# Patient Record
Sex: MALE | Race: WHITE | NOT HISPANIC OR LATINO | Employment: OTHER | URBAN - NONMETROPOLITAN AREA
[De-identification: names, ages, dates, MRNs, and addresses within clinical notes are randomized per-mention and may not be internally consistent; named-entity substitution may affect disease eponyms.]

---

## 2017-01-19 DIAGNOSIS — I10 BENIGN ESSENTIAL HYPERTENSION: Primary | Chronic | ICD-10-CM

## 2017-01-19 DIAGNOSIS — I10 HTN, GOAL BELOW 140/90: ICD-10-CM

## 2017-01-19 NOTE — TELEPHONE ENCOUNTER
HYDROCHLOROTHIAZIDE      Last Written Prescription Date: 12/23/16  Last Fill Quantity: 30, # refills: 0  Last Office Visit with [facility], [facility] or [facility] prescribing provider: 3/15/16       POTASSIUM   Date Value Ref Range Status   03/16/2016 4.2 3.4 - 5.3 mmol/L Final     CREATININE   Date Value Ref Range Status   03/16/2016 1.02 0.66 - 1.25 mg/dL Final     BP Readings from Last 3 Encounters:   09/15/16 142/74   06/16/16 141/65   03/15/16 132/66     LISINOPRIL      Last Written Prescription Date: 10/7/16  Last Fill Quantity: 30, # refills: 0  Last Office Visit with [facility], [facility] or [facility] prescribing provider: 3/15/16       POTASSIUM   Date Value Ref Range Status   03/16/2016 4.2 3.4 - 5.3 mmol/L Final     CREATININE   Date Value Ref Range Status   03/16/2016 1.02 0.66 - 1.25 mg/dL Final     BP Readings from Last 3 Encounters:   09/15/16 142/74   06/16/16 141/65   03/15/16 132/66

## 2017-01-20 ENCOUNTER — ALLIED HEALTH/NURSE VISIT (OUTPATIENT)
Dept: FAMILY MEDICINE | Facility: CLINIC | Age: 77
End: 2017-01-20
Payer: COMMERCIAL

## 2017-01-20 VITALS — SYSTOLIC BLOOD PRESSURE: 142 MMHG | RESPIRATION RATE: 16 BRPM | DIASTOLIC BLOOD PRESSURE: 68 MMHG | HEART RATE: 88 BPM

## 2017-01-20 DIAGNOSIS — E78.5 HYPERLIPIDEMIA WITH TARGET LDL LESS THAN 130: Primary | Chronic | ICD-10-CM

## 2017-01-20 DIAGNOSIS — I10 BENIGN ESSENTIAL HYPERTENSION: Chronic | ICD-10-CM

## 2017-01-20 DIAGNOSIS — R73.9 ELEVATED BLOOD SUGAR: Primary | ICD-10-CM

## 2017-01-20 LAB
ANION GAP SERPL CALCULATED.3IONS-SCNC: 8 MMOL/L (ref 3–14)
BUN SERPL-MCNC: 19 MG/DL (ref 7–30)
CALCIUM SERPL-MCNC: 9.1 MG/DL (ref 8.5–10.1)
CHLORIDE SERPL-SCNC: 104 MMOL/L (ref 94–109)
CO2 SERPL-SCNC: 27 MMOL/L (ref 20–32)
CREAT SERPL-MCNC: 1.04 MG/DL (ref 0.66–1.25)
GFR SERPL CREATININE-BSD FRML MDRD: 70 ML/MIN/1.7M2
GLUCOSE SERPL-MCNC: 139 MG/DL (ref 70–99)
POTASSIUM SERPL-SCNC: 3.8 MMOL/L (ref 3.4–5.3)
SODIUM SERPL-SCNC: 139 MMOL/L (ref 133–144)

## 2017-01-20 PROCEDURE — 99207 ZZC NO CHARGE NURSE ONLY: CPT

## 2017-01-20 PROCEDURE — 36415 COLL VENOUS BLD VENIPUNCTURE: CPT | Performed by: NURSE PRACTITIONER

## 2017-01-20 PROCEDURE — 80048 BASIC METABOLIC PNL TOTAL CA: CPT | Performed by: NURSE PRACTITIONER

## 2017-01-20 NOTE — PROGRESS NOTES
Liu Ragsdale is a 76 year old male who comes in today for a Blood Pressure check because of ongoing blood pressure monitoring.    *Document pulse and BP  *Use new set of vitals button for multiple readings.  *Use extended vitals for orthostatic    Vitals as recorded, a regular cuff was used.    Patient is taking medication as prescribed  Patient is tolerating medications well.  Patient is monitoring Blood Pressure at home.  Average readings if yes are 130-140/70s    Current complaints: dry mouth sometimes    Disposition: patient to continue with the same medication.  Labs today.  Blood pressure goal is150/90

## 2017-01-20 NOTE — PROGRESS NOTES
Quick Note:    BMP is normal, except blood glucose is elevated. Repeat blood glucose in 2 weeks. Come in fasting for 8 hours.  Please send him a copy of lab/test results.   Thanks. CECE Cline      ______

## 2017-01-23 DIAGNOSIS — R73.9 ELEVATED BLOOD SUGAR: ICD-10-CM

## 2017-01-23 LAB
GLUCOSE SERPL-MCNC: 89 MG/DL (ref 70–99)
HBA1C MFR BLD: 6 % (ref 4.3–6)

## 2017-01-23 PROCEDURE — 36415 COLL VENOUS BLD VENIPUNCTURE: CPT | Performed by: NURSE PRACTITIONER

## 2017-01-23 PROCEDURE — 82947 ASSAY GLUCOSE BLOOD QUANT: CPT | Performed by: NURSE PRACTITIONER

## 2017-01-23 PROCEDURE — 83036 HEMOGLOBIN GLYCOSYLATED A1C: CPT | Performed by: NURSE PRACTITIONER

## 2017-01-23 RX ORDER — HYDROCHLOROTHIAZIDE 25 MG/1
25 TABLET ORAL DAILY
Qty: 30 TABLET | Refills: 1 | Status: SHIPPED | OUTPATIENT
Start: 2017-01-23 | End: 2017-02-17

## 2017-01-23 RX ORDER — LISINOPRIL 30 MG/1
30 TABLET ORAL DAILY
Qty: 30 TABLET | Refills: 1 | Status: SHIPPED | OUTPATIENT
Start: 2017-01-23 | End: 2017-02-17

## 2017-01-23 NOTE — TELEPHONE ENCOUNTER
Patient came into clinic for a blood pressure check.  Prescription approved per [facility] Refill Protocol.    Ryann TOMLIN RN

## 2017-01-29 NOTE — PROGRESS NOTES
Quick Note:    Dear Liu,  Glucose normal  Hemoglobin A1c normal  No further follow-up at this time.   Please contact our clinic via phone or My Chart if you have any questions or concerns.  Thanks,  CECE Cline      ______

## 2017-02-17 ENCOUNTER — OFFICE VISIT (OUTPATIENT)
Dept: FAMILY MEDICINE | Facility: CLINIC | Age: 77
End: 2017-02-17
Payer: COMMERCIAL

## 2017-02-17 VITALS
SYSTOLIC BLOOD PRESSURE: 136 MMHG | HEIGHT: 63 IN | DIASTOLIC BLOOD PRESSURE: 68 MMHG | HEART RATE: 86 BPM | WEIGHT: 188 LBS | BODY MASS INDEX: 33.31 KG/M2 | TEMPERATURE: 98.2 F

## 2017-02-17 DIAGNOSIS — E78.5 HYPERLIPIDEMIA WITH TARGET LDL LESS THAN 130: Primary | Chronic | ICD-10-CM

## 2017-02-17 DIAGNOSIS — I10 BENIGN ESSENTIAL HYPERTENSION: Chronic | ICD-10-CM

## 2017-02-17 PROCEDURE — 99213 OFFICE O/P EST LOW 20 MIN: CPT | Performed by: NURSE PRACTITIONER

## 2017-02-17 RX ORDER — HYDROCHLOROTHIAZIDE 25 MG/1
25 TABLET ORAL DAILY
Qty: 90 TABLET | Refills: 3 | Status: SHIPPED | OUTPATIENT
Start: 2017-02-17 | End: 2018-03-19

## 2017-02-17 RX ORDER — LISINOPRIL 30 MG/1
30 TABLET ORAL DAILY
Qty: 90 TABLET | Refills: 3 | Status: SHIPPED | OUTPATIENT
Start: 2017-02-17 | End: 2018-03-19

## 2017-02-17 NOTE — NURSING NOTE
"Chief Complaint   Patient presents with     Hypertension       Initial /68  Pulse 86  Temp 98.2  F (36.8  C) (Tympanic)  Wt 188 lb (85.3 kg)  BMI 33.3 kg/m2 Estimated body mass index is 33.3 kg/(m^2) as calculated from the following:    Height as of 6/16/16: 5' 3\" (1.6 m).    Weight as of this encounter: 188 lb (85.3 kg).  Medication Reconciliation: complete  "

## 2017-02-17 NOTE — PATIENT INSTRUCTIONS
BP looks great  Labs are up-to-date  Lisinopril and HCTZ refilled  Recheck in 1 year  Continue to eat healthy and stay physically active  Recheck a fasting cholesterol in September 2017    Prevention Guidelines, Men Ages 65 and Older  Screening tests and vaccines are an important part of managing your health. Health counseling is essential, too. Below are guidelines for these, for men ages 65 and older. Talk with your healthcare provider to make sure you re up-to-date on what you need.  Screening Who needs it How often   Abdominal aortic aneurysm Men ages 65 to 75 who have ever smoked 1 ultrasound   Alcohol misuse All men in this age group At routine exams   Blood pressure All men in this age group Every 2 years if your blood pressure is less than 120/80 mm Hg; yearly if your systolic blood pressure is 120 to 139 mm Hg, or your diastolic blood pressure reading is 80 to 89 mm Hg   Colorectal cancer All men in this age group Flexible sigmoidoscopy every 5 years, or colonoscopy every 10 years, or double-contrast barium enema every 5 years; yearly fecal occult blood test or fecal immunochemical test; or a stool DNA test as often as your healthcare provider advises; talk with your healthcare provider about which tests are best for you   Depression All men in this age group At routine exams   Type 2 diabetes or prediabetes All adults beginning at age 45 and adults without symptoms at any age who are overweight or obese and have 1 or more other risk factors for diabetes At least every 3 years   Hepatitis C Men at increased risk for infection - talk with your healthcare provider At routine exams   High cholesterol or triglycerides All men in this age group At least every 5 years   HIV Men at increased risk for infection - talk with your healthcare provider At routine exams   Lung cancer Adults ages 55 to 80 who have smoked Yearly screening in smokers with 30 pack-year history of smoking or who quit within 15 years   Obesity  All men in this age group At routine exams   Prostate cancer All men in this age group, talk to healthcare provider about risks and benefits of digital rectal exam (LESLEE) and prostate-specific antigen (PSA) screening1 At routine exams   Syphilis Men at increased risk for infection - talk with your healthcare provider At routine exams   Tuberculosis Men at increased risk for infection - talk with your healthcare provider Ask your healthcare provider   Vision All men in this age group Every 1 to 2 years; if you have a chronic health condition, ask your healthcare provider if you needs exams more often   Vaccine Who needs it How often   Chickenpox (varicella) All men in this age group who have no record of this infection or vaccine 2 doses; second dose should be given at least 4 weeks after the first dose   Hepatitis A Men at increased risk for infection - talk with your healthcare provider 2 doses given at least 6 months apart   Hepatitis B Men at increased risk for infection - talk with your healthcare provider 3 doses over 6 months; second dose should be given 1 month after the first dose; the third dose should be given at least 2 months after the second dose and at least 4 months after the first dose   Haemophilus influenzae Type B (HIB) Men at increased risk for infection - talk with your healthcare provider 1 to 3 doses   Influenza (flu) All men in this age group  Once a year   Meningococcal Men at increased risk for infection - talk with your healthcare provider 1 or more doses   Pneumococcal conjugate vaccine (PCV13) and pneumococcal polysaccharide vaccine (PPSV23) All men in this age group 1 dose of each vaccine   Tetanus/diphtheria/  pertussis (Td/Tdap) booster All men in this age group Td every 10 years, or Tdap if you will have contact with a child younger than 12 months old   Zoster All men in this age group 1 dose   Counseling Who needs it How often   Diet and exercise Men who are overweight or obese When  diagnosed, and then at routine exams   Fall prevention (exercise, vitamin D supplements) All men in this age group At routine exams   Sexually transmitted infection Men at increased risk for infection - talk with your healthcare provider At routine exams   Use of daily aspirin Men ages 45 to 79 at risk for cardiovascular health problems At routine exams   Use of tobacco and the health affects it can cause All men in this age group Every visit   [address] [place] [address] [facility]     9628-4595 [employer] [address] [place] [address], [place] [address]. All rights reserved. This information is not intended as a substitute for professional medical care. Always follow your healthcare professional's instructions.

## 2017-02-17 NOTE — PROGRESS NOTES
"  SUBJECTIVE:                                                    Liu Ragsdale is a 76 year old male who presents to clinic today for the following health issues:      Hypertension Follow-up      Outpatient blood pressures are being checked at home.  Results are 138-60-70.    Low Salt Diet: no added salt       Amount of exercise or physical activity: None    Problems taking medications regularly: No    Medication side effects: Post nasal drip? Dry mouth?     Diet: regular (no restrictions) and low salt    BP Readings from Last 6 Encounters:   02/17/17 136/68   01/20/17 142/68   09/15/16 142/74   06/16/16 141/65   03/15/16 132/66   04/07/15 141/69       HPI:     Patient Active Problem List   Diagnosis     Carotid bruit     Hyperlipidemia with target LDL less than 130     Benign essential hypertension, BP goal <150/90     Obesity     FANI (obstructive sleep apnea)       Current Outpatient Prescriptions:      lisinopril (PRINIVIL,ZESTRIL) 30 MG tablet, Take 1 tablet (30 mg) by mouth daily, Disp: 30 tablet, Rfl: 1     hydrochlorothiazide (HYDRODIURIL) 25 MG tablet, Take 1 tablet (25 mg) by mouth daily, Disp: 30 tablet, Rfl: 1     atorvastatin (LIPITOR) 20 MG tablet, Take 1 tablet (20 mg) by mouth daily, Disp: 90 tablet, Rfl: 3     aspirin 81 MG EC tablet, Take 81 mg by mouth daily., Disp: , Rfl:      MULTIVITAMINS/MINERALS TABS   OR, 1 OZ  DAILY, Disp: , Rfl:   Labs reviewed in EPIC  Problem list, Medication list, Allergies, and Medical/Social/Surgical histories reviewed in [facility] and updated as appropriate.      ROS:  Constitutional, HEENT, cardiovascular, pulmonary, gi and gu systems are negative, except as otherwise noted.  CV: NEGATIVE for chest pain, palpitations or peripheral edema and Hx HTN    PHYSICAL EXAM:   /68  Pulse 86  Temp 98.2  F (36.8  C) (Tympanic)  Ht 5' 3\" (1.6 m)  Wt 188 lb (85.3 kg)  BMI 33.3 kg/m2  Body mass index is 33.3 kg/(m^2).  GENERAL APPEARANCE: healthy, alert, no distress and over " weight  NECK: no adenopathy, no asymmetry, masses, or scars and thyroid normal to palpation  RESP: lungs clear to auscultation - no rales, rhonchi or wheezes  CV: regular rates and rhythm, normal S1 S2, no S3 or S4 and no murmur, click or rub  ABDOMEN: soft, nontender, without hepatosplenomegaly or masses, bowel sounds normal and protuberant  MS: extremities normal- no gross deformities noted  SKIN: no suspicious lesions or rashes  PSYCH: mentation appears normal and affect normal/bright      ASSESSMENT & PLAN:   Liu was seen today for hypertension.    Diagnoses and all orders for this visit:    Benign essential hypertension, BP goal <150/90  -     lisinopril (PRINIVIL,ZESTRIL) 30 MG tablet; Take 1 tablet (30 mg) by mouth daily    HTN, goal below 140/90  -     hydrochlorothiazide (HYDRODIURIL) 25 MG tablet; Take 1 tablet (25 mg) by mouth daily    Hyperlipidemia with target LDL less than 130      (E78.5) Hyperlipidemia with target LDL less than 130  (primary encounter diagnosis)  Comment: chronic, controlled  Plan: Lipid panel reflex to direct LDL        Continue Atorvastatin    (I10) Benign essential hypertension, BP goal <150/90  Comment: chronic, controlled  Plan: lisinopril (PRINIVIL,ZESTRIL) 30 MG tablet,         hydrochlorothiazide (HYDRODIURIL) 25 MG tablet        refill      Patient Instructions     BP looks great  Labs are up-to-date  Lisinopril and HCTZ refilled  Recheck in 1 year  Continue to eat healthy and stay physically active  Recheck a fasting cholesterol in September 2017    Prevention Guidelines, Men Ages 65 and Older  Screening tests and vaccines are an important part of managing your health. Health counseling is essential, too. Below are guidelines for these, for men ages 65 and older. Talk with your healthcare provider to make sure you re up-to-date on what you need.  Screening Who needs it How often   Abdominal aortic aneurysm Men ages 65 to 75 who have ever smoked 1 ultrasound   Alcohol  misuse All men in this age group At routine exams   Blood pressure All men in this age group Every 2 years if your blood pressure is less than 120/80 mm Hg; yearly if your systolic blood pressure is 120 to 139 mm Hg, or your diastolic blood pressure reading is 80 to 89 mm Hg   Colorectal cancer All men in this age group Flexible sigmoidoscopy every 5 years, or colonoscopy every 10 years, or double-contrast barium enema every 5 years; yearly fecal occult blood test or fecal immunochemical test; or a stool DNA test as often as your healthcare provider advises; talk with your healthcare provider about which tests are best for you   Depression All men in this age group At routine exams   Type 2 diabetes or prediabetes All adults beginning at age 45 and adults without symptoms at any age who are overweight or obese and have 1 or more other risk factors for diabetes At least every 3 years   Hepatitis C Men at increased risk for infection - talk with your healthcare provider At routine exams   High cholesterol or triglycerides All men in this age group At least every 5 years   HIV Men at increased risk for infection - talk with your healthcare provider At routine exams   Lung cancer Adults ages 55 to 80 who have smoked Yearly screening in smokers with 30 pack-year history of smoking or who quit within 15 years   Obesity All men in this age group At routine exams   Prostate cancer All men in this age group, talk to healthcare provider about risks and benefits of digital rectal exam (LESLEE) and prostate-specific antigen (PSA) screening1 At routine exams   Syphilis Men at increased risk for infection - talk with your healthcare provider At routine exams   Tuberculosis Men at increased risk for infection - talk with your healthcare provider Ask your healthcare provider   Vision All men in this age group Every 1 to 2 years; if you have a chronic health condition, ask your healthcare provider if you needs exams more often    Vaccine Who needs it How often   Chickenpox (varicella) All men in this age group who have no record of this infection or vaccine 2 doses; second dose should be given at least 4 weeks after the first dose   Hepatitis A Men at increased risk for infection - talk with your healthcare provider 2 doses given at least 6 months apart   Hepatitis B Men at increased risk for infection - talk with your healthcare provider 3 doses over 6 months; second dose should be given 1 month after the first dose; the third dose should be given at least 2 months after the second dose and at least 4 months after the first dose   Haemophilus influenzae Type B (HIB) Men at increased risk for infection - talk with your healthcare provider 1 to 3 doses   Influenza (flu) All men in this age group  Once a year   Meningococcal Men at increased risk for infection - talk with your healthcare provider 1 or more doses   Pneumococcal conjugate vaccine (PCV13) and pneumococcal polysaccharide vaccine (PPSV23) All men in this age group 1 dose of each vaccine   Tetanus/diphtheria/  pertussis (Td/Tdap) booster All men in this age group Td every 10 years, or Tdap if you will have contact with a child younger than 12 months old   Zoster All men in this age group 1 dose   Counseling Who needs it How often   Diet and exercise Men who are overweight or obese When diagnosed, and then at routine exams   Fall prevention (exercise, vitamin D supplements) All men in this age group At routine exams   Sexually transmitted infection Men at increased risk for infection - talk with your healthcare provider At routine exams   Use of daily aspirin Men ages 45 to 79 at risk for cardiovascular health problems At routine exams   Use of tobacco and the health affects it can cause All men in this age group Every visit   [address] [place] [address] [facility]     [id number] [employer] [address] [place] [address], [place] [address]. All rights reserved. This  information is not intended as a substitute for professional medical care. Always follow your healthcare professional's instructions.          Risks, benefits, side effects and rationale for treatment plan fully discussed with the patient and understanding expressed.    CECE [facility]

## 2017-02-17 NOTE — MR AVS SNAPSHOT
After Visit Summary   2/17/2017    Liu Ragsdale    MRN: [id number]           Patient Information     Date Of Birth          1940        Visit Information        Provider Department      2/17/2017 12:40 PM Shania Schneider CNP [facility]        Today's Diagnoses     Hyperlipidemia with target LDL less than 130    -  1    Benign essential hypertension, BP goal <150/90          Care Instructions    BP looks great  Labs are up-to-date  Lisinopril and HCTZ refilled  Recheck in 1 year  Continue to eat healthy and stay physically active  Recheck a fasting cholesterol in September 2017    Prevention Guidelines, Men Ages 65 and Older  Screening tests and vaccines are an important part of managing your health. Health counseling is essential, too. Below are guidelines for these, for men ages 65 and older. Talk with your healthcare provider to make sure you re up-to-date on what you need.  Screening Who needs it How often   Abdominal aortic aneurysm Men ages 65 to 75 who have ever smoked 1 ultrasound   Alcohol misuse All men in this age group At routine exams   Blood pressure All men in this age group Every 2 years if your blood pressure is less than 120/80 mm Hg; yearly if your systolic blood pressure is 120 to 139 mm Hg, or your diastolic blood pressure reading is 80 to 89 mm Hg   Colorectal cancer All men in this age group Flexible sigmoidoscopy every 5 years, or colonoscopy every 10 years, or double-contrast barium enema every 5 years; yearly fecal occult blood test or fecal immunochemical test; or a stool DNA test as often as your healthcare provider advises; talk with your healthcare provider about which tests are best for you   Depression All men in this age group At routine exams   Type 2 diabetes or prediabetes All adults beginning at age 45 and adults without symptoms at any age who are overweight or obese and have 1 or more other risk factors for diabetes At least every 3  years   Hepatitis C Men at increased risk for infection - talk with your healthcare provider At routine exams   High cholesterol or triglycerides All men in this age group At least every 5 years   HIV Men at increased risk for infection - talk with your healthcare provider At routine exams   Lung cancer Adults ages 55 to 80 who have smoked Yearly screening in smokers with 30 pack-year history of smoking or who quit within 15 years   Obesity All men in this age group At routine exams   Prostate cancer All men in this age group, talk to healthcare provider about risks and benefits of digital rectal exam (LESLEE) and prostate-specific antigen (PSA) screening1 At routine exams   Syphilis Men at increased risk for infection - talk with your healthcare provider At routine exams   Tuberculosis Men at increased risk for infection - talk with your healthcare provider Ask your healthcare provider   Vision All men in this age group Every 1 to 2 years; if you have a chronic health condition, ask your healthcare provider if you needs exams more often   Vaccine Who needs it How often   Chickenpox (varicella) All men in this age group who have no record of this infection or vaccine 2 doses; second dose should be given at least 4 weeks after the first dose   Hepatitis A Men at increased risk for infection - talk with your healthcare provider 2 doses given at least 6 months apart   Hepatitis B Men at increased risk for infection - talk with your healthcare provider 3 doses over 6 months; second dose should be given 1 month after the first dose; the third dose should be given at least 2 months after the second dose and at least 4 months after the first dose   Haemophilus influenzae Type B (HIB) Men at increased risk for infection - talk with your healthcare provider 1 to 3 doses   Influenza (flu) All men in this age group  Once a year   Meningococcal Men at increased risk for infection - talk with your healthcare provider 1 or more  doses   Pneumococcal conjugate vaccine (PCV13) and pneumococcal polysaccharide vaccine (PPSV23) All men in this age group 1 dose of each vaccine   Tetanus/diphtheria/  pertussis (Td/Tdap) booster All men in this age group Td every 10 years, or Tdap if you will have contact with a child younger than 12 months old   Zoster All men in this age group 1 dose   Counseling Who needs it How often   Diet and exercise Men who are overweight or obese When diagnosed, and then at routine exams   Fall prevention (exercise, vitamin D supplements) All men in this age group At routine exams   Sexually transmitted infection Men at increased risk for infection - talk with your healthcare provider At routine exams   Use of daily aspirin Men ages 45 to 79 at risk for cardiovascular health problems At routine exams   Use of tobacco and the health affects it can cause All men in this age group Every visit   [address] [place] [address] [facility]     4175-8901 [employer]. [address] [place] [address]. All rights reserved. This information is not intended as a substitute for professional medical care. Always follow your healthcare professional's instructions.              Follow-ups after your visit        Future tests that were ordered for you today     Open Future Orders        Priority Expected Expires Ordered    Lipid panel reflex to direct LDL Routine  2/17/2018 2/17/2017            Who to contact     If you have questions or need follow up information about today's clinic visit or your schedule please contact [facility] directly at [phone].  Normal or non-critical lab and imaging results will be communicated to you by MyChart, letter or phone within 4 business days after the clinic has received the results. If you do not hear from us within 7 days, please contact the clinic through MyChart or phone. If you have a critical or abnormal lab result, we will notify you by phone as soon as  "possible.  Submit refill requests through [employer] or call your pharmacy and they will forward the refill request to us. Please allow 3 business days for your refill to be completed.          Additional Information About Your Visit        [employer] Information     [employer] gives you secure access to your electronic health record. If you see a primary care provider, you can also send messages to your care team and make appointments. If you have questions, please call your primary care clinic.  If you do not have a primary care provider, please call [phone] and they will assist you.        Care EveryWhere ID     This is your Care EveryWhere ID. This could be used by other organizations to access your [facility] medical records  [id number]        Your Vitals Were     Pulse Temperature Height BMI (Body Mass Index)          86 98.2  F (36.8  C) (Tympanic) 5' 3\" (1.6 m) 33.3 kg/m2         Blood Pressure from Last 3 Encounters:   02/17/17 136/68   01/20/17 142/68   09/15/16 142/74    Weight from Last 3 Encounters:   02/17/17 188 lb (85.3 kg)   06/16/16 180 lb (81.6 kg)   03/15/16 186 lb (84.4 kg)                 Where to get your medicines      These medications were sent to [facility] [address] [place] [address] [address] [place] [address]  [address] [facility] [address]     Phone:  [phone]     hydrochlorothiazide 25 MG tablet    lisinopril 30 MG tablet          Primary Care Provider Office Phone # Fax #    Shania Schneider [facility] [phone]       [facility] [address] [facility] [address]        Thank you!     Thank you for choosing [facility]  for your care. Our goal is always to provide you with excellent care. Hearing back from our patients is one way we can continue to improve our services. Please take a few minutes to complete the written survey that you may receive in the mail after your visit with us. Thank you!             Your Updated Medication " List - Protect others around you: Learn how to safely use, store and throw away your medicines at www.disposemymeds.org.          This list is accurate as of: 2/17/17  1:04 PM.  Always use your most recent med list.                   Brand Name Dispense Instructions for use    aspirin 81 MG EC tablet      Take 81 mg by mouth daily.       atorvastatin 20 MG tablet    LIPITOR    90 tablet    Take 1 tablet (20 mg) by mouth daily       hydrochlorothiazide 25 MG tablet    HYDRODIURIL    90 tablet    Take 1 tablet (25 mg) by mouth daily       lisinopril 30 MG tablet    PRINIVIL,ZESTRIL    90 tablet    Take 1 tablet (30 mg) by mouth daily       MULTIVITAMINS/MINERALS TABS   OR      1 OZ  DAILY

## 2017-11-06 DIAGNOSIS — E78.5 HYPERLIPIDEMIA WITH TARGET LDL LESS THAN 130: Chronic | ICD-10-CM

## 2017-11-06 RX ORDER — ATORVASTATIN CALCIUM 20 MG/1
TABLET, FILM COATED ORAL
Qty: 90 TABLET | Refills: 0 | Status: SHIPPED | OUTPATIENT
Start: 2017-11-06 | End: 2017-11-09

## 2017-11-08 DIAGNOSIS — E78.5 HYPERLIPIDEMIA WITH TARGET LDL LESS THAN 130: Chronic | ICD-10-CM

## 2017-11-08 LAB
CHOLEST SERPL-MCNC: 136 MG/DL
HDLC SERPL-MCNC: 69 MG/DL
LDLC SERPL CALC-MCNC: 49 MG/DL
NONHDLC SERPL-MCNC: 67 MG/DL
TRIGL SERPL-MCNC: 88 MG/DL

## 2017-11-08 PROCEDURE — 80061 LIPID PANEL: CPT | Performed by: NURSE PRACTITIONER

## 2017-11-08 PROCEDURE — 36415 COLL VENOUS BLD VENIPUNCTURE: CPT | Performed by: NURSE PRACTITIONER

## 2017-11-08 NOTE — LETTER
November 9, 2017      Liu Gouldvoy  [address] [facility] [address]        Dear ,    We are writing to inform you of your test results.    Normal lipids. Refilled Lipitor 20 mg daily for 1 year.     Resulted Orders   Lipid panel reflex to direct LDL   Result Value Ref Range    Cholesterol 136 <200 mg/dL    Triglycerides 88 <150 mg/dL    HDL Cholesterol 69 >39 mg/dL    LDL Cholesterol Calculated 49 <100 mg/dL      Comment:      Desirable:       <100 mg/dl    Non HDL Cholesterol 67 <130 mg/dL       If you have any questions or concerns, please call the clinic at the number listed above.       Sincerely,        [facility]

## 2017-11-09 DIAGNOSIS — E78.5 HYPERLIPIDEMIA WITH TARGET LDL LESS THAN 130: Chronic | ICD-10-CM

## 2017-11-09 RX ORDER — ATORVASTATIN CALCIUM 20 MG/1
20 TABLET, FILM COATED ORAL DAILY
Qty: 90 TABLET | Refills: 3 | Status: SHIPPED | OUTPATIENT
Start: 2017-11-09 | End: 2018-04-06

## 2017-11-09 NOTE — PROGRESS NOTES
Dear Liu,  Normal lipids. Refilled Lipitor 20 mg daily for 1 year.   Please contact our clinic via phone or My Chart if you have any questions or concerns.  Thanks,  CECE Cline

## 2018-01-22 ENCOUNTER — HOSPITAL ENCOUNTER (EMERGENCY)
Facility: CLINIC | Age: 78
Discharge: HOME | End: 2018-01-22
Attending: NURSE PRACTITIONER | Admitting: NURSE PRACTITIONER
Payer: COMMERCIAL

## 2018-01-22 ENCOUNTER — APPOINTMENT (OUTPATIENT)
Dept: GENERAL RADIOLOGY | Facility: CLINIC | Age: 78
End: 2018-01-22
Attending: NURSE PRACTITIONER
Payer: COMMERCIAL

## 2018-01-22 VITALS
DIASTOLIC BLOOD PRESSURE: 66 MMHG | WEIGHT: 189 LBS | OXYGEN SATURATION: 95 % | TEMPERATURE: 99.2 F | SYSTOLIC BLOOD PRESSURE: 130 MMHG | BODY MASS INDEX: 33.49 KG/M2 | HEIGHT: 63 IN | HEART RATE: 93 BPM | RESPIRATION RATE: 18 BRPM

## 2018-01-22 DIAGNOSIS — R55 VASOVAGAL SYNCOPE: ICD-10-CM

## 2018-01-22 DIAGNOSIS — J18.9 PNEUMONIA OF LEFT LOWER LOBE DUE TO INFECTIOUS ORGANISM: ICD-10-CM

## 2018-01-22 LAB
ALBUMIN SERPL-MCNC: 3.3 G/DL (ref 3.4–5)
ALBUMIN UR-MCNC: NEGATIVE MG/DL
ALP SERPL-CCNC: 113 U/L (ref 40–150)
ALT SERPL W P-5'-P-CCNC: 29 U/L (ref 0–70)
ANION GAP SERPL CALCULATED.3IONS-SCNC: 10 MMOL/L (ref 3–14)
APPEARANCE UR: CLEAR
AST SERPL W P-5'-P-CCNC: 19 U/L (ref 0–45)
BASOPHILS # BLD AUTO: 0 10E9/L (ref 0–0.2)
BASOPHILS NFR BLD AUTO: 0.1 %
BILIRUB SERPL-MCNC: 1 MG/DL (ref 0.2–1.3)
BILIRUB UR QL STRIP: NEGATIVE
BUN SERPL-MCNC: 16 MG/DL (ref 7–30)
CALCIUM SERPL-MCNC: 8.6 MG/DL (ref 8.5–10.1)
CHLORIDE SERPL-SCNC: 100 MMOL/L (ref 94–109)
CO2 SERPL-SCNC: 22 MMOL/L (ref 20–32)
COLOR UR AUTO: YELLOW
CREAT SERPL-MCNC: 1.05 MG/DL (ref 0.66–1.25)
DIFFERENTIAL METHOD BLD: ABNORMAL
EOSINOPHIL # BLD AUTO: 0 10E9/L (ref 0–0.7)
EOSINOPHIL NFR BLD AUTO: 0.1 %
ERYTHROCYTE [DISTWIDTH] IN BLOOD BY AUTOMATED COUNT: 12.3 % (ref 10–15)
FLUAV+FLUBV AG SPEC QL: NEGATIVE
FLUAV+FLUBV AG SPEC QL: NEGATIVE
GFR SERPL CREATININE-BSD FRML MDRD: 69 ML/MIN/1.7M2
GLUCOSE SERPL-MCNC: 111 MG/DL (ref 70–99)
GLUCOSE UR STRIP-MCNC: NEGATIVE MG/DL
HCT VFR BLD AUTO: 39.7 % (ref 40–53)
HGB BLD-MCNC: 13.5 G/DL (ref 13.3–17.7)
HGB UR QL STRIP: NEGATIVE
IMM GRANULOCYTES # BLD: 0 10E9/L (ref 0–0.4)
IMM GRANULOCYTES NFR BLD: 0.3 %
KETONES UR STRIP-MCNC: 40 MG/DL
LEUKOCYTE ESTERASE UR QL STRIP: NEGATIVE
LYMPHOCYTES # BLD AUTO: 0.9 10E9/L (ref 0.8–5.3)
LYMPHOCYTES NFR BLD AUTO: 5.8 %
MCH RBC QN AUTO: 29 PG (ref 26.5–33)
MCHC RBC AUTO-ENTMCNC: 34 G/DL (ref 31.5–36.5)
MCV RBC AUTO: 85 FL (ref 78–100)
MONOCYTES # BLD AUTO: 1.2 10E9/L (ref 0–1.3)
MONOCYTES NFR BLD AUTO: 7.6 %
NEUTROPHILS # BLD AUTO: 13.2 10E9/L (ref 1.6–8.3)
NEUTROPHILS NFR BLD AUTO: 86.1 %
NITRATE UR QL: NEGATIVE
PH UR STRIP: 6 PH (ref 5–7)
PLATELET # BLD AUTO: 179 10E9/L (ref 150–450)
POTASSIUM SERPL-SCNC: 3.5 MMOL/L (ref 3.4–5.3)
PROT SERPL-MCNC: 7.9 G/DL (ref 6.8–8.8)
RBC # BLD AUTO: 4.65 10E12/L (ref 4.4–5.9)
SODIUM SERPL-SCNC: 132 MMOL/L (ref 133–144)
SOURCE: ABNORMAL
SP GR UR STRIP: 1.01 (ref 1–1.03)
SPECIMEN SOURCE: NORMAL
TROPONIN I SERPL-MCNC: <0.015 UG/L (ref 0–0.04)
UROBILINOGEN UR STRIP-MCNC: NORMAL MG/DL (ref 0–2)
WBC # BLD AUTO: 15.3 10E9/L (ref 4–11)

## 2018-01-22 PROCEDURE — 93005 ELECTROCARDIOGRAM TRACING: CPT | Performed by: NURSE PRACTITIONER

## 2018-01-22 PROCEDURE — 99284 EMERGENCY DEPT VISIT MOD MDM: CPT | Mod: Z6 | Performed by: NURSE PRACTITIONER

## 2018-01-22 PROCEDURE — 96360 HYDRATION IV INFUSION INIT: CPT | Performed by: NURSE PRACTITIONER

## 2018-01-22 PROCEDURE — 80053 COMPREHEN METABOLIC PANEL: CPT | Performed by: EMERGENCY MEDICINE

## 2018-01-22 PROCEDURE — 93010 ELECTROCARDIOGRAM REPORT: CPT | Mod: Z6 | Performed by: EMERGENCY MEDICINE

## 2018-01-22 PROCEDURE — 96361 HYDRATE IV INFUSION ADD-ON: CPT | Performed by: NURSE PRACTITIONER

## 2018-01-22 PROCEDURE — 85025 COMPLETE CBC W/AUTO DIFF WBC: CPT | Performed by: EMERGENCY MEDICINE

## 2018-01-22 PROCEDURE — 99285 EMERGENCY DEPT VISIT HI MDM: CPT | Mod: 25 | Performed by: NURSE PRACTITIONER

## 2018-01-22 PROCEDURE — 25000128 H RX IP 250 OP 636: Performed by: EMERGENCY MEDICINE

## 2018-01-22 PROCEDURE — 71046 X-RAY EXAM CHEST 2 VIEWS: CPT

## 2018-01-22 PROCEDURE — 81003 URINALYSIS AUTO W/O SCOPE: CPT | Performed by: NURSE PRACTITIONER

## 2018-01-22 PROCEDURE — 87804 INFLUENZA ASSAY W/OPTIC: CPT | Performed by: NURSE PRACTITIONER

## 2018-01-22 PROCEDURE — 84484 ASSAY OF TROPONIN QUANT: CPT | Performed by: NURSE PRACTITIONER

## 2018-01-22 RX ORDER — DOXYCYCLINE 100 MG/1
100 TABLET ORAL 2 TIMES DAILY
Qty: 20 TABLET | Refills: 0 | Status: SHIPPED | OUTPATIENT
Start: 2018-01-22 | End: 2018-02-01

## 2018-01-22 RX ORDER — SODIUM CHLORIDE 9 MG/ML
INJECTION, SOLUTION INTRAVENOUS CONTINUOUS
Status: DISCONTINUED | OUTPATIENT
Start: 2018-01-22 | End: 2018-01-22 | Stop reason: HOSPADM

## 2018-01-22 RX ADMIN — SODIUM CHLORIDE 1000 ML: 9 INJECTION, SOLUTION INTRAVENOUS at 12:04

## 2018-01-22 ASSESSMENT — ENCOUNTER SYMPTOMS
DYSURIA: 0
APPETITE CHANGE: 1
FEVER: 1
DIARRHEA: 1
NAUSEA: 0
ABDOMINAL PAIN: 0
SORE THROAT: 1
LIGHT-HEADEDNESS: 0
SHORTNESS OF BREATH: 0
CHEST TIGHTNESS: 0
FATIGUE: 1
HEADACHES: 0
CHILLS: 1
COUGH: 1
BACK PAIN: 0
VOMITING: 0

## 2018-01-22 NOTE — ED AVS SNAPSHOT
[facility]    [address] [facility] [address]    Phone:  [phone]    Fax:  [phone]                                       Liu Ragsdale   MRN: [id number]    Department:  [facility]   Date of Visit:  1/22/2018           Patient Information     Date Of Birth          1940        Your diagnoses for this visit were:     Pneumonia of left lower lobe due to infectious organism (H)     Vasovagal syncope        You were seen by Malathi Ruiz APRN CNP.      Follow-up Information     Follow up with Shania Schneider CNP. Schedule an appointment as soon as possible for a visit in 2 days.    Specialty:  Nurse Practitioner - Family    Contact information:    [address] [facility] [address]  [phone]          Discharge Instructions         Start Doxycyline 100mg twice daily for 10 days.  Drink plenty of fluids.  Follow-up for recheck in clinic this week.  Return to the emergency department for chest pain, shortness of breath, vomiting, any further fainting, or worsening symptoms.    Pneumonia (Adult)  Pneumonia is an infection deep within the lungs. It is in the small air sacs (alveoli). Pneumonia may be caused by a virus or bacteria. Pneumonia caused by bacteria is usually treated with an antibiotic. Severe cases may need to be treated in the hospital. Milder cases can be treated at home. Symptoms usually start to get better during the first 2 days of treatment.    Home care  Follow these guidelines when caring for yourself at home:    Rest at home for the first 2 to 3 days, or until you feel stronger. Don t let yourself get overly tired when you go back to your activities.    Stay away from cigarette smoke - yours or other people s.    You may use acetaminophen or ibuprofen to control fever or pain, unless another medicine was prescribed. If you have chronic liver or kidney disease, talk with your healthcare provider  before using these medicines. Also talk with your provider if you ve had a stomach ulcer or gastrointestinal bleeding. Don t give aspirin to anyone younger than 18 years of age who is ill with a fever. It may cause severe liver damage.    Your appetite may be poor, so a light diet is fine.    Drink 6 to 8 glasses of fluids every day to make sure you are getting enough fluids. Beverages can include water, sport drinks, sodas without caffeine, juices, tea, or soup. Fluids will help loosen secretions in the lung. This will make it easier for you to cough up the phlegm (sputum). If you also have heart or kidney disease, check with your healthcare provider before you drink extra fluids.    Take antibiotic medicine prescribed until it is all gone, even if you are feeling better after a few days.  Follow-up care  Follow up with your healthcare provider in the next 2 to 3 days, or as advised. This is to be sure the medicine is helping you get better.  If you are 65 or older, you should get a pneumococcal vaccine and a yearly flu (influenza) shot. You should also get these vaccines if you have chronic lung disease like asthma, emphysema, or COPD. Recently, a second type of pneumonia vaccine has become available for everyone over 65 years old. This is in addition to the previous vaccine. Ask your provider about this.  When to seek medical advice  Call your healthcare provider right away if any of these occur:    You don t get better within the first 48 hours of treatment    Shortness of breath gets worse    Rapid breathing (more than 25 breaths per minute)    Coughing up blood    Chest pain gets worse with breathing    Fever of 100.4 F (38 C) or higher that doesn t get better with fever medicine    Weakness, dizziness, or fainting that gets worse    Thirst or dry mouth that gets worse    Sinus pain, headache, or a stiff neck    Chest pain not caused by coughing  Date Last Reviewed: 1/1/2017 2000-2017 [employer]  [employer]. [address] [place] [address]. All rights reserved. This information is not intended as a substitute for professional medical care. Always follow your healthcare professional's instructions.          24 Hour Appointment Hotline       To make an appointment at any [facility], call [phone] ([phone]). If you don't have a family doctor or clinic, we will help you find one. [facility] clinics are conveniently located to serve the needs of you and your family.             Review of your medicines      START taking        Dose / Directions Last dose taken    doxycycline Monohydrate 100 MG Tabs   Dose:  100 mg   Quantity:  20 tablet        Take 100 mg by mouth 2 times daily for 10 days   Refills:  0          Our records show that you are taking the medicines listed below. If these are incorrect, please call your family doctor or clinic.        Dose / Directions Last dose taken    aspirin 81 MG EC tablet   Dose:  81 mg        Take 81 mg by mouth every other day To every third day   Refills:  0        atorvastatin 20 MG tablet   Commonly known as:  LIPITOR   Dose:  20 mg   Quantity:  90 tablet        Take 1 tablet (20 mg) by mouth daily   Refills:  3        hydrochlorothiazide 25 MG tablet   Commonly known as:  HYDRODIURIL   Dose:  25 mg   Quantity:  90 tablet        Take 1 tablet (25 mg) by mouth daily   Refills:  3        lisinopril 30 MG tablet   Commonly known as:  PRINIVIL,ZESTRIL   Dose:  30 mg   Quantity:  90 tablet        Take 1 tablet (30 mg) by mouth daily   Refills:  3        MULTI VITAMIN PO   Dose:  1 oz        Take 1 oz by mouth daily   Refills:  0                Prescriptions were sent or printed at these locations (1 Prescription)                   [facility] [address] [place] [address]    Telephone:  [phone]   Fax:  [phone]   Hours:                  E-Prescribed (1 of 1)         doxycycline Monohydrate 100 MG  TABS                Procedures and tests performed during your visit     CBC with platelets, differential    Comprehensive metabolic panel    EKG 12-lead, tracing only    Influenza A/B antigen    Peripheral IV catheter    Troponin I    UA reflex to Microscopic and Culture    XR Chest 2 Views      Orders Needing Specimen Collection     None      Pending Results     No orders found from 1/20/2018 to 1/23/2018.            Pending Culture Results     No orders found from 1/20/2018 to 1/23/2018.            Pending Results Instructions     If you had any lab results that were not finalized at the time of your Discharge, you can call the ED Lab Result RN at [phone]. You will be contacted by this team for any positive Lab results or changes in treatment. The nurses are available 7 days a week from 10A to 6:30P.  You can leave a message 24 hours per day and they will return your call.        Test Results From Your Hospital Stay        1/22/2018 12:08 PM      Component Results     Component Value Ref Range & Units Status    WBC 15.3 (H) 4.0 - 11.0 10e9/L Final    RBC Count 4.65 4.4 - 5.9 10e12/L Final    Hemoglobin 13.5 13.3 - 17.7 g/dL Final    Hematocrit 39.7 (L) 40.0 - 53.0 % Final    MCV 85 78 - 100 fl Final    MCH 29.0 26.5 - 33.0 pg Final    MCHC 34.0 31.5 - 36.5 g/dL Final    RDW 12.3 10.0 - 15.0 % Final    Platelet Count 179 150 - 450 10e9/L Final    Diff Method Automated Method  Final    % Neutrophils 86.1 % Final    % Lymphocytes 5.8 % Final    % Monocytes 7.6 % Final    % Eosinophils 0.1 % Final    % Basophils 0.1 % Final    % Immature Granulocytes 0.3 % Final    Absolute Neutrophil 13.2 (H) 1.6 - 8.3 10e9/L Final    Absolute Lymphocytes 0.9 0.8 - 5.3 10e9/L Final    Absolute Monocytes 1.2 0.0 - 1.3 10e9/L Final    Absolute Eosinophils 0.0 0.0 - 0.7 10e9/L Final    Absolute Basophils 0.0 0.0 - 0.2 10e9/L Final    Abs Immature Granulocytes 0.0 0 - 0.4 10e9/L Final         1/22/2018 12:29 PM      Component  Results     Component Value Ref Range & Units Status    Sodium 132 (L) 133 - 144 mmol/L Final    Potassium 3.5 3.4 - 5.3 mmol/L Final    Chloride 100 94 - 109 mmol/L Final    Carbon Dioxide 22 20 - 32 mmol/L Final    Anion Gap 10 3 - 14 mmol/L Final    Glucose 111 (H) 70 - 99 mg/dL Final    Urea Nitrogen 16 7 - 30 mg/dL Final    Creatinine 1.05 0.66 - 1.25 mg/dL Final    GFR Estimate 69 >60 mL/min/1.7m2 Final    Non  GFR Calc    GFR Estimate If Black 83 >60 mL/min/1.7m2 Final    African American GFR Calc    Calcium 8.6 8.5 - 10.1 mg/dL Final    Bilirubin Total 1.0 0.2 - 1.3 mg/dL Final    Albumin 3.3 (L) 3.4 - 5.0 g/dL Final    Protein Total 7.9 6.8 - 8.8 g/dL Final    Alkaline Phosphatase 113 40 - 150 U/L Final    ALT 29 0 - 70 U/L Final    AST 19 0 - 45 U/L Final         1/22/2018  1:28 PM      Component Results     Component Value Ref Range & Units Status    Color Urine Yellow  Final    Appearance Urine Clear  Final    Glucose Urine Negative NEG^Negative mg/dL Final    Bilirubin Urine Negative NEG^Negative Final    Ketones Urine 40 (A) NEG^Negative mg/dL Final    Specific Gravity Urine 1.009 1.003 - 1.035 Final    Blood Urine Negative NEG^Negative Final    pH Urine 6.0 5.0 - 7.0 pH Final    Protein Albumin Urine Negative NEG^Negative mg/dL Final    Urobilinogen mg/dL Normal 0.0 - 2.0 mg/dL Final    Nitrite Urine Negative NEG^Negative Final    Leukocyte Esterase Urine Negative NEG^Negative Final    Source Midstream Urine  Final         1/22/2018  2:46 PM      Narrative     XR CHEST 2 VW 1/22/2018 2:37 PM    COMPARISON: None.    HISTORY: Fever.        Impression     IMPRESSION: Left retrocardiac consolidation, concerning for aspiration  or infection. Right lung is clear. No pneumothorax or pleural effusion  seen on either side.    THIERRY PRATT MD         1/22/2018  2:30 PM      Component Results     Component Value Ref Range & Units Status    Influenza A/B Agn Specimen Nasal  Final    Influenza A  Negative NEG^Negative Final    Influenza B Negative NEG^Negative Final    Test results must be correlated with clinical data. If necessary, results   should be confirmed by a molecular assay or viral culture.           1/22/2018  3:18 PM      Component Results     Component Value Ref Range & Units Status    Troponin I ES <0.015 0.000 - 0.045 ug/L Final    The 99th percentile for upper reference range is 0.045 ug/L.  Troponin values   in the range of 0.045 - 0.120 ug/L may be associated with risks of adverse   clinical events.                  Thank you for choosing [facility]       Thank you for choosing [facility] for your care. Our goal is always to provide you with excellent care. Hearing back from our patients is one way we can continue to improve our services. Please take a few minutes to complete the written survey that you may receive in the mail after you visit with us. Thank you!        [employer] Information     [employer] gives you secure access to your electronic health record. If you see a primary care provider, you can also send messages to your care team and make appointments. If you have questions, please call your primary care clinic.  If you do not have a primary care provider, please call [phone] and they will assist you.        Care EveryWhere ID     This is your Care EveryWhere ID. This could be used by other organizations to access your [facility] medical records  [id number]        Equal Access to Services     MAGGIE SHANKAR : Fidelia booneo Stephanie, waaxda luqadaha, qaybta kaalmada octavio, matilda barrios. So [facility] [phone].    ATENCIÓN: Si habla español, tiene a sawyer disposición servicios gratuitos de asistencia lingüística. Llame al [phone].    We comply with applicable federal civil rights laws and [place] laws. We do not discriminate on the basis of race, color, national origin, age, disability, sex, sexual orientation, or gender identity.             After Visit Summary       This is your record. Keep this with you and show to your community pharmacist(s) and doctor(s) at your next visit.

## 2018-01-22 NOTE — ED NOTES
Patient states cough started  1 week ago   This am woke up with full blown  Flu symptoms  Patient had a syncopal episode  This Am  Passed out and slip down against wall was assisted by people to floor Patient was diaphoretic  Patient did loose control of stool at that time     Was at [facility]  To visit cousin. Patient has cough   Body aches now having Diarrhea  Feeling warn out and weak. Coughing up phlegm   Drainage down back of throat  Headache is mild  Chills

## 2018-01-22 NOTE — ED AVS SNAPSHOT
[facility] Emergency Department    [address] [facility] [address]    Phone:  [phone]    Fax:  [phone]                                       Liu Ragsdale   MRN: [id number]    Department:  [facility] Emergency Department   Date of Visit:  1/22/2018           After Visit Summary Signature Page     I have received my discharge instructions, and my questions have been answered. I have discussed any challenges I see with this plan with the nurse or doctor.    ..........................................................................................................................................  Patient/Patient Representative Signature      ..........................................................................................................................................  Patient Representative Print Name and Relationship to Patient    ..................................................               ................................................  Date                                            Time    ..........................................................................................................................................  Reviewed by Signature/Title    ...................................................              ..............................................  Date                                                            Time

## 2018-01-22 NOTE — DISCHARGE INSTRUCTIONS
Start Doxycyline 100mg twice daily for 10 days.  Drink plenty of fluids.  Follow-up for recheck in clinic this week.  Return to the emergency department for chest pain, shortness of breath, vomiting, any further fainting, or worsening symptoms.    Pneumonia (Adult)  Pneumonia is an infection deep within the lungs. It is in the small air sacs (alveoli). Pneumonia may be caused by a virus or bacteria. Pneumonia caused by bacteria is usually treated with an antibiotic. Severe cases may need to be treated in the hospital. Milder cases can be treated at home. Symptoms usually start to get better during the first 2 days of treatment.    Home care  Follow these guidelines when caring for yourself at home:    Rest at home for the first 2 to 3 days, or until you feel stronger. Don t let yourself get overly tired when you go back to your activities.    Stay away from cigarette smoke - yours or other people s.    You may use acetaminophen or ibuprofen to control fever or pain, unless another medicine was prescribed. If you have chronic liver or kidney disease, talk with your healthcare provider before using these medicines. Also talk with your provider if you ve had a stomach ulcer or gastrointestinal bleeding. Don t give aspirin to anyone younger than 18 years of age who is ill with a fever. It may cause severe liver damage.    Your appetite may be poor, so a light diet is fine.    Drink 6 to 8 glasses of fluids every day to make sure you are getting enough fluids. Beverages can include water, sport drinks, sodas without caffeine, juices, tea, or soup. Fluids will help loosen secretions in the lung. This will make it easier for you to cough up the phlegm (sputum). If you also have heart or kidney disease, check with your healthcare provider before you drink extra fluids.    Take antibiotic medicine prescribed until it is all gone, even if you are feeling better after a few days.  Follow-up care  Follow up with your  healthcare provider in the next 2 to 3 days, or as advised. This is to be sure the medicine is helping you get better.  If you are 65 or older, you should get a pneumococcal vaccine and a yearly flu (influenza) shot. You should also get these vaccines if you have chronic lung disease like asthma, emphysema, or COPD. Recently, a second type of pneumonia vaccine has become available for everyone over 65 years old. This is in addition to the previous vaccine. Ask your provider about this.  When to seek medical advice  Call your healthcare provider right away if any of these occur:    You don t get better within the first 48 hours of treatment    Shortness of breath gets worse    Rapid breathing (more than 25 breaths per minute)    Coughing up blood    Chest pain gets worse with breathing    Fever of 100.4 F (38 C) or higher that doesn t get better with fever medicine    Weakness, dizziness, or fainting that gets worse    Thirst or dry mouth that gets worse    Sinus pain, headache, or a stiff neck    Chest pain not caused by coughing  Date Last Reviewed: 1/1/2017 2000-2017 [employer]. [address] [place] [address], [place] [address]. All rights reserved. This information is not intended as a substitute for professional medical care. Always follow your healthcare professional's instructions.

## 2018-01-22 NOTE — ED PROVIDER NOTES
History     Chief Complaint   Patient presents with     Flu Symptoms     cough, flu symptoms for 4 days.  did have syncopal episode      Loss of Consciousness     had syncopal episode. Believes it to be due to dehydration.      Diarrhea     has had some stomach pains, with incont of stool and now diarrhea since LOC      HPI  Liu Ragsdale is a 77 year old male with history of htn, FANI, and right carotid stenosis who presents to the emergency department for evaluation of cough and influenza-like symptoms. Symptoms started 1 week ago with cough and congestion. Started to feel better until 3 days ago and coughing became worse. Loose stools for the last 2 days.  Decreased appetite. States cough is less productive. Patient was up early this morning at 5am to visit a relative in the hospital at [facility]. Patient was standing in the room with a face mask for approximately 10 minutes when he became syncopal. He did not completely go down to floor- was leaning on wall and nursing staff at the hospital came to his aide. Patient states he did have a loss of bowel control at the time. He returned to baseline within a few seconds. He declined assessment at [facility] and wanted to drive closer to home to be evaluated here. Denies chest pain or shortness of breath. Patient is currently denies any dizziness or lightheadedness.    Problem List:    Patient Active Problem List    Diagnosis Date Noted     FANI (obstructive sleep apnea) 03/15/2016     Priority: Medium     NOT using CPAP 3/15/16       Obesity 04/07/2015     Priority: Medium     BMI 33 (3/15/16)       Benign essential hypertension, BP goal <150/90 09/10/2014     Priority: Medium     Hyperlipidemia with target LDL less than 130 12/12/2013     Priority: Medium     Diagnosis updated by automated process. Provider to review and confirm.       Carotid bruit 03/30/2010     Priority: Medium     right carotid bruit on exam- u/s 7/09 with minimal stenosis on left and not well  "seen on right           Past Medical History:    Past Medical History:   Diagnosis Date     NO ACTIVE PROBLEMS        Past Surgical History:    Past Surgical History:   Procedure Laterality Date     COLONOSCOPY N/A 6/16/2016    Procedure: COLONOSCOPY;  Surgeon: Randy Avendano MD;  Location: [facility]     VASECTOMY  1981       Family History:    Family History   Problem Relation Age of Onset     DIABETES Sister      Obesity Son      Depression Sister      Thyroid Disease Sister      GASTROINTESTINAL DISEASE Son      Depression Son      Congenital Anomalies Daughter      Depression Daughter      HEART DISEASE Daughter      CEREBROVASCULAR DISEASE No family hx of        Social History:  Marital Status:   [2]  Social History   Substance Use Topics     Smoking status: Never Smoker     Smokeless tobacco: Never Used     Alcohol use Yes      Comment: Rare        Medications:      doxycycline Monohydrate 100 MG TABS   Multiple Vitamin (MULTI VITAMIN PO)   atorvastatin (LIPITOR) 20 MG tablet   lisinopril (PRINIVIL,ZESTRIL) 30 MG tablet   hydrochlorothiazide (HYDRODIURIL) 25 MG tablet   aspirin 81 MG EC tablet         Review of Systems   Constitutional: Positive for appetite change, chills, fatigue and fever.   HENT: Positive for congestion and sore throat. Negative for ear pain.    Respiratory: Positive for cough. Negative for chest tightness and shortness of breath.    Cardiovascular: Negative for chest pain.   Gastrointestinal: Positive for diarrhea. Negative for abdominal pain, nausea and vomiting.   Genitourinary: Negative for dysuria.   Musculoskeletal: Negative for back pain.   Skin: Negative for rash.   Neurological: Positive for syncope. Negative for light-headedness and headaches.       Physical Exam   BP: 132/67  Pulse: 93  Temp: 98.3  F (36.8  C)  Resp: 18  Height: 160 cm (5' 3\")  Weight: 85.7 kg (189 lb)  SpO2: 96 %      Physical Exam   Constitutional: He appears well-developed and well-nourished. No distress. "   HENT:   Head: Normocephalic and atraumatic.   Right Ear: External ear normal.   Left Ear: External ear normal.   Nose: Nose normal.   Mouth/Throat: Oropharynx is clear and moist.   Cardiovascular: Normal rate, regular rhythm and normal heart sounds.    No murmur heard.  Pulmonary/Chest: Effort normal. He has decreased breath sounds in the right lower field and the left lower field. He has no wheezes. He has no rhonchi. He has no rales.       ED Course     ED Course     Procedures       EKG Interpretation:      Interpreted by Dr. Issac Razo  Time reviewed:10:35   Symptoms at time of EKG: none   Rhythm: Normal sinus   Rate: Normal  Axis: Normal  Ectopy: None  Conduction: Normal  ST Segments/ T Waves: nonspecific T-wave abnormality (lead III, AVF, V4, V5, and V6)  Q Waves: None  Comparison to prior: No old EKG available    Clinical Impression: sinus rhythm with nonspecific T-wave abnormality               Results for orders placed or performed during the hospital encounter of 01/22/18 (from the past 24 hour(s))   CBC with platelets, differential   Result Value Ref Range    WBC 15.3 (H) 4.0 - 11.0 10e9/L    RBC Count 4.65 4.4 - 5.9 10e12/L    Hemoglobin 13.5 13.3 - 17.7 g/dL    Hematocrit 39.7 (L) 40.0 - 53.0 %    MCV 85 78 - 100 fl    MCH 29.0 26.5 - 33.0 pg    MCHC 34.0 31.5 - 36.5 g/dL    RDW 12.3 10.0 - 15.0 %    Platelet Count 179 150 - 450 10e9/L    Diff Method Automated Method     % Neutrophils 86.1 %    % Lymphocytes 5.8 %    % Monocytes 7.6 %    % Eosinophils 0.1 %    % Basophils 0.1 %    % Immature Granulocytes 0.3 %    Absolute Neutrophil 13.2 (H) 1.6 - 8.3 10e9/L    Absolute Lymphocytes 0.9 0.8 - 5.3 10e9/L    Absolute Monocytes 1.2 0.0 - 1.3 10e9/L    Absolute Eosinophils 0.0 0.0 - 0.7 10e9/L    Absolute Basophils 0.0 0.0 - 0.2 10e9/L    Abs Immature Granulocytes 0.0 0 - 0.4 10e9/L   Comprehensive metabolic panel   Result Value Ref Range    Sodium 132 (L) 133 - 144 mmol/L    Potassium 3.5 3.4 - 5.3  mmol/L    Chloride 100 94 - 109 mmol/L    Carbon Dioxide 22 20 - 32 mmol/L    Anion Gap 10 3 - 14 mmol/L    Glucose 111 (H) 70 - 99 mg/dL    Urea Nitrogen 16 7 - 30 mg/dL    Creatinine 1.05 0.66 - 1.25 mg/dL    GFR Estimate 69 >60 mL/min/1.7m2    GFR Estimate If Black 83 >60 mL/min/1.7m2    Calcium 8.6 8.5 - 10.1 mg/dL    Bilirubin Total 1.0 0.2 - 1.3 mg/dL    Albumin 3.3 (L) 3.4 - 5.0 g/dL    Protein Total 7.9 6.8 - 8.8 g/dL    Alkaline Phosphatase 113 40 - 150 U/L    ALT 29 0 - 70 U/L    AST 19 0 - 45 U/L   Troponin I   Result Value Ref Range    Troponin I ES <0.015 0.000 - 0.045 ug/L   UA reflex to Microscopic and Culture   Result Value Ref Range    Color Urine Yellow     Appearance Urine Clear     Glucose Urine Negative NEG^Negative mg/dL    Bilirubin Urine Negative NEG^Negative    Ketones Urine 40 (A) NEG^Negative mg/dL    Specific Gravity Urine 1.009 1.003 - 1.035    Blood Urine Negative NEG^Negative    pH Urine 6.0 5.0 - 7.0 pH    Protein Albumin Urine Negative NEG^Negative mg/dL    Urobilinogen mg/dL Normal 0.0 - 2.0 mg/dL    Nitrite Urine Negative NEG^Negative    Leukocyte Esterase Urine Negative NEG^Negative    Source Midstream Urine    Influenza A/B antigen   Result Value Ref Range    Influenza A/B Agn Specimen Nasal     Influenza A Negative NEG^Negative    Influenza B Negative NEG^Negative   XR Chest 2 Views    Narrative    XR CHEST 2 VW 1/22/2018 2:37 PM    COMPARISON: None.    HISTORY: Fever.      Impression    IMPRESSION: Left retrocardiac consolidation, concerning for aspiration  or infection. Right lung is clear. No pneumothorax or pleural effusion  seen on either side.    THIERRY PRATT MD     Pneumonia Severity - Curb65 for disposition management (calculator)  Background  For patients with pneumonia, estimates mortality and risk stratifies them to inpatient or outpatient management.  Based on 5 criteria including age, systolic blood pressure, respiratory rate, confusion and BUN.   Data  77 year old    Resp: 18  BP: 130/66  No components found for:  BUN  Criteria  Out of 5 possible items  Age 65 years or older    Interpretation  Score 0-1: Outpatient management (30 day mortality <2.1%)      Assessments & Plan (with Medical Decision Making)     Liu Ragsdale is a 77 year old male with history of htn, FANI, and right carotid stenosis who presents to the emergency department for evaluation of cough and influenza-like symptoms. Symptoms started 1 week ago with cough and congestion. Started to feel better until 3 days ago and coughing became worse. Loose stools for the last 2 days.  Decreased appetite. States cough is less productive. Patient was up early this morning at 5am to visit a relative in the hospital at [facility]. Patient was standing in the room with a face mask for approximately 10 minutes when he became syncopal. He did not completely go down to floor- was leaning on wall and nursing staff at the hospital came to his aide. Patient states he did have a loss of bowel control at the time. He returned to baseline within a few seconds. He declined assessment at [facility] and wanted to drive closer to home to be evaluated here. Denies chest pain or shortness of breath. Patient is currently denies any dizziness or lightheadedness.     On exam patient is alert and oriented. Pleasant. NAD. Lung sounds are diminished in the bases bilaterally. No tachypnea. No tachycardia. SPO2 95% on room air. Remainder of exam is  Unremarkable.  WBC elevated at 15.3. Sodium low at 132. Glucose elevated at 111. Troponin is normal. Chest xray reveals left retrocardiac consolidation.  Right lung is clear. With elevated WBC and consolidation for on xray this appears to be a left lower lobar pneumonia.  Patient has remained stable here, no further dizziness or feeling syncopal.  No hypoxia or respiratory distress.  Pneumonia Severity (curb65 score) is 1, supports outpatient management for pneumonia.  Patient will be treated for  pneumonia with Doxycyline and sent to pharmacy.     Patient provided with the following verbal and written instructions:  Start Doxycyline 100mg twice daily for 10 days.  Drink plenty of fluids.  Follow-up for recheck in clinic this week.  Return to the emergency department for chest pain, shortness of breath, vomiting, any further fainting, or worsening symptoms      I have reviewed the nursing notes.    I have reviewed the findings, diagnosis, plan and need for follow up with the patient.      Discharge Medication List as of 1/22/2018  3:39 PM      START taking these medications    Details   doxycycline Monohydrate 100 MG TABS Take 100 mg by mouth 2 times daily for 10 days, Disp-20 tablet, R-0, E-Prescribe             Final diagnoses:   Pneumonia of left lower lobe due to infectious organism (H)   Vasovagal syncope       1/22/2018   [facility] EMERGENCY DEPARTMENT     Malathi Ruiz APRN CNP  01/22/18 4520

## 2018-01-26 ENCOUNTER — OFFICE VISIT (OUTPATIENT)
Dept: FAMILY MEDICINE | Facility: CLINIC | Age: 78
End: 2018-01-26
Payer: COMMERCIAL

## 2018-01-26 VITALS
HEART RATE: 74 BPM | SYSTOLIC BLOOD PRESSURE: 136 MMHG | HEIGHT: 63 IN | WEIGHT: 183 LBS | DIASTOLIC BLOOD PRESSURE: 64 MMHG | RESPIRATION RATE: 20 BRPM | BODY MASS INDEX: 32.43 KG/M2 | OXYGEN SATURATION: 96 % | TEMPERATURE: 98.4 F

## 2018-01-26 DIAGNOSIS — J18.9 PNEUMONIA OF LEFT LOWER LOBE DUE TO INFECTIOUS ORGANISM: Primary | ICD-10-CM

## 2018-01-26 PROCEDURE — 99213 OFFICE O/P EST LOW 20 MIN: CPT | Performed by: NURSE PRACTITIONER

## 2018-01-26 NOTE — PATIENT INSTRUCTIONS
1. Pneumonia of left lower lobe due to infectious organism (H)  Acute, improved  - Continue taking Doxycycline twice daily until complete  - Push fluids  - Consider humidifier in the bedroom

## 2018-01-26 NOTE — MR AVS SNAPSHOT
After Visit Summary   1/26/2018    Liu Ragsdale    MRN: [id number]           Patient Information     Date Of Birth          1940        Visit Information        Provider Department      1/26/2018 9:40 AM Shania Schneider CNP [facility]        Today's Diagnoses     Pneumonia of left lower lobe due to infectious organism (H)    -  1      Care Instructions    1. Pneumonia of left lower lobe due to infectious organism (H)  Acute, improved  - Continue taking Doxycycline twice daily until complete  - Push fluids  - Consider humidifier in the bedroom            Follow-ups after your visit        Who to contact     If you have questions or need follow up information about today's clinic visit or your schedule please contact [facility] directly at [phone].  Normal or non-critical lab and imaging results will be communicated to you by [employer]Sent[employer]art, letter or phone within 4 business days after the clinic has received the results. If you do not hear from us within 7 days, please contact the clinic through [employer] or phone. If you have a critical or abnormal lab result, we will notify you by phone as soon as possible.  Submit refill requests through [employer] or call your pharmacy and they will forward the refill request to us. Please allow 3 business days for your refill to be completed.          Additional Information About Your Visit        MyChart Information     [employer] gives you secure access to your electronic health record. If you see a primary care provider, you can also send messages to your care team and make appointments. If you have questions, please call your primary care clinic.  If you do not have a primary care provider, please call [phone] and they will assist you.        Care EveryWhere ID     This is your Care EveryWhere ID. This could be used by other organizations to access your [facility] medical records  [id number]        Your Vitals Were      "Pulse Temperature Respirations Height Pulse Oximetry BMI (Body Mass Index)    74 98.4  F (36.9  C) (Tympanic) 20 5' 3\" (1.6 m) 96% 32.42 kg/m2       Blood Pressure from Last 3 Encounters:   01/26/18 136/64   01/22/18 130/66   02/17/17 136/68    Weight from Last 3 Encounters:   01/26/18 183 lb (83 kg)   01/22/18 189 lb (85.7 kg)   02/17/17 188 lb (85.3 kg)              Today, you had the following     No orders found for display       Primary Care Provider Office Phone # Fax #    Shania Schneider, [facility] [phone]       [address] [facility] [address]        Equal Access to Services     MAGGIE SHANKAR : Fidelia faulkner Solita, waaxda luqadaha, qaybta kaalmada adeegyada, matilda vegas . So [facility] [phone].    ATENCIÓN: Si habla español, tiene a sawyer disposición servicios gratuitos de asistencia lingüística. Llame al [phone].    We comply with applicable federal civil rights laws and [place] laws. We do not discriminate on the basis of race, color, national origin, age, disability, sex, sexual orientation, or gender identity.            Thank you!     Thank you for choosing [facility]  for your care. Our goal is always to provide you with excellent care. Hearing back from our patients is one way we can continue to improve our services. Please take a few minutes to complete the written survey that you may receive in the mail after your visit with us. Thank you!             Your Updated Medication List - Protect others around you: Learn how to safely use, store and throw away your medicines at www.disposemymeds.org.          This list is accurate as of 1/26/18 10:25 AM.  Always use your most recent med list.                   Brand Name Dispense Instructions for use Diagnosis    aspirin 81 MG EC tablet      Take 81 mg by mouth every other day To every third day        atorvastatin 20 MG tablet    LIPITOR    90 tablet    Take 1 tablet (20 " mg) by mouth daily    Hyperlipidemia with target LDL less than 130       doxycycline Monohydrate 100 MG Tabs     20 tablet    Take 100 mg by mouth 2 times daily for 10 days        hydrochlorothiazide 25 MG tablet    HYDRODIURIL    90 tablet    Take 1 tablet (25 mg) by mouth daily    Benign essential hypertension       lisinopril 30 MG tablet    PRINIVIL,ZESTRIL    90 tablet    Take 1 tablet (30 mg) by mouth daily    Benign essential hypertension       MULTI VITAMIN PO      Take 1 oz by mouth daily

## 2018-01-26 NOTE — PROGRESS NOTES
"  SUBJECTIVE:   Liu Ragsdale is a 77 year old male who presents to clinic today for the following health issues:      ED/UC Followup:    Facility:  [facility] [place]   Date of visit: 01/22/2018  Reason for visit: Passed out, diagnosed with pneumonia LLL  Current Status: Less coughing, afebrile, still fatigued with exertion       76 yo male for follow-up of LLL pneumonia. He was Tx'd with Doxycycline. He is drinking plenty of fluids. House is kind of dry. Last night was his first night he didn't cough. He is feeling much better. He is up-to-date on labs and needs no refills.     HPI:   PCP:  Shania Schneider, [phone]    Patient Active Problem List   Diagnosis     Carotid bruit     Hyperlipidemia with target LDL less than 130     Benign essential hypertension, BP goal <150/90     Obesity     FANI (obstructive sleep apnea)     Current Outpatient Prescriptions   Medication     doxycycline Monohydrate 100 MG TABS     Multiple Vitamin (MULTI VITAMIN PO)     atorvastatin (LIPITOR) 20 MG tablet     lisinopril (PRINIVIL,ZESTRIL) 30 MG tablet     hydrochlorothiazide (HYDRODIURIL) 25 MG tablet     aspirin 81 MG EC tablet     No current facility-administered medications for this visit.        Health Maintenance Due   Topic Date Due     TETANUS IMMUNIZATION (SYSTEM ASSIGNED)  09/19/2016     FALL RISK ASSESSMENT  03/15/2017     ADVANCE DIRECTIVE PLANNING Q5 YRS  12/17/2017       Reviewed and updated:  Tobacco  Allergies  Meds  Med Hx  Surg Hx  Fam Hx  Soc Hx     ROS:  Constitutional, HEENT, cardiovascular, pulmonary, gi and gu systems are negative, except as otherwise noted.    PHYSICAL EXAM:   /64 (BP Location: Right arm, Patient Position: Sitting, Cuff Size: Adult Large)  Pulse 74  Temp 98.4  F (36.9  C) (Tympanic)  Resp 20  Ht 5' 3\" (1.6 m)  Wt 183 lb (83 kg)  SpO2 96%  BMI 32.42 kg/m2  Body mass index is 32.42 kg/(m^2).  GENERAL APPEARANCE: healthy, alert and no distress  HENT: ear canals and TM's normal " and nose and mouth without ulcers or lesions  NECK: no adenopathy, no asymmetry, masses, or scars and thyroid normal to palpation  RESP: lungs clear to auscultation - no rales, rhonchi or wheezes  CV: regular rates and rhythm, normal S1 S2, no S3 or S4 and no murmur, click or rub  PSYCH: mentation appears normal and affect normal/bright    ASSESSMENT & PLAN:   1. Pneumonia of left lower lobe due to infectious organism (H)  Acute, improved  - Continue taking Doxycycline twice daily until complete  - Push fluids  - Consider humidifier in the bedroom      Risks, benefits, side effects and rationale for treatment plan fully discussed with the patient and understanding expressed.    CECE Da Silva-BC  [facility]

## 2018-01-26 NOTE — NURSING NOTE
"Chief Complaint   Patient presents with     ER F/U       Initial /64 (BP Location: Right arm, Patient Position: Sitting, Cuff Size: Adult Large)  Pulse 74  Temp 98.4  F (36.9  C) (Tympanic)  Resp 20  Ht 5' 3\" (1.6 m)  Wt 183 lb (83 kg)  SpO2 96%  BMI 32.42 kg/m2 Estimated body mass index is 32.42 kg/(m^2) as calculated from the following:    Height as of this encounter: 5' 3\" (1.6 m).    Weight as of this encounter: 183 lb (83 kg).  Medication Reconciliation: complete    Health Maintenance that is potentially due pending provider review:  NONE    n/a    Is there anyone who you would like to be able to receive your results? No  If yes have patient fill out EDWARD    "

## 2018-03-19 DIAGNOSIS — I10 BENIGN ESSENTIAL HYPERTENSION: Chronic | ICD-10-CM

## 2018-03-19 RX ORDER — LISINOPRIL 30 MG/1
TABLET ORAL
Qty: 90 TABLET | Refills: 1 | Status: SHIPPED | OUTPATIENT
Start: 2018-03-19 | End: 2018-04-06

## 2018-03-19 RX ORDER — HYDROCHLOROTHIAZIDE 25 MG/1
TABLET ORAL
Qty: 90 TABLET | Refills: 1 | Status: SHIPPED | OUTPATIENT
Start: 2018-03-19 | End: 2018-04-06

## 2018-03-19 NOTE — TELEPHONE ENCOUNTER
"Requested Prescriptions   Pending Prescriptions Disp Refills     hydrochlorothiazide (HYDRODIURIL) 25 MG tablet [Pharmacy Med Name: HYDROCHLOROT 25MG   TAB] 90 tablet 3     Sig: TAKE ONE TABLET BY MOUTH ONCE DAILY    Diuretics (Including Combos) Protocol Failed    3/19/2018  1:11 PM       Failed - Normal serum sodium on file in past 12 months    Recent Labs   Lab Test  01/22/18   1155   NA  132*             Passed - Blood pressure under 140/90 in past 12 months    BP Readings from Last 3 Encounters:   01/26/18 136/64   01/22/18 130/66   02/17/17 136/68                Passed - Recent (12 mo) or future (30 days) visit within the authorizing provider's specialty    Patient had office visit in the last 12 months or has a visit in the next 30 days with authorizing provider or within the authorizing provider's specialty.  See \"Patient Info\" tab in inbasket, or \"Choose Columns\" in Meds & Orders section of the refill encounter.      Last Written Prescription Date:  2/17/17  Last Fill Quantity: 90,  # refills: 3   Last office visit: 1/26/2018 with prescribing provider:     Future Office Visit:             Passed - Patient is age 18 or older       Passed - Normal serum creatinine on file in past 12 months    Recent Labs   Lab Test  01/22/18   1155   CR  1.05             Passed - Normal serum potassium on file in past 12 months    Recent Labs   Lab Test  01/22/18   1155   POTASSIUM  3.5                    lisinopril (PRINIVIL,ZESTRIL) 30 MG tablet [Pharmacy Med Name: LISINOPRIL 30MG     TAB] 90 tablet 3     Sig: TAKE ONE TABLET BY MOUTH ONCE DAILY    ACE Inhibitors (Including Combos) Protocol Passed    3/19/2018  1:11 PM       Passed - Blood pressure under 140/90 in past 12 months    BP Readings from Last 3 Encounters:   01/26/18 136/64   01/22/18 130/66   02/17/17 136/68                Passed - Recent (12 mo) or future (30 days) visit within the authorizing provider's specialty    Patient had office visit in the last 12 " "months or has a visit in the next 30 days with authorizing provider or within the authorizing provider's specialty.  See \"Patient Info\" tab in inbasket, or \"Choose Columns\" in Meds & Orders section of the refill encounter.      Last Written Prescription Date:  2/17/17  Last Fill Quantity: 90,  # refills: 3   Last office visit: 1/26/2018 with prescribing provider:     Future Office Visit:             Passed - Patient is age 18 or older       Passed - Normal serum creatinine on file in past 12 months    Recent Labs   Lab Test  01/22/18   1155   CR  1.05            Passed - Normal serum potassium on file in past 12 months    Recent Labs   Lab Test  01/22/18   1155   POTASSIUM  3.5               "

## 2018-04-06 DIAGNOSIS — E78.5 HYPERLIPIDEMIA WITH TARGET LDL LESS THAN 130: Chronic | ICD-10-CM

## 2018-04-06 DIAGNOSIS — I10 BENIGN ESSENTIAL HYPERTENSION: Chronic | ICD-10-CM

## 2018-04-06 RX ORDER — HYDROCHLOROTHIAZIDE 25 MG/1
25 TABLET ORAL DAILY
Qty: 90 TABLET | Refills: 0 | Status: SHIPPED | OUTPATIENT
Start: 2018-04-06 | End: 2018-09-19

## 2018-04-06 RX ORDER — LISINOPRIL 30 MG/1
30 TABLET ORAL DAILY
Qty: 90 TABLET | Refills: 0 | Status: SHIPPED | OUTPATIENT
Start: 2018-04-06 | End: 2018-09-19

## 2018-04-06 RX ORDER — ATORVASTATIN CALCIUM 20 MG/1
20 TABLET, FILM COATED ORAL DAILY
Qty: 90 TABLET | Refills: 0 | Status: SHIPPED | OUTPATIENT
Start: 2018-04-06 | End: 2018-09-19

## 2018-04-06 NOTE — TELEPHONE ENCOUNTER
"Requested Prescriptions   Pending Prescriptions Disp Refills     lisinopril (PRINIVIL,ZESTRIL) 30 MG tablet  Last Written Prescription Date:  3/19/18  Last Fill Quantity: 90,  # refills: 1   Last office visit: 1/26/2018 with prescribing provider:  TJ Schneider    90 tablet 1     Sig: Take 1 tablet (30 mg) by mouth daily    ACE Inhibitors (Including Combos) Protocol Passed    4/6/2018 10:35 AM       Passed - Blood pressure under 140/90 in past 12 months    BP Readings from Last 3 Encounters:   01/26/18 136/64   01/22/18 130/66   02/17/17 136/68                Passed - Recent (12 mo) or future (30 days) visit within the authorizing provider's specialty    Patient had office visit in the last 12 months or has a visit in the next 30 days with authorizing provider or within the authorizing provider's specialty.  See \"Patient Info\" tab in inbasket, or \"Choose Columns\" in Meds & Orders section of the refill encounter.           Passed - Patient is age 18 or older       Passed - Normal serum creatinine on file in past 12 months    Recent Labs   Lab Test  01/22/18   1155   CR  1.05            Passed - Normal serum potassium on file in past 12 months    Recent Labs   Lab Test  01/22/18   1155   POTASSIUM  3.5             hydrochlorothiazide (HYDRODIURIL) 25 MG tablet  Last Written Prescription Date:  3/19/18  Last Fill Quantity: 90,  # refills: 1   Last office visit: 1/26/2018 with prescribing provider:  TJ Schneider     90 tablet 1     Sig: Take 1 tablet (25 mg) by mouth daily    Diuretics (Including Combos) Protocol Failed    4/6/2018 10:35 AM       Failed - Normal serum sodium on file in past 12 months    Recent Labs   Lab Test  01/22/18   1155   NA  132*             Passed - Blood pressure under 140/90 in past 12 months    BP Readings from Last 3 Encounters:   01/26/18 136/64   01/22/18 130/66   02/17/17 136/68                Passed - Recent (12 mo) or future (30 days) visit within the authorizing provider's specialty " "   Patient had office visit in the last 12 months or has a visit in the next 30 days with authorizing provider or within the authorizing provider's specialty.  See \"Patient Info\" tab in inbasket, or \"Choose Columns\" in Meds & Orders section of the refill encounter.           Passed - Patient is age 18 or older       Passed - Normal serum creatinine on file in past 12 months    Recent Labs   Lab Test  01/22/18   1155   CR  1.05             Passed - Normal serum potassium on file in past 12 months    Recent Labs   Lab Test  01/22/18   1155   POTASSIUM  3.5                    atorvastatin (LIPITOR) 20 MG tablet  Last Written Prescription Date:  11/9/17  Last Fill Quantity: 90,  # refills: 3   Last office visit: 1/26/2018 with prescribing provider:  TJ Schneider   Future Office Visit:     90 tablet 3     Sig: Take 1 tablet (20 mg) by mouth daily    Statins Protocol Passed    4/6/2018 10:35 AM       Passed - LDL on file in past 12 months    Recent Labs   Lab Test  11/08/17   1003   LDL  49            Passed - No abnormal creatine kinase in past 12 months    No lab results found.            Passed - Recent (12 mo) or future (30 days) visit within the authorizing provider's specialty    Patient had office visit in the last 12 months or has a visit in the next 30 days with authorizing provider or within the authorizing provider's specialty.  See \"Patient Info\" tab in inbasket, or \"Choose Columns\" in Meds & Orders section of the refill encounter.           Passed - Patient is age 18 or older          "

## 2018-05-15 ENCOUNTER — OFFICE VISIT (OUTPATIENT)
Dept: FAMILY MEDICINE | Facility: CLINIC | Age: 78
End: 2018-05-15
Payer: COMMERCIAL

## 2018-05-15 VITALS
DIASTOLIC BLOOD PRESSURE: 54 MMHG | BODY MASS INDEX: 32.07 KG/M2 | HEART RATE: 89 BPM | RESPIRATION RATE: 18 BRPM | WEIGHT: 181 LBS | TEMPERATURE: 97.7 F | HEIGHT: 63 IN | OXYGEN SATURATION: 96 % | SYSTOLIC BLOOD PRESSURE: 144 MMHG

## 2018-05-15 DIAGNOSIS — J06.9 UPPER RESPIRATORY TRACT INFECTION, UNSPECIFIED TYPE: Primary | ICD-10-CM

## 2018-05-15 PROCEDURE — 99213 OFFICE O/P EST LOW 20 MIN: CPT | Performed by: FAMILY MEDICINE

## 2018-05-15 NOTE — MR AVS SNAPSHOT
After Visit Summary   5/15/2018    Liu Ragsdale    MRN: [id number]           Patient Information     Date Of Birth          1940        Visit Information        Provider Department      5/15/2018 9:00 AM Piyush Rogers MD [facility]        Today's Diagnoses     Upper respiratory tract infection, unspecified type    -  1      Care Instructions      Viral Upper Respiratory Illness (Adult)  You have a viral upper respiratory illness (URI), which is another term for the common cold. This illness is contagious during the first few days. It is spread through the air by coughing and sneezing. It may also be spread by direct contact (touching the sick person and then touching your own eyes, nose, or mouth). Frequent handwashing will decrease risk of spread. Most viral illnesses go away within 7 to 10 days with rest and simple home remedies. Sometimes the illness may last for several weeks. Antibiotics will not kill a virus, and they are generally not prescribed for this condition.    Home care    If symptoms are severe, rest at home for the first 2 to 3 days. When you resume activity, don't let yourself get too tired.    Avoid being exposed to cigarette smoke (yours or others ).    You may use acetaminophen or ibuprofen to control pain and fever, unless another medicine was prescribed. If you have chronic liver or kidney disease, have ever had a stomach ulcer or gastrointestinal bleeding, or are taking blood-thinning medicines, talk with your healthcare provider before using these medicines. Aspirin should never be given to anyone under 18 years of age who is ill with a viral infection or fever. It may cause severe liver or brain damage.    Your appetite may be poor, so a light diet is fine. Avoid dehydration by drinking 6 to 8 glasses of fluids per day (water, soft drinks, juices, tea, or soup). Extra fluids will help loosen secretions in the nose and lungs.    Over-the-counter cold  medicines will not shorten the length of time you re sick, but they may be helpful for the following symptoms: cough, sore throat, and nasal and sinus congestion. (Note: Do not use decongestants if you have high blood pressure.)  Follow-up care  Follow up with your healthcare provider, or as advised.  When to seek medical advice  Call your healthcare provider right away if any of these occur:    Cough with lots of colored sputum (mucus)    Severe headache; face, neck, or ear pain    Difficulty swallowing due to throat pain    Fever of 100.4 F (38 C) or higher, or as directed by your healthcare provider  Call 911  Call 911 if any of these occur:    Chest pain, shortness of breath, wheezing, or difficulty breathing    Coughing up blood    Inability to swallow due to throat pain  Date Last Reviewed: 9/13/2015 2000-2017 [employer]. [address] [place] [address]. All rights reserved. This information is not intended as a substitute for professional medical care. Always follow your healthcare professional's instructions.                Follow-ups after your visit        Who to contact     If you have questions or need follow up information about today's clinic visit or your schedule please contact [facility] directly at [phone].  Normal or non-critical lab and imaging results will be communicated to you by MyChart, letter or phone within 4 business days after the clinic has received the results. If you do not hear from us within 7 days, please contact the clinic through [employer] or phone. If you have a critical or abnormal lab result, we will notify you by phone as soon as possible.  Submit refill requests through [employer] or call your pharmacy and they will forward the refill request to us. Please allow 3 business days for your refill to be completed.          Additional Information About Your Visit        [employer] Information     [employer] gives you secure access to your  "electronic health record. If you see a primary care provider, you can also send messages to your care team and make appointments. If you have questions, please call your primary care clinic.  If you do not have a primary care provider, please call [phone] and they will assist you.        Care EveryWhere ID     This is your Care EveryWhere ID. This could be used by other organizations to access your [facility] medical records  [id number]        Your Vitals Were     Pulse Temperature Respirations Height Pulse Oximetry BMI (Body Mass Index)    89 97.7  F (36.5  C) (Tympanic) 18 5' 3\" (1.6 m) 96% 32.06 kg/m2       Blood Pressure from Last 3 Encounters:   05/15/18 144/54   01/26/18 136/64   01/22/18 130/66    Weight from Last 3 Encounters:   05/15/18 181 lb (82.1 kg)   01/26/18 183 lb (83 kg)   01/22/18 189 lb (85.7 kg)              Today, you had the following     No orders found for display       Primary Care Provider Office Phone # Fax #    Shania Tamikakristie Schneider, [facility] [phone]       [address] [address]        Equal Access to Services     MAGGIE SHANKAR : Hadii dion booneo Soramakrishnaali, waaxda luqadaha, qaybta kaalmada adeegyada, matilda barrios. So [facility] [phone].    ATENCIÓN: Si habla español, tiene a sawyer disposición servicios gratuitos de asistencia lingüística. Llame al [phone].    We comply with applicable federal civil rights laws and [place] laws. We do not discriminate on the basis of race, color, national origin, age, disability, sex, sexual orientation, or gender identity.            Thank you!     Thank you for choosing [facility]  for your care. Our goal is always to provide you with excellent care. Hearing back from our patients is one way we can continue to improve our services. Please take a few minutes to complete the written survey that you may receive in the mail after your visit with us. Thank you!           "   Your Updated Medication List - Protect others around you: Learn how to safely use, store and throw away your medicines at www.disposemymeds.org.          This list is accurate as of 5/15/18  9:26 AM.  Always use your most recent med list.                   Brand Name Dispense Instructions for use Diagnosis    aspirin 81 MG EC tablet      Take 81 mg by mouth every other day To every third day        atorvastatin 20 MG tablet    LIPITOR    90 tablet    Take 1 tablet (20 mg) by mouth daily    Hyperlipidemia with target LDL less than 130       hydrochlorothiazide 25 MG tablet    HYDRODIURIL    90 tablet    Take 1 tablet (25 mg) by mouth daily    Benign essential hypertension       lisinopril 30 MG tablet    PRINIVIL,ZESTRIL    90 tablet    Take 1 tablet (30 mg) by mouth daily    Benign essential hypertension       MULTI VITAMIN PO      Take 1 oz by mouth daily

## 2018-05-15 NOTE — PATIENT INSTRUCTIONS
Viral Upper Respiratory Illness (Adult)  You have a viral upper respiratory illness (URI), which is another term for the common cold. This illness is contagious during the first few days. It is spread through the air by coughing and sneezing. It may also be spread by direct contact (touching the sick person and then touching your own eyes, nose, or mouth). Frequent handwashing will decrease risk of spread. Most viral illnesses go away within 7 to 10 days with rest and simple home remedies. Sometimes the illness may last for several weeks. Antibiotics will not kill a virus, and they are generally not prescribed for this condition.    Home care    If symptoms are severe, rest at home for the first 2 to 3 days. When you resume activity, don't let yourself get too tired.    Avoid being exposed to cigarette smoke (yours or others ).    You may use acetaminophen or ibuprofen to control pain and fever, unless another medicine was prescribed. If you have chronic liver or kidney disease, have ever had a stomach ulcer or gastrointestinal bleeding, or are taking blood-thinning medicines, talk with your healthcare provider before using these medicines. Aspirin should never be given to anyone under 18 years of age who is ill with a viral infection or fever. It may cause severe liver or brain damage.    Your appetite may be poor, so a light diet is fine. Avoid dehydration by drinking 6 to 8 glasses of fluids per day (water, soft drinks, juices, tea, or soup). Extra fluids will help loosen secretions in the nose and lungs.    Over-the-counter cold medicines will not shorten the length of time you re sick, but they may be helpful for the following symptoms: cough, sore throat, and nasal and sinus congestion. (Note: Do not use decongestants if you have high blood pressure.)  Follow-up care  Follow up with your healthcare provider, or as advised.  When to seek medical advice  Call your healthcare provider right away if any of these  occur:    Cough with lots of colored sputum (mucus)    Severe headache; face, neck, or ear pain    Difficulty swallowing due to throat pain    Fever of 100.4 F (38 C) or higher, or as directed by your healthcare provider  Call 911  Call 911 if any of these occur:    Chest pain, shortness of breath, wheezing, or difficulty breathing    Coughing up blood    Inability to swallow due to throat pain  Date Last Reviewed: 9/13/2015 2000-2017 [employer]. [address] [place] [address]. All rights reserved. This information is not intended as a substitute for professional medical care. Always follow your healthcare professional's instructions.

## 2018-05-15 NOTE — PROGRESS NOTES
SUBJECTIVE:   Liu Ragsdale is a 77 year old male who presents to clinic today for the following health issues:      RESPIRATORY SYMPTOMS      Duration: about 10 days     Description  nasal congestion, rhinorrhea, facial pain/pressure, cough, wheezing and fatigue/malaise    Severity: moderate    Accompanying signs and symptoms:     History (predisposing factors):  Had pneumonia of Left lower lobe over winter and is leaving for [place] tomorrow    Precipitating or alleviating factors: None    Therapies tried and outcome:  rest and fluids, benadryl         Problem list and histories reviewed & adjusted, as indicated.  Additional history: as documented    Patient Active Problem List   Diagnosis     Carotid bruit     Hyperlipidemia with target LDL less than 130     Benign essential hypertension, BP goal <150/90     Obesity     FANI (obstructive sleep apnea)     Past Surgical History:   Procedure Laterality Date     COLONOSCOPY N/A 6/16/2016    Procedure: COLONOSCOPY;  Surgeon: Randy Avendano MD;  Location: [facility]     VASECTOMY  1981       Social History   Substance Use Topics     Smoking status: Never Smoker     Smokeless tobacco: Never Used     Alcohol use Yes      Comment: Rare     Family History   Problem Relation Age of Onset     DIABETES Sister      Obesity Son      Depression Sister      Thyroid Disease Sister      GASTROINTESTINAL DISEASE Son      Depression Son      Congenital Anomalies Daughter      Depression Daughter      HEART DISEASE Daughter      CEREBROVASCULAR DISEASE No family hx of          Current Outpatient Prescriptions   Medication Sig Dispense Refill     aspirin 81 MG EC tablet Take 81 mg by mouth every other day To every third day       atorvastatin (LIPITOR) 20 MG tablet Take 1 tablet (20 mg) by mouth daily 90 tablet 0     hydrochlorothiazide (HYDRODIURIL) 25 MG tablet Take 1 tablet (25 mg) by mouth daily 90 tablet 0     lisinopril (PRINIVIL,ZESTRIL) 30 MG tablet Take 1 tablet (30 mg) by mouth  "daily 90 tablet 0     Multiple Vitamin (MULTI VITAMIN PO) Take 1 oz by mouth daily       Allergies   Allergen Reactions     Metoprolol Other (See Comments)     Side effects     Ampicillin Rash     Recent Labs   Lab Test  01/22/18   1155  11/08/17   1003  01/23/17   1020  01/20/17   0926  09/19/16   1015  03/16/16   1040   09/12/14   1010  05/30/14   1055   A1C   --    --   6.0   --    --    --    --    --    --    LDL   --   49   --    --   55  132*   < >   --    --    HDL   --   69   --    --   63  57   < >   --    --    TRIG   --   88   --    --   147  238*   < >   --    --    ALT  29   --    --    --    --    --    --   24  26   CR  1.05   --    --   1.04   --   1.02   < >  1.05  1.15   GFRESTIMATED  69   --    --   70   --   71   < >  69  62   GFRESTBLACK  83   --    --   84   --   86   < >  84  75   POTASSIUM  3.5   --    --   3.8   --   4.2   < >  4.6  4.5   TSH   --    --    --    --    --    --    --    --   1.41    < > = values in this interval not displayed.      BP Readings from Last 3 Encounters:   05/15/18 144/54   01/26/18 136/64   01/22/18 130/66    Wt Readings from Last 3 Encounters:   05/15/18 181 lb (82.1 kg)   01/26/18 183 lb (83 kg)   01/22/18 189 lb (85.7 kg)                  Labs reviewed in EPIC    Reviewed and updated as needed this visit by clinical staff       Reviewed and updated as needed this visit by Provider         ROS:  Constitutional, HEENT, cardiovascular, pulmonary, gi and gu systems are negative, except as otherwise noted.    OBJECTIVE:     /54 (Cuff Size: Adult Regular)  Pulse 89  Temp 97.7  F (36.5  C) (Tympanic)  Resp 18  Ht 5' 3\" (1.6 m)  Wt 181 lb (82.1 kg)  SpO2 96%  BMI 32.06 kg/m2  Body mass index is 32.06 kg/(m^2).  GENERAL: alert and no distress  EYES: Eyes grossly normal to inspection, PERRL and conjunctivae and sclerae normal  HENT: ear canals and TM's normal, nose and mouth without ulcers or lesions  NECK: no adenopathy, no asymmetry, masses, or scars " and thyroid normal to palpation  RESP: lungs clear to auscultation - no rales, rhonchi or wheezes  CV: regular rates and rhythm, normal S1 S2, no S3 or S4 and no murmur, click or rub  MS: no gross musculoskeletal defects noted, no edema      ASSESSMENT/PLAN:       ICD-10-CM    1. Upper respiratory tract infection, unspecified type J06.9        Discussed in detail differentials and further management. Symptoms are likely secondary to viral infection, could be an element of seasonal allergy. Recommended well hydration, over-the-counter analgesia, Zyrtec, warm fluids and saline gargles. Written instructions/information provided. Patient understood and in agreement with the above plan. All questions are answered. Follow-up if symptoms persist or worsen.        Patient Instructions     Viral Upper Respiratory Illness (Adult)  You have a viral upper respiratory illness (URI), which is another term for the common cold. This illness is contagious during the first few days. It is spread through the air by coughing and sneezing. It may also be spread by direct contact (touching the sick person and then touching your own eyes, nose, or mouth). Frequent handwashing will decrease risk of spread. Most viral illnesses go away within 7 to 10 days with rest and simple home remedies. Sometimes the illness may last for several weeks. Antibiotics will not kill a virus, and they are generally not prescribed for this condition.    Home care    If symptoms are severe, rest at home for the first 2 to 3 days. When you resume activity, don't let yourself get too tired.    Avoid being exposed to cigarette smoke (yours or others ).    You may use acetaminophen or ibuprofen to control pain and fever, unless another medicine was prescribed. If you have chronic liver or kidney disease, have ever had a stomach ulcer or gastrointestinal bleeding, or are taking blood-thinning medicines, talk with your healthcare provider before using these  medicines. Aspirin should never be given to anyone under 18 years of age who is ill with a viral infection or fever. It may cause severe liver or brain damage.    Your appetite may be poor, so a light diet is fine. Avoid dehydration by drinking 6 to 8 glasses of fluids per day (water, soft drinks, juices, tea, or soup). Extra fluids will help loosen secretions in the nose and lungs.    Over-the-counter cold medicines will not shorten the length of time you re sick, but they may be helpful for the following symptoms: cough, sore throat, and nasal and sinus congestion. (Note: Do not use decongestants if you have high blood pressure.)  Follow-up care  Follow up with your healthcare provider, or as advised.  When to seek medical advice  Call your healthcare provider right away if any of these occur:    Cough with lots of colored sputum (mucus)    Severe headache; face, neck, or ear pain    Difficulty swallowing due to throat pain    Fever of 100.4 F (38 C) or higher, or as directed by your healthcare provider  Call 911  Call 911 if any of these occur:    Chest pain, shortness of breath, wheezing, or difficulty breathing    Coughing up blood    Inability to swallow due to throat pain  Date Last Reviewed: 9/13/2015 2000-2017 [employer]. [address] [place] [address], [place] [address]. All rights reserved. This information is not intended as a substitute for professional medical care. Always follow your healthcare professional's instructions.            Piyush Rogers MD  [facility]

## 2018-05-15 NOTE — NURSING NOTE
"Chief Complaint   Patient presents with     URI       Initial /54 (Cuff Size: Adult Regular)  Pulse 89  Temp 97.7  F (36.5  C) (Tympanic)  Resp 18  Ht 5' 3\" (1.6 m)  Wt 181 lb (82.1 kg)  SpO2 96%  BMI 32.06 kg/m2 Estimated body mass index is 32.06 kg/(m^2) as calculated from the following:    Height as of this encounter: 5' 3\" (1.6 m).    Weight as of this encounter: 181 lb (82.1 kg).      Health Maintenance that is potentially due pending provider review:  NONE    n/a    Is there anyone who you would like to be able to receive your results? Not Applicable  If yes have patient fill out EDWARD    "

## 2018-06-07 ENCOUNTER — OFFICE VISIT (OUTPATIENT)
Dept: FAMILY MEDICINE | Facility: CLINIC | Age: 78
End: 2018-06-07
Payer: COMMERCIAL

## 2018-06-07 VITALS
HEART RATE: 68 BPM | RESPIRATION RATE: 18 BRPM | SYSTOLIC BLOOD PRESSURE: 130 MMHG | DIASTOLIC BLOOD PRESSURE: 62 MMHG | TEMPERATURE: 97.8 F | HEIGHT: 63 IN | BODY MASS INDEX: 31.54 KG/M2 | WEIGHT: 178 LBS

## 2018-06-07 DIAGNOSIS — L57.0 ACTINIC KERATOSIS: Primary | ICD-10-CM

## 2018-06-07 PROCEDURE — 17003 DESTRUCT PREMALG LES 2-14: CPT | Performed by: FAMILY MEDICINE

## 2018-06-07 PROCEDURE — 17000 DESTRUCT PREMALG LESION: CPT | Performed by: FAMILY MEDICINE

## 2018-06-07 NOTE — NURSING NOTE
"Chief Complaint   Patient presents with     Derm Problem       Initial /62 (Cuff Size: Adult Regular)  Pulse 68  Temp 97.8  F (36.6  C) (Tympanic)  Resp 18  Ht 5' 3\" (1.6 m)  Wt 178 lb (80.7 kg)  BMI 31.53 kg/m2 Estimated body mass index is 31.53 kg/(m^2) as calculated from the following:    Height as of this encounter: 5' 3\" (1.6 m).    Weight as of this encounter: 178 lb (80.7 kg).      Health Maintenance that is potentially due pending provider review:  NONE    n/a    Is there anyone who you would like to be able to receive your results? Not Applicable  If yes have patient fill out EDWARD    "

## 2018-06-07 NOTE — PATIENT INSTRUCTIONS
Understanding Actinic Keratosis     Wear a hat that protects your face and ears from the sun.     Actinic keratosis is a skin growth caused by sun damage. These growths are not cancer, but they may develop into skin cancer (precancerous). Many people get these growths, especially as they age. This is because of cumulative sun exposure over years. Multiple growths are called actinic keratoses.  What causes actinic keratosis?  Sun damage causes actinic keratosis. These growths usually appear on skin that is most often exposed to the sun, such as the face, ears, or back of the hands. People who easily sunburn are likely to develop actinic keratoses. Actinic keratoses often appear later in life from cumulative, extensive sun exposure.  What are the symptoms of actinic keratosis?  Actinic keratosis growths may be described as:    Rough, like sandpaper    Wart-like    Scaly or scabby    More easily felt than seen  They may appear singly or in clusters. They may start out as red patches, and the skin around them may be discolored.  Actinic keratoses usually are not painful. For some people they may make the skin feel tender.  How is actinic keratosis treated?  Because actinic keratoses may develop into skin cancer, a healthcare provider should look at them. Your healthcare provider may choose to remove one or more of these growths. It may be examined under a microscope. This is called a biopsy. You may also wish to have actinic keratoses removed if they bother you. They can be removed in several ways:    By using a medicine on the skin such as 5 fluorouracil or imiquimod    By removing them with a scalpel    By freezing them with liquid nitrogen  How can I prevent actinic keratoses?  Avoiding sun damage to your skin is the best way to prevent actinic keratoses. Here are some ways to protect your skin:    Use sunscreen with an SPF of 30 or higher on exposed skin when you are outside.    Wear a hat to protect your face and  scalp. Consider wearing clothing that covers your arms and legs.    Avoid the sun in the middle of the day, when sunlight is most direct.    Be aware of how long you have been out in the sun. Reapply sunscreen according to package directions.    Do not use tanning beds.  What are the complications of actinic keratosis?  Actinic keratoses are a sign of skin damage. They may become cancerous. It s a good idea to ask your healthcare provider to check new skin growths. Report any skin problem that concerns you.  When should I call my healthcare provider?  Call your healthcare provider right away if any of these occur:    You have an actinic keratosis sore that does not respond to treatment    You have an actinic keratosis sore that does not heal within a few weeks, or heals and then comes back    You have a skin growth that is changing in size, shape, or color    You have a skin growth that looks different on one side from the other    You have a skin growth that is not the same color throughout   Date Last Reviewed: 5/1/2016 2000-2017 [employer]. [address] [place] [address]. All rights reserved. This information is not intended as a substitute for professional medical care. Always follow your healthcare professional's instructions.        Recognizing Skin Cancer  Doing monthly skin checkups is the best way to find new marks or skin changes. During your skin checkups, be sure to follow the ABCDEs of skin checks. This means checking moles or other growths for Asymmetry, Border, Color, Diameter, and Evolving (changing). Note, too, any new growths, or, if any of your growths bleed, itch, look different, or are painful.  The ABCDEs of skin checks  Check your moles or growths for signs of melanoma using ABCDE:    Asymmetry: the sides of the mole or growth don t match    Border: the edges are ragged, notched, or blurred    Color: the color within the mole or growth varies    Diameter: the mole  or growth is larger than 6 mm (size of a pencil eraser)    Evolving: the size, shape, or color of the mole or growth is changing (evolving is not shown below)       In addition to the ABCDEs, other warning signs of skin cancer include:    A spot or mole that looks different from all other marks on your skin    Changes in how an area feels, such as itching, tenderness, or pain    Changes in the skin's surface, such as oozing, bleeding, or scaliness    A sore that does not heal    New swelling or redness beyond the border of a mole  Who s at risk?  Anyone can get skin cancer. But you are at greater risk if you have:    Fair skin, light-colored hair, or light-colored eyes    Many moles or abnormal moles on your skin    A history of sunburns from sunlight or tanning beds    A family history of skin cancer    A history of exposure to radiation or chemicals    A weakened immune system  Also, a personal history of skin cancer puts you at risk for recurring skin cancer.  How to check your skin  Do your monthly skin checkups in front of a full-length mirror. Check all parts of your body, including your:    Head (ears, face, neck, and scalp)    Torso (front, back, and sides)    Arms (tops, undersides, upper, and lower armpits)    Hands (palms, backs, and fingers, including under the nails)    Buttocks and genitals    Legs (front, back, and sides)    Feet (tops, soles, toes, including under the nails, and between toes)  If you have a lot of moles, take digital photos of them each month. Make sure to take photos both up close and from a distance. These can help you see if any moles change over time.  When to seek medical treatment  Most skin changes are not cancer. But if you see any changes in your skin, call your doctor right away. Only he or she can diagnose a problem. If you have skin cancer, seeing your doctor can be the first step toward getting the treatment that could save your life.   Date Last Reviewed: 1/1/2017     7662-6678 [employer]. [address] [place] [address], [place] [address]. All rights reserved. This information is not intended as a substitute for professional medical care. Always follow your healthcare professional's instructions.

## 2018-06-07 NOTE — PROGRESS NOTES
SUBJECTIVE:   Liu Ragsdale is a 78 year old male who presents to clinic today for the following health issues:      Spot on head      Duration: 6 months or so- thought it was just a scab or something     Description (location/character/radiation): on top of head- thinks there may be more then one spot.     Intensity:  moderate    Accompanying signs and symptoms: Thinks it needs to be frozen off.     History (similar episodes/previous evaluation): None    Therapies tried and outcome: None         Problem list and histories reviewed & adjusted, as indicated.  Additional history: as documented    Patient Active Problem List   Diagnosis     Carotid bruit     Hyperlipidemia with target LDL less than 130     Benign essential hypertension, BP goal <150/90     Obesity     FANI (obstructive sleep apnea)     Past Surgical History:   Procedure Laterality Date     COLONOSCOPY N/A 6/16/2016    Procedure: COLONOSCOPY;  Surgeon: Randy Avendano MD;  Location: [facility]     VASECTOMY  1981       Social History   Substance Use Topics     Smoking status: Never Smoker     Smokeless tobacco: Never Used     Alcohol use Yes      Comment: Rare     Family History   Problem Relation Age of Onset     DIABETES Sister      Obesity Son      Depression Sister      Thyroid Disease Sister      GASTROINTESTINAL DISEASE Son      Depression Son      Congenital Anomalies Daughter      Depression Daughter      HEART DISEASE Daughter      CEREBROVASCULAR DISEASE No family hx of          Current Outpatient Prescriptions   Medication Sig Dispense Refill     aspirin 81 MG EC tablet Take 81 mg by mouth every other day To every third day       atorvastatin (LIPITOR) 20 MG tablet Take 1 tablet (20 mg) by mouth daily 90 tablet 0     hydrochlorothiazide (HYDRODIURIL) 25 MG tablet Take 1 tablet (25 mg) by mouth daily 90 tablet 0     lisinopril (PRINIVIL,ZESTRIL) 30 MG tablet Take 1 tablet (30 mg) by mouth daily 90 tablet 0     Multiple Vitamin (MULTI VITAMIN PO)  "Take 1 oz by mouth daily       Allergies   Allergen Reactions     Metoprolol Other (See Comments)     Side effects     Ampicillin Rash     Recent Labs   Lab Test  01/22/18   1155  11/08/17   1003  01/23/17   1020  01/20/17   0926  09/19/16   1015  03/16/16   1040   09/12/14   1010  05/30/14   1055   A1C   --    --   6.0   --    --    --    --    --    --    LDL   --   49   --    --   55  132*   < >   --    --    HDL   --   69   --    --   63  57   < >   --    --    TRIG   --   88   --    --   147  238*   < >   --    --    ALT  29   --    --    --    --    --    --   24  26   CR  1.05   --    --   1.04   --   1.02   < >  1.05  1.15   GFRESTIMATED  69   --    --   70   --   71   < >  69  62   GFRESTBLACK  83   --    --   84   --   86   < >  84  75   POTASSIUM  3.5   --    --   3.8   --   4.2   < >  4.6  4.5   TSH   --    --    --    --    --    --    --    --   1.41    < > = values in this interval not displayed.      BP Readings from Last 3 Encounters:   06/07/18 130/62   05/15/18 144/54   01/26/18 136/64    Wt Readings from Last 3 Encounters:   06/07/18 178 lb (80.7 kg)   05/15/18 181 lb (82.1 kg)   01/26/18 183 lb (83 kg)                  Labs reviewed in EPIC    Reviewed and updated as needed this visit by clinical staff       Reviewed and updated as needed this visit by Provider         ROS:  Constitutional, HEENT, cardiovascular, pulmonary, gi and gu systems are negative, except as otherwise noted.    OBJECTIVE:     /62 (Cuff Size: Adult Regular)  Pulse 68  Temp 97.8  F (36.6  C) (Tympanic)  Resp 18  Ht 5' 3\" (1.6 m)  Wt 178 lb (80.7 kg)  BMI 31.53 kg/m2  Body mass index is 31.53 kg/(m^2).  GENERAL: healthy, alert and no distress  RESP: lungs clear to auscultation - no rales, rhonchi or wheezes  CV: regular rate and rhythm, normal S1 S2, no S3 or S4, no murmur, click or rub, no peripheral edema and peripheral pulses strong  MS: no gross musculoskeletal defects noted, no edema  SKIN: erythematous " scaly lesions 4x involving scalp as shown below              ASSESSMENT/PLAN:         ICD-10-CM    1. Actinic keratosis L57.0      --Suspect lesion secondary to actinic keratosis.  Cryotherapy performed in office today, recommended to use sunscreen regularly and ABCD of skin cancer explained  --Cryotherapy Procedure Note    --Patient informed of risks (permanent scarring, infection, light or dark discoloration, bleeding, infection, weakness, numbness and recurrence of the lesion) and benefits of the procedure and verbal informed consent obtained.      The areas are treated with liquid nitrogen therapy, frozen until ice ball extended 2 mm beyond lesion, allowed to thaw, and treated again. The patient tolerated procedure well. The patient was instructed on post-op care, warned that there may be blister formation, redness and pain. Recommend OTC analgesia as needed for pain.      .  Patient Instructions       Understanding Actinic Keratosis     Wear a hat that protects your face and ears from the sun.     Actinic keratosis is a skin growth caused by sun damage. These growths are not cancer, but they may develop into skin cancer (precancerous). Many people get these growths, especially as they age. This is because of cumulative sun exposure over years. Multiple growths are called actinic keratoses.  What causes actinic keratosis?  Sun damage causes actinic keratosis. These growths usually appear on skin that is most often exposed to the sun, such as the face, ears, or back of the hands. People who easily sunburn are likely to develop actinic keratoses. Actinic keratoses often appear later in life from cumulative, extensive sun exposure.  What are the symptoms of actinic keratosis?  Actinic keratosis growths may be described as:    Rough, like sandpaper    Wart-like    Scaly or scabby    More easily felt than seen  They may appear singly or in clusters. They may start out as red patches, and the skin around them may be  discolored.  Actinic keratoses usually are not painful. For some people they may make the skin feel tender.  How is actinic keratosis treated?  Because actinic keratoses may develop into skin cancer, a healthcare provider should look at them. Your healthcare provider may choose to remove one or more of these growths. It may be examined under a microscope. This is called a biopsy. You may also wish to have actinic keratoses removed if they bother you. They can be removed in several ways:    By using a medicine on the skin such as 5 fluorouracil or imiquimod    By removing them with a scalpel    By freezing them with liquid nitrogen  How can I prevent actinic keratoses?  Avoiding sun damage to your skin is the best way to prevent actinic keratoses. Here are some ways to protect your skin:    Use sunscreen with an SPF of 30 or higher on exposed skin when you are outside.    Wear a hat to protect your face and scalp. Consider wearing clothing that covers your arms and legs.    Avoid the sun in the middle of the day, when sunlight is most direct.    Be aware of how long you have been out in the sun. Reapply sunscreen according to package directions.    Do not use tanning beds.  What are the complications of actinic keratosis?  Actinic keratoses are a sign of skin damage. They may become cancerous. It s a good idea to ask your healthcare provider to check new skin growths. Report any skin problem that concerns you.  When should I call my healthcare provider?  Call your healthcare provider right away if any of these occur:    You have an actinic keratosis sore that does not respond to treatment    You have an actinic keratosis sore that does not heal within a few weeks, or heals and then comes back    You have a skin growth that is changing in size, shape, or color    You have a skin growth that looks different on one side from the other    You have a skin growth that is not the same color throughout   Date Last Reviewed:  5/1/2016 2000-2017 [email or web address]. [address] [place] [id number]. All rights reserved. This information is not intended as a substitute for professional medical care. Always follow your healthcare professional's instructions.        Recognizing Skin Cancer  Doing monthly skin checkups is the best way to find new marks or skin changes. During your skin checkups, be sure to follow the ABCDEs of skin checks. This means checking moles or other growths for Asymmetry, Border, Color, Diameter, and Evolving (changing). Note, too, any new growths, or, if any of your growths bleed, itch, look different, or are painful.  The ABCDEs of skin checks  Check your moles or growths for signs of melanoma using ABCDE:    Asymmetry: the sides of the mole or growth don t match    Border: the edges are ragged, notched, or blurred    Color: the color within the mole or growth varies    Diameter: the mole or growth is larger than 6 mm (size of a pencil eraser)    Evolving: the size, shape, or color of the mole or growth is changing (evolving is not shown below)       In addition to the ABCDEs, other warning signs of skin cancer include:    A spot or mole that looks different from all other marks on your skin    Changes in how an area feels, such as itching, tenderness, or pain    Changes in the skin's surface, such as oozing, bleeding, or scaliness    A sore that does not heal    New swelling or redness beyond the border of a mole  Who s at risk?  Anyone can get skin cancer. But you are at greater risk if you have:    Fair skin, light-colored hair, or light-colored eyes    Many moles or abnormal moles on your skin    A history of sunburns from sunlight or tanning beds    A family history of skin cancer    A history of exposure to radiation or chemicals    A weakened immune system  Also, a personal history of skin cancer puts you at risk for recurring skin cancer.  How to check your skin  Do your monthly skin  checkups in front of a full-length mirror. Check all parts of your body, including your:    Head (ears, face, neck, and scalp)    Torso (front, back, and sides)    Arms (tops, undersides, upper, and lower armpits)    Hands (palms, backs, and fingers, including under the nails)    Buttocks and genitals    Legs (front, back, and sides)    Feet (tops, soles, toes, including under the nails, and between toes)  If you have a lot of moles, take digital photos of them each month. Make sure to take photos both up close and from a distance. These can help you see if any moles change over time.  When to seek medical treatment  Most skin changes are not cancer. But if you see any changes in your skin, call your doctor right away. Only he or she can diagnose a problem. If you have skin cancer, seeing your doctor can be the first step toward getting the treatment that could save your life.   Date Last Reviewed: 1/1/2017 2000-2017 [employer]. [address] [place] [address], [place] [address]. All rights reserved. This information is not intended as a substitute for professional medical care. Always follow your healthcare professional's instructions.            Piyush Rogers MD  [facility]

## 2018-06-07 NOTE — MR AVS SNAPSHOT
After Visit Summary   6/7/2018    Liu Ragsdale    MRN: [id number]           Patient Information     Date Of Birth          1940        Visit Information        Provider Department      6/7/2018 10:40 AM Piyush Rogers MD [facility]        Today's Diagnoses     Actinic keratosis    -  1      Care Instructions      Understanding Actinic Keratosis     Wear a hat that protects your face and ears from the sun.     Actinic keratosis is a skin growth caused by sun damage. These growths are not cancer, but they may develop into skin cancer (precancerous). Many people get these growths, especially as they age. This is because of cumulative sun exposure over years. Multiple growths are called actinic keratoses.  What causes actinic keratosis?  Sun damage causes actinic keratosis. These growths usually appear on skin that is most often exposed to the sun, such as the face, ears, or back of the hands. People who easily sunburn are likely to develop actinic keratoses. Actinic keratoses often appear later in life from cumulative, extensive sun exposure.  What are the symptoms of actinic keratosis?  Actinic keratosis growths may be described as:    Rough, like sandpaper    Wart-like    Scaly or scabby    More easily felt than seen  They may appear singly or in clusters. They may start out as red patches, and the skin around them may be discolored.  Actinic keratoses usually are not painful. For some people they may make the skin feel tender.  How is actinic keratosis treated?  Because actinic keratoses may develop into skin cancer, a healthcare provider should look at them. Your healthcare provider may choose to remove one or more of these growths. It may be examined under a microscope. This is called a biopsy. You may also wish to have actinic keratoses removed if they bother you. They can be removed in several ways:    By using a medicine on the skin such as 5 fluorouracil or  imiquimod    By removing them with a scalpel    By freezing them with liquid nitrogen  How can I prevent actinic keratoses?  Avoiding sun damage to your skin is the best way to prevent actinic keratoses. Here are some ways to protect your skin:    Use sunscreen with an SPF of 30 or higher on exposed skin when you are outside.    Wear a hat to protect your face and scalp. Consider wearing clothing that covers your arms and legs.    Avoid the sun in the middle of the day, when sunlight is most direct.    Be aware of how long you have been out in the sun. Reapply sunscreen according to package directions.    Do not use tanning beds.  What are the complications of actinic keratosis?  Actinic keratoses are a sign of skin damage. They may become cancerous. It s a good idea to ask your healthcare provider to check new skin growths. Report any skin problem that concerns you.  When should I call my healthcare provider?  Call your healthcare provider right away if any of these occur:    You have an actinic keratosis sore that does not respond to treatment    You have an actinic keratosis sore that does not heal within a few weeks, or heals and then comes back    You have a skin growth that is changing in size, shape, or color    You have a skin growth that looks different on one side from the other    You have a skin growth that is not the same color throughout   Date Last Reviewed: 5/1/2016 2000-2017 [employer]. [address] [place] [address]. All rights reserved. This information is not intended as a substitute for professional medical care. Always follow your healthcare professional's instructions.        Recognizing Skin Cancer  Doing monthly skin checkups is the best way to find new marks or skin changes. During your skin checkups, be sure to follow the ABCDEs of skin checks. This means checking moles or other growths for Asymmetry, Border, Color, Diameter, and Evolving (changing). Note, too,  any new growths, or, if any of your growths bleed, itch, look different, or are painful.  The ABCDEs of skin checks  Check your moles or growths for signs of melanoma using ABCDE:    Asymmetry: the sides of the mole or growth don t match    Border: the edges are ragged, notched, or blurred    Color: the color within the mole or growth varies    Diameter: the mole or growth is larger than 6 mm (size of a pencil eraser)    Evolving: the size, shape, or color of the mole or growth is changing (evolving is not shown below)       In addition to the ABCDEs, other warning signs of skin cancer include:    A spot or mole that looks different from all other marks on your skin    Changes in how an area feels, such as itching, tenderness, or pain    Changes in the skin's surface, such as oozing, bleeding, or scaliness    A sore that does not heal    New swelling or redness beyond the border of a mole  Who s at risk?  Anyone can get skin cancer. But you are at greater risk if you have:    Fair skin, light-colored hair, or light-colored eyes    Many moles or abnormal moles on your skin    A history of sunburns from sunlight or tanning beds    A family history of skin cancer    A history of exposure to radiation or chemicals    A weakened immune system  Also, a personal history of skin cancer puts you at risk for recurring skin cancer.  How to check your skin  Do your monthly skin checkups in front of a full-length mirror. Check all parts of your body, including your:    Head (ears, face, neck, and scalp)    Torso (front, back, and sides)    Arms (tops, undersides, upper, and lower armpits)    Hands (palms, backs, and fingers, including under the nails)    Buttocks and genitals    Legs (front, back, and sides)    Feet (tops, soles, toes, including under the nails, and between toes)  If you have a lot of moles, take digital photos of them each month. Make sure to take photos both up close and from a distance. These can help you see  if any moles change over time.  When to seek medical treatment  Most skin changes are not cancer. But if you see any changes in your skin, call your doctor right away. Only he or she can diagnose a problem. If you have skin cancer, seeing your doctor can be the first step toward getting the treatment that could save your life.   Date Last Reviewed: 1/1/2017 2000-2017 [employer]. [address] [place] [address], [place] [address]. All rights reserved. This information is not intended as a substitute for professional medical care. Always follow your healthcare professional's instructions.                Follow-ups after your visit        Who to contact     If you have questions or need follow up information about today's clinic visit or your schedule please contact [facility] directly at [phone].  Normal or non-critical lab and imaging results will be communicated to you by [employer], letter or phone within 4 business days after the clinic has received the results. If you do not hear from us within 7 days, please contact the clinic through [employer] or phone. If you have a critical or abnormal lab result, we will notify you by phone as soon as possible.  Submit refill requests through [employer] or call your pharmacy and they will forward the refill request to us. Please allow 3 business days for your refill to be completed.          Additional Information About Your Visit        [employer] Information     [employer] gives you secure access to your electronic health record. If you see a primary care provider, you can also send messages to your care team and make appointments. If you have questions, please call your primary care clinic.  If you do not have a primary care provider, please call [phone] and they will assist you.        Care EveryWhere ID     This is your Care EveryWhere ID. This could be used by other organizations to access your [facility] medical records  [id number]        Your  "Vitals Were     Pulse Temperature Respirations Height BMI (Body Mass Index)       68 97.8  F (36.6  C) (Tympanic) 18 5' 3\" (1.6 m) 31.53 kg/m2        Blood Pressure from Last 3 Encounters:   06/07/18 130/62   05/15/18 144/54   01/26/18 136/64    Weight from Last 3 Encounters:   06/07/18 178 lb (80.7 kg)   05/15/18 181 lb (82.1 kg)   01/26/18 183 lb (83 kg)              Today, you had the following     No orders found for display       Primary Care Provider Office Phone # Fax #    Shania Schneider, [facility] [phone]       [address] [facility] [address]        Equal Access to Services     MAGGIE SHANKAR : Fidelia faulkner Solita, waaxda luqadaha, qaybta kaalmada adeegyada, matilda vegas . [facility] [phone].    ATENCIÓN: Si habla español, tiene a sawyer disposición servicios gratuitos de asistencia lingüística. Llame al [phone].    We comply with applicable federal civil rights laws and [place] laws. We do not discriminate on the basis of race, color, national origin, age, disability, sex, sexual orientation, or gender identity.            Thank you!     Thank you for choosing [facility]  for your care. Our goal is always to provide you with excellent care. Hearing back from our patients is one way we can continue to improve our services. Please take a few minutes to complete the written survey that you may receive in the mail after your visit with us. Thank you!             Your Updated Medication List - Protect others around you: Learn how to safely use, store and throw away your medicines at www.disposemymeds.org.          This list is accurate as of 6/7/18 11:09 AM.  Always use your most recent med list.                   Brand Name Dispense Instructions for use Diagnosis    aspirin 81 MG EC tablet      Take 81 mg by mouth every other day To every third day        atorvastatin 20 MG tablet    LIPITOR    90 tablet    Take 1 tablet (20 " mg) by mouth daily    Hyperlipidemia with target LDL less than 130       hydrochlorothiazide 25 MG tablet    HYDRODIURIL    90 tablet    Take 1 tablet (25 mg) by mouth daily    Benign essential hypertension       lisinopril 30 MG tablet    PRINIVIL,ZESTRIL    90 tablet    Take 1 tablet (30 mg) by mouth daily    Benign essential hypertension       MULTI VITAMIN PO      Take 1 oz by mouth daily

## 2018-09-19 DIAGNOSIS — I10 BENIGN ESSENTIAL HYPERTENSION: Chronic | ICD-10-CM

## 2018-09-19 DIAGNOSIS — E78.5 HYPERLIPIDEMIA WITH TARGET LDL LESS THAN 130: Chronic | ICD-10-CM

## 2018-09-19 RX ORDER — LISINOPRIL 30 MG/1
TABLET ORAL
Qty: 90 TABLET | Refills: 0 | Status: SHIPPED | OUTPATIENT
Start: 2018-09-19 | End: 2019-01-03

## 2018-09-19 RX ORDER — ATORVASTATIN CALCIUM 20 MG/1
TABLET, FILM COATED ORAL
Qty: 90 TABLET | Refills: 0 | Status: SHIPPED | OUTPATIENT
Start: 2018-09-19 | End: 2019-01-03

## 2018-09-19 RX ORDER — HYDROCHLOROTHIAZIDE 25 MG/1
TABLET ORAL
Qty: 90 TABLET | Refills: 0 | Status: SHIPPED | OUTPATIENT
Start: 2018-09-19 | End: 2019-01-03

## 2018-09-19 NOTE — TELEPHONE ENCOUNTER
"Requested Prescriptions   Pending Prescriptions Disp Refills     atorvastatin (LIPITOR) 20 MG tablet [Pharmacy Med Name: ATORVASTATIN CALCIUM 20 MG Tablet] 90 tablet 0     Sig: TAKE 1 TABLET EVERY DAY    Statins Protocol Passed    9/19/2018  1:59 PM       Passed - LDL on file in past 12 months    Recent Labs   Lab Test  11/08/17   1003   LDL  49            Passed - No abnormal creatine kinase in past 12 months    No lab results found.            Passed - Recent (12 mo) or future (30 days) visit within the authorizing provider's specialty    Patient had office visit in the last 12 months or has a visit in the next 30 days with authorizing provider or within the authorizing provider's specialty.  See \"Patient Info\" tab in inbasket, or \"Choose Columns\" in Meds & Orders section of the refill encounter.    Last Written Prescription Date:  4/6/18  Last Fill Quantity: 90,  # refills: 0   Last office visit: 6/7/2018 with prescribing provider:     Future Office Visit:               Passed - Patient is age 18 or older        lisinopril (PRINIVIL,ZESTRIL) 30 MG tablet [Pharmacy Med Name: LISINOPRIL 30 MG Tablet] 90 tablet 0     Sig: TAKE 1 TABLET (30 MG) BY MOUTH DAILY    ACE Inhibitors (Including Combos) Protocol Passed    9/19/2018  1:59 PM       Passed - Blood pressure under 140/90 in past 12 months    BP Readings from Last 3 Encounters:   06/07/18 130/62   05/15/18 144/54   01/26/18 136/64                Passed - Recent (12 mo) or future (30 days) visit within the authorizing provider's specialty    Patient had office visit in the last 12 months or has a visit in the next 30 days with authorizing provider or within the authorizing provider's specialty.  See \"Patient Info\" tab in inbasket, or \"Choose Columns\" in Meds & Orders section of the refill encounter.      Last Written Prescription Date:  4/6/18  Last Fill Quantity: 90,  # refills: 0  Last office visit: 6/7/2018 with prescribing provider:    Future Office Visit:  " "           Passed - Patient is age 18 or older       Passed - Normal serum creatinine on file in past 12 months    Recent Labs   Lab Test  01/22/18   1155   CR  1.05            Passed - Normal serum potassium on file in past 12 months    Recent Labs   Lab Test  01/22/18   1155   POTASSIUM  3.5             hydrochlorothiazide (HYDRODIURIL) 25 MG tablet [Pharmacy Med Name: HYDROCHLOROTHIAZIDE 25 MG Tablet] 90 tablet 0     Sig: TAKE 1 TABLET (25 MG) BY MOUTH DAILY    Diuretics (Including Combos) Protocol Failed    9/19/2018  1:59 PM       Failed - Normal serum sodium on file in past 12 months    Recent Labs   Lab Test  01/22/18   1155   NA  132*             Passed - Blood pressure under 140/90 in past 12 months    BP Readings from Last 3 Encounters:   06/07/18 130/62   05/15/18 144/54   01/26/18 136/64                Passed - Recent (12 mo) or future (30 days) visit within the authorizing provider's specialty    Patient had office visit in the last 12 months or has a visit in the next 30 days with authorizing provider or within the authorizing provider's specialty.  See \"Patient Info\" tab in inbasket, or \"Choose Columns\" in Meds & Orders section of the refill encounter.      Last Written Prescription Date:  4/6/18  Last Fill Quantity: 90,  # refills: 0   Last office visit: 6/7/2018 with prescribing provider:     Future Office Visit:             Passed - Patient is age 18 or older       Passed - Normal serum creatinine on file in past 12 months    Recent Labs   Lab Test  01/22/18   1155   CR  1.05             Passed - Normal serum potassium on file in past 12 months    Recent Labs   Lab Test  01/22/18   1155   POTASSIUM  3.5                      "

## 2019-01-03 DIAGNOSIS — I10 BENIGN ESSENTIAL HYPERTENSION: Chronic | ICD-10-CM

## 2019-01-03 DIAGNOSIS — E78.5 HYPERLIPIDEMIA WITH TARGET LDL LESS THAN 130: Chronic | ICD-10-CM

## 2019-01-03 RX ORDER — LISINOPRIL 30 MG/1
30 TABLET ORAL DAILY
Qty: 90 TABLET | Refills: 0 | Status: SHIPPED | OUTPATIENT
Start: 2019-01-03 | End: 2019-05-31

## 2019-01-03 RX ORDER — HYDROCHLOROTHIAZIDE 25 MG/1
25 TABLET ORAL DAILY
Qty: 90 TABLET | Refills: 0 | Status: SHIPPED | OUTPATIENT
Start: 2019-01-03 | End: 2019-06-04

## 2019-01-03 RX ORDER — ATORVASTATIN CALCIUM 20 MG/1
20 TABLET, FILM COATED ORAL DAILY
Qty: 90 TABLET | Refills: 0 | Status: SHIPPED | OUTPATIENT
Start: 2019-01-03 | End: 2019-06-04

## 2019-01-03 NOTE — TELEPHONE ENCOUNTER
Requested Prescriptions   Pending Prescriptions Disp Refills     lisinopril (PRINIVIL/ZESTRIL) 30 MG tablet 90 tablet     There is no refill protocol information for this order        atorvastatin (LIPITOR) 20 MG tablet 90 tablet     There is no refill protocol information for this order        hydrochlorothiazide (HYDRODIURIL) 25 MG tablet 90 tablet     There is no refill protocol information for this order        Last Written Prescription Date:  9/19/18  Last Fill Quantity: 90,  # refills: 0   Last office visit: 6/7/2018 with prescribing provider:     Future Office Visit:

## 2019-04-23 DIAGNOSIS — E78.5 HYPERLIPIDEMIA WITH TARGET LDL LESS THAN 130: Chronic | ICD-10-CM

## 2019-04-23 DIAGNOSIS — I10 BENIGN ESSENTIAL HYPERTENSION: Chronic | ICD-10-CM

## 2019-04-23 RX ORDER — LISINOPRIL 30 MG/1
30 TABLET ORAL DAILY
Qty: 30 TABLET | Refills: 0 | Status: SHIPPED | OUTPATIENT
Start: 2019-04-23 | End: 2019-06-06 | Stop reason: ALTCHOICE

## 2019-04-23 RX ORDER — HYDROCHLOROTHIAZIDE 25 MG/1
25 TABLET ORAL DAILY
Qty: 30 TABLET | Refills: 0 | Status: SHIPPED | OUTPATIENT
Start: 2019-04-23 | End: 2019-06-06

## 2019-04-23 RX ORDER — ATORVASTATIN CALCIUM 20 MG/1
20 TABLET, FILM COATED ORAL DAILY
Qty: 30 TABLET | Refills: 0 | Status: SHIPPED | OUTPATIENT
Start: 2019-04-23 | End: 2019-06-06

## 2019-04-23 NOTE — TELEPHONE ENCOUNTER
"Requested Prescriptions   Pending Prescriptions Disp Refills     hydrochlorothiazide (HYDRODIURIL) 25 MG tablet [Pharmacy Med Name: HYDROCHLOROT 25MG   TAB] 90 tablet 1     Sig: TAKE 1 TABLET BY MOUTH ONCE DAILY       Diuretics (Including Combos) Protocol Failed - 4/23/2019  9:28 AM        Failed - Normal serum creatinine on file in past 12 months     Recent Labs   Lab Test 01/22/18  1155   CR 1.05              Failed - Normal serum potassium on file in past 12 months     Recent Labs   Lab Test 01/22/18  1155   POTASSIUM 3.5                    Failed - Normal serum sodium on file in past 12 months     Recent Labs   Lab Test 01/22/18  1155   *              Passed - Blood pressure under 140/90 in past 12 months     BP Readings from Last 3 Encounters:   06/07/18 130/62   05/15/18 144/54   01/26/18 136/64                 Passed - Recent (12 mo) or future (30 days) visit within the authorizing provider's specialty     Patient had office visit in the last 12 months or has a visit in the next 30 days with authorizing provider or within the authorizing provider's specialty.  See \"Patient Info\" tab in inbasket, or \"Choose Columns\" in Meds & Orders section of the refill encounter.      Last Written Prescription Date:  1/3/19  Last Fill Quantity: 90,  # refills: 0   Last office visit: 6/7/2018 with prescribing provider:     Future Office Visit:                Passed - Medication is active on med list        Passed - Patient is age 18 or older        lisinopril (PRINIVIL/ZESTRIL) 30 MG tablet [Pharmacy Med Name: LISINOPRIL 30MG     TAB] 90 tablet 1     Sig: TAKE 1 TABLET BY MOUTH ONCE DAILY       ACE Inhibitors (Including Combos) Protocol Failed - 4/23/2019  9:28 AM        Failed - Normal serum creatinine on file in past 12 months     Recent Labs   Lab Test 01/22/18  1155   CR 1.05             Failed - Normal serum potassium on file in past 12 months     Recent Labs   Lab Test 01/22/18  1155   POTASSIUM 3.5             " "Passed - Blood pressure under 140/90 in past 12 months     BP Readings from Last 3 Encounters:   06/07/18 130/62   05/15/18 144/54   01/26/18 136/64                 Passed - Recent (12 mo) or future (30 days) visit within the authorizing provider's specialty     Patient had office visit in the last 12 months or has a visit in the next 30 days with authorizing provider or within the authorizing provider's specialty.  See \"Patient Info\" tab in inbasket, or \"Choose Columns\" in Meds & Orders section of the refill encounter.      Last Written Prescription Date:  1/3/19  Last Fill Quantity: 90,  # refills: 0   Last office visit: 6/7/2018 with prescribing provider:     Future Office Visit:                Passed - Medication is active on med list        Passed - Patient is age 18 or older        atorvastatin (LIPITOR) 20 MG tablet [Pharmacy Med Name: ATORVASTATIN 20MG   TAB] 90 tablet 3     Sig: TAKE ONE TABLET BY MOUTH ONCE DAILY       Statins Protocol Failed - 4/23/2019  9:28 AM        Failed - LDL on file in past 12 months     Recent Labs   Lab Test 11/08/17  1003   LDL 49             Passed - No abnormal creatine kinase in past 12 months     No lab results found.             Passed - Recent (12 mo) or future (30 days) visit within the authorizing provider's specialty     Patient had office visit in the last 12 months or has a visit in the next 30 days with authorizing provider or within the authorizing provider's specialty.  See \"Patient Info\" tab in inbasket, or \"Choose Columns\" in Meds & Orders section of the refill encounter.      Last Written Prescription Date:  1/3/19  Last Fill Quantity: 90,  # refills: 0   Last office visit: 6/7/2018 with prescribing provider:     Future Office Visit:              Passed - Medication is active on med list        Passed - Patient is age 18 or older          "

## 2019-05-31 ENCOUNTER — DOCUMENTATION ONLY (OUTPATIENT)
Dept: LAB | Facility: CLINIC | Age: 79
End: 2019-05-31

## 2019-05-31 DIAGNOSIS — E78.5 HYPERLIPIDEMIA WITH TARGET LDL LESS THAN 130: Primary | Chronic | ICD-10-CM

## 2019-05-31 DIAGNOSIS — I10 BENIGN ESSENTIAL HYPERTENSION: Chronic | ICD-10-CM

## 2019-05-31 NOTE — PROGRESS NOTES
Patient is scheduled for a lab appointment on 6/03/19.  Please place future orders for labs needed.  Thank you.

## 2019-06-03 DIAGNOSIS — E78.5 HYPERLIPIDEMIA WITH TARGET LDL LESS THAN 130: Chronic | ICD-10-CM

## 2019-06-03 DIAGNOSIS — I10 BENIGN ESSENTIAL HYPERTENSION: Chronic | ICD-10-CM

## 2019-06-03 LAB
ALBUMIN SERPL-MCNC: 3.5 G/DL (ref 3.4–5)
ALP SERPL-CCNC: 86 U/L (ref 40–150)
ALT SERPL W P-5'-P-CCNC: 26 U/L (ref 0–70)
ANION GAP SERPL CALCULATED.3IONS-SCNC: 3 MMOL/L (ref 3–14)
AST SERPL W P-5'-P-CCNC: 17 U/L (ref 0–45)
BILIRUB SERPL-MCNC: 0.5 MG/DL (ref 0.2–1.3)
BUN SERPL-MCNC: 18 MG/DL (ref 7–30)
CALCIUM SERPL-MCNC: 9.4 MG/DL (ref 8.5–10.1)
CHLORIDE SERPL-SCNC: 105 MMOL/L (ref 94–109)
CHOLEST SERPL-MCNC: 149 MG/DL
CO2 SERPL-SCNC: 29 MMOL/L (ref 20–32)
CREAT SERPL-MCNC: 1.04 MG/DL (ref 0.66–1.25)
GFR SERPL CREATININE-BSD FRML MDRD: 68 ML/MIN/{1.73_M2}
GLUCOSE SERPL-MCNC: 98 MG/DL (ref 70–99)
HDLC SERPL-MCNC: 63 MG/DL
LDLC SERPL CALC-MCNC: 61 MG/DL
NONHDLC SERPL-MCNC: 86 MG/DL
POTASSIUM SERPL-SCNC: 3.9 MMOL/L (ref 3.4–5.3)
PROT SERPL-MCNC: 7.3 G/DL (ref 6.8–8.8)
SODIUM SERPL-SCNC: 137 MMOL/L (ref 133–144)
TRIGL SERPL-MCNC: 126 MG/DL

## 2019-06-03 PROCEDURE — 80061 LIPID PANEL: CPT | Performed by: NURSE PRACTITIONER

## 2019-06-03 PROCEDURE — 36415 COLL VENOUS BLD VENIPUNCTURE: CPT | Performed by: NURSE PRACTITIONER

## 2019-06-03 PROCEDURE — 80053 COMPREHEN METABOLIC PANEL: CPT | Performed by: NURSE PRACTITIONER

## 2019-06-03 NOTE — LETTER
June 6, 2019      Liu Ragsdale  [address] [facility] [address]        Dear ,    We are writing to inform you of your test results.    CMP and Lipids are normal.     Resulted Orders   Lipid panel reflex to direct LDL Fasting   Result Value Ref Range    Cholesterol 149 <200 mg/dL    Triglycerides 126 <150 mg/dL      Comment:      Fasting specimen    HDL Cholesterol 63 >39 mg/dL    LDL Cholesterol Calculated 61 <100 mg/dL      Comment:      Desirable:       <100 mg/dl    Non HDL Cholesterol 86 <130 mg/dL   Comprehensive metabolic panel (BMP + Alb, Alk Phos, ALT, AST, Total. Bili, TP)   Result Value Ref Range    Sodium 137 133 - 144 mmol/L    Potassium 3.9 3.4 - 5.3 mmol/L    Chloride 105 94 - 109 mmol/L    Carbon Dioxide 29 20 - 32 mmol/L    Anion Gap 3 3 - 14 mmol/L    Glucose 98 70 - 99 mg/dL      Comment:      Fasting specimen    Urea Nitrogen 18 7 - 30 mg/dL    Creatinine 1.04 0.66 - 1.25 mg/dL    GFR Estimate 68 >60 mL/min/[1.73_m2]      Comment:      Non  GFR Calc  Starting 12/18/2018, serum creatinine based estimated GFR (eGFR) will be   calculated using the Chronic Kidney Disease Epidemiology Collaboration   (CKD-EPI) equation.      GFR Estimate If Black 79 >60 mL/min/[1.73_m2]      Comment:       GFR Calc  Starting 12/18/2018, serum creatinine based estimated GFR (eGFR) will be   calculated using the Chronic Kidney Disease Epidemiology Collaboration   (CKD-EPI) equation.      Calcium 9.4 8.5 - 10.1 mg/dL    Bilirubin Total 0.5 0.2 - 1.3 mg/dL    Albumin 3.5 3.4 - 5.0 g/dL    Protein Total 7.3 6.8 - 8.8 g/dL    Alkaline Phosphatase 86 40 - 150 U/L    ALT 26 0 - 70 U/L    AST 17 0 - 45 U/L       If you have any questions or concerns, please call the clinic at the number listed above.       Sincerely,      Shania Schneider NP

## 2019-06-04 NOTE — RESULT ENCOUNTER NOTE
Dear Liu,  CMP and Lipids are normal. We'll see you on June 6, 2019.   Please contact our clinic via phone or My Chart if you have any questions or concerns.  Thanks,  CECE Cline

## 2019-06-06 ENCOUNTER — OFFICE VISIT (OUTPATIENT)
Dept: FAMILY MEDICINE | Facility: CLINIC | Age: 79
End: 2019-06-06
Payer: COMMERCIAL

## 2019-06-06 VITALS
DIASTOLIC BLOOD PRESSURE: 66 MMHG | SYSTOLIC BLOOD PRESSURE: 132 MMHG | WEIGHT: 179 LBS | HEIGHT: 62 IN | BODY MASS INDEX: 32.94 KG/M2 | HEART RATE: 66 BPM | RESPIRATION RATE: 20 BRPM | OXYGEN SATURATION: 96 % | TEMPERATURE: 97.7 F

## 2019-06-06 DIAGNOSIS — H90.3 BILATERAL SENSORINEURAL HEARING LOSS: Primary | ICD-10-CM

## 2019-06-06 DIAGNOSIS — E78.5 HYPERLIPIDEMIA WITH TARGET LDL LESS THAN 130: Chronic | ICD-10-CM

## 2019-06-06 DIAGNOSIS — I10 BENIGN ESSENTIAL HYPERTENSION: Chronic | ICD-10-CM

## 2019-06-06 PROCEDURE — 99214 OFFICE O/P EST MOD 30 MIN: CPT | Performed by: NURSE PRACTITIONER

## 2019-06-06 RX ORDER — HYDROCHLOROTHIAZIDE 25 MG/1
25 TABLET ORAL DAILY
Qty: 90 TABLET | Refills: 3 | Status: SHIPPED | OUTPATIENT
Start: 2019-06-06 | End: 2020-07-09

## 2019-06-06 RX ORDER — LISINOPRIL 30 MG/1
30 TABLET ORAL DAILY
Qty: 90 TABLET | Status: CANCELLED | OUTPATIENT
Start: 2019-06-06

## 2019-06-06 RX ORDER — LOSARTAN POTASSIUM 25 MG/1
25 TABLET ORAL DAILY
Qty: 60 TABLET | Refills: 0 | Status: SHIPPED | OUTPATIENT
Start: 2019-06-06 | End: 2019-07-05

## 2019-06-06 RX ORDER — ATORVASTATIN CALCIUM 20 MG/1
20 TABLET, FILM COATED ORAL DAILY
Qty: 90 TABLET | Refills: 3 | Status: SHIPPED | OUTPATIENT
Start: 2019-06-06 | End: 2020-03-23

## 2019-06-06 ASSESSMENT — ENCOUNTER SYMPTOMS
FREQUENCY: 1
ARTHRALGIAS: 1
COUGH: 1
MYALGIAS: 1

## 2019-06-06 ASSESSMENT — ACTIVITIES OF DAILY LIVING (ADL): CURRENT_FUNCTION: NO ASSISTANCE NEEDED

## 2019-06-06 ASSESSMENT — MIFFLIN-ST. JEOR: SCORE: 1411.19

## 2019-06-06 NOTE — PATIENT INSTRUCTIONS
1. Hyperlipidemia with target LDL less than 130  Chronic, stable  - atorvastatin (LIPITOR) 20 MG tablet; Take 1 tablet (20 mg) by mouth daily  Dispense: 90 tablet; Refill: 3    2. Benign essential hypertension, BP goal <150/90  Chronic, stabale  STOP Lisinopril  Monitor your BP daily and keep a log, if [employer] works, you could just send me your readings and we'll adjust Losartan as needed.  - hydrochlorothiazide (HYDRODIURIL) 25 MG tablet; Take 1 tablet (25 mg) by mouth daily  Dispense: 90 tablet; Refill: 3  -START  losartan (COZAAR) 25 MG tablet; Take 1 tablet (25 mg) by mouth daily  Dispense: 60 tablet; Refill: 0    3. Bilateral sensorineural hearing loss  Chronic, unchanged  - AUDIOLOGY ADULT REFERRAL        Component      Latest Ref Rng & Units 6/3/2019   Sodium      133 - 144 mmol/L 137   Potassium      3.4 - 5.3 mmol/L 3.9   Chloride      94 - 109 mmol/L 105   Carbon Dioxide      20 - 32 mmol/L 29   Anion Gap      3 - 14 mmol/L 3   Glucose      70 - 99 mg/dL 98   Urea Nitrogen      7 - 30 mg/dL 18   Creatinine      0.66 - 1.25 mg/dL 1.04   GFR Estimate      >60 mL/min/1.73:m2 68   GFR Estimate If Black      >60 mL/min/1.73:m2 79   Calcium      8.5 - 10.1 mg/dL 9.4   Bilirubin Total      0.2 - 1.3 mg/dL 0.5   Albumin      3.4 - 5.0 g/dL 3.5   Protein Total      6.8 - 8.8 g/dL 7.3   Alkaline Phosphatase      40 - 150 U/L 86   ALT      0 - 70 U/L 26   AST      0 - 45 U/L 17   Cholesterol      <200 mg/dL 149   Triglycerides      <150 mg/dL 126   HDL Cholesterol      >39 mg/dL 63   LDL Cholesterol Calculated      <100 mg/dL 61   Non HDL Cholesterol      <130 mg/dL 86     Patient Education   Personalized Prevention Plan  You are due for the preventive services outlined below.  Your care team is available to assist you in scheduling these services.  If you have already completed any of these items, please share that information with your care team to update in your medical record.  Health Maintenance Due   Topic Date  Due     Diptheria Tetanus Pertussis (DTAP/TDAP/TD) Vaccine (1 - Tdap) 05/29/1966     Annual Wellness Visit  05/29/2006     Zoster (Shingles) Vaccine (2 of 3) 12/11/2014     Discuss Advance Directive Planning  12/17/2017     PHQ-2  01/01/2019

## 2019-06-06 NOTE — NURSING NOTE
"Chief Complaint   Patient presents with     Medicare Visit     Lipids     Hypertension       Initial /66 (BP Location: Right arm, Patient Position: Sitting, Cuff Size: Adult Large)   Pulse 66   Temp 97.7  F (36.5  C) (Tympanic)   Resp 20   Ht 1.575 m (5' 2\")   Wt 81.2 kg (179 lb)   SpO2 96%   BMI 32.74 kg/m   Estimated body mass index is 32.74 kg/m  as calculated from the following:    Height as of this encounter: 1.575 m (5' 2\").    Weight as of this encounter: 81.2 kg (179 lb).    Patient presents to the clinic using No DME    Health Maintenance that is potentially due pending provider review:  NONE    n/a    Is there anyone who you would like to be able to receive your results? No  If yes have patient fill out EDWARD    "

## 2019-06-10 DIAGNOSIS — I10 BENIGN ESSENTIAL HYPERTENSION: Chronic | ICD-10-CM

## 2019-06-10 RX ORDER — LISINOPRIL 30 MG/1
TABLET ORAL
Qty: 30 TABLET | OUTPATIENT
Start: 2019-06-10

## 2019-06-10 RX ORDER — LISINOPRIL 30 MG/1
TABLET ORAL
Qty: 90 TABLET | Refills: 1 | OUTPATIENT
Start: 2019-06-10

## 2019-06-10 NOTE — TELEPHONE ENCOUNTER
"LISINOPRIL 30MG     TAB  Last Written Prescription Date:  9/19/2018  Last Fill Quantity: 90,  # refills: 0   Last office visit: 6/6/2019 with prescribing provider:  SANGEETA   Future Office Visit:    Requested Prescriptions   Pending Prescriptions Disp Refills     lisinopril (PRINIVIL/ZESTRIL) 30 MG tablet [Pharmacy Med Name: LISINOPRIL 30MG     TAB] 90 tablet 1     Sig: TAKE 1 TABLET BY MOUTH ONCE DAILY       ACE Inhibitors (Including Combos) Protocol Failed - 6/10/2019 10:22 AM        Failed - Medication is active on med list        Passed - Blood pressure under 140/90 in past 12 months     BP Readings from Last 3 Encounters:   06/06/19 132/66   06/07/18 130/62   05/15/18 144/54                 Passed - Recent (12 mo) or future (30 days) visit within the authorizing provider's specialty     Patient had office visit in the last 12 months or has a visit in the next 30 days with authorizing provider or within the authorizing provider's specialty.  See \"Patient Info\" tab in inbasket, or \"Choose Columns\" in Meds & Orders section of the refill encounter.              Passed - Patient is age 18 or older        Passed - Normal serum creatinine on file in past 12 months     Recent Labs   Lab Test 06/03/19  0825   CR 1.04             Passed - Normal serum potassium on file in past 12 months     Recent Labs   Lab Test 06/03/19  0825   POTASSIUM 3.9               "

## 2019-06-10 NOTE — TELEPHONE ENCOUNTER
"Requested Prescriptions   Pending Prescriptions Disp Refills     lisinopril (PRINIVIL/ZESTRIL) 30 MG tablet [Pharmacy Med Name: LISINOPRIL 30MG     TAB] 30 tablet      Sig: TAKE 1 TABLET BY MOUTH ONCE DAILY (DUE  FOR  OFFICE  VISIT/FASTING  LAB  PRIOR  TO  FURTHER  REFILL)       ACE Inhibitors (Including Combos) Protocol Failed - 6/10/2019  9:18 AM        Failed - Medication is active on med list        Passed - Blood pressure under 140/90 in past 12 months     BP Readings from Last 3 Encounters:   06/06/19 132/66   06/07/18 130/62   05/15/18 144/54                 Passed - Recent (12 mo) or future (30 days) visit within the authorizing provider's specialty     Patient had office visit in the last 12 months or has a visit in the next 30 days with authorizing provider or within the authorizing provider's specialty.  See \"Patient Info\" tab in inbasket, or \"Choose Columns\" in Meds & Orders section of the refill encounter.              Passed - Patient is age 18 or older        Passed - Normal serum creatinine on file in past 12 months     Recent Labs   Lab Test 06/03/19  0825   CR 1.04             Passed - Normal serum potassium on file in past 12 months     Recent Labs   Lab Test 06/03/19  0825   POTASSIUM 3.9             Last Written Prescription Date:  2/17/17  Last Fill Quantity: 90,  # refills: 3   Last office visit: 6/6/2019 with prescribing provider:     Future Office Visit:      "

## 2019-06-10 NOTE — TELEPHONE ENCOUNTER
Per 06-06-19 OV-  2. Benign essential hypertension, BP goal <150/90  Chronic, stabale  STOP Lisinopril  Monitor your BP daily and keep a log, if MyChart works, you could just send me your readings and we'll adjust Losartan as needed.  - hydrochlorothiazide (HYDRODIURIL) 25 MG tablet; Take 1 tablet (25 mg) by mouth daily  Dispense: 90 tablet; Refill: 3  -START  losartan (COZAAR) 25 MG tablet; Take 1 tablet (25 mg) by mouth daily  Dispense: 60 tablet; Refill: 0       Pharm informed Rx discontinued per above visit.  HEIDE Chavarria RN

## 2019-06-21 ENCOUNTER — OFFICE VISIT (OUTPATIENT)
Dept: AUDIOLOGY | Facility: CLINIC | Age: 79
End: 2019-06-21
Payer: COMMERCIAL

## 2019-06-21 DIAGNOSIS — H90.3 SENSORINEURAL HEARING LOSS, BILATERAL: Primary | ICD-10-CM

## 2019-06-21 DIAGNOSIS — H93.13 TINNITUS, BILATERAL: ICD-10-CM

## 2019-06-21 PROCEDURE — 92567 TYMPANOMETRY: CPT | Performed by: AUDIOLOGIST

## 2019-06-21 PROCEDURE — 92557 COMPREHENSIVE HEARING TEST: CPT | Performed by: AUDIOLOGIST

## 2019-06-21 PROCEDURE — 99207 ZZC NO CHARGE LOS: CPT | Performed by: AUDIOLOGIST

## 2019-06-21 NOTE — PROGRESS NOTES
AUDIOLOGY REPORT    SUBJECTIVE:  Liu Ragsdale, a 79 year old male, was seen in the Audiology Clinic at [facility] for audiologic evaluation, referred by Shania Schneider CNP. The patient reports a gradual decline in hearing bilaterally. He feels his right ear hears better than his left. Patient reports bilateral tinnitus, worse in the left ear. He has a history of  and occupational noise exposure. There is a family history of hearing loss in patient's grandfather. The patient denies bilateral otalgia, drainage in the left ear, bilateral aural fullness, vertigo, and history of ear surgery. The patient notes difficulty with communication in a variety of listening situations. Patient was unaccompanied to today's appointment.      OBJECTIVE:    Abuse Screening:  Do you feel unsafe at home or work/school? No  Do you feel threatened by someone? No  Does anyone try to keep you from having contact with others, or doing things outside of your home? No  Physical signs of abuse present? No     Otoscopic exam indicates minimal, non-occluding cerumen bilaterally.     Pure Tone Thresholds assessed using conventional audiometry with good reliability from 250-8000 Hz bilaterally using insert earphones and circumaural headphones      RIGHT:  normal sloping to mild-moderate sensorineural hearing loss    LEFT:    normal sloping to mild-moderately severe sensorineural hearing loss  Significant asymmetry noted at 9844-8329 Hz with the left ear being poorer. This did not resolve with use of insert earphones.     Tympanogram:    RIGHT: normal eardrum mobility    LEFT:   normal eardrum mobility    Reflexes (reported by stimulus ear):  RIGHT: Ipsilateral is present at normal levels  RIGHT: Contralateral is absent at frequencies tested  LEFT:   Ipsilateral is present at normal levels  LEFT:   Contralateral is absent at frequencies tested    Speech Reception Threshold:    RIGHT: 20 dB HL    LEFT:   20  dB HL  These results are in agreement with patient's pure tone average.    Word Recognition Score:     RIGHT: 88% at 60 dB HL using NU-6 recorded word list.    LEFT:   92% at 60 dB HL using NU-6 recorded word list.      ASSESSMENT:   Bilateral sensorineural hearing loss     Today s results were discussed with the patient in detail.     PLAN:  Patient was counseled regarding hearing loss and impact on communication. Patient is a good candidate for amplification at this time. Handout on good communication strategies, and hearing aid use was given to patient. It is recommended that the patient follow up with ENT regarding asymmetry. Pending medical clearance, it is recommended that patient schedule appointment for hearing aid consultation. Please call this clinic with questions regarding these results or recommendations.        Crow Weiss, [facility]  Licensed Audiologist #[id number]  6/21/2019

## 2019-07-01 ENCOUNTER — TELEPHONE (OUTPATIENT)
Dept: FAMILY MEDICINE | Facility: CLINIC | Age: 79
End: 2019-07-01

## 2019-07-01 DIAGNOSIS — I10 BENIGN ESSENTIAL HYPERTENSION: Chronic | ICD-10-CM

## 2019-07-01 NOTE — TELEPHONE ENCOUNTER
Patient dropped off his BP log. Note on the log says will need a med change, went up 1/2 a tab. BP given to RN to review.    Paige Hidalgo-Station

## 2019-07-01 NOTE — TELEPHONE ENCOUNTER
Per 06-06-19 OV-  2. Benign essential hypertension, BP goal <150/90  Chronic, stabale  STOP Lisinopril  Monitor your BP daily and keep a log, if Samanthat works, you could just send me your readings and we'll adjust Losartan as needed.  - hydrochlorothiazide (HYDRODIURIL) 25 MG tablet; Take 1 tablet (25 mg) by mouth daily  Dispense: 90 tablet; Refill: 3  -START  losartan (COZAAR) 25 MG tablet; Take 1 tablet (25 mg) by mouth daily  Dispense: 60 tablet; Refill: 0         Component      Latest Ref Rng & Units 6/3/2019   Sodium      133 - 144 mmol/L 137   Potassium      3.4 - 5.3 mmol/L 3.9   Chloride      94 - 109 mmol/L 105   Carbon Dioxide      20 - 32 mmol/L 29   Anion Gap      3 - 14 mmol/L 3   Glucose      70 - 99 mg/dL 98   Urea Nitrogen      7 - 30 mg/dL 18   Creatinine      0.66 - 1.25 mg/dL 1.04   GFR Estimate      >60 mL/min/1.73:m2 68   GFR Estimate If Black      >60 mL/min/1.73:m2 79   Calcium      8.5 - 10.1 mg/dL 9.4   Bilirubin Total      0.2 - 1.3 mg/dL 0.5   Albumin      3.4 - 5.0 g/dL 3.5   Protein Total      6.8 - 8.8 g/dL 7.3   Alkaline Phosphatase      40 - 150 U/L 86   ALT      0 - 70 U/L 26   AST      0 - 45 U/L 17     BP's reviewed by RN. Pt states increased losartan to 25 mg tab 1.5 (37.5 mg) daily 06-25-19 per Shania's instructions therefore needing losartan refill with direction change.     [facility] has enough med until Shania back in office Fri.  BP log placed in Shania's inbox for review.  JUICE Driver, RN

## 2019-07-05 RX ORDER — LOSARTAN POTASSIUM 25 MG/1
25 TABLET ORAL DAILY
Qty: 90 TABLET | Refills: 3 | Status: SHIPPED | OUTPATIENT
Start: 2019-07-05 | End: 2019-09-25

## 2019-07-05 NOTE — TELEPHONE ENCOUNTER
BP's at home now averaging 130's/70's. Continue with current hypertension medications. Refilled Cozaar 25 mg daily for the year.  EDWARD CilneP

## 2019-07-05 NOTE — TELEPHONE ENCOUNTER
Pt informed/ advised as noted per Shania. Advised to cont home BP monitoring with BP goal < 150/90.  HEIDE Chavarria RN

## 2019-07-22 ENCOUNTER — OFFICE VISIT (OUTPATIENT)
Dept: OTOLARYNGOLOGY | Facility: CLINIC | Age: 79
End: 2019-07-22
Payer: COMMERCIAL

## 2019-07-22 VITALS
HEART RATE: 85 BPM | TEMPERATURE: 97.2 F | DIASTOLIC BLOOD PRESSURE: 72 MMHG | BODY MASS INDEX: 32.94 KG/M2 | WEIGHT: 179 LBS | RESPIRATION RATE: 18 BRPM | HEIGHT: 62 IN | SYSTOLIC BLOOD PRESSURE: 133 MMHG

## 2019-07-22 DIAGNOSIS — H90.3 SNHL (SENSORY-NEURAL HEARING LOSS), ASYMMETRICAL: Primary | ICD-10-CM

## 2019-07-22 PROCEDURE — 99203 OFFICE O/P NEW LOW 30 MIN: CPT | Performed by: OTOLARYNGOLOGY

## 2019-07-22 ASSESSMENT — MIFFLIN-ST. JEOR: SCORE: 1411.19

## 2019-07-22 NOTE — LETTER
7/22/2019         RE: Liu Ragsdale  [address] [facility] [id number]        Dear Colleague,    Thank you for referring your patient, Liu Ragsdale, to the [facility]. Please see a copy of my visit note below.    History of Present Illness - Liu Ragsdale is a 79 year old male here to see me for the first time for an ear exam and medical clearance.    He first started noticing issues with hearing and understanding people about one year ago.  He started having issues in crowded places.  Also, when in the car, he would turn the radio on very loud as well.  Also, he has tinnitus for many years as well.  But otherwise no surgery, no ear disease, no drainage no ear pain.  No chemo or radiation therapy.  However, he did serve in the , working on jet engines as a .  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    An auidogram was done 6/21/2019 and perosnally reviewed for this exam.  It shows a symmetric mild to severe down sloping sensorineural hearing loss in both ears, but the LEFT ear has a threshold shift above 3000 Hz, becoming about 10dB worse above that point.  No conductive hearing loss.        Past Medical History -   Patient Active Problem List   Diagnosis     Carotid bruit     Hyperlipidemia with target LDL less than 130     Benign essential hypertension, BP goal <150/90     Obesity     FANI (obstructive sleep apnea)       Current Medications -   Current Outpatient Medications:      aspirin 81 MG EC tablet, Take 81 mg by mouth every other day To every third day, Disp: , Rfl:      atorvastatin (LIPITOR) 20 MG tablet, Take 1 tablet (20 mg) by mouth daily, Disp: 90 tablet, Rfl: 3     hydrochlorothiazide (HYDRODIURIL) 25 MG tablet, Take 1 tablet (25 mg) by mouth daily, Disp: 90 tablet, Rfl: 3     losartan (COZAAR) 25 MG tablet, Take 1 tablet (25 mg) by mouth daily, Disp:  90 tablet, Rfl: 3     Multiple Vitamin (MULTI VITAMIN PO), Take 1 oz by mouth daily, Disp: , Rfl:     Allergies -   Allergies   Allergen Reactions     Metoprolol Other (See Comments)     Side effects     Ampicillin Rash       Social History -   Social History     Socioeconomic History     Marital status:      Spouse name: None     Number of children: None     Years of education: None     Highest education level: None   Occupational History     Occupation:    Social Needs     Financial resource strain: None     Food insecurity:     Worry: None     Inability: None     Transportation needs:     Medical: None     Non-medical: None   Tobacco Use     Smoking status: Never Smoker     Smokeless tobacco: Never Used   Substance and Sexual Activity     Alcohol use: Yes     Comment: Rare     Drug use: No     Sexual activity: Never     Partners: Female   Lifestyle     Physical activity:     Days per week: None     Minutes per session: None     Stress: None   Relationships     Social connections:     Talks on phone: None     Gets together: None     Attends Restoration service: None     Active member of club or organization: None     Attends meetings of clubs or organizations: None     Relationship status: None     Intimate partner violence:     Fear of current or ex partner: None     Emotionally abused: None     Physically abused: None     Forced sexual activity: None   Other Topics Concern     Parent/sibling w/ CABG, MI or angioplasty before 65F 55M? No   Social History Narrative     None       Family History -   Family History   Problem Relation Age of Onset     Diabetes Sister      Obesity Son      Depression Sister      Thyroid Disease Sister      Gastrointestinal Disease Son      Depression Son      Congenital Anomalies Daughter      Depression Daughter      Heart Disease Daughter      Cerebrovascular Disease No family hx of        Review of Systems - As per HPI and PMHx, otherwise 10+ system review of the  "head and neck, and general constitution is negative.    Physical Exam  B/P: 133/72, T: 97.2, P: 85, R: 18  Vitals: /72 (BP Location: Right arm, Patient Position: Chair, Cuff Size: Adult Regular)   Pulse 85   Temp 97.2  F (36.2  C) (Tympanic)   Resp 18   Ht 1.575 m (5' 2\")   Wt 81.2 kg (179 lb)   BMI 32.74 kg/m     BMI= Body mass index is 32.74 kg/m .    General - The patient is well nourished and well developed, and appears to have good nutritional status.  Alert and oriented to person and place, answers questions and cooperates with examination appropriately.   Head and Face - Normocephalic and atraumatic, with no gross asymmetry noted of the contour of the facial features.  The facial nerve is intact, with strong symmetric movements.  Voice and Breathing - The patient was breathing comfortably without the use of accessory muscles. There was no wheezing, stridor, or stertor.  The patients voice was clear and strong, and had appropriate pitch and quality.  Ears - The tympanic membranes are normal in appearance, bony landmarks are intact.  No retraction, perforation, or masses.  No fluid or purulence was seen in the external canal or the middle ear. No evidence of infection of the middle ear or external canal, cerumen was normal in appearance.  Eyes - Extraocular movements intact, and the pupils were reactive to light.  Sclera were not icteric or injected, conjunctiva were pink and moist.  Mouth - Examination of the oral cavity showed pink, healthy oral mucosa. No lesions or ulcerations noted.  The tongue was mobile and midline, and the dentition were in good condition.    Throat - The walls of the oropharynx were smooth, pink, moist, symmetric, and had no lesions or ulcerations.  The tonsillar pillars and soft palate were symmetric.  The uvula was midline on elevation.    Neck - Normal midline excursion of the laryngotracheal complex during swallowing.  Full range of motion on passive movement.  " Palpation of the occipital, submental, submandibular, internal jugular chain, and supraclavicular nodes did not demonstrate any abnormal lymph nodes or masses.  The carotid pulse was palpable bilaterally.  Palpation of the thyroid was soft and smooth, with no nodules or goiter appreciated.  The trachea was mobile and midline.  Nose - External contour is symmetric, no gross deflection or scars.  Nasal mucosa is pink and moist with no abnormal mucus.  The septum was midline and non-obstructive, turbinates of normal size and position.  No polyps, masses, or purulence noted on examination.      A/P - Liu Ragsdale is a 79 year old male  (H90.5) SNHL (sensory-neural hearing loss), asymmetrical  (primary encounter diagnosis)    Based on the history, audiogram, and ear exam, I don't find any evidence of medical or surgical disease that would have caused the hearing loss.  Although noise exposure could be a factor, I suspect that this is primarily genetic/presbycusis.    We discussed the natural history of the steady loss of human hearing, and things to help.  I certainly recommend considering hearing aids, and they have my medical clearance to look into being fitted, and information has been provided.    Finally, safety considerations such as loud smoke detectors, alarm clocks, and special aids for the hearing impaired using phones and television were also discussed.      Again, thank you for allowing me to participate in the care of your patient.        Sincerely,        Haroldo Rodriguez MD

## 2019-07-22 NOTE — PROGRESS NOTES
History of Present Illness - Liu Ragsdale is a 79 year old male here to see me for the first time for an ear exam and medical clearance.    He first started noticing issues with hearing and understanding people about one year ago.  He started having issues in crowded places.  Also, when in the car, he would turn the radio on very loud as well.  Also, he has tinnitus for many years as well.  But otherwise no surgery, no ear disease, no drainage no ear pain.  No chemo or radiation therapy.  However, he did serve in the , working on jet engines as a .  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    An auidogram was done 6/21/2019 and perosnally reviewed for this exam.  It shows a symmetric mild to severe down sloping sensorineural hearing loss in both ears, but the LEFT ear has a threshold shift above 3000 Hz, becoming about 10dB worse above that point.  No conductive hearing loss.        Past Medical History -   Patient Active Problem List   Diagnosis     Carotid bruit     Hyperlipidemia with target LDL less than 130     Benign essential hypertension, BP goal <150/90     Obesity     FANI (obstructive sleep apnea)       Current Medications -   Current Outpatient Medications:      aspirin 81 MG EC tablet, Take 81 mg by mouth every other day To every third day, Disp: , Rfl:      atorvastatin (LIPITOR) 20 MG tablet, Take 1 tablet (20 mg) by mouth daily, Disp: 90 tablet, Rfl: 3     hydrochlorothiazide (HYDRODIURIL) 25 MG tablet, Take 1 tablet (25 mg) by mouth daily, Disp: 90 tablet, Rfl: 3     losartan (COZAAR) 25 MG tablet, Take 1 tablet (25 mg) by mouth daily, Disp: 90 tablet, Rfl: 3     Multiple Vitamin (MULTI VITAMIN PO), Take 1 oz by mouth daily, Disp: , Rfl:     Allergies -   Allergies   Allergen Reactions     Metoprolol Other (See Comments)     Side effects     Ampicillin Rash       Social History -    Social History     Socioeconomic History     Marital status:      Spouse name: None     Number of children: None     Years of education: None     Highest education level: None   Occupational History     Occupation:    Social Needs     Financial resource strain: None     Food insecurity:     Worry: None     Inability: None     Transportation needs:     Medical: None     Non-medical: None   Tobacco Use     Smoking status: Never Smoker     Smokeless tobacco: Never Used   Substance and Sexual Activity     Alcohol use: Yes     Comment: Rare     Drug use: No     Sexual activity: Never     Partners: Female   Lifestyle     Physical activity:     Days per week: None     Minutes per session: None     Stress: None   Relationships     Social connections:     Talks on phone: None     Gets together: None     Attends Yarsani service: None     Active member of club or organization: None     Attends meetings of clubs or organizations: None     Relationship status: None     Intimate partner violence:     Fear of current or ex partner: None     Emotionally abused: None     Physically abused: None     Forced sexual activity: None   Other Topics Concern     Parent/sibling w/ CABG, MI or angioplasty before 65F 55M? No   Social History Narrative     None       Family History -   Family History   Problem Relation Age of Onset     Diabetes Sister      Obesity Son      Depression Sister      Thyroid Disease Sister      Gastrointestinal Disease Son      Depression Son      Congenital Anomalies Daughter      Depression Daughter      Heart Disease Daughter      Cerebrovascular Disease No family hx of        Review of Systems - As per HPI and PMHx, otherwise 10+ system review of the head and neck, and general constitution is negative.    Physical Exam  B/P: 133/72, T: 97.2, P: 85, R: 18  Vitals: /72 (BP Location: Right arm, Patient Position: Chair, Cuff Size: Adult Regular)   Pulse 85   Temp 97.2  F (36.2  C)  "(Tympanic)   Resp 18   Ht 1.575 m (5' 2\")   Wt 81.2 kg (179 lb)   BMI 32.74 kg/m    BMI= Body mass index is 32.74 kg/m .    General - The patient is well nourished and well developed, and appears to have good nutritional status.  Alert and oriented to person and place, answers questions and cooperates with examination appropriately.   Head and Face - Normocephalic and atraumatic, with no gross asymmetry noted of the contour of the facial features.  The facial nerve is intact, with strong symmetric movements.  Voice and Breathing - The patient was breathing comfortably without the use of accessory muscles. There was no wheezing, stridor, or stertor.  The patients voice was clear and strong, and had appropriate pitch and quality.  Ears - The tympanic membranes are normal in appearance, bony landmarks are intact.  No retraction, perforation, or masses.  No fluid or purulence was seen in the external canal or the middle ear. No evidence of infection of the middle ear or external canal, cerumen was normal in appearance.  Eyes - Extraocular movements intact, and the pupils were reactive to light.  Sclera were not icteric or injected, conjunctiva were pink and moist.  Mouth - Examination of the oral cavity showed pink, healthy oral mucosa. No lesions or ulcerations noted.  The tongue was mobile and midline, and the dentition were in good condition.    Throat - The walls of the oropharynx were smooth, pink, moist, symmetric, and had no lesions or ulcerations.  The tonsillar pillars and soft palate were symmetric.  The uvula was midline on elevation.    Neck - Normal midline excursion of the laryngotracheal complex during swallowing.  Full range of motion on passive movement.  Palpation of the occipital, submental, submandibular, internal jugular chain, and supraclavicular nodes did not demonstrate any abnormal lymph nodes or masses.  The carotid pulse was palpable bilaterally.  Palpation of the thyroid was soft and " smooth, with no nodules or goiter appreciated.  The trachea was mobile and midline.  Nose - External contour is symmetric, no gross deflection or scars.  Nasal mucosa is pink and moist with no abnormal mucus.  The septum was midline and non-obstructive, turbinates of normal size and position.  No polyps, masses, or purulence noted on examination.      A/P - Liu Ragsdale is a 79 year old male  (H90.5) SNHL (sensory-neural hearing loss), asymmetrical  (primary encounter diagnosis)    Based on the history, audiogram, and ear exam, I don't find any evidence of medical or surgical disease that would have caused the hearing loss.  Although noise exposure could be a factor, I suspect that this is primarily genetic/presbycusis.    We discussed the natural history of the steady loss of human hearing, and things to help.  I certainly recommend considering hearing aids, and they have my medical clearance to look into being fitted, and information has been provided.    Finally, safety considerations such as loud smoke detectors, alarm clocks, and special aids for the hearing impaired using phones and television were also discussed.

## 2019-07-22 NOTE — PATIENT INSTRUCTIONS
Per physician instructions.    If you have questions or concerns on any instructions given to you by your provider today or if you need to schedule an appointment, you can reach us at [phone].    Thank you!

## 2019-07-22 NOTE — NURSING NOTE
"Chief Complaint   Patient presents with     Ear Problem     hearing loss       Initial /72 (BP Location: Right arm, Patient Position: Chair, Cuff Size: Adult Regular)   Pulse 85   Temp 97.2  F (36.2  C) (Tympanic)   Resp 18   Ht 1.575 m (5' 2\")   Wt 81.2 kg (179 lb)   BMI 32.74 kg/m   Estimated body mass index is 32.74 kg/m  as calculated from the following:    Height as of this encounter: 1.575 m (5' 2\").    Weight as of this encounter: 81.2 kg (179 lb).  Medications and allergies reviewed.    Anneliese SOLIS CMA ([facility])    "

## 2019-09-23 ENCOUNTER — TELEPHONE (OUTPATIENT)
Dept: FAMILY MEDICINE | Facility: CLINIC | Age: 79
End: 2019-09-23

## 2019-09-23 NOTE — TELEPHONE ENCOUNTER
Pt reports BP higher past 1 wk with readings ~ 147/72. Prior to this BP's 130's/70's. Denies change in diet, wt, activity, lifestyle. C/O rt shoulder pain for sometime, gradually has worsened and believes pain could be a factor. Denies injury, states R/T overuse/ repetitive motion. Tyl ineffective.   Re: BP, pt has increased losartan 25 mg to tab 1.5 daily (states was instructed by Shania to increase if BP's higher however do not see documentation) Also takes hydrochlorothiazide 25 mg daily.  Advised OV with Shania to address sx, scheduled 09-25-19. Does have enough losartan until appt.  HEIDE Chavarria RN

## 2019-09-23 NOTE — PROGRESS NOTES
"    SUBJECTIVE   Liu Ragsdale is a  male who presents to clinic today for the following health issue(s):       Hypertension Follow-up    Do you check your blood pressure regularly outside of the clinic? Yes     Are you following a low salt diet? Yes    Are your blood pressures ever more than 140 on the top number (systolic) OR more than 90 on the bottom number (diastolic), for example 140/90? Yes, been over 140     Just switched to Losartan    Blood Pressure Log    BP Readings from Last 6 Encounters:   09/25/19 (!) 150/72   07/22/19 133/72   06/06/19 132/66   06/07/18 130/62   05/15/18 144/54   01/26/18 136/64         How many servings of fruits and vegetables do you eat daily?  2-3    On average, how many sweetened beverages do you drink each day (soda, juice, sweet tea, etc)?   0    How many days per week do you miss taking your medication? 0    Musculoskeletal problem/pain: RIGHT shoulder pain      Duration: worsened within the last few months     Description  Location: right shoulder    Intensity:  moderate    Accompanying signs and symptoms: radiation of pain to down to right arm and elbow     History  Previous similar problem: wear and tear on shoulder from work   Previous evaluation:  none    Precipitating or alleviating factors:  Trauma or overuse: no  Aggravating factors include: none    Therapies tried and outcome: heat and topical cream,     Installing windshields by himself- holding onto the whole  Right handed    Annual Wellness Visit  Are you in the first 12 months of your Medicare Part B coverage?  No    Physical Health:    In general, how would you rate your overall physical health? good    If you drink alcohol do you typically have >3 drinks per day or >7 drinks per week? No    Do you usually eat at least 4 servings of fruit and vegetables a day, include whole grains & fiber and avoid regularly eating high fat or \"junk\" foods? NO    Needs assistance for the following daily activities: no " assistance needed    Which of the following safety concerns are present in your home?  lack of grab bars in the bathroom     Hearing impairment: Yes, hearing in left ear is declining went to [facility]     In the past 6 months, have you been bothered by leaking of urine? Yes     Mental Health:    In general, how would you rate your overall mental or emotional health? good  PHQ-2 Score:      Do you feel safe in your environment? Yes    Do you have a Health Care Directive? Yes: Patient states has Advance Directive and will bring in a copy to clinic.    Fall risk:  Fallen 2 or more times in the past year?: No  Any fall with injury in the past year?: No    Cognitive Screenin) Repeat 3 items (Leader, Season, Table)    2) Clock draw: NORMAL  3) 3 item recall: Recalls 3 objects  Results: NORMAL clock, 1-2 items recalled: COGNITIVE IMPAIRMENT LESS LIKELY    Mini-CogTM Copyright S [place]. Licensed by the author for use in [facility]; reprinted with permission (bernadette@[facility]). All rights reserved.      Current providers sharing in care for this patient include:     Patient Care Team:  Shania Schneider CNP as PCP - General (Nurse Practitioner - Family)  Shania Schneider CNP as Assigned PCP      ADDRESS ALL HEALTH MAINTENANCE NEEDS- Place orders as needed  Health Maintenance Due   Topic Date Due     MEDICARE ANNUAL WELLNESS VISIT  2006     ZOSTER IMMUNIZATION (2 of 3) 2014     INFLUENZA VACCINE (1) 2019       PCP   Shania Schneider -[phone]    PROBLEM LIST        Patient Active Problem List   Diagnosis     Carotid bruit     Hyperlipidemia with target LDL less than 130     Benign essential hypertension, BP goal <150/90     Obesity     FANI (obstructive sleep apnea)     SNHL (sensory-neural hearing loss), asymmetrical       MEDICATIONS        Current Outpatient Medications   Medication     aspirin 81 MG EC tablet     atorvastatin (LIPITOR) 20 MG tablet      "hydrochlorothiazide (HYDRODIURIL) 25 MG tablet     losartan (COZAAR) 50 MG tablet     Multiple Vitamin (MULTI VITAMIN PO)     No current facility-administered medications for this visit.        Reviewed and updated as needed this visit by Provider:  Tobacco  Allergies  Meds  Med Hx  Surg Hx  Fam Hx  Soc Hx     ROS      Constitutional, HEENT, cardiovascular, pulmonary, gi and gu systems are negative, except as otherwise noted.    PHYSICAL EXAM   BP (!) 150/72   Pulse 75   Temp 98.4  F (36.9  C) (Tympanic)   Resp 12   Ht 1.575 m (5' 2\")   Wt 84.4 kg (186 lb)   BMI 34.02 kg/m    Body mass index is 34.02 kg/m .  GENERAL APPEARANCE: healthy, alert and no distress  RESP: lungs clear to auscultation - no rales, rhonchi or wheezes  CV: regular rates and rhythm, normal S1 S2, no S3 or S4 and no murmur, click or rub  ABDOMEN: soft, nontender, without hepatosplenomegaly or masses and bowel sounds normal  MS: extremities normal- no gross deformities noted  ORTHO:   SHOULDER Exam-Right   Inspection: no swelling, no bruising, no discoloration, no obvious deformity, no asymmetry, no glenohumeral joint anterior bulge, no distal clavicle elevation, no muscle atrophy, no scapular winging   Tenderness of: AC Joint   Range of Motion: Active- forward flexion- reduced (notes pulling), abduction- reduced, external rotation- normal, internal rotation- reduced d/t pain  Range of Motion: Passive- forward flexion- reduced, abduction- normal, external rotation- reduced d/t pain, internal rotation- normal   Strength: reduced in forward flexion, abduction and external rotation d/t pain   Special tests: Neers- POSITIVE and anterior apprehension- POSITIVE        SKIN: no suspicious lesions or rashes  PSYCH: mentation appears normal and affect normal/bright    ASSESSMENT & PLAN   1. Encounter for Medicare annual wellness exam  - HC FLU VACCINE, INCREASED ANTIGEN, PRESV FREE [55424]    2. Benign essential hypertension, BP goal " <140/90  Chronic, uncontrolled  INCREASE- losartan (COZAAR) 50 MG tablet; Take 1 tablet (50 mg) by mouth daily  Dispense: 30 tablet; Refill: 0  BP Readings from Last 6 Encounters:   09/25/19 (!) 150/72   07/22/19 133/72   06/06/19 132/66   06/07/18 130/62   05/15/18 144/54   01/26/18 136/64       3. Advanced directives, counseling/discussion  Fill out and drop at  in 2 weeks with your BP log  - C ADVANCE CARE PLANNING FIRST 30 MINS    4. Internal derangement of right shoulder  Acute, uncontrolled  - PHYSICAL THERAPY REFERRAL; Future    Patient Education   Personalized Prevention Plan  You are due for the preventive services outlined below.  Your care team is available to assist you in scheduling these services.  If you have already completed any of these items, please share that information with your care team to update in your medical record.  Health Maintenance Due   Topic Date Due     Annual Wellness Visit  05/29/2006     Zoster (Shingles) Vaccine (2 of 3) 12/11/2014     Flu Vaccine (1) 09/01/2019          Risks, benefits, side effects and rationale for treatment plan fully discussed with the patient and understanding expressed.    Shania Schneider, CECE[facility]

## 2019-09-23 NOTE — TELEPHONE ENCOUNTER
Reason for call:  Patient reporting a symptom    Symptom or request: Pt says he is on Losartan . His BP has been creeping up so he is taking 1 1/2 daily and says he will run out before the next date it's due to be filled. He would like to talk to Mary's nurse.       Phone Number patient can be reached at:  Home number on file [phone] (home)    Best Time:  anytime    Can we leave a detailed message on this number:  YES    Call taken on 9/23/2019 at 8:22 AM by Teagan Gillis

## 2019-09-25 ENCOUNTER — OFFICE VISIT (OUTPATIENT)
Dept: FAMILY MEDICINE | Facility: CLINIC | Age: 79
End: 2019-09-25
Payer: COMMERCIAL

## 2019-09-25 VITALS
BODY MASS INDEX: 34.23 KG/M2 | DIASTOLIC BLOOD PRESSURE: 72 MMHG | WEIGHT: 186 LBS | SYSTOLIC BLOOD PRESSURE: 150 MMHG | TEMPERATURE: 98.4 F | RESPIRATION RATE: 12 BRPM | HEIGHT: 62 IN | HEART RATE: 75 BPM

## 2019-09-25 DIAGNOSIS — M24.811 INTERNAL DERANGEMENT OF RIGHT SHOULDER: ICD-10-CM

## 2019-09-25 DIAGNOSIS — I10 BENIGN ESSENTIAL HYPERTENSION: Chronic | ICD-10-CM

## 2019-09-25 DIAGNOSIS — Z71.89 ADVANCED DIRECTIVES, COUNSELING/DISCUSSION: ICD-10-CM

## 2019-09-25 DIAGNOSIS — Z00.00 ENCOUNTER FOR MEDICARE ANNUAL WELLNESS EXAM: Primary | ICD-10-CM

## 2019-09-25 PROCEDURE — 99214 OFFICE O/P EST MOD 30 MIN: CPT | Mod: 25 | Performed by: NURSE PRACTITIONER

## 2019-09-25 PROCEDURE — 99397 PER PM REEVAL EST PAT 65+ YR: CPT | Performed by: NURSE PRACTITIONER

## 2019-09-25 PROCEDURE — 90662 IIV NO PRSV INCREASED AG IM: CPT | Performed by: NURSE PRACTITIONER

## 2019-09-25 PROCEDURE — G0008 ADMIN INFLUENZA VIRUS VAC: HCPCS | Performed by: NURSE PRACTITIONER

## 2019-09-25 PROCEDURE — 99497 ADVNCD CARE PLAN 30 MIN: CPT | Performed by: NURSE PRACTITIONER

## 2019-09-25 RX ORDER — LOSARTAN POTASSIUM 50 MG/1
50 TABLET ORAL DAILY
Qty: 30 TABLET | Refills: 0 | Status: SHIPPED | OUTPATIENT
Start: 2019-09-25 | End: 2019-10-15

## 2019-09-25 ASSESSMENT — MIFFLIN-ST. JEOR: SCORE: 1442.94

## 2019-09-25 NOTE — PATIENT INSTRUCTIONS
1. Encounter for Medicare annual wellness exam  - HC FLU VACCINE, INCREASED ANTIGEN, PRESV FREE [94003]    2. Benign essential hypertension, BP goal <140/90  Chronic, uncontrolled  INCREASE- losartan (COZAAR) 50 MG tablet; Take 1 tablet (50 mg) by mouth daily  Dispense: 30 tablet; Refill: 0  BP Readings from Last 6 Encounters:   09/25/19 (!) 150/72   07/22/19 133/72   06/06/19 132/66   06/07/18 130/62   05/15/18 144/54   01/26/18 136/64       3. Advanced directives, counseling/discussion  Fill out and drop at  in 2 weeks with your BP log    4. Internal derangement of right shoulder  Acute, uncontrolled  - PHYSICAL THERAPY REFERRAL; Future    Patient Education   Personalized Prevention Plan  You are due for the preventive services outlined below.  Your care team is available to assist you in scheduling these services.  If you have already completed any of these items, please share that information with your care team to update in your medical record.  Health Maintenance Due   Topic Date Due     Annual Wellness Visit  05/29/2006     Zoster (Shingles) Vaccine (2 of 3) 12/11/2014     Flu Vaccine (1) 09/01/2019

## 2019-09-25 NOTE — NURSING NOTE
"Chief Complaint   Patient presents with     Shoulder Pain     Hypertension       Initial Resp 12   Ht 1.575 m (5' 2\")   Wt 84.4 kg (186 lb)   BMI 34.02 kg/m   Estimated body mass index is 34.02 kg/m  as calculated from the following:    Height as of this encounter: 1.575 m (5' 2\").    Weight as of this encounter: 84.4 kg (186 lb).    Patient presents to the clinic using No DME    Health Maintenance that is potentially due pending provider review:  Medicare visit and flu    Will do both today .    Is there anyone who you would like to be able to receive your results? No  If yes have patient fill out EDWARD    "

## 2019-10-03 ENCOUNTER — HOSPITAL ENCOUNTER (OUTPATIENT)
Dept: PHYSICAL THERAPY | Facility: CLINIC | Age: 79
Setting detail: THERAPIES SERIES
End: 2019-10-03
Attending: NURSE PRACTITIONER
Payer: MEDICARE

## 2019-10-03 PROCEDURE — 97161 PT EVAL LOW COMPLEX 20 MIN: CPT | Mod: GP | Performed by: PHYSICAL MEDICINE & REHABILITATION

## 2019-10-03 PROCEDURE — 97110 THERAPEUTIC EXERCISES: CPT | Mod: GP | Performed by: PHYSICAL MEDICINE & REHABILITATION

## 2019-10-03 NOTE — PROGRESS NOTES
"[facility]          OUTPATIENT PHYSICAL THERAPY ORTHOPEDIC EVALUATION  PLAN OF TREATMENT FOR OUTPATIENT REHABILITATION  (COMPLETE FOR INITIAL CLAIMS ONLY)  Patient's Last Name, First Name, M.I.  YOB: 1940  Liu Ragsdale    Provider s Name:  [facility]   Medical Record No.  [id number]   Start of Care Date:  10/03/19   Onset Date:  01/01/07(\"reports pain started in 2007\")   Type:     _X__PT   ___OT   ___SLP Medical Diagnosis:  Internal derangement of right shoulder     PT Diagnosis:  R shoulder pain   Visits from SOC:  1      _________________________________________________________________________________  Plan of Treatment/Functional Goals:  ADL retraining, IADL retraining, bed mobility training, joint mobilization, manual therapy, motor coordination training, neuromuscular re-education, orthotic fitting/training, ROM, strengthening, stretching     Biofeedback, Contrast bath immersion, Cryotherapy, Electrical stimulation, Hot packs, Iontophoresis, Low level laser therapy, Shortwave diathermy, TENS, Traction, Ultrasound  only as needed  Goals  Goal Identifier: 1  Goal Description: Pt will be able to flex R shoulder 150* in order to decrease difficulty with reaching overhead.   Target Date: 10/31/19    Goal Identifier: 2  Goal Description: Pt will be able to lift and carry 10# without increase in sx in order to decrease difficulty with housework.   Target Date: 11/14/19    Goal Identifier: 3  Goal Description: Pt will be independent with HEP in order to self manage symptoms.   Target Date: 11/29/19    Goal Identifier: 4  Goal Description: Pt will improve SPADI by 10%, indicating decreased difficulty with ADLs.   Target Date: 11/29/19      Therapy Frequency:  1 time/week  Predicted Duration of Therapy Intervention:  8 weeks    Nancy Regalado PT                 I CERTIFY THE NEED FOR THESE SERVICES FURNISHED UNDER        THIS PLAN OF TREATMENT AND WHILE " UNDER MY CARE     (Physician co-signature of this document indicates review and certification of the therapy plan).                       Certification Date From:  10/03/19   Certification Date To:  11/29/19    Referring Provider:  Shania Schneider CNP    Initial Assessment        See Epic Evaluation Start of Care Date: 10/03/19

## 2019-10-03 NOTE — PROGRESS NOTES
"   10/03/19 0700   General Information   Type of Visit Initial OP Ortho PT Evaluation   Start of Care Date 10/03/19   Referring Physician Shania Schneider, CNP   Patient/Family Goals Statement \"improve flexibility and pain. decrease pain with reaching forward and out with items in hand (weight)\"   Orders Evaluate and Treat   Orders Comment \"Rotator cuff impingement\"   Date of Order 09/25/19   Certification Required? Yes  (Medicare/[employer])   Medical Diagnosis Internal derangement of right shoulder   Surgical/Medical history reviewed Yes   Precautions/Limitations no known precautions/limitations   General Information Comments PMHx per pt report: high BP, bladder control, notes possible RA and arthritis   Body Part(s)   Body Part(s) Shoulder   Presentation and Etiology   Pertinent history of current problem (include personal factors and/or comorbidities that impact the POC) Pt arrived to PT today for R shoulder pain. 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 Pain primarily in R shoulder, with occasional radiation of sx into shoulder blade and elbow. Notes he has hx of numbness/tingling in B hands but feels unrelated to current shoulder pain, reports he sees chiropractor for neck pain and UE numbness.    Impairments A. Pain;D. Decreased ROM;E. Decreased flexibility;F. Decreased strength and endurance;M. Locking or catching   Functional Limitations perform activities of daily living;perform desired leisure / sports activities   Symptom Location R shoulder   How/Where did it occur With repetition/overuse;From insidious onset;From Degenerative Joint Disease   Onset date of current episode/exacerbation 01/01/07  (\"reports pain started in 2007\")   Chronicity Chronic   Pain rating (0-10 point scale) Best (/10);Worst (/10)   Best (/10) 5   Worst (/10) 7   Pain quality A. Sharp;C. Aching   Frequency of pain/symptoms A. Constant   Pain/symptoms are: The same all the " time   Pain/symptoms exacerbated by C. Lifting;D. Carrying;G. Certain positions;H. Overhead reach;L. Work tasks   Pain/symptoms eased by C. Rest;F. Certain positions;G. Heat;I. OTC medication(s)   Progression of symptoms since onset: Worsened   Prior Level of Function   Prior Level of Function-Mobility independent   Prior Level of Function-ADLs independent   Current Level of Function   Current Community Support Family/friend caregiver   Patient role/employment history F. Retired   [facility]   Current equipment-Gait/Locomotion None   Fall Risk Screen   Fall screen completed by PT   Have you fallen 2 or more times in the past year? Yes   Have you fallen and had an injury in the past year? No   Is patient a fall risk? No   Fall screen comments Pt reports 2 falls in the last year while walking on ice on downhill driveway. Pt demonstrates safe gait mechanics throughout evaluation and amb throughout clinic. No sx of sway or LOB.    Abuse Screen (yes response referral indicated)   Feels Unsafe at Home or Work/School no   Feels Threatened by Someone no   Does Anyone Try to Keep You From Having Contact with Others or Doing Things Outside Your Home? no   Physical Signs of Abuse Present no   Functional Scales   Functional Scales Other   Other Scales  SPADI: 35.38 % disability    Shoulder Objective Findings   Side (if bilateral, select both right and left) Right   Posture forward ehad posture and rounded shoulders B   Cervical Screen (ROM, quadrant) all motions wfl, pt notes slight increase in neck pain with rotation L   Thoracic Mobility Screen all  motions wfl, pt noted increased R shoulder pain with SB R, flexion and Rot L   Thoracic Outlet Syndrom (Raina, Adson, Bernadette, Webster) Raina: neg   Scapulothoracic Rhythm fair   Pec Minor (supine) Flexibility limited B   Neer's Test pos   Thurston-Wilbur Test pos   Coracoid Test noted min increase in sx   Bursa Test noted min increase in sx with subacromial bursa    Campbell's Test neg   Load and Shift Test neg   Relocation Test pos   Sulcus Test neg   Crossover Test neg   Palpation noted increased sx along supraspinatus, infraspinatus, biceps T   Accessory Motion/Joint Mobility hypomobility of R GHJ, ACJ and SCJ in all directions   Right Shoulder Flexion AROM 110* (min increase in sx)   Right Shoulder Flexion PROM 115* (pain)   Right Shoulder Abduction AROM 127* (increased sx)   Right Shoulder Abduction PROM 145* (pain)   Right Shoulder ER AROM C7/T1 (pain)   Right Shoulder ER PROM at 90* abd: 65* (pain)   Right Shoulder IR AROM R PSIS (pain)   Right Shoulder IR PROM at 90* abd: 10* (pain)   Right Shoulder Flexion Strength 5-/5 (min increase in sx)   Right Shoulder Abduction Strength 5-/5 (min increase in sx)   Right Shoulder ER Strength 5-/5 (min increase in sx)   Right Shoulder IR Strength 5-/5 (min increase in sx)   Right Shoulder Extension Strength 5/5    Planned Therapy Interventions   Planned Therapy Interventions ADL retraining;IADL retraining;bed mobility training;joint mobilization;manual therapy;motor coordination training;neuromuscular re-education;orthotic fitting/training;ROM;strengthening;stretching   Planned Modality Interventions   Planned Modality Interventions Biofeedback;Contrast bath immersion;Cryotherapy;Electrical stimulation;Hot packs;Iontophoresis;Low level laser therapy;Shortwave diathermy;TENS;Traction;Ultrasound   Planned Modality Interventions Comments only as needed   Clinical Impression   Criteria for Skilled Therapeutic Interventions Met yes, treatment indicated   PT Diagnosis R shoulder pain   Influenced by the following impairments decreased ROM, decreased strength, impaired posture, pain   Functional limitations due to impairments reaching, lifting, carrying   Clinical Presentation Stable/Uncomplicated   Clinical Presentation Rationale ROM, strength, PLOF, few comorbidities, clinical judgement   Clinical Decision Making (Complexity) Low  complexity   Therapy Frequency 1 time/week   Predicted Duration of Therapy Intervention (days/wks) 8 weeks   Risk & Benefits of therapy have been explained Yes   Patient, Family & other staff in agreement with plan of care Yes   Clinical Impression Comments Pt is a 79 y.o. male who presented to the PT clinic today with a rehab diagnosis of R shoulder pain as evidenced by decreased ROM, decreased strength, impaired posture and pain. More specifically, pt presents with signs/symptoms consistent with R shoulder OA and impingement of supraspinatus. Pt is appropriate for skilled PT to address previously listed impairments in order to decrease difficulty with reaching, lifting and carrying.    Education Assessment   Preferred Learning Style Listening;Reading;Demonstration;Pictures/video   Barriers to Learning No barriers   ORTHO GOALS   PT Ortho Eval Goals 1;2;3;4   Ortho Goal 1   Goal Identifier 1   Goal Description Pt will be able to flex R shoulder 150* in order to decrease difficulty with reaching overhead.    Target Date 10/31/19   Ortho Goal 2   Goal Identifier 2   Goal Description Pt will be able to lift and carry 10# without increase in sx in order to decrease difficulty with housework.    Target Date 11/14/19   Ortho Goal 3   Goal Identifier 3   Goal Description Pt will be independent with HEP in order to self manage symptoms.    Target Date 11/29/19   Ortho Goal 4   Goal Identifier 4   Goal Description Pt will improve SPADI by 10%, indicating decreased difficulty with ADLs.    Target Date 11/29/19   Total Evaluation Time   PT Eval, Low Complexity Minutes (48007) 25   Therapy Certification   Certification date from 10/03/19   Certification date to 11/29/19   Medical Diagnosis Internal derangement of right shoulder       Please contact me with any questions or concerns.    Thank you for your referral,     Nancy Regalado, PT, DPT  Physical Therapist  [facility]  [address]  [place] [address]  [phone]

## 2019-10-08 ENCOUNTER — HOSPITAL ENCOUNTER (OUTPATIENT)
Dept: PHYSICAL THERAPY | Facility: CLINIC | Age: 79
Setting detail: THERAPIES SERIES
End: 2019-10-08
Attending: NURSE PRACTITIONER
Payer: MEDICARE

## 2019-10-08 PROCEDURE — 97140 MANUAL THERAPY 1/> REGIONS: CPT | Mod: GP | Performed by: PHYSICAL THERAPIST

## 2019-10-08 PROCEDURE — 97110 THERAPEUTIC EXERCISES: CPT | Mod: GP | Performed by: PHYSICAL THERAPIST

## 2019-10-14 ENCOUNTER — HOSPITAL ENCOUNTER (OUTPATIENT)
Dept: PHYSICAL THERAPY | Facility: CLINIC | Age: 79
Setting detail: THERAPIES SERIES
End: 2019-10-14
Attending: NURSE PRACTITIONER
Payer: MEDICARE

## 2019-10-14 PROCEDURE — 97110 THERAPEUTIC EXERCISES: CPT | Mod: GP | Performed by: PHYSICAL MEDICINE & REHABILITATION

## 2019-10-15 ENCOUNTER — TELEPHONE (OUTPATIENT)
Dept: FAMILY MEDICINE | Facility: CLINIC | Age: 79
End: 2019-10-15

## 2019-10-15 DIAGNOSIS — I10 BENIGN ESSENTIAL HYPERTENSION: Chronic | ICD-10-CM

## 2019-10-15 RX ORDER — LOSARTAN POTASSIUM 50 MG/1
75 TABLET ORAL DAILY
Qty: 30 TABLET | Refills: 0 | Status: SHIPPED | OUTPATIENT
Start: 2019-10-15 | End: 2019-11-06

## 2019-10-15 NOTE — TELEPHONE ENCOUNTER
Hypertension follow-up.   On 9/25/19 Medicare wellness exam, he had just switched to Losartan. We increased to Losartan 50 mg daily.   Hypertension is still uncontrolled. Home BP list mainly 150-160/diastolic.    Plan: Increase Losartan to 75 mg daily (take 1.5 tablets)  Continue with daily BP and bring log in next week.   CECE Cline

## 2019-10-15 NOTE — TELEPHONE ENCOUNTER
Pt informed/ advised as noted per Shania. Pt voices understanding/ agreeable with plan even though home reading today 138/71.  New Rx sent to pharm, pt will drop off BP readings in 1 wk for PCP to review.  HEDIE Chavarria RN

## 2019-10-21 ENCOUNTER — HOSPITAL ENCOUNTER (OUTPATIENT)
Dept: PHYSICAL THERAPY | Facility: CLINIC | Age: 79
Setting detail: THERAPIES SERIES
End: 2019-10-21
Attending: NURSE PRACTITIONER
Payer: MEDICARE

## 2019-10-21 PROCEDURE — 97140 MANUAL THERAPY 1/> REGIONS: CPT | Mod: GP | Performed by: PHYSICAL MEDICINE & REHABILITATION

## 2019-10-28 ENCOUNTER — HOSPITAL ENCOUNTER (OUTPATIENT)
Dept: PHYSICAL THERAPY | Facility: CLINIC | Age: 79
Setting detail: THERAPIES SERIES
End: 2019-10-28
Attending: NURSE PRACTITIONER
Payer: MEDICARE

## 2019-10-28 PROCEDURE — 97110 THERAPEUTIC EXERCISES: CPT | Mod: GP | Performed by: PHYSICAL MEDICINE & REHABILITATION

## 2019-11-04 ENCOUNTER — HOSPITAL ENCOUNTER (OUTPATIENT)
Dept: PHYSICAL THERAPY | Facility: CLINIC | Age: 79
Setting detail: THERAPIES SERIES
End: 2019-11-04
Attending: NURSE PRACTITIONER
Payer: MEDICARE

## 2019-11-04 PROCEDURE — 97110 THERAPEUTIC EXERCISES: CPT | Mod: GP | Performed by: PHYSICAL MEDICINE & REHABILITATION

## 2019-11-04 NOTE — PROGRESS NOTES
"Outpatient Physical Therapy Progress Note     Patient: Liu Ragsdale  : 1940    Beginning/End Dates of Reporting Period:  10/03/2019 to 2019    Referring Provider: Shania Schneider, CNP    Therapy Diagnosis: R shoulder pain     Client Self Report: Pt reports R shoulder \"still hurts.\" Feels it may be more attributed from housework and yard work. Feels exercises have helped with ROM but not helping with pain.     Objective Measurements:  Objective Measure: R shoulder ROM  Details: flexion: 145*; abd: 160*; IR: L3/4; ER: T2/3  Objective Measure: R shoulder strength  Details: flexion: 5/5, abd: 5/5, ext: 5/5, IR: 5/5, ER: 5/5 (pt notes slight increase in pain with all MMT)    Outcome Measures (most recent score):  SPADI: 34.62% (35.38% at initial eval)    Goals:  Goal Identifier 1   Goal Description Pt will be able to flex R shoulder 150* in order to decrease difficulty with reaching overhead.    Target Date 10/31/19   Date Met      Progress: (progressing, 145* AROM today)     Goal Identifier 2   Goal Description Pt will be able to lift and carry 10# without increase in sx in order to decrease difficulty with housework.    Target Date 19   Date Met      Progress: (still challenging at this time)     Goal Identifier 3   Goal Description Pt will be independent with HEP in order to self manage symptoms.    Target Date 19   Date Met      Progress: (long term goal, I with current HEP)     Goal Identifier 4   Goal Description Pt will improve SPADI by 10%, indicating decreased difficulty with ADLs.    Target Date 19   Date Met      Progress: (1% improvement change since starting PT)     Progress Toward Goals:   Progress this reporting period: Pt has attended 6 PT sessions, achieving 0/4 short term and long term goals. Pt's ROM has improved, however, pt continues to have difficulty with strengthening and pain. PT and pt discussed and agreed upon return to PCP for further pain management at " this time.    Plan:  Other: follow up with PCP before return to PT.    Discharge:  No    Please contact me with any questions or concerns.    Thank you for your referral,     Nancy Regalado, PT, DPT  Physical Therapist  [address] [address] [phone]

## 2019-11-05 NOTE — PROGRESS NOTES
"    SUBJECTIVE   Liu Ragsdale is a  male who presents to clinic today for the following health issue(s):       Follow-up shoulder pain  [facility]  -get right shoulder x-ray today    Joint Pain    Onset: ongoing, few months     Description:   Location: right shoulder  Character: Sharp and Dull ache    Intensity: moderate    Progression of Symptoms: better    Accompanying Signs & Symptoms:  Other symptoms: radiation of pain to fingers all the time,     History:   Previous similar pain: YES- wear and tear on shoulder from work      Precipitating factors:   Trauma or overuse: no     Alleviating factors:  Improved by: nothing, chiropractor and physical therapy    Therapies Tried and outcome: chiropractor and PT are helping a little bit     Hypertension Follow-up    Do you check your blood pressure regularly outside of the clinic? Yes     Are you following a low salt diet? Yes    Are your blood pressures ever more than 140 on the top number (systolic) OR more   than 90 on the bottom number (diastolic), for example 140/90? Yes      How many servings of fruits and vegetables do you eat daily?  2-3    On average, how many sweetened beverages do you drink each day (soda, juice, sweet tea, etc)?   0    How many days per week do you miss taking your medication? Once in awhile but not often      BP have high for a couple  BP Readings from Last 6 Encounters:   11/06/19 (!) 140/70   09/25/19 (!) 150/72   07/22/19 133/72   06/06/19 132/66   06/07/18 130/62   05/15/18 144/54       Shortness of breath with exertion   Going up stairs  Stress test in the past  History of \"Crotid bruits\"  No chest pain, NV, jaw pain    BILATERAL DUPLEX CAROTID ULTRASOUND Apr 1, 2010 12:51:00 PM      HISTORY:  Carotid bruit.   COMPARISON: None.  FINDINGS:  There is mild  atherosclerotic plaque in the carotid  bifurcations.  Flow velocities and waveforms show less than 50%  diameter stenosis in both the right and left internal " "carotid arteries  as assessed by each ICA PSV and EDV and ICA/DCCA ratio.  Antegrade  flow is present in both vertebral and external carotid arteries.       IMPRESSION:  Less than 50% diameter stenosis of both internal carotid  arteries relative to the distal ICA diameters.    ADDRESS ALL HEALTH MAINTENANCE NEEDS- Place orders as needed  Health Maintenance Due   Topic Date Due     ZOSTER IMMUNIZATION (2 of 3) 12/11/2014       PCP   Shania Schneider, [facility] [phone]    PROBLEM LIST        Patient Active Problem List   Diagnosis     Carotid bruit     Hyperlipidemia with target LDL less than 130     Benign essential hypertension, BP goal <150/90     Obesity     FANI (obstructive sleep apnea)     SNHL (sensory-neural hearing loss), asymmetrical       MEDICATIONS        Current Outpatient Medications   Medication     aspirin 81 MG EC tablet     atorvastatin (LIPITOR) 20 MG tablet     diclofenac (VOLTAREN) 1 % topical gel     hydrochlorothiazide (HYDRODIURIL) 25 MG tablet     losartan (COZAAR) 100 MG tablet     Multiple Vitamin (MULTI VITAMIN PO)     No current facility-administered medications for this visit.        Reviewed and updated as needed this visit by Provider:  Tobacco  Allergies  Meds  Med Hx  Surg Hx  Fam Hx  Soc Hx     ROS      Constitutional, HEENT, cardiovascular, pulmonary, gi and gu systems are negative, except as otherwise noted.    PHYSICAL EXAM   BP (!) 140/70   Pulse 72   Resp 14   Ht 1.575 m (5' 2\")   Wt 84.8 kg (187 lb)   SpO2 100%   BMI 34.20 kg/m    Body mass index is 34.2 kg/m .  GENERAL APPEARANCE: healthy, alert and no distress  NECK: no adenopathy, no asymmetry, masses, or scars, thyroid normal to palpation and carotid bruits bilateral  RESP: lungs clear to auscultation - no rales, rhonchi or wheezes  CV: regular rates and rhythm, normal S1 S2, no S3 or S4 and no murmur, click or rub  ABDOMEN: soft, nontender, without hepatosplenomegaly or masses and bowel sounds normal  MS: " "extremities normal- no gross deformities noted  ORTHO:   SHOULDER Exam-Right   Inspection: no swelling, no bruising, no discoloration, no obvious deformity, no asymmetry, no glenohumeral joint anterior bulge, no distal clavicle elevation, no muscle atrophy, no scapular winging   Tenderness of: SC joint- no, clavicle(prox-mid)- no, clavicle-(mid-distal)- no, AC joint- no, acromion- no, anterior capsule- no, prox bicep tendon- no, greater tuberosity- no, prox humerus- no, supraspinatous- no, infraspinatous- no, superior trapezious- no, rhomboids- no   Range of Motion: Active- 145 flex, 150 abd, IR iliac crest, ER T4   Strength: forward flexion- 5/5, abduction- 5/5, internal rotation- 5/5, external rotation- 5/5 and bicep- full (slight \"discomfort\" with all of these)           SKIN: no suspicious lesions or rashes  NEURO: Normal strength and tone, mentation intact and speech normal  PSYCH: mentation appears normal and affect normal/bright    EKG: Sinus rhythm with Left atrial enlargement    I independently reviewed the X-rays: Agree with the Radiologist's interpretation.    ASSESSMENT & PLAN     1. Right shoulder pain, unspecified chronicity  Chronic, uncontrolled  - XR Shoulder Right 2 Views; Future  - ORTHO  REFERRAL  - diclofenac (VOLTAREN) 1 % topical gel; Place 4 g onto the skin 4 times daily (Shoulder)  Dispense: 100 g; Refill: 3    2. Benign essential hypertension, BP goal <140/90  Chronic, elevated  INCREASE- losartan (COZAAR) 100 MG tablet; Take 1 tablet (100 mg) by mouth daily  Dispense: 30 tablet; Refill: 0  - NM Lexiscan stress test; Future    3. SOB (shortness of breath) on exertion  Acute, stable  - EKG 12-LEAD CLINIC READ  - NM Lexiscan stress test; Future    4. Bilateral carotid bruits  Chronic, stable  - US Carotid Bilateral; Future: Call [facility] department to schedule at [phone].  - NM Lexiscan stress test; Future        Risks, benefits, side effects and rationale for " treatment plan fully discussed with the patient and understanding expressed.    Shania Schneider, FNP-BC  [facility]

## 2019-11-06 ENCOUNTER — OFFICE VISIT (OUTPATIENT)
Dept: FAMILY MEDICINE | Facility: CLINIC | Age: 79
End: 2019-11-06
Payer: COMMERCIAL

## 2019-11-06 ENCOUNTER — ANCILLARY PROCEDURE (OUTPATIENT)
Dept: GENERAL RADIOLOGY | Facility: CLINIC | Age: 79
End: 2019-11-06
Attending: NURSE PRACTITIONER
Payer: COMMERCIAL

## 2019-11-06 VITALS
SYSTOLIC BLOOD PRESSURE: 140 MMHG | OXYGEN SATURATION: 100 % | DIASTOLIC BLOOD PRESSURE: 70 MMHG | RESPIRATION RATE: 14 BRPM | HEART RATE: 72 BPM | BODY MASS INDEX: 34.41 KG/M2 | HEIGHT: 62 IN | WEIGHT: 187 LBS

## 2019-11-06 DIAGNOSIS — R06.02 SOB (SHORTNESS OF BREATH) ON EXERTION: ICD-10-CM

## 2019-11-06 DIAGNOSIS — R09.89 BILATERAL CAROTID BRUITS: ICD-10-CM

## 2019-11-06 DIAGNOSIS — I10 BENIGN ESSENTIAL HYPERTENSION: Chronic | ICD-10-CM

## 2019-11-06 DIAGNOSIS — M25.511 RIGHT SHOULDER PAIN, UNSPECIFIED CHRONICITY: ICD-10-CM

## 2019-11-06 DIAGNOSIS — M25.511 RIGHT SHOULDER PAIN, UNSPECIFIED CHRONICITY: Primary | ICD-10-CM

## 2019-11-06 PROBLEM — M19.111 OSTEOARTHRITIS OF RIGHT SHOULDER DUE TO ROTATOR CUFF INJURY: Status: ACTIVE | Noted: 2019-11-06

## 2019-11-06 PROBLEM — M19.011 ARTHRITIS OF RIGHT GLENOHUMERAL JOINT: Status: ACTIVE | Noted: 2019-11-06

## 2019-11-06 PROBLEM — M19.011 ARTHRITIS OF RIGHT ACROMIOCLAVICULAR JOINT: Status: ACTIVE | Noted: 2019-11-06

## 2019-11-06 PROBLEM — S46.001S OSTEOARTHRITIS OF RIGHT SHOULDER DUE TO ROTATOR CUFF INJURY: Status: ACTIVE | Noted: 2019-11-06

## 2019-11-06 PROCEDURE — 93010 ELECTROCARDIOGRAM REPORT: CPT | Performed by: NURSE PRACTITIONER

## 2019-11-06 PROCEDURE — 73030 X-RAY EXAM OF SHOULDER: CPT | Mod: RT

## 2019-11-06 PROCEDURE — 99214 OFFICE O/P EST MOD 30 MIN: CPT | Performed by: NURSE PRACTITIONER

## 2019-11-06 RX ORDER — LOSARTAN POTASSIUM 100 MG/1
100 TABLET ORAL DAILY
Qty: 30 TABLET | Refills: 0 | Status: SHIPPED | OUTPATIENT
Start: 2019-11-06 | End: 2020-01-06

## 2019-11-06 ASSESSMENT — MIFFLIN-ST. JEOR: SCORE: 1447.48

## 2019-11-06 NOTE — RESULT ENCOUNTER NOTE
Dear Liu,  No fractures, but the Radiologist did note advanced degenerative changes to your A/C joint and moderate degenerative changes to your glenohumeral joint. Let's see what the Orthopedist says.  Please contact our clinic via phone or My Chart if you have any questions or concerns.  Thanks,  Shania Schneider, CECE

## 2019-11-06 NOTE — PATIENT INSTRUCTIONS
1. Right shoulder pain, unspecified chronicity  Chronic, uncontrolled  - XR Shoulder Right 2 Views; Future  - ORTHO  REFERRAL  - diclofenac (VOLTAREN) 1 % topical gel; Place 4 g onto the skin 4 times daily (Shoulder)  Dispense: 100 g; Refill: 3    2. Benign essential hypertension, BP goal <140/90  Chronic, elevated  INCREASE- losartan (COZAAR) 100 MG tablet; Take 1 tablet (100 mg) by mouth daily  Dispense: 30 tablet; Refill: 0  - NM Lexiscan stress test; Future    3. SOB (shortness of breath) on exertion  Acute, stable  - EKG 12-LEAD CLINIC READ  - NM Lexiscan stress test; Future    4. Bilateral carotid bruits  Chronic, stable  - US Carotid Bilateral; Future: Call [facility] department to schedule at [phone].  - NM Lexiscan stress test; Future

## 2019-11-07 ENCOUNTER — ANCILLARY PROCEDURE (OUTPATIENT)
Dept: ULTRASOUND IMAGING | Facility: CLINIC | Age: 79
End: 2019-11-07
Attending: NURSE PRACTITIONER
Payer: COMMERCIAL

## 2019-11-07 ENCOUNTER — TELEPHONE (OUTPATIENT)
Dept: OTHER | Facility: CLINIC | Age: 79
End: 2019-11-07

## 2019-11-07 DIAGNOSIS — I65.23 CAROTID STENOSIS, BILATERAL: Primary | ICD-10-CM

## 2019-11-07 DIAGNOSIS — R09.89 BILATERAL CAROTID BRUITS: ICD-10-CM

## 2019-11-07 PROCEDURE — 93880 EXTRACRANIAL BILAT STUDY: CPT

## 2019-11-07 NOTE — TELEPHONE ENCOUNTER
Pt referred to [facility] by Shania Schneider, CNP for increasing stenosis of carotids bilaterally.    US bilateral carotids 11/7/19 in Epic.      Pt needs to be scheduled for consult with vascular surgery.  Will route to scheduling to coordinate an appointment at next available.    Lavinia Collier, ZACKN, RN

## 2019-11-07 NOTE — RESULT ENCOUNTER NOTE
Dear Liu,  Your carotid ultrasound shows an increase in stenosis with plaque to bilateral sides. I'd like you to see a Vascular specialist in [place] to further discuss these findings, and plan of care. You should receive a call in the next 2 days. If you don't hear from the Vascular department then, please call our clinic.  Please contact our clinic via phone or My Chart if you have any questions or concerns.  Thanks,  CECE Cline

## 2019-11-08 ENCOUNTER — HEALTH MAINTENANCE LETTER (OUTPATIENT)
Age: 79
End: 2019-11-08

## 2019-11-08 NOTE — TELEPHONE ENCOUNTER
Left message on home number for patient to call back to schedule consult appointment with Vascular Surgery.

## 2019-11-15 ENCOUNTER — OFFICE VISIT (OUTPATIENT)
Dept: ORTHOPEDICS | Facility: CLINIC | Age: 79
End: 2019-11-15
Payer: COMMERCIAL

## 2019-11-15 VITALS
DIASTOLIC BLOOD PRESSURE: 71 MMHG | SYSTOLIC BLOOD PRESSURE: 133 MMHG | BODY MASS INDEX: 34.41 KG/M2 | HEIGHT: 62 IN | WEIGHT: 187 LBS

## 2019-11-15 DIAGNOSIS — M19.019 SHOULDER ARTHRITIS: Primary | ICD-10-CM

## 2019-11-15 PROCEDURE — 99203 OFFICE O/P NEW LOW 30 MIN: CPT | Performed by: PEDIATRICS

## 2019-11-15 ASSESSMENT — MIFFLIN-ST. JEOR: SCORE: 1447.48

## 2019-11-15 NOTE — LETTER
11/15/2019         RE: Liu Ragsdale  [id number] [address] [id number]        Dear Colleague,    Thank you for referring your patient, Liu Ragsdale, to the [facility]. Please see a copy of my visit note below.    Sports Medicine Clinic Visit    PCP: Shania Schneider    Liu Ragsdale is a 79 year old male who is seen  in consultation at the request of  Shania Schneider C.N.P. presenting with right shoulder pain    Injury: He reports right shoulder pain for 1 year. He reports no injury. He states that the majority of his pain is in the posterior shoulder and radiates to his upper arm. He is left hand dominate. His pain increases with reaching away from his body and reaching overhead. Pain did not improve with physical therapy.    Location of Pain: right shoulder  Duration of Pain: 1 year(s)  Rating of Pain at worst: 8/10  Rating of Pain Currently: 3/10  Symptoms are better with: Rest  Symptoms are worse with: overhead motions and reaching  Additional Features:   Positive: popping and weakness   Negative: swelling, bruising, grinding, catching, locking, instability, paresthesias and numbness  Other evaluation and/or treatments so far consists of: physical therapy  Prior History of related problems: nothing    Social History: retired    Review of Systems  Skin: no bruising, no swelling  Musculoskeletal: as above  Neurologic: no numbness, paresthesias  Remainder of review of systems is negative including constitutional, CV, pulmonary, GI, except as noted in HPI or medical history.    Patient's current problem list, past medical and surgical history, and family history were reviewed.    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     SOB (shortness of breath) on exertion     Arthritis of right glenohumeral joint- moderate     Arthritis of right acromioclavicular joint- advanced     Past Medical History:   Diagnosis Date     NO ACTIVE PROBLEMS      Past Surgical History:   Procedure Laterality Date     COLONOSCOPY N/A 6/16/2016    Procedure: COLONOSCOPY;  Surgeon: Randy Avendano MD;  Location: [facility]     VASECTOMY  1981     Family History   Problem Relation Age of Onset     Diabetes Sister      Obesity Son      Depression Sister      Thyroid Disease Sister      Gastrointestinal Disease Son      Depression Son      Congenital Anomalies Daughter      Depression Daughter      Heart Disease Daughter      Cerebrovascular Disease No family hx of          Objective  /71   Ht 1.575 m (5' 2\")   Wt 84.8 kg (187 lb)   BMI 34.20 kg/m       GENERAL APPEARANCE: healthy, alert and no distress   GAIT: NORMAL  SKIN: no suspicious lesions or rashes  HEENT: Sclera clear, anicteric  CV: good peripheral pulses  RESP: Breathing not labored  NEURO: Normal strength and tone, mentation intact and speech normal  PSYCH:  mentation appears normal and affect normal/bright    Bilateral Shoulder exam    Inspection and Posture:       rounded shoulders and upper back    Skin:        no visible deformities    Tender:        none    Non Tender:       remainder of shoulder bilateral    ROM:        forward flexion 140  right       abduction 150 right       internal rotation gluteal region right       external rotation 35 right    Painful motions:       flexion right       elevation right    Strength:        abduction 5/5 bilateral       flexion 5/5 bilateral       internal rotation 5/5 bilateral       external rotation 5/5 bilateral    Impingement testing:       neg (-) Neer right       neg (-) Thurston right    Sensation:        normal sensation over shoulder and upper " extremity       Radiology  I visualized and reviewed these images with the patient  Xr Shoulder Right 2 Views  Result Date: 11/6/2019  RIGHT SHOULDER THREE VIEWS   11/6/2019 9:19 AM HISTORY: Right shoulder pain. COMPARISON: None.   IMPRESSION: No fracture or acute abnormality is demonstrated. There are moderate degenerative changes of the glenohumeral joint with moderate to advanced degenerative changes of the acromioclavicular joint. No other abnormality is seen. LEONARD HENRIQUEZ MD    Assessment:  1. Shoulder arthritis      Discussed nature of shoulder osteoarthritis. Discussed symptom treatment with over-the-counter medications, ice or heat and rest if needed. Discussed physical therapy for strength. Discussed injection therapy including subacromial or glenohumeral corticosteroid injections. Also briefly discussed future consideration of referral to orthopedic surgery for further evaluation and discussion of arthroplasty.    Plan:  - Today's Plan of Care:  Continue Home Exercise Program  Monitor symptoms    -We also discussed other future treatment options:  MRI, Trial of injection (subacromial v. US guided glenohumeral injection), Referral to orthopedic surgery    Follow Up: as needed    Concerning signs and symptoms were reviewed.  The patient expressed understanding of this management plan and all questions were answered at this time.    Thanks for the opportunity to participate in the care of this patient, I will keep you updated on their progress.    CC: Shania Lepe MD [facility]  Primary Care Sports Medicine  [facility]    Again, thank you for allowing me to participate in the care of your patient.        Sincerely,        Brenda Lepe MD

## 2019-11-15 NOTE — PATIENT INSTRUCTIONS
Plan:  - Today's Plan of Care:  Continue Home Exercise Program  Monitor symptoms    -We also discussed other future treatment options:  MRI, Trial of injection (subacromial v. US guided glenohumeral injection), Referral to orthopedic surgery    Follow Up: as needed    If you have any further questions for your physician or physician s care team you can call [phone] and use option 3 to leave a voice message. Calls received during business hours will be returned same day.

## 2019-11-15 NOTE — PROGRESS NOTES
Sports Medicine Clinic Visit    PCP: Shania Schneider JAYESH Ragsdale is a 79 year old male who is seen  in consultation at the request of  Shania Schneider C.N.P. presenting with right shoulder pain    Injury: He reports right shoulder pain for 1 year. He reports no injury. He states that the majority of his pain is in the posterior shoulder and radiates to his upper arm. He is left hand dominate. His pain increases with reaching away from his body and reaching overhead. Pain did not improve with physical therapy.    Location of Pain: right shoulder  Duration of Pain: 1 year(s)  Rating of Pain at worst: 8/10  Rating of Pain Currently: 3/10  Symptoms are better with: Rest  Symptoms are worse with: overhead motions and reaching  Additional Features:   Positive: popping and weakness   Negative: swelling, bruising, grinding, catching, locking, instability, paresthesias and numbness  Other evaluation and/or treatments so far consists of: physical therapy  Prior History of related problems: nothing    Social History: retired    Review of Systems  Skin: no bruising, no swelling  Musculoskeletal: as above  Neurologic: no numbness, paresthesias  Remainder of review of systems is negative including constitutional, CV, pulmonary, GI, except as noted in HPI or medical history.    Patient's current problem list, past medical and surgical history, and family history were reviewed.    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     SOB (shortness of breath) on exertion     Arthritis of right glenohumeral joint- moderate     Arthritis of right acromioclavicular joint- advanced     Past Medical  "History:   Diagnosis Date     NO ACTIVE PROBLEMS      Past Surgical History:   Procedure Laterality Date     COLONOSCOPY N/A 6/16/2016    Procedure: COLONOSCOPY;  Surgeon: Randy Avendano MD;  Location: [facility]     VASECTOMY  1981     Family History   Problem Relation Age of Onset     Diabetes Sister      Obesity Son      Depression Sister      Thyroid Disease Sister      Gastrointestinal Disease Son      Depression Son      Congenital Anomalies Daughter      Depression Daughter      Heart Disease Daughter      Cerebrovascular Disease No family hx of          Objective  /71   Ht 1.575 m (5' 2\")   Wt 84.8 kg (187 lb)   BMI 34.20 kg/m      GENERAL APPEARANCE: healthy, alert and no distress   GAIT: NORMAL  SKIN: no suspicious lesions or rashes  HEENT: Sclera clear, anicteric  CV: good peripheral pulses  RESP: Breathing not labored  NEURO: Normal strength and tone, mentation intact and speech normal  PSYCH:  mentation appears normal and affect normal/bright    Bilateral Shoulder exam    Inspection and Posture:       rounded shoulders and upper back    Skin:        no visible deformities    Tender:        none    Non Tender:       remainder of shoulder bilateral    ROM:        forward flexion 140  right       abduction 150 right       internal rotation gluteal region right       external rotation 35 right    Painful motions:       flexion right       elevation right    Strength:        abduction 5/5 bilateral       flexion 5/5 bilateral       internal rotation 5/5 bilateral       external rotation 5/5 bilateral    Impingement testing:       neg (-) Neer right       neg (-) Thurston right    Sensation:        normal sensation over shoulder and upper extremity       Radiology  I visualized and reviewed these images with the patient  Xr Shoulder Right 2 Views  Result Date: 11/6/2019  RIGHT SHOULDER THREE VIEWS   11/6/2019 9:19 AM HISTORY: Right shoulder pain. COMPARISON: None.   IMPRESSION: No fracture or acute " abnormality is demonstrated. There are moderate degenerative changes of the glenohumeral joint with moderate to advanced degenerative changes of the acromioclavicular joint. No other abnormality is seen. LEONARD HENRIQUEZ MD    Assessment:  1. Shoulder arthritis      Discussed nature of shoulder osteoarthritis. Discussed symptom treatment with over-the-counter medications, ice or heat and rest if needed. Discussed physical therapy for strength. Discussed injection therapy including subacromial or glenohumeral corticosteroid injections. Also briefly discussed future consideration of referral to orthopedic surgery for further evaluation and discussion of arthroplasty.    Plan:  - Today's Plan of Care:  Continue Home Exercise Program  Monitor symptoms    -We also discussed other future treatment options:  MRI, Trial of injection (subacromial v. US guided glenohumeral injection), Referral to orthopedic surgery    Follow Up: as needed    Concerning signs and symptoms were reviewed.  The patient expressed understanding of this management plan and all questions were answered at this time.    Thanks for the opportunity to participate in the care of this patient, I will keep you updated on their progress.    CC: Shania Lepe MD CAQ  Primary Care Sports Medicine  [facility]

## 2019-11-18 DIAGNOSIS — I10 BENIGN ESSENTIAL HYPERTENSION: Chronic | ICD-10-CM

## 2019-11-18 RX ORDER — LOSARTAN POTASSIUM 50 MG/1
TABLET ORAL
Qty: 90 TABLET | Refills: 0 | Status: SHIPPED | OUTPATIENT
Start: 2019-11-18 | End: 2020-01-02

## 2019-11-20 ENCOUNTER — HOSPITAL ENCOUNTER (OUTPATIENT)
Dept: NUCLEAR MEDICINE | Facility: CLINIC | Age: 79
Setting detail: NUCLEAR MEDICINE
Discharge: HOME | End: 2019-11-20
Attending: NURSE PRACTITIONER | Admitting: NURSE PRACTITIONER
Payer: MEDICARE

## 2019-11-20 VITALS — WEIGHT: 187 LBS | HEIGHT: 62 IN | BODY MASS INDEX: 34.41 KG/M2

## 2019-11-20 DIAGNOSIS — R06.02 SOB (SHORTNESS OF BREATH) ON EXERTION: ICD-10-CM

## 2019-11-20 DIAGNOSIS — I10 BENIGN ESSENTIAL HYPERTENSION: Chronic | ICD-10-CM

## 2019-11-20 DIAGNOSIS — R09.89 BILATERAL CAROTID BRUITS: ICD-10-CM

## 2019-11-20 LAB
CV BLOOD PRESSURE: 72 %
CV STRESS MAX HR HE: 105
NUC STRESS EJECTION FRACTION: 70 %
RATE PRESSURE PRODUCT: NORMAL
STRESS ECHO BASELINE DIASTOLIC HE: 80
STRESS ECHO BASELINE HR: 64
STRESS ECHO BASELINE SYSTOLIC BP: 164
STRESS ECHO CALCULATED PERCENT HR: 74 %
STRESS ECHO LAST STRESS DIASTOLIC BP: 80
STRESS ECHO LAST STRESS SYSTOLIC BP: 190
STRESS ECHO TARGET HR: 142
STRESS ST DEPRESSION: 1 MM
STRESS/REST PERFUSION RATIO: 1.19

## 2019-11-20 PROCEDURE — 34300033 ZZH RX 343: Performed by: INTERNAL MEDICINE

## 2019-11-20 PROCEDURE — A9502 TC99M TETROFOSMIN: HCPCS | Performed by: INTERNAL MEDICINE

## 2019-11-20 PROCEDURE — 78452 HT MUSCLE IMAGE SPECT MULT: CPT

## 2019-11-20 PROCEDURE — 93018 CV STRESS TEST I&R ONLY: CPT | Performed by: INTERNAL MEDICINE

## 2019-11-20 PROCEDURE — 25000128 H RX IP 250 OP 636: Performed by: NURSE PRACTITIONER

## 2019-11-20 PROCEDURE — 93017 CV STRESS TEST TRACING ONLY: CPT

## 2019-11-20 PROCEDURE — 93016 CV STRESS TEST SUPVJ ONLY: CPT | Performed by: INTERNAL MEDICINE

## 2019-11-20 PROCEDURE — 78452 HT MUSCLE IMAGE SPECT MULT: CPT | Mod: 26 | Performed by: INTERNAL MEDICINE

## 2019-11-20 RX ORDER — AMINOPHYLLINE 25 MG/ML
50 INJECTION, SOLUTION INTRAVENOUS ONCE
Status: COMPLETED | OUTPATIENT
Start: 2019-11-20 | End: 2019-11-20

## 2019-11-20 RX ORDER — REGADENOSON 0.08 MG/ML
0.4 INJECTION, SOLUTION INTRAVENOUS ONCE
Status: COMPLETED | OUTPATIENT
Start: 2019-11-20 | End: 2019-11-20

## 2019-11-20 RX ADMIN — TETROFOSMIN 10.1 MCI.: 1.38 INJECTION, POWDER, LYOPHILIZED, FOR SOLUTION INTRAVENOUS at 12:50

## 2019-11-20 RX ADMIN — AMINOPHYLLINE 50 MG: 25 INJECTION, SOLUTION INTRAVENOUS at 14:39

## 2019-11-20 RX ADMIN — TETROFOSMIN 31.5 MCI.: 1.38 INJECTION, POWDER, LYOPHILIZED, FOR SOLUTION INTRAVENOUS at 14:26

## 2019-11-20 RX ADMIN — REGADENOSON 0.4 MG: 0.08 INJECTION, SOLUTION INTRAVENOUS at 14:20

## 2019-11-20 ASSESSMENT — MIFFLIN-ST. JEOR: SCORE: 1447.48

## 2019-11-20 NOTE — PROGRESS NOTES
Pt here for stress test today.  Reports having chest discomfort/heaviness with exertion sometimes, has been happening for the past month.  Denies any pain today.  Dr Castillo notified and pt ok to continue with test.  Pt walking of treadmill while Lexiscan given.  Pt having 1mm ST depression after Lexiscan given, treadmill stopped.  Pt complaining of chest tightness and bilateral pectoral pain 3/10 at 1 minute recovery.  Chest tightness resolved by 8 minutes recovery, pectoral pain resolved by 12 minutes recovery.  ST depression resolved by 11 minutes. Upper back pain 3/10 at 5 minutes recovery, pain waxing and waning between 2 and 3.  Aminophylline given at 15 minutes recovery.  Upper back pain eased to 1/10 shortly after and resolved by 28 minutes recovery.  Pt denied any nausea, SOA or sweating with pain, color remained WNL.  Pt did not appear in great distress, though did report feeling a little anxious.  BP remained elevated at 180/80 at 28 minutes recovery.  Rechecked after stress scan and BP still elevated at 182/80.  Pt denies any pain.  Dr Castillo notified about symptoms and ST depression.

## 2019-11-21 DIAGNOSIS — I65.23 CAROTID STENOSIS, BILATERAL: ICD-10-CM

## 2019-11-21 DIAGNOSIS — R94.39 EQUIVOCAL STRESS TEST: ICD-10-CM

## 2019-11-21 DIAGNOSIS — I10 BENIGN ESSENTIAL HYPERTENSION: Primary | ICD-10-CM

## 2019-11-21 DIAGNOSIS — R06.02 SOB (SHORTNESS OF BREATH) ON EXERTION: ICD-10-CM

## 2019-11-21 NOTE — RESULT ENCOUNTER NOTE
Dear Liu,  Your stress test was equivocal. I feel that given you had significant chest pain during the stress portion of the test, it would be recommended that you follow-up with a Cardiologist for further testing. I know you have a Vascular appointment set-up later this month, keep that. I'll place a referral for Cardiology in [place]. They will call you to schedule an appointment.   Please contact our clinic via phone or My Chart if you have any questions or concerns.  Thanks,  CECE Cline

## 2019-11-26 ENCOUNTER — OFFICE VISIT (OUTPATIENT)
Dept: VASCULAR SURGERY | Facility: CLINIC | Age: 79
End: 2019-11-26
Payer: COMMERCIAL

## 2019-11-26 VITALS
SYSTOLIC BLOOD PRESSURE: 148 MMHG | WEIGHT: 185 LBS | HEART RATE: 77 BPM | DIASTOLIC BLOOD PRESSURE: 78 MMHG | BODY MASS INDEX: 33.84 KG/M2

## 2019-11-26 DIAGNOSIS — I65.23 CAROTID STENOSIS, BILATERAL: ICD-10-CM

## 2019-11-26 DIAGNOSIS — I65.29 CAROTID STENOSIS: Primary | ICD-10-CM

## 2019-11-26 PROCEDURE — 99204 OFFICE O/P NEW MOD 45 MIN: CPT | Performed by: SURGERY

## 2019-11-26 NOTE — PROGRESS NOTES
I the pleasure of seeing Mr. Liu Ragsdale in the vascular surgery clinic today.  This is a kind consultation from Shania Schneider CNP.   Patient is seen at [facility] on 6 November 2019 and a she picked up on bilateral carotid bruits.  For this carotid duplex sonography was ordered.  Patient does not report transient ischemic attack or amaurosis fugax.  Patient does report increasing shortness of breath with ambulation.  He also reports right shoulder pain due to arthritis.  Patient recently underwent a cardiac stress test.    Past medical history is notable for sensorineural hearing loss, obstructive sleep apnea, shortness of breath, obesity, hyperlipidemia and hypertension.     Past surgical history includes vasectomy and colonoscopy.    He does not smoke or drink.    Family history is noncontributory to the current illness.  There is no noted coronary artery disease or cerebrovascular disease in the family.    Review of systems is as noted in history of presenting illness.  Of note he did develop chest pain during his cardiac stress test.    On examination: He appears comfortable and is in no acute distress.  Blood pressure in the left arm is 162 x 79 mmHg.  Pulse rate is 81 bpm.  HEENT: Head is atraumatic and normocephalic, mucosa pink.  Eyes: Extraocular motions are intact, sclerae are anicteric.  Mental: Alert and oriented x4, judgment insight is good.  Pediatric: Mood and affect are appropriate to the situation.  Cardiac: Regular rate and rhythm, S1 plus S2 +0.  Chest: Clear to auscultation bilaterally.  Abdomen: Soft and nontender.  Aortic impulse is not palpable.  Vascular: Bilateral carotid bruits are noted, right is louder than left.  3+ radial and femoral pulses.  Dorsalis pedis pulses are 2+ palpable.  Extremities: No clubbing, cyanosis, edema or deformities are noted.    Imaging data: I personally reviewed the recent duplex sonography that was performed today.  There is heavy calcific burden in both  carotid arteries but by velocity criteria the degree of stenosis is between 50 and 69%.    Diagnosis: Moderate to severe, asymptomatic, bilateral carotid stenosis.    Plan: I explained the pathophysiology of disease to him in detail.  As is true of heavily calcified disease in the carotids that ultrasonography can be less than accurate.  For this I would recommend obtaining CT angiogram of the head and neck to further evaluate the status of the carotid arteries.  I am concerned about the findings on his cardiac stress test.  And I will request our cardiology colleagues to see this gentleman on a priority basis.  I will get in touch with him once I have reviewed this results of the CT angiogram of the head and neck.

## 2019-11-26 NOTE — NURSING NOTE
"Initial BP (!) 148/78 (BP Location: Right arm, Patient Position: Sitting, Cuff Size: Adult Large)   Pulse 77   Wt 83.9 kg (185 lb)   BMI 33.84 kg/m   Estimated body mass index is 33.84 kg/m  as calculated from the following:    Height as of 11/20/19: 1.575 m (5' 2\").    Weight as of this encounter: 83.9 kg (185 lb). .      "

## 2019-11-29 ENCOUNTER — HOSPITAL ENCOUNTER (OUTPATIENT)
Dept: CT IMAGING | Facility: CLINIC | Age: 79
Discharge: HOME | End: 2019-11-29
Attending: SURGERY | Admitting: SURGERY
Payer: MEDICARE

## 2019-11-29 DIAGNOSIS — I65.29 CAROTID STENOSIS: ICD-10-CM

## 2019-11-29 LAB
CREAT BLD-MCNC: 1.4 MG/DL (ref 0.66–1.25)
GFR SERPL CREATININE-BSD FRML MDRD: 49 ML/MIN/{1.73_M2}

## 2019-11-29 PROCEDURE — 82565 ASSAY OF CREATININE: CPT

## 2019-11-29 PROCEDURE — 25000128 H RX IP 250 OP 636: Performed by: RADIOLOGY

## 2019-11-29 PROCEDURE — 25000125 ZZHC RX 250: Performed by: RADIOLOGY

## 2019-11-29 PROCEDURE — 70498 CT ANGIOGRAPHY NECK: CPT

## 2019-11-29 RX ORDER — IOPAMIDOL 755 MG/ML
70 INJECTION, SOLUTION INTRAVASCULAR ONCE
Status: COMPLETED | OUTPATIENT
Start: 2019-11-29 | End: 2019-11-29

## 2019-11-29 RX ADMIN — SODIUM CHLORIDE 100 ML: 9 INJECTION, SOLUTION INTRAVENOUS at 08:19

## 2019-11-29 RX ADMIN — IOPAMIDOL 70 ML: 755 INJECTION, SOLUTION INTRAVENOUS at 08:19

## 2019-12-02 ENCOUNTER — TELEPHONE (OUTPATIENT)
Dept: OTHER | Facility: CLINIC | Age: 79
End: 2019-12-02

## 2019-12-02 NOTE — TELEPHONE ENCOUNTER
Received call from vascular RN for Dr Barone.  State pt had positive stress test and needs to be seen as soon as possible.  Pt has appt for 1/27/19 -first available at [place].  Called pt to offer going to [place] for a sooner appointment.  Pt declined to drive to [place] and would like to wait until January appointment.  Advised to follow up with primary in the meantime.  RODDY GREGG RN on 12/2/2019 at 1:51 PM

## 2019-12-02 NOTE — TELEPHONE ENCOUNTER
I called and left message. I wanted to go over the results of the CTA head and neck. On both stenosis is < 50%, no surgery advised.

## 2019-12-10 NOTE — PROGRESS NOTES
Outpatient Physical Therapy Discharge Note     Patient: Liu Ragsdale  : 1940    Beginning/End Dates of Reporting Period:  10/03/2019 to 2019    Referring Provider: Shania Schneider CNP    Therapy Diagnosis: R shoulder pain    Patient did not return for follow up treatments as directed.  Goal status and current objective information is therefore unknown.  Discharge from PT services at this time for this episode of treatment. Please see attached documentation under this episode of care for further information including dates of service, start of care date, referring physician, Dx, treatment plan, treatments, etc.    Please contact me with any questions or concerns.    Thank you for your referral.    Nancy Regalado, PT, DPT  Physical Therapist   [address] [address] [phone]

## 2020-01-02 DIAGNOSIS — I10 BENIGN ESSENTIAL HYPERTENSION: Chronic | ICD-10-CM

## 2020-01-02 RX ORDER — LOSARTAN POTASSIUM 50 MG/1
TABLET ORAL
Qty: 135 TABLET | Refills: 0 | Status: SHIPPED | OUTPATIENT
Start: 2020-01-02 | End: 2020-01-06 | Stop reason: DRUGHIGH

## 2020-01-02 NOTE — TELEPHONE ENCOUNTER
"Requested Prescriptions   Pending Prescriptions Disp Refills     losartan (COZAAR) 50 MG tablet [Pharmacy Med Name: Losartan Potassium 50 MG Oral Tablet]  0     Sig: TAKE 1 & 1/2 (ONE & ONE-HALF) TABLETS BY MOUTH ONCE DAILY       Angiotensin-II Receptors Failed - 1/2/2020  8:19 AM        Failed - Last blood pressure under 140/90 in past 12 months     BP Readings from Last 3 Encounters:   11/26/19 (!) 148/78   11/15/19 133/71   11/06/19 (!) 140/70                 Failed - Normal serum creatinine on file in past 12 months     Recent Labs   Lab Test 11/29/19  0823 06/03/19  0825   CR  --  1.04   CREAT 1.4*  --              Passed - Recent (12 mo) or future (30 days) visit within the authorizing provider's specialty     Patient has had an office visit with the authorizing provider or a provider within the authorizing providers department within the previous 12 mos or has a future within next 30 days. See \"Patient Info\" tab in inbasket, or \"Choose Columns\" in Meds & Orders section of the refill encounter.              Passed - Medication is active on med list        Passed - Patient is age 18 or older        Passed - Normal serum potassium on file in past 12 months     Recent Labs   Lab Test 06/03/19  0825   POTASSIUM 3.9                    Last Written Prescription Date:  11/18/19  Last Fill Quantity: 90,  # refills: 0   Last office visit: 11/6/2019 with prescribing provider:     Future Office Visit:      "

## 2020-01-06 ENCOUNTER — TELEPHONE (OUTPATIENT)
Dept: FAMILY MEDICINE | Facility: CLINIC | Age: 80
End: 2020-01-06

## 2020-01-06 DIAGNOSIS — I10 BENIGN ESSENTIAL HYPERTENSION: Chronic | ICD-10-CM

## 2020-01-06 RX ORDER — LOSARTAN POTASSIUM 100 MG/1
100 TABLET ORAL DAILY
Qty: 90 TABLET | Refills: 0 | Status: SHIPPED | OUTPATIENT
Start: 2020-01-06 | End: 2020-03-23

## 2020-01-06 NOTE — TELEPHONE ENCOUNTER
Pt informed, reviewed Rx dose, directions.   Will have BP check at upcoming cardiology visit.  HEIDE Chavarria RN

## 2020-01-06 NOTE — TELEPHONE ENCOUNTER
Per 11-06-19 OV-  2. Benign essential hypertension, BP goal <140/90  Chronic, elevated  INCREASE- losartan (COZAAR) 100 MG tablet; Take 1 tablet (100 mg) by mouth daily  Dispense: 30 tablet; Refill: 0  - NM Lexiscan stress test; Future    losartan (COZAAR) 100 MG tablet 30 tablet 0 11/6/2019  No   Sig - Route: Take 1 tablet (100 mg) by mouth daily - Oral   Sent to pharmacy as: losartan (COZAAR) 100 MG tablet     RN spoke with pharm re: above Rx, intentional dose change. States did receive however pt's insurance requires 90 tab.  LM to call clinic nurse to review Rx dose, directions and also needs RN BP check.  HEIDE Chavarria, RN

## 2020-01-06 NOTE — TELEPHONE ENCOUNTER
Patient has been taking 2 tablets, not the 1 1/2. He would like a script for this. He hasn't picked up the one that was sent on 1/2/2020 because the pharmacy said it wasn't ready or they didn't get it?     Paige [facility]

## 2020-02-27 ENCOUNTER — OFFICE VISIT (OUTPATIENT)
Dept: CARDIOLOGY | Facility: CLINIC | Age: 80
End: 2020-02-27
Attending: NURSE PRACTITIONER
Payer: COMMERCIAL

## 2020-02-27 VITALS
HEART RATE: 91 BPM | OXYGEN SATURATION: 97 % | DIASTOLIC BLOOD PRESSURE: 79 MMHG | BODY MASS INDEX: 34.39 KG/M2 | SYSTOLIC BLOOD PRESSURE: 159 MMHG | WEIGHT: 188 LBS

## 2020-02-27 DIAGNOSIS — I20.89 ANGINA AT REST (H): ICD-10-CM

## 2020-02-27 DIAGNOSIS — R79.89 ELEVATED SERUM CREATININE: ICD-10-CM

## 2020-02-27 DIAGNOSIS — R94.39 POSITIVE CARDIAC STRESS TEST: Primary | ICD-10-CM

## 2020-02-27 LAB
ANION GAP SERPL CALCULATED.3IONS-SCNC: 3 MMOL/L (ref 3–14)
BUN SERPL-MCNC: 18 MG/DL (ref 7–30)
CALCIUM SERPL-MCNC: 9 MG/DL (ref 8.5–10.1)
CHLORIDE SERPL-SCNC: 104 MMOL/L (ref 94–109)
CO2 SERPL-SCNC: 29 MMOL/L (ref 20–32)
CREAT SERPL-MCNC: 1.1 MG/DL (ref 0.66–1.25)
GFR SERPL CREATININE-BSD FRML MDRD: 64 ML/MIN/{1.73_M2}
GLUCOSE SERPL-MCNC: 141 MG/DL (ref 70–99)
POTASSIUM SERPL-SCNC: 3.8 MMOL/L (ref 3.4–5.3)
SODIUM SERPL-SCNC: 136 MMOL/L (ref 133–144)

## 2020-02-27 PROCEDURE — 80048 BASIC METABOLIC PNL TOTAL CA: CPT | Performed by: INTERNAL MEDICINE

## 2020-02-27 PROCEDURE — 99205 OFFICE O/P NEW HI 60 MIN: CPT | Performed by: INTERNAL MEDICINE

## 2020-02-27 PROCEDURE — 36415 COLL VENOUS BLD VENIPUNCTURE: CPT | Performed by: INTERNAL MEDICINE

## 2020-02-27 RX ORDER — METOPROLOL SUCCINATE 25 MG/1
25 TABLET, EXTENDED RELEASE ORAL DAILY
Qty: 30 TABLET | Refills: 3 | Status: SHIPPED | OUTPATIENT
Start: 2020-02-27 | End: 2020-07-07

## 2020-02-27 RX ORDER — NITROGLYCERIN 0.3 MG/1
TABLET SUBLINGUAL
Qty: 30 TABLET | Refills: 3 | Status: SHIPPED | OUTPATIENT
Start: 2020-02-27 | End: 2024-01-01

## 2020-02-27 RX ORDER — LIDOCAINE 40 MG/G
CREAM TOPICAL
Status: CANCELLED | OUTPATIENT
Start: 2020-02-27

## 2020-02-27 RX ORDER — ASPIRIN 81 MG/1
81 TABLET ORAL DAILY
Status: CANCELLED | OUTPATIENT
Start: 2020-02-27 | End: 2020-02-29

## 2020-02-27 RX ORDER — SODIUM CHLORIDE 9 MG/ML
INJECTION, SOLUTION INTRAVENOUS CONTINUOUS
Status: CANCELLED | OUTPATIENT
Start: 2020-02-27

## 2020-02-27 NOTE — PATIENT INSTRUCTIONS
Medication Changes:  1. START metoprolol XL 25 mg daily. Call us if you have any side effects. BP goal <120/80   2. START SL nitroglycerin as needed for chest pain that occurs at rest   3. CONTINUE aspirin and atorvastatin   Recommendations:  1. Get non fasting blood work today on your way out  2. Echocardiogram  3. Coronary angiogram to be done at [facility] on Tuesday March 10. 2020. Please arrive at 9:00am. If you need to contact [facility] for any reason, please call [phone] option #2.  Follow-up:  1. See Megan Cortez NP for cardiology follow up at [facility] 1-2 weeks after the angiogram   2. See Dr Díaz for cardiology follow up at [facility] in 3 months  Cardiology Scheduling~[phone]  Cardiology Clinic RNs~[phone] (Josseline Martin RN and Mellisa Lindsey RN)    ANGIOGRAM  [facility]   [place], [place]   Contact [facility] scheduling if needed at [phone].      1. In preparation for your procedure, we require that you do the following:  a. Nothing to eat or drink after midnight if your procedure is before 12 noon.  b. Take your usual morning medications with a small sip of water immediately upon arising on the morning of your procedure unless outlined below:    Aspirin:  o If you currently take Aspirin, continue taking your same dose.    Diuretic (Water Pill):  o If you take a diuretic (water pill), do not take the morning of the procedure.  2. You will be unable to drive after your procedure; please arrange to have someone drive you home. Make sure that there is a responsible adult with you for 24 hours after your procedure. Your procedure will be cancelled if you do not have transportation home or someone staying with you for 24 hrs.   3.  Your procedure will be done at [facility]. Please park in the  [address]amp  on the west side of [facility] on [address]. Take the skyway over [facility] to  "the hospital. Please check in on the first floor, \"Skyway Welcome Desk\" which is one floor down from the skyway level.   4. If you have any questions about your upcoming procedure, please contact nursing at [facility] at [phone] or at [facility] at [phone].    "

## 2020-02-27 NOTE — PROGRESS NOTES
HPI:     This is a 79 year old male with history of HTN, Carotid stenosis, Sensorineural hearing loss, FANI, shortness of breath, obesity, and hyperlipidemia.    Referred by PCP for equivocal stress test. Had positive EKG changes as well as significant chest pain during test however no reversible defects on imaging. Prior to stress test was having some chest discomfort, tightness which came on with walking fast and shoveling snow, also going up with steps. Symptoms occur everytime he climbs stairs now and are overall new to him. Associated symptoms of shortness of breath. Medications include aspirin 81 mg daily, statin. Non smoker. BP is well controlled on hydrochlorothiazide and losartan. No palpitations, syncope, LE edema.    Family history significant for heart failure in father in his 80s.         PAST MEDICAL HISTORY  Past Medical History:   Diagnosis Date     NO ACTIVE PROBLEMS        CURRENT MEDICATIONS  Current Outpatient Medications   Medication Sig Dispense Refill     aspirin 81 MG EC tablet Take 81 mg by mouth every other day To every third day       atorvastatin (LIPITOR) 20 MG tablet Take 1 tablet (20 mg) by mouth daily 90 tablet 3     diclofenac (VOLTAREN) 1 % topical gel Place 4 g onto the skin 4 times daily (Shoulder) 100 g 3     hydrochlorothiazide (HYDRODIURIL) 25 MG tablet Take 1 tablet (25 mg) by mouth daily 90 tablet 3     losartan (COZAAR) 100 MG tablet Take 1 tablet (100 mg) by mouth daily PLEASE SCHEDULE NURSE VISIT FOR BLOOD PRESSURE CHECK 90 tablet 0     Multiple Vitamin (MULTI VITAMIN PO) Take 1 oz by mouth daily         PAST SURGICAL HISTORY:  Past Surgical History:   Procedure Laterality Date     COLONOSCOPY N/A 6/16/2016    Procedure: COLONOSCOPY;  Surgeon: Randy Avendano MD;  Location: [facility]     VASECTOMY  1981       ALLERGIES     Allergies   Allergen Reactions     Metoprolol Other (See Comments)     Side effects     Ampicillin Rash       FAMILY HISTORY  Family History   Problem  Relation Age of Onset     Diabetes Sister      Obesity Son      Depression Sister      Thyroid Disease Sister      Gastrointestinal Disease Son      Depression Son      Congenital Anomalies Daughter      Depression Daughter      Heart Disease Daughter      Cerebrovascular Disease No family hx of        SOCIAL HISTORY  Social History     Socioeconomic History     Marital status:      Spouse name: Not on file     Number of children: Not on file     Years of education: Not on file     Highest education level: Not on file   Occupational History     Occupation:    Social Needs     Financial resource strain: Not on file     Food insecurity:     Worry: Not on file     Inability: Not on file     Transportation needs:     Medical: Not on file     Non-medical: Not on file   Tobacco Use     Smoking status: Never Smoker     Smokeless tobacco: Never Used   Substance and Sexual Activity     Alcohol use: Yes     Comment: Rare     Drug use: No     Sexual activity: Never     Partners: Female   Lifestyle     Physical activity:     Days per week: Not on file     Minutes per session: Not on file     Stress: Not on file   Relationships     Social connections:     Talks on phone: Not on file     Gets together: Not on file     Attends Restorationism service: Not on file     Active member of club or organization: Not on file     Attends meetings of clubs or organizations: Not on file     Relationship status: Not on file     Intimate partner violence:     Fear of current or ex partner: Not on file     Emotionally abused: Not on file     Physically abused: Not on file     Forced sexual activity: Not on file   Other Topics Concern     Parent/sibling w/ CABG, MI or angioplasty before 65F 55M? No   Social History Narrative     Not on file       ROS:   Constitutional: No fever, chills, or sweats. No weight gain/loss   ENT: No visual disturbance, ear ache, epistaxis, sore throat  Allergies/Immunologic: Negative  Respiratory: No cough,  hemoptysia  Cardiovascular: As per HPI  GI: No nausea, vomiting, hematemesis, melena, or hematochezia  : No urinary frequency, dysuria, or hematuria  Integument: Negative  Psychiatric: Negative  Neuro: Negative  Endocrinology: Negative   Musculoskeletal: Negative  Vascular: No walking impairment, claudication, ischemic rest pain or nonhealing wounds    EXAM:  BP (!) 159/79   Pulse 91   Wt 85.3 kg (188 lb)   SpO2 97%   BMI 34.39 kg/m    In general, the patient is a pleasant male in no apparent distress.    HEENT: NC/AT.  PERRLA.  EOMI.  Sclerae white, not injected.  Nares clear.  Pharynx without erythema or exudate.  Dentition intact.    Neck: No adenopathy.  No thyromegaly. Carotids +2/2 bilaterally without bruits.  No jugular venous distension.   Heart: RRR. Normal S1, S2 splits physiologically. No murmur, rub, click, or gallop. The PMI is in the 5th ICS in the midclavicular line. There is no heave.    Lungs: CTA.  No ronchi, wheezes, rales.  No dullness to percussion.   Abdomen: Soft, nontender, nondistended. No organomegaly. No AAA.  No bruits.   Extremities: No clubbing, cyanosis, or edema.  No wounds. No varicose veins signs of chronic venous insufficiency.   Vascular: No bruits are noted.    Labs:  LIPID RESULTS:  Lab Results   Component Value Date    CHOL 149 06/03/2019    HDL 63 06/03/2019    LDL 61 06/03/2019    TRIG 126 06/03/2019    CHOLHDLRATIO 3.5 03/19/2015    NHDL 86 06/03/2019       LIVER ENZYME RESULTS:  Lab Results   Component Value Date    AST 17 06/03/2019    ALT 26 06/03/2019       CBC RESULTS:  Lab Results   Component Value Date    WBC 15.3 (H) 01/22/2018    RBC 4.65 01/22/2018    HGB 13.5 01/22/2018    HCT 39.7 (L) 01/22/2018    MCV 85 01/22/2018    MCH 29.0 01/22/2018    MCHC 34.0 01/22/2018    RDW 12.3 01/22/2018     01/22/2018       BMP RESULTS:  Lab Results   Component Value Date     06/03/2019    POTASSIUM 3.9 06/03/2019    CHLORIDE 105 06/03/2019    CO2 29 06/03/2019     ANIONGAP 3 06/03/2019    GLC 98 06/03/2019    BUN 18 06/03/2019    CR 1.04 06/03/2019    GFRESTIMATED 49 (L) 11/29/2019    GFRESTBLACK 59 (L) 11/29/2019    GWEN 9.4 06/03/2019        A1C RESULTS:  Lab Results   Component Value Date    A1C 6.0 01/23/2017       Procedures:    Nuclear stress test Lexiscan 11/20/2019      The nuclear stress test is equivocal. Patient had significant chest pain during the stress portion associated with diffuse 1mm ST depressions on ECG that persisted for approximately 11 minutes. However there are no significant reversible perfusion defects.Recommend additional evaluation with a stress echocardiogram or coronary CTA, or cardiology consultation if indicated.     Stress to rest cavity ratio is 1.19.  TID is absent.     Left ventricular function is normal.     The left ventricular ejection fraction at rest is 72%.  The left ventricular ejection fraction at stress is 70%.     There is no prior study for comparison.      CTA head/neck 11/2019    IMPRESSION:   1. Calcified and noncalcified atherosclerotic plaque in the right  carotid bifurcation causes Y-shaped stenosis of all three vessels.  Although the right internal carotid artery shows only 47% diameter  stenosis by NASCET criteria, tandem stenosis of the distal right  common carotid artery probably accounts for the ultrasound finding of  50-69% diameter stenosis.  2. Ultrasound overestimated stenosis on the left, with no significant  stenosis present in the internal carotid artery.  3. Multiple moderate stenoses of the distal right vertebral artery.  4. Mild stenosis of the right carotid siphon and moderate stenosis of  the left carotid siphon.    EKG         Assessment and Plan:       In summary this is a 79-year-old male with past medical history significant for hyperlipidemia and hypertension who is referred for dyspnea on exertion and chest tightness.  He had a nuclear stress test demonstrating no ischemia however EKG changes and  symptoms were elicited.  Given his typical nature of anginal symptoms that have been increasing in severity I would like to have patient undergo coronary angiography.  Cannot rule out multivessel disease which can be a cause of false negative nuclear perfusion imaging.  He has diffuse plaque in his carotid arteries which suggests he is a vasculopath and likely has underlying coronary disease.  Blood pressure is poorly controlled today and we discussed that potentially if he has nonobstructive coronary disease that elevated blood pressures can lead to anginal symptoms.  We will attempt to better control his blood pressure.    Summary recommendations:    1. Coronary angiogram at [facility]   2. Echocardiogram at [facility]   3. Start Toprol XL 25 mg a day.  He has reported side effects in the past with metoprolol however I believe this was in the setting of lisinopril.  He would benefit from beta-blockade.  Patient willing to try.  He will call if any side effects occur.  4. Start SL nitroglycerin as needed for chest pain that occurs at rest   5. Continue aspirin and statin      Audrey Díaz MD MSc  [facility]

## 2020-02-27 NOTE — LETTER
2/27/2020    Shania Schneider, CNP  [facility] [address]    RE: Liu Ragsdale       Dear Colleague,    I had the pleasure of seeing Liu Ragsdale in the [facility].          HPI:     This is a 79 year old male with history of HTN, Carotid stenosis, Sensorineural hearing loss, FANI, shortness of breath, obesity, and hyperlipidemia.    Referred by PCP for equivocal stress test. Had positive EKG changes as well as significant chest pain during test however no reversible defects on imaging. Prior to stress test was having some chest discomfort, tightness which came on with walking fast and shoveling snow, also going up with steps. Symptoms occur everytime he climbs stairs now and are overall new to him. Associated symptoms of shortness of breath. Medications include aspirin 81 mg daily, statin. Non smoker. BP is well controlled on hydrochlorothiazide and losartan. No palpitations, syncope, LE edema.    Family history significant for heart failure in father in his 80s.         PAST MEDICAL HISTORY  Past Medical History:   Diagnosis Date     NO ACTIVE PROBLEMS        CURRENT MEDICATIONS  Current Outpatient Medications   Medication Sig Dispense Refill     aspirin 81 MG EC tablet Take 81 mg by mouth every other day To every third day       atorvastatin (LIPITOR) 20 MG tablet Take 1 tablet (20 mg) by mouth daily 90 tablet 3     diclofenac (VOLTAREN) 1 % topical gel Place 4 g onto the skin 4 times daily (Shoulder) 100 g 3     hydrochlorothiazide (HYDRODIURIL) 25 MG tablet Take 1 tablet (25 mg) by mouth daily 90 tablet 3     losartan (COZAAR) 100 MG tablet Take 1 tablet (100 mg) by mouth daily PLEASE SCHEDULE NURSE VISIT FOR BLOOD PRESSURE CHECK 90 tablet 0     Multiple Vitamin (MULTI VITAMIN PO) Take 1 oz by mouth daily         PAST SURGICAL HISTORY:  Past Surgical History:   Procedure Laterality Date     COLONOSCOPY N/A 6/16/2016    Procedure: COLONOSCOPY;  Surgeon:  Randy Avendano MD;  Location: [facility]     VASECTOMY  1981       ALLERGIES     Allergies   Allergen Reactions     Metoprolol Other (See Comments)     Side effects     Ampicillin Rash       FAMILY HISTORY  Family History   Problem Relation Age of Onset     Diabetes Sister      Obesity Son      Depression Sister      Thyroid Disease Sister      Gastrointestinal Disease Son      Depression Son      Congenital Anomalies Daughter      Depression Daughter      Heart Disease Daughter      Cerebrovascular Disease No family hx of        SOCIAL HISTORY  Social History     Socioeconomic History     Marital status:      Spouse name: Not on file     Number of children: Not on file     Years of education: Not on file     Highest education level: Not on file   Occupational History     Occupation:    Social Needs     Financial resource strain: Not on file     Food insecurity:     Worry: Not on file     Inability: Not on file     Transportation needs:     Medical: Not on file     Non-medical: Not on file   Tobacco Use     Smoking status: Never Smoker     Smokeless tobacco: Never Used   Substance and Sexual Activity     Alcohol use: Yes     Comment: Rare     Drug use: No     Sexual activity: Never     Partners: Female   Lifestyle     Physical activity:     Days per week: Not on file     Minutes per session: Not on file     Stress: Not on file   Relationships     Social connections:     Talks on phone: Not on file     Gets together: Not on file     Attends Holiness service: Not on file     Active member of club or organization: Not on file     Attends meetings of clubs or organizations: Not on file     Relationship status: Not on file     Intimate partner violence:     Fear of current or ex partner: Not on file     Emotionally abused: Not on file     Physically abused: Not on file     Forced sexual activity: Not on file   Other Topics Concern     Parent/sibling w/ CABG, MI or angioplasty before 65F 55M? No   Social  History Narrative     Not on file       ROS:   Constitutional: No fever, chills, or sweats. No weight gain/loss   ENT: No visual disturbance, ear ache, epistaxis, sore throat  Allergies/Immunologic: Negative  Respiratory: No cough, hemoptysia  Cardiovascular: As per HPI  GI: No nausea, vomiting, hematemesis, melena, or hematochezia  : No urinary frequency, dysuria, or hematuria  Integument: Negative  Psychiatric: Negative  Neuro: Negative  Endocrinology: Negative   Musculoskeletal: Negative  Vascular: No walking impairment, claudication, ischemic rest pain or nonhealing wounds    EXAM:  BP (!) 159/79   Pulse 91   Wt 85.3 kg (188 lb)   SpO2 97%   BMI 34.39 kg/m     In general, the patient is a pleasant male in no apparent distress.    HEENT: NC/AT.  PERRLA.  EOMI.  Sclerae white, not injected.  Nares clear.  Pharynx without erythema or exudate.  Dentition intact.    Neck: No adenopathy.  No thyromegaly. Carotids +2/2 bilaterally without bruits.  No jugular venous distension.   Heart: RRR. Normal S1, S2 splits physiologically. No murmur, rub, click, or gallop. The PMI is in the 5th ICS in the midclavicular line. There is no heave.    Lungs: CTA.  No ronchi, wheezes, rales.  No dullness to percussion.   Abdomen: Soft, nontender, nondistended. No organomegaly. No AAA.  No bruits.   Extremities: No clubbing, cyanosis, or edema.  No wounds. No varicose veins signs of chronic venous insufficiency.   Vascular: No bruits are noted.    Labs:  LIPID RESULTS:  Lab Results   Component Value Date    CHOL 149 06/03/2019    HDL 63 06/03/2019    LDL 61 06/03/2019    TRIG 126 06/03/2019    CHOLHDLRATIO 3.5 03/19/2015    NHDL 86 06/03/2019       LIVER ENZYME RESULTS:  Lab Results   Component Value Date    AST 17 06/03/2019    ALT 26 06/03/2019       CBC RESULTS:  Lab Results   Component Value Date    WBC 15.3 (H) 01/22/2018    RBC 4.65 01/22/2018    HGB 13.5 01/22/2018    HCT 39.7 (L) 01/22/2018    MCV 85 01/22/2018    MCH 29.0  01/22/2018    MCHC 34.0 01/22/2018    RDW 12.3 01/22/2018     01/22/2018       BMP RESULTS:  Lab Results   Component Value Date     06/03/2019    POTASSIUM 3.9 06/03/2019    CHLORIDE 105 06/03/2019    CO2 29 06/03/2019    ANIONGAP 3 06/03/2019    GLC 98 06/03/2019    BUN 18 06/03/2019    CR 1.04 06/03/2019    GFRESTIMATED 49 (L) 11/29/2019    GFRESTBLACK 59 (L) 11/29/2019    GWEN 9.4 06/03/2019        A1C RESULTS:  Lab Results   Component Value Date    A1C 6.0 01/23/2017       Procedures:    Nuclear stress test Lexiscan 11/20/2019      The nuclear stress test is equivocal. Patient had significant chest pain during the stress portion associated with diffuse 1mm ST depressions on ECG that persisted for approximately 11 minutes. However there are no significant reversible perfusion defects.Recommend additional evaluation with a stress echocardiogram or coronary CTA, or cardiology consultation if indicated.     Stress to rest cavity ratio is 1.19.  TID is absent.     Left ventricular function is normal.     The left ventricular ejection fraction at rest is 72%.  The left ventricular ejection fraction at stress is 70%.     There is no prior study for comparison.      CTA head/neck 11/2019    IMPRESSION:   1. Calcified and noncalcified atherosclerotic plaque in the right  carotid bifurcation causes Y-shaped stenosis of all three vessels.  Although the right internal carotid artery shows only 47% diameter  stenosis by NASCET criteria, tandem stenosis of the distal right  common carotid artery probably accounts for the ultrasound finding of  50-69% diameter stenosis.  2. Ultrasound overestimated stenosis on the left, with no significant  stenosis present in the internal carotid artery.  3. Multiple moderate stenoses of the distal right vertebral artery.  4. Mild stenosis of the right carotid siphon and moderate stenosis of  the left carotid siphon.    EKG         Assessment and Plan:       In summary this is a  79-year-old male with past medical history significant for hyperlipidemia and hypertension who is referred for dyspnea on exertion and chest tightness.  He had a nuclear stress test demonstrating no ischemia however EKG changes and symptoms were elicited.  Given his typical nature of anginal symptoms that have been increasing in severity I would like to have patient undergo coronary angiography.  Cannot rule out multivessel disease which can be a cause of false negative nuclear perfusion imaging.  He has diffuse plaque in his carotid arteries which suggests he is a vasculopath and likely has underlying coronary disease.  Blood pressure is poorly controlled today and we discussed that potentially if he has nonobstructive coronary disease that elevated blood pressures can lead to anginal symptoms.  We will attempt to better control his blood pressure.    Summary recommendations:    1. Coronary angiogram at [facility]   2. Echocardiogram at [facility]   3. Start Toprol XL 25 mg a day.  He has reported side effects in the past with metoprolol however I believe this was in the setting of lisinopril.  He would benefit from beta-blockade.  Patient willing to try.  He will call if any side effects occur.  4. Start SL nitroglycerin as needed for chest pain that occurs at rest   5. Continue aspirin and statin      Audrey Díaz MD MSc  [facility]    Thank you for allowing me to participate in the care of your patient.      Sincerely,     Audrey Díaz MD     [facility]

## 2020-03-05 ENCOUNTER — HOSPITAL ENCOUNTER (OUTPATIENT)
Dept: CARDIOLOGY | Facility: CLINIC | Age: 80
Discharge: HOME | End: 2020-03-05
Attending: INTERNAL MEDICINE | Admitting: INTERNAL MEDICINE
Payer: MEDICARE

## 2020-03-05 DIAGNOSIS — I20.89 ANGINA AT REST (H): ICD-10-CM

## 2020-03-05 DIAGNOSIS — R94.39 POSITIVE CARDIAC STRESS TEST: ICD-10-CM

## 2020-03-05 PROCEDURE — 93306 TTE W/DOPPLER COMPLETE: CPT | Mod: 26 | Performed by: INTERNAL MEDICINE

## 2020-03-05 PROCEDURE — 93306 TTE W/DOPPLER COMPLETE: CPT

## 2020-03-10 ENCOUNTER — HOSPITAL ENCOUNTER (OUTPATIENT)
Facility: CLINIC | Age: 80
Discharge: HOME | End: 2020-03-10
Admitting: INTERNAL MEDICINE
Payer: MEDICARE

## 2020-03-10 VITALS
WEIGHT: 187.9 LBS | OXYGEN SATURATION: 99 % | HEART RATE: 56 BPM | RESPIRATION RATE: 16 BRPM | BODY MASS INDEX: 33.29 KG/M2 | HEIGHT: 63 IN | SYSTOLIC BLOOD PRESSURE: 165 MMHG | TEMPERATURE: 97.6 F | DIASTOLIC BLOOD PRESSURE: 79 MMHG

## 2020-03-10 DIAGNOSIS — I20.89 ANGINA AT REST (H): ICD-10-CM

## 2020-03-10 DIAGNOSIS — R94.39 POSITIVE CARDIAC STRESS TEST: ICD-10-CM

## 2020-03-10 DIAGNOSIS — Z98.61 POSTSURGICAL PERCUTANEOUS TRANSLUMINAL CORONARY ANGIOPLASTY STATUS: Primary | ICD-10-CM

## 2020-03-10 PROBLEM — Z98.890 STATUS POST CORONARY ANGIOGRAM: Status: ACTIVE | Noted: 2020-03-10

## 2020-03-10 LAB
ANION GAP SERPL CALCULATED.3IONS-SCNC: 5 MMOL/L (ref 3–14)
APTT PPP: 35 SEC (ref 22–37)
BUN SERPL-MCNC: 18 MG/DL (ref 7–30)
CALCIUM SERPL-MCNC: 9.2 MG/DL (ref 8.5–10.1)
CHLORIDE SERPL-SCNC: 110 MMOL/L (ref 94–109)
CO2 SERPL-SCNC: 26 MMOL/L (ref 20–32)
CREAT SERPL-MCNC: 1.06 MG/DL (ref 0.66–1.25)
ERYTHROCYTE [DISTWIDTH] IN BLOOD BY AUTOMATED COUNT: 13 % (ref 10–15)
GFR SERPL CREATININE-BSD FRML MDRD: 67 ML/MIN/{1.73_M2}
GLUCOSE SERPL-MCNC: 105 MG/DL (ref 70–99)
HCT VFR BLD AUTO: 40.2 % (ref 40–53)
HGB BLD-MCNC: 13.4 G/DL (ref 13.3–17.7)
INR PPP: 1.04 (ref 0.86–1.14)
KCT BLD-ACNC: 280 SEC (ref 75–150)
KCT BLD-ACNC: 296 SEC (ref 75–150)
MCH RBC QN AUTO: 29 PG (ref 26.5–33)
MCHC RBC AUTO-ENTMCNC: 33.3 G/DL (ref 31.5–36.5)
MCV RBC AUTO: 87 FL (ref 78–100)
PLATELET # BLD AUTO: 183 10E9/L (ref 150–450)
POTASSIUM SERPL-SCNC: 3.8 MMOL/L (ref 3.4–5.3)
RBC # BLD AUTO: 4.62 10E12/L (ref 4.4–5.9)
SODIUM SERPL-SCNC: 141 MMOL/L (ref 133–144)
WBC # BLD AUTO: 6 10E9/L (ref 4–11)

## 2020-03-10 PROCEDURE — C1769 GUIDE WIRE: HCPCS | Performed by: INTERNAL MEDICINE

## 2020-03-10 PROCEDURE — 25000128 H RX IP 250 OP 636: Performed by: INTERNAL MEDICINE

## 2020-03-10 PROCEDURE — 80048 BASIC METABOLIC PNL TOTAL CA: CPT | Performed by: INTERNAL MEDICINE

## 2020-03-10 PROCEDURE — 92921 ZZHC PRQ TRLUML CORONARY ANGIOPLASTY ADDL BRANCH: CPT | Mod: LC | Performed by: INTERNAL MEDICINE

## 2020-03-10 PROCEDURE — 25000132 ZZH RX MED GY IP 250 OP 250 PS 637: Mod: GY | Performed by: INTERNAL MEDICINE

## 2020-03-10 PROCEDURE — 40000235 ZZH STATISTIC TELEMETRY

## 2020-03-10 PROCEDURE — 40000065 ZZH STATISTIC EKG NON-CHARGEABLE

## 2020-03-10 PROCEDURE — 85347 COAGULATION TIME ACTIVATED: CPT | Mod: 91

## 2020-03-10 PROCEDURE — 85730 THROMBOPLASTIN TIME PARTIAL: CPT | Performed by: INTERNAL MEDICINE

## 2020-03-10 PROCEDURE — 93005 ELECTROCARDIOGRAM TRACING: CPT

## 2020-03-10 PROCEDURE — 25800030 ZZH RX IP 258 OP 636: Performed by: INTERNAL MEDICINE

## 2020-03-10 PROCEDURE — C1725 CATH, TRANSLUMIN NON-LASER: HCPCS | Performed by: INTERNAL MEDICINE

## 2020-03-10 PROCEDURE — 27210794 ZZH OR GENERAL SUPPLY STERILE: Performed by: INTERNAL MEDICINE

## 2020-03-10 PROCEDURE — 99152 MOD SED SAME PHYS/QHP 5/>YRS: CPT | Performed by: INTERNAL MEDICINE

## 2020-03-10 PROCEDURE — 85027 COMPLETE CBC AUTOMATED: CPT | Performed by: INTERNAL MEDICINE

## 2020-03-10 PROCEDURE — 25000125 ZZHC RX 250: Performed by: INTERNAL MEDICINE

## 2020-03-10 PROCEDURE — C9600 PERC DRUG-EL COR STENT SING: HCPCS | Performed by: INTERNAL MEDICINE

## 2020-03-10 PROCEDURE — 40000852 ZZH STATISTIC HEART CATH LAB OR EP LAB

## 2020-03-10 PROCEDURE — C1874 STENT, COATED/COV W/DEL SYS: HCPCS | Performed by: INTERNAL MEDICINE

## 2020-03-10 PROCEDURE — 93454 CORONARY ARTERY ANGIO S&I: CPT | Mod: XU | Performed by: INTERNAL MEDICINE

## 2020-03-10 PROCEDURE — 93571 IV DOP VEL&/PRESS C FLO 1ST: CPT | Mod: 52,RC

## 2020-03-10 PROCEDURE — 85610 PROTHROMBIN TIME: CPT | Performed by: INTERNAL MEDICINE

## 2020-03-10 PROCEDURE — 93010 ELECTROCARDIOGRAM REPORT: CPT | Performed by: INTERNAL MEDICINE

## 2020-03-10 PROCEDURE — 36415 COLL VENOUS BLD VENIPUNCTURE: CPT

## 2020-03-10 PROCEDURE — C1760 CLOSURE DEV, VASC: HCPCS | Performed by: INTERNAL MEDICINE

## 2020-03-10 PROCEDURE — 99153 MOD SED SAME PHYS/QHP EA: CPT | Performed by: INTERNAL MEDICINE

## 2020-03-10 PROCEDURE — C1887 CATHETER, GUIDING: HCPCS | Performed by: INTERNAL MEDICINE

## 2020-03-10 DEVICE — IMPLANTABLE DEVICE: Type: IMPLANTABLE DEVICE | Status: FUNCTIONAL

## 2020-03-10 RX ORDER — ATROPINE SULFATE 0.1 MG/ML
0.5 INJECTION INTRAVENOUS EVERY 5 MIN PRN
Status: DISCONTINUED | OUTPATIENT
Start: 2020-03-10 | End: 2020-03-10 | Stop reason: HOSPADM

## 2020-03-10 RX ORDER — ACETAMINOPHEN 325 MG/1
650 TABLET ORAL EVERY 4 HOURS PRN
Status: DISCONTINUED | OUTPATIENT
Start: 2020-03-10 | End: 2020-03-10 | Stop reason: HOSPADM

## 2020-03-10 RX ORDER — SODIUM CHLORIDE 9 MG/ML
INJECTION, SOLUTION INTRAVENOUS CONTINUOUS
Status: DISCONTINUED | OUTPATIENT
Start: 2020-03-10 | End: 2020-03-10 | Stop reason: HOSPADM

## 2020-03-10 RX ORDER — LIDOCAINE 40 MG/G
CREAM TOPICAL
Status: DISCONTINUED | OUTPATIENT
Start: 2020-03-10 | End: 2020-03-10 | Stop reason: HOSPADM

## 2020-03-10 RX ORDER — HEPARIN SODIUM 1000 [USP'U]/ML
INJECTION, SOLUTION INTRAVENOUS; SUBCUTANEOUS
Status: DISCONTINUED | OUTPATIENT
Start: 2020-03-10 | End: 2020-03-10 | Stop reason: HOSPADM

## 2020-03-10 RX ORDER — FENTANYL CITRATE 50 UG/ML
INJECTION, SOLUTION INTRAMUSCULAR; INTRAVENOUS
Status: DISCONTINUED | OUTPATIENT
Start: 2020-03-10 | End: 2020-03-10 | Stop reason: HOSPADM

## 2020-03-10 RX ORDER — IOPAMIDOL 755 MG/ML
INJECTION, SOLUTION INTRAVASCULAR
Status: DISCONTINUED | OUTPATIENT
Start: 2020-03-10 | End: 2020-03-10 | Stop reason: HOSPADM

## 2020-03-10 RX ORDER — NALOXONE HYDROCHLORIDE 0.4 MG/ML
.1-.4 INJECTION, SOLUTION INTRAMUSCULAR; INTRAVENOUS; SUBCUTANEOUS
Status: DISCONTINUED | OUTPATIENT
Start: 2020-03-10 | End: 2020-03-10 | Stop reason: HOSPADM

## 2020-03-10 RX ORDER — FLUMAZENIL 0.1 MG/ML
0.2 INJECTION, SOLUTION INTRAVENOUS
Status: DISCONTINUED | OUTPATIENT
Start: 2020-03-10 | End: 2020-03-10 | Stop reason: HOSPADM

## 2020-03-10 RX ORDER — ISOSORBIDE MONONITRATE 30 MG/1
30 TABLET, EXTENDED RELEASE ORAL DAILY
Qty: 90 TABLET | Refills: 3 | Status: SHIPPED | OUTPATIENT
Start: 2020-03-10 | End: 2021-01-29

## 2020-03-10 RX ORDER — LISINOPRIL 5 MG/1
5 TABLET ORAL DAILY
Qty: 90 TABLET | Refills: 3 | Status: SHIPPED | OUTPATIENT
Start: 2020-03-10 | End: 2020-03-10

## 2020-03-10 RX ORDER — FENTANYL CITRATE 50 UG/ML
25-50 INJECTION, SOLUTION INTRAMUSCULAR; INTRAVENOUS
Status: DISCONTINUED | OUTPATIENT
Start: 2020-03-10 | End: 2020-03-10 | Stop reason: HOSPADM

## 2020-03-10 RX ORDER — NITROGLYCERIN 0.4 MG/1
0.4 TABLET SUBLINGUAL EVERY 5 MIN PRN
Status: DISCONTINUED | OUTPATIENT
Start: 2020-03-10 | End: 2020-03-10 | Stop reason: HOSPADM

## 2020-03-10 RX ORDER — NALOXONE HYDROCHLORIDE 0.4 MG/ML
.2-.4 INJECTION, SOLUTION INTRAMUSCULAR; INTRAVENOUS; SUBCUTANEOUS
Status: DISCONTINUED | OUTPATIENT
Start: 2020-03-10 | End: 2020-03-10 | Stop reason: HOSPADM

## 2020-03-10 RX ORDER — POTASSIUM CHLORIDE 1500 MG/1
20 TABLET, EXTENDED RELEASE ORAL ONCE
Status: COMPLETED | OUTPATIENT
Start: 2020-03-10 | End: 2020-03-10

## 2020-03-10 RX ORDER — ACETAMINOPHEN 325 MG/1
650 TABLET ORAL EVERY 6 HOURS PRN
COMMUNITY
End: 2021-04-01

## 2020-03-10 RX ORDER — LISINOPRIL 5 MG/1
5 TABLET ORAL DAILY
Status: DISCONTINUED | OUTPATIENT
Start: 2020-03-10 | End: 2020-03-10

## 2020-03-10 RX ORDER — ASPIRIN 81 MG/1
81 TABLET ORAL DAILY
Status: DISCONTINUED | OUTPATIENT
Start: 2020-03-10 | End: 2020-03-10 | Stop reason: HOSPADM

## 2020-03-10 RX ORDER — NITROGLYCERIN 5 MG/ML
VIAL (ML) INTRAVENOUS
Status: DISCONTINUED | OUTPATIENT
Start: 2020-03-10 | End: 2020-03-10 | Stop reason: HOSPADM

## 2020-03-10 RX ADMIN — SODIUM CHLORIDE: 9 INJECTION, SOLUTION INTRAVENOUS at 10:10

## 2020-03-10 RX ADMIN — POTASSIUM CHLORIDE 20 MEQ: 1500 TABLET, EXTENDED RELEASE ORAL at 11:22

## 2020-03-10 ASSESSMENT — MIFFLIN-ST. JEOR: SCORE: 1467.44

## 2020-03-10 NOTE — Clinical Note
The first balloon was inserted into the circumflex and ostium marginal circumflex.Max pressure = 15 marii. Total duration = 45 seconds.     Max pressure = 18 marii. Total duration = 36 seconds.    Balloon reinflated a second time: Max pressure = 18 marii. Total duration = 36 seconds.  Balloon reinflated a third time: Max pressure = 20 marii. Total duration = 40 seconds.

## 2020-03-10 NOTE — DISCHARGE INSTRUCTIONS
Cardiac Angioplasty/Stent Discharge Instructions - Femoral    After you go home:      Have an adult stay with you until tomorrow.    Drink extra fluids for 2 days.    You may resume your normal diet.    No smoking       For 24 hours - due to the sedation you received:    Relax and take it easy.    Do NOT make any important or legal decisions.    Do NOT drive or operate machines at home or at work.    Do NOT drink alcohol.    Care of Groin Puncture Site:      For the first 24 hrs - check the puncture site every 1-2 hours while awake.    For 2 days, when you cough, sneeze, laugh or move your bowels, hold your hand over the puncture site and press firmly.    Remove the bandaid after 24 hours. If there is minor oozing, apply another bandaid and remove it after 12 hours.    It is normal to have a small bruise or pea size lump at the site.    You may shower tomorrow. Do NOT take a bath, or use a hot tub or pool for at least 3 days. Do NOT scrub the site. Do not use lotion or powder near the puncture site.     Activity:            For 2 days:    No stooping or squatting    Do NOT do any heavy activity such as exercise, lifting, or straining.     No housework, yard work or any activity that make you sweat    Do NOT lift more than 10 pounds    Bleeding:      If you start bleeding from the site in your groin, lie down flat and press firmly on/above the site for 10 minutes.     Once bleeding stops, lay flat for 2 hours.     Call [facility] as soon as you can.       Call 911 right away if you have heavy bleeding or bleeding that does not stop.      Medicines:      If you are taking an antiplatelet medication - Brilinta - do not stop taking it until you talk to your cardiologist.        Take your medications, including blood thinners, unless your provider tells you not to.     If you have stopped any medicines, check with your provider about when to restart them.    Follow Up Appointments:      Follow up with [facility] Nurse  Practitioner at [facility] of patient preference in 7-10 days.    Cardiac Rehab will contact you for follow up care.    Call the clinic if:      You have increased pain or a large or growing hard lump around the site.    The site is red, swollen, hot or tender.    Blood or fluid is draining from the site.    You have chills or a fever greater than 101 F (38 C).    Your leg feels numb, cool or changes color.    You have hives, a rash or unusual itching.    New pain in the back or belly that you cannot control with Tylenol.    Any questions or concerns.      Other Instructions:      If you received a stent - carry your stent card with you at all times.      [facility] [place]:    [phone] [facility] (7 days a week)

## 2020-03-10 NOTE — Clinical Note
The first balloon was inserted into the circumflex and proximal circumflex.Max pressure = 8 marii. Total duration = 20 seconds.     Max pressure = 12 marii. Total duration = 20 seconds.    Balloon reinflated a second time: Max pressure = 12 marii. Total duration = 20 seconds.  Balloon reinflated a third time: Max pressure = 16 marii. Total duration = 30 seconds.

## 2020-03-10 NOTE — PROGRESS NOTES
Care Suites Admission Nursing Note    Patient Information  Name: Liu Ragsdale  Age: 79 year old  Reason for admission: coronary angiogram  Care Suites arrival time: 0910; left for cath lab 1340    Patient Admission/Assessment   Pre-procedure assessment complete: Yes  If abnormal assessment/labs, provider notified: N/A  NPO: Yes  Medications held per instructions/orders: Yes  Consent: obtained  Patient oriented to room: Yes  Education/questions answered: Yes  Plan/other: to cath lab     Discharge Planning  Accompanied by: wife and daughter  Discharge name/phone number: Kelsi [phone]  Overnight post sedation caregiver: same  Discharge location: home    Yue Andrade RN

## 2020-03-10 NOTE — RESULT ENCOUNTER NOTE
Results noted: small vessel disease; NINO to pLCX. Added Imdur. To be discussed at OV with Megan Cortez NP on 3/23/20

## 2020-03-10 NOTE — Clinical Note
Stent deployed in the ostium marginal circumflex. Max pressure = 7 marii. Total duration = 40 seconds. Balloon reinflated a second time: Max pressure = 9 marii. Total duration = 30 seconds.

## 2020-03-10 NOTE — PROGRESS NOTES
1525 Pt returned from Cath Lab. A/O. Gauze drsg CDI to right groin puncture site. No oozing or hematoma noted. Area soft & flat. Pt denies pain. Pt instructed on activity restrictions while on bedrest. Verbal understanding received from pt. Pt's family at bedside. Detailed update given.   1600 Dr Lopez at bedside to speak with pt & family.  1635 Pt taking diet & flds well. No complaints.  1640 Lisinopril RX removed from pt's AVS med list per VO Dr Lopez as pt will NOT be on Lisinopril as take home med.  1750 OOB - steady on feet. Ambulated in halls to bathroom with good sammie. No change in puncture site assessment with activity.  1805 Discharge teaching & instructions given to both pt & family w/ verbal understanding received. All questions & concerns addressed.  1830 Pt discharged per w/c to private vehicle. All personal belongings taken w/ pt.

## 2020-03-10 NOTE — PRE-PROCEDURE
GENERAL PRE-PROCEDURE:   Procedure:  Heart cath poss intervention  Date/Time:  3/10/2020 1:23 PM    Written consent obtained?: Yes    Risks and benefits: Risks, benefits and alternatives were discussed    Consent given by:  Patient  Patient states understanding of procedure being performed: Yes    Patient's understanding of procedure matches consent: Yes    Procedure consent matches procedure scheduled: Yes    Expected level of sedation:  Moderate  Appropriately NPO:  Yes  ASA Class:  Class 3- Severe systemic disease, definite functional limitations  Lungs:  Lungs clear with good breath sounds bilaterally  Heart:  Normal heart sounds and rate  History & Physical reviewed:  History and physical reviewed and no updates needed  Statement of review:  I have reviewed the lab findings, diagnostic data, medications, and the plan for sedation

## 2020-03-10 NOTE — Clinical Note
The first balloon was inserted into the circumflex and ostium marginal circumflex.Max pressure = 12 marii. Total duration = 30 seconds.

## 2020-03-11 ENCOUNTER — TELEPHONE (OUTPATIENT)
Dept: CARDIOLOGY | Facility: CLINIC | Age: 80
End: 2020-03-11

## 2020-03-11 NOTE — TELEPHONE ENCOUNTER
Attempted to call. Unable to reach. Left voicemail with Team 2 contact info for questions. Patient has follow up with Megan on 3/23.    Ale

## 2020-03-15 LAB — INTERPRETATION ECG - MUSE: NORMAL

## 2020-03-18 ENCOUNTER — TELEPHONE (OUTPATIENT)
Dept: CARDIOLOGY | Facility: CLINIC | Age: 80
End: 2020-03-18

## 2020-03-18 NOTE — TELEPHONE ENCOUNTER
Wellness Screening Tool    Symptom Screening:    Do you have a:    Fever?   no    Cough?  no    Shortness of breath?   no (if yes, is this a change?)    Skin rash?   no      Within the past 14 days, have you been in contact with someone who:    Is currently being ruled out for COVID-19 (novel coronavirus)?   no    Has tested positive for COVID-19?   no    Has symptoms of a respiratory illness (fever, cough, shortness of breath)?   no    Have you or someone you have been in contact with traveled to an area with COVID-19:    Refer to the CDC Coronavirus webpage for COVID-19 areas: no (if yes, what country?)    Within the past 3 weeks, have you been exposed to the following:      Pertussis?   no    Chicken pox?   no    Measles? no    Patient's appointment status:  patient will be seen in clinic as scheduled, appointment converted to phone visit, appointment rescheduled to a later date.  9:40 AM 03/18/20 F[facility]ED [facility]

## 2020-03-23 ENCOUNTER — VIRTUAL VISIT (OUTPATIENT)
Dept: CARDIOLOGY | Facility: CLINIC | Age: 80
End: 2020-03-23
Payer: COMMERCIAL

## 2020-03-23 VITALS
BODY MASS INDEX: 32.42 KG/M2 | DIASTOLIC BLOOD PRESSURE: 70 MMHG | HEART RATE: 62 BPM | WEIGHT: 183 LBS | SYSTOLIC BLOOD PRESSURE: 155 MMHG

## 2020-03-23 DIAGNOSIS — I25.10 CORONARY ARTERY DISEASE INVOLVING NATIVE CORONARY ARTERY OF NATIVE HEART WITHOUT ANGINA PECTORIS: Primary | ICD-10-CM

## 2020-03-23 DIAGNOSIS — I10 BENIGN ESSENTIAL HYPERTENSION: ICD-10-CM

## 2020-03-23 DIAGNOSIS — E78.5 HYPERLIPIDEMIA LDL GOAL <70: ICD-10-CM

## 2020-03-23 PROCEDURE — 99443 ZZC PHYSICIAN TELEPHONE EVALUATION 21-30 MIN: CPT | Performed by: NURSE PRACTITIONER

## 2020-03-23 RX ORDER — AMLODIPINE BESYLATE 2.5 MG/1
2.5 TABLET ORAL DAILY
Qty: 30 TABLET | Refills: 11 | Status: SHIPPED | OUTPATIENT
Start: 2020-03-23 | End: 2020-12-31

## 2020-03-23 RX ORDER — LOSARTAN POTASSIUM 100 MG/1
100 TABLET ORAL DAILY
Qty: 90 TABLET | Refills: 3 | Status: SHIPPED | OUTPATIENT
Start: 2020-03-23 | End: 2021-06-04

## 2020-03-23 RX ORDER — ATORVASTATIN CALCIUM 40 MG/1
40 TABLET, FILM COATED ORAL DAILY
Qty: 90 TABLET | Refills: 3 | Status: SHIPPED | OUTPATIENT
Start: 2020-03-23 | End: 2021-06-07

## 2020-03-23 NOTE — PROGRESS NOTES
"Liu Ragsdale is a 79 year old male who is being evaluated via a billable telephone visit.      The patient has been notified of following:     \"This telephone visit will be conducted via a call between you and your physician/provider. We have found that certain health care needs can be provided without the need for a physical exam.  This service lets us provide the care you need with a short phone conversation.  If a prescription is necessary we can send it directly to your pharmacy.  If lab work is needed we can place an order for that and you can then stop by our lab to have the test done at a later time.    If during the course of the call the physician/provider feels a telephone visit is not appropriate, you will not be charged for this service.\"     Liu Ragsdale complains of    Chief Complaint   Patient presents with     RECHECK     post angio/bruising     Hypertension     continues to have elevated BP       I have reviewed and updated the patient's Past Medical History, Social History, Family History and Medication List.    ALLERGIES  Metoprolol and Ampicillin    Additional provider notes: see dictation    Assessment/Plan:  1. Coronary artery disease involving native coronary artery of native heart without angina pectoris      2. Benign essential hypertension      3. Hyperlipidemia LDL goal <70        Phone call duration: 33 minutes    GIO Clemens CNP          Cardiology Clinic Progress Note  Liu Ragsdale MRN# [id number]   YOB: 1940 Age: 79 year old      Primary Cardiologist:   Dr. Díaz           History of Presenting Illness:      Liu Ragsdale is a pleasant 79 year old patient with a past cardiac history significant for   1. CAD  2. Hypertension  3. Hyperlipidemia  4. Carotid artery stenosis  Past medical history significant for obesity, sensorineural hearing loss, FANI not requiring cpap.     Patient was seen by PCP and underwent stress testing which was " equivocal.  He had positive EKG changes along with significant chest pain during testing but no reversible defects.  Prior to stress testing he noted chest discomfort, chest tightness with walking and shoveling and going up stairs.  He also had associated SOB.  Metoprolol was started.  Prior head and neck CT from November 2019 showed right ICA 47% stenosis and no significant stenosis on the left ICA.    Pt presents today for testing follow-up. Echocardiogram 3/5/2020 showing normal EF 55 to 60%, possible mild hypokinesis of the basal inferior wall, normal RV, no significant valvular disease.  Coronary angiogram 3/10/2020 with two-vessel CAD noting  of the proximal LCx into OM's, moderately severe ramus disease and a small vessel, moderately severe RCA-PL disease.  He underwent angioplasty and NINO to the proximal LCx into the OM1.  He was started on Imdur and recommended CCB if still symptomatic, before repeating heart cath.  The results were reviewed with him today.    He denies any side effects after starting metoprolol and Imdur.  He did notice some mild shortness of breath for a day after starting Brilinta, which resolved.  He has been taking Brilinta and aspirin without missing any doses and is aware that he should not hold his medications without checking with cardiology first.  He has not noted any bleeding difficulties on dual antiplatelet therapy.  He denies any further angina.  He would like to wait to set up cardiac rehab until after COVID19 concerns.  Blood pressure today is elevated and he is agreeable to starting amlodipine.  He prefers to try to decrease the number of medications he takes.  His right femoral angiogram site is noted to have ecchymosis which is soft and he denies any bleeding, pain, numbness, or tingling.  For his sleep apnea he does not require CPAP at this time. Patient reports no chest pain, shortness of breath, PND, orthopnea, presyncope, syncope, edema, heart racing, or  palpitations.    Current Cardiac Medications   Aspirin 81 mg every other day- taking daily   Atorvastatin 20 mg daily  HCTZ 25 mg daily  Imdur 30 mg daily  Losartan 100 mg daily  Metoprolol XL 25 mg daily  Nitroglycerin PRN  Brilinta 90 mg twice daily                   Assessment and Plan:       Plan  INCREASE atorvastatin to 40 mg daily for high dose, high intensity with CAD   START amlodipine 2.5 mg daily for HTN and CAD     Recommendations:  1. Check blood pressure at least 1 hour after medications. Call the clinic if your blood pressure is consistently greater than 130/80.   2. If you have not heard from cardiac rehab office within 2 business days, please call [phone]    Follow-up:  1. Fasting lab in July (lipid/ALT)  2. See Dr. Díaz for cardiology follow up at [facility]: July 2020.- consider stopping imdur as Pt wants less medications         1. CAD    Angio 3/2020  of the proximal LCx into OM's, moderately severe ramus disease and a small vessel, moderately severe RCA-PL disease.  He underwent angioplasty and NINO to the proximal LCx into the OM1.    No angina     Continue statin, aspirin, ARB, beta-blocker, Imdur, CCM     Continue DAPT, uninterrupted, for at least 1 year (3/2021)      2. Hypertension    Not Controlled    Continue HCTZ, losartan, Imdur, metoprolol    Consider increasing metoprolol or starting amlodipine, if needed      3. Hyperlipidemia    Last LDL 61 on 6/2018    Increase atorvastatin to 40 mg daily          Thank you for allowing me to participate in this delightful patient's care.      This note was completed in part using Dragon voice recognition software. Although reviewed after completion, some word and grammatical errors may occur.    Megan Clemente, APRN CNP, APRN, CNP           Data:   All laboratory data reviewed      HPI and Plan:   See dictation    No orders of the defined types were placed in this encounter.      No orders of the defined types were  placed in this encounter.      There are no discontinued medications.      Encounter Diagnoses   Name Primary?     Coronary artery disease involving native coronary artery of native heart without angina pectoris Yes     Benign essential hypertension      Hyperlipidemia LDL goal <70        CURRENT MEDICATIONS:  Current Outpatient Medications   Medication Sig Dispense Refill     acetaminophen (TYLENOL) 325 MG tablet Take 650 mg by mouth every 6 hours as needed for mild pain       aspirin 81 MG EC tablet Take 81 mg by mouth every other day To every third day       atorvastatin (LIPITOR) 20 MG tablet Take 1 tablet (20 mg) by mouth daily 90 tablet 3     diclofenac (VOLTAREN) 1 % topical gel Place 4 g onto the skin 4 times daily (Shoulder) 100 g 3     hydrochlorothiazide (HYDRODIURIL) 25 MG tablet Take 1 tablet (25 mg) by mouth daily 90 tablet 3     isosorbide mononitrate (IMDUR) 30 MG 24 hr tablet Take 1 tablet (30 mg) by mouth daily 90 tablet 3     losartan (COZAAR) 100 MG tablet Take 1 tablet (100 mg) by mouth daily PLEASE SCHEDULE NURSE VISIT FOR BLOOD PRESSURE CHECK 90 tablet 0     metoprolol succinate ER (TOPROL XL) 25 MG 24 hr tablet Take 1 tablet (25 mg) by mouth daily 30 tablet 3     Multiple Vitamin (MULTI VITAMIN PO) Take 1 oz by mouth daily       nitroGLYcerin (NITROSTAT) 0.3 MG sublingual tablet For chest pain place 1 tablet under the tongue every 5 minutes for 3 doses. If symptoms persist 5 minutes after 1st dose call 911. 30 tablet 3     ticagrelor (BRILINTA) 90 MG tablet Take 1 tablet (90 mg) by mouth 2 times daily Start this evening. 180 tablet 3       ALLERGIES     Allergies   Allergen Reactions     Metoprolol Other (See Comments)     Side effects     Ampicillin Rash       PAST MEDICAL HISTORY:  Past Medical History:   Diagnosis Date     NO ACTIVE PROBLEMS        PAST SURGICAL HISTORY:  Past Surgical History:   Procedure Laterality Date     COLONOSCOPY N/A 6/16/2016    Procedure: COLONOSCOPY;  Surgeon:  Randy Avendano MD;  Location: [facility]     CV LEFT HEART CATH Left 3/10/2020    Procedure: Left Heart Cath;  Surgeon: Vicente Lopez MD;  Location:  HEART CARDIAC CATH LAB     VASECTOMY  1981       FAMILY HISTORY:  Family History   Problem Relation Age of Onset     Diabetes Sister      Obesity Son      Depression Sister      Thyroid Disease Sister      Gastrointestinal Disease Son      Depression Son      Congenital Anomalies Daughter      Depression Daughter      Heart Disease Daughter      Cerebrovascular Disease No family hx of        SOCIAL HISTORY:  Social History     Socioeconomic History     Marital status:      Spouse name: Not on file     Number of children: Not on file     Years of education: Not on file     Highest education level: Not on file   Occupational History     Occupation:    Social Needs     Financial resource strain: Not on file     Food insecurity     Worry: Not on file     Inability: Not on file     Transportation needs     Medical: Not on file     Non-medical: Not on file   Tobacco Use     Smoking status: Never Smoker     Smokeless tobacco: Never Used   Substance and Sexual Activity     Alcohol use: Yes     Comment: Rare     Drug use: No     Sexual activity: Never     Partners: Female   Lifestyle     Physical activity     Days per week: Not on file     Minutes per session: Not on file     Stress: Not on file   Relationships     Social connections     Talks on phone: Not on file     Gets together: Not on file     Attends Bahai service: Not on file     Active member of club or organization: Not on file     Attends meetings of clubs or organizations: Not on file     Relationship status: Not on file     Intimate partner violence     Fear of current or ex partner: Not on file     Emotionally abused: Not on file     Physically abused: Not on file     Forced sexual activity: Not on file   Other Topics Concern     Parent/sibling w/ CABG, MI or angioplasty before 65F 55M? No    Social History Narrative     Not on file       Review of Systems:  Skin:          Eyes:         ENT:         Respiratory:          Cardiovascular:         Gastroenterology:        Genitourinary:         Musculoskeletal:         Neurologic:         Psychiatric:         Heme/Lymph/Imm:         Endocrine:           Physical Exam:  Vitals: BP (!) 155/70   Pulse 62   Wt 83 kg (183 lb)   BMI 32.42 kg/m      Constitutional:           Skin:             Head:           Eyes:           Lymph:      ENT:           Neck:           Respiratory:            Cardiac:                                                           GI:           Extremities and Muscular Skeletal:                 Neurological:           Psych:           CC  Audrey Díaz MD  [address] [place] [id number]

## 2020-03-23 NOTE — PATIENT INSTRUCTIONS
Medication Changes:  INCREASE atorvastatin to 40 mg daily   START amlodipine 2.5 mg daily     Recommendations:  1. Check blood pressure at least 1 hour after medications. Call the clinic if your blood pressure is consistently greater than 130/80.   2. If you have not heard from cardiac rehab office within 2 business days, please call [phone]  3. Your echocardiogram showed normal pumping function and no significant valve disease.     Follow-up:  1. Fasting lab in July (lipid/ALT)  2. See Dr. Díaz for cardiology follow up at [facility]: July 2020. Call in April to schedule.     Cardiology Scheduling~[phone]  Cardiology Clinic RNs~[phone] (Josseline Martin RN and Mellisa Lindsey RN)

## 2020-04-07 ENCOUNTER — CARE COORDINATION (OUTPATIENT)
Dept: CARDIOLOGY | Facility: CLINIC | Age: 80
End: 2020-04-07

## 2020-04-07 DIAGNOSIS — R94.39 POSITIVE CARDIAC STRESS TEST: ICD-10-CM

## 2020-04-07 DIAGNOSIS — I20.89 ANGINA AT REST (H): ICD-10-CM

## 2020-04-07 DIAGNOSIS — Z98.61 POSTSURGICAL PERCUTANEOUS TRANSLUMINAL CORONARY ANGIOPLASTY STATUS: ICD-10-CM

## 2020-04-07 NOTE — PROGRESS NOTES
Call from patient regarding Brilinta medication. Patient was recently prescribed medication and due to cost is unable to afford this at this time. Patient has 5 days left of medication. Will route to our prior authorization team to see if we can get the cost of this drug lowered.         JUICE Plunkett April 7, 2020 10:28 AM

## 2020-04-07 NOTE — TELEPHONE ENCOUNTER
PA Initiation    Medication: Brilinta 90MG -   Insurance Company: [employer] - Phone [phone] Fax [phone]  Pharmacy Filling the Rx: [facility] - [address]  Filling Pharmacy Phone: [phone]  Filling Pharmacy Fax: [phone]  Start Date: 4/7/2020

## 2020-04-21 NOTE — TELEPHONE ENCOUNTER
"Tier Exception Denied    Insurance Company: [employer] - Phone [phone] Fax [phone]    Medication: Brilinta 90MG - Tier Exception- DENIED    Denial Date: 4/22/2020    Denial Rational:     Medication is already on the lowest tier possible for patient.    Appeal Information:     Please advise if appeal is necessary and place a letter of medical necessity under the \"letters\" tab in patient's chart.        "

## 2020-04-23 NOTE — PROGRESS NOTES
PA for tier exception for Brilinta was denied. RN called patient and reviewed with him. Patient advised RN he did  a new rx for Brilinta and now his deductible is met so he should not need an alternative. Patient will call clinic if there is an issue with cost when he orders his next refill. Patient will make sure that he does this at least 1 week before he runs out to ensure we have enough time to work with him on an alternative before he runs out of Brilinta.           3/23/20 Virtual Visit KRYSTAL Megan   Plan  INCREASE atorvastatin to 40 mg daily for high dose, high intensity with CAD   START amlodipine 2.5 mg daily for HTN and CAD      Recommendations:  1. Check blood pressure at least 1 hour after medications. Call the clinic if your blood pressure is consistently greater than 130/80.   2. If you have not heard from cardiac rehab office within 2 business days, please call [phone]     Follow-up:  1. Fasting lab in July (lipid/ALT)  2. See Dr. Díaz for cardiology follow up at [facility]: July 2020.- consider stopping imdur as Pt wants less medications            1. CAD    Angio 3/2020  of the proximal LCx into OM's, moderately severe ramus disease and a small vessel, moderately severe RCA-PL disease.  He underwent angioplasty and NINO to the proximal LCx into the OM1.    No angina     Continue statin, aspirin, ARB, beta-blocker, Imdur, CCM     Continue DAPT, uninterrupted, for at least 1 year (3/2021)        2. Hypertension    Not Controlled    Continue HCTZ, losartan, Imdur, metoprolol    Consider increasing metoprolol or starting amlodipine, if needed        3. Hyperlipidemia    Last LDL 61 on 6/2018    Increase atorvastatin to 40 mg daily

## 2020-06-26 DIAGNOSIS — I20.89 ANGINA AT REST (H): ICD-10-CM

## 2020-06-26 DIAGNOSIS — R94.39 POSITIVE CARDIAC STRESS TEST: ICD-10-CM

## 2020-07-07 DIAGNOSIS — R94.39 POSITIVE CARDIAC STRESS TEST: ICD-10-CM

## 2020-07-07 DIAGNOSIS — I20.89 ANGINA AT REST (H): ICD-10-CM

## 2020-07-07 RX ORDER — METOPROLOL SUCCINATE 25 MG/1
25 TABLET, EXTENDED RELEASE ORAL DAILY
Qty: 90 TABLET | Refills: 3 | Status: SHIPPED | OUTPATIENT
Start: 2020-07-07 | End: 2021-04-15

## 2020-07-08 DIAGNOSIS — I10 BENIGN ESSENTIAL HYPERTENSION: Chronic | ICD-10-CM

## 2020-07-08 NOTE — TELEPHONE ENCOUNTER
Routing refill request to provider for review/approval because:  Blood pressure out of range/date:    BP Readings from Last 3 Encounters:   03/23/20 (!) 155/70   03/10/20 (!) 165/79   02/27/20 (!) 159/79     Mónica Pitt RN

## 2020-07-09 RX ORDER — HYDROCHLOROTHIAZIDE 25 MG/1
25 TABLET ORAL DAILY
Qty: 30 TABLET | Refills: 0 | Status: SHIPPED | OUTPATIENT
Start: 2020-07-09 | End: 2020-07-17

## 2020-07-13 ENCOUNTER — TELEPHONE (OUTPATIENT)
Dept: FAMILY MEDICINE | Facility: CLINIC | Age: 80
End: 2020-07-13

## 2020-07-13 NOTE — TELEPHONE ENCOUNTER
[facility] Oral Surgery Pre oper. Phys. Susan. Form signed and faxed to: [phone]  Sent to scanning.    Glendy Garces  [facility]

## 2020-07-17 ENCOUNTER — ALLIED HEALTH/NURSE VISIT (OUTPATIENT)
Dept: FAMILY MEDICINE | Facility: CLINIC | Age: 80
End: 2020-07-17
Payer: COMMERCIAL

## 2020-07-17 VITALS — DIASTOLIC BLOOD PRESSURE: 70 MMHG | SYSTOLIC BLOOD PRESSURE: 132 MMHG | HEART RATE: 60 BPM

## 2020-07-17 DIAGNOSIS — I10 BENIGN ESSENTIAL HYPERTENSION: Primary | ICD-10-CM

## 2020-07-17 DIAGNOSIS — E78.5 HYPERLIPIDEMIA LDL GOAL <70: ICD-10-CM

## 2020-07-17 DIAGNOSIS — I10 BENIGN ESSENTIAL HYPERTENSION: Chronic | ICD-10-CM

## 2020-07-17 DIAGNOSIS — I25.10 CORONARY ARTERY DISEASE INVOLVING NATIVE CORONARY ARTERY OF NATIVE HEART WITHOUT ANGINA PECTORIS: ICD-10-CM

## 2020-07-17 LAB
ALT SERPL W P-5'-P-CCNC: 24 U/L (ref 0–70)
CHOLEST SERPL-MCNC: 139 MG/DL
HDLC SERPL-MCNC: 56 MG/DL
LDLC SERPL CALC-MCNC: 50 MG/DL
NONHDLC SERPL-MCNC: 83 MG/DL
TRIGL SERPL-MCNC: 167 MG/DL

## 2020-07-17 PROCEDURE — 99207 ZZC NO CHARGE NURSE ONLY: CPT

## 2020-07-17 PROCEDURE — 36415 COLL VENOUS BLD VENIPUNCTURE: CPT | Performed by: NURSE PRACTITIONER

## 2020-07-17 PROCEDURE — 84460 ALANINE AMINO (ALT) (SGPT): CPT | Performed by: NURSE PRACTITIONER

## 2020-07-17 PROCEDURE — 80061 LIPID PANEL: CPT | Performed by: NURSE PRACTITIONER

## 2020-07-17 RX ORDER — HYDROCHLOROTHIAZIDE 25 MG/1
25 TABLET ORAL DAILY
Qty: 90 TABLET | Refills: 3 | Status: SHIPPED | OUTPATIENT
Start: 2020-07-17 | End: 2021-04-01

## 2020-07-17 NOTE — PROGRESS NOTES
Liu Ragsdale is a 80 year old year old patient who comes in today for a Blood Pressure check because of ongoing blood pressure monitoring.    Patient is taking medication as prescribed  Patient is tolerating medications well.  Patient is not monitoring Blood Pressure at home.    Current complaints: none  Sonia SLATER MA    BP Readings from Last 6 Encounters:   07/17/20 132/70   03/23/20 (!) 155/70   03/10/20 (!) 165/79   02/27/20 (!) 159/79   11/26/19 (!) 148/78   11/15/19 133/71     Hydrochlorothiazide refilled for the year    CECE Cline

## 2020-07-24 RX ORDER — METOPROLOL SUCCINATE 25 MG/1
TABLET, EXTENDED RELEASE ORAL
Qty: 30 TABLET | Refills: 0 | OUTPATIENT
Start: 2020-07-24

## 2020-08-12 ENCOUNTER — OFFICE VISIT (OUTPATIENT)
Dept: FAMILY MEDICINE | Facility: CLINIC | Age: 80
End: 2020-08-12
Payer: COMMERCIAL

## 2020-08-12 VITALS
TEMPERATURE: 98.2 F | WEIGHT: 174 LBS | HEIGHT: 62 IN | DIASTOLIC BLOOD PRESSURE: 83 MMHG | SYSTOLIC BLOOD PRESSURE: 136 MMHG | OXYGEN SATURATION: 97 % | BODY MASS INDEX: 32.02 KG/M2 | HEART RATE: 64 BPM | RESPIRATION RATE: 16 BRPM

## 2020-08-12 DIAGNOSIS — M19.011 PRIMARY OSTEOARTHRITIS OF RIGHT SHOULDER: Primary | ICD-10-CM

## 2020-08-12 PROCEDURE — 20610 DRAIN/INJ JOINT/BURSA W/O US: CPT | Mod: RT | Performed by: FAMILY MEDICINE

## 2020-08-12 RX ORDER — TRIAMCINOLONE ACETONIDE 40 MG/ML
40 INJECTION, SUSPENSION INTRA-ARTICULAR; INTRAMUSCULAR ONCE
Qty: 1 ML | Refills: 0 | Status: SHIPPED | OUTPATIENT
Start: 2020-08-12 | End: 2020-08-12

## 2020-08-12 ASSESSMENT — MIFFLIN-ST. JEOR: SCORE: 1383.51

## 2020-08-12 NOTE — PATIENT INSTRUCTIONS
Patient Education     Osteoarthritis  Osteoarthritis (also called degenerative joint disease) happens when the cartilage in a joint becomes damaged and worn. This may be due to age, wear and tear, overuse of the joint, or other problems. Osteoarthritis can affect any joint. But it is most common in hands, knees, spine, hips, and feet. Symptoms include joint stiffness, pain, and swelling.  Home care    When a joint is more sore than usual, rest it for a day or two.    Heat can help relieve stiffness. Take a hot bath or apply a heating pad for up to 30 minutes at a time. If symptoms are worse in the morning, using heat just after awakening can help relax the muscle and soothe the joints.     Ice helps relieve pain and swelling. It is often used after activity. Use a cold pack wrapped in a thin cloth on the joint for 10 to 15 minutes at a time.     Alternating hot and cold can also help relieve pain. Try this for 20 minutes at a time, several times per day.    Exercise helps prevent the muscles and ligaments around the joint from becoming weak. It also helps maintain function in the joint.  Be as active as you can. Talk to your healthcare provider about what activity program is best for you.    Excess weight puts a lot of extra strain on weight-bearing joints of the lower back, hips, knees, feet and ankles. If you are overweight, talk to your healthcare provider about a safe and effective weight loss program.    Use anti-inflammatory medicines as prescribed for pain. This includes acetaminophen or NSAIDs such as ibuprofen or naproxen. If needed, topical or injected medicines may be recommended. Talk to your healthcare provider if these options are not enough to manage your pain.    Talk with your healthcare provider about devices that might help improve your function and reduce pain.  Follow-up care  Follow up with your healthcare provider as advised by our staff.  When to seek medical advice  Call your healthcare  provider right away if any of these occur:    Redness or swelling of a painful joint    Discharge or pus from a painful joint    Fever of 100.4 F (38 C) or higher, or as directed by your healthcare provider    Worsening joint pain    Decreased ability to move the joint or bear weight on the joint  Date Last Reviewed: 3/1/2017    7168-7178 [employer]. [address] [place] [address]. All rights reserved. This information is not intended as a substitute for professional medical care. Always follow your healthcare professional's instructions.

## 2020-08-12 NOTE — PROGRESS NOTES
PROCEDURE:  JOINT ASPIRATION/INJECTION.    ICD-10-CM    1. Primary osteoarthritis of right shoulder  M19.011        After a discussion of risks, benefits and side effects of procedure, informed patient consent was obtained.  The right shoulder was prepped and draped in the usual clean fashion (sterile not required for this procedure).  3 cc of 1 % lidocaine was used for local analgesia.    ASPIRATION: Not performed.     INJECTION:  Using 3 cc of 1 % lidocaine mixed with 40 mg of Kenalog, the right shoulder was successfully injected without complication.  Band-Aid applied and routine postprocedure care explained.  Continue Tylenol, Voltaren gel and activity as tolerated.  Patient deferred physical therapy.      Piyush Rogers MD  [facility]

## 2020-08-13 RX ORDER — TRIAMCINOLONE ACETONIDE 40 MG/ML
40 INJECTION, SUSPENSION INTRA-ARTICULAR; INTRAMUSCULAR ONCE
Status: COMPLETED | OUTPATIENT
Start: 2020-08-13 | End: 2020-08-13

## 2020-08-13 RX ADMIN — TRIAMCINOLONE ACETONIDE 40 MG: 40 INJECTION, SUSPENSION INTRA-ARTICULAR; INTRAMUSCULAR at 11:42

## 2020-08-18 ENCOUNTER — DOCUMENTATION ONLY (OUTPATIENT)
Dept: LAB | Facility: CLINIC | Age: 80
End: 2020-08-18

## 2020-08-19 DIAGNOSIS — I25.10 CORONARY ARTERY DISEASE INVOLVING NATIVE CORONARY ARTERY OF NATIVE HEART WITHOUT ANGINA PECTORIS: Primary | ICD-10-CM

## 2020-08-19 DIAGNOSIS — I25.10 CORONARY ARTERY DISEASE INVOLVING NATIVE CORONARY ARTERY OF NATIVE HEART WITHOUT ANGINA PECTORIS: ICD-10-CM

## 2020-08-19 LAB
ALT SERPL W P-5'-P-CCNC: 29 U/L (ref 0–70)
CHOLEST SERPL-MCNC: 123 MG/DL
HDLC SERPL-MCNC: 76 MG/DL
LDLC SERPL CALC-MCNC: 27 MG/DL
NONHDLC SERPL-MCNC: 47 MG/DL
TRIGL SERPL-MCNC: 100 MG/DL

## 2020-08-19 PROCEDURE — 36415 COLL VENOUS BLD VENIPUNCTURE: CPT | Performed by: NURSE PRACTITIONER

## 2020-08-19 PROCEDURE — 80061 LIPID PANEL: CPT | Performed by: NURSE PRACTITIONER

## 2020-08-19 PROCEDURE — 84460 ALANINE AMINO (ALT) (SGPT): CPT | Performed by: NURSE PRACTITIONER

## 2020-08-24 ENCOUNTER — VIRTUAL VISIT (OUTPATIENT)
Dept: CARDIOLOGY | Facility: CLINIC | Age: 80
End: 2020-08-24
Attending: NURSE PRACTITIONER
Payer: COMMERCIAL

## 2020-08-24 VITALS — BODY MASS INDEX: 31.83 KG/M2 | WEIGHT: 174 LBS

## 2020-08-24 DIAGNOSIS — I25.10 CORONARY ARTERY DISEASE INVOLVING NATIVE CORONARY ARTERY OF NATIVE HEART WITHOUT ANGINA PECTORIS: ICD-10-CM

## 2020-08-24 DIAGNOSIS — I20.89 ANGINA AT REST (H): ICD-10-CM

## 2020-08-24 DIAGNOSIS — R94.39 POSITIVE CARDIAC STRESS TEST: ICD-10-CM

## 2020-08-24 PROCEDURE — 99214 OFFICE O/P EST MOD 30 MIN: CPT | Mod: 95 | Performed by: INTERNAL MEDICINE

## 2020-08-24 RX ORDER — ISOSORBIDE MONONITRATE 30 MG/1
60 TABLET, EXTENDED RELEASE ORAL DAILY
Qty: 60 TABLET | Refills: 3 | Status: SHIPPED | OUTPATIENT
Start: 2020-08-24 | End: 2020-12-31

## 2020-08-24 NOTE — LETTER
8/24/2020    Shania Schneider, CNP  [address] [facility] [address]    RE: Liu Montanezy       Dear Colleague,    I had the pleasure of seeing Liu Ragsdale in the [facility].      Liu Ragsdale is a 80 year old male who is being evaluated via a billable telephone visit.      PROVIDER NOTES:    HPI:      This is a 80 year old male with history of HTN, Carotid stenosis, Sensorineural hearing loss, FANI, shortness of breath, obesity, and hyperlipidemia.     Prior history:     Referred by PCP for equivocal stress test. Had positive EKG changes as well as significant chest pain during test however no reversible defects on imaging. Prior to stress test was having some chest discomfort, tightness which came on with walking fast and shoveling snow, also going up with steps. Symptoms occurred everytime he climbs stairs. Associated symptoms of shortness of breath. Medications included aspirin 81 mg daily, statin. Non smoker. BP was well controlled on hydrochlorothiazide and losartan. No palpitations, syncope, LE edema. Family history significant for heart failure in father in his 80s.     Since last visit he underwent a coronary angiogram:      Dist LM lesion is 10% stenosed.    Ramus lesion is 65% stenosed.    Prox Cx lesion is 100% stenosed.    1st Mrg lesion is 99% stenosed.    Mid LAD lesion is 25% stenosed.    1st Diag lesion is 25% stenosed.    Ost RCA lesion is 40% stenosed.    Mid RCA lesion is 30% stenosed.    1st RPL lesion is 70% stenosed.     1. Two vessel CAD with essentially  of proximal Lcx into OM's, Mod/severe ramus disease (vessel 2.0-2.25mm), and mod/severe RCA-PL lesion which is smooth. The Lcx  was angioplastied and a NINO stent placed in prox Lcx into OM1 with good result    He is now chest pain free and without SOB. No significant bleeding on DAPT. He has again poorly controlled BP despite addition of imdur and amlodipine. He wishes to try to  consolidate medications and lower number he is taking. He had statin increased at last OPV.    Assessment and Plan:         In summary this is a 80-year-old male with past medical history significant for hyperlipidemia and hypertension who is referred for dyspnea on exertion and chest tightness.  He had a nuclear stress test demonstrating no ischemia however EKG changes and symptoms were elicited.  Given his typical nature of anginal symptoms that have been increasing in severity he underwent a coronary angiogram demonstrating multivessel disease and he is s/p NINO to  of LCX. He had residual small vessel CAD as well as moderate-severely stenosed Ramus and RCA-YAA disease. Imdur was added to his regimen for more BP control and he was followed up post cath by Megan during which atorvastatin was increased and amlodipine 2.5 mg was started. His BP is still difficult to control but he remains chest pain free without SOB. Echo reviewed and normal LV function with only mild basal inferior WMA.       1. Follow up nurse visit on one week for blood pressure management   2. Start taking imdur 60 mg daily. If blood pressure is <120/80 at follow up nursing call, then we can STOP amlodipine 2.5 mg daily. If it is above 120/80 mmHg, then we can increase further imdur to 90 mg daily and establish another phone visit with nursing one week thereafter to see if we can decrease amlodipine and/or hydrochlorothiazide   3. 3 month follow up with Dr. Díaz   4. Continue aspirin and ticagrelor for one year post coronary angiogram  5. Echocardiogram every 1-2 years   6. HLD - yearly lipid panel, continue atorvastatin 40 mg daily       Audrey Díaz MD MSc  [facility]    Total time spent 18 minutes.    Procedures:     Nuclear stress test [facility] 11/20/2019       The nuclear stress test is equivocal. Patient had significant chest pain during the stress portion associated with diffuse 1mm ST depressions on ECG that  persisted for approximately 11 minutes. However there are no significant reversible perfusion defects.Recommend additional evaluation with a stress echocardiogram or coronary CTA, or cardiology consultation if indicated.     Stress to rest cavity ratio is 1.19.  TID is absent.     Left ventricular function is normal.     The left ventricular ejection fraction at rest is 72%.  The left ventricular ejection fraction at stress is 70%.     There is no prior study for comparison.        CTA head/neck 11/2019     IMPRESSION:   1. Calcified and noncalcified atherosclerotic plaque in the right  carotid bifurcation causes Y-shaped stenosis of all three vessels.  Although the right internal carotid artery shows only 47% diameter  stenosis by NASCET criteria, tandem stenosis of the distal right  common carotid artery probably accounts for the ultrasound finding of  50-69% diameter stenosis.  2. Ultrasound overestimated stenosis on the left, with no significant  stenosis present in the internal carotid artery.  3. Multiple moderate stenoses of the distal right vertebral artery.  4. Mild stenosis of the right carotid siphon and moderate stenosis of  the left carotid siphon.     EKG               Encounter Diagnoses   Name Primary?     Positive cardiac stress test      Angina at rest (H)      Coronary artery disease involving native coronary artery of native heart without angina pectoris        CURRENT MEDICATIONS:  Current Outpatient Medications   Medication Sig Dispense Refill     acetaminophen (TYLENOL) 325 MG tablet Take 650 mg by mouth every 6 hours as needed for mild pain       amLODIPine (NORVASC) 2.5 MG tablet Take 1 tablet (2.5 mg) by mouth daily 30 tablet 11     atorvastatin (LIPITOR) 40 MG tablet Take 1 tablet (40 mg) by mouth daily 90 tablet 3     diclofenac (VOLTAREN) 1 % topical gel Place 4 g onto the skin 3 times daily as needed for moderate pain (Shoulder) 100 g 1     hydrochlorothiazide (HYDRODIURIL) 25 MG tablet  Take 1 tablet (25 mg) by mouth daily 90 tablet 3     isosorbide mononitrate (IMDUR) 30 MG 24 hr tablet Take 1 tablet (30 mg) by mouth daily 90 tablet 3     losartan (COZAAR) 100 MG tablet Take 1 tablet (100 mg) by mouth daily 90 tablet 3     metoprolol succinate ER (TOPROL XL) 25 MG 24 hr tablet Take 1 tablet (25 mg) by mouth daily 90 tablet 3     Multiple Vitamin (MULTI VITAMIN PO) Take 1 oz by mouth daily       nitroGLYcerin (NITROSTAT) 0.3 MG sublingual tablet For chest pain place 1 tablet under the tongue every 5 minutes for 3 doses. If symptoms persist 5 minutes after 1st dose call 911. 30 tablet 3     ticagrelor (BRILINTA) 90 MG tablet Take 1 tablet (90 mg) by mouth 2 times daily Start this evening. 180 tablet 3       ALLERGIES     Allergies   Allergen Reactions     Metoprolol Other (See Comments)     Side effects     Ampicillin Rash       PAST MEDICAL HISTORY:  Past Medical History:   Diagnosis Date     NO ACTIVE PROBLEMS        PAST SURGICAL HISTORY:  Past Surgical History:   Procedure Laterality Date     COLONOSCOPY N/A 6/16/2016    Procedure: COLONOSCOPY;  Surgeon: Randy Avendano MD;  Location: [facility]     CV LEFT HEART CATH Left 3/10/2020    Procedure: Left Heart Cath;  Surgeon: Vicente Lopez MD;  Location:  HEART CARDIAC CATH LAB     VASECTOMY  1981       FAMILY HISTORY:  Family History   Problem Relation Age of Onset     Diabetes Sister      Obesity Son      Depression Sister      Thyroid Disease Sister      Gastrointestinal Disease Son      Depression Son      Congenital Anomalies Daughter      Depression Daughter      Heart Disease Daughter      Cerebrovascular Disease No family hx of        SOCIAL HISTORY:  Social History     Socioeconomic History     Marital status:      Spouse name: None     Number of children: None     Years of education: None     Highest education level: None   Occupational History     Occupation:    Social Needs     Financial resource strain: None     Food  insecurity     Worry: None     Inability: None     Transportation needs     Medical: None     Non-medical: None   Tobacco Use     Smoking status: Never Smoker     Smokeless tobacco: Never Used   Substance and Sexual Activity     Alcohol use: Yes     Comment: Rare     Drug use: No     Sexual activity: Never     Partners: Female   Lifestyle     Physical activity     Days per week: None     Minutes per session: None     Stress: None   Relationships     Social connections     Talks on phone: None     Gets together: None     Attends Tenriism service: None     Active member of club or organization: None     Attends meetings of clubs or organizations: None     Relationship status: None     Intimate partner violence     Fear of current or ex partner: None     Emotionally abused: None     Physically abused: None     Forced sexual activity: None   Other Topics Concern     Parent/sibling w/ CABG, MI or angioplasty before 65F 55M? No   Social History Narrative     None       Review of Systems:  Skin:  Positive for bruising   Eyes:  Negative    ENT:  Positive for tinnitus  Respiratory:  Negative    Cardiovascular:  Negative    Gastroenterology: Negative    Genitourinary:  Positive for urinary frequency;urgency  Musculoskeletal:  Positive for arthritis;joint pain;joint swelling;joint stiffness  Neurologic:  Positive for numbness or tingling of hands  Psychiatric:  Negative    Heme/Lymph/Imm:  Negative    Endocrine:  Negative      Physical Exam:  Vitals: Wt 78.9 kg (174 lb)   BMI 31.83 kg/m      Recent Lab Results:  LIPID RESULTS:  Lab Results   Component Value Date    CHOL 123 08/19/2020    HDL 76 08/19/2020    LDL 27 08/19/2020    TRIG 100 08/19/2020    CHOLHDLRATIO 3.5 03/19/2015       LIVER ENZYME RESULTS:  Lab Results   Component Value Date    AST 17 06/03/2019    ALT 29 08/19/2020       CBC RESULTS:  Lab Results   Component Value Date    WBC 6.0 03/10/2020    RBC 4.62 03/10/2020    HGB 13.4 03/10/2020    HCT 40.2  03/10/2020    MCV 87 03/10/2020    MCH 29.0 03/10/2020    MCHC 33.3 03/10/2020    RDW 13.0 03/10/2020     03/10/2020       BMP RESULTS:  Lab Results   Component Value Date     03/10/2020    POTASSIUM 3.8 03/10/2020    CHLORIDE 110 (H) 03/10/2020    CO2 26 03/10/2020    ANIONGAP 5 03/10/2020     (H) 03/10/2020    BUN 18 03/10/2020    CR 1.06 03/10/2020    GFRESTIMATED 67 03/10/2020    GFRESTBLACK 77 03/10/2020    GWEN 9.2 03/10/2020        A1C RESULTS:  Lab Results   Component Value Date    A1C 6.0 01/23/2017       INR RESULTS:  Lab Results   Component Value Date    INR 1.04 03/10/2020       Phone call duration: 18 minutes      Thank you for allowing me to participate in the care of your patient.    Sincerely,     Audrey Díaz MD     [facility]

## 2020-08-24 NOTE — PROGRESS NOTES
"      Liu Ragsdale is a 80 year old male who is being evaluated via a billable telephone visit.      The patient has been notified of following:     \"This telephone visit will be conducted via a call between you and your physician/provider. We have found that certain health care needs can be provided without the need for a physical exam.  This service lets us provide the care you need with a short phone conversation.  If a prescription is necessary we can send it directly to your pharmacy.  If lab work is needed we can place an order for that and you can then stop by our lab to have the test done at a later time.    Telephone visits are billed at different rates depending on your insurance coverage. During this emergency period, for some insurers they may be billed the same as an in-person visit.  Please reach out to your insurance provider with any questions.    If during the course of the call the physician/provider feels a telephone visit is not appropriate, you will not be charged for this service.\"    Patient has given verbal consent for Telephone visit?  Yes    What phone number would you like to be contacted at? [phone]    How would you like to obtain your AVS? Mail a copy    Pari Thomas MA 8/24/2020 8:25 AM    PROVIDER NOTES:    HPI:      This is a 80 year old male with history of HTN, Carotid stenosis, Sensorineural hearing loss, FANI, shortness of breath, obesity, and hyperlipidemia.     Prior history:     Referred by PCP for equivocal stress test. Had positive EKG changes as well as significant chest pain during test however no reversible defects on imaging. Prior to stress test was having some chest discomfort, tightness which came on with walking fast and shoveling snow, also going up with steps. Symptoms occurred everytime he climbs stairs. Associated symptoms of shortness of breath. Medications included aspirin 81 mg daily, statin. Non smoker. BP was well controlled on " hydrochlorothiazide and losartan. No palpitations, syncope, LE edema. Family history significant for heart failure in father in his 80s.     Since last visit he underwent a coronary angiogram:      Dist LM lesion is 10% stenosed.    Ramus lesion is 65% stenosed.    Prox Cx lesion is 100% stenosed.    1st Mrg lesion is 99% stenosed.    Mid LAD lesion is 25% stenosed.    1st Diag lesion is 25% stenosed.    Ost RCA lesion is 40% stenosed.    Mid RCA lesion is 30% stenosed.    1st RPL lesion is 70% stenosed.     1. Two vessel CAD with essentially  of proximal Lcx into OM's, Mod/severe ramus disease (vessel 2.0-2.25mm), and mod/severe RCA-PL lesion which is smooth. The Lcx  was angioplastied and a NINO stent placed in prox Lcx into OM1 with good result    He is now chest pain free and without SOB. No significant bleeding on DAPT. He has again poorly controlled BP despite addition of imdur and amlodipine. He wishes to try to consolidate medications and lower number he is taking. He had statin increased at last OPV.    Assessment and Plan:         In summary this is a 80-year-old male with past medical history significant for hyperlipidemia and hypertension who is referred for dyspnea on exertion and chest tightness.  He had a nuclear stress test demonstrating no ischemia however EKG changes and symptoms were elicited.  Given his typical nature of anginal symptoms that have been increasing in severity he underwent a coronary angiogram demonstrating multivessel disease and he is s/p NINO to  of LCX. He had residual small vessel CAD as well as moderate-severely stenosed Ramus and RCA-YAA disease. Imdur was added to his regimen for more BP control and he was followed up post cath by Megan during which atorvastatin was increased and amlodipine 2.5 mg was started. His BP is still difficult to control but he remains chest pain free without SOB. Echo reviewed and normal LV function with only mild basal inferior  WMA.       1. Follow up nurse visit on one week for blood pressure management   2. Start taking imdur 60 mg daily. If blood pressure is <120/80 at follow up nursing call, then we can STOP amlodipine 2.5 mg daily. If it is above 120/80 mmHg, then we can increase further imdur to 90 mg daily and establish another phone visit with nursing one week thereafter to see if we can decrease amlodipine and/or hydrochlorothiazide   3. 3 month follow up with Dr. Díaz   4. Continue aspirin and ticagrelor for one year post coronary angiogram  5. Echocardiogram every 1-2 years   6. HLD - yearly lipid panel, continue atorvastatin 40 mg daily       Audrey Díaz MD [facility]    Total time spent 18 minutes.    Procedures:     Nuclear stress test [facility] 11/20/2019       The nuclear stress test is equivocal. Patient had significant chest pain during the stress portion associated with diffuse 1mm ST depressions on ECG that persisted for approximately 11 minutes. However there are no significant reversible perfusion defects.Recommend additional evaluation with a stress echocardiogram or coronary CTA, or cardiology consultation if indicated.     Stress to rest cavity ratio is 1.19.  TID is absent.     Left ventricular function is normal.     The left ventricular ejection fraction at rest is 72%.  The left ventricular ejection fraction at stress is 70%.     There is no prior study for comparison.        CTA head/neck 11/2019     IMPRESSION:   1. Calcified and noncalcified atherosclerotic plaque in the right  carotid bifurcation causes Y-shaped stenosis of all three vessels.  Although the right internal carotid artery shows only 47% diameter  stenosis by NASCET criteria, tandem stenosis of the distal right  common carotid artery probably accounts for the ultrasound finding of  50-69% diameter stenosis.  2. Ultrasound overestimated stenosis on the left, with no significant  stenosis present in the internal carotid  artery.  3. Multiple moderate stenoses of the distal right vertebral artery.  4. Mild stenosis of the right carotid siphon and moderate stenosis of  the left carotid siphon.     EKG               Encounter Diagnoses   Name Primary?     Positive cardiac stress test      Angina at rest (H)      Coronary artery disease involving native coronary artery of native heart without angina pectoris        CURRENT MEDICATIONS:  Current Outpatient Medications   Medication Sig Dispense Refill     acetaminophen (TYLENOL) 325 MG tablet Take 650 mg by mouth every 6 hours as needed for mild pain       amLODIPine (NORVASC) 2.5 MG tablet Take 1 tablet (2.5 mg) by mouth daily 30 tablet 11     atorvastatin (LIPITOR) 40 MG tablet Take 1 tablet (40 mg) by mouth daily 90 tablet 3     diclofenac (VOLTAREN) 1 % topical gel Place 4 g onto the skin 3 times daily as needed for moderate pain (Shoulder) 100 g 1     hydrochlorothiazide (HYDRODIURIL) 25 MG tablet Take 1 tablet (25 mg) by mouth daily 90 tablet 3     isosorbide mononitrate (IMDUR) 30 MG 24 hr tablet Take 1 tablet (30 mg) by mouth daily 90 tablet 3     losartan (COZAAR) 100 MG tablet Take 1 tablet (100 mg) by mouth daily 90 tablet 3     metoprolol succinate ER (TOPROL XL) 25 MG 24 hr tablet Take 1 tablet (25 mg) by mouth daily 90 tablet 3     Multiple Vitamin (MULTI VITAMIN PO) Take 1 oz by mouth daily       nitroGLYcerin (NITROSTAT) 0.3 MG sublingual tablet For chest pain place 1 tablet under the tongue every 5 minutes for 3 doses. If symptoms persist 5 minutes after 1st dose call 911. 30 tablet 3     ticagrelor (BRILINTA) 90 MG tablet Take 1 tablet (90 mg) by mouth 2 times daily Start this evening. 180 tablet 3       ALLERGIES     Allergies   Allergen Reactions     Metoprolol Other (See Comments)     Side effects     Ampicillin Rash       PAST MEDICAL HISTORY:  Past Medical History:   Diagnosis Date     NO ACTIVE PROBLEMS        PAST SURGICAL HISTORY:  Past Surgical History:    Procedure Laterality Date     COLONOSCOPY N/A 6/16/2016    Procedure: COLONOSCOPY;  Surgeon: Randy Avendano MD;  Location: [facility]     CV LEFT HEART CATH Left 3/10/2020    Procedure: Left Heart Cath;  Surgeon: Vicente Lopez MD;  Location:  HEART CARDIAC CATH LAB     VASECTOMY  1981       FAMILY HISTORY:  Family History   Problem Relation Age of Onset     Diabetes Sister      Obesity Son      Depression Sister      Thyroid Disease Sister      Gastrointestinal Disease Son      Depression Son      Congenital Anomalies Daughter      Depression Daughter      Heart Disease Daughter      Cerebrovascular Disease No family hx of        SOCIAL HISTORY:  Social History     Socioeconomic History     Marital status:      Spouse name: None     Number of children: None     Years of education: None     Highest education level: None   Occupational History     Occupation:    Social Needs     Financial resource strain: None     Food insecurity     Worry: None     Inability: None     Transportation needs     Medical: None     Non-medical: None   Tobacco Use     Smoking status: Never Smoker     Smokeless tobacco: Never Used   Substance and Sexual Activity     Alcohol use: Yes     Comment: Rare     Drug use: No     Sexual activity: Never     Partners: Female   Lifestyle     Physical activity     Days per week: None     Minutes per session: None     Stress: None   Relationships     Social connections     Talks on phone: None     Gets together: None     Attends Voodoo service: None     Active member of club or organization: None     Attends meetings of clubs or organizations: None     Relationship status: None     Intimate partner violence     Fear of current or ex partner: None     Emotionally abused: None     Physically abused: None     Forced sexual activity: None   Other Topics Concern     Parent/sibling w/ CABG, MI or angioplasty before 65F 55M? No   Social History Narrative     None       Review of  Systems:  Skin:  Positive for bruising   Eyes:  Negative    ENT:  Positive for tinnitus  Respiratory:  Negative    Cardiovascular:  Negative    Gastroenterology: Negative    Genitourinary:  Positive for urinary frequency;urgency  Musculoskeletal:  Positive for arthritis;joint pain;joint swelling;joint stiffness  Neurologic:  Positive for numbness or tingling of hands  Psychiatric:  Negative    Heme/Lymph/Imm:  Negative    Endocrine:  Negative      Physical Exam:  Vitals: Wt 78.9 kg (174 lb)   BMI 31.83 kg/m      Recent Lab Results:  LIPID RESULTS:  Lab Results   Component Value Date    CHOL 123 08/19/2020    HDL 76 08/19/2020    LDL 27 08/19/2020    TRIG 100 08/19/2020    CHOLHDLRATIO 3.5 03/19/2015       LIVER ENZYME RESULTS:  Lab Results   Component Value Date    AST 17 06/03/2019    ALT 29 08/19/2020       CBC RESULTS:  Lab Results   Component Value Date    WBC 6.0 03/10/2020    RBC 4.62 03/10/2020    HGB 13.4 03/10/2020    HCT 40.2 03/10/2020    MCV 87 03/10/2020    MCH 29.0 03/10/2020    MCHC 33.3 03/10/2020    RDW 13.0 03/10/2020     03/10/2020       BMP RESULTS:  Lab Results   Component Value Date     03/10/2020    POTASSIUM 3.8 03/10/2020    CHLORIDE 110 (H) 03/10/2020    CO2 26 03/10/2020    ANIONGAP 5 03/10/2020     (H) 03/10/2020    BUN 18 03/10/2020    CR 1.06 03/10/2020    GFRESTIMATED 67 03/10/2020    GFRESTBLACK 77 03/10/2020    GWEN 9.2 03/10/2020        A1C RESULTS:  Lab Results   Component Value Date    A1C 6.0 01/23/2017       INR RESULTS:  Lab Results   Component Value Date    INR 1.04 03/10/2020           GOYO Cortez, GIO CNP  [address]  [place] [id number]      Phone call duration: 18 minutes

## 2020-08-24 NOTE — PATIENT INSTRUCTIONS
1. Follow up nurse visit on one week for blood pressure management   2. Start taking imdur 60 mg daily. If blood pressure is <120/80 at follow up nursing call, then we can STOP amlodipine 2.5 mg daily. If it is above 120/80 mmHg, then we can increase further imdur to 90 mg daily and establish another phone visit with nursing one week thereafter to see if we can decrease amlodipine and/or hydrochlorothiazide   3. 3 month follow up with Dr. Díaz   4. Continue aspirin and ticagrelor for one year post coronary angiogram  5. Echocardiogram every 1-2 years

## 2020-10-19 ENCOUNTER — ANCILLARY PROCEDURE (OUTPATIENT)
Dept: GENERAL RADIOLOGY | Facility: CLINIC | Age: 80
End: 2020-10-19
Attending: FAMILY MEDICINE
Payer: COMMERCIAL

## 2020-10-19 ENCOUNTER — OFFICE VISIT (OUTPATIENT)
Dept: FAMILY MEDICINE | Facility: CLINIC | Age: 80
End: 2020-10-19
Payer: COMMERCIAL

## 2020-10-19 VITALS
DIASTOLIC BLOOD PRESSURE: 70 MMHG | TEMPERATURE: 97.1 F | WEIGHT: 169 LBS | SYSTOLIC BLOOD PRESSURE: 150 MMHG | BODY MASS INDEX: 31.1 KG/M2 | HEART RATE: 70 BPM | RESPIRATION RATE: 18 BRPM | HEIGHT: 62 IN

## 2020-10-19 DIAGNOSIS — M25.552 HIP PAIN, LEFT: ICD-10-CM

## 2020-10-19 DIAGNOSIS — M25.552 HIP PAIN, LEFT: Primary | ICD-10-CM

## 2020-10-19 PROCEDURE — 72100 X-RAY EXAM L-S SPINE 2/3 VWS: CPT | Performed by: RADIOLOGY

## 2020-10-19 PROCEDURE — 99213 OFFICE O/P EST LOW 20 MIN: CPT | Performed by: FAMILY MEDICINE

## 2020-10-19 PROCEDURE — 73502 X-RAY EXAM HIP UNI 2-3 VIEWS: CPT | Performed by: RADIOLOGY

## 2020-10-19 ASSESSMENT — PAIN SCALES - GENERAL: PAINLEVEL: SEVERE PAIN (6)

## 2020-10-19 ASSESSMENT — MIFFLIN-ST. JEOR: SCORE: 1360.83

## 2020-10-19 NOTE — PROGRESS NOTES
SUBJECTIVE   Liu Ragsdale is a 80 year old male who presents with     Musculoskeletal problem/pain  Onset/Duration: about a month   Description  Location: Left hip   Joint Swelling: no  Redness: no  Pain: YES  Warmth: no  Intensity:  moderate  Progression of Symptoms:  worsening  Accompanying signs and symptoms:   Fevers: no  Numbness/tingling/weakness: YES- weakness   History  Trauma to the area: YES- was bending over and twisting and working on his knees   Recent illness:  no  Previous similar problem: YES  Previous evaluation:  YES- long time ago maybe  Precipitating or alleviating factors:  Was told that he has a pelvis that is tilted and twisted a little bit   Aggravating factors include: standing, walking, climbing stairs, exercise and overuse  Therapies tried and outcome: chiropractor- usually helps- but hasn't helped this time         PCP   Shania Schneider, [phone]    Health Maintenance        Health Maintenance Due   Topic Date Due     ZOSTER IMMUNIZATION (2 of 3) 12/11/2014     PHQ-2  01/01/2020     FALL RISK ASSESSMENT  09/25/2020     MEDICARE ANNUAL WELLNESS VISIT  09/25/2020       HPI        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     SOB (shortness of breath) on exertion     Arthritis of right glenohumeral joint- moderate     Arthritis of right acromioclavicular joint- advanced     Positive cardiac stress test     Angina at rest (H)     Status post coronary angiogram     Coronary artery disease involving native coronary artery of native heart without angina pectoris     Current Outpatient Medications   Medication     acetaminophen (TYLENOL) 325 MG tablet     amLODIPine (NORVASC)  2.5 MG tablet     atorvastatin (LIPITOR) 40 MG tablet     diclofenac (VOLTAREN) 1 % topical gel     hydrochlorothiazide (HYDRODIURIL) 25 MG tablet     isosorbide mononitrate (IMDUR) 30 MG 24 hr tablet     isosorbide mononitrate (IMDUR) 30 MG 24 hr tablet     losartan (COZAAR) 100 MG tablet     metoprolol succinate ER (TOPROL XL) 25 MG 24 hr tablet     Multiple Vitamin (MULTI VITAMIN PO)     nitroGLYcerin (NITROSTAT) 0.3 MG sublingual tablet     ticagrelor (BRILINTA) 90 MG tablet     No current facility-administered medications for this visit.        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     SOB (shortness of breath) on exertion     Arthritis of right glenohumeral joint- moderate     Arthritis of right acromioclavicular joint- advanced     Positive cardiac stress test     Angina at rest (H)     Status post coronary angiogram     Coronary artery disease involving native coronary artery of native heart without angina pectoris     Past Surgical History:   Procedure Laterality Date     COLONOSCOPY N/A 6/16/2016    Procedure: COLONOSCOPY;  Surgeon: Randy Avendano MD;  Location: [facility]     CV LEFT HEART CATH Left 3/10/2020    Procedure: Left Heart Cath;  Surgeon: Vicente Lopez MD;  Location:  HEART CARDIAC CATH LAB     VASECTOMY  1981       Social History     Tobacco Use     Smoking status: Never Smoker     Smokeless tobacco: Never Used   Substance Use Topics     Alcohol use: Yes     Comment: Rare     Family History   Problem Relation Age of Onset     Diabetes Sister      Obesity Son      Depression Sister      Thyroid Disease Sister      Gastrointestinal Disease Son      Depression Son      Congenital  Anomalies Daughter      Depression Daughter      Heart Disease Daughter      Cerebrovascular Disease No family hx of          Current Outpatient Medications   Medication Sig Dispense Refill     acetaminophen (TYLENOL) 325 MG tablet Take 650 mg by mouth every 6 hours as needed for mild pain       amLODIPine (NORVASC) 2.5 MG tablet Take 1 tablet (2.5 mg) by mouth daily 30 tablet 11     atorvastatin (LIPITOR) 40 MG tablet Take 1 tablet (40 mg) by mouth daily 90 tablet 3     diclofenac (VOLTAREN) 1 % topical gel Place 4 g onto the skin 3 times daily as needed for moderate pain (Shoulder) 100 g 1     hydrochlorothiazide (HYDRODIURIL) 25 MG tablet Take 1 tablet (25 mg) by mouth daily 90 tablet 3     isosorbide mononitrate (IMDUR) 30 MG 24 hr tablet Take 2 tablets (60 mg) by mouth daily 60 tablet 3     isosorbide mononitrate (IMDUR) 30 MG 24 hr tablet Take 1 tablet (30 mg) by mouth daily 90 tablet 3     losartan (COZAAR) 100 MG tablet Take 1 tablet (100 mg) by mouth daily 90 tablet 3     metoprolol succinate ER (TOPROL XL) 25 MG 24 hr tablet Take 1 tablet (25 mg) by mouth daily 90 tablet 3     Multiple Vitamin (MULTI VITAMIN PO) Take 1 oz by mouth daily       nitroGLYcerin (NITROSTAT) 0.3 MG sublingual tablet For chest pain place 1 tablet under the tongue every 5 minutes for 3 doses. If symptoms persist 5 minutes after 1st dose call 911. 30 tablet 3     ticagrelor (BRILINTA) 90 MG tablet Take 1 tablet (90 mg) by mouth 2 times daily Start this evening. 180 tablet 3     Allergies   Allergen Reactions     Metoprolol Other (See Comments)     Side effects     Ampicillin Rash     Recent Labs   Lab Test 08/19/20  1920 07/17/20  0927 03/10/20  0955 02/27/20  1015 06/03/19  0825 06/03/19  0825 01/23/17  1020 01/23/17  1020 05/30/14  1055 05/30/14  1055   A1C  --   --   --   --   --   --   --  6.0  --   --    LDL 27 50  --   --   --  61   < >  --    < >  --    HDL 76 56  --   --   --  63   < >  --    < >  --    TRIG 100 167*  --    "--   --  126   < >  --    < >  --    ALT 29 24  --   --   --  26   < >  --    < > 26   CR  --   --  1.06 1.10  --  1.04   < >  --    < > 1.15   GFRESTIMATED  --   --  67 64   < > 68   < >  --    < > 62   GFRESTBLACK  --   --  77 74   < > 79   < >  --    < > 75   POTASSIUM  --   --  3.8 3.8  --  3.9   < >  --    < > 4.5   TSH  --   --   --   --   --   --   --   --   --  1.41    < > = values in this interval not displayed.      BP Readings from Last 3 Encounters:   10/19/20 (!) 150/70   08/12/20 136/83   07/17/20 132/70    Wt Readings from Last 3 Encounters:   10/19/20 76.7 kg (169 lb)   08/24/20 78.9 kg (174 lb)   08/12/20 78.9 kg (174 lb)                    Reviewed and updated:  Tobacco  Allergies  Meds  Med Hx  Surg Hx  Fam Hx  Soc Hx     ROS:  Constitutional, HEENT, cardiovascular, pulmonary, gi and gu systems are negative, except as otherwise noted.    PHYSICAL EXAM   BP (!) 150/70 (Cuff Size: Adult Regular)   Pulse 70   Temp 97.1  F (36.2  C) (Tympanic)   Resp 18   Ht 1.575 m (5' 2\")   Wt 76.7 kg (169 lb)   BMI 30.91 kg/m    Body mass index is 30.91 kg/m .  GENERAL: alert and no distress  NECK: no adenopathy, no asymmetry, masses, or scars and thyroid normal to palpation  RESP: lungs clear to auscultation - no rales, rhonchi or wheezes  CV: regular rate and rhythm, normal S1 S2, no S3 or S4, no murmur, click or rub, no peripheral edema and peripheral pulses strong  ABDOMEN: soft, nontender, no hepatosplenomegaly, no masses and bowel sounds normal  MS: No spinal/paraspinal tenderness, skin discoloration or swelling, left hip internal rotation painful, SLR equivocal, normal lower extremity strength, reflexes 2+, no pedal edema noted  SKIN: no suspicious lesions or rashes  NEURO: Normal strength and tone, mentation intact and speech normal    Assessment & Plan     Hip pain, left  --Differentials discussed in detail including left hip osteoarthritis, lumbar radiculopathy.  Will do x-ray lumbar spine and " left hip.  Suggested to continue over-the-counter oral/topical analgesia and chiropractic therapy.  Orthopedic referral placed considering chronicity of symptoms.  Patient understood and in agreement with above plan.  All questions answered.  - XR Pelvis and Hip Left 2 Views; Future  - XR Lumbar Spine 2/3 Views; Future  - Orthopedic & Spine  Referral; Future         Piyush Rogers MD  [facility]

## 2020-10-19 NOTE — NURSING NOTE
"Chief Complaint   Patient presents with     Musculoskeletal Problem     BP (!) 150/70 (Cuff Size: Adult Regular)   Pulse 70   Temp 97.1  F (36.2  C) (Tympanic)   Resp 18   Ht 1.575 m (5' 2\")   Wt 76.7 kg (169 lb)   BMI 30.91 kg/m   Estimated body mass index is 30.91 kg/m  as calculated from the following:    Height as of this encounter: 1.575 m (5' 2\").    Weight as of this encounter: 76.7 kg (169 lb).  Patient presents to the clinic using No DME      Health Maintenance that is potentially due pending provider review:    Health Maintenance Due   Topic Date Due     ZOSTER IMMUNIZATION (2 of 3) 12/11/2014     PHQ-2  01/01/2020     FALL RISK ASSESSMENT  09/25/2020     MEDICARE ANNUAL WELLNESS VISIT  09/25/2020                "

## 2020-10-21 ENCOUNTER — OFFICE VISIT (OUTPATIENT)
Dept: ORTHOPEDICS | Facility: CLINIC | Age: 80
End: 2020-10-21
Payer: COMMERCIAL

## 2020-10-21 VITALS
SYSTOLIC BLOOD PRESSURE: 124 MMHG | DIASTOLIC BLOOD PRESSURE: 76 MMHG | WEIGHT: 168 LBS | BODY MASS INDEX: 30.91 KG/M2 | HEIGHT: 62 IN

## 2020-10-21 DIAGNOSIS — M19.011 ARTHRITIS OF RIGHT SHOULDER REGION: ICD-10-CM

## 2020-10-21 DIAGNOSIS — M16.10 HIP ARTHRITIS: Primary | ICD-10-CM

## 2020-10-21 DIAGNOSIS — M53.3 SACROILIAC JOINT PAIN: ICD-10-CM

## 2020-10-21 PROCEDURE — 99214 OFFICE O/P EST MOD 30 MIN: CPT | Performed by: PEDIATRICS

## 2020-10-21 ASSESSMENT — MIFFLIN-ST. JEOR: SCORE: 1356.29

## 2020-10-21 NOTE — PATIENT INSTRUCTIONS
Plan:  - Today's Plan of Care:  Rehab: Physical Therapy: [facility] - [phone]  Steroid of the  left hip: intra-articular  And right shoulder: intra-articular performed via ultrasound at [facility]    -We also discussed other future treatment options:  SI joint injection    Follow Up: 1 month, at least 2 weeks post injection    If you have any further questions for your physician or physician s care team you can call [phone] and use option 3 to leave a voice message. Calls received during business hours will be returned same day.

## 2020-10-21 NOTE — LETTER
10/21/2020         RE: Liu Ragsdale  [id number] [address] [id number]        Dear Colleague,    Thank you for referring your patient, Liu Ragsdale, to the [facility] [place]. Please see a copy of my visit note below.    Sports Medicine Clinic Visit    PCP: Shania Schneider    Liu Ragsdale is a 80 year old male who is seen  in consultation at the request of  Piyush Rogers M.D. presenting with left hip pain and right shoulder pain.    1) Left Hip:  Injury: He reports left posterior and anterior hip pain that radiates to his mid thigh. He reports no injury but has had pain for 4-6 weeks. His pain increases with flexion of the hip and bending and stooping.    Location of Pain: left posterior and anterior hip  Duration of Pain: 4-6 week(s)  Rating of Pain at worst: 8/10  Rating of Pain Currently: 4/10  Symptoms are better with: Tylenol, Ibuprofen and Rest  Symptoms are worse with: flexion and bending and stooping  Additional Features:   Positive: instability and weakness   Negative: swelling, bruising, popping, grinding, catching, locking, paresthesias and numbness  Other evaluation and/or treatments so far consists of: chiropractic care  Prior History of related problems: nothing    2) Right Shoulder:  He reports chronic pain, worse with overhead movements.  Has tried physical therapy and a subacromial corticosteroid injection without relief.    Social History: retired    Review of Systems  Skin: no bruising, no swelling  Musculoskeletal: as above  Neurologic: no numbness, paresthesias  Remainder of review of systems is negative including constitutional, CV, pulmonary, GI, except as noted in HPI or medical history.    Patient's current problem list, past medical and surgical history, and family history were reviewed.    Patient Active Problem List   Diagnosis     Carotid bruit     Hyperlipidemia LDL goal <70     Benign essential hypertension, BP goal <140/90  "    Obesity     FANI (obstructive sleep apnea)     SNHL (sensory-neural hearing loss), asymmetrical     Right shoulder pain, unspecified chronicity     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     SOB (shortness of breath) on exertion     Arthritis of right glenohumeral joint- moderate     Arthritis of right acromioclavicular joint- advanced     Positive cardiac stress test     Angina at rest (H)     Status post coronary angiogram     Coronary artery disease involving native coronary artery of native heart without angina pectoris     Past Medical History:   Diagnosis Date     NO ACTIVE PROBLEMS      Past Surgical History:   Procedure Laterality Date     COLONOSCOPY N/A 6/16/2016    Procedure: COLONOSCOPY;  Surgeon: Randy Avendano MD;  Location: [facility]     CV LEFT HEART CATH Left 3/10/2020    Procedure: Left Heart Cath;  Surgeon: Vicente Lopez MD;  Location:  HEART CARDIAC CATH LAB     VASECTOMY  1981     Family History   Problem Relation Age of Onset     Diabetes Sister      Obesity Son      Depression Sister      Thyroid Disease Sister      Gastrointestinal Disease Son      Depression Son      Congenital Anomalies Daughter      Depression Daughter      Heart Disease Daughter      Cerebrovascular Disease No family hx of        Objective  /76   Ht 1.575 m (5' 2\")   Wt 76.2 kg (168 lb)   BMI 30.73 kg/m      GENERAL APPEARANCE: healthy, alert and no distress   GAIT: NORMAL  SKIN: no suspicious lesions or rashes  HEENT: Sclera clear, anicteric  CV: good peripheral pulses  RESP: Breathing not labored  NEURO: Normal strength and tone, mentation intact and speech normal  PSYCH:  mentation appears normal and affect normal/bright    Bilateral Shoulder exam  Inspection and Posture:       rounded shoulders and upper back    Skin:        no visible deformities    Tender:        subacromial space right    Non Tender:       remainder of shoulder bilateral    ROM:        forward flexion 120  " right       abduction 130 right       internal rotation gluteal region right       external rotation 45 right    Painful motions:       flexion right       elevation right    Strength:        abduction 5/5 bilateral       flexion 5/5 bilateral       internal rotation 5/5 bilateral       external rotation 5/5 bilateral    Sensation:        normal sensation over shoulder and upper extremity     Bilateral hip exam  Inspection:      no edema or ecchymosis in hip area    Tender:      SI joint left    Non Tender:      remainder of the hip area bilateral    ROM:     Range of motion limited in internal and external rotation on the left    Strength:      flexion 4/5 left (pain with strength testing)    Sensation:      grossly intact in hip and thigh    Special Tests:      positive (+) DANTE left - pain posterior    Low back exam:  Inspection:     no visible deformity in the low back       normal skin       normal vascular       normal lymphatic       no asymmetry    Posture:      normal    Foot Inspection:     no deformity noted    Tender:     Left SI joint    Non Tender:     remainder of lumbar spine    ROM:      limited flexion due to pain       limited extension due to pain    Strength:     hip flexion 5/5 bilateral       knee extension 5/5 bilateral       ankle dorsiflexion 5/5 bilateral       ankle plantarflexion 5/5 bilateral       dorsiflexion of the great toe 5/5 bilateral    Reflexes:     patellar (L3, L4) symmetric normal       achilles tendons (S1) symmetric normal    Sensation:    grossly intact throughout lower extremities    Special tests:      straight leg raise left negative        straight leg raise right negative    Radiology  I visualized and reviewed these images with the patient  Xr Pelvis And Hip Left 2 Views  Result Date: 10/19/2020  PELVIS AND HIP LEFT TWO VIEWS October 19, 2020 10:04 AM HISTORY: Low back and left hip pain. Hip pain, left. COMPARISON: None.   IMPRESSION: Moderate bilateral hip  degenerative changes. No fracture or AVN. SI joints are unremarkable. Vascular calcifications. RAMIREZ SLATER MD    Xr Lumbar Spine 2/3 Views  Result Date: 10/19/2020  LUMBAR SPINE TWO TO THREE VIEWS October 19, 2020 10:03 AM HISTORY: Low back and left hip pain. Hip pain, left. COMPARISON: None.   IMPRESSION: Normal alignment of the lumbar vertebrae. Vertebral body heights of the lumbar spine are normal. No evidence for fracture of the lumbar spine. Loss of intervertebral disc space height and degenerative endplate spurring to varying degrees at all levels of the lumbar spine. Facet arthropathy at the L2-L3, L3-L4, L4-L5 and L5-S1 levels. KARLY GONZALEZ MD  RIGHT SHOULDER THREE VIEWS   11/6/2019 9:19 AM     HISTORY: Right shoulder pain.  COMPARISON: None.                                                           IMPRESSION: No fracture or acute abnormality is demonstrated. There  are moderate degenerative changes of the glenohumeral joint with  moderate to advanced degenerative changes of the acromioclavicular  joint. No other abnormality is seen.        Assessment:  1. Hip arthritis    2. Sacroiliac joint pain    3. Arthritis of right shoulder region      1) Hip pain with features of arthritis and SI joint pain.  Start with physical therapy and hip injection.    2) Discussed nature of shoulder osteoarthritis. Discussed symptom treatment with over-the-counter medications, ice or heat and rest if needed. Discussed physical therapy for strength. Discussed injection therapy including subacromial or glenohumeral corticosteroid injections. Also briefly discussed future consideration of referral to orthopedic surgery for further evaluation and discussion of arthroplasty.  - given lack of improvement with prior injection and PT, will trial US guided intra-articular injection next step    Plan:  - Today's Plan of Care:  Rehab[facility] [phone]  Steroid of the  left hip: intra-articular   And right shoulder: intra-articular performed via ultrasound at [facility]    -We also discussed other future treatment options:  SI joint injection    Follow Up: 1 month, at least 2 weeks post injection    Concerning signs and symptoms were reviewed.  The patient expressed understanding of this management plan and all questions were answered at this time.    Thanks for the opportunity to participate in the care of this patient, I will keep you updated on their progress.    CC: Piyush Lepe MD [facility]  Primary Care Sports Medicine  [facility]      Again, thank you for allowing me to participate in the care of your patient.        Sincerely,        Brenda Lepe MD

## 2020-10-21 NOTE — PROGRESS NOTES
Sports Medicine Clinic Visit    PCP: Shania Schneider Melyssa is a 80 year old male who is seen  in consultation at the request of  Piyush Rogers M.D. presenting with left hip pain and right shoulder pain.    1) Left Hip:  Injury: He reports left posterior and anterior hip pain that radiates to his mid thigh. He reports no injury but has had pain for 4-6 weeks. His pain increases with flexion of the hip and bending and stooping.    Location of Pain: left posterior and anterior hip  Duration of Pain: 4-6 week(s)  Rating of Pain at worst: 8/10  Rating of Pain Currently: 4/10  Symptoms are better with: Tylenol, Ibuprofen and Rest  Symptoms are worse with: flexion and bending and stooping  Additional Features:   Positive: instability and weakness   Negative: swelling, bruising, popping, grinding, catching, locking, paresthesias and numbness  Other evaluation and/or treatments so far consists of: chiropractic care  Prior History of related problems: nothing    2) Right Shoulder:  He reports chronic pain, worse with overhead movements.  Has tried physical therapy and a subacromial corticosteroid injection without relief.    Social History: retired    Review of Systems  Skin: no bruising, no swelling  Musculoskeletal: as above  Neurologic: no numbness, paresthesias  Remainder of review of systems is negative including constitutional, CV, pulmonary, GI, except as noted in HPI or medical history.    Patient's current problem list, past medical and surgical history, and family history were reviewed.    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     SOB (shortness of breath) on exertion     Arthritis of  "right glenohumeral joint- moderate     Arthritis of right acromioclavicular joint- advanced     Positive cardiac stress test     Angina at rest (H)     Status post coronary angiogram     Coronary artery disease involving native coronary artery of native heart without angina pectoris     Past Medical History:   Diagnosis Date     NO ACTIVE PROBLEMS      Past Surgical History:   Procedure Laterality Date     COLONOSCOPY N/A 6/16/2016    Procedure: COLONOSCOPY;  Surgeon: Randy Avendano MD;  Location: [facility]     CV LEFT HEART CATH Left 3/10/2020    Procedure: Left Heart Cath;  Surgeon: Vicente Lopez MD;  Location:  HEART CARDIAC CATH LAB     VASECTOMY  1981     Family History   Problem Relation Age of Onset     Diabetes Sister      Obesity Son      Depression Sister      Thyroid Disease Sister      Gastrointestinal Disease Son      Depression Son      Congenital Anomalies Daughter      Depression Daughter      Heart Disease Daughter      Cerebrovascular Disease No family hx of        Objective  /76   Ht 1.575 m (5' 2\")   Wt 76.2 kg (168 lb)   BMI 30.73 kg/m      GENERAL APPEARANCE: healthy, alert and no distress   GAIT: NORMAL  SKIN: no suspicious lesions or rashes  HEENT: Sclera clear, anicteric  CV: good peripheral pulses  RESP: Breathing not labored  NEURO: Normal strength and tone, mentation intact and speech normal  PSYCH:  mentation appears normal and affect normal/bright    Bilateral Shoulder exam  Inspection and Posture:       rounded shoulders and upper back    Skin:        no visible deformities    Tender:        subacromial space right    Non Tender:       remainder of shoulder bilateral    ROM:        forward flexion 120  right       abduction 130 right       internal rotation gluteal region right       external rotation 45 right    Painful motions:       flexion right       elevation right    Strength:        abduction 5/5 bilateral       flexion 5/5 bilateral       internal rotation 5/5 " bilateral       external rotation 5/5 bilateral    Sensation:        normal sensation over shoulder and upper extremity     Bilateral hip exam  Inspection:      no edema or ecchymosis in hip area    Tender:      SI joint left    Non Tender:      remainder of the hip area bilateral    ROM:     Range of motion limited in internal and external rotation on the left    Strength:      flexion 4/5 left (pain with strength testing)    Sensation:      grossly intact in hip and thigh    Special Tests:      positive (+) DANTE left - pain posterior    Low back exam:  Inspection:     no visible deformity in the low back       normal skin       normal vascular       normal lymphatic       no asymmetry    Posture:      normal    Foot Inspection:     no deformity noted    Tender:     Left SI joint    Non Tender:     remainder of lumbar spine    ROM:      limited flexion due to pain       limited extension due to pain    Strength:     hip flexion 5/5 bilateral       knee extension 5/5 bilateral       ankle dorsiflexion 5/5 bilateral       ankle plantarflexion 5/5 bilateral       dorsiflexion of the great toe 5/5 bilateral    Reflexes:     patellar (L3, L4) symmetric normal       achilles tendons (S1) symmetric normal    Sensation:    grossly intact throughout lower extremities    Special tests:      straight leg raise left negative        straight leg raise right negative    Radiology  I visualized and reviewed these images with the patient  Xr Pelvis And Hip Left 2 Views  Result Date: 10/19/2020  PELVIS AND HIP LEFT TWO VIEWS October 19, 2020 10:04 AM HISTORY: Low back and left hip pain. Hip pain, left. COMPARISON: None.   IMPRESSION: Moderate bilateral hip degenerative changes. No fracture or AVN. SI joints are unremarkable. Vascular calcifications. RAMIREZ SLATER MD    Xr Lumbar Spine 2/3 Views  Result Date: 10/19/2020  LUMBAR SPINE TWO TO THREE VIEWS October 19, 2020 10:03 AM HISTORY: Low back and left hip pain. Hip pain, left.  COMPARISON: None.   IMPRESSION: Normal alignment of the lumbar vertebrae. Vertebral body heights of the lumbar spine are normal. No evidence for fracture of the lumbar spine. Loss of intervertebral disc space height and degenerative endplate spurring to varying degrees at all levels of the lumbar spine. Facet arthropathy at the L2-L3, L3-L4, L4-L5 and L5-S1 levels. KARLY GONZALEZ MD  RIGHT SHOULDER THREE VIEWS   11/6/2019 9:19 AM     HISTORY: Right shoulder pain.  COMPARISON: None.                                                           IMPRESSION: No fracture or acute abnormality is demonstrated. There  are moderate degenerative changes of the glenohumeral joint with  moderate to advanced degenerative changes of the acromioclavicular  joint. No other abnormality is seen.        Assessment:  1. Hip arthritis    2. Sacroiliac joint pain    3. Arthritis of right shoulder region      1) Hip pain with features of arthritis and SI joint pain.  Start with physical therapy and hip injection.    2) Discussed nature of shoulder osteoarthritis. Discussed symptom treatment with over-the-counter medications, ice or heat and rest if needed. Discussed physical therapy for strength. Discussed injection therapy including subacromial or glenohumeral corticosteroid injections. Also briefly discussed future consideration of referral to orthopedic surgery for further evaluation and discussion of arthroplasty.  - given lack of improvement with prior injection and PT, will trial US guided intra-articular injection next step    Plan:  - Today's Plan of Care:  Rehab: [phone]  Steroid of the  left hip: intra-articular  And right shoulder: intra-articular performed via ultrasound at [facility]    -We also discussed other future treatment options:  SI joint injection    Follow Up: 1 month, at least 2 weeks post injection    Concerning signs and symptoms were reviewed.  The patient expressed  understanding of this management plan and all questions were answered at this time.    Thanks for the opportunity to participate in the care of this patient, I will keep you updated on their progress.    CC: Piyush Lepe MD CA  Primary Care Sports Medicine  [facility]

## 2020-10-28 NOTE — PROGRESS NOTES
Liu Ragsdale  :  1940  DOS: 11/3/2020  MRN: [id number]    Sports Medicine Clinic Procedure    Ultrasound Guided Left Intra-Articular Hip Injection  Right shoulder glenohumeral joint Injection    Clinical History: Patient presents with left hip pain over the past 6+ weeks.  Chronic right shoulder pain over the past 1+ years.  Previous subacromial steroid injection completed 11/15/19 provided minimal relief.    Diagnosis:   1. Hip arthritis    2. Arthritis of right shoulder region      Referring Physician: Brenda Lepe MD  Large Joint Injection/Arthocentesis: R glenohumeral joint    Date/Time: 11/3/2020 10:15 AM  Performed by: Issac De Oliveira DO  Authorized by: Issac De Oliveira DO     Indications:  Pain  Needle Size:  21 G  Guidance: ultrasound    Approach:  Posterolateral  Location:  Shoulder      Site:  R glenohumeral joint  Medications:  3 mL ropivacaine 5 MG/ML; 40 mg triamcinolone 40 MG/ML  Outcome:  Tolerated well, no immediate complications  Procedure discussed: discussed risks, benefits, and alternatives    Consent Given by:  Patient  Timeout: timeout called immediately prior to procedure    Prep: patient was prepped and draped in usual sterile fashion    Large Joint Injection/Arthocentesis: L hip joint    Date/Time: 11/3/2020 10:20 AM  Performed by: Issac De Oliveira DO  Authorized by: Issac De Oliveira DO     Indications:  Pain and diagnostic evaluation  Needle Size:  22 G  Guidance: ultrasound    Approach:  Anterior  Location:  Hip      Site:  L hip joint  Medications:  3 mL ropivacaine 5 MG/ML; 40 mg triamcinolone 40 MG/ML  Outcome:  Tolerated well, no immediate complications  Procedure discussed: discussed risks, benefits, and alternatives    Consent Given by:  Patient  Timeout: timeout called immediately prior to procedure    Prep: patient was prepped and draped in usual sterile fashion     4 ml's of 1% lidocaine was used as local anesthetic prior to  injection      Impression:  Successful Left intra-articular hip injection and right glenohumeral joint injection, both US guided    Plan:  Follow up as directed with Dr Lepe  Expectations and goals of CSI reviewed  Often 2-3 days for steroid effect, and can take up to two weeks for maximum effect  We discussed modified progressive pain-free activity as tolerated  Do not overuse in first two weeks if feeling better due to concern for vulnerability while steroid is working  Supportive care reviewed  All questions were answered today  Contact us with additional questions or concerns  Signs and sx of concern reviewed      Issac De Oliveira DO, CAMARGIE  Primary Care Sports Medicine  [facility]

## 2020-11-03 ENCOUNTER — OFFICE VISIT (OUTPATIENT)
Dept: ORTHOPEDICS | Facility: CLINIC | Age: 80
End: 2020-11-03
Payer: COMMERCIAL

## 2020-11-03 VITALS
SYSTOLIC BLOOD PRESSURE: 128 MMHG | DIASTOLIC BLOOD PRESSURE: 74 MMHG | BODY MASS INDEX: 31.1 KG/M2 | WEIGHT: 169 LBS | HEIGHT: 62 IN

## 2020-11-03 DIAGNOSIS — M16.10 HIP ARTHRITIS: Primary | ICD-10-CM

## 2020-11-03 DIAGNOSIS — M53.3 SACROILIAC JOINT PAIN: ICD-10-CM

## 2020-11-03 DIAGNOSIS — M19.011 ARTHRITIS OF RIGHT SHOULDER REGION: ICD-10-CM

## 2020-11-03 PROCEDURE — 20611 DRAIN/INJ JOINT/BURSA W/US: CPT | Mod: 59 | Performed by: FAMILY MEDICINE

## 2020-11-03 PROCEDURE — 20611 DRAIN/INJ JOINT/BURSA W/US: CPT | Mod: RT | Performed by: FAMILY MEDICINE

## 2020-11-03 RX ORDER — TRIAMCINOLONE ACETONIDE 40 MG/ML
40 INJECTION, SUSPENSION INTRA-ARTICULAR; INTRAMUSCULAR
Status: DISCONTINUED | OUTPATIENT
Start: 2020-11-03 | End: 2021-01-06

## 2020-11-03 RX ORDER — ROPIVACAINE HYDROCHLORIDE 5 MG/ML
3 INJECTION, SOLUTION EPIDURAL; INFILTRATION; PERINEURAL
Status: DISCONTINUED | OUTPATIENT
Start: 2020-11-03 | End: 2021-01-06

## 2020-11-03 RX ADMIN — ROPIVACAINE HYDROCHLORIDE 3 ML: 5 INJECTION, SOLUTION EPIDURAL; INFILTRATION; PERINEURAL at 10:20

## 2020-11-03 RX ADMIN — TRIAMCINOLONE ACETONIDE 40 MG: 40 INJECTION, SUSPENSION INTRA-ARTICULAR; INTRAMUSCULAR at 10:20

## 2020-11-03 RX ADMIN — ROPIVACAINE HYDROCHLORIDE 3 ML: 5 INJECTION, SOLUTION EPIDURAL; INFILTRATION; PERINEURAL at 10:15

## 2020-11-03 RX ADMIN — TRIAMCINOLONE ACETONIDE 40 MG: 40 INJECTION, SUSPENSION INTRA-ARTICULAR; INTRAMUSCULAR at 10:15

## 2020-11-03 ASSESSMENT — MIFFLIN-ST. JEOR: SCORE: 1360.83

## 2020-11-03 NOTE — LETTER
11/3/2020         RE: Liu Ragsdale  [id number] [address] [id number]        Dear Colleague,    Thank you for referring your patient, Liu Ragsdale, to the [facility] [place]. Please see a copy of my visit note below.    Liu Ragsdale  :  1940  DOS: 11/3/2020  MRN: [id number]    Sports Medicine Clinic Procedure    Ultrasound Guided Left Intra-Articular Hip Injection  Right shoulder glenohumeral joint Injection    Clinical History: Patient presents with left hip pain over the past 6+ weeks.  Chronic right shoulder pain over the past 1+ years.  Previous subacromial steroid injection completed 11/15/19 provided minimal relief.    Diagnosis:   1. Hip arthritis    2. Arthritis of right shoulder region      Referring Physician: Brenda Lepe MD  Large Joint Injection/Arthocentesis: R glenohumeral joint    Date/Time: 11/3/2020 10:15 AM  Performed by: Issac De Oliveira DO  Authorized by: Issac De Oliveira DO     Indications:  Pain  Needle Size:  21 G  Guidance: ultrasound    Approach:  Posterolateral  Location:  Shoulder      Site:  R glenohumeral joint  Medications:  3 mL ropivacaine 5 MG/ML; 40 mg triamcinolone 40 MG/ML  Outcome:  Tolerated well, no immediate complications  Procedure discussed: discussed risks, benefits, and alternatives    Consent Given by:  Patient  Timeout: timeout called immediately prior to procedure    Prep: patient was prepped and draped in usual sterile fashion    Large Joint Injection/Arthocentesis: L hip joint    Date/Time: 11/3/2020 10:20 AM  Performed by: Issac De Oliveira DO  Authorized by: Issac De Oliveira DO     Indications:  Pain and diagnostic evaluation  Needle Size:  22 G  Guidance: ultrasound    Approach:  Anterior  Location:  Hip      Site:  L hip joint  Medications:  3 mL ropivacaine 5 MG/ML; 40 mg triamcinolone 40 MG/ML  Outcome:  Tolerated well, no immediate complications  Procedure discussed:  discussed risks, benefits, and alternatives    Consent Given by:  Patient  Timeout: timeout called immediately prior to procedure    Prep: patient was prepped and draped in usual sterile fashion     4 ml's of 1% lidocaine was used as local anesthetic prior to injection      Impression:  Successful Left intra-articular hip injection and right glenohumeral joint injection, both US guided    Plan:  Follow up as directed with Dr Lepe  Expectations and goals of CSI reviewed  Often 2-3 days for steroid effect, and can take up to two weeks for maximum effect  We discussed modified progressive pain-free activity as tolerated  Do not overuse in first two weeks if feeling better due to concern for vulnerability while steroid is working  Supportive care reviewed  All questions were answered today  Contact us with additional questions or concerns  Signs and sx of concern reviewed      Issac De Oliveira DO, JOSE  Primary Care Sports Medicine  [facility]           Again, thank you for allowing me to participate in the care of your patient.        Sincerely,        Issac De Oliveira DO

## 2020-12-03 ENCOUNTER — HOSPITAL ENCOUNTER (OUTPATIENT)
Dept: PHYSICAL THERAPY | Facility: CLINIC | Age: 80
Setting detail: THERAPIES SERIES
End: 2020-12-03
Attending: NURSE PRACTITIONER
Payer: MEDICARE

## 2020-12-03 DIAGNOSIS — M19.011 ARTHRITIS OF RIGHT SHOULDER REGION: Primary | ICD-10-CM

## 2020-12-03 PROCEDURE — 97110 THERAPEUTIC EXERCISES: CPT | Mod: GP | Performed by: PHYSICAL MEDICINE & REHABILITATION

## 2020-12-03 PROCEDURE — 97116 GAIT TRAINING THERAPY: CPT | Mod: GP | Performed by: PHYSICAL MEDICINE & REHABILITATION

## 2020-12-03 PROCEDURE — 97162 PT EVAL MOD COMPLEX 30 MIN: CPT | Mod: GP | Performed by: PHYSICAL MEDICINE & REHABILITATION

## 2020-12-03 NOTE — PROGRESS NOTES
"[facility]          OUTPATIENT PHYSICAL THERAPY ORTHOPEDIC EVALUATION  PLAN OF TREATMENT FOR OUTPATIENT REHABILITATION  (COMPLETE FOR INITIAL CLAIMS ONLY)  Patient's Last Name, First Name, M.I.  YOB: 1940  Liu Ragsdale    Provider s Name:  [facility]   Medical Record No.  [id number]   Start of Care Date:  12/03/20   Onset Date:  09/01/20(\"started a few months ago\")   Type:     _X__PT   ___OT   ___SLP Medical Diagnosis:  Hip arthritis; Sacroiliac joint pain; Arthritis of right shoulder region      PT Diagnosis:  R shoulder pain; L hip pain   Visits from SOC:  1      _________________________________________________________________________________  Plan of Treatment/Functional Goals:  ADL retraining, balance training, bed mobility training, gait training, joint mobilization, manual therapy, motor coordination training, neuromuscular re-education, ROM, strengthening, stretching, transfer training     Cryotherapy, Electrical stimulation, Hot packs, TENS, Traction, Ultrasound  only as needed  Goals  Goal Identifier: 1  Goal Description: Pt will be able to amb throughout home without increase in sx, in order to decrease difficulty with ADLs.   Target Date: 12/31/20    Goal Identifier: 2  Goal Description: Pt will be able to perform bed and chair transfers without increase in sx.   Target Date: 01/14/21    Goal Identifier: 3  Goal Description: Pt will be able to ascend/descend stairs reciprocally in order to decrease difficulty with getting to laundry room.   Target Date: 01/29/21    Goal Identifier: 4  Goal Description: Pt will be independent with HEP in order to self manage symptoms.   Target Date: 01/29/21    Goal Identifier: 5  Goal Description: Pt will be able to reach overhead without increase in sx.   Target Date: 01/29/21         Therapy Frequency:  2 times/Week  Predicted Duration of Therapy Intervention:  8 weeks    Nancy Regalado PT             "     I CERTIFY THE NEED FOR THESE SERVICES FURNISHED UNDER        THIS PLAN OF TREATMENT AND WHILE UNDER MY CARE     (Physician co-signature of this document indicates review and certification of the therapy plan).                       Certification Date From:  12/03/20   Certification Date To:  01/29/21    Referring Provider:  Brenda Lepe MD    Initial Assessment        See Epic Evaluation Start of Care Date: 12/03/20

## 2020-12-03 NOTE — PROGRESS NOTES
"   12/03/20 0700   General Information   Type of Visit Initial OP Ortho PT Evaluation   Start of Care Date 12/03/20   Referring Physician Brenda Lepe MD   Patient/Family Goals Statement sitting down/transfers, laying in bed, putting on socks, putting on pants, stairs   Orders Evaluate and Treat   Date of Order 10/21/20   Certification Required? Yes   Medical Diagnosis Hip arthritis; Sacroiliac joint pain; Arthritis of right shoulder region    Surgical/Medical history reviewed Yes   Precautions/Limitations no known precautions/limitations   General Information Comments PMHx per pt report: high blood pressure, RA, stent   Body Part(s)   Body Part(s) Shoulder;Hip   Presentation and Etiology   Pertinent history of current problem (include personal factors and/or comorbidities that impact the POC) Pt arrived to PT today with R shoulder and L hip pain. R shoulder pain is chronic and has seen PT in the past with minimal relief. Notes L hip pain started a few months ago. States insidious onset for both. Had injections 1 month ago with 1 week of relief. Using tylenol for pain.    Impairments A. Pain;B. Decreased WB tolerance;D. Decreased ROM;E. Decreased flexibility;F. Decreased strength and endurance;H. Impaired gait;M. Locking or catching   Functional Limitations perform activities of daily living;perform desired leisure / sports activities   Symptom Location R shoulder, L hip   How/Where did it occur From insidious onset;From Degenerative Joint Disease;With repetition/overuse   Onset date of current episode/exacerbation 09/01/20  (\"started a few months ago\")   Chronicity Chronic   Pain rating (0-10 point scale) Best (/10);Worst (/10)   Best (/10) 2   Worst (/10) 7   Pain quality A. Sharp;C. Aching   Frequency of pain/symptoms A. Constant   Pain/symptoms are: The same all the time   Pain/symptoms exacerbated by B. Walking;C. Lifting;D. Carrying;G. Certain positions;H. Overhead reach;I. Bending;L. Work tasks " "  Pain/symptoms eased by A. Sitting;G. Heat;H. Cold;I. OTC medication(s)   Progression of symptoms since onset: Worsened   Prior Level of Function   Prior Level of Function-Mobility independent   Prior Level of Function-ADLs independent   Current Level of Function   Current Community Support Family/friend caregiver   Patient role/employment history F. Retired   Living environment House/townhome   Home/community accessibility Pt has stairs to basement, with railing   Current equipment-Gait/Locomotion Standard cane   Fall Risk Screen   Fall screen completed by PT   Have you fallen 2 or more times in the past year? No   Have you fallen and had an injury in the past year? Yes   Timed Up and Go score (seconds) 11   Is patient a fall risk? No   Fall screen comments Notes 1 fall in the last year, irritated R shoulder.   Abuse Screen (yes response referral indicated)   Feels Unsafe at Home or Work/School no   Feels Threatened by Someone no   Does Anyone Try to Keep You From Having Contact with Others or Doing Things Outside Your Home? no   Physical Signs of Abuse Present no   Functional Scales   Functional Scales Other   Other Scales  LEFS: 27/80   Hip Objective Findings   Side (if bilateral, select both right and left) Left   Posture see below   Gait/Locomotion pt amb with short stride length, decreased stance time on L LE, no use of AD. Ascends stairs with R LE, descends stairs L LE.   Hip ROM Comments noted no pain with PROM   Lumbar ROM flexion: 5\" from toes (noted pain over L SIJ), extension: 75% limited, SB: 2\" from superior patella   Pelvic Screen posterior L innominate/anterior R innominate   Hip/Knee Strength Comments seated adduction: 5/5   Straight Leg Raise Test neg   Scour Test pos   DANTE Test pos   FADIR Test pos   Palpation noted pain over L PSIS   Accessory Motion/Joint Mobility hypomobility of L hip jt in capsular pattern   Left Hip Flexion PROM knee bent: 100*   Left Hip Abduction PROM 25*   Left Hip " Adduction PROM 20*   Left Hip ER PROM 30*   Left Hip IR PROM 10*   Left Hip Flexion Strength 4+/5   Left Hip Abduction Strength seated: 5/5   Left Hip Extension Strength able to perform supine bridge with min-mod difficulty   Left Hip IR Strength 4+/5   Left Hip ER Strength 4+/5   Left Knee Flexion Strength 5-/5   Left Knee Extension Strength 5/5   Andrew Flexibility Test limited 1 jt hip flexors in modified andrew test position   Left Hamstring Flexibility 80*   Left Piriformis Flexibility limited   Shoulder Objective Findings   Side (if bilateral, select both right and left) Right   Integumentary  no palpable swelling   Posture rounded shoulders, forward head   Cervical Screen (ROM, quadrant) flexion: chin near chest, extension: 25% limited, SB B: wfl, Rot B: 50% limited. Noted hx of neck pain and sees chiropractor   Thoracic Mobility Screen wfl   Thoracic Outlet Syndrom (Jonathan, Bernadette Espinosa Wright) jonathan: neg   Shoulder ROM Comment noted no pain with PROM (stopped when capsular tightness felt)   Scapulothoracic Rhythm fair with scap set   Pec Minor (supine) Flexibility limited B   Neer's Test noted slight increase in sx   Thurston-Wilbur Test pos   Coracoid Test neg   Bursa Test neg   Newhall's Test noted slight increase in sx   Load and Shift Test neg   Sulcus Test neg   Crossover Test pos   Palpation noted no increased tenderness with mod-max palpation of all surrounding soft tissues and bony prominances   Accessory Motion/Joint Mobility hypomobility of R GHJ, ACJ, and SCJ   Right Shoulder Flexion AROM 125* (pain)   Right Shoulder Flexion PROM 95* caspular tightness   Right Shoulder Abduction AROM 120* (pain)   Right Shoulder Abduction PROM 110* capsular tightness   Right Shoulder ER AROM C6/7 (pain)   Right Shoulder ER PROM at 90*: 30*   Right Shoulder IR AROM R PSIS (pain)   Right Shoulder IR PROM at 90*: 5*   Right Shoulder Flexion Strength 4+/5 B   Right Shoulder Abduction Strength 4+/5 B (noted slight pain)    Right Shoulder ER Strength 4-/5 (pain)   Right Shoulder IR Strength 5-/5   Right Shoulder Extension Strength 5/5   Planned Therapy Interventions   Planned Therapy Interventions ADL retraining;balance training;bed mobility training;gait training;joint mobilization;manual therapy;motor coordination training;neuromuscular re-education;ROM;strengthening;stretching;transfer training   Planned Modality Interventions   Planned Modality Interventions Cryotherapy;Electrical stimulation;Hot packs;TENS;Traction;Ultrasound   Planned Modality Interventions Comments only as needed   Clinical Impression   Criteria for Skilled Therapeutic Interventions Met yes, treatment indicated   PT Diagnosis R shoulder pain; L hip pain   Influenced by the following impairments decreased ROM, decreased strength, impaired posture, impaired gait, impaired transfers, pain   Functional limitations due to impairments reaching, dressing, walking, transfers   Clinical Presentation Evolving/Changing   Clinical Presentation Rationale multiple body segement involvement, chronicity of sx, severity of sx, LEFS, clinical judgement   Clinical Decision Making (Complexity) Moderate complexity   Therapy Frequency 2 times/Week   Predicted Duration of Therapy Intervention (days/wks) 8 weeks   Risk & Benefits of therapy have been explained Yes   Patient, Family & other staff in agreement with plan of care Yes   Clinical Impression Comments Pt is a 80 y.o. male who presented to the PT clinic today with a rehab diagnosis of R shoulder pain and L hip pain as evidenced by decreased ROM, decreased strength, impaired posture, impaired gait, impaired transfers, and pain. Pt is appropriate for skilled PT to address previously listed impairments in order to decrease difficulty with walking, transfers and stairs.   Education Assessment   Preferred Learning Style Listening;Reading;Demonstration;Pictures/video   Barriers to Learning No barriers   ORTHO GOALS   PT Ortho Eval  Goals 1;2;3;4;5   Ortho Goal 1   Goal Identifier 1   Goal Description Pt will be able to amb throughout home without increase in sx, in order to decrease difficulty with ADLs.    Target Date 12/31/20   Ortho Goal 2   Goal Identifier 2   Goal Description Pt will be able to perform bed and chair transfers without increase in sx.    Target Date 01/14/21   Ortho Goal 3   Goal Identifier 3   Goal Description Pt will be able to ascend/descend stairs reciprocally in order to decrease difficulty with getting to laundry room.    Target Date 01/29/21   Ortho Goal 4   Goal Identifier 4   Goal Description Pt will be independent with HEP in order to self manage symptoms.    Target Date 01/29/21   Ortho Goal 5   Goal Identifier 5   Goal Description Pt will be able to reach overhead without increase in sx.    Target Date 01/29/21   Total Evaluation Time   PT Eval, Moderate Complexity Minutes (82070) 35   Therapy Certification   Certification date from 12/03/20   Certification date to 01/29/21   Medical Diagnosis Hip arthritis; Sacroiliac joint pain; Arthritis of right shoulder region        Please contact me with any questions or concerns.    Thank you for your referral,     Nancy Regalado, PT, DPT  Physical Therapist  [address] [address] [phone]

## 2020-12-06 ENCOUNTER — HEALTH MAINTENANCE LETTER (OUTPATIENT)
Age: 80
End: 2020-12-06

## 2020-12-08 ENCOUNTER — HOSPITAL ENCOUNTER (OUTPATIENT)
Dept: PHYSICAL THERAPY | Facility: CLINIC | Age: 80
Setting detail: THERAPIES SERIES
End: 2020-12-08
Attending: PEDIATRICS
Payer: MEDICARE

## 2020-12-08 PROCEDURE — 97140 MANUAL THERAPY 1/> REGIONS: CPT | Mod: GP | Performed by: PHYSICAL THERAPIST

## 2020-12-08 PROCEDURE — 97110 THERAPEUTIC EXERCISES: CPT | Mod: GP | Performed by: PHYSICAL THERAPIST

## 2020-12-10 ENCOUNTER — HOSPITAL ENCOUNTER (OUTPATIENT)
Dept: PHYSICAL THERAPY | Facility: CLINIC | Age: 80
Setting detail: THERAPIES SERIES
End: 2020-12-10
Attending: PEDIATRICS
Payer: MEDICARE

## 2020-12-10 PROCEDURE — 97140 MANUAL THERAPY 1/> REGIONS: CPT | Mod: GP | Performed by: PHYSICAL THERAPIST

## 2020-12-10 PROCEDURE — 97110 THERAPEUTIC EXERCISES: CPT | Mod: GP | Performed by: PHYSICAL THERAPIST

## 2020-12-15 ENCOUNTER — HOSPITAL ENCOUNTER (OUTPATIENT)
Dept: PHYSICAL THERAPY | Facility: CLINIC | Age: 80
Setting detail: THERAPIES SERIES
End: 2020-12-15
Attending: PEDIATRICS
Payer: MEDICARE

## 2020-12-15 PROCEDURE — 97110 THERAPEUTIC EXERCISES: CPT | Mod: GP | Performed by: PHYSICAL THERAPIST

## 2020-12-17 ENCOUNTER — HOSPITAL ENCOUNTER (OUTPATIENT)
Dept: PHYSICAL THERAPY | Facility: CLINIC | Age: 80
Setting detail: THERAPIES SERIES
End: 2020-12-17
Attending: PEDIATRICS
Payer: MEDICARE

## 2020-12-17 PROCEDURE — 97110 THERAPEUTIC EXERCISES: CPT | Mod: GP | Performed by: PHYSICAL THERAPIST

## 2020-12-22 ENCOUNTER — HOSPITAL ENCOUNTER (OUTPATIENT)
Dept: PHYSICAL THERAPY | Facility: CLINIC | Age: 80
Setting detail: THERAPIES SERIES
End: 2020-12-22
Attending: PEDIATRICS
Payer: MEDICARE

## 2020-12-22 PROCEDURE — 97110 THERAPEUTIC EXERCISES: CPT | Mod: GP | Performed by: PHYSICAL THERAPIST

## 2020-12-26 DIAGNOSIS — I20.89 ANGINA AT REST (H): ICD-10-CM

## 2020-12-26 DIAGNOSIS — I25.10 CORONARY ARTERY DISEASE INVOLVING NATIVE CORONARY ARTERY OF NATIVE HEART WITHOUT ANGINA PECTORIS: ICD-10-CM

## 2020-12-31 RX ORDER — ISOSORBIDE MONONITRATE 30 MG/1
TABLET, EXTENDED RELEASE ORAL
Qty: 180 TABLET | Refills: 3 | Status: ON HOLD | OUTPATIENT
Start: 2020-12-31 | End: 2021-09-28

## 2020-12-31 RX ORDER — AMLODIPINE BESYLATE 2.5 MG/1
2.5 TABLET ORAL DAILY
Qty: 90 TABLET | Refills: 3 | Status: SHIPPED | OUTPATIENT
Start: 2020-12-31 | End: 2021-03-18

## 2021-01-06 ENCOUNTER — OFFICE VISIT (OUTPATIENT)
Dept: ORTHOPEDICS | Facility: CLINIC | Age: 81
End: 2021-01-06
Payer: COMMERCIAL

## 2021-01-06 VITALS
DIASTOLIC BLOOD PRESSURE: 62 MMHG | HEIGHT: 62 IN | WEIGHT: 169 LBS | BODY MASS INDEX: 31.1 KG/M2 | SYSTOLIC BLOOD PRESSURE: 120 MMHG

## 2021-01-06 DIAGNOSIS — M16.10 HIP ARTHRITIS: Primary | ICD-10-CM

## 2021-01-06 PROCEDURE — 99213 OFFICE O/P EST LOW 20 MIN: CPT | Performed by: PEDIATRICS

## 2021-01-06 ASSESSMENT — MIFFLIN-ST. JEOR: SCORE: 1360.83

## 2021-01-06 NOTE — LETTER
1/6/2021         RE: Liu Ragsdale  [id number] [facility] [id number]        Dear Colleague,    Thank you for referring your patient, Liu Ragsdale, to the [facility] [place]. Please see a copy of my visit note below.    Sports Medicine Clinic Visit - Interim History January 6, 2021    PCP: Shania Schneider    Liu Ragsdale is a 80 year old male who is seen in f/u up for Hip arthritis. Since last visit on 12/3/20 patient has continued left hip pain. He has completed PT and feels minimal improvement in his pain. He has difficulty walking and exercising at times due to pain. He states the steroid injection he received 2 months ago with Dr De Oliveira only provided 2 days of pain relief. He is interested in having a consult with an orthopedic surgeon.    Symptoms are better with: Rest  Symptoms are worse with: standing, stairs and walking  Additional Features:   Positive: instability and weakness   Negative: swelling, bruising, popping, grinding, catching, locking, paresthesias and numbness    Social History: Retired    Review of Systems  Skin: no bruising, no swelling  Musculoskeletal: as above  Neurologic: no numbness, paresthesias  Remainder of review of systems is negative including constitutional, CV, pulmonary, GI, except as noted in HPI or medical history.    Patient's current problem list, past medical and surgical history, and family history were reviewed.    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     SOB (shortness of breath) on exertion     Arthritis of right glenohumeral joint- moderate     Arthritis of right acromioclavicular joint- advanced      "Positive cardiac stress test     Angina at rest (H)     Status post coronary angiogram     Coronary artery disease involving native coronary artery of native heart without angina pectoris     Past Medical History:   Diagnosis Date     NO ACTIVE PROBLEMS      Past Surgical History:   Procedure Laterality Date     COLONOSCOPY N/A 6/16/2016    Procedure: COLONOSCOPY;  Surgeon: Randy Avendano MD;  Location: [facility]     CV LEFT HEART CATH Left 3/10/2020    Procedure: Left Heart Cath;  Surgeon: Vicente Lopez MD;  Location:  HEART CARDIAC CATH LAB     VASECTOMY  1981     Family History   Problem Relation Age of Onset     Diabetes Sister      Obesity Son      Depression Sister      Thyroid Disease Sister      Gastrointestinal Disease Son      Depression Son      Congenital Anomalies Daughter      Depression Daughter      Heart Disease Daughter      Cerebrovascular Disease No family hx of        Objective  /62   Ht 1.575 m (5' 2\")   Wt 76.7 kg (169 lb)   BMI 30.91 kg/m      GENERAL APPEARANCE: healthy, alert and no distress   GAIT: antalgic  SKIN: no suspicious lesions or rashes  HEENT: sclera clear, anicteric  CV: good peripheral pulses  RESP: Breathing not labored  NEURO: Normal strength and tone, mentation intact and speech normal  PSYCH:  mentation appears normal and affect normal/bright    Bilateral hip exam  Inspection:      no edema or ecchymosis in hip area     Tender:      posterior knee     Non Tender:      remainder of the hip area bilateral     ROM:     Range of motion limited in internal and external rotation on the left     Strength:      flexion 4/5 left (pain with strength testing)     Sensation:      grossly intact in hip and thigh     Special Tests:      positive (+) DANTE left - pain posterior    Radiology  I independently visualized and reviewed these images with the patient  PELVIS AND HIP LEFT TWO VIEWS October 19, 2020 10:04 AM   HISTORY: Low back and left hip pain. Hip pain, " left.  COMPARISON: None.                                                           IMPRESSION: Moderate bilateral hip degenerative changes. No fracture  or AVN. SI joints are unremarkable. Vascular calcifications.    Assessment:  1. Hip arthritis      Discussed nature of degenerative arthritis of the hip. Discussed symptom treatment with over-the-counter medications and rest if needed. Discussed benefits of weight loss and possible physical therapy. Discussed injection therapy. Also briefly discussed future consideration of referral to orthopedic surgery for further evaluation and discussion of arthroplasty.    Plan:  - Today's Plan of Care:  Referral to an Orthopedic Surgeon    -We also discussed other future treatment options:  Consideration of repeat US guided injection - would wait ~ 3 months from last one    Follow Up: as needed    Concerning signs and symptoms were reviewed.  The patient expressed understanding of this management plan and all questions were answered at this time.    Brenda Lepe MD CA  Primary Care Sports Medicine  [facility]      Again, thank you for allowing me to participate in the care of your patient.        Sincerely,        Brenda Lepe MD

## 2021-01-06 NOTE — PATIENT INSTRUCTIONS
Plan:  - Today's Plan of Care:  Referral to an Orthopedic Surgeon    -We also discussed other future treatment options:  Consideration of repeat US guided injection - would wait ~ 3 months from last one    Follow Up: as needed    If you have any further questions for your physician or physician s care team you can call [phone] and use option 3 to leave a voice message. Calls received during business hours will be returned same day.

## 2021-01-06 NOTE — PROGRESS NOTES
Sports Medicine Clinic Visit - Interim History January 6, 2021    PCP: Shania Schneider JAYESH Ragsdale is a 80 year old male who is seen in f/u up for Hip arthritis. Since last visit on 12/3/20 patient has continued left hip pain. He has completed PT and feels minimal improvement in his pain. He has difficulty walking and exercising at times due to pain. He states the steroid injection he received 2 months ago with Dr De Oliveira only provided 2 days of pain relief. He is interested in having a consult with an orthopedic surgeon.    Symptoms are better with: Rest  Symptoms are worse with: standing, stairs and walking  Additional Features:   Positive: instability and weakness   Negative: swelling, bruising, popping, grinding, catching, locking, paresthesias and numbness    Social History: Retired    Review of Systems  Skin: no bruising, no swelling  Musculoskeletal: as above  Neurologic: no numbness, paresthesias  Remainder of review of systems is negative including constitutional, CV, pulmonary, GI, except as noted in HPI or medical history.    Patient's current problem list, past medical and surgical history, and family history were reviewed.    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     SOB (shortness of breath) on exertion     Arthritis of right glenohumeral joint- moderate     Arthritis of right acromioclavicular joint- advanced     Positive cardiac stress test     Angina at rest (H)     Status post coronary angiogram     Coronary artery disease involving native coronary artery of native heart without angina pectoris     Past Medical History:   Diagnosis Date     NO ACTIVE PROBLEMS      Past Surgical  "History:   Procedure Laterality Date     COLONOSCOPY N/A 6/16/2016    Procedure: COLONOSCOPY;  Surgeon: Randy Avendano MD;  Location: [facility]     CV LEFT HEART CATH Left 3/10/2020    Procedure: Left Heart Cath;  Surgeon: Vicente Lopez MD;  Location:  HEART CARDIAC CATH LAB     VASECTOMY  1981     Family History   Problem Relation Age of Onset     Diabetes Sister      Obesity Son      Depression Sister      Thyroid Disease Sister      Gastrointestinal Disease Son      Depression Son      Congenital Anomalies Daughter      Depression Daughter      Heart Disease Daughter      Cerebrovascular Disease No family hx of        Objective  /62   Ht 1.575 m (5' 2\")   Wt 76.7 kg (169 lb)   BMI 30.91 kg/m      GENERAL APPEARANCE: healthy, alert and no distress   GAIT: antalgic  SKIN: no suspicious lesions or rashes  HEENT: sclera clear, anicteric  CV: good peripheral pulses  RESP: Breathing not labored  NEURO: Normal strength and tone, mentation intact and speech normal  PSYCH:  mentation appears normal and affect normal/bright    Bilateral hip exam  Inspection:      no edema or ecchymosis in hip area     Tender:      posterior knee     Non Tender:      remainder of the hip area bilateral     ROM:     Range of motion limited in internal and external rotation on the left     Strength:      flexion 4/5 left (pain with strength testing)     Sensation:      grossly intact in hip and thigh     Special Tests:      positive (+) DANTE left - pain posterior    Radiology  I independently visualized and reviewed these images with the patient  PELVIS AND HIP LEFT TWO VIEWS October 19, 2020 10:04 AM   HISTORY: Low back and left hip pain. Hip pain, left.  COMPARISON: None.                                                           IMPRESSION: Moderate bilateral hip degenerative changes. No fracture  or AVN. SI joints are unremarkable. Vascular calcifications.    Assessment:  1. Hip arthritis      Discussed nature of degenerative " arthritis of the hip. Discussed symptom treatment with over-the-counter medications and rest if needed. Discussed benefits of weight loss and possible physical therapy. Discussed injection therapy. Also briefly discussed future consideration of referral to orthopedic surgery for further evaluation and discussion of arthroplasty.    Plan:  - Today's Plan of Care:  Referral to an Orthopedic Surgeon    -We also discussed other future treatment options:  Consideration of repeat US guided injection - would wait ~ 3 months from last one    Follow Up: as needed    Concerning signs and symptoms were reviewed.  The patient expressed understanding of this management plan and all questions were answered at this time.    Brenda Lepe MD CAQ  Primary Care Sports Medicine  [facility]

## 2021-01-12 ENCOUNTER — HOSPITAL ENCOUNTER (OUTPATIENT)
Dept: PHYSICAL THERAPY | Facility: CLINIC | Age: 81
Setting detail: THERAPIES SERIES
End: 2021-01-12
Attending: PEDIATRICS
Payer: MEDICARE

## 2021-01-12 PROCEDURE — 97110 THERAPEUTIC EXERCISES: CPT | Mod: GP | Performed by: PHYSICAL THERAPIST

## 2021-01-13 ENCOUNTER — TRANSFERRED RECORDS (OUTPATIENT)
Dept: HEALTH INFORMATION MANAGEMENT | Facility: CLINIC | Age: 81
End: 2021-01-13

## 2021-01-17 DIAGNOSIS — Z11.59 ENCOUNTER FOR SCREENING FOR OTHER VIRAL DISEASES: Primary | ICD-10-CM

## 2021-01-20 ENCOUNTER — HOSPITAL ENCOUNTER (OUTPATIENT)
Dept: PHYSICAL THERAPY | Facility: CLINIC | Age: 81
Setting detail: THERAPIES SERIES
End: 2021-01-20
Attending: PEDIATRICS
Payer: MEDICARE

## 2021-01-20 PROCEDURE — 97110 THERAPEUTIC EXERCISES: CPT | Mod: GP | Performed by: PHYSICAL THERAPIST

## 2021-01-25 ENCOUNTER — OFFICE VISIT (OUTPATIENT)
Dept: FAMILY MEDICINE | Facility: CLINIC | Age: 81
End: 2021-01-25
Payer: COMMERCIAL

## 2021-01-25 VITALS
DIASTOLIC BLOOD PRESSURE: 66 MMHG | BODY MASS INDEX: 31.1 KG/M2 | SYSTOLIC BLOOD PRESSURE: 130 MMHG | WEIGHT: 169 LBS | RESPIRATION RATE: 18 BRPM | TEMPERATURE: 97.3 F | HEART RATE: 64 BPM | HEIGHT: 62 IN

## 2021-01-25 DIAGNOSIS — Z01.818 PREOP GENERAL PHYSICAL EXAM: Primary | ICD-10-CM

## 2021-01-25 DIAGNOSIS — E78.5 HYPERLIPIDEMIA WITH TARGET LDL LESS THAN 130: ICD-10-CM

## 2021-01-25 DIAGNOSIS — M16.12 PRIMARY OSTEOARTHRITIS OF LEFT HIP: ICD-10-CM

## 2021-01-25 DIAGNOSIS — I25.9 IHD (ISCHEMIC HEART DISEASE): ICD-10-CM

## 2021-01-25 DIAGNOSIS — D64.9 ANEMIA, UNSPECIFIED TYPE: Primary | ICD-10-CM

## 2021-01-25 DIAGNOSIS — I65.23 CAROTID STENOSIS, BILATERAL: ICD-10-CM

## 2021-01-25 DIAGNOSIS — I10 BENIGN ESSENTIAL HYPERTENSION: ICD-10-CM

## 2021-01-25 LAB
ANION GAP SERPL CALCULATED.3IONS-SCNC: 3 MMOL/L (ref 3–14)
BUN SERPL-MCNC: 15 MG/DL (ref 7–30)
CALCIUM SERPL-MCNC: 9.9 MG/DL (ref 8.5–10.1)
CHLORIDE SERPL-SCNC: 103 MMOL/L (ref 94–109)
CO2 SERPL-SCNC: 30 MMOL/L (ref 20–32)
CREAT SERPL-MCNC: 1.13 MG/DL (ref 0.66–1.25)
ERYTHROCYTE [DISTWIDTH] IN BLOOD BY AUTOMATED COUNT: 13.1 % (ref 10–15)
GFR SERPL CREATININE-BSD FRML MDRD: 61 ML/MIN/{1.73_M2}
GLUCOSE SERPL-MCNC: 96 MG/DL (ref 70–99)
HCT VFR BLD AUTO: 39.2 % (ref 40–53)
HGB BLD-MCNC: 13.1 G/DL (ref 13.3–17.7)
MCH RBC QN AUTO: 30 PG (ref 26.5–33)
MCHC RBC AUTO-ENTMCNC: 33.4 G/DL (ref 31.5–36.5)
MCV RBC AUTO: 90 FL (ref 78–100)
PLATELET # BLD AUTO: 226 10E9/L (ref 150–450)
POTASSIUM SERPL-SCNC: 4.1 MMOL/L (ref 3.4–5.3)
RBC # BLD AUTO: 4.36 10E12/L (ref 4.4–5.9)
SODIUM SERPL-SCNC: 136 MMOL/L (ref 133–144)
WBC # BLD AUTO: 7.5 10E9/L (ref 4–11)

## 2021-01-25 PROCEDURE — 99214 OFFICE O/P EST MOD 30 MIN: CPT | Performed by: FAMILY MEDICINE

## 2021-01-25 PROCEDURE — 80048 BASIC METABOLIC PNL TOTAL CA: CPT | Performed by: FAMILY MEDICINE

## 2021-01-25 PROCEDURE — 93000 ELECTROCARDIOGRAM COMPLETE: CPT | Performed by: FAMILY MEDICINE

## 2021-01-25 PROCEDURE — 85027 COMPLETE CBC AUTOMATED: CPT | Performed by: FAMILY MEDICINE

## 2021-01-25 PROCEDURE — 36415 COLL VENOUS BLD VENIPUNCTURE: CPT | Performed by: FAMILY MEDICINE

## 2021-01-25 ASSESSMENT — MIFFLIN-ST. JEOR: SCORE: 1360.83

## 2021-01-25 NOTE — PROGRESS NOTES
[facility]  [address] [place] [address]  Phone: [phone]  Fax: [phone]  Primary Provider: Shania Schneider  Pre-op Performing Provider: GABBI KING    PREOPERATIVE EVALUATION:  Today's date: 1/25/2021    Liu Ragsdale is a 80 year old male who presents for a preoperative evaluation.    Surgical Information:  Surgery/Procedure: Total Hip Arthroplasty- left   Surgery Location: [facility]   Surgeon: Comfort   Surgery Date: 2/3/2021  Time of Surgery: 2:10pm  Where patient plans to recover: At home with family  Fax number for surgical facility: Note does not need to be faxed, will be available electronically in Epic.    Type of Anesthesia Anticipated: Choice    Subjective     HPI related to upcoming procedure: Total Hip Arthroplasty- Left    Preop Questions 1/22/2021   1. Have you ever had a heart attack or stroke? No   2. Have you ever had surgery on your heart or blood vessels, such as a stent placement, a coronary artery bypass, or surgery on an artery in your head, neck, heart, or legs? YES - IHD   3. Do you have chest pain with activity? No   4. Do you have a history of  heart failure? No   5. Do you currently have a cold, bronchitis or symptoms of other infection? No   6. Do you have a cough, shortness of breath, or wheezing? No   7. Do you or anyone in your family have previous history of blood clots? No   8. Do you or does anyone in your family have a serious bleeding problem such as prolonged bleeding following surgeries or cuts? No   9. Have you ever had problems with anemia or been told to take iron pills? No   10. Have you had any abnormal blood loss such as black, tarry or bloody stools? No   11. Have you ever had a blood transfusion? No   12. Are you willing to have a blood transfusion if it is medically needed before, during, or after your surgery? Yes   13. Have you or any of your relatives ever had problems with anesthesia? No   14. Do you  have sleep apnea, excessive snoring or daytime drowsiness? No   15. Do you have any artifical heart valves or other implanted medical devices like a pacemaker, defibrillator, or continuous glucose monitor? No   16. Do you have artificial joints? No   17. Are you allergic to latex? YES:        Health Care Directive:  Patient does not have a Health Care Directive or Living Will: Patient states has Advance Directive and will bring in a copy to clinic.    Preoperative Review of :   reviewed - no record of controlled substances prescribed.        Review of Systems  Constitutional, neuro, ENT, endocrine, pulmonary, cardiac, gastrointestinal, genitourinary, musculoskeletal, integument and psychiatric systems are negative, except as otherwise noted.    Patient Active Problem List    Diagnosis Date Noted     Coronary artery disease involving native coronary artery of native heart without angina pectoris 03/23/2020     Priority: Medium     Status post coronary angiogram 03/10/2020     Priority: Medium     Positive cardiac stress test 02/27/2020     Priority: Medium     Added automatically from request for surgery [id number]       Angina at rest (H) 02/27/2020     Priority: Medium     Added automatically from request for surgery [id number]       Right shoulder pain, unspecified chronicity 11/06/2019     Priority: Medium     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 11/06/2019     Priority: Medium     SOB (shortness of breath) on exertion 11/06/2019     Priority: Medium     Arthritis of right glenohumeral joint- moderate 11/06/2019     Priority: Medium     Arthritis of right acromioclavicular joint- advanced 11/06/2019     Priority: Medium     SNHL (sensory-neural hearing loss), asymmetrical 07/22/2019     Priority: Medium     FANI (obstructive sleep apnea) 03/15/2016     Priority: Medium     NOT using CPAP 3/15/16       Obesity 04/07/2015     Priority: Medium     BMI 33 (3/15/16)       Benign essential  hypertension, BP goal <140/90 09/10/2014     Priority: Medium     Hyperlipidemia LDL goal <70 12/12/2013     Priority: Medium     Diagnosis updated by automated process. Provider to review and confirm.       Carotid bruit 03/30/2010     Priority: Medium     right carotid bruit on exam- u/s 7/09 with minimal stenosis on left and not well seen on right         Past Medical History:   Diagnosis Date     Arthritis      Hypertension      NO ACTIVE PROBLEMS      Past Surgical History:   Procedure Laterality Date     CARDIAC SURGERY  3-10 2020    stent installed     COLONOSCOPY N/A 6/16/2016    Procedure: COLONOSCOPY;  Surgeon: Randy Avendano MD;  Location: [facility]     CV LEFT HEART CATH Left 3/10/2020    Procedure: Left Heart Cath;  Surgeon: Vicente Lopez MD;  Location:  HEART CARDIAC CATH LAB     EYE SURGERY  2005    cataract surgery     VASECTOMY  1981     Current Outpatient Medications   Medication Sig Dispense Refill     acetaminophen (TYLENOL) 325 MG tablet Take 650 mg by mouth every 6 hours as needed for mild pain       amLODIPine (NORVASC) 2.5 MG tablet Take 1 tablet (2.5 mg) by mouth daily 90 tablet 3     atorvastatin (LIPITOR) 40 MG tablet Take 1 tablet (40 mg) by mouth daily 90 tablet 3     diclofenac (VOLTAREN) 1 % topical gel Place 4 g onto the skin 3 times daily as needed for moderate pain (Shoulder) 100 g 1     hydrochlorothiazide (HYDRODIURIL) 25 MG tablet Take 1 tablet (25 mg) by mouth daily 90 tablet 3     isosorbide mononitrate (IMDUR) 30 MG 24 hr tablet Take 2 tablets by mouth once daily 180 tablet 3     isosorbide mononitrate (IMDUR) 30 MG 24 hr tablet Take 1 tablet (30 mg) by mouth daily 90 tablet 3     losartan (COZAAR) 100 MG tablet Take 1 tablet (100 mg) by mouth daily 90 tablet 3     metoprolol succinate ER (TOPROL XL) 25 MG 24 hr tablet Take 1 tablet (25 mg) by mouth daily 90 tablet 3     Multiple Vitamin (MULTI VITAMIN PO) Take 1 oz by mouth daily       nitroGLYcerin (NITROSTAT) 0.3 MG  "sublingual tablet For chest pain place 1 tablet under the tongue every 5 minutes for 3 doses. If symptoms persist 5 minutes after 1st dose call 911. 30 tablet 3     ticagrelor (BRILINTA) 90 MG tablet Take 1 tablet (90 mg) by mouth 2 times daily Start this evening. 180 tablet 3       Allergies   Allergen Reactions     Metoprolol Other (See Comments)     Side effects     Ampicillin Rash        Social History     Tobacco Use     Smoking status: Never Smoker     Smokeless tobacco: Never Used   Substance Use Topics     Alcohol use: Yes     Comment: Rare     Family History   Problem Relation Age of Onset     Diabetes Sister      Obesity Son      Depression Sister      Thyroid Disease Sister      Gastrointestinal Disease Son      Depression Son      Congenital Anomalies Daughter      Depression Daughter      Heart Disease Daughter      Hypertension Sister      Thyroid Disease Sister      Cerebrovascular Disease No family hx of      History   Drug Use No         Objective     /66 (Cuff Size: Adult Regular)   Pulse 64   Temp 97.3  F (36.3  C) (Tympanic)   Resp 18   Ht 1.575 m (5' 2\")   Wt 76.7 kg (169 lb)   BMI 30.91 kg/m      Physical Exam    GENERAL APPEARANCE: alert, active and no distress     EYES: EOMI,  PERRL     HENT: ear canals and TM's normal and nose and mouth without ulcers or lesions     NECK: no adenopathy, no asymmetry, masses, or scars and thyroid normal to palpation     RESP: lungs clear to auscultation - no rales, rhonchi or wheezes     CV: regular rates and rhythm, normal S1 S2, no S3 or S4 and no murmur, click or rub     ABDOMEN:  soft, nontender, no HSM or masses and bowel sounds normal     MS: extremities normal- no gross deformities noted, no evidence of inflammation in joints, FROM in all extremities.     SKIN: no suspicious lesions or rashes     NEURO: Normal strength and tone, sensory exam grossly normal, mentation intact and speech normal     PSYCH: mentation appears normal. and affect " normal/bright     LYMPHATICS: No cervical adenopathy    Recent Labs   Lab Test 03/10/20  0955 02/27/20  1015   HGB 13.4  --      --    INR 1.04  --     136   POTASSIUM 3.8 3.8   CR 1.06 1.10        Diagnostics:  Labs pending at this time.  Results will be reviewed when available.   EKG: SR, no acute ischemic changes or rhythm abnormality noted    Revised Cardiac Risk Index (RCRI):  The patient has the following serious cardiovascular risks for perioperative complications:   - Coronary Artery Disease (MI, positive stress test, angina, Qs on EKG) = 1 point     RCRI Interpretation: 1 point: Class II (low risk - 0.9% complication rate)    Assessment & Plan   The proposed surgical procedure is considered INTERMEDIATE risk.      ICD-10-CM    1. Preop general physical exam  Z01.818    2. Benign essential hypertension, BP goal <140/90  I10 EKG 12-lead complete w/read - Clinics     CBC with platelets     Basic metabolic panel   3. Carotid stenosis, bilateral  I65.23    4. Hyperlipidemia with target LDL less than 130  E78.5    5. Primary osteoarthritis of left hip  M16.12    6. IHD (ischemic heart disease)  I25.9     multivessel disease, s/p NINO to  of LCX 03/2020          Risks and Recommendations:  The patient has the following additional risks and recommendations for perioperative complications:   - No identified additional risk factors other than previously addressed    Medication Instructions:   - aspirin: Discontinue aspirin 7-10 days prior to procedure to reduce bleeding risk. It should be resumed postoperatively.    - ticagrelor (Brilinta): Patient has a cardiac stent. Medication will continue the medication.      RECOMMENDATION:  APPROVAL GIVEN to proceed with proposed procedure, without further diagnostic evaluation.    Signed Electronically by: Piyush Rogers MD    Copy of this evaluation report is provided to requesting physician.    [facility] Preop Guidelines    Revised  Cardiac Risk Index

## 2021-01-25 NOTE — H&P (VIEW-ONLY)
[facility]  [address] [place] [address]  Phone: [phone]  Fax: [phone]  Primary Provider: Shania Schneider  Pre-op Performing Provider: GABBI KING    PREOPERATIVE EVALUATION:  Today's date: 1/25/2021    Liu Ragsdale is a 80 year old male who presents for a preoperative evaluation.    Surgical Information:  Surgery/Procedure: Total Hip Arthroplasty- left   Surgery Location: [facility]   Surgeon: Comfort   Surgery Date: 2/3/2021  Time of Surgery: 2:10pm  Where patient plans to recover: At home with family  Fax number for surgical facility: Note does not need to be faxed, will be available electronically in Epic.    Type of Anesthesia Anticipated: Choice    Subjective     HPI related to upcoming procedure: Total Hip Arthroplasty- Left    Preop Questions 1/22/2021   1. Have you ever had a heart attack or stroke? No   2. Have you ever had surgery on your heart or blood vessels, such as a stent placement, a coronary artery bypass, or surgery on an artery in your head, neck, heart, or legs? YES - IHD   3. Do you have chest pain with activity? No   4. Do you have a history of  heart failure? No   5. Do you currently have a cold, bronchitis or symptoms of other infection? No   6. Do you have a cough, shortness of breath, or wheezing? No   7. Do you or anyone in your family have previous history of blood clots? No   8. Do you or does anyone in your family have a serious bleeding problem such as prolonged bleeding following surgeries or cuts? No   9. Have you ever had problems with anemia or been told to take iron pills? No   10. Have you had any abnormal blood loss such as black, tarry or bloody stools? No   11. Have you ever had a blood transfusion? No   12. Are you willing to have a blood transfusion if it is medically needed before, during, or after your surgery? Yes   13. Have you or any of your relatives ever had problems with anesthesia? No   14. Do you  have sleep apnea, excessive snoring or daytime drowsiness? No   15. Do you have any artifical heart valves or other implanted medical devices like a pacemaker, defibrillator, or continuous glucose monitor? No   16. Do you have artificial joints? No   17. Are you allergic to latex? YES:        Health Care Directive:  Patient does not have a Health Care Directive or Living Will: Patient states has Advance Directive and will bring in a copy to clinic.    Preoperative Review of :   reviewed - no record of controlled substances prescribed.        Review of Systems  Constitutional, neuro, ENT, endocrine, pulmonary, cardiac, gastrointestinal, genitourinary, musculoskeletal, integument and psychiatric systems are negative, except as otherwise noted.    Patient Active Problem List    Diagnosis Date Noted     Coronary artery disease involving native coronary artery of native heart without angina pectoris 03/23/2020     Priority: Medium     Status post coronary angiogram 03/10/2020     Priority: Medium     Positive cardiac stress test 02/27/2020     Priority: Medium     Added automatically from request for surgery [id number]       Angina at rest (H) 02/27/2020     Priority: Medium     Added automatically from request for surgery [id number]       Right shoulder pain, unspecified chronicity 11/06/2019     Priority: Medium     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 11/06/2019     Priority: Medium     SOB (shortness of breath) on exertion 11/06/2019     Priority: Medium     Arthritis of right glenohumeral joint- moderate 11/06/2019     Priority: Medium     Arthritis of right acromioclavicular joint- advanced 11/06/2019     Priority: Medium     SNHL (sensory-neural hearing loss), asymmetrical 07/22/2019     Priority: Medium     FANI (obstructive sleep apnea) 03/15/2016     Priority: Medium     NOT using CPAP 3/15/16       Obesity 04/07/2015     Priority: Medium     BMI 33 (3/15/16)       Benign essential  hypertension, BP goal <140/90 09/10/2014     Priority: Medium     Hyperlipidemia LDL goal <70 12/12/2013     Priority: Medium     Diagnosis updated by automated process. Provider to review and confirm.       Carotid bruit 03/30/2010     Priority: Medium     right carotid bruit on exam- u/s 7/09 with minimal stenosis on left and not well seen on right         Past Medical History:   Diagnosis Date     Arthritis      Hypertension      NO ACTIVE PROBLEMS      Past Surgical History:   Procedure Laterality Date     CARDIAC SURGERY  3-10 2020    stent installed     COLONOSCOPY N/A 6/16/2016    Procedure: COLONOSCOPY;  Surgeon: Randy Avendano MD;  Location: [facility]     CV LEFT HEART CATH Left 3/10/2020    Procedure: Left Heart Cath;  Surgeon: Vicente Lopez MD;  Location:  HEART CARDIAC CATH LAB     EYE SURGERY  2005    cataract surgery     VASECTOMY  1981     Current Outpatient Medications   Medication Sig Dispense Refill     acetaminophen (TYLENOL) 325 MG tablet Take 650 mg by mouth every 6 hours as needed for mild pain       amLODIPine (NORVASC) 2.5 MG tablet Take 1 tablet (2.5 mg) by mouth daily 90 tablet 3     atorvastatin (LIPITOR) 40 MG tablet Take 1 tablet (40 mg) by mouth daily 90 tablet 3     diclofenac (VOLTAREN) 1 % topical gel Place 4 g onto the skin 3 times daily as needed for moderate pain (Shoulder) 100 g 1     hydrochlorothiazide (HYDRODIURIL) 25 MG tablet Take 1 tablet (25 mg) by mouth daily 90 tablet 3     isosorbide mononitrate (IMDUR) 30 MG 24 hr tablet Take 2 tablets by mouth once daily 180 tablet 3     isosorbide mononitrate (IMDUR) 30 MG 24 hr tablet Take 1 tablet (30 mg) by mouth daily 90 tablet 3     losartan (COZAAR) 100 MG tablet Take 1 tablet (100 mg) by mouth daily 90 tablet 3     metoprolol succinate ER (TOPROL XL) 25 MG 24 hr tablet Take 1 tablet (25 mg) by mouth daily 90 tablet 3     Multiple Vitamin (MULTI VITAMIN PO) Take 1 oz by mouth daily       nitroGLYcerin (NITROSTAT) 0.3 MG  "sublingual tablet For chest pain place 1 tablet under the tongue every 5 minutes for 3 doses. If symptoms persist 5 minutes after 1st dose call 911. 30 tablet 3     ticagrelor (BRILINTA) 90 MG tablet Take 1 tablet (90 mg) by mouth 2 times daily Start this evening. 180 tablet 3       Allergies   Allergen Reactions     Metoprolol Other (See Comments)     Side effects     Ampicillin Rash        Social History     Tobacco Use     Smoking status: Never Smoker     Smokeless tobacco: Never Used   Substance Use Topics     Alcohol use: Yes     Comment: Rare     Family History   Problem Relation Age of Onset     Diabetes Sister      Obesity Son      Depression Sister      Thyroid Disease Sister      Gastrointestinal Disease Son      Depression Son      Congenital Anomalies Daughter      Depression Daughter      Heart Disease Daughter      Hypertension Sister      Thyroid Disease Sister      Cerebrovascular Disease No family hx of      History   Drug Use No         Objective     /66 (Cuff Size: Adult Regular)   Pulse 64   Temp 97.3  F (36.3  C) (Tympanic)   Resp 18   Ht 1.575 m (5' 2\")   Wt 76.7 kg (169 lb)   BMI 30.91 kg/m      Physical Exam    GENERAL APPEARANCE: alert, active and no distress     EYES: EOMI,  PERRL     HENT: ear canals and TM's normal and nose and mouth without ulcers or lesions     NECK: no adenopathy, no asymmetry, masses, or scars and thyroid normal to palpation     RESP: lungs clear to auscultation - no rales, rhonchi or wheezes     CV: regular rates and rhythm, normal S1 S2, no S3 or S4 and no murmur, click or rub     ABDOMEN:  soft, nontender, no HSM or masses and bowel sounds normal     MS: extremities normal- no gross deformities noted, no evidence of inflammation in joints, FROM in all extremities.     SKIN: no suspicious lesions or rashes     NEURO: Normal strength and tone, sensory exam grossly normal, mentation intact and speech normal     PSYCH: mentation appears normal. and affect " normal/bright     LYMPHATICS: No cervical adenopathy    Recent Labs   Lab Test 03/10/20  0955 02/27/20  1015   HGB 13.4  --      --    INR 1.04  --     136   POTASSIUM 3.8 3.8   CR 1.06 1.10        Diagnostics:  Labs pending at this time.  Results will be reviewed when available.   EKG: SR, no acute ischemic changes or rhythm abnormality noted    Revised Cardiac Risk Index (RCRI):  The patient has the following serious cardiovascular risks for perioperative complications:   - Coronary Artery Disease (MI, positive stress test, angina, Qs on EKG) = 1 point     RCRI Interpretation: 1 point: Class II (low risk - 0.9% complication rate)    Assessment & Plan   The proposed surgical procedure is considered INTERMEDIATE risk.      ICD-10-CM    1. Preop general physical exam  Z01.818    2. Benign essential hypertension, BP goal <140/90  I10 EKG 12-lead complete w/read - Clinics     CBC with platelets     Basic metabolic panel   3. Carotid stenosis, bilateral  I65.23    4. Hyperlipidemia with target LDL less than 130  E78.5    5. Primary osteoarthritis of left hip  M16.12    6. IHD (ischemic heart disease)  I25.9     multivessel disease, s/p NINO to  of LCX 03/2020          Risks and Recommendations:  The patient has the following additional risks and recommendations for perioperative complications:   - No identified additional risk factors other than previously addressed    Medication Instructions:   - aspirin: Discontinue aspirin 7-10 days prior to procedure to reduce bleeding risk. It should be resumed postoperatively.    - ticagrelor (Brilinta): Patient has a cardiac stent. Medication will continue the medication.      RECOMMENDATION:  APPROVAL GIVEN to proceed with proposed procedure, without further diagnostic evaluation.    Signed Electronically by: Piyush Rogers MD    Copy of this evaluation report is provided to requesting physician.    [facility] Preop Guidelines    Revised  Cardiac Risk Index

## 2021-01-25 NOTE — NURSING NOTE
"Chief Complaint   Patient presents with     Pre-Op Exam     /66 (Cuff Size: Adult Regular)   Pulse 64   Temp 97.3  F (36.3  C) (Tympanic)   Resp 18   Ht 1.575 m (5' 2\")   Wt 76.7 kg (169 lb)   BMI 30.91 kg/m   Estimated body mass index is 30.91 kg/m  as calculated from the following:    Height as of this encounter: 1.575 m (5' 2\").    Weight as of this encounter: 76.7 kg (169 lb).  Patient presents to the clinic using No DME      Health Maintenance that is potentially due pending provider review:    Health Maintenance Due   Topic Date Due     HEPATITIS C SCREENING  05/29/1958     ZOSTER IMMUNIZATION (2 of 3) 12/11/2014     MEDICARE ANNUAL WELLNESS VISIT  09/25/2020     FALL RISK ASSESSMENT  09/25/2020     PHQ-2  01/01/2021                "

## 2021-01-27 ENCOUNTER — HOSPITAL ENCOUNTER (OUTPATIENT)
Dept: PHYSICAL THERAPY | Facility: CLINIC | Age: 81
Setting detail: THERAPIES SERIES
End: 2021-01-27
Attending: PEDIATRICS
Payer: MEDICARE

## 2021-01-27 PROCEDURE — 97110 THERAPEUTIC EXERCISES: CPT | Mod: GP | Performed by: PHYSICAL THERAPIST

## 2021-01-27 NOTE — PROGRESS NOTES
Outpatient Physical Therapy Discharge Note     Patient: Liu Ragsdale  : 1940    Beginning/End Dates of Reporting Period:  12/3/2020  to 2021    Referring Provider: Dr. Lepe    Therapy Diagnosis: R shoulder pain; L hip pain     Client Self Report: Reports he is getting a hip replacement next week.  Reports more movement in his shoulder, however he continues to have pain.    Objective Measurements:  Objective Measure: R shoulder ROM/strength  Details: Flex: 140*; Abduct: 135*; ER: 30*; IR: R PSIS  Objective Measure: L hip ROM/strength    Goals:  Goal Identifier 1 (continued limp with ambulation)   Goal Description Pt will be able to amb throughout home without increase in sx, in order to decrease difficulty with ADLs.    Target Date 20   Date Met      Progress:     Goal Identifier 2 (continued difficulty with bed and chair transfers - no change)   Goal Description Pt will be able to perform bed and chair transfers without increase in sx.    Target Date 21   Date Met      Progress:     Goal Identifier 3 (no change - some days are better and can do recipricol pattern down)   Goal Description Pt will be able to ascend/descend stairs reciprocally in order to decrease difficulty with getting to laundry room.    Target Date 21   Date Met      Progress:     Goal Identifier 4 (LTG)   Goal Description Pt will be independent with HEP in order to self manage symptoms.    Target Date 21   Date Met      Progress:     Goal Identifier 5   Goal Description Pt will be able to reach overhead without increase in sx.    Target Date 21   Date Met  21   Progress:     Progress Toward Goals:   Progress this reporting period: Pt has made improvement with shoulder flexion and has met 1 goal with being able to reach overhead.  Pt did not meet any of the goals for his hip pain and will be undergoing a L hip replacement on 2021.  Due to upcoming procedure, the patient will be discharged  from therapy.    Plan:  Discharge from therapy.    Discharge:    Reason for Discharge: No further expectation of progress.  Patient chooses to discontinue therapy.  Change in medical status.    Equipment Issued: none    Discharge Plan: Patient to continue home program.

## 2021-01-30 DIAGNOSIS — Z11.59 ENCOUNTER FOR SCREENING FOR OTHER VIRAL DISEASES: ICD-10-CM

## 2021-01-30 LAB
SARS-COV-2 RNA RESP QL NAA+PROBE: NORMAL
SPECIMEN SOURCE: NORMAL

## 2021-01-30 PROCEDURE — U0005 INFEC AGEN DETEC AMPLI PROBE: HCPCS | Performed by: ORTHOPAEDIC SURGERY

## 2021-01-30 PROCEDURE — U0003 INFECTIOUS AGENT DETECTION BY NUCLEIC ACID (DNA OR RNA); SEVERE ACUTE RESPIRATORY SYNDROME CORONAVIRUS 2 (SARS-COV-2) (CORONAVIRUS DISEASE [COVID-19]), AMPLIFIED PROBE TECHNIQUE, MAKING USE OF HIGH THROUGHPUT TECHNOLOGIES AS DESCRIBED BY CMS-2020-01-R: HCPCS | Performed by: ORTHOPAEDIC SURGERY

## 2021-01-31 LAB
LABORATORY COMMENT REPORT: NORMAL
SARS-COV-2 RNA RESP QL NAA+PROBE: NEGATIVE
SPECIMEN SOURCE: NORMAL

## 2021-02-01 ENCOUNTER — ANESTHESIA EVENT (OUTPATIENT)
Dept: SURGERY | Facility: CLINIC | Age: 81
End: 2021-02-01
Payer: MEDICARE

## 2021-02-01 NOTE — ANESTHESIA PREPROCEDURE EVALUATION
Anesthesia Pre-Procedure Evaluation    Patient: Liu Ragsdale   MRN: [id number] : 1940        Preoperative Diagnosis: Degenerative joint disease of left hip [M16.12]   Procedure : Procedure(s):  Total Hip Arthroplasty     Past Medical History:   Diagnosis Date     Arthritis      Hypertension      NO ACTIVE PROBLEMS       Past Surgical History:   Procedure Laterality Date     CARDIAC SURGERY  3-10 2020    stent installed     COLONOSCOPY N/A 2016    Procedure: COLONOSCOPY;  Surgeon: Randy Avendano MD;  Location: [facility]     CV LEFT HEART CATH Left 3/10/2020    Procedure: Left Heart Cath;  Surgeon: Vicente Lopez MD;  Location:  HEART CARDIAC CATH LAB     EYE SURGERY      cataract surgery     VASECTOMY        Allergies   Allergen Reactions     Metoprolol Other (See Comments)     Side effects     Ampicillin Rash      Social History     Tobacco Use     Smoking status: Never Smoker     Smokeless tobacco: Never Used   Substance Use Topics     Alcohol use: Yes     Comment: Rare      Wt Readings from Last 1 Encounters:   21 76.7 kg (169 lb)        Anesthesia Evaluation   Pt has had prior anesthetic. Type: General and MAC.        ROS/MED HX  ENT/Pulmonary: Comment: Hearing loss    (+) sleep apnea,     Neurologic:       Cardiovascular: Comment: S/Pcoronary angio    (+) Dyslipidemia hypertension-Peripheral Vascular Disease-- Carotid Stenosis. CAD angina-at rest. -stent-3/2020. 1 Taking blood thinners PHILLIPS. Previous cardiac testing   Echo: Date: 3/5/2020 Results:  Interpretation Summary     1. Normal biventricular size and function. Left ventricular ejection fraction  of 55-60%. Possible mild hypokinesis of the basal inferior wall.  2. Normal sized atria.  3. No hemodynamically significant valvular disease.  No prior study for comparison.  Technically adequate study.  _____________________________________________________________________________  __        Left Ventricle  The left ventricle is  normal in size. Left ventricular systolic function is  normal. The visual ejection fraction is estimated at 55-60%. Grade I or early  diastolic dysfunction. Possible mild hypokinesis of the basal inferior wall.     Right Ventricle  The right ventricle is normal in size and function.     Atria  Normal left atrial size. Right atrial size is normal.     Mitral Valve  The mitral valve leaflets appear normal. There is no evidence of stenosis,  fluttering, or prolapse. There is trace mitral regurgitation.        Tricuspid Valve  Normal tricuspid valve. No tricuspid regurgitation.     Aortic Valve  Normal tricuspid aortic valve. No aortic regurgitation is present.     Pulmonic Valve  The pulmonic valve is not well visualized.     Vessels  Normal size aorta. Normal size ascending aorta. IVC diameter <2.1 cm  collapsing >50% with sniff suggests a normal RA pressure of 3 mmHg.     Pericardium  There is no pericardial effusion.        Rhythm  Sinus rhythm was noted.  Stress Test: Date: Results:    ECG Reviewed: Date: 1/25/21 Results:  Sinus Rhythm   WITHIN NORMAL LIMITS  Cath: Date: Results:      METS/Exercise Tolerance:     Hematologic:       Musculoskeletal:   (+) arthritis,     GI/Hepatic:  - neg GI/hepatic ROS     Renal/Genitourinary:  - neg Renal ROS     Endo:     (+) Obesity,     Psychiatric/Substance Use:  - neg psychiatric ROS     Infectious Disease:  - neg infectious disease ROS     Malignancy:  - neg malignancy ROS     Other:  - neg other ROS          Physical Exam    Airway        Mallampati: II   TM distance: > 3 FB   Neck ROM: full   Mouth opening: > 3 cm    Respiratory Devices and Support         Dental  no notable dental history         Cardiovascular   cardiovascular exam normal          Pulmonary   pulmonary exam normal                OUTSIDE LABS:  CBC:   Lab Results   Component Value Date    WBC 7.5 01/25/2021    WBC 6.0 03/10/2020    HGB 13.1 (L) 01/25/2021    HGB 13.4 03/10/2020    HCT 39.2 (L) 01/25/2021     HCT 40.2 03/10/2020     01/25/2021     03/10/2020     BMP:   Lab Results   Component Value Date     01/25/2021     03/10/2020    POTASSIUM 4.1 01/25/2021    POTASSIUM 3.8 03/10/2020    CHLORIDE 103 01/25/2021    CHLORIDE 110 (H) 03/10/2020    CO2 30 01/25/2021    CO2 26 03/10/2020    BUN 15 01/25/2021    BUN 18 03/10/2020    CR 1.13 01/25/2021    CR 1.06 03/10/2020    GLC 96 01/25/2021     (H) 03/10/2020     COAGS:   Lab Results   Component Value Date    PTT 35 03/10/2020    INR 1.04 03/10/2020     POC: No results found for: BGM, HCG, HCGS  HEPATIC:   Lab Results   Component Value Date    ALBUMIN 3.5 06/03/2019    PROTTOTAL 7.3 06/03/2019    ALT 29 08/19/2020    AST 17 06/03/2019    ALKPHOS 86 06/03/2019    BILITOTAL 0.5 06/03/2019     OTHER:   Lab Results   Component Value Date    A1C 6.0 01/23/2017    GWEN 9.9 01/25/2021    LIPASE 57 03/31/2007    AMYLASE 72 03/31/2007    TSH 1.41 05/30/2014       Anesthesia Plan    ASA Status:  3   NPO Status:  NPO Appropriate    Anesthesia Type: Spinal      Maintenance: Balanced.        Consents    Anesthesia Plan(s) and associated risks, benefits, and realistic alternatives discussed. Questions answered and patient/representative(s) expressed understanding.     - Discussed with:  Patient      - Extended Intubation/Ventilatory Support Discussed: no Extended Intubation.      - Patient is DNR/DNI Status: No    Use of blood products discussed: Yes.     - Discussed with: Patient.     - Consented: consented to blood products     Postoperative Care    Pain management: IV analgesics, Neuraxial analgesia, Oral pain medications.   PONV prophylaxis: Ondansetron (or other 5HT-3), Dexamethasone or Solumedrol     Comments:                GIO Jones CRNA

## 2021-02-03 ENCOUNTER — ANESTHESIA (OUTPATIENT)
Dept: SURGERY | Facility: CLINIC | Age: 81
End: 2021-02-03
Payer: MEDICARE

## 2021-02-03 ENCOUNTER — APPOINTMENT (OUTPATIENT)
Dept: GENERAL RADIOLOGY | Facility: CLINIC | Age: 81
End: 2021-02-03
Attending: ORTHOPAEDIC SURGERY
Payer: MEDICARE

## 2021-02-03 ENCOUNTER — HOSPITAL ENCOUNTER (OUTPATIENT)
Facility: CLINIC | Age: 81
Discharge: HOME | End: 2021-02-04
Attending: ORTHOPAEDIC SURGERY | Admitting: ORTHOPAEDIC SURGERY
Payer: MEDICARE

## 2021-02-03 DIAGNOSIS — Z96.642 HISTORY OF TOTAL HIP REPLACEMENT, LEFT: Primary | ICD-10-CM

## 2021-02-03 PROBLEM — M16.12 DEGENERATIVE JOINT DISEASE OF LEFT HIP: Status: ACTIVE | Noted: 2021-02-03

## 2021-02-03 LAB
CREAT SERPL-MCNC: 1.1 MG/DL (ref 0.66–1.25)
GFR SERPL CREATININE-BSD FRML MDRD: 63 ML/MIN/{1.73_M2}
HGB BLD-MCNC: 13.5 G/DL (ref 13.3–17.7)
INR PPP: 1.03 (ref 0.86–1.14)
POTASSIUM SERPL-SCNC: 3.7 MMOL/L (ref 3.4–5.3)

## 2021-02-03 PROCEDURE — 360N000077 HC SURGERY LEVEL 4, PER MIN: Performed by: ORTHOPAEDIC SURGERY

## 2021-02-03 PROCEDURE — 250N000011 HC RX IP 250 OP 636: Performed by: ORTHOPAEDIC SURGERY

## 2021-02-03 PROCEDURE — C1776 JOINT DEVICE (IMPLANTABLE): HCPCS | Performed by: ORTHOPAEDIC SURGERY

## 2021-02-03 PROCEDURE — 710N000009 HC RECOVERY PHASE 1, LEVEL 1, PER MIN: Performed by: ORTHOPAEDIC SURGERY

## 2021-02-03 PROCEDURE — 36415 COLL VENOUS BLD VENIPUNCTURE: CPT | Performed by: PHYSICIAN ASSISTANT

## 2021-02-03 PROCEDURE — 250N000025 HC SEVOFLURANE, PER MIN: Performed by: ORTHOPAEDIC SURGERY

## 2021-02-03 PROCEDURE — 999N000141 HC STATISTIC PRE-PROCEDURE NURSING ASSESSMENT: Performed by: ORTHOPAEDIC SURGERY

## 2021-02-03 PROCEDURE — C1713 ANCHOR/SCREW BN/BN,TIS/BN: HCPCS | Performed by: ORTHOPAEDIC SURGERY

## 2021-02-03 PROCEDURE — 85018 HEMOGLOBIN: CPT | Performed by: PHYSICIAN ASSISTANT

## 2021-02-03 PROCEDURE — 272N000001 HC OR GENERAL SUPPLY STERILE: Performed by: ORTHOPAEDIC SURGERY

## 2021-02-03 PROCEDURE — 250N000009 HC RX 250: Performed by: NURSE ANESTHETIST, CERTIFIED REGISTERED

## 2021-02-03 PROCEDURE — 258N000003 HC RX IP 258 OP 636: Performed by: NURSE ANESTHETIST, CERTIFIED REGISTERED

## 2021-02-03 PROCEDURE — 250N000011 HC RX IP 250 OP 636: Performed by: NURSE ANESTHETIST, CERTIFIED REGISTERED

## 2021-02-03 PROCEDURE — 258N000003 HC RX IP 258 OP 636: Performed by: ORTHOPAEDIC SURGERY

## 2021-02-03 PROCEDURE — 370N000017 HC ANESTHESIA TECHNICAL FEE, PER MIN: Performed by: ORTHOPAEDIC SURGERY

## 2021-02-03 PROCEDURE — 73501 X-RAY EXAM HIP UNI 1 VIEW: CPT

## 2021-02-03 PROCEDURE — 250N000013 HC RX MED GY IP 250 OP 250 PS 637: Mod: GY | Performed by: PHYSICIAN ASSISTANT

## 2021-02-03 PROCEDURE — 84132 ASSAY OF SERUM POTASSIUM: CPT | Performed by: PHYSICIAN ASSISTANT

## 2021-02-03 PROCEDURE — 85610 PROTHROMBIN TIME: CPT | Performed by: PHYSICIAN ASSISTANT

## 2021-02-03 PROCEDURE — 82565 ASSAY OF CREATININE: CPT | Performed by: PHYSICIAN ASSISTANT

## 2021-02-03 PROCEDURE — 250N000013 HC RX MED GY IP 250 OP 250 PS 637: Mod: GY | Performed by: ORTHOPAEDIC SURGERY

## 2021-02-03 PROCEDURE — 999N000065 XR PELVIS AND HIP PORTABLE LEFT 1 VIEW

## 2021-02-03 DEVICE — IMP SCR STRK LOW PROFILE HEX 6.5X25MM 7030-6525: Type: IMPLANTABLE DEVICE | Site: HIP | Status: FUNCTIONAL

## 2021-02-03 DEVICE — IMP STEM FEMORAL HIP STRK ACCOLADE II 127DEG SZ 5 6721-0535: Type: IMPLANTABLE DEVICE | Site: HIP | Status: FUNCTIONAL

## 2021-02-03 DEVICE — IMP LINER STRK TRIDENT X3 POLY 32MM 10DEG SZ D 623-10-32D: Type: IMPLANTABLE DEVICE | Site: HIP | Status: FUNCTIONAL

## 2021-02-03 DEVICE — 6.5MM LOW PROFILE HEX SCR 20MM: Type: IMPLANTABLE DEVICE | Site: HIP | Status: FUNCTIONAL

## 2021-02-03 DEVICE — IMP HEAD FEMORAL STRK BIOLOX DELTA CERAMIC V40 32MM: Type: IMPLANTABLE DEVICE | Site: HIP | Status: FUNCTIONAL

## 2021-02-03 DEVICE — IMPLANTABLE DEVICE: Type: IMPLANTABLE DEVICE | Site: HIP | Status: FUNCTIONAL

## 2021-02-03 RX ORDER — HYDROXYZINE HYDROCHLORIDE 10 MG/1
10 TABLET, FILM COATED ORAL EVERY 6 HOURS PRN
Qty: 30 TABLET | Refills: 0 | Status: SHIPPED | OUTPATIENT
Start: 2021-02-03 | End: 2021-09-26

## 2021-02-03 RX ORDER — SODIUM CHLORIDE, SODIUM LACTATE, POTASSIUM CHLORIDE, CALCIUM CHLORIDE 600; 310; 30; 20 MG/100ML; MG/100ML; MG/100ML; MG/100ML
INJECTION, SOLUTION INTRAVENOUS CONTINUOUS
Status: DISCONTINUED | OUTPATIENT
Start: 2021-02-03 | End: 2021-02-03 | Stop reason: HOSPADM

## 2021-02-03 RX ORDER — DOCUSATE SODIUM 100 MG/1
100 CAPSULE, LIQUID FILLED ORAL 2 TIMES DAILY
Status: DISCONTINUED | OUTPATIENT
Start: 2021-02-03 | End: 2021-02-04 | Stop reason: HOSPADM

## 2021-02-03 RX ORDER — ONDANSETRON 2 MG/ML
4 INJECTION INTRAMUSCULAR; INTRAVENOUS EVERY 30 MIN PRN
Status: DISCONTINUED | OUTPATIENT
Start: 2021-02-03 | End: 2021-02-03 | Stop reason: HOSPADM

## 2021-02-03 RX ORDER — FENTANYL CITRATE 50 UG/ML
INJECTION, SOLUTION INTRAMUSCULAR; INTRAVENOUS PRN
Status: DISCONTINUED | OUTPATIENT
Start: 2021-02-03 | End: 2021-02-03

## 2021-02-03 RX ORDER — MEPERIDINE HYDROCHLORIDE 25 MG/ML
12.5 INJECTION INTRAMUSCULAR; INTRAVENOUS; SUBCUTANEOUS EVERY 5 MIN PRN
Status: DISCONTINUED | OUTPATIENT
Start: 2021-02-03 | End: 2021-02-03 | Stop reason: HOSPADM

## 2021-02-03 RX ORDER — LIDOCAINE 40 MG/G
CREAM TOPICAL
Status: DISCONTINUED | OUTPATIENT
Start: 2021-02-03 | End: 2021-02-04 | Stop reason: HOSPADM

## 2021-02-03 RX ORDER — LIDOCAINE 40 MG/G
CREAM TOPICAL
Status: DISCONTINUED | OUTPATIENT
Start: 2021-02-03 | End: 2021-02-03 | Stop reason: HOSPADM

## 2021-02-03 RX ORDER — FENTANYL CITRATE 50 UG/ML
25-50 INJECTION, SOLUTION INTRAMUSCULAR; INTRAVENOUS
Status: DISCONTINUED | OUTPATIENT
Start: 2021-02-03 | End: 2021-02-03 | Stop reason: HOSPADM

## 2021-02-03 RX ORDER — HYDROMORPHONE HYDROCHLORIDE 1 MG/ML
0.4 INJECTION, SOLUTION INTRAMUSCULAR; INTRAVENOUS; SUBCUTANEOUS
Status: DISCONTINUED | OUTPATIENT
Start: 2021-02-03 | End: 2021-02-04 | Stop reason: HOSPADM

## 2021-02-03 RX ORDER — ACETAMINOPHEN 325 MG/1
650 TABLET ORAL EVERY 4 HOURS PRN
Status: DISCONTINUED | OUTPATIENT
Start: 2021-02-06 | End: 2021-02-04 | Stop reason: HOSPADM

## 2021-02-03 RX ORDER — METOPROLOL SUCCINATE 25 MG/1
25 TABLET, EXTENDED RELEASE ORAL DAILY
Status: DISCONTINUED | OUTPATIENT
Start: 2021-02-04 | End: 2021-02-04 | Stop reason: HOSPADM

## 2021-02-03 RX ORDER — OXYCODONE HYDROCHLORIDE 5 MG/1
5 TABLET ORAL EVERY 6 HOURS PRN
Qty: 24 TABLET | Refills: 0 | Status: SHIPPED | OUTPATIENT
Start: 2021-02-03 | End: 2021-02-04

## 2021-02-03 RX ORDER — ISOSORBIDE MONONITRATE 60 MG/1
60 TABLET, EXTENDED RELEASE ORAL DAILY
Status: DISCONTINUED | OUTPATIENT
Start: 2021-02-04 | End: 2021-02-04 | Stop reason: HOSPADM

## 2021-02-03 RX ORDER — ONDANSETRON 4 MG/1
4 TABLET, ORALLY DISINTEGRATING ORAL EVERY 6 HOURS PRN
Status: DISCONTINUED | OUTPATIENT
Start: 2021-02-03 | End: 2021-02-04 | Stop reason: HOSPADM

## 2021-02-03 RX ORDER — ONDANSETRON 4 MG/1
4 TABLET, ORALLY DISINTEGRATING ORAL EVERY 30 MIN PRN
Status: DISCONTINUED | OUTPATIENT
Start: 2021-02-03 | End: 2021-02-03 | Stop reason: HOSPADM

## 2021-02-03 RX ORDER — ACETAMINOPHEN 325 MG/1
975 TABLET ORAL EVERY 8 HOURS
Status: DISCONTINUED | OUTPATIENT
Start: 2021-02-03 | End: 2021-02-04 | Stop reason: HOSPADM

## 2021-02-03 RX ORDER — CEFAZOLIN SODIUM 1 G/50ML
1 INJECTION, SOLUTION INTRAVENOUS EVERY 8 HOURS
Status: COMPLETED | OUTPATIENT
Start: 2021-02-03 | End: 2021-02-04

## 2021-02-03 RX ORDER — EPHEDRINE SULFATE 50 MG/ML
INJECTION, SOLUTION INTRAMUSCULAR; INTRAVENOUS; SUBCUTANEOUS PRN
Status: DISCONTINUED | OUTPATIENT
Start: 2021-02-03 | End: 2021-02-03

## 2021-02-03 RX ORDER — NALOXONE HYDROCHLORIDE 0.4 MG/ML
0.2 INJECTION, SOLUTION INTRAMUSCULAR; INTRAVENOUS; SUBCUTANEOUS
Status: DISCONTINUED | OUTPATIENT
Start: 2021-02-03 | End: 2021-02-04 | Stop reason: HOSPADM

## 2021-02-03 RX ORDER — METHOCARBAMOL 500 MG/1
500 TABLET, FILM COATED ORAL EVERY 6 HOURS PRN
Status: DISCONTINUED | OUTPATIENT
Start: 2021-02-03 | End: 2021-02-04 | Stop reason: HOSPADM

## 2021-02-03 RX ORDER — HYDROXYZINE HYDROCHLORIDE 10 MG/1
10 TABLET, FILM COATED ORAL EVERY 6 HOURS PRN
Status: DISCONTINUED | OUTPATIENT
Start: 2021-02-03 | End: 2021-02-04 | Stop reason: HOSPADM

## 2021-02-03 RX ORDER — PROPOFOL 10 MG/ML
INJECTION, EMULSION INTRAVENOUS PRN
Status: DISCONTINUED | OUTPATIENT
Start: 2021-02-03 | End: 2021-02-03

## 2021-02-03 RX ORDER — LIDOCAINE HYDROCHLORIDE 10 MG/ML
INJECTION, SOLUTION INFILTRATION; PERINEURAL PRN
Status: DISCONTINUED | OUTPATIENT
Start: 2021-02-03 | End: 2021-02-03

## 2021-02-03 RX ORDER — CLINDAMYCIN PHOSPHATE 900 MG/50ML
900 INJECTION, SOLUTION INTRAVENOUS
Status: DISCONTINUED | OUTPATIENT
Start: 2021-02-03 | End: 2021-02-03 | Stop reason: HOSPADM

## 2021-02-03 RX ORDER — METOPROLOL TARTRATE 1 MG/ML
1-2 INJECTION, SOLUTION INTRAVENOUS EVERY 5 MIN PRN
Status: DISCONTINUED | OUTPATIENT
Start: 2021-02-03 | End: 2021-02-03 | Stop reason: HOSPADM

## 2021-02-03 RX ORDER — SODIUM CHLORIDE, SODIUM LACTATE, POTASSIUM CHLORIDE, CALCIUM CHLORIDE 600; 310; 30; 20 MG/100ML; MG/100ML; MG/100ML; MG/100ML
INJECTION, SOLUTION INTRAVENOUS CONTINUOUS
Status: DISCONTINUED | OUTPATIENT
Start: 2021-02-03 | End: 2021-02-04 | Stop reason: HOSPADM

## 2021-02-03 RX ORDER — IBUPROFEN 600 MG/1
600 TABLET, FILM COATED ORAL EVERY 6 HOURS PRN
Refills: 0
Start: 2021-02-03 | End: 2021-04-16

## 2021-02-03 RX ORDER — ATORVASTATIN CALCIUM 20 MG/1
40 TABLET, FILM COATED ORAL EVERY EVENING
Status: DISCONTINUED | OUTPATIENT
Start: 2021-02-03 | End: 2021-02-04 | Stop reason: HOSPADM

## 2021-02-03 RX ORDER — AMOXICILLIN 250 MG
1-2 CAPSULE ORAL 2 TIMES DAILY
Qty: 30 TABLET | Refills: 0
Start: 2021-02-03 | End: 2021-09-26

## 2021-02-03 RX ORDER — POLYETHYLENE GLYCOL 3350 17 G/17G
17 POWDER, FOR SOLUTION ORAL DAILY
Status: DISCONTINUED | OUTPATIENT
Start: 2021-02-04 | End: 2021-02-04 | Stop reason: HOSPADM

## 2021-02-03 RX ORDER — BISACODYL 10 MG
10 SUPPOSITORY, RECTAL RECTAL DAILY PRN
Status: DISCONTINUED | OUTPATIENT
Start: 2021-02-03 | End: 2021-02-04 | Stop reason: HOSPADM

## 2021-02-03 RX ORDER — ACETAMINOPHEN 325 MG/1
1000 TABLET ORAL 3 TIMES DAILY
Refills: 0 | Status: ON HOLD
Start: 2021-02-03 | End: 2023-01-01

## 2021-02-03 RX ORDER — ACETAMINOPHEN 325 MG/1
975 TABLET ORAL ONCE
Status: COMPLETED | OUTPATIENT
Start: 2021-02-03 | End: 2021-02-03

## 2021-02-03 RX ORDER — ONDANSETRON 2 MG/ML
4 INJECTION INTRAMUSCULAR; INTRAVENOUS EVERY 6 HOURS PRN
Status: DISCONTINUED | OUTPATIENT
Start: 2021-02-03 | End: 2021-02-04 | Stop reason: HOSPADM

## 2021-02-03 RX ORDER — PROCHLORPERAZINE MALEATE 5 MG
5 TABLET ORAL EVERY 6 HOURS PRN
Status: DISCONTINUED | OUTPATIENT
Start: 2021-02-03 | End: 2021-02-04 | Stop reason: HOSPADM

## 2021-02-03 RX ORDER — HYDROMORPHONE HYDROCHLORIDE 1 MG/ML
0.2 INJECTION, SOLUTION INTRAMUSCULAR; INTRAVENOUS; SUBCUTANEOUS
Status: DISCONTINUED | OUTPATIENT
Start: 2021-02-03 | End: 2021-02-04 | Stop reason: HOSPADM

## 2021-02-03 RX ORDER — TRANEXAMIC ACID 650 MG/1
1950 TABLET ORAL ONCE
Status: COMPLETED | OUTPATIENT
Start: 2021-02-03 | End: 2021-02-03

## 2021-02-03 RX ORDER — HYDROCHLOROTHIAZIDE 25 MG/1
25 TABLET ORAL DAILY
Status: DISCONTINUED | OUTPATIENT
Start: 2021-02-04 | End: 2021-02-04 | Stop reason: HOSPADM

## 2021-02-03 RX ORDER — ONDANSETRON 2 MG/ML
INJECTION INTRAMUSCULAR; INTRAVENOUS PRN
Status: DISCONTINUED | OUTPATIENT
Start: 2021-02-03 | End: 2021-02-03

## 2021-02-03 RX ORDER — AMOXICILLIN 250 MG
1 CAPSULE ORAL 2 TIMES DAILY
Status: DISCONTINUED | OUTPATIENT
Start: 2021-02-03 | End: 2021-02-04 | Stop reason: HOSPADM

## 2021-02-03 RX ORDER — NALOXONE HYDROCHLORIDE 0.4 MG/ML
0.4 INJECTION, SOLUTION INTRAMUSCULAR; INTRAVENOUS; SUBCUTANEOUS
Status: DISCONTINUED | OUTPATIENT
Start: 2021-02-03 | End: 2021-02-04 | Stop reason: HOSPADM

## 2021-02-03 RX ORDER — DEXAMETHASONE SODIUM PHOSPHATE 4 MG/ML
INJECTION, SOLUTION INTRA-ARTICULAR; INTRALESIONAL; INTRAMUSCULAR; INTRAVENOUS; SOFT TISSUE PRN
Status: DISCONTINUED | OUTPATIENT
Start: 2021-02-03 | End: 2021-02-03

## 2021-02-03 RX ORDER — ALBUTEROL SULFATE 0.83 MG/ML
2.5 SOLUTION RESPIRATORY (INHALATION) EVERY 4 HOURS PRN
Status: DISCONTINUED | OUTPATIENT
Start: 2021-02-03 | End: 2021-02-03 | Stop reason: HOSPADM

## 2021-02-03 RX ORDER — TRAMADOL HYDROCHLORIDE 50 MG/1
50 TABLET ORAL EVERY 6 HOURS PRN
Status: DISCONTINUED | OUTPATIENT
Start: 2021-02-03 | End: 2021-02-04 | Stop reason: HOSPADM

## 2021-02-03 RX ORDER — LOSARTAN POTASSIUM 50 MG/1
100 TABLET ORAL DAILY
Status: DISCONTINUED | OUTPATIENT
Start: 2021-02-04 | End: 2021-02-04 | Stop reason: HOSPADM

## 2021-02-03 RX ORDER — AMLODIPINE BESYLATE 2.5 MG/1
2.5 TABLET ORAL DAILY
Status: DISCONTINUED | OUTPATIENT
Start: 2021-02-04 | End: 2021-02-04 | Stop reason: HOSPADM

## 2021-02-03 RX ORDER — CLINDAMYCIN PHOSPHATE 900 MG/50ML
900 INJECTION, SOLUTION INTRAVENOUS SEE ADMIN INSTRUCTIONS
Status: DISCONTINUED | OUTPATIENT
Start: 2021-02-03 | End: 2021-02-03 | Stop reason: HOSPADM

## 2021-02-03 RX ORDER — FAMOTIDINE 20 MG/1
20 TABLET, FILM COATED ORAL 2 TIMES DAILY
Status: DISCONTINUED | OUTPATIENT
Start: 2021-02-03 | End: 2021-02-04 | Stop reason: HOSPADM

## 2021-02-03 RX ORDER — GLYCOPYRROLATE 0.2 MG/ML
INJECTION, SOLUTION INTRAMUSCULAR; INTRAVENOUS PRN
Status: DISCONTINUED | OUTPATIENT
Start: 2021-02-03 | End: 2021-02-03

## 2021-02-03 RX ADMIN — TRANEXAMIC ACID 1950 MG: 650 TABLET ORAL at 12:30

## 2021-02-03 RX ADMIN — PROPOFOL 150 MG: 10 INJECTION, EMULSION INTRAVENOUS at 14:25

## 2021-02-03 RX ADMIN — Medication 10 MG: at 14:31

## 2021-02-03 RX ADMIN — PHENYLEPHRINE HYDROCHLORIDE 200 MCG: 10 INJECTION INTRAVENOUS at 15:29

## 2021-02-03 RX ADMIN — FENTANYL CITRATE 50 MCG: 50 INJECTION, SOLUTION INTRAMUSCULAR; INTRAVENOUS at 15:18

## 2021-02-03 RX ADMIN — FENTANYL CITRATE 50 MCG: 50 INJECTION, SOLUTION INTRAMUSCULAR; INTRAVENOUS at 16:46

## 2021-02-03 RX ADMIN — HYDROMORPHONE HYDROCHLORIDE 0.5 MG: 1 INJECTION, SOLUTION INTRAMUSCULAR; INTRAVENOUS; SUBCUTANEOUS at 17:10

## 2021-02-03 RX ADMIN — SODIUM CHLORIDE, POTASSIUM CHLORIDE, SODIUM LACTATE AND CALCIUM CHLORIDE: 600; 310; 30; 20 INJECTION, SOLUTION INTRAVENOUS at 13:01

## 2021-02-03 RX ADMIN — SODIUM CHLORIDE, POTASSIUM CHLORIDE, SODIUM LACTATE AND CALCIUM CHLORIDE: 600; 310; 30; 20 INJECTION, SOLUTION INTRAVENOUS at 22:22

## 2021-02-03 RX ADMIN — FENTANYL CITRATE 50 MCG: 50 INJECTION, SOLUTION INTRAMUSCULAR; INTRAVENOUS at 16:38

## 2021-02-03 RX ADMIN — SODIUM CHLORIDE, POTASSIUM CHLORIDE, SODIUM LACTATE AND CALCIUM CHLORIDE: 600; 310; 30; 20 INJECTION, SOLUTION INTRAVENOUS at 16:12

## 2021-02-03 RX ADMIN — ROCURONIUM BROMIDE 40 MG: 10 INJECTION INTRAVENOUS at 14:25

## 2021-02-03 RX ADMIN — PHENYLEPHRINE HYDROCHLORIDE 150 MCG: 10 INJECTION INTRAVENOUS at 14:56

## 2021-02-03 RX ADMIN — LIDOCAINE HYDROCHLORIDE 50 MG: 10 INJECTION, SOLUTION INFILTRATION; PERINEURAL at 14:25

## 2021-02-03 RX ADMIN — CEFAZOLIN SODIUM 1 G: 1 INJECTION, SOLUTION INTRAVENOUS at 22:23

## 2021-02-03 RX ADMIN — ACETAMINOPHEN 975 MG: 325 TABLET, FILM COATED ORAL at 18:41

## 2021-02-03 RX ADMIN — CLINDAMYCIN PHOSPHATE 900 MG: 900 INJECTION, SOLUTION INTRAVENOUS at 14:30

## 2021-02-03 RX ADMIN — DEXAMETHASONE SODIUM PHOSPHATE 4 MG: 4 INJECTION, SOLUTION INTRA-ARTICULAR; INTRALESIONAL; INTRAMUSCULAR; INTRAVENOUS; SOFT TISSUE at 14:44

## 2021-02-03 RX ADMIN — FENTANYL CITRATE 100 MCG: 50 INJECTION, SOLUTION INTRAMUSCULAR; INTRAVENOUS at 14:25

## 2021-02-03 RX ADMIN — FENTANYL CITRATE 50 MCG: 50 INJECTION, SOLUTION INTRAMUSCULAR; INTRAVENOUS at 15:02

## 2021-02-03 RX ADMIN — PROPOFOL 30 MG: 10 INJECTION, EMULSION INTRAVENOUS at 15:00

## 2021-02-03 RX ADMIN — HYDROMORPHONE HYDROCHLORIDE 0.5 MG: 1 INJECTION, SOLUTION INTRAMUSCULAR; INTRAVENOUS; SUBCUTANEOUS at 17:21

## 2021-02-03 RX ADMIN — MIDAZOLAM 2 MG: 1 INJECTION INTRAMUSCULAR; INTRAVENOUS at 14:19

## 2021-02-03 RX ADMIN — FAMOTIDINE 20 MG: 20 TABLET, FILM COATED ORAL at 20:08

## 2021-02-03 RX ADMIN — ONDANSETRON 4 MG: 2 INJECTION INTRAMUSCULAR; INTRAVENOUS at 14:44

## 2021-02-03 RX ADMIN — GLYCOPYRROLATE 0.2 MG: 0.2 INJECTION, SOLUTION INTRAMUSCULAR; INTRAVENOUS at 14:28

## 2021-02-03 RX ADMIN — FENTANYL CITRATE 50 MCG: 50 INJECTION, SOLUTION INTRAMUSCULAR; INTRAVENOUS at 15:00

## 2021-02-03 RX ADMIN — TRAMADOL HYDROCHLORIDE 50 MG: 50 TABLET, FILM COATED ORAL at 18:48

## 2021-02-03 RX ADMIN — HYDROMORPHONE HYDROCHLORIDE 0.5 MG: 1 INJECTION, SOLUTION INTRAMUSCULAR; INTRAVENOUS; SUBCUTANEOUS at 16:49

## 2021-02-03 RX ADMIN — FENTANYL CITRATE 50 MCG: 50 INJECTION, SOLUTION INTRAMUSCULAR; INTRAVENOUS at 16:42

## 2021-02-03 RX ADMIN — Medication 10 MG: at 14:54

## 2021-02-03 RX ADMIN — ACETAMINOPHEN 975 MG: 325 TABLET, FILM COATED ORAL at 12:30

## 2021-02-03 RX ADMIN — ATORVASTATIN CALCIUM 40 MG: 20 TABLET, FILM COATED ORAL at 18:41

## 2021-02-03 RX ADMIN — LIDOCAINE HYDROCHLORIDE 0.1 ML: 10 INJECTION, SOLUTION EPIDURAL; INFILTRATION; INTRACAUDAL; PERINEURAL at 13:01

## 2021-02-03 RX ADMIN — DOCUSATE SODIUM 100 MG: 100 CAPSULE, LIQUID FILLED ORAL at 20:08

## 2021-02-03 RX ADMIN — DOCUSATE SODIUM AND SENNOSIDES 1 TABLET: 8.6; 5 TABLET ORAL at 20:08

## 2021-02-03 RX ADMIN — FENTANYL CITRATE 50 MCG: 50 INJECTION, SOLUTION INTRAMUSCULAR; INTRAVENOUS at 16:34

## 2021-02-03 RX ADMIN — HYDROMORPHONE HYDROCHLORIDE 0.5 MG: 1 INJECTION, SOLUTION INTRAMUSCULAR; INTRAVENOUS; SUBCUTANEOUS at 17:00

## 2021-02-03 ASSESSMENT — MIFFLIN-ST. JEOR: SCORE: 1360.83

## 2021-02-03 NOTE — ANESTHESIA POSTPROCEDURE EVALUATION
Patient: Liu Ragsdale    Procedure(s):  Total Hip Arthroplasty    Diagnosis:Degenerative joint disease of left hip [M16.12]  Diagnosis Additional Information: No value filed.    Anesthesia Type:  Spinal    Note:  Disposition: Inpatient   Postop Pain Control: Uneventful            Sign Out: Well controlled pain   PONV: No   Neuro/Psych: Uneventful            Sign Out: Acceptable/Baseline neuro status   Airway/Respiratory: Uneventful            Sign Out: Acceptable/Baseline resp. status   CV/Hemodynamics: Uneventful            Sign Out: Acceptable CV status   Other NRE: NONE   DID A NON-ROUTINE EVENT OCCUR? No         Last vitals:  CRNA VITALS  2/3/2021 1555 - 2/3/2021 1655      2/3/2021             Pulse:  94    SpO2:  99 %    Resp Rate (observed):  (!) 6          Electronically Signed By: Claribel Benjamin CRNA, GIO DUKE  February 3, 2021  5:05 PM

## 2021-02-03 NOTE — BRIEF OP NOTE
[facility]     Brief Operative Note    Pre-operative diagnosis: Degenerative joint disease of left hip [M16.12]  Post-operative diagnosis Same as pre-operative diagnosis    Procedure: Procedure(s):  Total Hip Arthroplasty  Surgeon: Surgeon(s) and Role:     * Andrew Arriola MD - Primary     * Silverio Mondragon PA-C - Assisting  Anesthesia: General   Estimated blood loss: 300 ml  Drains: None  Specimens: * No specimens in log *  Findings:   None.  Complications: None.  Implants:   Implant Name Type Inv. Item Serial No.  Lot No. LRB No. Used Action   John Trident II PSL Clusterhole Acetabular Shell, 48 mm, D    JOHN [id number] Left 1 Implanted   IMP SCR STRK LOW PROFILE HEX 6.5X25MM [id number] Metallic Hardware/[place] IMP SCR STRK LOW PROFILE HEX 6.5X25MM [id number]  [employer] [id number] Left 1 Implanted   6.5MM LOW PROFILE HEX SCR 20MM Metallic Hardware/[place] 6.5MM LOW PROFILE HEX SCR 20MM  [employer] [id number] Left 1 Implanted   IMP STEM FEMORAL HIP STRK ACCOLADE II 127DEG SZ 5 [id number] Total Joint Component/Insert IMP STEM FEMORAL HIP STRK ACCOLADE II 127DEG SZ 5 [id number]  [employer] [id number] Left 1 Implanted   IMP LINER STRK TRIDENT X3 POLY 32MM 10DEG SZ D 253-10-32D Total Joint Component/Insert IMP LINER STRK TRIDENT X3 POLY 32MM 10DEG SZ D 623-10-32D  [employer] [id number] Left 1 Implanted   IMP HEAD FEMORAL STRK BIOLOX DELTA CERAMIC V40 32MM Total Joint Component/Insert IMP HEAD FEMORAL STRK BIOLOX DELTA CERAMIC V40 32MM  [employer] [id number] Left 1 Implanted

## 2021-02-03 NOTE — PROGRESS NOTES
"SPIRITUAL HEALTH SERVICES  [facility] - Surgery    Referral Source: Pt request during pre-op phone call    - Liu was just being prepped for surgery.  Wife, Kelsi, was also in the room.  Lab was drawing blood so I served as a \"distraction\" in providing conversation.    - Liu spoke of how this was his first joint replacement and that he felt he was in good hands, both with the [facility] team, and Kelsi pointed up \"and with Him too.\"    - I provided prayer for a successful surgery and rehab in the coming weeks.    Plan:  will remain available for any ongoing support needs during LOS.    Jaxon Santiago M.A., [facility]  Lead   [facility]  Office: [phone]    "

## 2021-02-03 NOTE — OP NOTE
Total Hip Arthoplasty Operative Note        PLAN:  Weight bearing status: Weight bearing as tolerated   Activity: Activity as tolerated  Patient may move about with assist as indicated or with supervision   Anticoagulation plan:                 ASA 81 mg po every day for 42 days  Follow up plan                           Follow up in 2 week(s)        Name: Liu MCCONNELL Melyssa    PCP: Piyush Rogers    Procedure Date: 2/3/2021    Pre-operative diagnosis: Degenerative joint disease of left hip [M16.12]   Post-operative diagnosis: Same   Procedure: Total hip arthoplasty (Left)   Surgeon: Andrew Arriola MD     Assistant(s): Silverio Mondragon PA-C   Anesthesia: Spinal Anesthesia   Estimated blood loss: 300 ml   Drains: None   Specimens: None       Findings: See full dictated operative note for details   Complications: None       Comments: See dictated operative report for full details     Indications:    The patient has experienced progressive left hip pain despite use of  Analgesics, NSAID's, Injection therapy and physical therapy. Because of the failure of these therapies to control symptoms and limited walking, night pain and altered activities the patient has decided to move ahead with joint replacement surgery.        Procedure and Findings:    After being informed of the risks, benefits, and alternatives to the procedure, the patient desired to proceed. Brought to the main operating suite where she was placed under spinal anesthetic. She was positioned in an Innomed hip akhtar. The patient s Left lower extremity was prepped and draped in a manner appropriate for the procedure after a timeout verification step was complete.    Patient received 2 grams of intravenous Ancef. Silverio Mondragon PA-C was present for the entire length of the case for the purposes of proper patient positioning, surgical exposure, and patient safety.    A minimally invasive posterior approach to the hip was taken. IT band was divided. Gluteus  fibers divided and the Charnley retractor was placed. Attention was directed towards the external rotators. The piriformis was identified and tagged. Minimus was elevated. The capsule was teed and tagged. Hip leg length and offset were then determined using a Smith and Nephew device with a pin in the innominate and the hip was then dislocated. The neck was resected using the Trochet guide. Full thickness cartilage loss was demonstrated involving the femoral head and acetabulum.     Attention was directed toward the acetabulum. Retractors were placed. Soft tissues were resected. Sequential reaming from 44 to 49 mm was carried out. Good cancellous bleeding bed was demonstrated. The trail 48 mm cup was stable. The final 48 mm Trident 2 HA PSL cup was selected and secured with 2 supplemental fixation screws. A 10 degree liner was placed. Attention was directed towards the femur. Box chisel, canal finder, sequential broaching up to 5 was carried out. Trial reductions were carried out and excellent range of motion and stability and restoration of leg length and offset was demonstrated with a -4,32 head. X-ray was obtained which demonstrated appropriate sizing and positioning of components. The trial components were then removed. The final 10 degree liner was secured. Inferior osteophytes were resected from the acetabulum. The implant 5, Accolade 2, 127  degree offset stem was the secured. Trial reductions were carried out and a -4, 32 mm head was selected. The hip was reduced. The joint was copiously irrigated. The joint capsule and piriformis tendon was repaired back to the greater trochanter through drill holes in bone. Soft tissues were infiltrated with anesthetic solution. Care was taken to avoid neurovascular structures.   The IT band was closed with running #1 running Stratafix stitch. Subcutaneous closure With layered 2-0 Vicryl, skin was closed with 3-0 Stratafix and Aquacell dressing. Sterile dressing was  applied. Hip abductor pillow was placed. The patient returned to PAR in stable condition.       Andrew Arriola MD    Date: 2/3/2021 Time: 4:23 PM      CONFIDENTIALITY NOTICE This message and any included attachments are from [facility] and are intended only for the addressee. The information contained in this message is confidential and may constitute inside or non-public information under international, federal, or state securities laws. Unauthorized forwarding, printing, copying, distribution, or use of such information is strictly prohibited and may be unlawful. If you are not the addressee, please promptly delete this message and notify the sender of the delivery error by e-mail.

## 2021-02-03 NOTE — ANESTHESIA PROCEDURE NOTES
Airway   Date/Time: 2/3/2021 2:28 PM   Patient location during procedure: OR  Staff -   CRNA: Claribel Benjamin APRN CRNA  Other Anesthesia Staff: Paola Pierce  Performed By: JENELLE    Consent for Airway   Urgency: elective    Indications and Patient Condition  Indications for airway management: brenda-procedural  Induction type:intravenousMask difficulty assessment: 0 - not attempted    Final Airway Details  Final airway type: endotracheal airway  Successful airway:ETT - single  Endotracheal Airway Details   ETT size (mm): 7.5  Cuffed: yes  Successful intubation technique: video laryngoscopy  Grade View of Cords: 2  Adjucts: stylet  Measured from: gums/teeth  Secured at (cm): 23  Secured with: cloth tape  Bite block used: None    Post intubation assessment   Placement verified by: capnometry and chest rise   Number of attempts at approach: 1  Number of other approaches attempted: 0  Secured with:cloth tape  Ease of procedure: easy  Dentition: Intact and Unchanged

## 2021-02-04 ENCOUNTER — APPOINTMENT (OUTPATIENT)
Dept: PHYSICAL THERAPY | Facility: CLINIC | Age: 81
End: 2021-02-04
Attending: ORTHOPAEDIC SURGERY
Payer: MEDICARE

## 2021-02-04 VITALS
BODY MASS INDEX: 31.1 KG/M2 | HEART RATE: 85 BPM | RESPIRATION RATE: 16 BRPM | HEIGHT: 62 IN | DIASTOLIC BLOOD PRESSURE: 61 MMHG | SYSTOLIC BLOOD PRESSURE: 134 MMHG | OXYGEN SATURATION: 94 % | WEIGHT: 169 LBS | TEMPERATURE: 98.3 F

## 2021-02-04 PROBLEM — I25.10 CORONARY ARTERY DISEASE INVOLVING NATIVE CORONARY ARTERY OF NATIVE HEART WITHOUT ANGINA PECTORIS: Status: ACTIVE | Noted: 2020-03-23

## 2021-02-04 LAB — HGB BLD-MCNC: 10.5 G/DL (ref 13.3–17.7)

## 2021-02-04 PROCEDURE — 97116 GAIT TRAINING THERAPY: CPT | Mod: GP

## 2021-02-04 PROCEDURE — 99207 PR CDG-CODE CATEGORY CHANGED: CPT | Performed by: PHYSICIAN ASSISTANT

## 2021-02-04 PROCEDURE — 97110 THERAPEUTIC EXERCISES: CPT | Mod: GP

## 2021-02-04 PROCEDURE — 36415 COLL VENOUS BLD VENIPUNCTURE: CPT | Performed by: ORTHOPAEDIC SURGERY

## 2021-02-04 PROCEDURE — 85018 HEMOGLOBIN: CPT | Performed by: ORTHOPAEDIC SURGERY

## 2021-02-04 PROCEDURE — 97161 PT EVAL LOW COMPLEX 20 MIN: CPT | Mod: GP

## 2021-02-04 PROCEDURE — 250N000013 HC RX MED GY IP 250 OP 250 PS 637: Performed by: ORTHOPAEDIC SURGERY

## 2021-02-04 PROCEDURE — 99224 PR SUBSEQUENT OBSERVATION CARE,LEVEL I: CPT | Performed by: PHYSICIAN ASSISTANT

## 2021-02-04 PROCEDURE — 250N000013 HC RX MED GY IP 250 OP 250 PS 637: Mod: GY | Performed by: PHYSICIAN ASSISTANT

## 2021-02-04 PROCEDURE — 250N000011 HC RX IP 250 OP 636: Performed by: ORTHOPAEDIC SURGERY

## 2021-02-04 RX ORDER — ASPIRIN 81 MG/1
81 TABLET ORAL DAILY
Status: DISCONTINUED | OUTPATIENT
Start: 2021-02-04 | End: 2021-02-04 | Stop reason: HOSPADM

## 2021-02-04 RX ORDER — TRAMADOL HYDROCHLORIDE 50 MG/1
50 TABLET ORAL EVERY 6 HOURS PRN
Qty: 40 TABLET | Refills: 0 | Status: SHIPPED | OUTPATIENT
Start: 2021-02-04 | End: 2021-04-15

## 2021-02-04 RX ORDER — ASPIRIN 81 MG/1
81 TABLET ORAL DAILY
Qty: 42 TABLET | Refills: 0 | Status: ON HOLD | OUTPATIENT
Start: 2021-02-04 | End: 2021-09-28

## 2021-02-04 RX ADMIN — ASPIRIN 81 MG: 81 TABLET ORAL at 10:02

## 2021-02-04 RX ADMIN — TICAGRELOR 90 MG: 90 TABLET ORAL at 10:02

## 2021-02-04 RX ADMIN — ISOSORBIDE MONONITRATE 60 MG: 60 TABLET, EXTENDED RELEASE ORAL at 08:21

## 2021-02-04 RX ADMIN — ACETAMINOPHEN 975 MG: 325 TABLET, FILM COATED ORAL at 02:19

## 2021-02-04 RX ADMIN — DOCUSATE SODIUM AND SENNOSIDES 1 TABLET: 8.6; 5 TABLET ORAL at 08:21

## 2021-02-04 RX ADMIN — TRAMADOL HYDROCHLORIDE 50 MG: 50 TABLET, FILM COATED ORAL at 08:23

## 2021-02-04 RX ADMIN — AMLODIPINE BESYLATE 2.5 MG: 2.5 TABLET ORAL at 08:20

## 2021-02-04 RX ADMIN — FAMOTIDINE 20 MG: 20 TABLET, FILM COATED ORAL at 08:21

## 2021-02-04 RX ADMIN — CEFAZOLIN SODIUM 1 G: 1 INJECTION, SOLUTION INTRAVENOUS at 06:40

## 2021-02-04 RX ADMIN — ACETAMINOPHEN 975 MG: 325 TABLET, FILM COATED ORAL at 10:05

## 2021-02-04 RX ADMIN — HYDROCHLOROTHIAZIDE 25 MG: 25 TABLET ORAL at 08:21

## 2021-02-04 RX ADMIN — TRAMADOL HYDROCHLORIDE 50 MG: 50 TABLET, FILM COATED ORAL at 02:19

## 2021-02-04 RX ADMIN — LOSARTAN POTASSIUM 100 MG: 50 TABLET, FILM COATED ORAL at 08:21

## 2021-02-04 RX ADMIN — METOPROLOL SUCCINATE 25 MG: 25 TABLET, EXTENDED RELEASE ORAL at 08:21

## 2021-02-04 NOTE — DISCHARGE SUMMARY
Orthopedics Discharge Summary                                  [facility]     GISELE LOZANO [id number]   Age: 80 year old  PCP: Piyush Rogers , [phone] 1940     Date of Admission:  2/3/2021  Date of Discharge::  2/4/2021  Discharge Physician:  Yessenia Albarran PA-C    Code status:  Full Code    Admission Information:  Admission Diagnosis:  Degenerative joint disease of left hip [M16.12]    Post-Operative Day: 1 Day Post-Op     Reason for admission:  The patient was admitted for the following:Procedure(s) (LRB):  Total Hip Arthroplasty (Left)    Active Problems:    Hyperlipidemia LDL goal <70    Benign essential hypertension, BP goal <140/90    Obesity    FANI (obstructive sleep apnea)    Coronary artery disease involving native coronary artery of native heart without angina pectoris    Degenerative joint disease of left hip      Allergies:  Metoprolol and Ampicillin    Following the procedure noted above the patient was transferred to the post-op floor and started on:    Therapy:  physical therapy and occupational therapy  Anticoagulation Plan: Brilinta, ASA 81mg x30d   Pain Management: tramadol and tylenol  Weight bearing status: Weight bearing as tolerated     The patient was followed and co-managed by the hospitalist service during the inpatient treatment course  Complications:  None  Consultations:  None     Pertinent Labs   Lab Results: personally reviewed.     Recent Labs   Lab Test 02/04/21  0409 02/03/21  1220 01/25/21  1038 03/10/20  0955 02/27/20  1015 01/22/18  1155 01/22/18  1155   INR  --  1.03  --  1.04  --   --   --    HGB 10.5* 13.5 13.1* 13.4  --   --  13.5   HCT  --   --  39.2* 40.2  --   --  39.7*   MCV  --   --  90 87  --   --  85   PLT  --   --  226 183  --   --  179   NA  --   --  136 141 136   < > 132*    < > = values in this interval not displayed.          Discharge Information:  Condition at discharge: Stable  Discharge destination:  Discharged  to home     Medications at discharge:  Current Discharge Medication List      START taking these medications    Details   !! acetaminophen (TYLENOL) 325 MG tablet Take 3 tablets (975 mg) by mouth 3 times daily  Refills: 0    Associated Diagnoses: History of total hip replacement, left      aspirin 81 MG EC tablet Take 1 tablet (81 mg) by mouth daily  Qty: 42 tablet, Refills: 0    Associated Diagnoses: History of total hip replacement, left      hydrOXYzine (ATARAX) 10 MG tablet Take 1 tablet (10 mg) by mouth every 6 hours as needed for itching or anxiety (with pain, moderate pain)  Qty: 30 tablet, Refills: 0    Associated Diagnoses: History of total hip replacement, left      ibuprofen (ADVIL/MOTRIN) 600 MG tablet Take 1 tablet (600 mg) by mouth every 6 hours as needed for pain (mild)  Refills: 0    Associated Diagnoses: History of total hip replacement, left      senna-docusate (SENOKOT-S/PERICOLACE) 8.6-50 MG tablet Take 1-2 tablets by mouth 2 times daily Take while on oral narcotics to prevent or treat constipation.  Qty: 30 tablet, Refills: 0    Comments: While taking narcotics  Associated Diagnoses: History of total hip replacement, left      traMADol (ULTRAM) 50 MG tablet Take 1 tablet (50 mg) by mouth every 6 hours as needed  Qty: 40 tablet, Refills: 0    Associated Diagnoses: History of total hip replacement, left       !! - Potential duplicate medications found. Please discuss with provider.      CONTINUE these medications which have NOT CHANGED    Details   !! acetaminophen (TYLENOL) 325 MG tablet Take 650 mg by mouth every 6 hours as needed for mild pain      amLODIPine (NORVASC) 2.5 MG tablet Take 1 tablet (2.5 mg) by mouth daily  Qty: 90 tablet, Refills: 3    Associated Diagnoses: Coronary artery disease involving native coronary artery of native heart without angina pectoris      atorvastatin (LIPITOR) 40 MG tablet Take 1 tablet (40 mg) by mouth daily  Qty: 90 tablet, Refills: 3    Associated  Diagnoses: Coronary artery disease involving native coronary artery of native heart without angina pectoris; Hyperlipidemia LDL goal <70      diclofenac (VOLTAREN) 1 % topical gel Place 4 g onto the skin 3 times daily as needed for moderate pain (Shoulder)  Qty: 100 g, Refills: 1    Associated Diagnoses: Primary osteoarthritis of right shoulder      hydrochlorothiazide (HYDRODIURIL) 25 MG tablet Take 1 tablet (25 mg) by mouth daily  Qty: 90 tablet, Refills: 3    Associated Diagnoses: Benign essential hypertension      isosorbide mononitrate (IMDUR) 30 MG 24 hr tablet Take 2 tablets by mouth once daily  Qty: 180 tablet, Refills: 3    Associated Diagnoses: Angina at rest (H); Coronary artery disease involving native coronary artery of native heart without angina pectoris      losartan (COZAAR) 100 MG tablet Take 1 tablet (100 mg) by mouth daily  Qty: 90 tablet, Refills: 3    Associated Diagnoses: Coronary artery disease involving native coronary artery of native heart without angina pectoris; Benign essential hypertension      metoprolol succinate ER (TOPROL XL) 25 MG 24 hr tablet Take 1 tablet (25 mg) by mouth daily  Qty: 90 tablet, Refills: 3    Associated Diagnoses: Positive cardiac stress test; Angina at rest (H)      Multiple Vitamin (MULTI VITAMIN PO) Take 1 oz by mouth daily      ticagrelor (BRILINTA) 90 MG tablet Take 1 tablet (90 mg) by mouth 2 times daily Start this evening.  Qty: 180 tablet, Refills: 3    Comments: PLEASE DO NOT FILL. PATIENT WILL CALL TO FILL.  Associated Diagnoses: Positive cardiac stress test; Angina at rest (H); Postsurgical percutaneous transluminal coronary angioplasty status      nitroGLYcerin (NITROSTAT) 0.3 MG sublingual tablet For chest pain place 1 tablet under the tongue every 5 minutes for 3 doses. If symptoms persist 5 minutes after 1st dose call 911.  Qty: 30 tablet, Refills: 3    Associated Diagnoses: Positive cardiac stress test; Angina at rest (H)       !! - Potential  duplicate medications found. Please discuss with provider.                     Follow-Up Care:  Patient should be seen in the office in 10-14 days by the Orthopedic Surgeon/Physician Assistant.  Call [phone] for appointment or questions.    HALEIGH Steiner MD

## 2021-02-04 NOTE — PROGRESS NOTES
"[facility] [place]MISSION [place]    Patient admitted to room 2300 at approximately 1805 via cart from surgery. Patient was accompanied by nurse.     Verbal SBAR report received from RN prior to patient arrival.     Patient trasferred to bed via air roni. Patient alert and oriented X 3. The patient is not having any pain.  . Admission vital signs: Blood pressure 115/58, pulse 80, temperature 97.3  F (36.3  C), temperature source Axillary, resp. rate 16, height 1.575 m (5' 2\"), weight 76.7 kg (169 lb), SpO2 97 %. Patient was oriented to plan of care, call light, bed controls, tv, telephone, bathroom and visiting hours.     Risk Assessment    The following safety risks were identified during admission: fall. Yellow risk band applied: YES.     Skin Initial Assessment    This writer admitted this patient and completed a full skin assessment and Jersey score in the Adult PCS flowsheet. Appropriate interventions initiated as needed.     Secondary skin check completed by Cesia BOSE.         Education    Patient has a [place] to Observation order: No  Observation education completed and documented: N/A      Nyla Chaudhary RN    "

## 2021-02-04 NOTE — PLAN OF CARE
[facility] DISCHARGE NOTE    Patient discharged to home at 1:46 PM via wheel chair. Accompanied by spouse and staff. Discharge instructions reviewed with patient and spouse, opportunity offered to ask questions. Prescriptions filled and sent with patient upon discharge. All belongings sent with patient.    Tony Aguirre RN

## 2021-02-04 NOTE — PROGRESS NOTES
Patient vital signs are at baseline: Yes  Patient able to ambulate as they were prior to admission or with assist devices provided by therapies during their stay:  Yes  Patient MUST void prior to discharge:  Yes  Patient able to tolerate oral intake:  Yes  Pain has adequate pain control using Oral analgesics:  Yes     AOx4, up w/SBA and walker. Pain controlled with tramadol x 1. On RA, urinal void, VSS.    JUAN Barahona RN

## 2021-02-04 NOTE — PROGRESS NOTES
Skin affirmation note    Admitting nurse completed full skin assessment, Jersey score and Jersey interventions. This writer agrees with the initial skin assessment findings.

## 2021-02-04 NOTE — PROGRESS NOTES
[facility] Medicine Progress Note  Date of Service: 02/04/2021    Assessment & Plan   Liu Ragsdale is a 80 year old male who presented on 2/3/2021 for scheduled Procedure(s):  Total Hip Arthroplasty by Andrew Arriola MD and is being followed by the hospital medicine service for co-management of acute and/or chronic perioperative medical problems.    S/p Procedure(s):  Total Hip Arthroplasty   1 Day Post-Op   Hg 10.5. Pain well managed at present.   - pain control, wound cares, physical therapy, occupational therapy and per orthopedic surgery service  - DVT ppx with aspirin 81 mg daily due to brillinta therapy, discussed with orhopedics    Coronary Artery Disease  Previously diagnosed, no current concerning symptoms. History of angiogram in 3/2020 underwent stenting to the LCx with resdiual disease noted in the ramus and RCA-YAA. Follows with cardiology.   - continue home Imdur, losartan, metoprolol, statin  - resume home brillinta, discussed with pharmacy    Hypertension  Chronic. Blood pressures reviewed, stable.   - continue home amlodipine, hydrochlorothiazide, losartan, metoprolol, Imdur    Hyperlipidemia  Chronic.  - continue home atorvastatin    Obesity  Body mass index is 30.91 kg/m . Contributes to above.    DVT Prophylaxis: as per orthopedic surgery service - 81 mg aspirin daily along with home brillinta   Code Status: Full Code    Lines: peripheral   Vásquez catheter: none    Discussion: Medically, the patient appears to be recovering appropriately since surgery. Vital signs stable. No medical barriers to discharge at this time.    Disposition: Anticipate discharge later today when mobility goals met and pain managed     Attestation:  I have reviewed today's vital signs, notes, medications, labs and imaging.  Total time: 15 minutes    This patient was discussed with Dr. Rafael Uribe. Plan as above.    Deidre Byrd PA-C   [facility] Medicine    Interval  History   Patient was seen this morning and is doing well today. Pain well managed. Activity well tolerated thus far though hasn't worked with PT. Good appetite passing Gas. no BM. Voiding, though somewhat low amount, does have issues with weak stream and frequency at home, no formal benign prostatic hypertrophy diagnosis.    Denies headache, lightheadedness, dizziness, fever, chills, chest pain, palpitations, shortness of breath, cough, abdominal pain, nausea, vomiting, numbness or tingling in bilateral lower extremities.     Physical Exam   Temp:  [97.3  F (36.3  C)-98.7  F (37.1  C)] 98.2  F (36.8  C)  Pulse:  [77-92] 88  Resp:  [7-20] 18  BP: (109-144)/(52-66) 137/57  SpO2:  [94 %-100 %] 94 %    Weights:   Vitals:    02/03/21 1224   Weight: 76.7 kg (169 lb)    Body mass index is 30.91 kg/m .    General: Appears well, sitting up in chair. Alert and oriented. Pleasant and cooperative. No distress. Non-toxic. Appears stated age.   CV: Regular rate, normal rhythm. Radial pulses are 2+ bilaterally. Pedal pulses are 2+. No lower extremity edema.  Respiratory: No accessory muscle usage. Clear to auscultation bilaterally. No wheezes, crackles or rhonchi.   GI: Bowel sounds normoactive in all quadrants. Soft, non-tender, non-distended.  Skin: Warm, dry, intact. Did not assess incision site directly.  Musculoskeletal: Muscle tone is appropriate. Moves all extremities freely with limited range of motion left lower extremity due to recent surgery, pain and bandage.  Neuro: Sensation to light touch of bilateral lower extremities is grossly intact.     Data   Recent Labs   Lab 02/04/21  0409 02/03/21  1220   HGB 10.5* 13.5   INR  --  1.03   POTASSIUM  --  3.7   CR  --  1.10       No results for input(s): GLC, BGM in the last 168 hours.     Unresulted Labs Ordered in the Past 30 Days of this Admission     No orders found for last 31 day(s).           Imaging  Recent Results (from the past 24 hour(s))   XR Hip Port Left 1 View     Narrative    HIP PORTABLE LEFT ONE VIEW   2/3/2021 3:48 PM     HISTORY: Left Hip Hemiarthroplasty    COMPARISON: None.      Impression    IMPRESSION: Single intraoperative view demonstrates right acetabular  component with arthroplasty in place. Femoral sizing type device in  place. Chronic cortical irregularity and thickening in the proximal  lateral femoral diaphysis.    RAMIREZ SLATER MD   XR Pelvis w Hip Port Left 1 View    Narrative    EXAM: XR PELVIS AND HIP PORTABLE LEFT 1 VIEW  LOCATION: [facility]  DATE/TIME: 2/3/2021 5:10 PM    INDICATION: Postop hip surgery  COMPARISON: None.      Impression    IMPRESSION: Right total hip arthroplasty is in place without apparent complication. Postoperative soft tissue gas is noted. Iliac and femoral artery calcification is also noted.      I reviewed all new labs and imaging results over the last 24 hours. I personally reviewed no images or EKG's today.    Medications     lactated ringers 100 mL/hr at 02/03/21 2222       acetaminophen  975 mg Oral Q8H     amLODIPine  2.5 mg Oral Daily     aspirin  325 mg Oral Daily     atorvastatin  40 mg Oral QPM     docusate sodium  100 mg Oral BID     famotidine  20 mg Oral BID    Or     famotidine  20 mg Intravenous BID     hydrochlorothiazide  25 mg Oral Daily     isosorbide mononitrate  60 mg Oral Daily     losartan  100 mg Oral Daily     metoprolol succinate ER  25 mg Oral Daily     polyethylene glycol  17 g Oral Daily     senna-docusate  1 tablet Oral BID     sodium chloride (PF)  3 mL Intracatheter Q8H

## 2021-02-04 NOTE — PROGRESS NOTES
"                  Orthopaedics Progress Note      Post-operative Day: 1 Day Post-Op    Procedure(s):  Total Hip Arthroplasty      Subjective:    Pain: minimal - well controlled with tramadol & Tylenol  Chest pain, SOB:  No  Voiding, passing gas. No nausea/vomiting    Objective:  Blood pressure 137/57, pulse 88, temperature 98.2  F (36.8  C), temperature source Axillary, resp. rate 18, height 1.575 m (5' 2\"), weight 76.7 kg (169 lb), SpO2 94 %.    Patient Vitals for the past 24 hrs:   BP Temp Temp src Pulse Resp SpO2 Height Weight   02/04/21 0729 137/57 98.2  F (36.8  C) Axillary 88 18 94 % -- --   02/04/21 0219 130/53 97.9  F (36.6  C) Oral 86 18 98 % -- --   02/03/21 2300 131/66 -- -- 92 -- -- -- --   02/03/21 2233 (!) 143/60 97.3  F (36.3  C) Axillary 90 16 99 % -- --   02/03/21 2100 109/52 -- -- 87 -- -- -- --   02/03/21 1900 124/59 -- -- 77 -- -- -- --   02/03/21 1830 138/54 -- -- 87 -- 98 % -- --   02/03/21 1815 127/56 -- -- 85 -- -- -- --   02/03/21 1813 115/58 97.3  F (36.3  C) Axillary 80 16 97 % -- --   02/03/21 1736 -- -- -- -- -- 98 % -- --   02/03/21 1730 122/52 97.7  F (36.5  C) Oral 78 15 97 % -- --   02/03/21 1715 121/52 -- -- 80 (!) 7 100 % -- --   02/03/21 1700 124/64 -- -- 86 14 99 % -- --   02/03/21 1645 138/57 -- -- 82 20 94 % -- --   02/03/21 1630 133/61 -- -- 92 15 95 % -- --   02/03/21 1625 (!) 140/62 98.1  F (36.7  C) Oral -- -- -- -- --   02/03/21 1224 (!) 144/63 98.7  F (37.1  C) Oral 85 14 -- 1.575 m (5' 2\") 76.7 kg (169 lb)       Wt Readings from Last 4 Encounters:   02/03/21 76.7 kg (169 lb)   01/25/21 76.7 kg (169 lb)   01/06/21 76.7 kg (169 lb)   11/03/20 76.7 kg (169 lb)         Motor function, sensation, and circulation intact   Yes  Wound status: incisions are clean dry and intact. Yes  Calf tenderness: Bilateral  No    Pertinent Labs   Lab Results: personally reviewed.     Recent Labs   Lab Test 02/04/21  0409 02/03/21  1220 01/25/21  1038 03/10/20  0955 02/27/20  1015 " 01/22/18  1155 01/22/18  1155   INR  --  1.03  --  1.04  --   --   --    HGB 10.5* 13.5 13.1* 13.4  --   --  13.5   HCT  --   --  39.2* 40.2  --   --  39.7*   MCV  --   --  90 87  --   --  85   PLT  --   --  226 183  --   --  179   NA  --   --  136 141 136   < > 132*    < > = values in this interval not displayed.       Plan: Anticoagulation protocol: Resume Brilinta, ASA 81mg qd  x 30 days            Pain medications:  tramadol and tylenol            Weight bearing status:  WBAT            Disposition:  Home today with outpt services             Continue cares and rehabilitation     Report completed by:  Yessenia Albarran PA-C  Date: 2/4/2021  Time: 9:04 AM

## 2021-02-04 NOTE — PROGRESS NOTES
Physical Therapy Evaluation       02/04/21 1000   Quick Adds   Type of Visit Initial PT Evaluation   Living Environment   People in home spouse   Current Living Arrangements house   Home Accessibility stairs to enter home;stairs within home   Number of Stairs, Main Entrance 5   Stair Railings, Main Entrance railings on both sides of stairs   Number of Stairs, Within Home, Primary 10   Stair Railings, Within Home, Primary railing on right side (ascending)   Transportation Anticipated family or friend will provide   Self-Care   Equipment Currently Used at Home cane, straight;walker, rolling   Disability/Function   Hearing Difficulty or Deaf no   Wear Glasses or Blind yes   Concentrating, Remembering or Making Decisions Difficulty no   Difficulty Communicating no   Difficulty Eating/Swallowing no   Walking or Climbing Stairs Difficulty no   Dressing/Bathing Difficulty no   Toileting issues no   Doing Errands Independently Difficulty (such as shopping) no   Fall history within last six months yes   Number of times patient has fallen within last six months 1   General Information   Onset of Illness/Injury or Date of Surgery 02/03/21   Referring Physician Dr Arriola   Patient/Family Therapy Goals Statement (PT) wants to go home   Pertinent History of Current Problem (include personal factors and/or comorbidities that impact the POC) Pt admitted for L PATY   Weight-Bearing Status - LLE weight-bearing as tolerated   Cognition   Orientation Status (Cognition) oriented x 4   Affect/Mental Status (Cognition) WNL   Follows Commands (Cognition) WNL   Pain Assessment   Patient Currently in Pain Yes, see Vital Sign flowsheet  (L hip pain rated at 1/10)   Posture    Posture Forward head position;Protracted shoulders   Range of Motion (ROM)   ROM Quick Adds ROM WFL   ROM Comment except L hip mildly limited due to pain   Strength   Strength Comments WFL except L LE just antigravity   Bed Mobility   Comment (Bed Mobility) SBA with vc  to use belt and then cane to assist L LE    Transfers   Transfer Safety Comments SBA   Gait/Stairs (Locomotion)   [place] Level (Gait) contact guard   Assistive Device (Gait) walker, front-wheeled   Distance in Feet (Required for LE Total Joints) 340   Pattern (Gait) step-to   Deviations/Abnormal Patterns (Gait) base of support, wide;mariel decreased;stride length decreased;weight shifting decreased   Maintains Weight-bearing Status (Gait) able to maintain   Negotiation (Stairs) stairs independence;handrail location;number of steps;stairs assistive device   [place] Level (Stairs) contact guard   Assistive Device (Stairs) cane, quad   Handrail Location (Stairs) right side (descending)   Number of Steps (Stairs) 5   Balance   Balance Comments good with RW   Clinical Impression   Criteria for Skilled Therapeutic Intervention yes, treatment indicated   PT Diagnosis (PT) L PATY   Influenced by the following impairments pain and weakness   Functional limitations due to impairments mobility and gait   Clinical Presentation Stable/Uncomplicated   Clinical Presentation Rationale clinical judgement   Clinical Decision Making (Complexity) low complexity   Therapy Frequency (PT) One time eval and treatment only   Predicted Duration of Therapy Intervention (days/wks) 1 visit only   Planned Therapy Interventions (PT) bed mobility training;gait training;home exercise program;ROM (range of motion);strengthening   Risk & Benefits of therapy have been explained evaluation/treatment results reviewed;care plan/treatment goals reviewed;risks/benefits reviewed;participants included;patient   PT Discharge Planning    PT Discharge Recommendation (DC Rec) home with assist   PT Rationale for DC Rec good mobility and gait   PT Brief overview of current status  Transfers with SBA and vc using SEC to assist with L LE. Amb with ' with CGA and up/down 5 steps with one rail and cane.   Total Evaluation Time   Total Evaluation Time  (Minutes) 10     Yue Razo PT

## 2021-02-17 ENCOUNTER — TRANSFERRED RECORDS (OUTPATIENT)
Dept: HEALTH INFORMATION MANAGEMENT | Facility: CLINIC | Age: 81
End: 2021-02-17

## 2021-02-24 ENCOUNTER — DOCUMENTATION ONLY (OUTPATIENT)
Dept: OTHER | Facility: CLINIC | Age: 81
End: 2021-02-24

## 2021-03-15 ENCOUNTER — MYC MEDICAL ADVICE (OUTPATIENT)
Dept: FAMILY MEDICINE | Facility: CLINIC | Age: 81
End: 2021-03-15

## 2021-03-16 ENCOUNTER — TELEPHONE (OUTPATIENT)
Dept: FAMILY MEDICINE | Facility: CLINIC | Age: 81
End: 2021-03-16

## 2021-03-16 NOTE — TELEPHONE ENCOUNTER
Patient called with concern for a bruise near his elbow. Area is discolored dark purple, is not hard. No redness. Patient states area is tender when turning his wrist at time and is concerned. No numbness or tingling of hand ,fingers. Advised ice or heat as tolerated and limit activities that cause discomfort. Patient has appointment on 3/18. He would like this added to his visit concerns.  January MANCILLA RN

## 2021-03-18 ENCOUNTER — OFFICE VISIT (OUTPATIENT)
Dept: FAMILY MEDICINE | Facility: CLINIC | Age: 81
End: 2021-03-18
Payer: COMMERCIAL

## 2021-03-18 VITALS
RESPIRATION RATE: 16 BRPM | WEIGHT: 168 LBS | HEART RATE: 90 BPM | SYSTOLIC BLOOD PRESSURE: 166 MMHG | OXYGEN SATURATION: 98 % | BODY MASS INDEX: 30.91 KG/M2 | HEIGHT: 62 IN | TEMPERATURE: 97.8 F | DIASTOLIC BLOOD PRESSURE: 48 MMHG

## 2021-03-18 DIAGNOSIS — I10 BENIGN ESSENTIAL HYPERTENSION: ICD-10-CM

## 2021-03-18 DIAGNOSIS — T14.8XXA BRUISING: ICD-10-CM

## 2021-03-18 DIAGNOSIS — I25.9 IHD (ISCHEMIC HEART DISEASE): Primary | ICD-10-CM

## 2021-03-18 PROBLEM — I20.89 ANGINA AT REST (H): Status: RESOLVED | Noted: 2020-02-27 | Resolved: 2021-03-18

## 2021-03-18 PROCEDURE — 99214 OFFICE O/P EST MOD 30 MIN: CPT | Performed by: FAMILY MEDICINE

## 2021-03-18 RX ORDER — AMLODIPINE BESYLATE 10 MG/1
10 TABLET ORAL DAILY
Qty: 90 TABLET | Refills: 1 | Status: SHIPPED | OUTPATIENT
Start: 2021-03-18 | End: 2021-04-01

## 2021-03-18 ASSESSMENT — MIFFLIN-ST. JEOR: SCORE: 1356.29

## 2021-03-18 NOTE — NURSING NOTE
"Chief Complaint   Patient presents with     Bleeding/Bruising       Initial BP (!) 166/0   Pulse 90   Temp 97.8  F (36.6  C) (Tympanic)   Resp 16   Ht 1.575 m (5' 2\")   Wt 76.2 kg (168 lb)   SpO2 98%   BMI 30.73 kg/m   Estimated body mass index is 30.73 kg/m  as calculated from the following:    Height as of this encounter: 1.575 m (5' 2\").    Weight as of this encounter: 76.2 kg (168 lb).    Patient presents to the clinic using No DME    Health Maintenance that is potentially due pending provider review:  NONE    n/a    Is there anyone who you would like to be able to receive your results? No  If yes have patient fill out ED[facility]      "

## 2021-03-18 NOTE — PATIENT INSTRUCTIONS
Patient Education     Eating Heart-Healthy Foods  Eating has a big impact on your heart health. In fact, eating healthier can improve several of your heart risks at once. For instance, it helps you manage weight, cholesterol, and blood pressure. Here are ideas to help you make heart-healthy changes without giving up all the foods and flavors you love.   Getting started    Talk with your healthcare provider about eating plans, such as the DASH or Mediterranean diet. You may also be referred to a dietitian.    Change a few things at a time. Give yourself time to get used to a few eating changes before adding more.    Work to create a tasty, healthy eating plan that you can stick to for the rest of your life.    Goals for healthy eating  Below are some tips to improve your eating habits:     Limit saturated fats and trans fats. Saturated fats raise your levels of cholesterol, so keep these fats to a minimum. They are found in foods such as fatty meats, whole milk, cheese, and palm and coconut oils. Avoid trans fats because they lower good cholesterol as well as raise bad cholesterol. Trans fats are most often found in processed foods, such as pastries, cookies, pies, muffins, fried foods, stick margarines, and shortening.    Reduce how much sodium (salt) you have. Eating too much salt may increase your blood pressure. Limit your sodium intake to 2,300 milligrams (mg) per day (the amount in 1 teaspoon of salt), or less if your healthcare provider recommends it. Dining out less often and eating fewer processed foods are two great ways to decrease the amount of salt you consume. At home, flavor your foods with other spices and herbs instead of salt.    Managing calories. A calorie is a unit of energy. Your body burns calories for fuel, but if you eat more calories than your body burns, the extras are stored as fat. Your healthcare provider can help you create a diet plan to manage your calories. This will likely include  eating healthier foods and getting regular exercise. To help you track your progress, keep a diary to record what you eat and how often you exercise.  Choose the right foods  Aim to make these foods staples of your diet. If you have diabetes, you may have different recommendations than what is listed here:     Fruits and vegetables provide plenty of nutrients without a lot of calories. At meals, fill half your plate with these foods. Choose between fresh, frozen, canned, or dried without added sauces, salt, or sugars. Split the other half of your plate between whole grains and lean protein.    Whole grains are high in fiber and rich in vitamins and nutrients. Good choices include whole wheat bread, pasta, oats, and brown rice. Make at least half of your grains whole grains.    Lean proteins give you nutrition with less fat. Good choices include fish, skinless chicken and turkey, and beans. Draining the fat from cooked ground meat is another way to reduce the amount of fat you eat.    Low-fat and nonfat dairy provide nutrients without a lot of fat. Try low-fat or nonfat milk, cheese, or yogurt.    Healthy fats can be good for you in small amounts. These are unsaturated fats, such as olive oil, nuts, and fish. Try to have at least 2 servings per week of fatty fish, such as salmon, sardines, mackerel, rainbow trout, and albacore tuna. These contain omega-3 fatty acids, which are good for your heart. Flaxseed and walnuts are other sources of heart-healthy fats.  More on heart-healthy eating  Read food labels  Healthy eating starts at the grocery store. Be sure to pay attention to food labels on packaged foods. Look for products that are high in fiber and protein, and low in saturated fat, added sugars, and sodium. Avoid products that contain trans fat. And pay close attention to serving size. For instance, if you plan to eat two servings, double all the numbers on the label.   Prepare food right  A key part of healthy  cooking is cutting down on added fat, sugar and salt. Look on the internet for lower-fat, lower-sodium recipes without a lot of added sugars. Also try these tips:     Remove fat from meat and skin from poultry before cooking.    Skim fat from the surface of soups and sauces.    Broil, roast, boil, bake, steam, grill, or microwave food without added fats.    Choose ingredients that spice up your food without adding calories, fat, sugar, or sodium. Try these items: horseradish, hot sauce, lemon, mustard, nonfat salad dressings, and vinegar. Small amounts of olive oil-based vinaigrettes are OK, too. For salt-free herbs and spices, try basil, cilantro, cinnamon, cumin, paprika, pepper, and rosemary.  [employer] last reviewed this educational content on 7/1/2020 2000-2020 [employer]. All rights reserved. This information is not intended as a substitute for professional medical care. Always follow your healthcare professional's instructions.

## 2021-03-18 NOTE — PROGRESS NOTES
"  Assessment & Plan     IHD (ischemic heart disease)  Known to have ischemic heart disease, s/p angioplasty 03/2020.  Will discontinue Brilinta, amlodipine added for poorly controlled blood pressure.  Patient will continue other medications including aspirin 81 mg and Lipitor.  Reminded to schedule appointment with cardiology as well    Bruising  Likely secondary to dual antiplatelets.  Brilinta discontinued    Benign essential hypertension, BP goal <140/90  As above.  Suggested to monitor blood pressure at home regularly, continue hydrochlorothiazide, Imdur, losartan and metoprolol.  Recommended to continue monitoring blood pressure at home regularly and follow-up with RN in 2 weeks for repeat blood pressure check  - amLODIPine (NORVASC) 10 MG tablet; Take 1 tablet (10 mg) by mouth daily        BMI:   Estimated body mass index is 30.73 kg/m  as calculated from the following:    Height as of this encounter: 1.575 m (5' 2\").    Weight as of this encounter: 76.2 kg (168 lb).   Weight management plan: Discussed healthy diet and exercise guidelines    Patient Instructions     Patient Education     Eating Heart-Healthy Foods  Eating has a big impact on your heart health. In fact, eating healthier can improve several of your heart risks at once. For instance, it helps you manage weight, cholesterol, and blood pressure. Here are ideas to help you make heart-healthy changes without giving up all the foods and flavors you love.   Getting started    Talk with your healthcare provider about eating plans, such as the DASH or Mediterranean diet. You may also be referred to a dietitian.    Change a few things at a time. Give yourself time to get used to a few eating changes before adding more.    Work to create a tasty, healthy eating plan that you can stick to for the rest of your life.    Goals for healthy eating  Below are some tips to improve your eating habits:     Limit saturated fats and trans fats. Saturated fats raise " your levels of cholesterol, so keep these fats to a minimum. They are found in foods such as fatty meats, whole milk, cheese, and palm and coconut oils. Avoid trans fats because they lower good cholesterol as well as raise bad cholesterol. Trans fats are most often found in processed foods, such as pastries, cookies, pies, muffins, fried foods, stick margarines, and shortening.    Reduce how much sodium (salt) you have. Eating too much salt may increase your blood pressure. Limit your sodium intake to 2,300 milligrams (mg) per day (the amount in 1 teaspoon of salt), or less if your healthcare provider recommends it. Dining out less often and eating fewer processed foods are two great ways to decrease the amount of salt you consume. At home, flavor your foods with other spices and herbs instead of salt.    Managing calories. A calorie is a unit of energy. Your body burns calories for fuel, but if you eat more calories than your body burns, the extras are stored as fat. Your healthcare provider can help you create a diet plan to manage your calories. This will likely include eating healthier foods and getting regular exercise. To help you track your progress, keep a diary to record what you eat and how often you exercise.  Choose the right foods  Aim to make these foods staples of your diet. If you have diabetes, you may have different recommendations than what is listed here:     Fruits and vegetables provide plenty of nutrients without a lot of calories. At meals, fill half your plate with these foods. Choose between fresh, frozen, canned, or dried without added sauces, salt, or sugars. Split the other half of your plate between whole grains and lean protein.    Whole grains are high in fiber and rich in vitamins and nutrients. Good choices include whole wheat bread, pasta, oats, and brown rice. Make at least half of your grains whole grains.    Lean proteins give you nutrition with less fat. Good choices include  fish, skinless chicken and turkey, and beans. Draining the fat from cooked ground meat is another way to reduce the amount of fat you eat.    Low-fat and nonfat dairy provide nutrients without a lot of fat. Try low-fat or nonfat milk, cheese, or yogurt.    Healthy fats can be good for you in small amounts. These are unsaturated fats, such as olive oil, nuts, and fish. Try to have at least 2 servings per week of fatty fish, such as salmon, sardines, mackerel, rainbow trout, and albacore tuna. These contain omega-3 fatty acids, which are good for your heart. Flaxseed and walnuts are other sources of heart-healthy fats.  More on heart-healthy eating  Read food labels  Healthy eating starts at the grocery store. Be sure to pay attention to food labels on packaged foods. Look for products that are high in fiber and protein, and low in saturated fat, added sugars, and sodium. Avoid products that contain trans fat. And pay close attention to serving size. For instance, if you plan to eat two servings, double all the numbers on the label.   Prepare food right  A key part of healthy cooking is cutting down on added fat, sugar and salt. Look on the internet for lower-fat, lower-sodium recipes without a lot of added sugars. Also try these tips:     Remove fat from meat and skin from poultry before cooking.    Skim fat from the surface of soups and sauces.    Broil, roast, boil, bake, steam, grill, or microwave food without added fats.    Choose ingredients that spice up your food without adding calories, fat, sugar, or sodium. Try these items: horseradish, hot sauce, lemon, mustard, nonfat salad dressings, and vinegar. Small amounts of olive oil-based vinaigrettes are OK, too. For salt-free herbs and spices, try basil, cilantro, cinnamon, cumin, paprika, pepper, and rosemary.  [place] last reviewed this educational content on 7/1/2020 2000-2020 [employer]. All rights reserved. This information is not  "intended as a substitute for professional medical care. Always follow your healthcare professional's instructions.               Return in about 2 weeks (around 4/1/2021) for BP Recheck.    Piyush Rogers MD  [facility]    Eder Hatfield is a 80 year old who presents for the following health issues    HPI     Concern - Bruising (see 03/15/2021 My Chart encounter)  Onset: 1 year   Description: Recently became painful when it occurs  Intensity: mild  Progression of Symptoms:  intermittent  Accompanying Signs & Symptoms: Rt elbow painful with use  Previous history of similar problem: No  Precipitating factors:        Worsened by: Positional   Alleviating factors:        Improved by: NA  Therapies tried and outcome:  none         Review of Systems   Constitutional, HEENT, cardiovascular, pulmonary, GI, , musculoskeletal, neuro, skin, endocrine and psych systems are negative, except as otherwise noted.      Objective    BP (!) 160/70   Pulse 90   Temp 97.8  F (36.6  C) (Tympanic)   Resp 16   Ht 1.575 m (5' 2\")   Wt 76.2 kg (168 lb)   SpO2 98%   BMI 30.73 kg/m    Body mass index is 30.73 kg/m .  Physical Exam   GENERAL: alert and no distress  NECK: no adenopathy, no asymmetry, masses, or scars and thyroid normal to palpation  RESP: lungs clear to auscultation - no rales, rhonchi or wheezes  CV: regular rate and rhythm, normal S1 S2, no S3 or S4, no murmur, click or rub, no peripheral edema and peripheral pulses strong  ABDOMEN: soft, nontender, no hepatosplenomegaly, no masses and bowel sounds normal  SKIN: scattered bruising involving both arms, no blistering or discharge noted  NEURO: Normal strength and tone, mentation intact and speech normal  PSYCH: mentation appears normal, affect normal/bright      Wt Readings from Last 10 Encounters:   03/18/21 76.2 kg (168 lb)   02/03/21 76.7 kg (169 lb)   01/25/21 76.7 kg (169 lb)   01/06/21 76.7 kg (169 lb)   11/03/20 76.7 kg (169 lb) "   10/21/20 76.2 kg (168 lb)   10/19/20 76.7 kg (169 lb)   08/24/20 78.9 kg (174 lb)   08/12/20 78.9 kg (174 lb)   03/23/20 83 kg (183 lb)

## 2021-03-24 ENCOUNTER — TRANSFERRED RECORDS (OUTPATIENT)
Dept: HEALTH INFORMATION MANAGEMENT | Facility: CLINIC | Age: 81
End: 2021-03-24

## 2021-04-01 ENCOUNTER — OFFICE VISIT (OUTPATIENT)
Dept: FAMILY MEDICINE | Facility: CLINIC | Age: 81
End: 2021-04-01
Payer: COMMERCIAL

## 2021-04-01 VITALS
SYSTOLIC BLOOD PRESSURE: 130 MMHG | HEIGHT: 62 IN | RESPIRATION RATE: 18 BRPM | BODY MASS INDEX: 32.2 KG/M2 | TEMPERATURE: 97.5 F | HEART RATE: 76 BPM | DIASTOLIC BLOOD PRESSURE: 62 MMHG | WEIGHT: 175 LBS

## 2021-04-01 DIAGNOSIS — E78.2 MIXED HYPERLIPIDEMIA: ICD-10-CM

## 2021-04-01 DIAGNOSIS — R60.0 PEDAL EDEMA: ICD-10-CM

## 2021-04-01 DIAGNOSIS — I10 BENIGN ESSENTIAL HYPERTENSION: Primary | ICD-10-CM

## 2021-04-01 PROCEDURE — 99214 OFFICE O/P EST MOD 30 MIN: CPT | Performed by: FAMILY MEDICINE

## 2021-04-01 RX ORDER — AMLODIPINE BESYLATE 5 MG/1
5 TABLET ORAL DAILY
Qty: 90 TABLET | Refills: 1 | Status: SHIPPED | OUTPATIENT
Start: 2021-04-01 | End: 2021-04-15

## 2021-04-01 RX ORDER — HYDROCHLOROTHIAZIDE 50 MG/1
50 TABLET ORAL DAILY
Qty: 90 TABLET | Refills: 1 | Status: SHIPPED | OUTPATIENT
Start: 2021-04-01 | End: 2021-04-16

## 2021-04-01 ASSESSMENT — MIFFLIN-ST. JEOR: SCORE: 1388.04

## 2021-04-01 NOTE — PATIENT INSTRUCTIONS
Patient Education     Low-Salt Choices  Eating salt (sodium) can make your body retain too much water. Extra water makes your heart work harder. Canned, packaged, and frozen foods are easy to prepare. But they are often high in sodium. Here are some ideas for low-salt foods you can easily make yourself.     For breakfast    Fruit or 100% fruit juice. It's better to have whole fruit instead of 100% fruit juice.    Whole-wheat bread or an English muffin. Look for sodium content on Nutrition Facts labels.    Low-fat milk or yogurt    Unsalted eggs    Shredded wheat    Corn tortillas    Unsalted steamed rice    Regular (not instant) hot cereal, made without salt  Stay away from    Sausage, jackson, and ham    Flour tortillas    Packaged muffins, pancakes, and biscuits    Instant hot cereals    Cottage cheese    For lunch and dinner    Fresh fish, chicken, turkey, or meat--baked, broiled, or roasted without salt    Dry beans, cooked without salt    Tofu, stir-fried without salt    Unsalted fresh fruit and vegetables, or frozen or canned fruit and vegetables with no added salt  Stay away from    Lunch or deli meat that is cured or smoked    Cheese    Tomato juice and ketchup    Canned vegetables, soups, and fish not labeled as no-salt-added or reduced sodium    Packaged gravies and sauces    Olives, pickles, and relish    Bottled salad dressings    For snacks and desserts    Yogurt    Unsalted, air-popped popcorn    Unsalted nuts or seeds  Stay away from    Pies and cakes    Packaged dessert mixes    Pizza    Canned and packaged puddings    Pretzels, chips, crackers, and nuts--unless the label says unsalted    [place] last reviewed this educational content on 11/1/2019 2000-2020 [employer]. All rights reserved. This information is not intended as a substitute for professional medical care. Always follow your healthcare professional's instructions.

## 2021-04-01 NOTE — PROGRESS NOTES
Assessment & Plan     Benign essential hypertension, BP goal <140/90  Patient has been experiencing worsening leg swelling since amlodipine dose increased from 2.5 to 10 mg.  Home blood pressure readings are stable.  Physical examination remarkable for pedal edema 2+ bilaterally.  Suspect symptoms secondary to amlodipine.  Will reduce amlodipine to 5 mg and hydrochlorothiazide increased from 25 to 50 mg, common side effects discussed.  Recommended regular walks, leg elevation when possible and limiting salt.  Suggested to monitor blood pressure at home regularly and follow-up in 2 weeks for repeat blood pressure check and labs.  Other medications reviewed and no changes made.  Patient understood and in agreement with above plan.  All questions answered.  - hydrochlorothiazide (HYDRODIURIL) 50 MG tablet; Take 1 tablet (50 mg) by mouth daily  - amLODIPine (NORVASC) 5 MG tablet; Take 1 tablet (5 mg) by mouth daily  - **Basic metabolic panel FUTURE 14d; Future    Mixed hyperlipidemia  Continue Lipitor    Pedal edema  As above        Patient Instructions     Patient Education     Low-Salt Choices  Eating salt (sodium) can make your body retain too much water. Extra water makes your heart work harder. Canned, packaged, and frozen foods are easy to prepare. But they are often high in sodium. Here are some ideas for low-salt foods you can easily make yourself.     For breakfast    Fruit or 100% fruit juice. It's better to have whole fruit instead of 100% fruit juice.    Whole-wheat bread or an English muffin. Look for sodium content on Nutrition Facts labels.    Low-fat milk or yogurt    Unsalted eggs    Shredded wheat    Corn tortillas    Unsalted steamed rice    Regular (not instant) hot cereal, made without salt  Stay away from    Sausage, jackson, and ham    Flour tortillas    Packaged muffins, pancakes, and biscuits    Instant hot cereals    Cottage cheese    For lunch and dinner    Fresh fish, chicken, turkey, or  meat--baked, broiled, or roasted without salt    Dry beans, cooked without salt    Tofu, stir-fried without salt    Unsalted fresh fruit and vegetables, or frozen or canned fruit and vegetables with no added salt  Stay away from    Lunch or deli meat that is cured or smoked    Cheese    Tomato juice and ketchup    Canned vegetables, soups, and fish not labeled as no-salt-added or reduced sodium    Packaged gravies and sauces    Olives, pickles, and relish    Bottled salad dressings    For snacks and desserts    Yogurt    Unsalted, air-popped popcorn    Unsalted nuts or seeds  Stay away from    Pies and cakes    Packaged dessert mixes    Pizza    Canned and packaged puddings    Pretzels, chips, crackers, and nuts--unless the label says unsalted    [place] last reviewed this educational content on 11/1/2019 2000-2020 [employer]. All rights reserved. This information is not intended as a substitute for professional medical care. Always follow your healthcare professional's instructions.               Piyush Rogers MD  [facility]    Eder Hatfield is a 80 year old who presents for the following health issues     HPI     Hyperlipidemia Follow-Up      Are you regularly taking any medication or supplement to lower your cholesterol?   Yes- atorvastatin    Are you having muscle aches or other side effects that you think could be caused by your cholesterol lowering medication?  No    Hypertension Follow-up      Do you check your blood pressure regularly outside of the clinic? Yes     Are you following a low salt diet? Yes    Are your blood pressures ever more than 140 on the top number (systolic) OR more   than 90 on the bottom number (diastolic), for example 140/90? No      How many servings of fruits and vegetables do you eat daily?  2-3    On average, how many sweetened beverages do you drink each day (Examples: soda, juice, sweet tea, etc.  Do NOT count diet or  "artificially sweetened beverages)?   0    How many days per week do you exercise enough to make your heart beat faster? 3 or less    How many minutes a day do you exercise enough to make your heart beat faster? 9 or less    How many days per week do you miss taking your medication? 0      Review of Systems   Constitutional, HEENT, cardiovascular, pulmonary, GI, , musculoskeletal, neuro, skin, endocrine and psych systems are negative, except as otherwise noted.      Objective    /62 (BP Location: Right arm, Cuff Size: Adult Large)   Pulse 76   Temp 97.5  F (36.4  C) (Tympanic)   Resp 18   Ht 1.575 m (5' 2\")   Wt 79.4 kg (175 lb)   BMI 32.01 kg/m    Body mass index is 32.01 kg/m .  Physical Exam   GENERAL: alert and no distress  NECK: no adenopathy, no asymmetry, masses, or scars and thyroid normal to palpation  RESP: lungs clear to auscultation - no rales, rhonchi or wheezes  CV: regular rate and rhythm, normal S1 S2, no S3 or S4, no murmur, click or rub, 2+ pedal edema bilaterally  ABDOMEN: soft, nontender, no hepatosplenomegaly, no masses and bowel sounds normal  MS: no gross musculoskeletal defects noted, 2+ pedal edema bilaterally  SKIN: no suspicious lesions or rashes  NEURO: Normal strength and tone, mentation intact and speech normal  PSYCH: mentation appears normal, affect normal/bright      Wt Readings from Last 10 Encounters:   04/01/21 79.4 kg (175 lb)   03/18/21 76.2 kg (168 lb)   02/03/21 76.7 kg (169 lb)   01/25/21 76.7 kg (169 lb)   01/06/21 76.7 kg (169 lb)   11/03/20 76.7 kg (169 lb)   10/21/20 76.2 kg (168 lb)   10/19/20 76.7 kg (169 lb)   08/24/20 78.9 kg (174 lb)   08/12/20 78.9 kg (174 lb)           "

## 2021-04-15 ENCOUNTER — OFFICE VISIT (OUTPATIENT)
Dept: CARDIOLOGY | Facility: CLINIC | Age: 81
End: 2021-04-15
Attending: INTERNAL MEDICINE
Payer: COMMERCIAL

## 2021-04-15 VITALS
SYSTOLIC BLOOD PRESSURE: 135 MMHG | HEART RATE: 74 BPM | WEIGHT: 172 LBS | DIASTOLIC BLOOD PRESSURE: 67 MMHG | BODY MASS INDEX: 31.46 KG/M2

## 2021-04-15 DIAGNOSIS — I25.10 CORONARY ARTERY DISEASE INVOLVING NATIVE CORONARY ARTERY OF NATIVE HEART WITHOUT ANGINA PECTORIS: ICD-10-CM

## 2021-04-15 DIAGNOSIS — I10 BENIGN ESSENTIAL HYPERTENSION: ICD-10-CM

## 2021-04-15 DIAGNOSIS — I10 BENIGN ESSENTIAL HYPERTENSION: Primary | ICD-10-CM

## 2021-04-15 DIAGNOSIS — E78.5 HYPERLIPIDEMIA LDL GOAL <70: ICD-10-CM

## 2021-04-15 DIAGNOSIS — D64.9 ANEMIA, UNSPECIFIED TYPE: ICD-10-CM

## 2021-04-15 LAB
ANION GAP SERPL CALCULATED.3IONS-SCNC: 7 MMOL/L (ref 3–14)
BUN SERPL-MCNC: 26 MG/DL (ref 7–30)
CALCIUM SERPL-MCNC: 9.2 MG/DL (ref 8.5–10.1)
CHLORIDE SERPL-SCNC: 105 MMOL/L (ref 94–109)
CO2 SERPL-SCNC: 28 MMOL/L (ref 20–32)
CREAT SERPL-MCNC: 1.72 MG/DL (ref 0.66–1.25)
ERYTHROCYTE [DISTWIDTH] IN BLOOD BY AUTOMATED COUNT: 12.3 % (ref 10–15)
GFR SERPL CREATININE-BSD FRML MDRD: 37 ML/MIN/{1.73_M2}
GLUCOSE SERPL-MCNC: 84 MG/DL (ref 70–99)
HCT VFR BLD AUTO: 34.3 % (ref 40–53)
HGB BLD-MCNC: 11.3 G/DL (ref 13.3–17.7)
MCH RBC QN AUTO: 29.9 PG (ref 26.5–33)
MCHC RBC AUTO-ENTMCNC: 32.9 G/DL (ref 31.5–36.5)
MCV RBC AUTO: 91 FL (ref 78–100)
PLATELET # BLD AUTO: 199 10E9/L (ref 150–450)
POTASSIUM SERPL-SCNC: 3.8 MMOL/L (ref 3.4–5.3)
RBC # BLD AUTO: 3.78 10E12/L (ref 4.4–5.9)
SODIUM SERPL-SCNC: 140 MMOL/L (ref 133–144)
WBC # BLD AUTO: 6.6 10E9/L (ref 4–11)

## 2021-04-15 PROCEDURE — 36415 COLL VENOUS BLD VENIPUNCTURE: CPT | Performed by: FAMILY MEDICINE

## 2021-04-15 PROCEDURE — 99214 OFFICE O/P EST MOD 30 MIN: CPT | Performed by: PHYSICIAN ASSISTANT

## 2021-04-15 PROCEDURE — 80048 BASIC METABOLIC PNL TOTAL CA: CPT | Performed by: FAMILY MEDICINE

## 2021-04-15 PROCEDURE — 85027 COMPLETE CBC AUTOMATED: CPT | Performed by: FAMILY MEDICINE

## 2021-04-15 RX ORDER — AMLODIPINE BESYLATE 2.5 MG/1
2.5 TABLET ORAL DAILY
Qty: 90 TABLET | Refills: 3 | Status: ON HOLD | OUTPATIENT
Start: 2021-04-15 | End: 2021-09-28

## 2021-04-15 RX ORDER — CARVEDILOL 12.5 MG/1
12.5 TABLET ORAL 2 TIMES DAILY WITH MEALS
Qty: 180 TABLET | Refills: 3 | Status: ON HOLD | OUTPATIENT
Start: 2021-04-15 | End: 2021-09-28

## 2021-04-15 NOTE — PATIENT INSTRUCTIONS
"Thank you for your visit today. If you have questions regarding your visit please contact your cardiology RNs at [phone]. Your provider has recommended the following:  Medication Changes:  Stop metoprolol  START carvedilol 12.5mg twice a day  DECREASE the amlodipine to 2.5mg once a day (I sent a new prescription into the pharmacy)  Recommendations:  Please follow up on your BP with Dr Rogers in the new couple of weeks or give us a call.  Follow-up:  Cardiology follow up in late summer 2021 with Dr Díaz    To schedule a future appointment, we kindly ask that you call cardiology scheduling at [phone] three months prior to requested revisit date.    Reminder:  For your safety, we ask that you bring in your current medication(s) or an updated list of your medications with you to EACH office visit. Include the medication name, dose of pill on bottle and how you are taking it. Include over-the-counter medications or supplements. Your provider will review this at each visit and plan your care based on your current information.   ~~~~~~~~~~~~~~~~~~~~~~~~~~~~~~~~~~~~~~~  \"[facility]\" [place] telephone numbers for reference:  Cardiology Scheduling~[phone]  Cardiology Clinic RN's~[phone]  Diagnostic Imaging Scheduling~[phone]  Lab Scheduling~[phone]  Anticoagulation Clinic~[phone]  Cardiac Rehabilitation~[phone]  [facility] RN's~[phone] (at [facility])  ~~~~~~~~~~~~~~~~~~~~~~~~~~~~~~~~~~~~~~~~    "

## 2021-04-15 NOTE — LETTER
4/15/2021    Piyush Rogers MD  [address] [place] [id number]    RE: Liu Ragsdale       Dear Colleague,    I had the pleasure of seeing Liu Ragsdale in the [facility].    Please see separate dictation for HPI, PHYSICAL EXAM AND IMPRESSION/PLAN.    CURRENT MEDICATIONS:  Current Outpatient Medications   Medication Sig Dispense Refill     acetaminophen (TYLENOL) 325 MG tablet Take 3 tablets (975 mg) by mouth 3 times daily  0     amLODIPine (NORVASC) 5 MG tablet Take 1 tablet (5 mg) by mouth daily 90 tablet 1     aspirin 81 MG EC tablet Take 1 tablet (81 mg) by mouth daily 42 tablet 0     atorvastatin (LIPITOR) 40 MG tablet Take 1 tablet (40 mg) by mouth daily 90 tablet 3     diclofenac (VOLTAREN) 1 % topical gel Place 4 g onto the skin 3 times daily as needed for moderate pain (Shoulder) 100 g 1     hydrochlorothiazide (HYDRODIURIL) 50 MG tablet Take 1 tablet (50 mg) by mouth daily 90 tablet 1     hydrOXYzine (ATARAX) 10 MG tablet Take 1 tablet (10 mg) by mouth every 6 hours as needed for itching or anxiety (with pain, moderate pain) 30 tablet 0     ibuprofen (ADVIL/MOTRIN) 600 MG tablet Take 1 tablet (600 mg) by mouth every 6 hours as needed for pain (mild)  0     isosorbide mononitrate (IMDUR) 30 MG 24 hr tablet Take 2 tablets by mouth once daily 180 tablet 3     losartan (COZAAR) 100 MG tablet Take 1 tablet (100 mg) by mouth daily 90 tablet 3     metoprolol succinate ER (TOPROL XL) 25 MG 24 hr tablet Take 1 tablet (25 mg) by mouth daily 90 tablet 3     Multiple Vitamin (MULTI VITAMIN PO) Take 1 oz by mouth daily       nitroGLYcerin (NITROSTAT) 0.3 MG sublingual tablet For chest pain place 1 tablet under the tongue every 5 minutes for 3 doses. If symptoms persist 5 minutes after 1st dose call 911. 30 tablet 3     senna-docusate (SENOKOT-S/PERICOLACE) 8.6-50 MG tablet Take 1-2 tablets by mouth 2 times daily Take while on oral narcotics to prevent  or treat constipation. 30 tablet 0       ALLERGIES:     Allergies   Allergen Reactions     Metoprolol Other (See Comments)     Side effects     Ampicillin Rash       PAST MEDICAL HISTORY:  Past Medical History:   Diagnosis Date     Angina at rest (H)      Arthritis      Coronary artery disease involving native coronary artery of native heart without angina pectoris      Hypertension      NO ACTIVE PROBLEMS        PAST SURGICAL HISTORY:  Past Surgical History:   Procedure Laterality Date     ARTHROPLASTY HIP Left 2/3/2021    Procedure: Total Hip Arthroplasty;  Surgeon: Andrew Arriola MD;  Location: [facility]     CARDIAC SURGERY  3-10 2020    stent installed     COLONOSCOPY N/A 6/16/2016    Procedure: COLONOSCOPY;  Surgeon: Randy Avendano MD;  Location: [facility]     CV LEFT HEART CATH Left 3/10/2020    Procedure: Left Heart Cath;  Surgeon: Vicente Lopez MD;  Location:  HEART CARDIAC CATH LAB     EYE SURGERY  2005    cataract surgery     VASECTOMY  1981       SOCIAL HISTORY:  Social History     Socioeconomic History     Marital status:      Spouse name: None     Number of children: None     Years of education: None     Highest education level: None   Occupational History     Occupation:    Social Needs     Financial resource strain: None     Food insecurity     Worry: None     Inability: None     Transportation needs     Medical: None     Non-medical: None   Tobacco Use     Smoking status: Never Smoker     Smokeless tobacco: Never Used   Substance and Sexual Activity     Alcohol use: Yes     Comment: Rare     Drug use: No     Sexual activity: Not Currently     Partners: Female     Birth control/protection: Male Surgical     Comment: [id number]   Lifestyle     Physical activity     Days per week: None     Minutes per session: None     Stress: None   Relationships     Social connections     Talks on phone: None     Gets together: None     Attends Islam service: None     Active member of club  or organization: None     Attends meetings of clubs or organizations: None     Relationship status: None     Intimate partner violence     Fear of current or ex partner: None     Emotionally abused: None     Physically abused: None     Forced sexual activity: None   Other Topics Concern     Parent/sibling w/ CABG, MI or angioplasty before 65F 55M? No   Social History Narrative     None       FAMILY HISTORY:  Family History   Problem Relation Age of Onset     Diabetes Sister      Obesity Son      Depression Sister      Thyroid Disease Sister      Gastrointestinal Disease Son      Depression Son      Congenital Anomalies Daughter      Depression Daughter      Heart Disease Daughter      Hypertension Sister      Thyroid Disease Sister      Cerebrovascular Disease No family hx of        Review of Systems:  Skin:  Positive for bruising     Eyes:  Negative      ENT:  Positive for tinnitus    Respiratory:  Positive for shortness of breath;sleep apnea     Cardiovascular:    Positive for;edema    Gastroenterology: Negative      Genitourinary:  Positive for urinary frequency;urgency    Musculoskeletal:  Positive for arthritis;joint pain;joint swelling;joint stiffness    Neurologic:  Positive for numbness or tingling of hands    Psychiatric:  Negative      Heme/Lymph/Imm:  Negative      Endocrine:  Negative         Reviewed. Remainder of the note dictated.    Ryann Jolly PA-C    Service Date: 04/15/2021      REASON FOR VISIT:  Mr. Ragsdale is a 80-year-old gentleman who presents to the office today with concerns over hypertension/blood pressure medications.  Today is the first time I am meeting the patient.      HISTORY OF PRESENT ILLNESS:  His cardiovascular history includes hypertension, hyperlipidemia and coronary artery disease diagnosed in 03/2020 at which time he was noted to have a  of the proximal left circumflex into the OM, moderate to severe ramus disease and moderate to severe RCA/PLB disease.  He  underwent drug-eluting stent placement from the proximal left circumflex into the OM1.  He has residual small vessel coronary artery disease as well as moderate to severe stenosis in the ramus and RCA/YAA for which medical management has been recommended.        He has been following with Dr. Díaz and Megan Cortez, nurse practitioner.  He last had a virtual visit with Dr. Díaz in August.  It sounds like his blood pressure was still high at that time.  His Imdur was increased to 60 mg daily.  There was recommendation that if his home blood pressure was less than 120/80, amlodipine could be discontinued.  If it remained greater than 120/80, Imdur could be further increased.  He was supposed to call back to the office blood pressure readings in about a week and follow up in office in 3 months, but it sounds like this did not occur.        He recently contacted the office with concerns over elevated blood pressure and to discuss his medication therapy.  He also has been seeing his primary care provider for the same concerns.      It appears from the EMR about a month ago, his amlodipine was increased from 2.5 mg daily to 10 mg daily.  His blood pressure control improved, but he developed significant lower extremity edema.  On 04/01, his amlodipine was decreased to 5 mg daily and his hydrochlorothiazide was increased to 50 mg daily.  He is scheduled to have a repeat basic metabolic panel later today and is supposed to be in contact with Dr. Rogers's office.      Liu tells me today that he has developed significant lower extremity edema over the last month, I asked him if this improved with the recent medication changes and he did not think it made much of a difference.  He did not bring a medication list or his bottles with him to the office today, so was a bit confused when talking about doses of the medication, but I do believe he has been taking them as directed.  He tells me his main concern today is the  swelling and trying to simplify his medication regimen.      CURRENT CARDIAC MEDICATIONS:   1.  Amlodipine 5 mg daily.   2.  Aspirin 81 mg daily.   3.  Atorvastatin 40 mg daily.   4.  Hydrochlorothiazide 50 mg daily.   5.  Isosorbide 30 mg 2 tablets daily.   6.  Losartan 100 mg daily.   7.  Toprol-XL 25 mg daily.   8.  Sublingual nitro p.r.n.      The remainder of his medications, allergies and review of systems were reviewed and as are documented separately.      PHYSICAL EXAMINATION:   GENERAL:  The patient is a pleasant 80-year-old gentleman who appears his stated age.  He is in no apparent distress.   VITAL SIGNS:  His blood pressure is 135/67, pulse is 74, weight is 172 pounds.   PULMONARY:  Breathing is nonlabored.  Lungs are clear to auscultation.   CARDIAC:  Reveals a regular rate and rhythm.  I do not appreciate any significant murmur.   EXTREMITIES:  Lower extremities show 1-2+ pitting edema to the calves bilaterally.   NEUROLOGIC:  Alert and oriented.      LABORATORY DATA:  He did have an echocardiogram in 03/2020 that showed preserved LV systolic function.  No significant valvular heart disease.  The IVC was normal in size.      ASSESSMENT AND PLAN:  A 80-year-old male with hypertension, hyperlipidemia, coronary artery disease, status post drug-eluting stent placement to the  of the left circumflex into the OM1.  He also was found to have small vessel disease as well as moderately severe disease in the ramus and RCA/YAA.  He presents for blood pressure and medication management.   1.  Hypertension.  His blood pressure is only mildly elevated today, but it does appear that he has significant lower extremity edema, which he states was not present when he was on 2.5mg of amlodipine.  He also requested to try to simplify his medication regimen.  At this time, I have asked him to discontinue metoprolol and started him on carvedilol 12.5 mg b.i.d.  I will reduce his dose of amlodipine to 2.5 mg daily.  In  the future as long as his heart rate is okay and he is tolerating the carvedilol, one could certainly increase this dose to 25 mg b.i.d. or even 50 mg b.i.d. and try to wean him off of the amlodipine.  We could also consider putting him on a higher dose of isosorbide mononitrate.   2.  Hyperlipidemia.  He is on high-intensity statin therapy appropriate for his underlying disease.   3.  Coronary artery disease as detailed above.  He is not having any anginal symptoms.  He continues on aspirin and statin therapy.  We are working on risk factor modification.      He has a basic metabolic panel scheduled through his primary care provider later today and I encouraged him to follow through with this.  He also states that he is planning on seeing his primary care provider for blood pressure followup in the next couple of weeks and prefers to follow there as it is closer for him.  I think that this is absolutely an acceptable  plan and he can certainly contact us if he has additional concerns or if Dr. Rogers has any additional concerns.      I recommended that he follow up in the Cardiology office with Dr. Díaz in late summer of  with a repeat lipid profile prior to that office visit.  Of course, I encouraged him to contact us sooner with questions or concerns.      37 minutes spent on the date of the encounter doing chart review, patient visit and documentation        DAI CURRY PA-C             D: 04/15/2021   T: 04/15/2021   MT: BOB      Name:     GISELE LOZANO   MRN:      [id number]        Account:      [id number]   :      1940           Service Date: 04/15/2021      Document: [id number]      Thank you for allowing me to participate in the care of your patient.      Sincerely,     Dai Curry PA-C     [facility]    cc:   Audrey Díaz MD  [address] [place] [id number]

## 2021-04-15 NOTE — PROGRESS NOTES
Service Date: 04/15/2021      REASON FOR VISIT:  Mr. Ragsdale is a 80-year-old gentleman who presents to the office today with concerns over hypertension/blood pressure medications.  Today is the first time I am meeting the patient.      HISTORY OF PRESENT ILLNESS:  His cardiovascular history includes hypertension, hyperlipidemia and coronary artery disease diagnosed in 03/2020 at which time he was noted to have a  of the proximal left circumflex into the OM, moderate to severe ramus disease and moderate to severe RCA/PLB disease.  He underwent drug-eluting stent placement from the proximal left circumflex into the OM1.  He has residual small vessel coronary artery disease as well as moderate to severe stenosis in the ramus and RCA/YAA for which medical management has been recommended.        He has been following with Dr. Díaz and Megan Cortez, nurse practitioner.  He last had a virtual visit with Dr. Díaz in August.  It sounds like his blood pressure was still high at that time.  His Imdur was increased to 60 mg daily.  There was recommendation that if his home blood pressure was less than 120/80, amlodipine could be discontinued.  If it remained greater than 120/80, Imdur could be further increased.  He was supposed to call back to the office blood pressure readings in about a week and follow up in office in 3 months, but it sounds like this did not occur.        He recently contacted the office with concerns over elevated blood pressure and to discuss his medication therapy.  He also has been seeing his primary care provider for the same concerns.      It appears from the EMR about a month ago, his amlodipine was increased from 2.5 mg daily to 10 mg daily.  His blood pressure control improved, but he developed significant lower extremity edema.  On 04/01, his amlodipine was decreased to 5 mg daily and his hydrochlorothiazide was increased to 50 mg daily.  He is scheduled to have a repeat basic  metabolic panel later today and is supposed to be in contact with Dr. Rogers's office.      Liu tells me today that he has developed significant lower extremity edema over the last month, I asked him if this improved with the recent medication changes and he did not think it made much of a difference.  He did not bring a medication list or his bottles with him to the office today, so was a bit confused when talking about doses of the medication, but I do believe he has been taking them as directed.  He tells me his main concern today is the swelling and trying to simplify his medication regimen.      CURRENT CARDIAC MEDICATIONS:   1.  Amlodipine 5 mg daily.   2.  Aspirin 81 mg daily.   3.  Atorvastatin 40 mg daily.   4.  Hydrochlorothiazide 50 mg daily.   5.  Isosorbide 30 mg 2 tablets daily.   6.  Losartan 100 mg daily.   7.  Toprol-XL 25 mg daily.   8.  Sublingual nitro p.r.n.      The remainder of his medications, allergies and review of systems were reviewed and as are documented separately.      PHYSICAL EXAMINATION:   GENERAL:  The patient is a pleasant 80-year-old gentleman who appears his stated age.  He is in no apparent distress.   VITAL SIGNS:  His blood pressure is 135/67, pulse is 74, weight is 172 pounds.   PULMONARY:  Breathing is nonlabored.  Lungs are clear to auscultation.   CARDIAC:  Reveals a regular rate and rhythm.  I do not appreciate any significant murmur.   EXTREMITIES:  Lower extremities show 1-2+ pitting edema to the calves bilaterally.   NEUROLOGIC:  Alert and oriented.      LABORATORY DATA:  He did have an echocardiogram in 03/2020 that showed preserved LV systolic function.  No significant valvular heart disease.  The IVC was normal in size.      ASSESSMENT AND PLAN:  A 80-year-old male with hypertension, hyperlipidemia, coronary artery disease, status post drug-eluting stent placement to the  of the left circumflex into the OM1.  He also was found to have small vessel disease as  well as moderately severe disease in the ramus and RCA/YAA.  He presents for blood pressure and medication management.   1.  Hypertension.  His blood pressure is only mildly elevated today, but it does appear that he has significant lower extremity edema, which he states was not present when he was on 2.5mg of amlodipine.  He also requested to try to simplify his medication regimen.  At this time, I have asked him to discontinue metoprolol and started him on carvedilol 12.5 mg b.i.d.  I will reduce his dose of amlodipine to 2.5 mg daily.  In the future as long as his heart rate is okay and he is tolerating the carvedilol, one could certainly increase this dose to 25 mg b.i.d. or even 50 mg b.i.d. and try to wean him off of the amlodipine.  We could also consider putting him on a higher dose of isosorbide mononitrate.   2.  Hyperlipidemia.  He is on high-intensity statin therapy appropriate for his underlying disease.   3.  Coronary artery disease as detailed above.  He is not having any anginal symptoms.  He continues on aspirin and statin therapy.  We are working on risk factor modification.      He has a basic metabolic panel scheduled through his primary care provider later today and I encouraged him to follow through with this.  He also states that he is planning on seeing his primary care provider for blood pressure followup in the next couple of weeks and prefers to follow there as it is closer for him.  I think that this is absolutely an acceptable  plan and he can certainly contact us if he has additional concerns or if Dr. Rogers has any additional concerns.      I recommended that he follow up in the Cardiology office with Dr. Díaz in late summer of 2021 with a repeat lipid profile prior to that office visit.  Of course, I encouraged him to contact us sooner with questions or concerns.      37 minutes spent on the date of the encounter doing chart review, patient visit and documentation        DAI  FAREED CRURY PA-C             D: 04/15/2021   T: 04/15/2021   MT: BOB      Name:     GISELE LOZANO   MRN:      [id number]        Account:      [id number]   :      1940           Service Date: 04/15/2021      Document: [id number]

## 2021-04-15 NOTE — PROGRESS NOTES
Please see separate dictation for HPI, PHYSICAL EXAM AND IMPRESSION/PLAN.    CURRENT MEDICATIONS:  Current Outpatient Medications   Medication Sig Dispense Refill     acetaminophen (TYLENOL) 325 MG tablet Take 3 tablets (975 mg) by mouth 3 times daily  0     amLODIPine (NORVASC) 5 MG tablet Take 1 tablet (5 mg) by mouth daily 90 tablet 1     aspirin 81 MG EC tablet Take 1 tablet (81 mg) by mouth daily 42 tablet 0     atorvastatin (LIPITOR) 40 MG tablet Take 1 tablet (40 mg) by mouth daily 90 tablet 3     diclofenac (VOLTAREN) 1 % topical gel Place 4 g onto the skin 3 times daily as needed for moderate pain (Shoulder) 100 g 1     hydrochlorothiazide (HYDRODIURIL) 50 MG tablet Take 1 tablet (50 mg) by mouth daily 90 tablet 1     hydrOXYzine (ATARAX) 10 MG tablet Take 1 tablet (10 mg) by mouth every 6 hours as needed for itching or anxiety (with pain, moderate pain) 30 tablet 0     ibuprofen (ADVIL/MOTRIN) 600 MG tablet Take 1 tablet (600 mg) by mouth every 6 hours as needed for pain (mild)  0     isosorbide mononitrate (IMDUR) 30 MG 24 hr tablet Take 2 tablets by mouth once daily 180 tablet 3     losartan (COZAAR) 100 MG tablet Take 1 tablet (100 mg) by mouth daily 90 tablet 3     metoprolol succinate ER (TOPROL XL) 25 MG 24 hr tablet Take 1 tablet (25 mg) by mouth daily 90 tablet 3     Multiple Vitamin (MULTI VITAMIN PO) Take 1 oz by mouth daily       nitroGLYcerin (NITROSTAT) 0.3 MG sublingual tablet For chest pain place 1 tablet under the tongue every 5 minutes for 3 doses. If symptoms persist 5 minutes after 1st dose call 911. 30 tablet 3     senna-docusate (SENOKOT-S/PERICOLACE) 8.6-50 MG tablet Take 1-2 tablets by mouth 2 times daily Take while on oral narcotics to prevent or treat constipation. 30 tablet 0       ALLERGIES:     Allergies   Allergen Reactions     Metoprolol Other (See Comments)     Side effects     Ampicillin Rash       PAST MEDICAL HISTORY:  Past Medical History:   Diagnosis Date     Angina at  rest (H)      Arthritis      Coronary artery disease involving native coronary artery of native heart without angina pectoris      Hypertension      NO ACTIVE PROBLEMS        PAST SURGICAL HISTORY:  Past Surgical History:   Procedure Laterality Date     ARTHROPLASTY HIP Left 2/3/2021    Procedure: Total Hip Arthroplasty;  Surgeon: Andrew Arriola MD;  Location: [facility]     CARDIAC SURGERY  3-10 2020    stent installed     COLONOSCOPY N/A 6/16/2016    Procedure: COLONOSCOPY;  Surgeon: Randy Avendano MD;  Location: [facility]     CV LEFT HEART CATH Left 3/10/2020    Procedure: Left Heart Cath;  Surgeon: Vicente Lopez MD;  Location:  HEART CARDIAC CATH LAB     EYE SURGERY  2005    cataract surgery     VASECTOMY  1981       SOCIAL HISTORY:  Social History     Socioeconomic History     Marital status:      Spouse name: None     Number of children: None     Years of education: None     Highest education level: None   Occupational History     Occupation:    Social Needs     Financial resource strain: None     Food insecurity     Worry: None     Inability: None     Transportation needs     Medical: None     Non-medical: None   Tobacco Use     Smoking status: Never Smoker     Smokeless tobacco: Never Used   Substance and Sexual Activity     Alcohol use: Yes     Comment: Rare     Drug use: No     Sexual activity: Not Currently     Partners: Female     Birth control/protection: Male Surgical     Comment: [id number]   Lifestyle     Physical activity     Days per week: None     Minutes per session: None     Stress: None   Relationships     Social connections     Talks on phone: None     Gets together: None     Attends Muslim service: None     Active member of club or organization: None     Attends meetings of clubs or organizations: None     Relationship status: None     Intimate partner violence     Fear of current or ex partner: None     Emotionally abused: None     Physically abused: None     Forced  sexual activity: None   Other Topics Concern     Parent/sibling w/ CABG, MI or angioplasty before 65F 55M? No   Social History Narrative     None       FAMILY HISTORY:  Family History   Problem Relation Age of Onset     Diabetes Sister      Obesity Son      Depression Sister      Thyroid Disease Sister      Gastrointestinal Disease Son      Depression Son      Congenital Anomalies Daughter      Depression Daughter      Heart Disease Daughter      Hypertension Sister      Thyroid Disease Sister      Cerebrovascular Disease No family hx of        Review of Systems:  Skin:  Positive for bruising     Eyes:  Negative      ENT:  Positive for tinnitus    Respiratory:  Positive for shortness of breath;sleep apnea     Cardiovascular:    Positive for;edema    Gastroenterology: Negative      Genitourinary:  Positive for urinary frequency;urgency    Musculoskeletal:  Positive for arthritis;joint pain;joint swelling;joint stiffness    Neurologic:  Positive for numbness or tingling of hands    Psychiatric:  Negative      Heme/Lymph/Imm:  Negative      Endocrine:  Negative         Reviewed. Remainder of the note dictated.    Ryann Jolly PA-C

## 2021-04-16 ENCOUNTER — DOCUMENTATION ONLY (OUTPATIENT)
Dept: FAMILY MEDICINE | Facility: CLINIC | Age: 81
End: 2021-04-16

## 2021-04-16 DIAGNOSIS — N17.9 ACUTE KIDNEY INJURY (H): Primary | ICD-10-CM

## 2021-04-16 DIAGNOSIS — I10 BENIGN ESSENTIAL HYPERTENSION: ICD-10-CM

## 2021-04-16 RX ORDER — HYDROCHLOROTHIAZIDE 25 MG/1
25 TABLET ORAL DAILY
Qty: 90 TABLET | Refills: 1 | Status: ON HOLD | OUTPATIENT
Start: 2021-04-16 | End: 2021-09-28

## 2021-04-16 NOTE — PROGRESS NOTES
Lab results discussed with patient, consistent with acute renal injury, likely related to hydrochlorothiazide increased dose and ibuprofen.  Recommended to stop using NSAIDs, will reduce hydrochlorothiazide to 25 mg.  Patient was seen by cardiology yesterday, amlodipine was reduced to 2.5 mg and carvedilol was added.  Suggested to continue pushing fluids, leg elevation when possible for pedal edema, monitoring blood pressure and regular exercise.  Follow-up in 2 weeks or earlier if needed.  All questions answered.    Dr Rogers

## 2021-06-04 DIAGNOSIS — I25.10 CORONARY ARTERY DISEASE INVOLVING NATIVE CORONARY ARTERY OF NATIVE HEART WITHOUT ANGINA PECTORIS: ICD-10-CM

## 2021-06-04 DIAGNOSIS — I10 BENIGN ESSENTIAL HYPERTENSION: ICD-10-CM

## 2021-06-04 RX ORDER — LOSARTAN POTASSIUM 100 MG/1
100 TABLET ORAL DAILY
Qty: 90 TABLET | Refills: 3 | Status: ON HOLD | OUTPATIENT
Start: 2021-06-04 | End: 2021-09-28

## 2021-06-07 DIAGNOSIS — E78.5 HYPERLIPIDEMIA LDL GOAL <70: ICD-10-CM

## 2021-06-07 DIAGNOSIS — I25.10 CORONARY ARTERY DISEASE INVOLVING NATIVE CORONARY ARTERY OF NATIVE HEART WITHOUT ANGINA PECTORIS: ICD-10-CM

## 2021-06-07 RX ORDER — ATORVASTATIN CALCIUM 40 MG/1
40 TABLET, FILM COATED ORAL DAILY
Qty: 90 TABLET | Refills: 3 | Status: ON HOLD | OUTPATIENT
Start: 2021-06-07 | End: 2021-10-01

## 2021-09-06 ENCOUNTER — HOSPITAL ENCOUNTER (EMERGENCY)
Facility: CLINIC | Age: 81
Discharge: HOME | End: 2021-09-06
Attending: FAMILY MEDICINE | Admitting: FAMILY MEDICINE
Payer: MEDICARE

## 2021-09-06 ENCOUNTER — NURSE TRIAGE (OUTPATIENT)
Dept: NURSING | Facility: CLINIC | Age: 81
End: 2021-09-06

## 2021-09-06 VITALS
HEIGHT: 62 IN | SYSTOLIC BLOOD PRESSURE: 141 MMHG | OXYGEN SATURATION: 98 % | TEMPERATURE: 97.5 F | WEIGHT: 175 LBS | HEART RATE: 55 BPM | RESPIRATION RATE: 18 BRPM | DIASTOLIC BLOOD PRESSURE: 69 MMHG | BODY MASS INDEX: 32.2 KG/M2

## 2021-09-06 DIAGNOSIS — G56.13 MEDIAN NEUROPATHY OF BOTH UPPER EXTREMITIES: ICD-10-CM

## 2021-09-06 PROCEDURE — 99213 OFFICE O/P EST LOW 20 MIN: CPT | Performed by: FAMILY MEDICINE

## 2021-09-06 PROCEDURE — G0463 HOSPITAL OUTPT CLINIC VISIT: HCPCS | Performed by: FAMILY MEDICINE

## 2021-09-06 ASSESSMENT — MIFFLIN-ST. JEOR: SCORE: 1383.04

## 2021-09-06 NOTE — TELEPHONE ENCOUNTER
"Patient calling reporting bilateral hand pain for 2-3 months . Symptoms increased last night to \"severe pain.\"    Reporting history of arthritis in right shoulder x 1 year with \"pretty severe pain.\" History of cortisone shot and PT for right shoulder.  Stating he has been having pain in left thumb, and forefinger, along with right hand pain. Describes possible swelling in hands. Reporting \"severe electrical shock pain\" with movement of hands. Pain is \"moderate\" at rest.    Patient is unable to grasp things today.    Stating he has taking Tylenol and Aspirin with no improvement. Difficulty sleeping last night.    Disposition to see provider with in 4 hours.    Patient plans to go into [facility] today.    COVID 19 Nurse Triage Plan/Patient Instructions    Please be aware that novel coronavirus (COVID-19) may be circulating in the community. If you develop symptoms such as fever, cough, or SOB or if you have concerns about the presence of another infection including coronavirus (COVID-19), please contact your health care provider or visit [email or web address].     Disposition/Instructions    In-Person Visit with provider recommended. Reference Visit Selection Guide.    Thank you for taking steps to prevent the spread of this virus.  o Limit your contact with others.  o Wear a simple mask to cover your cough.  o Wash your hands well and often.    Resources    [facility]: About COVID-19: [email or web address]    CDC: What to Do If You're Sick: www.cdc.gov/coronavirus/2019-ncov/about/steps-when-sick.html    CDC: Ending Home Isolation: www.cdc.gov/coronavirus/2019-ncov/hcp/disposition-in-home-patients.html     CDC: Caring for Someone: www.cdc.gov/coronavirus/2019-ncov/if-you-are-sick/care-for-someone.html     [facility]: Interim Guidance for Hospital Discharge to Home: www.health.[facility].[place]/diseases/coronavirus/hcp/hospdischarge.pdf    [facility] clinical trials (COVID-19 research " "studies): clinicalaffairs.[facility]-clinical-trials     Below are the EGT hotlines at the [place] Department of Health ([facility]). Interpreters are available.   o For health questions: Call 461-153-2977 or 1-949.503.1107 (7 a.m. to 7 p.m.)  o For questions about schools and childcare: Call 425-305-1205 or 1-461.564.5457 (7 a.m. to 7 p.m.)                       Reason for Disposition    [1] SEVERE pain (e.g., excruciating, unable to use hand at all) AND [2] not improved after 2 hours of pain medicine    Additional Information    Negative: [1] Similar pain previously AND [2] it was from \"heart attack\"    Negative: [1] Similar pain previously AND [2] it was from \"angina\" AND [3] not relieved by nitroglycerin    Negative: Sounds like a life-threatening emergency to the triager    Negative: Followed a hand or wrist injury    Negative: Chest pain    Negative: Caused by an animal bite    Negative: Caused by a human bite    Negative: Wound looks infected    Negative: Finger pain is main symptom    Negative: Arm pain is main symptom    Negative: [1] Swollen joint AND [2] fever    Negative: [1] Red area or streak AND [2] fever    Negative: Patient sounds very sick or weak to the triager    Protocols used: HAND AND WRIST PAIN-A-AH      "

## 2021-09-06 NOTE — DISCHARGE INSTRUCTIONS
ICD-10-CM    1. Median neuropathy of both upper extremities  G56.13 Orthopedic  Referral    recommend splinting starting on dominant wrist with a  carpal tunnel splint overnight and during the day and may advance  use to both arms as long as able to /grasp with splint on to prevent nighttime fall.

## 2021-09-06 NOTE — ED PROVIDER NOTES
History     Chief Complaint   Patient presents with     Numbness     numbness, pain, and tingling in the finger tips, righ shoulder pain since this morning      HPI  Liu Ragsdale is a 81 year old male who \presents with numbness pain in paresthesias in his fingers right shoulder since this morning.  History of chronic right shoulder pain, osteoarthritis of the right shoulder, status post coronary angiogram coronary disease.  Sensorineural hearing loss, obstructive sleep apnea.  This is worse in the morning on awakening.  Seems to get better during the day.  On Norvasc, Lipitor, Coreg, hydrochlorothiazide, Imdur, losartan      Called nurse advice line with bilateral hand pain for last 2 to 3 months arthritis.  Difficulty grasping items today.  Referred to the urgent care today.      He has no significant neck pain and has had chronic long-term right shoulder pain.  Progressive symptoms of paresthesias in the median distribution of both hands but primarily in his right arm.  He notes this is gotten worse and he has difficulty with  strength at this time.  He has no other weakness.  He has no acute systemic symptoms.    Allergies:  Allergies   Allergen Reactions     Metoprolol Other (See Comments)     Side effects     Ampicillin Rash       Problem List:    Patient Active Problem List    Diagnosis Date Noted     Degenerative joint disease of left hip 02/03/2021     Priority: Medium     Coronary artery disease involving native coronary artery of native heart without angina pectoris 03/23/2020     Priority: Medium     Status post coronary angiogram 03/10/2020     Priority: Medium     Positive cardiac stress test 02/27/2020     Priority: Medium     Added automatically from request for surgery [id number]       Right shoulder pain, unspecified chronicity 11/06/2019     Priority: Medium     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 11/06/2019     Priority: Medium     SOB (shortness of breath) on  exertion 11/06/2019     Priority: Medium     Arthritis of right glenohumeral joint- moderate 11/06/2019     Priority: Medium     Arthritis of right acromioclavicular joint- advanced 11/06/2019     Priority: Medium     SNHL (sensory-neural hearing loss), asymmetrical 07/22/2019     Priority: Medium     FANI (obstructive sleep apnea) 03/15/2016     Priority: Medium     NOT using CPAP 3/15/16       Obesity 04/07/2015     Priority: Medium     BMI 33 (3/15/16)       Benign essential hypertension, BP goal <140/90 09/10/2014     Priority: Medium     Hyperlipidemia LDL goal <70 12/12/2013     Priority: Medium     Diagnosis updated by automated process. Provider to review and confirm.       Carotid bruit 03/30/2010     Priority: Medium     right carotid bruit on exam- u/s 7/09 with minimal stenosis on left and not well seen on right           Past Medical History:    Past Medical History:   Diagnosis Date     Angina at rest (H)      Arthritis      Coronary artery disease involving native coronary artery of native heart without angina pectoris      Hypertension      NO ACTIVE PROBLEMS        Past Surgical History:    Past Surgical History:   Procedure Laterality Date     ARTHROPLASTY HIP Left 2/3/2021    Procedure: Total Hip Arthroplasty;  Surgeon: Andrew Arriola MD;  Location: [facility] OR     CARDIAC SURGERY  3-10 2020    stent installed     COLONOSCOPY N/A 6/16/2016    Procedure: COLONOSCOPY;  Surgeon: Randy Avendano MD;  Location: [facility] GI     CV LEFT HEART CATH Left 3/10/2020    Procedure: Left Heart Cath;  Surgeon: Vicente Lopez MD;  Location:  HEART CARDIAC CATH LAB     EYE SURGERY  2005    cataract surgery     VASECTOMY  1981       Family History:    Family History   Problem Relation Age of Onset     Diabetes Sister      Obesity Son      Depression Sister      Thyroid Disease Sister      Gastrointestinal Disease Son      Depression Son      Congenital Anomalies Daughter      Depression Daughter      Heart Disease  "Daughter      Hypertension Sister      Thyroid Disease Sister      Cerebrovascular Disease No family hx of        Social History:  Marital Status:   [2]  Social History     Tobacco Use     Smoking status: Never Smoker     Smokeless tobacco: Never Used   Substance Use Topics     Alcohol use: Yes     Comment: Rare     Drug use: No        Medications:    acetaminophen (TYLENOL) 325 MG tablet  amLODIPine (NORVASC) 2.5 MG tablet  aspirin 81 MG EC tablet  atorvastatin (LIPITOR) 40 MG tablet  carvedilol (COREG) 12.5 MG tablet  diclofenac (VOLTAREN) 1 % topical gel  hydrochlorothiazide (HYDRODIURIL) 25 MG tablet  hydrOXYzine (ATARAX) 10 MG tablet  isosorbide mononitrate (IMDUR) 30 MG 24 hr tablet  losartan (COZAAR) 100 MG tablet  Multiple Vitamin (MULTI VITAMIN PO)  nitroGLYcerin (NITROSTAT) 0.3 MG sublingual tablet  senna-docusate (SENOKOT-S/PERICOLACE) 8.6-50 MG tablet          Review of Systems   ROS:  5 point ROS negative except as noted above in HPI, including Gen., Resp., CV, GI &  system review.      Physical Exam   BP: (!) 141/69  Pulse: 55  Temp: 97.5  F (36.4  C)  Resp: 18  Height: 157.5 cm (5' 2\")  Weight: 79.4 kg (175 lb)      Physical Exam   The midline cervical spine is nontender.  Does have some mild tenderness to palpation paraspinous region on the right side near the C6-C7 region.  No mass or deformity here.  Full range of motion of the neck.  Negative Spurling's test.  He has reduced internal rotation of the shoulder but otherwise has good overall range of motion of the shoulder.  Forward flexion extension, external rotation.  His supraspinatus testing with full cans and empty cans is normal.  External rotation against resistance is good although mildly painful.    Costoclavicular maneuver is normal.  East maneuver is normal.    He has no tenderness to palpation or deformity around the elbow.  Tap test is negative.  He has positive Phalen's and Tinel's test bilaterally prominently on the right " side.  No obvious swelling.  Normal pulses.    Median ulnar and radial nerve function is fully intact motor.  Sensory is more variable and not tested but he describes numbness and paresthesias primarily in the distal fingers on the thumb index middle fingers -especially the volar aspect.  There is no obvious atrophy at the hyperthenar or thenar eminence.  No distal color changes.        ED Course        Procedures              Critical Care time:  none               No results found for this or any previous visit (from the past 24 hour(s)).    Medications - No data to display    Assessments & Plan (with Medical Decision Making)     MDM: Liu Ragsdale is a 81 year old male with findings most consistent with median neuropathy bilaterally associated with a cervical spine likely C7-C8 region, suspect bony degeneration and possibly foraminal stenosis.  I suspect this is a double crush injury both at the neck and in the wrist.  We discussed the use a a carpal tunnel splint.  I showed him the various options for splints and recommended going to the pharmacy to try some on.  I would use this on his dominant arm first.  Use it at nighttime as his wrist is likely in a altered position during this time.  If he does have a double crush injury doubt that initial corticosteroid injection at the wrist would be helpful although we did discuss that that may be an option when he follows up with sports medicine.  Consult written for Ortho isidoro.  Would have him use this at nighttime and also during the day but caution about risk for falls as he would be less likely to be able to grasp something as he stands.  Could be unstable at nighttime and increased risk of fall we discussed this.    I have made him today an urgent care visit.    I have reviewed the nursing notes.    I have reviewed the findings, diagnosis, plan and need for follow up with the patient.       New Prescriptions    No medications on file       Final diagnoses:    Median neuropathy of both upper extremities - recommend splinting starting on dominant wrist with a  carpal tunnel splint overnight and during the day and may advance  use to both arms as long as able to /grasp with splint on to prevent nighttime fall.       9/6/2021   [facility] EMERGENCY DEPT     Issac Varela MD  09/06/21 [id number]

## 2021-09-17 ENCOUNTER — OFFICE VISIT (OUTPATIENT)
Dept: FAMILY MEDICINE | Facility: CLINIC | Age: 81
End: 2021-09-17
Payer: COMMERCIAL

## 2021-09-17 VITALS
SYSTOLIC BLOOD PRESSURE: 130 MMHG | WEIGHT: 170 LBS | HEART RATE: 68 BPM | BODY MASS INDEX: 31.28 KG/M2 | DIASTOLIC BLOOD PRESSURE: 60 MMHG | TEMPERATURE: 97.4 F | HEIGHT: 62 IN | RESPIRATION RATE: 18 BRPM

## 2021-09-17 DIAGNOSIS — M19.011 PRIMARY OSTEOARTHRITIS OF RIGHT SHOULDER: ICD-10-CM

## 2021-09-17 DIAGNOSIS — G56.03 BILATERAL CARPAL TUNNEL SYNDROME: Primary | ICD-10-CM

## 2021-09-17 PROCEDURE — 99213 OFFICE O/P EST LOW 20 MIN: CPT | Performed by: FAMILY MEDICINE

## 2021-09-17 ASSESSMENT — MIFFLIN-ST. JEOR: SCORE: 1360.36

## 2021-09-17 NOTE — PROGRESS NOTES
"  Assessment & Plan     Bilateral carpal tunnel syndrome  Suspect symptoms secondary to bilateral carpal tunnel syndrome along with shoulder osteoarthritis.  Previous imaging results reviewed.  Recommended to continue wrist brace, over-the-counter analgesia and activity as tolerated.  Orthopedic referral placed for further review and recommendations.  Patient understood and in agreement with above plan.  All questions answered.  - Orthopedic  Referral; Future    Primary osteoarthritis of right shoulder  As above         Piyush Rogers MD  [facility]    Eder Hatfield is a 81 year old who presents for the following health issues     HPI     Concern - numbness   Onset: Follow up   Description: numbness and tingling in his hands and fingers. Right shoulder pain   Intensity: moderate  Progression of Symptoms:  worsening  Accompanying Signs & Symptoms: Neither seem to be getting better.   Previous history of similar problem:   Therapies tried and outcome: using a topical get and tylenol for shoulder. PT aggravated it in the past.  Chiropractor.   Has been wearing splints on his hands 24 hours. Seems to alleviate it a little bit.         Review of Systems   Constitutional, HEENT, cardiovascular, pulmonary, GI, , musculoskeletal, neuro, skin, endocrine and psych systems are negative, except as otherwise noted.      Objective    /60 (Cuff Size: Adult Regular)   Pulse 68   Temp 97.4  F (36.3  C) (Tympanic)   Resp 18   Ht 1.575 m (5' 2\")   Wt 77.1 kg (170 lb)   BMI 31.09 kg/m    Body mass index is 31.09 kg/m .  Physical Exam   GENERAL: alert and no distress  EYES: Eyes grossly normal to inspection, PERRL and conjunctivae and sclerae normal  HENT: normal cephalic/atraumatic, nose and mouth without ulcers or lesions, oropharynx clear and oral mucous membranes moist  NECK: no adenopathy, no asymmetry, masses, or scars and thyroid normal to palpation  RESP: lungs clear to " auscultation - no rales, rhonchi or wheezes  CV: regular rates and rhythm, normal S1 S2, no S3 or S4 and no murmur, click or rub  ABDOMEN: soft, nontender  MS: Subjective bilateral palm pain, slightly weak handgrips, no joint swelling or skin discoloration noted, normal shoulder range of movement, few skin abrasions involving left arm, peripheral pulses 3+, sensation to touch and pressure intact  SKIN: As above  NEURO: Normal strength and tone, mentation intact and speech normal  PSYCH: mentation appears normal, affect normal/bright

## 2021-09-17 NOTE — NURSING NOTE
"Chief Complaint   Patient presents with     Numbness     Shoulder Pain     /60 (Cuff Size: Adult Regular)   Pulse 68   Temp 97.4  F (36.3  C) (Tympanic)   Resp 18   Ht 1.575 m (5' 2\")   Wt 77.1 kg (170 lb)   BMI 31.09 kg/m   Estimated body mass index is 31.09 kg/m  as calculated from the following:    Height as of this encounter: 1.575 m (5' 2\").    Weight as of this encounter: 77.1 kg (170 lb).  Patient presents to the clinic using No DME      Health Maintenance that is potentially due pending provider review:    Health Maintenance Due   Topic Date Due     ANNUAL REVIEW OF HM ORDERS  Never done     ZOSTER IMMUNIZATION (2 of 3) 12/11/2014     MEDICARE ANNUAL WELLNESS VISIT  09/25/2020     FALL RISK ASSESSMENT  09/25/2020     LIPID  08/19/2021                "

## 2021-09-24 ENCOUNTER — ANCILLARY PROCEDURE (OUTPATIENT)
Dept: GENERAL RADIOLOGY | Facility: CLINIC | Age: 81
End: 2021-09-24
Attending: PEDIATRICS
Payer: COMMERCIAL

## 2021-09-24 ENCOUNTER — OFFICE VISIT (OUTPATIENT)
Dept: ORTHOPEDICS | Facility: CLINIC | Age: 81
End: 2021-09-24
Attending: FAMILY MEDICINE
Payer: COMMERCIAL

## 2021-09-24 VITALS
WEIGHT: 170 LBS | BODY MASS INDEX: 31.28 KG/M2 | HEIGHT: 62 IN | DIASTOLIC BLOOD PRESSURE: 67 MMHG | SYSTOLIC BLOOD PRESSURE: 127 MMHG

## 2021-09-24 DIAGNOSIS — G89.29 CHRONIC RIGHT SHOULDER PAIN: ICD-10-CM

## 2021-09-24 DIAGNOSIS — M25.511 CHRONIC RIGHT SHOULDER PAIN: Primary | ICD-10-CM

## 2021-09-24 DIAGNOSIS — G56.03 BILATERAL CARPAL TUNNEL SYNDROME: ICD-10-CM

## 2021-09-24 DIAGNOSIS — G89.29 CHRONIC RIGHT SHOULDER PAIN: Primary | ICD-10-CM

## 2021-09-24 DIAGNOSIS — M19.011 ARTHRITIS OF RIGHT SHOULDER REGION: ICD-10-CM

## 2021-09-24 DIAGNOSIS — M25.511 CHRONIC RIGHT SHOULDER PAIN: ICD-10-CM

## 2021-09-24 PROCEDURE — 73110 X-RAY EXAM OF WRIST: CPT | Mod: LT | Performed by: RADIOLOGY

## 2021-09-24 PROCEDURE — 73030 X-RAY EXAM OF SHOULDER: CPT | Mod: RT | Performed by: RADIOLOGY

## 2021-09-24 PROCEDURE — 99214 OFFICE O/P EST MOD 30 MIN: CPT | Performed by: PEDIATRICS

## 2021-09-24 ASSESSMENT — MIFFLIN-ST. JEOR: SCORE: 1360.36

## 2021-09-24 NOTE — PROGRESS NOTES
ASSESSMENT & PLAN    Liu was seen today for pain, pain and pain.    Diagnoses and all orders for this visit:    Chronic right shoulder pain  -     XR Shoulder Right G/E 3 Views; Future  -     Orthopedic  Referral; Future    Bilateral carpal tunnel syndrome  -     Orthopedic  Referral  -     XR Wrist Bilateral G/E 3 vw  -     Orthopedic  Referral; Future    Arthritis of right shoulder region  -     Orthopedic  Referral; Future      This issue is chronic and Unchanged.    Discussed nature of shoulder osteoarthritis. Discussed symptom treatment with over-the-counter medications, ice or heat and rest if needed. Discussed physical therapy for strength. Discussed injection therapy including subacromial or glenohumeral corticosteroid injections. Given lack of improvement with prior  therapy and injections, will refer to orthopedic surgery.    Discussed anatomy and pathophysiology of carpal tunnel. Discussed avoidance of exacerbating activities and use of braces.  Also discussed formal occupational hand therapy, potential referral for evaluation with EMG to evaluate severity.  Would consider referral pending course with treatment.  Home Handout Provided.    Plan:  - Today's Plan of Care:  Referral to an Orthopedic Surgeon  Continue bracing for carpal tunnel  Referral for US guided carpal tunnel injections  - Discussed activity considerations and other supportive care including Ice/Heat, OTC and other topical medications as needed.    -We also discussed other future treatment options:  EMG bilateral arms  Referral to occupational therapy    Follow Up: as needed    Concerning signs and symptoms were reviewed.  The patient expressed understanding of this management plan and all questions were answered at this time.    Brenda Lepe MD [facility]  Sports Medicine Physician  [facility]      -----  Chief Complaint   Patient presents with     Right Wrist - Pain     Left Wrist -  "Pain     Right Shoulder - Pain       SUBJECTIVE  Liu Ragsdale is a/an 81 year old male who is seen in consultation at the request of Piyush Rogers M.D. for evaluation of bilateral hand numbness, primarily on the left wrist, and right shoulder pain.  He was seen by me 11/15/2019 for his right shoulder pain.    The patient is seen by themselves.  The patient is Left handed    Onset: 3 month(s) ago. Reports insidious onset without acute precipitating event - bilateral wrists              2 years, reports insidious onset without acute precipitating event - right shoulder  Location of Pain: bilateral wrist, primarily left                               Right shoulder, upper trapezius, anterior   Worsened by: wrists; grasping                       Right shoulder;putting seatbelt on /off, squeezing elbow to his side, anything above shoulder height, elevation of the shoulder (carrying groceries)   Better with: wrists; braces, Tylenol                     Right shoulder; diclofenac, tyelnol   Treatments tried: Wrist: tylenol, bracing                      Shoulder: ice, heat, Tylenol, physical therapy (5 visits), US guided corticosteroid injection by Dr. De Oliveira (most recent date: 11/3/2020) that provided no relief of the shoulder,and chiropractic care (2x weekly visits)  Associated symptoms: wrists numbness, tingling and pain  Associated symptoms: R shoulder feeling of instabiliy, crepitus, pain    Orthopedic/Surgical history: YES - Date: chronic left hip pain, chronic right shoulder pain  Social History/Occupation: retired     No family history pertinent to patient's problem today.    REVIEW OF SYSTEMS:  Review of Systems  Skin: no bruising, no swelling  Musculoskeletal: as above  Neurologic: no numbness, paresthesias  Remainder of review of systems is negative including constitutional, CV, pulmonary, GI, except as noted in HPI or medical history.    OBJECTIVE:  /67   Ht 1.575 m (5' 2\")   Wt 77.1 kg (170 lb)   BMI " 31.09 kg/m     General: healthy, alert and in no distress  HEENT: no scleral icterus or conjunctival erythema  Skin: no suspicious lesions or rash. No jaundice.  CV: distal perfusion intact  Resp: normal respiratory effort without conversational dyspnea   Psych: normal mood and affect  Gait: normal steady gait with appropriate coordination and balance  Neuro: Normal light sensory exam of upper extremity    Bilateral Shoulder exam  Inspection and Posture:       rounded shoulders and upper back     Skin:        no visible deformities     Tender:        none     Non Tender:       remainder of shoulder bilateral     ROM:        forward flexion 110 right       abduction 110 right       internal rotation gluteal region right       external rotation 35 right     Painful motions:       flexion right       elevation right     Strength:        abduction 5/5 bilateral       flexion 5/5 bilateral       internal rotation 5/5 bilateral       external rotation 5/5 bilateral     Impingement testing:       neg (-) Neer right       neg (-) Thurston right     Sensation:        normal sensation over shoulder and upper extremity     Bilateral Wrist and Hand exam    Inspection:       No swelling, bruising or deformity bilateral    Tender:       Mild volar wrist    Non Tender:       Remainder of the Wrist and Hand bilateral    ROM:       Decreased flexion and extension - symmetric    Strength:       Grossly normal    Special Tests:        positive (+) Tinel's test bilateral and       positive (+) Phalen's test bilateral    Neurovascular:       2+ radial pulses bilaterally with brisk capillary refill and      normal sensation to light touch in the radial, median and ulnar nerve distributions  - numbness in thumb and index finger    RADIOLOGY:  I independently ordered, visualized and reviewed these images with the patient  3 XR views of right shoulder reviewed: no acute bony abnormality, severe glenohumeral degenerative change  - will follow  official read    3 XR views of bilateral wrist reviewed: no acute bony abnormality, STT degenerative change on the left  - will follow official read      Review of the result(s) of each unique test - XRs

## 2021-09-24 NOTE — PATIENT INSTRUCTIONS
Discussed nature of shoulder osteoarthritis. Discussed symptom treatment with over-the-counter medications, ice or heat and rest if needed. Discussed physical therapy for strength. Discussed injection therapy including subacromial or glenohumeral corticosteroid injections. Given lack of improvement with prior  therapy and injections, will refer to orthopedic surgery.    Discussed anatomy and pathophysiology of carpal tunnel. Discussed avoidance of exacerbating activities and use of braces.  Also discussed formal occupational hand therapy, potential referral for evaluation with EMG to evaluate severity.  Would consider referral pending course with treatment.  Home Handout Provided.    Plan:  - Today's Plan of Care:  Referral to an Orthopedic Surgeon  Continue bracing for carpal tunnel  Referral for US guided carpal tunnel injections  - Discussed activity considerations and other supportive care including Ice/Heat, OTC and other topical medications as needed.    -We also discussed other future treatment options:  EMG bilateral arms  Referral to occupational therapy    Follow Up: as needed    If you have any further questions for your physician or physician s care team you can call [phone] and use option 3 to leave a voice message. Calls received during business hours will be returned same day.

## 2021-09-24 NOTE — LETTER
9/24/2021         RE: Liu Ragsdale  [address] [facility] [id number]        Dear Colleague,    Thank you for referring your patient, Liu Ragsdale, to the [facility] [place]. Please see a copy of my visit note below.    ASSESSMENT & PLAN    Liu was seen today for pain, pain and pain.    Diagnoses and all orders for this visit:    Chronic right shoulder pain  -     XR Shoulder Right G/E 3 Views; Future  -     Orthopedic  Referral; Future    Bilateral carpal tunnel syndrome  -     Orthopedic  Referral  -     XR Wrist Bilateral G/E 3 vw  -     Orthopedic  Referral; Future    Arthritis of right shoulder region  -     Orthopedic  Referral; Future      This issue is chronic and Unchanged.    Discussed nature of shoulder osteoarthritis. Discussed symptom treatment with over-the-counter medications, ice or heat and rest if needed. Discussed physical therapy for strength. Discussed injection therapy including subacromial or glenohumeral corticosteroid injections. Given lack of improvement with prior  therapy and injections, will refer to orthopedic surgery.    Discussed anatomy and pathophysiology of carpal tunnel. Discussed avoidance of exacerbating activities and use of braces.  Also discussed formal occupational hand therapy, potential referral for evaluation with EMG to evaluate severity.  Would consider referral pending course with treatment.  Home Handout Provided.    Plan:  - Today's Plan of Care:  Referral to an Orthopedic Surgeon  Continue bracing for carpal tunnel  Referral for US guided carpal tunnel injections  - Discussed activity considerations and other supportive care including Ice/Heat, OTC and other topical medications as needed.    -We also discussed other future treatment options:  EMG bilateral arms  Referral to occupational therapy    Follow Up: as needed    Concerning signs and symptoms were reviewed.  The patient  expressed understanding of this management plan and all questions were answered at this time.    Brenda Lepe MD [facility]  Sports Medicine Physician  [facility]      -----  Chief Complaint   Patient presents with     Right Wrist - Pain     Left Wrist - Pain     Right Shoulder - Pain       SUBJECTIVE  Liu Ragsdale is a/an 81 year old male who is seen in consultation at the request of Piyush Rogers M.D. for evaluation of bilateral hand numbness, primarily on the left wrist, and right shoulder pain.  He was seen by me 11/15/2019 for his right shoulder pain.    The patient is seen by themselves.  The patient is Left handed    Onset: 3 month(s) ago. Reports insidious onset without acute precipitating event - bilateral wrists              2 years, reports insidious onset without acute precipitating event - right shoulder  Location of Pain: bilateral wrist, primarily left                               Right shoulder, upper trapezius, anterior   Worsened by: wrists; grasping                       Right shoulder;putting seatbelt on /off, squeezing elbow to his side, anything above shoulder height, elevation of the shoulder (carrying groceries)   Better with: wrists; braces, Tylenol                     Right shoulder; diclofenac, tyelnol   Treatments tried: Wrist: tylenol, bracing                      Shoulder: ice, heat, Tylenol, physical therapy (5 visits), US guided corticosteroid injection by Dr. De Oliveira (most recent date: 11/3/2020) that provided no relief of the shoulder,and chiropractic care (2x weekly visits)  Associated symptoms: wrists numbness, tingling and pain  Associated symptoms: R shoulder feeling of instabiliy, crepitus, pain    Orthopedic/Surgical history: YES - Date: chronic left hip pain, chronic right shoulder pain  Social History/Occupation: retired     No family history pertinent to patient's problem today.    REVIEW OF SYSTEMS:  Review of Systems  Skin: no bruising, no  "swelling  Musculoskeletal: as above  Neurologic: no numbness, paresthesias  Remainder of review of systems is negative including constitutional, CV, pulmonary, GI, except as noted in HPI or medical history.    OBJECTIVE:  /67   Ht 1.575 m (5' 2\")   Wt 77.1 kg (170 lb)   BMI 31.09 kg/m     General: healthy, alert and in no distress  HEENT: no scleral icterus or conjunctival erythema  Skin: no suspicious lesions or rash. No jaundice.  CV: distal perfusion intact  Resp: normal respiratory effort without conversational dyspnea   Psych: normal mood and affect  Gait: normal steady gait with appropriate coordination and balance  Neuro: Normal light sensory exam of upper extremity    Bilateral Shoulder exam  Inspection and Posture:       rounded shoulders and upper back     Skin:        no visible deformities     Tender:        none     Non Tender:       remainder of shoulder bilateral     ROM:        forward flexion 110 right       abduction 110 right       internal rotation gluteal region right       external rotation 35 right     Painful motions:       flexion right       elevation right     Strength:        abduction 5/5 bilateral       flexion 5/5 bilateral       internal rotation 5/5 bilateral       external rotation 5/5 bilateral     Impingement testing:       neg (-) Neer right       neg (-) [place]ston right     Sensation:        normal sensation over shoulder and upper extremity     Bilateral Wrist and Hand exam    Inspection:       No swelling, bruising or deformity bilateral    Tender:       Mild volar wrist    Non Tender:       Remainder of the Wrist and Hand bilateral    ROM:       Decreased flexion and extension - symmetric    Strength:       Grossly normal    Special Tests:        positive (+) Tinel's test bilateral and       positive (+) Phalen's test bilateral    Neurovascular:       2+ radial pulses bilaterally with brisk capillary refill and      normal sensation to light touch in the radial, median " and ulnar nerve distributions  - numbness in thumb and index finger    RADIOLOGY:  I independently ordered, visualized and reviewed these images with the patient  3 XR views of right shoulder reviewed: no acute bony abnormality, severe glenohumeral degenerative change  - will follow official read    3 XR views of bilateral wrist reviewed: no acute bony abnormality, STT degenerative change on the left  - will follow official read      Review of the result(s) of each unique test - XRs           Again, thank you for allowing me to participate in the care of your patient.        Sincerely,        Brenda Lepe MD

## 2021-09-26 ENCOUNTER — HOSPITAL ENCOUNTER (INPATIENT)
Facility: CLINIC | Age: 81
LOS: 2 days | Discharge: ANOTHER HOSPITAL | DRG: 064 | End: 2021-09-28
Attending: FAMILY MEDICINE | Admitting: INTERNAL MEDICINE
Payer: MEDICARE

## 2021-09-26 ENCOUNTER — APPOINTMENT (OUTPATIENT)
Dept: CT IMAGING | Facility: CLINIC | Age: 81
DRG: 064 | End: 2021-09-26
Attending: FAMILY MEDICINE
Payer: MEDICARE

## 2021-09-26 DIAGNOSIS — R55 NEAR SYNCOPE: ICD-10-CM

## 2021-09-26 DIAGNOSIS — Z11.52 ENCOUNTER FOR SCREENING LABORATORY TESTING FOR SEVERE ACUTE RESPIRATORY SYNDROME CORONAVIRUS 2 (SARS-COV-2): ICD-10-CM

## 2021-09-26 DIAGNOSIS — I65.21 RIGHT CAROTID ARTERY OCCLUSION: ICD-10-CM

## 2021-09-26 DIAGNOSIS — I63.511 CEREBROVASCULAR ACCIDENT (CVA) DUE TO OCCLUSION OF RIGHT MIDDLE CEREBRAL ARTERY (H): ICD-10-CM

## 2021-09-26 DIAGNOSIS — M19.011 ARTHRITIS OF RIGHT ACROMIOCLAVICULAR JOINT: Primary | ICD-10-CM

## 2021-09-26 LAB
ANION GAP SERPL CALCULATED.3IONS-SCNC: 4 MMOL/L (ref 3–14)
APTT PPP: 34 SECONDS (ref 22–38)
BASOPHILS # BLD AUTO: 0 10E3/UL (ref 0–0.2)
BASOPHILS NFR BLD AUTO: 1 %
BUN SERPL-MCNC: 22 MG/DL (ref 7–30)
CALCIUM SERPL-MCNC: 8.5 MG/DL (ref 8.5–10.1)
CHLORIDE BLD-SCNC: 108 MMOL/L (ref 94–109)
CO2 SERPL-SCNC: 27 MMOL/L (ref 20–32)
CREAT SERPL-MCNC: 1.32 MG/DL (ref 0.66–1.25)
EOSINOPHIL # BLD AUTO: 0.2 10E3/UL (ref 0–0.7)
EOSINOPHIL NFR BLD AUTO: 2 %
ERYTHROCYTE [DISTWIDTH] IN BLOOD BY AUTOMATED COUNT: 12.7 % (ref 10–15)
GFR SERPL CREATININE-BSD FRML MDRD: 51 ML/MIN/1.73M2
GLUCOSE BLD-MCNC: 113 MG/DL (ref 70–99)
GLUCOSE BLDC GLUCOMTR-MCNC: 117 MG/DL (ref 70–99)
HBA1C MFR BLD: 6 % (ref 0–5.6)
HCT VFR BLD AUTO: 38.9 % (ref 40–53)
HGB BLD-MCNC: 13.1 G/DL (ref 13.3–17.7)
HOLD SPECIMEN: NORMAL
IMM GRANULOCYTES # BLD: 0 10E3/UL
IMM GRANULOCYTES NFR BLD: 0 %
INR PPP: 1.03 (ref 0.85–1.15)
LYMPHOCYTES # BLD AUTO: 1.1 10E3/UL (ref 0.8–5.3)
LYMPHOCYTES NFR BLD AUTO: 13 %
MCH RBC QN AUTO: 29.7 PG (ref 26.5–33)
MCHC RBC AUTO-ENTMCNC: 33.7 G/DL (ref 31.5–36.5)
MCV RBC AUTO: 88 FL (ref 78–100)
MONOCYTES # BLD AUTO: 0.5 10E3/UL (ref 0–1.3)
MONOCYTES NFR BLD AUTO: 6 %
NEUTROPHILS # BLD AUTO: 6.5 10E3/UL (ref 1.6–8.3)
NEUTROPHILS NFR BLD AUTO: 78 %
NRBC # BLD AUTO: 0 10E3/UL
NRBC BLD AUTO-RTO: 0 /100
PLATELET # BLD AUTO: 151 10E3/UL (ref 150–450)
POTASSIUM BLD-SCNC: 4 MMOL/L (ref 3.4–5.3)
RBC # BLD AUTO: 4.41 10E6/UL (ref 4.4–5.9)
SARS-COV-2 RNA RESP QL NAA+PROBE: NEGATIVE
SODIUM SERPL-SCNC: 139 MMOL/L (ref 133–144)
TROPONIN I SERPL-MCNC: <0.015 UG/L (ref 0–0.04)
UFH PPP CHRO-ACNC: >1.1 IU/ML
WBC # BLD AUTO: 8.4 10E3/UL (ref 4–11)

## 2021-09-26 PROCEDURE — 85730 THROMBOPLASTIN TIME PARTIAL: CPT | Performed by: FAMILY MEDICINE

## 2021-09-26 PROCEDURE — 36415 COLL VENOUS BLD VENIPUNCTURE: CPT | Performed by: INTERNAL MEDICINE

## 2021-09-26 PROCEDURE — 85025 COMPLETE CBC W/AUTO DIFF WBC: CPT | Performed by: FAMILY MEDICINE

## 2021-09-26 PROCEDURE — 87635 SARS-COV-2 COVID-19 AMP PRB: CPT | Performed by: FAMILY MEDICINE

## 2021-09-26 PROCEDURE — 83036 HEMOGLOBIN GLYCOSYLATED A1C: CPT | Performed by: INTERNAL MEDICINE

## 2021-09-26 PROCEDURE — 93005 ELECTROCARDIOGRAM TRACING: CPT | Performed by: FAMILY MEDICINE

## 2021-09-26 PROCEDURE — 36415 COLL VENOUS BLD VENIPUNCTURE: CPT | Performed by: FAMILY MEDICINE

## 2021-09-26 PROCEDURE — 96366 THER/PROPH/DIAG IV INF ADDON: CPT | Performed by: FAMILY MEDICINE

## 2021-09-26 PROCEDURE — 99285 EMERGENCY DEPT VISIT HI MDM: CPT | Mod: 25 | Performed by: FAMILY MEDICINE

## 2021-09-26 PROCEDURE — 250N000011 HC RX IP 250 OP 636: Performed by: FAMILY MEDICINE

## 2021-09-26 PROCEDURE — 96366 THER/PROPH/DIAG IV INF ADDON: CPT

## 2021-09-26 PROCEDURE — 99223 1ST HOSP IP/OBS HIGH 75: CPT | Mod: AI | Performed by: INTERNAL MEDICINE

## 2021-09-26 PROCEDURE — 120N000001 HC R&B MED SURG/OB

## 2021-09-26 PROCEDURE — 93010 ELECTROCARDIOGRAM REPORT: CPT | Performed by: FAMILY MEDICINE

## 2021-09-26 PROCEDURE — G0378 HOSPITAL OBSERVATION PER HR: HCPCS

## 2021-09-26 PROCEDURE — 250N000013 HC RX MED GY IP 250 OP 250 PS 637: Performed by: INTERNAL MEDICINE

## 2021-09-26 PROCEDURE — 96365 THER/PROPH/DIAG IV INF INIT: CPT | Mod: 59 | Performed by: FAMILY MEDICINE

## 2021-09-26 PROCEDURE — 70450 CT HEAD/BRAIN W/O DYE: CPT

## 2021-09-26 PROCEDURE — 99291 CRITICAL CARE FIRST HOUR: CPT | Mod: 25 | Performed by: FAMILY MEDICINE

## 2021-09-26 PROCEDURE — 250N000009 HC RX 250: Performed by: FAMILY MEDICINE

## 2021-09-26 PROCEDURE — 70498 CT ANGIOGRAPHY NECK: CPT

## 2021-09-26 PROCEDURE — 85520 HEPARIN ASSAY: CPT | Performed by: INTERNAL MEDICINE

## 2021-09-26 PROCEDURE — 80048 BASIC METABOLIC PNL TOTAL CA: CPT | Performed by: FAMILY MEDICINE

## 2021-09-26 PROCEDURE — 85610 PROTHROMBIN TIME: CPT | Performed by: FAMILY MEDICINE

## 2021-09-26 PROCEDURE — 84484 ASSAY OF TROPONIN QUANT: CPT | Performed by: FAMILY MEDICINE

## 2021-09-26 PROCEDURE — C9803 HOPD COVID-19 SPEC COLLECT: HCPCS | Performed by: FAMILY MEDICINE

## 2021-09-26 RX ORDER — ONDANSETRON 2 MG/ML
4 INJECTION INTRAMUSCULAR; INTRAVENOUS EVERY 6 HOURS PRN
Status: DISCONTINUED | OUTPATIENT
Start: 2021-09-26 | End: 2021-09-28 | Stop reason: HOSPADM

## 2021-09-26 RX ORDER — ISOSORBIDE MONONITRATE 60 MG/1
60 TABLET, EXTENDED RELEASE ORAL DAILY
Status: DISCONTINUED | OUTPATIENT
Start: 2021-09-27 | End: 2021-09-28 | Stop reason: HOSPADM

## 2021-09-26 RX ORDER — ACETAMINOPHEN 500 MG
1000 TABLET ORAL 3 TIMES DAILY
Status: DISCONTINUED | OUTPATIENT
Start: 2021-09-26 | End: 2021-09-28 | Stop reason: HOSPADM

## 2021-09-26 RX ORDER — ATORVASTATIN CALCIUM 20 MG/1
40 TABLET, FILM COATED ORAL AT BEDTIME
Status: DISCONTINUED | OUTPATIENT
Start: 2021-09-26 | End: 2021-09-28 | Stop reason: HOSPADM

## 2021-09-26 RX ORDER — HYDROCHLOROTHIAZIDE 25 MG/1
25 TABLET ORAL DAILY
Status: DISCONTINUED | OUTPATIENT
Start: 2021-09-27 | End: 2021-09-28 | Stop reason: HOSPADM

## 2021-09-26 RX ORDER — LIDOCAINE 40 MG/G
CREAM TOPICAL
Status: DISCONTINUED | OUTPATIENT
Start: 2021-09-26 | End: 2021-09-27

## 2021-09-26 RX ORDER — CARVEDILOL 12.5 MG/1
12.5 TABLET ORAL 2 TIMES DAILY WITH MEALS
Status: DISCONTINUED | OUTPATIENT
Start: 2021-09-27 | End: 2021-09-26

## 2021-09-26 RX ORDER — MULTIVITAMIN,THERAPEUTIC
1 TABLET ORAL DAILY
Status: DISCONTINUED | OUTPATIENT
Start: 2021-09-27 | End: 2021-09-28 | Stop reason: HOSPADM

## 2021-09-26 RX ORDER — IOPAMIDOL 755 MG/ML
70 INJECTION, SOLUTION INTRAVASCULAR ONCE
Status: COMPLETED | OUTPATIENT
Start: 2021-09-26 | End: 2021-09-26

## 2021-09-26 RX ORDER — CARVEDILOL 12.5 MG/1
12.5 TABLET ORAL 2 TIMES DAILY WITH MEALS
Status: DISCONTINUED | OUTPATIENT
Start: 2021-09-26 | End: 2021-09-27

## 2021-09-26 RX ORDER — AMLODIPINE BESYLATE 2.5 MG/1
2.5 TABLET ORAL DAILY
Status: DISCONTINUED | OUTPATIENT
Start: 2021-09-27 | End: 2021-09-28 | Stop reason: HOSPADM

## 2021-09-26 RX ORDER — ONDANSETRON 4 MG/1
4 TABLET, ORALLY DISINTEGRATING ORAL EVERY 6 HOURS PRN
Status: DISCONTINUED | OUTPATIENT
Start: 2021-09-26 | End: 2021-09-28 | Stop reason: HOSPADM

## 2021-09-26 RX ORDER — LOSARTAN POTASSIUM 50 MG/1
100 TABLET ORAL DAILY
Status: DISCONTINUED | OUTPATIENT
Start: 2021-09-27 | End: 2021-09-28 | Stop reason: HOSPADM

## 2021-09-26 RX ORDER — ASPIRIN 81 MG/1
81 TABLET ORAL DAILY
Status: DISCONTINUED | OUTPATIENT
Start: 2021-09-27 | End: 2021-09-28 | Stop reason: DRUGHIGH

## 2021-09-26 RX ORDER — HEPARIN SODIUM 10000 [USP'U]/100ML
0-5000 INJECTION, SOLUTION INTRAVENOUS CONTINUOUS
Status: DISCONTINUED | OUTPATIENT
Start: 2021-09-26 | End: 2021-09-28

## 2021-09-26 RX ORDER — LIDOCAINE 40 MG/G
CREAM TOPICAL
Status: DISCONTINUED | OUTPATIENT
Start: 2021-09-26 | End: 2021-09-28 | Stop reason: HOSPADM

## 2021-09-26 RX ADMIN — HEPARIN SODIUM 1400 UNITS/HR: 10000 INJECTION, SOLUTION INTRAVENOUS at 12:42

## 2021-09-26 RX ADMIN — ACETAMINOPHEN 1000 MG: 500 TABLET, FILM COATED ORAL at 21:39

## 2021-09-26 RX ADMIN — ATORVASTATIN CALCIUM 40 MG: 20 TABLET, FILM COATED ORAL at 21:39

## 2021-09-26 RX ADMIN — IOPAMIDOL 70 ML: 755 INJECTION, SOLUTION INTRAVENOUS at 11:51

## 2021-09-26 RX ADMIN — SODIUM CHLORIDE 100 ML: 9 INJECTION, SOLUTION INTRAVENOUS at 11:50

## 2021-09-26 RX ADMIN — CARVEDILOL 12.5 MG: 12.5 TABLET, FILM COATED ORAL at 21:40

## 2021-09-26 ASSESSMENT — ACTIVITIES OF DAILY LIVING (ADL): ADLS_ACUITY_SCORE: 4

## 2021-09-26 ASSESSMENT — MIFFLIN-ST. JEOR: SCORE: 1353.25

## 2021-09-26 NOTE — H&P (VIEW-ONLY)
[facility]    History and Physical - Hospitalist Service       Date of Admission:  9/26/2021    Assessment & Plan      Liu Ragsdale is a 81 year old male with past medical history of hypertension, coronary artery disease status post stent in 2020, hyperlipidemia, bilateral carpal tunnel syndrome, untreated sleep apnea who was admitted on 9/26/2021 with near syncope and found to have right carotid artery occlusion, possible acute ischemic stroke    Near Syncope  Patient reported near loss of consciousness while sitting at [employer].  It is possibly this is neurogenic syncope that unmasked the right carotid artery occlusion.  --Telemetry  --Echo    Right Carotid Artery Occlusion  Possible acute ischemic stroke   CTA head/neck shows right ICA occluded at the skull base from near-total occlusion of the right carotid artery throughout the entirety of the neck.  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  He is having ongoing mild clumsiness of the left hand and left hand  weakness.  It is unclear if his known carpal tunnel syndrome is playing a role in this.  Stroke neuro was consulted who recommended starting heparin  --high intensity heparin drip per stroke neuro  --goal blood pressure < 220/120  --hold home anti-hypertensive meds for tonight, then resume 9/26 AM which is ~24 hours from onset of symptoms to allow permissive hypertension  --MRI brain and MRA head/neck in AM  --PT/OT/care coordination  --Referral to vascular surgery as outpatient    Coronary artery disease  Hypertension  Hyperlipidemia  coronary artery disease diagnosed in 03/2020 at which time he was noted to have a  of the proximal left circumflex into the OM, moderate to severe ramus disease and moderate to severe RCA/PLB disease.  He underwent drug-eluting stent placement from the proximal left circumflex into the OM1.  He has residual small vessel  coronary artery disease as well as moderate to severe stenosis in the ramus and RCA/YAA for which medical management has been recommended.  An echocardiogram in 03/2020 that showed preserved LV systolic function.   --Continue isosorbide 60 mg once daily, atorvastatin 40 mg daily  --Continue aspirin   -Continue prior to admission amlodipine 2.5 mg daily, carvedilol 12.5 mg twice daily, HCTZ 25 mg daily, losartan 100 mg daily -holding p.m. doses the night of admission    Acute kidney injury  Baseline creatinine 1.05-1.13.  Was up to 1.72 in April/2021 on high dose of HCTZ.  No recheck was done after that.  On admission creatinine 1.32.  Rare use of NSAIDs  --Recheck in morning    Obstructive sleep apnea: does not wear CPAP    Bilateral carpal tunnel   Recently diagnosed by his PCP last week.  He has been wearing braces at night which is helpful.  He has noticed some difficulty with hand , but feels it is worse since the event this morning.       Diet: Low Saturated Fat Na <2400 mg    DVT Prophylaxis: Heparin drip per stroke neurology  Vásquez Catheter: Not present  Central Lines: None  Code Status: Full Code     Clinically Significant Risk Factors Present on Admission              # Platelet Defect: home medication list includes an antiplatelet medication      Disposition Plan   Expected discharge:  1-2 days recommended to prior living arrangement once Testing is complete and patient is near baseline functional status.     The patient's care was discussed with the Patient and Patient's Family.    Deidre Castillo [facility]  Securely message with the [employer] Web Console (learn more here)  Text page via [employer] Paging/Directory      ______________________________________________________________________    Chief Complaint   --Near fainting spell    History is obtained from the patient    History of Present Illness     Liu Ragsdale is a 81 year old male with past medical history of  "hypertension, coronary artery disease status post stent in 2020, hyperlipidemia, bilateral carpal tunnel syndrome, untreated sleep apnea who was admitted on 9/26/2021 with near syncope and found to have right carotid artery occlusion, possible acute ischemic stroke    He was sitting in [employer] when he felt not quite right.   He saw the words of the song he was signing \"go dark \".  He became diaphoretic.  On the scene, he says that EMS said the blood pressure was 'low\" 120/60, pulse unknown.  He felt back to normal within 5-10 min.  Concurrently, he felt that his 'tongue was thick' and wife noted slurred speech.  Since the episode in [employer] he has noted his left hand felt weak and clumsy.  The left hand symptoms are intermittent since arrival to ER.  He also feels washed out since the episode -this is better now.    He was seen by his PCP earlier this week and diagnosed with carpal tunnel syndrome in bilateral hands (R>L); symptoms are hand tingling.  The hand symptoms he is currently having are different than his chronic CTS symptoms.      No chest pain, shortness of breath during episode    He had a similar episode 2 years ago; occurred while standing, and had overt syncope.  He did not seek medical attention at the time    Review of Systems    The 10 point Review of Systems is negative other than noted in the HPI or here.     Past Medical History    I have reviewed this patient's medical history and updated it with pertinent information if needed.   Past Medical History:   Diagnosis Date     Angina at rest (H)      Arthritis      Coronary artery disease involving native coronary artery of native heart without angina pectoris      Hypertension        Past Surgical History   I have reviewed this patient's surgical history and updated it with pertinent information if needed.  Past Surgical History:   Procedure Laterality Date     ARTHROPLASTY HIP Left 02/03/2021    Procedure: Total Hip Arthroplasty;  Surgeon: Zeinab" Andrew PACHECO MD;  Location: [facility] OR     COLONOSCOPY N/A 06/16/2016    Procedure: COLONOSCOPY;  Surgeon: Randy Avendano MD;  Location: [facility]     CV LEFT HEART CATH Left 03/10/2020    Procedure: Left Heart Cath;  Surgeon: Vicente Lopez MD;  Location:  HEART CARDIAC CATH LAB     EYE SURGERY  2005    cataract surgery     VASECTOMY  1981       Social History   I have reviewed this patient's social history and updated it with pertinent information if needed.  Social History     Tobacco Use     Smoking status: Never Smoker     Smokeless tobacco: Never Used   Substance Use Topics     Alcohol use: Yes     Comment: Beth only     Drug use: No       Family History   I have reviewed this patient's family history and updated it with pertinent information if needed.  Family History   Problem Relation Age of Onset     Hypertension Sister      Kidney failure Sister      Chronic Obstructive Pulmonary Disease Sister      Thyroid Disease Sister      Seizure Disorder Paternal Grandmother      Mitral valve prolapse Daughter      No Known Problems Son      Cerebrovascular Disease No family hx of        Prior to Admission Medications   Prior to Admission Medications   Prescriptions Last Dose Informant Patient Reported? Taking?   Multiple Vitamin (MULTI VITAMIN PO) 9/26/2021 at Unknown time Self Yes Yes   Sig: Take 1 oz by mouth daily   acetaminophen (TYLENOL) 325 MG tablet 9/26/2021 at Unknown time  No Yes   Sig: Take 3 tablets (975 mg) by mouth 3 times daily   amLODIPine (NORVASC) 2.5 MG tablet 9/26/2021 at Unknown time  No Yes   Sig: Take 1 tablet (2.5 mg) by mouth daily   aspirin 81 MG EC tablet 9/26/2021 at Unknown time  No Yes   Sig: Take 1 tablet (81 mg) by mouth daily   atorvastatin (LIPITOR) 40 MG tablet 9/25/2021 at Unknown time  No Yes   Sig: Take 1 tablet (40 mg) by mouth daily   carvedilol (COREG) 12.5 MG tablet 9/26/2021 at Unknown time  No Yes   Sig: Take 1 tablet (12.5 mg) by mouth 2 times daily (with meals)    diclofenac (VOLTAREN) 1 % topical gel 9/26/2021 at Unknown time  No Yes   Sig: Place 4 g onto the skin 3 times daily as needed for moderate pain (Shoulder)   hydrochlorothiazide (HYDRODIURIL) 25 MG tablet 9/26/2021 at Unknown time  No Yes   Sig: Take 1 tablet (25 mg) by mouth daily   isosorbide mononitrate (IMDUR) 30 MG 24 hr tablet 9/26/2021 at Unknown time  No Yes   Sig: Take 2 tablets by mouth once daily   losartan (COZAAR) 100 MG tablet 9/26/2021 at Unknown time  No Yes   Sig: Take 1 tablet (100 mg) by mouth daily   nitroGLYcerin (NITROSTAT) 0.3 MG sublingual tablet   No No   Sig: For chest pain place 1 tablet under the tongue every 5 minutes for 3 doses. If symptoms persist 5 minutes after 1st dose call 911.      Facility-Administered Medications: None     Allergies   Allergies   Allergen Reactions     Metoprolol Other (See Comments)     Side effects     Ampicillin Rash       Physical Exam   Vital Signs: Temp: 97.5  F (36.4  C) Temp src: Oral BP: (!) 166/64 Pulse: 62   Resp: 16 SpO2: 97 % O2 Device: None (Room air)    Weight: 168 lbs 6.9 oz    Constitutional: awake, alert, cooperative, no apparent distress, and appears stated age  Eyes: Lids and lashes normal, pupils equal, round and reactive to light, extra ocular muscles intact, sclera clear, conjunctiva normal  ENT: Normocephalic, without obvious abnormality, atraumatic,  external ears without lesions, oral pharynx with moist mucous membranes, tonsils without erythema or exudates, gums normal and good dentition.  Respiratory: No increased work of breathing, good air exchange, clear to auscultation bilaterally, no crackles or wheezing  Cardiovascular: regular rate and rhythm and normal S1 and S2  GI: normal bowel sounds, soft, non-distended and non-tender  Skin: no bruising or bleeding, normal skin color, texture, turgor and no rashes  Musculoskeletal: no lower extremity pitting edema present  there is no redness, warmth, or swelling of the joints  full  range of motion noted  Neurologic: Mental Status Exam:  Level of Alertness:   awake  Orientation:   person, place, time  Memory:   normal  Fund of Knowledge:  normal  Attention/Concentration:  normal  Cranial Nerves:  cranial nerves II-XII are intact  Motor Exam:  Motor exam is 5 out of 5 all extremities with the exception of left hand   Sensory:  Sensory intact  Coordination:  Finger/Nose:  Right:  normal  Left:  abnormal -clumsy and slow  Rapid Alternating Movements:  Right:  normal  Left:  abnormal -clumsy and slow  Deep Tendon Reflexes:  Reflexes are intact and symmetrical bilaterally      Data   Data reviewed today: I reviewed all medications, new labs and imaging results over the last 24 hours. I personally reviewed the EKG tracing showing low voltage but otherwise NSR.    Recent Labs   Lab 09/26/21  1138 09/26/21  1113   WBC  --  8.4   HGB  --  13.1*   MCV  --  88   PLT  --  151   INR  --  1.03   NA  --  139   POTASSIUM  --  4.0   CHLORIDE  --  108   CO2  --  27   BUN  --  22   CR  --  1.32*   ANIONGAP  --  4   GWEN  --  8.5   * 113*   TROPONIN  --  <0.015     Most Recent 3 CBC's:Recent Labs   Lab Test 09/26/21  1113 04/15/21  1449 02/04/21  0409 02/03/21  1220 01/25/21  1038   WBC 8.4 6.6  --   --  7.5   HGB 13.1* 11.3* 10.5*   < > 13.1*   MCV 88 91  --   --  90    199  --   --  226    < > = values in this interval not displayed.     Most Recent 3 BMP's:Recent Labs   Lab Test 09/26/21  1138 09/26/21  1113 04/15/21  1449 02/03/21  1220 01/25/21  1038 01/25/21  1038   NA  --  139 140  --   --  136   POTASSIUM  --  4.0 3.8 3.7   < > 4.1   CHLORIDE  --  108 105  --   --  103   CO2  --  27 28  --   --  30   BUN  --  22 26  --   --  15   CR  --  1.32* 1.72* 1.10   < > 1.13   ANIONGAP  --  4 7  --   --  3   GWEN  --  8.5 9.2  --   --  9.9   * 113* 84  --    < > 96    < > = values in this interval not displayed.     Most Recent 3 Creatinines:Recent Labs   Lab Test 09/26/21  1113 04/15/21  1445  02/03/21  1220   CR 1.32* 1.72* 1.10     Recent Results (from the past 24 hour(s))   CT Head w/o Contrast    Narrative    EXAM: CT HEAD WITHOUT AND WITH CONTRAST, CTA  HEAD NECK WITH CONTRAST  LOCATION: [facility]  DATE/TIME: 09/26/2021, 11:41 AM    INDICATION: Code stroke to evaluate for potential thrombolysis and thrombectomy.  Please read immediately.  COMPARISON: CTA 11/29/2019.  CONTRAST: 70 mL Isovue-370.  TECHNIQUE: Head and neck CT angiogram with IV contrast. Noncontrast head CT followed by axial helical CT images of the head and neck vessels obtained during the arterial phase of intravenous contrast administration. Axial 2D reconstructed images and   multiplanar 3D MIP reconstructed images of the head and neck vessels were performed by the technologist. Dose reduction techniques were used. All stenosis measurements made according to NASCET criteria unless otherwise specified.    FINDINGS:   NONCONTRAST HEAD CT:   INTRACRANIAL CONTENTS: No intracranial hemorrhage, extra-axial collection, or mass effect.  No CT evidence of acute infarct. Normal parenchymal attenuation. Normal ventricles and sulci.     VISUALIZED ORBITS/SINUSES/MASTOIDS: No intraorbital abnormality. No paranasal sinus mucosal disease. No middle ear or mastoid effusion.    BONES/SOFT TISSUES: No acute abnormality.    HEAD CTA:  The right ICA is occluded at the skull base likely from poor flow through the neck. Gradual opacification of the supraclinoid and cavernous right ICA likely from retrograde collateral flow. Small right A1 segment appears to be patent. Questionable right   P1 segment although it is diminutive in size if at all present. No definite vascular cutoff of the proximal ACAs, MCAs, or PCAs.    Intracranial right vertebral artery is smaller in size compared to the left. Scattered apparent stenoses of the right vertebral artery including a moderate focal stenosis of the distal right vertebral artery just  prior to its insertion to the basilar   artery. This is unchanged since prior and may be due to atherosclerotic disease or chronic dissection.    NECK CTA:  RIGHT CAROTID: The right common carotid artery is opacified at its very proximal aspect. There is mixed opacification of the very proximal common carotid artery which then becomes nonopacified 2 cm after its origin. The right common and internal carotid   arteries remain nonopacified throughout the neck up to the skull base. This is new since CTA 11/29/2019. The right external carotid artery branches are opacified possibly from collateral flow. There is calcified atherosclerotic disease in the expected   location of the right carotid bifurcation.    LEFT CAROTID: Mild soft plaque at the carotid bifurcation without stenosis.    VERTEBRAL ARTERIES: Luminal irregularity and stenosis at the origin of the right vertebral artery which is unchanged since 11/29/2019. Mild luminal irregularity is seen throughout the right vertebral artery in the neck up through at least the V3 segment.   This is likely due to a chronic dissection. Left vertebral artery appears widely patent throughout the neck. The left vertebral artery is larger than the right.    AORTIC ARCH: Classic aortic arch anatomy.    NONVASCULAR STRUCTURES: Unremarkable.      Impression    IMPRESSION:   HEAD CT:  1.  No acute intracranial hemorrhage, mass, or herniation.    HEAD CTA:   1.  Right ICA is occluded at the skull base likely from poor flow through the neck. Right ICA is very opacified by the [place] with a small right A1 segment and possibly a very diminutive right P1 segment.  2.  No definite vascular cutoff of the proximal ACAs, MCAs, or PCAs.  3.  Luminal irregularity and stenosis of the intracranial right vertebral artery which could be due to intracranial atherosclerotic disease versus chronic dissection. Appearance is unchanged since 11/29/2019.    NECK CTA:  1.  Occlusion of the right  carotid artery almost throughout its entirety in the neck. The very proximal portion of the right common carotid artery is opacified but quickly loses opacification and is likely occluded. Right RILEY is occluded up to the skull   base. Right external carotid artery branches appear to be present likely from collateral flow.  2.  Luminal irregularity and stenosis of the right vertebral artery throughout the neck particularly at its very proximal aspect. This is unchanged since CTA 11/29/2019 and is likely due to a chronic dissection.  3.  Mild soft plaque in the left carotid artery without significant stenosis.  4.  Left vertebral artery appears widely patent throughout the neck. Left vertebral artery is larger than the right.      Results discussed with Aung Izaguirre at 12:28 PM on 09/26/2021.     CTA Head Neck with Contrast    Narrative    EXAM: CT HEAD WITHOUT AND WITH CONTRAST, CTA  HEAD NECK WITH CONTRAST  LOCATION: [facility]  DATE/TIME: 09/26/2021, 11:41 AM    INDICATION: Code stroke to evaluate for potential thrombolysis and thrombectomy.  Please read immediately.  COMPARISON: CTA 11/29/2019.  CONTRAST: 70 mL Isovue-370.  TECHNIQUE: Head and neck CT angiogram with IV contrast. Noncontrast head CT followed by axial helical CT images of the head and neck vessels obtained during the arterial phase of intravenous contrast administration. Axial 2D reconstructed images and   multiplanar 3D MIP reconstructed images of the head and neck vessels were performed by the technologist. Dose reduction techniques were used. All stenosis measurements made according to NASCET criteria unless otherwise specified.    FINDINGS:   NONCONTRAST HEAD CT:   INTRACRANIAL CONTENTS: No intracranial hemorrhage, extra-axial collection, or mass effect.  No CT evidence of acute infarct. Normal parenchymal attenuation. Normal ventricles and sulci.     VISUALIZED ORBITS/SINUSES/MASTOIDS: No intraorbital abnormality.  No paranasal sinus mucosal disease. No middle ear or mastoid effusion.    BONES/SOFT TISSUES: No acute abnormality.    HEAD CTA:  The right ICA is occluded at the skull base likely from poor flow through the neck. Gradual opacification of the supraclinoid and cavernous right ICA likely from retrograde collateral flow. Small right A1 segment appears to be patent. Questionable right   P1 segment although it is diminutive in size if at all present. No definite vascular cutoff of the proximal ACAs, MCAs, or PCAs.    Intracranial right vertebral artery is smaller in size compared to the left. Scattered apparent stenoses of the right vertebral artery including a moderate focal stenosis of the distal right vertebral artery just prior to its insertion to the basilar   artery. This is unchanged since prior and may be due to atherosclerotic disease or chronic dissection.    NECK CTA:  RIGHT CAROTID: The right common carotid artery is opacified at its very proximal aspect. There is mixed opacification of the very proximal common carotid artery which then becomes nonopacified 2 cm after its origin. The right common and internal carotid   arteries remain nonopacified throughout the neck up to the skull base. This is new since CTA 11/29/2019. The right external carotid artery branches are opacified possibly from collateral flow. There is calcified atherosclerotic disease in the expected   location of the right carotid bifurcation.    LEFT CAROTID: Mild soft plaque at the carotid bifurcation without stenosis.    VERTEBRAL ARTERIES: Luminal irregularity and stenosis at the origin of the right vertebral artery which is unchanged since 11/29/2019. Mild luminal irregularity is seen throughout the right vertebral artery in the neck up through at least the V3 segment.   This is likely due to a chronic dissection. Left vertebral artery appears widely patent throughout the neck. The left vertebral artery is larger than the  right.    AORTIC ARCH: Classic aortic arch anatomy.    NONVASCULAR STRUCTURES: Unremarkable.      Impression    IMPRESSION:   HEAD CT:  1.  No acute intracranial hemorrhage, mass, or herniation.    HEAD CTA:   1.  Right ICA is occluded at the skull base likely from poor flow through the neck. Right ICA is very opacified by the [id number] with a small right A1 segment and possibly a very diminutive right P1 segment.  2.  No definite vascular cutoff of the proximal ACAs, MCAs, or PCAs.  3.  Luminal irregularity and stenosis of the intracranial right vertebral artery which could be due to intracranial atherosclerotic disease versus chronic dissection. Appearance is unchanged since 11/29/2019.    NECK CTA:  1.  Occlusion of the right carotid artery almost throughout its entirety in the neck. The very proximal portion of the right common carotid artery is opacified but quickly loses opacification and is likely occluded. Right RILEY is occluded up to the skull   base. Right external carotid artery branches appear to be present likely from collateral flow.  2.  Luminal irregularity and stenosis of the right vertebral artery throughout the neck particularly at its very proximal aspect. This is unchanged since CTA 11/29/2019 and is likely due to a chronic dissection.  3.  Mild soft plaque in the left carotid artery without significant stenosis.  4.  Left vertebral artery appears widely patent throughout the neck. Left vertebral artery is larger than the right.      Results discussed with Aung Izaguirre at 12:28 PM on 09/26/2021.

## 2021-09-26 NOTE — ED PROVIDER NOTES
"  History     Chief Complaint   Patient presents with     Loss of Consciousness     HPI    Liu Ragsdale is a 81 year old male who comes in after an episode of near syncope in [employer].  This occurred at 915 this morning.  He was in [employer] and found it hard to see the words.  He did not have diplopia and he did not have vision loss.  This was more of a tunnel vision feeling.  He was assisted out of the pew into the back of the Taoist and paramedics were called.  He began to feel a bit sweaty and he felt faint.  Never lost consciousness.  He remembers everything that happened.  Paramedics told him that his blood pressure was low and that his pulse was slow.  They recommended he come to the ED.  Subsequently he has felt washed out and tired.  After that, after the paramedics had left him just before transport with a friend he noticed that his left arm did not seem to want to work and he felt like he was having trouble with his speech which seemed thick to him.  Those symptoms have been gradually improving to the point that now they are essentially resolved.  He did not have any trouble walking.  He has not had any headache.  He has not had any symptoms of recent illness.  He has not any cough, fever, flu symptoms.  He is Covid vaccinated.  He has been dealing with some issues with his left hand with carpal tunnel recently.  He is reporting a dry mouth at this time.  He says he had a bruit in his left carotid artery since youth and had an ultrasound in 2009 showing minimal stenosis and no stenosis on the right.  In 2019 he had another carotid ultrasound showing 50 to 69% stenosis in the internal carotids bilaterally.  Subsequent CT angio showed the results below:    \"IMPRESSION:   1. Calcified and noncalcified atherosclerotic plaque in the right  carotid bifurcation causes Y-shaped stenosis of all three vessels.  Although the right internal carotid artery shows only 47% diameter  stenosis by NASCET criteria, tandem " "stenosis of the distal right  common carotid artery probably accounts for the ultrasound finding of  50-69% diameter stenosis.  2. Ultrasound overestimated stenosis on the left, with no significant  stenosis present in the internal carotid artery.  3. Multiple moderate stenoses of the distal right vertebral artery.  4. Mild stenosis of the right carotid siphon and moderate stenosis of  the left carotid siphon.\"    Allergies:  Allergies   Allergen Reactions     Metoprolol Other (See Comments)     Side effects     Ampicillin Rash       Problem List:    Patient Active Problem List    Diagnosis Date Noted     Near syncope 09/26/2021     Priority: Medium     Right carotid artery occlusion 09/26/2021     Priority: Medium     Degenerative joint disease of left hip 02/03/2021     Priority: Medium     Coronary artery disease involving native coronary artery of native heart without angina pectoris 03/23/2020     Priority: Medium     Status post coronary angiogram 03/10/2020     Priority: Medium     Positive cardiac stress test 02/27/2020     Priority: Medium     Added automatically from request for surgery [id number]       Right shoulder pain, unspecified chronicity 11/06/2019     Priority: Medium     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 11/06/2019     Priority: Medium     SOB (shortness of breath) on exertion 11/06/2019     Priority: Medium     Arthritis of right glenohumeral joint- moderate 11/06/2019     Priority: Medium     Arthritis of right acromioclavicular joint- advanced 11/06/2019     Priority: Medium     SNHL (sensory-neural hearing loss), asymmetrical 07/22/2019     Priority: Medium     FANI (obstructive sleep apnea) 03/15/2016     Priority: Medium     NOT using CPAP 3/15/16       Obesity 04/07/2015     Priority: Medium     BMI 33 (3/15/16)       Benign essential hypertension, BP goal <140/90 09/10/2014     Priority: Medium     Hyperlipidemia LDL goal <70 12/12/2013     Priority: Medium     " Diagnosis updated by automated process. Provider to review and confirm.       Carotid bruit 03/30/2010     Priority: Medium     right carotid bruit on exam- u/s 7/09 with minimal stenosis on left and not well seen on right           Past Medical History:    Past Medical History:   Diagnosis Date     Angina at rest (H)      Arthritis      Coronary artery disease involving native coronary artery of native heart without angina pectoris      Hypertension      NO ACTIVE PROBLEMS        Past Surgical History:    Past Surgical History:   Procedure Laterality Date     ARTHROPLASTY HIP Left 2/3/2021    Procedure: Total Hip Arthroplasty;  Surgeon: Andrew Arriola MD;  Location: [facility]     CARDIAC SURGERY  3-10 2020    stent installed     COLONOSCOPY N/A 6/16/2016    Procedure: COLONOSCOPY;  Surgeon: Randy Avendano MD;  Location: [facility]     CV LEFT HEART CATH Left 3/10/2020    Procedure: Left Heart Cath;  Surgeon: Vicente Lopez MD;  Location:  HEART CARDIAC CATH LAB     EYE SURGERY  2005    cataract surgery     VASECTOMY  1981       Family History:    Family History   Problem Relation Age of Onset     Diabetes Sister      Obesity Son      Depression Sister      Thyroid Disease Sister      Gastrointestinal Disease Son      Depression Son      Congenital Anomalies Daughter      Depression Daughter      Heart Disease Daughter      Hypertension Sister      Thyroid Disease Sister      Cerebrovascular Disease No family hx of        Social History:  Marital Status:   [2]  Social History     Tobacco Use     Smoking status: Never Smoker     Smokeless tobacco: Never Used   Substance Use Topics     Alcohol use: Yes     Comment: Rare     Drug use: No        Medications:    acetaminophen (TYLENOL) 325 MG tablet  amLODIPine (NORVASC) 2.5 MG tablet  aspirin 81 MG EC tablet  atorvastatin (LIPITOR) 40 MG tablet  carvedilol (COREG) 12.5 MG tablet  diclofenac (VOLTAREN) 1 % topical gel  hydrochlorothiazide (HYDRODIURIL) 25 MG  tablet  hydrOXYzine (ATARAX) 10 MG tablet  isosorbide mononitrate (IMDUR) 30 MG 24 hr tablet  losartan (COZAAR) 100 MG tablet  Multiple Vitamin (MULTI VITAMIN PO)  nitroGLYcerin (NITROSTAT) 0.3 MG sublingual tablet  senna-docusate (SENOKOT-S/PERICOLACE) 8.6-50 MG tablet      Review of Systems    All other systems are reviewed and are negative    Physical Exam   BP: 137/72  Pulse: 53  Temp: 98.2  F (36.8  C)  Resp: 16  Weight: 77.1 kg (170 lb)  SpO2: 97 %      Physical Exam    Nursing note and vitals were reviewed.  Constitutional: Awake and alert, adequately nourished and developed appearing 81-year-old in no apparent discomfort, who does not appear acutely ill, and who answers questions appropriately and cooperates with examination.  HEENT: PERRL EOMI.   Neck: Freely mobile.  Cardiovascular: Cardiac examination reveals normal heart rate and regular rhythm without murmur.  Pulmonary/Chest: Breathing is unlabored.  Breath sounds are clear and equal bilaterally.  There no retractions, tachypnea, rales, wheezes, or rhonchi.  Abdomen: Soft, nontender, no HSM or masses rebound or guarding.  Musculoskeletal: Extremities are warm and well-perfused and without edema  Neurological: Alert, oriented, thought content logical, coherent.  Visual fields full in confrontation.  Muscles of mastication and facial expression intact bilaterally.  Palate elevates in the midline.  Tongue protrudes in the midline.  Sternocleidomastoid strength equal bilaterally.  Motor strength intact in the upper and lower extremities bilaterally.  Sensation intact in the face and in the extremities.  Finger-to-nose well performed.  Heel-to-shin well performed.  Rapid alternating movements well performed in the hands bilaterally.  Skin: Warm, dry, no rashes.  Psychiatric: Affect broad and appropriate.      ED Course        Procedures              EKG Interpretation:      Interpreted by Aung Izaguirre MD  Time reviewed: 12:05  Symptoms at time of EKG:  none   Rhythm: normal sinus   Rate: normal  Axis: normal  Ectopy: none  Conduction: normal  ST Segments/ T Waves: No ST-T wave changes  Q Waves: none  Comparison to prior: Unchanged from 1/25/21    Clinical Impression: normal EKG    Critical Care time:  was 45 minutes for this patient excluding procedures.  The patient has stroke symptoms:         ED Stroke specific documentation           NIHSS PDF     Patient last known well time: 9:15AM  ED Provider first to bedside at: 11:15  CT Results received at:     Thrombolytics:   Not given due to minor/isolated/quickly resolving symptoms.    If treating with thrombolytics: Ensure SBP<180 and DBP<105 prior to treatment with thrombolytics.  Administering thrombolytics after treatment with IV labetalol, hydralazine, or nicardipine is reasonable once BP control is established.    Endovascular Retrieval:  Not initiated due to absence of proximal vessel occlusion    National Institutes of Health Stroke Scale (Baseline)  Time Performed: 11:15AM     Score    Level of consciousness: (0)   Alert, keenly responsive    LOC questions: (0)   Answers both questions correctly    LOC commands: (0)   Performs both tasks correctly    Best gaze: (0)   Normal    Visual: (0)   No visual loss    Facial palsy: (0)   Normal symmetrical movements    Motor arm (left): (0)   No drift    Motor arm (right): (0)   No drift    Motor leg (left): (0)   No drift    Motor leg (right): (0)   No drift    Limb ataxia: (0)   Absent    Sensory: (0)   Normal- no sensory loss    Best language: (0)   Normal- no aphasia    Dysarthria: (0)   Normal    Extinction and inattention: (0)   No abnormality        Total Score:  0        Stroke Mimics were considered (including migraine headache, seizure disorder, hypoglycemia (or hyperglycemia), head or spinal trauma, CNS infection, Toxin ingestion and shock state (e.g. sepsis) .    Evaluation/Treatement was delayed due to: did not tell triage nurse about arm and speech  symptoms. Only told her about near syncope       National Institutes of Health Stroke Scale  Time Performed: 12:30  Total Score: 0  (unchanged from last stroke score)         Results for orders placed or performed during the hospital encounter of 09/26/21 (from the past 24 hour(s))   [facility] Draw    Narrative    The following orders were created for panel order [facility] Draw.  Procedure                               Abnormality         Status                     ---------                               -----------         ------                     Extra Blue Top Tube[[id number]]                              Final result               Extra Red Top Tube[[id number]]                               Final result               Extra Green Top (Lithium...[[id number]]                      Final result               Extra Green Top (Lithium...[[id number]]                      Final result               Extra Purple Top Tube[[id number]]                            Final result                 Please view results for these tests on the individual orders.   Extra Blue Top Tube   Result Value Ref Range    Hold Specimen JIC    Extra Red Top Tube   Result Value Ref Range    Hold Specimen JIC    Extra Green Top (Lithium Heparin) Tube   Result Value Ref Range    Hold Specimen JIC    Extra Green Top (Lithium Heparin) Tube   Result Value Ref Range    Hold Specimen JIC    Extra Purple Top Tube   Result Value Ref Range    Hold Specimen JIC    Troponin I   Result Value Ref Range    Troponin I <0.015 0.000 - 0.045 ug/L   INR   Result Value Ref Range    INR 1.03 0.85 - 1.15   CBC with Platelets & Differential    Narrative    The following orders were created for panel order CBC with Platelets & Differential.  Procedure                               Abnormality         Status                     ---------                               -----------         ------                     CBC with platelets and d...[[id number]]  Abnormal            Final  result                 Please view results for these tests on the individual orders.   Basic metabolic panel   Result Value Ref Range    Sodium 139 133 - 144 mmol/L    Potassium 4.0 3.4 - 5.3 mmol/L    Chloride 108 94 - 109 mmol/L    Carbon Dioxide (CO2) 27 20 - 32 mmol/L    Anion Gap 4 3 - 14 mmol/L    Urea Nitrogen 22 7 - 30 mg/dL    Creatinine 1.32 (H) 0.66 - 1.25 mg/dL    Calcium 8.5 8.5 - 10.1 mg/dL    Glucose 113 (H) 70 - 99 mg/dL    GFR Estimate 51 (L) >60 mL/min/1.73m2   CBC with platelets and differential   Result Value Ref Range    WBC Count 8.4 4.0 - 11.0 10e3/uL    RBC Count 4.41 4.40 - 5.90 10e6/uL    Hemoglobin 13.1 (L) 13.3 - 17.7 g/dL    Hematocrit 38.9 (L) 40.0 - 53.0 %    MCV 88 78 - 100 fL    MCH 29.7 26.5 - 33.0 pg    MCHC 33.7 31.5 - 36.5 g/dL    RDW 12.7 10.0 - 15.0 %    Platelet Count 151 150 - 450 10e3/uL    % Neutrophils 78 %    % Lymphocytes 13 %    % Monocytes 6 %    % Eosinophils 2 %    % Basophils 1 %    % Immature Granulocytes 0 %    NRBCs per 100 WBC 0 <1 /100    Absolute Neutrophils 6.5 1.6 - 8.3 10e3/uL    Absolute Lymphocytes 1.1 0.8 - 5.3 10e3/uL    Absolute Monocytes 0.5 0.0 - 1.3 10e3/uL    Absolute Eosinophils 0.2 0.0 - 0.7 10e3/uL    Absolute Basophils 0.0 0.0 - 0.2 10e3/uL    Absolute Immature Granulocytes 0.0 <=0.0 10e3/uL    Absolute NRBCs 0.0 10e3/uL   Glucose by meter   Result Value Ref Range    GLUCOSE BY METER POCT 117 (H) 70 - 99 mg/dL   CT Head w/o Contrast    Narrative    EXAM: CT HEAD WITHOUT AND WITH CONTRAST, CTA  HEAD NECK WITH CONTRAST  LOCATION: [facility]  DATE/TIME: 09/26/2021, 11:41 AM    INDICATION: Code stroke to evaluate for potential thrombolysis and thrombectomy.  Please read immediately.  COMPARISON: CTA 11/29/2019.  CONTRAST: 70 mL Isovue-370.  TECHNIQUE: Head and neck CT angiogram with IV contrast. Noncontrast head CT followed by axial helical CT images of the head and neck vessels obtained during the arterial phase of  intravenous contrast administration. Axial 2D reconstructed images and   multiplanar 3D MIP reconstructed images of the head and neck vessels were performed by the technologist. Dose reduction techniques were used. All stenosis measurements made according to NASCET criteria unless otherwise specified.    FINDINGS:   NONCONTRAST HEAD CT:   INTRACRANIAL CONTENTS: No intracranial hemorrhage, extra-axial collection, or mass effect.  No CT evidence of acute infarct. Normal parenchymal attenuation. Normal ventricles and sulci.     VISUALIZED ORBITS/SINUSES/MASTOIDS: No intraorbital abnormality. No paranasal sinus mucosal disease. No middle ear or mastoid effusion.    BONES/SOFT TISSUES: No acute abnormality.    HEAD CTA:  The right ICA is occluded at the skull base likely from poor flow through the neck. Gradual opacification of the supraclinoid and cavernous right ICA likely from retrograde collateral flow. Small right A1 segment appears to be patent. Questionable right   P1 segment although it is diminutive in size if at all present. No definite vascular cutoff of the proximal ACAs, MCAs, or PCAs.    Intracranial right vertebral artery is smaller in size compared to the left. Scattered apparent stenoses of the right vertebral artery including a moderate focal stenosis of the distal right vertebral artery just prior to its insertion to the basilar   artery. This is unchanged since prior and may be due to atherosclerotic disease or chronic dissection.    NECK CTA:  RIGHT CAROTID: The right common carotid artery is opacified at its very proximal aspect. There is mixed opacification of the very proximal common carotid artery which then becomes nonopacified 2 cm after its origin. The right common and internal carotid   arteries remain nonopacified throughout the neck up to the skull base. This is new since CTA 11/29/2019. The right external carotid artery branches are opacified possibly from collateral flow. There is  calcified atherosclerotic disease in the expected   location of the right carotid bifurcation.    LEFT CAROTID: Mild soft plaque at the carotid bifurcation without stenosis.    VERTEBRAL ARTERIES: Luminal irregularity and stenosis at the origin of the right vertebral artery which is unchanged since 11/29/2019. Mild luminal irregularity is seen throughout the right vertebral artery in the neck up through at least the V3 segment.   This is likely due to a chronic dissection. Left vertebral artery appears widely patent throughout the neck. The left vertebral artery is larger than the right.    AORTIC ARCH: Classic aortic arch anatomy.    NONVASCULAR STRUCTURES: Unremarkable.      Impression    IMPRESSION:   HEAD CT:  1.  No acute intracranial hemorrhage, mass, or herniation.    HEAD CTA:   1.  Right ICA is occluded at the skull base likely from poor flow through the neck. Right ICA is very opacified by the [place] with a small right A1 segment and possibly a very diminutive right P1 segment.  2.  No definite vascular cutoff of the proximal ACAs, MCAs, or PCAs.  3.  Luminal irregularity and stenosis of the intracranial right vertebral artery which could be due to intracranial atherosclerotic disease versus chronic dissection. Appearance is unchanged since 11/29/2019.    NECK CTA:  1.  Occlusion of the right carotid artery almost throughout its entirety in the neck. The very proximal portion of the right common carotid artery is opacified but quickly loses opacification and is likely occluded. Right RILEY is occluded up to the skull   base. Right external carotid artery branches appear to be present likely from collateral flow.  2.  Luminal irregularity and stenosis of the right vertebral artery throughout the neck particularly at its very proximal aspect. This is unchanged since CTA 11/29/2019 and is likely due to a chronic dissection.  3.  Mild soft plaque in the left carotid artery without significant  stenosis.  4.  Left vertebral artery appears widely patent throughout the neck. Left vertebral artery is larger than the right.      Results discussed with Aung Izaguirre at 12:28 PM on 09/26/2021.     CTA Head Neck with Contrast    Narrative    EXAM: CT HEAD WITHOUT AND WITH CONTRAST, CTA  HEAD NECK WITH CONTRAST  LOCATION: [facility]  DATE/TIME: 09/26/2021, 11:41 AM    INDICATION: Code stroke to evaluate for potential thrombolysis and thrombectomy.  Please read immediately.  COMPARISON: CTA 11/29/2019.  CONTRAST: 70 mL Isovue-370.  TECHNIQUE: Head and neck CT angiogram with IV contrast. Noncontrast head CT followed by axial helical CT images of the head and neck vessels obtained during the arterial phase of intravenous contrast administration. Axial 2D reconstructed images and   multiplanar 3D MIP reconstructed images of the head and neck vessels were performed by the technologist. Dose reduction techniques were used. All stenosis measurements made according to NASCET criteria unless otherwise specified.    FINDINGS:   NONCONTRAST HEAD CT:   INTRACRANIAL CONTENTS: No intracranial hemorrhage, extra-axial collection, or mass effect.  No CT evidence of acute infarct. Normal parenchymal attenuation. Normal ventricles and sulci.     VISUALIZED ORBITS/SINUSES/MASTOIDS: No intraorbital abnormality. No paranasal sinus mucosal disease. No middle ear or mastoid effusion.    BONES/SOFT TISSUES: No acute abnormality.    HEAD CTA:  The right ICA is occluded at the skull base likely from poor flow through the neck. Gradual opacification of the supraclinoid and cavernous right ICA likely from retrograde collateral flow. Small right A1 segment appears to be patent. Questionable right   P1 segment although it is diminutive in size if at all present. No definite vascular cutoff of the proximal ACAs, MCAs, or PCAs.    Intracranial right vertebral artery is smaller in size compared to the left. Scattered  apparent stenoses of the right vertebral artery including a moderate focal stenosis of the distal right vertebral artery just prior to its insertion to the basilar   artery. This is unchanged since prior and may be due to atherosclerotic disease or chronic dissection.    NECK CTA:  RIGHT CAROTID: The right common carotid artery is opacified at its very proximal aspect. There is mixed opacification of the very proximal common carotid artery which then becomes nonopacified 2 cm after its origin. The right common and internal carotid   arteries remain nonopacified throughout the neck up to the skull base. This is new since CTA 11/29/2019. The right external carotid artery branches are opacified possibly from collateral flow. There is calcified atherosclerotic disease in the expected   location of the right carotid bifurcation.    LEFT CAROTID: Mild soft plaque at the carotid bifurcation without stenosis.    VERTEBRAL ARTERIES: Luminal irregularity and stenosis at the origin of the right vertebral artery which is unchanged since 11/29/2019. Mild luminal irregularity is seen throughout the right vertebral artery in the neck up through at least the V3 segment.   This is likely due to a chronic dissection. Left vertebral artery appears widely patent throughout the neck. The left vertebral artery is larger than the right.    AORTIC ARCH: Classic aortic arch anatomy.    NONVASCULAR STRUCTURES: Unremarkable.      Impression    IMPRESSION:   HEAD CT:  1.  No acute intracranial hemorrhage, mass, or herniation.    HEAD CTA:   1.  Right ICA is occluded at the skull base likely from poor flow through the neck. Right ICA is very opacified by the [place] with a small right A1 segment and possibly a very diminutive right P1 segment.  2.  No definite vascular cutoff of the proximal ACAs, MCAs, or PCAs.  3.  Luminal irregularity and stenosis of the intracranial right vertebral artery which could be due to intracranial  atherosclerotic disease versus chronic dissection. Appearance is unchanged since 11/29/2019.    NECK CTA:  1.  Occlusion of the right carotid artery almost throughout its entirety in the neck. The very proximal portion of the right common carotid artery is opacified but quickly loses opacification and is likely occluded. Right RILEY is occluded up to the skull   base. Right external carotid artery branches appear to be present likely from collateral flow.  2.  Luminal irregularity and stenosis of the right vertebral artery throughout the neck particularly at its very proximal aspect. This is unchanged since CTA 11/29/2019 and is likely due to a chronic dissection.  3.  Mild soft plaque in the left carotid artery without significant stenosis.  4.  Left vertebral artery appears widely patent throughout the neck. Left vertebral artery is larger than the right.      Results discussed with Aung Izaguirre at 12:28 PM on 09/26/2021.     Asymptomatic COVID-19 Virus (Coronavirus) by PCR Nasopharyngeal    Specimen: Nasopharyngeal; Swab   Result Value Ref Range    SARS CoV2 PCR Negative Negative    Narrative    Testing was performed using the [employer]  SARS-CoV-2 & Influenza A/B Assay on the [employer]  [place]  System.  This test should be ordered for the detection of SARS-COV-2 in individuals who meet SARS-CoV-2 clinical and/or epidemiological criteria. Test performance is unknown in asymptomatic patients.  This test is for in vitro diagnostic use under the FDA EUA for laboratories certified under CLIA to perform moderate and/or high complexity testing. This test has not been FDA cleared or approved.  A negative test does not rule out the presence of PCR inhibitors in the specimen or target RNA in concentration below the limit of detection for the assay. The possibility of a false negative should be considered if the patient's recent exposure or clinical presentation suggests COVID-19.  [facility] [employer] are certified under  "the Clinical Laboratory Improvement Amendments of 1988 (CLIA-88) as qualified to perform moderate and/or high complexity laboratory testing.   Partial thromboplastin time   Result Value Ref Range    aPTT 34 22 - 38 Seconds       Medications   heparin 25,000 units in 0.45% NaCl 250 mL ANTICOAGULANT infusion (1,400 Units/hr Intravenous New Bag 9/26/21 1242)   iopamidol (ISOVUE-370) solution 70 mL (70 mLs Intravenous Given 9/26/21 1151)   sodium chloride 0.9 % bag 500mL for CT scan flush use (100 mLs Intravenous Given 9/26/21 1150)   heparin ANTICOAGULANT Loading dose for HIGH INTENSITY TREATMENT * Give BEFORE starting heparin infusion (6,150 Units Intravenous Given 9/26/21 1242)       Assessments & Plan (with Medical Decision Making)     81-year-old male presented with the above episode of near syncope.  This was followed by development of left arm heavy sensation and \"thick\" speech.  Those symptoms lasted less than an hour and resolved prior to his imaging.  At the time of his imaging evaluation his NIH stroke scale was 0.  He is uncertain if the syncope was due to stroke where the stroke was due to hypotension triggering low flow and a clot as identified on the CT angio.  He is not a candidate for tenecteplase.  Case reviewed with stroke neuro.  They recommended high intensity heparin therapy and admission for observation.  They recommended MRI of the brain tomorrow to further characterize the stroke.  They recommended MRA of the neck tomorrow to see what the progression of the right ICA occlusion is.     Standpoint of his near syncopal episode, this could have been due to involvement of the carotid bulb.  This could be neurally mediated syncope.  The work-up for this is all reassuring with no changes on his EKG and normal laboratory investigation and normal vital signs at this time.  The history of low pulse, low blood pressure, diaphoresis and the weak washed out feeling that he described after syncope is " suggestive of neurally mediated syncope.     I discussed the recommendations with the patient is agreeable to the plan.  I discussed his case with Tevin Blanco of the hospital service and they will assume care on admission.    I have reviewed the nursing notes.    I have reviewed the findings, diagnosis, plan and need for follow up with the patient.       New Prescriptions    No medications on file       Final diagnoses:   Right carotid artery occlusion   Near syncope       9/26/2021   [facility] EMERGENCY DEPT     Aung Izaguirre MD  09/26/21 [id number]

## 2021-09-26 NOTE — H&P
[facility]    History and Physical - Hospitalist Service       Date of Admission:  9/26/2021    Assessment & Plan      Liu Ragsdale is a 81 year old male with past medical history of hypertension, coronary artery disease status post stent in 2020, hyperlipidemia, bilateral carpal tunnel syndrome, untreated sleep apnea who was admitted on 9/26/2021 with near syncope and found to have right carotid artery occlusion, possible acute ischemic stroke    Near Syncope  Patient reported near loss of consciousness while sitting at [employer].  It is possibly this is neurogenic syncope that unmasked the right carotid artery occlusion.  --Telemetry  --Echo    Right Carotid Artery Occlusion  Possible acute ischemic stroke   CTA head/neck shows right ICA occluded at the skull base from near-total occlusion of the right carotid artery throughout the entirety of the neck.  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  He is having ongoing mild clumsiness of the left hand and left hand  weakness.  It is unclear if his known carpal tunnel syndrome is playing a role in this.  Stroke neuro was consulted who recommended starting heparin  --high intensity heparin drip per stroke neuro  --goal blood pressure < 220/120  --hold home anti-hypertensive meds for tonight, then resume 9/26 AM which is ~24 hours from onset of symptoms to allow permissive hypertension  --MRI brain and MRA head/neck in AM  --PT/OT/care coordination  --Referral to vascular surgery as outpatient    Coronary artery disease  Hypertension  Hyperlipidemia  coronary artery disease diagnosed in 03/2020 at which time he was noted to have a  of the proximal left circumflex into the OM, moderate to severe ramus disease and moderate to severe RCA/PLB disease.  He underwent drug-eluting stent placement from the proximal left circumflex into the OM1.  He has residual small vessel  coronary artery disease as well as moderate to severe stenosis in the ramus and RCA/YAA for which medical management has been recommended.  An echocardiogram in 03/2020 that showed preserved LV systolic function.   --Continue isosorbide 60 mg once daily, atorvastatin 40 mg daily  --Continue aspirin   -Continue prior to admission amlodipine 2.5 mg daily, carvedilol 12.5 mg twice daily, HCTZ 25 mg daily, losartan 100 mg daily -holding p.m. doses the night of admission    Acute kidney injury  Baseline creatinine 1.05-1.13.  Was up to 1.72 in April/2021 on high dose of HCTZ.  No recheck was done after that.  On admission creatinine 1.32.  Rare use of NSAIDs  --Recheck in morning    Obstructive sleep apnea: does not wear CPAP    Bilateral carpal tunnel   Recently diagnosed by his PCP last week.  He has been wearing braces at night which is helpful.  He has noticed some difficulty with hand , but feels it is worse since the event this morning.       Diet: Low Saturated Fat Na <2400 mg    DVT Prophylaxis: Heparin drip per stroke neurology  Vásquez Catheter: Not present  Central Lines: None  Code Status: Full Code     Clinically Significant Risk Factors Present on Admission              # Platelet Defect: home medication list includes an antiplatelet medication      Disposition Plan   Expected discharge:  1-2 days recommended to prior living arrangement once Testing is complete and patient is near baseline functional status.     The patient's care was discussed with the Patient and Patient's Family.    Deidre Castillo [facility]  Securely message with the [employer] Web Console (learn more here)  Text page via [employer] Paging/Directory      ______________________________________________________________________    Chief Complaint   --Near fainting spell    History is obtained from the patient    History of Present Illness     Liu Ragsdale is a 81 year old male with past medical history of  "hypertension, coronary artery disease status post stent in 2020, hyperlipidemia, bilateral carpal tunnel syndrome, untreated sleep apnea who was admitted on 9/26/2021 with near syncope and found to have right carotid artery occlusion, possible acute ischemic stroke    He was sitting in [employer] when he felt not quite right.   He saw the words of the song he was signing \"go dark \".  He became diaphoretic.  On the scene, he says that EMS said the blood pressure was 'low\" 120/60, pulse unknown.  He felt back to normal within 5-10 min.  Concurrently, he felt that his 'tongue was thick' and wife noted slurred speech.  Since the episode in [employer] he has noted his left hand felt weak and clumsy.  The left hand symptoms are intermittent since arrival to ER.  He also feels washed out since the episode -this is better now.    He was seen by his PCP earlier this week and diagnosed with carpal tunnel syndrome in bilateral hands (R>L); symptoms are hand tingling.  The hand symptoms he is currently having are different than his chronic CTS symptoms.      No chest pain, shortness of breath during episode    He had a similar episode 2 years ago; occurred while standing, and had overt syncope.  He did not seek medical attention at the time    Review of Systems    The 10 point Review of Systems is negative other than noted in the HPI or here.     Past Medical History    I have reviewed this patient's medical history and updated it with pertinent information if needed.   Past Medical History:   Diagnosis Date     Angina at rest (H)      Arthritis      Coronary artery disease involving native coronary artery of native heart without angina pectoris      Hypertension        Past Surgical History   I have reviewed this patient's surgical history and updated it with pertinent information if needed.  Past Surgical History:   Procedure Laterality Date     ARTHROPLASTY HIP Left 02/03/2021    Procedure: Total Hip Arthroplasty;  Surgeon: Zeinab" Andrew PACHECO MD;  Location: [facility] OR     COLONOSCOPY N/A 06/16/2016    Procedure: COLONOSCOPY;  Surgeon: Randy Avendano MD;  Location: [facility] GI     CV LEFT HEART CATH Left 03/10/2020    Procedure: Left Heart Cath;  Surgeon: Vicente Lopez MD;  Location:  HEART CARDIAC CATH LAB     EYE SURGERY  2005    cataract surgery     VASECTOMY  1981       Social History   I have reviewed this patient's social history and updated it with pertinent information if needed.  Social History     Tobacco Use     Smoking status: Never Smoker     Smokeless tobacco: Never Used   Substance Use Topics     Alcohol use: Yes     Comment: [employer] only     Drug use: No       Family History   I have reviewed this patient's family history and updated it with pertinent information if needed.  Family History   Problem Relation Age of Onset     Hypertension Sister      Kidney failure Sister      Chronic Obstructive Pulmonary Disease Sister      Thyroid Disease Sister      Seizure Disorder Paternal Grandmother      Mitral valve prolapse Daughter      No Known Problems Son      Cerebrovascular Disease No family hx of        Prior to Admission Medications   Prior to Admission Medications   Prescriptions Last Dose Informant Patient Reported? Taking?   Multiple Vitamin (MULTI VITAMIN PO) 9/26/2021 at Unknown time Self Yes Yes   Sig: Take 1 oz by mouth daily   acetaminophen (TYLENOL) 325 MG tablet 9/26/2021 at Unknown time  No Yes   Sig: Take 3 tablets (975 mg) by mouth 3 times daily   amLODIPine (NORVASC) 2.5 MG tablet 9/26/2021 at Unknown time  No Yes   Sig: Take 1 tablet (2.5 mg) by mouth daily   aspirin 81 MG EC tablet 9/26/2021 at Unknown time  No Yes   Sig: Take 1 tablet (81 mg) by mouth daily   atorvastatin (LIPITOR) 40 MG tablet 9/25/2021 at Unknown time  No Yes   Sig: Take 1 tablet (40 mg) by mouth daily   carvedilol (COREG) 12.5 MG tablet 9/26/2021 at Unknown time  No Yes   Sig: Take 1 tablet (12.5 mg) by mouth 2 times daily (with meals)    diclofenac (VOLTAREN) 1 % topical gel 9/26/2021 at Unknown time  No Yes   Sig: Place 4 g onto the skin 3 times daily as needed for moderate pain (Shoulder)   hydrochlorothiazide (HYDRODIURIL) 25 MG tablet 9/26/2021 at Unknown time  No Yes   Sig: Take 1 tablet (25 mg) by mouth daily   isosorbide mononitrate (IMDUR) 30 MG 24 hr tablet 9/26/2021 at Unknown time  No Yes   Sig: Take 2 tablets by mouth once daily   losartan (COZAAR) 100 MG tablet 9/26/2021 at Unknown time  No Yes   Sig: Take 1 tablet (100 mg) by mouth daily   nitroGLYcerin (NITROSTAT) 0.3 MG sublingual tablet   No No   Sig: For chest pain place 1 tablet under the tongue every 5 minutes for 3 doses. If symptoms persist 5 minutes after 1st dose call 911.      Facility-Administered Medications: None     Allergies   Allergies   Allergen Reactions     Metoprolol Other (See Comments)     Side effects     Ampicillin Rash       Physical Exam   Vital Signs: Temp: 97.5  F (36.4  C) Temp src: Oral BP: (!) 166/64 Pulse: 62   Resp: 16 SpO2: 97 % O2 Device: None (Room air)    Weight: 168 lbs 6.9 oz    Constitutional: awake, alert, cooperative, no apparent distress, and appears stated age  Eyes: Lids and lashes normal, pupils equal, round and reactive to light, extra ocular muscles intact, sclera clear, conjunctiva normal  ENT: Normocephalic, without obvious abnormality, atraumatic,  external ears without lesions, oral pharynx with moist mucous membranes, tonsils without erythema or exudates, gums normal and good dentition.  Respiratory: No increased work of breathing, good air exchange, clear to auscultation bilaterally, no crackles or wheezing  Cardiovascular: regular rate and rhythm and normal S1 and S2  GI: normal bowel sounds, soft, non-distended and non-tender  Skin: no bruising or bleeding, normal skin color, texture, turgor and no rashes  Musculoskeletal: no lower extremity pitting edema present  there is no redness, warmth, or swelling of the joints  full  range of motion noted  Neurologic: Mental Status Exam:  Level of Alertness:   awake  Orientation:   person, place, time  Memory:   normal  Fund of Knowledge:  normal  Attention/Concentration:  normal  Cranial Nerves:  cranial nerves II-XII are intact  Motor Exam:  Motor exam is 5 out of 5 all extremities with the exception of left hand   Sensory:  Sensory intact  Coordination:  Finger/Nose:  Right:  normal  Left:  abnormal -clumsy and slow  Rapid Alternating Movements:  Right:  normal  Left:  abnormal -clumsy and slow  Deep Tendon Reflexes:  Reflexes are intact and symmetrical bilaterally      Data   Data reviewed today: I reviewed all medications, new labs and imaging results over the last 24 hours. I personally reviewed the EKG tracing showing low voltage but otherwise NSR.    Recent Labs   Lab 09/26/21  1138 09/26/21  1113   WBC  --  8.4   HGB  --  13.1*   MCV  --  88   PLT  --  151   INR  --  1.03   NA  --  139   POTASSIUM  --  4.0   CHLORIDE  --  108   CO2  --  27   BUN  --  22   CR  --  1.32*   ANIONGAP  --  4   GWEN  --  8.5   * 113*   TROPONIN  --  <0.015     Most Recent 3 CBC's:Recent Labs   Lab Test 09/26/21  1113 04/15/21  1449 02/04/21  0409 02/03/21  1220 01/25/21  1038   WBC 8.4 6.6  --   --  7.5   HGB 13.1* 11.3* 10.5*   < > 13.1*   MCV 88 91  --   --  90    199  --   --  226    < > = values in this interval not displayed.     Most Recent 3 BMP's:Recent Labs   Lab Test 09/26/21  1138 09/26/21  1113 04/15/21  1449 02/03/21  1220 01/25/21  1038 01/25/21  1038   NA  --  139 140  --   --  136   POTASSIUM  --  4.0 3.8 3.7   < > 4.1   CHLORIDE  --  108 105  --   --  103   CO2  --  27 28  --   --  30   BUN  --  22 26  --   --  15   CR  --  1.32* 1.72* 1.10   < > 1.13   ANIONGAP  --  4 7  --   --  3   GWEN  --  8.5 9.2  --   --  9.9   * 113* 84  --    < > 96    < > = values in this interval not displayed.     Most Recent 3 Creatinines:Recent Labs   Lab Test 09/26/21  1113 04/15/21  1447  02/03/21  1220   CR 1.32* 1.72* 1.10     Recent Results (from the past 24 hour(s))   CT Head w/o Contrast    Narrative    EXAM: CT HEAD WITHOUT AND WITH CONTRAST, CTA  HEAD NECK WITH CONTRAST  LOCATION: [facility]  DATE/TIME: 09/26/2021, 11:41 AM    INDICATION: Code stroke to evaluate for potential thrombolysis and thrombectomy.  Please read immediately.  COMPARISON: CTA 11/29/2019.  CONTRAST: 70 mL Isovue-370.  TECHNIQUE: Head and neck CT angiogram with IV contrast. Noncontrast head CT followed by axial helical CT images of the head and neck vessels obtained during the arterial phase of intravenous contrast administration. Axial 2D reconstructed images and   multiplanar 3D MIP reconstructed images of the head and neck vessels were performed by the technologist. Dose reduction techniques were used. All stenosis measurements made according to NASCET criteria unless otherwise specified.    FINDINGS:   NONCONTRAST HEAD CT:   INTRACRANIAL CONTENTS: No intracranial hemorrhage, extra-axial collection, or mass effect.  No CT evidence of acute infarct. Normal parenchymal attenuation. Normal ventricles and sulci.     VISUALIZED ORBITS/SINUSES/MASTOIDS: No intraorbital abnormality. No paranasal sinus mucosal disease. No middle ear or mastoid effusion.    BONES/SOFT TISSUES: No acute abnormality.    HEAD CTA:  The right ICA is occluded at the skull base likely from poor flow through the neck. Gradual opacification of the supraclinoid and cavernous right ICA likely from retrograde collateral flow. Small right A1 segment appears to be patent. Questionable right   P1 segment although it is diminutive in size if at all present. No definite vascular cutoff of the proximal ACAs, MCAs, or PCAs.    Intracranial right vertebral artery is smaller in size compared to the left. Scattered apparent stenoses of the right vertebral artery including a moderate focal stenosis of the distal right vertebral artery just  prior to its insertion to the basilar   artery. This is unchanged since prior and may be due to atherosclerotic disease or chronic dissection.    NECK CTA:  RIGHT CAROTID: The right common carotid artery is opacified at its very proximal aspect. There is mixed opacification of the very proximal common carotid artery which then becomes nonopacified 2 cm after its origin. The right common and internal carotid   arteries remain nonopacified throughout the neck up to the skull base. This is new since CTA 11/29/2019. The right external carotid artery branches are opacified possibly from collateral flow. There is calcified atherosclerotic disease in the expected   location of the right carotid bifurcation.    LEFT CAROTID: Mild soft plaque at the carotid bifurcation without stenosis.    VERTEBRAL ARTERIES: Luminal irregularity and stenosis at the origin of the right vertebral artery which is unchanged since 11/29/2019. Mild luminal irregularity is seen throughout the right vertebral artery in the neck up through at least the V3 segment.   This is likely due to a chronic dissection. Left vertebral artery appears widely patent throughout the neck. The left vertebral artery is larger than the right.    AORTIC ARCH: Classic aortic arch anatomy.    NONVASCULAR STRUCTURES: Unremarkable.      Impression    IMPRESSION:   HEAD CT:  1.  No acute intracranial hemorrhage, mass, or herniation.    HEAD CTA:   1.  Right ICA is occluded at the skull base likely from poor flow through the neck. Right ICA is very opacified by the [place] with a small right A1 segment and possibly a very diminutive right P1 segment.  2.  No definite vascular cutoff of the proximal ACAs, MCAs, or PCAs.  3.  Luminal irregularity and stenosis of the intracranial right vertebral artery which could be due to intracranial atherosclerotic disease versus chronic dissection. Appearance is unchanged since 11/29/2019.    NECK CTA:  1.  Occlusion of the right  carotid artery almost throughout its entirety in the neck. The very proximal portion of the right common carotid artery is opacified but quickly loses opacification and is likely occluded. Right RILEY is occluded up to the skull   base. Right external carotid artery branches appear to be present likely from collateral flow.  2.  Luminal irregularity and stenosis of the right vertebral artery throughout the neck particularly at its very proximal aspect. This is unchanged since CTA 11/29/2019 and is likely due to a chronic dissection.  3.  Mild soft plaque in the left carotid artery without significant stenosis.  4.  Left vertebral artery appears widely patent throughout the neck. Left vertebral artery is larger than the right.      Results discussed with Aung Izaguirre at 12:28 PM on 09/26/2021.     CTA Head Neck with Contrast    Narrative    EXAM: CT HEAD WITHOUT AND WITH CONTRAST, CTA  HEAD NECK WITH CONTRAST  LOCATION: [facility]  DATE/TIME: 09/26/2021, 11:41 AM    INDICATION: Code stroke to evaluate for potential thrombolysis and thrombectomy.  Please read immediately.  COMPARISON: CTA 11/29/2019.  CONTRAST: 70 mL Isovue-370.  TECHNIQUE: Head and neck CT angiogram with IV contrast. Noncontrast head CT followed by axial helical CT images of the head and neck vessels obtained during the arterial phase of intravenous contrast administration. Axial 2D reconstructed images and   multiplanar 3D MIP reconstructed images of the head and neck vessels were performed by the technologist. Dose reduction techniques were used. All stenosis measurements made according to NASCET criteria unless otherwise specified.    FINDINGS:   NONCONTRAST HEAD CT:   INTRACRANIAL CONTENTS: No intracranial hemorrhage, extra-axial collection, or mass effect.  No CT evidence of acute infarct. Normal parenchymal attenuation. Normal ventricles and sulci.     VISUALIZED ORBITS/SINUSES/MASTOIDS: No intraorbital abnormality.  No paranasal sinus mucosal disease. No middle ear or mastoid effusion.    BONES/SOFT TISSUES: No acute abnormality.    HEAD CTA:  The right ICA is occluded at the skull base likely from poor flow through the neck. Gradual opacification of the supraclinoid and cavernous right ICA likely from retrograde collateral flow. Small right A1 segment appears to be patent. Questionable right   P1 segment although it is diminutive in size if at all present. No definite vascular cutoff of the proximal ACAs, MCAs, or PCAs.    Intracranial right vertebral artery is smaller in size compared to the left. Scattered apparent stenoses of the right vertebral artery including a moderate focal stenosis of the distal right vertebral artery just prior to its insertion to the basilar   artery. This is unchanged since prior and may be due to atherosclerotic disease or chronic dissection.    NECK CTA:  RIGHT CAROTID: The right common carotid artery is opacified at its very proximal aspect. There is mixed opacification of the very proximal common carotid artery which then becomes nonopacified 2 cm after its origin. The right common and internal carotid   arteries remain nonopacified throughout the neck up to the skull base. This is new since CTA 11/29/2019. The right external carotid artery branches are opacified possibly from collateral flow. There is calcified atherosclerotic disease in the expected   location of the right carotid bifurcation.    LEFT CAROTID: Mild soft plaque at the carotid bifurcation without stenosis.    VERTEBRAL ARTERIES: Luminal irregularity and stenosis at the origin of the right vertebral artery which is unchanged since 11/29/2019. Mild luminal irregularity is seen throughout the right vertebral artery in the neck up through at least the V3 segment.   This is likely due to a chronic dissection. Left vertebral artery appears widely patent throughout the neck. The left vertebral artery is larger than the  right.    AORTIC ARCH: Classic aortic arch anatomy.    NONVASCULAR STRUCTURES: Unremarkable.      Impression    IMPRESSION:   HEAD CT:  1.  No acute intracranial hemorrhage, mass, or herniation.    HEAD CTA:   1.  Right ICA is occluded at the skull base likely from poor flow through the neck. Right ICA is very opacified by the [place] with a small right A1 segment and possibly a very diminutive right P1 segment.  2.  No definite vascular cutoff of the proximal ACAs, MCAs, or PCAs.  3.  Luminal irregularity and stenosis of the intracranial right vertebral artery which could be due to intracranial atherosclerotic disease versus chronic dissection. Appearance is unchanged since 11/29/2019.    NECK CTA:  1.  Occlusion of the right carotid artery almost throughout its entirety in the neck. The very proximal portion of the right common carotid artery is opacified but quickly loses opacification and is likely occluded. Right RILEY is occluded up to the skull   base. Right external carotid artery branches appear to be present likely from collateral flow.  2.  Luminal irregularity and stenosis of the right vertebral artery throughout the neck particularly at its very proximal aspect. This is unchanged since CTA 11/29/2019 and is likely due to a chronic dissection.  3.  Mild soft plaque in the left carotid artery without significant stenosis.  4.  Left vertebral artery appears widely patent throughout the neck. Left vertebral artery is larger than the right.      Results discussed with Aung Izaguirre at 12:28 PM on 09/26/2021.

## 2021-09-26 NOTE — ED NOTES
"RN at bedside putting IV in, pt lifting up left arm pt reports \"this arm feels weaker\". Pt states \"my speech feels nito thick too.\" RN did note some weakness to left arm, no obvious facial droop at this time. Notified charge RN to notify MD  "

## 2021-09-26 NOTE — PLAN OF CARE
"[facility] [facility] [place]    Patient admitted to room 2400 at approximately 1600 via cart from emergency room. Patient was accompanied by transport tech.     Verbal SBAR report received from Aggie prior to patient arrival.     Patient transferred to bed via SBA. Patient alert and oriented X 3. The patient is not having any pain.  . Admission vital signs: Blood pressure (!) 166/64, pulse 64, temperature 97.5  F (36.4  C), temperature source Oral, resp. rate 16, height 1.575 m (5' 2\"), weight 76.4 kg (168 lb 6.9 oz), SpO2 97 %. Patient was oriented to plan of care, call light, bed controls, tv, telephone, bathroom, and visiting hours.     Risk Assessment    The following safety risks were identified during admission: none. Yellow risk band applied: NO.     Skin Initial Assessment    This writer admitted this patient and completed a full skin assessment and Jersey score in the Adult PCS flowsheet. Appropriate interventions initiated as needed.     Jersey Risk Assessment  Sensory Perception: 4-->no impairment  Moisture: 4-->rarely moist  Activity: 4-->walks frequently  Mobility: 4-->no limitation  Nutrition: 3-->adequate  Friction and Shear: 3-->no apparent problem  Jersey Score: 22  Mattress: Standard Hospital Mattress (Foam)  Bed Frame: Standard width and length    Education    Patient has a [place] to Observation order: Yes  Observation education completed and documented: Yes      Mary Aramblua RN      "

## 2021-09-26 NOTE — CONSULTS
"    [facility]    Stroke Telephone Note    I was called by Aung Izaguirre on 09/26/21 regarding patient Liu Ragsdale. The patient is a 81 year old male who presented to the emergency department for evaluation after a near syncopal event.  Patient reports his episode occurred around 915 this morning while he was in [employer].  Patient became diaphoretic and noted new onset tunnellike vision.  This episode was followed by a transient episode of left arm weakness and \"thick\" speech that lasted approximately 30 minutes before resolving.  Upon arrival to the emergency department, all symptoms appear to have resolved.  No focal findings reported on exam.    Stroke Code Data (for stroke code without tele)  Stroke code activated 09/26/21   1134   Stroke provider first response  09/26/21   1136     Last known normal 09/26/21     Time of discovery   (or onset of symptoms) 09/26/21   0915   Head CT read by Stroke Neuro Dr/Provider 09/26/21   1148   Was stroke code de-escalated?   09/26/21 1209     Imaging Findings   CT head negative for acute hemorrhage  CTA head/neck: Occlusion of R common and internal carotid artery     Thrombolytic Treatment   Not given due to minor/isolated/quickly resolving symptoms.    Endovascular Treatment  Proximal vessel occlusion present, but endovascular treatment not initiated due to patient asymptomatic [NIH 0]. Started on Heparin gtt    Impression  81M who presented to the emergency department for evaluation after a near syncopal event and transient dysarthria and LUE weakness. Initial vessel imaging reveals right carotid occlusion. MRI brain pending.     Recommendations   -Admit for medical management   -High intensity heparin gtt w/ bolus  -MRI brain and MRA head/neck tomorrow      My recommendations are based on the information provided over the phone by Liu JAYESH Melyssa's in-person providers. They are not intended to replace the clinical judgment of his in-person " "providers. I was not requested to personally see or examine the patient at this time.    Isha Tucker PA-C   Neurology  To page me or covering stroke neurology team member, click here: [facility]COM   Choose \"On Call\" tab at top, then search dropdown box for \"Neurology Adult\", select location, press Enter, then look for stroke/neuro ICU/telestroke.         "

## 2021-09-26 NOTE — ED NOTES
Pt reported that his left hand was clumsy and he dropped his urinal, also reports decreased movement in hand that has since resolved. md aware and in room to assess.

## 2021-09-26 NOTE — ED TRIAGE NOTES
"Pt blacked out in [employer] today, had been feeling normal. reports a couple of twinges of chest pain in the last 2 weeks. Was diaphoretic per bystanders. Now feels \"drained\"  "

## 2021-09-27 ENCOUNTER — APPOINTMENT (OUTPATIENT)
Dept: MRI IMAGING | Facility: CLINIC | Age: 81
DRG: 064 | End: 2021-09-27
Attending: INTERNAL MEDICINE
Payer: MEDICARE

## 2021-09-27 ENCOUNTER — APPOINTMENT (OUTPATIENT)
Dept: OCCUPATIONAL THERAPY | Facility: CLINIC | Age: 81
DRG: 064 | End: 2021-09-27
Payer: MEDICARE

## 2021-09-27 ENCOUNTER — APPOINTMENT (OUTPATIENT)
Dept: CARDIOLOGY | Facility: CLINIC | Age: 81
DRG: 064 | End: 2021-09-27
Attending: INTERNAL MEDICINE
Payer: MEDICARE

## 2021-09-27 PROBLEM — I63.9 CEREBROVASCULAR ACCIDENT (H): Status: ACTIVE | Noted: 2021-09-27

## 2021-09-27 LAB
ANION GAP SERPL CALCULATED.3IONS-SCNC: 5 MMOL/L (ref 3–14)
BUN SERPL-MCNC: 30 MG/DL (ref 7–30)
CALCIUM SERPL-MCNC: 8.3 MG/DL (ref 8.5–10.1)
CHLORIDE BLD-SCNC: 107 MMOL/L (ref 94–109)
CHOLEST SERPL-MCNC: 99 MG/DL
CO2 SERPL-SCNC: 28 MMOL/L (ref 20–32)
CREAT SERPL-MCNC: 1.46 MG/DL (ref 0.66–1.25)
ERYTHROCYTE [DISTWIDTH] IN BLOOD BY AUTOMATED COUNT: 12.7 % (ref 10–15)
GFR SERPL CREATININE-BSD FRML MDRD: 45 ML/MIN/1.73M2
GLUCOSE BLD-MCNC: 104 MG/DL (ref 70–99)
HCT VFR BLD AUTO: 35.6 % (ref 40–53)
HDLC SERPL-MCNC: 67 MG/DL
HGB BLD-MCNC: 11.9 G/DL (ref 13.3–17.7)
LDLC SERPL CALC-MCNC: 15 MG/DL
LVEF ECHO: NORMAL
MCH RBC QN AUTO: 29.2 PG (ref 26.5–33)
MCHC RBC AUTO-ENTMCNC: 33.4 G/DL (ref 31.5–36.5)
MCV RBC AUTO: 88 FL (ref 78–100)
NONHDLC SERPL-MCNC: 32 MG/DL
PLATELET # BLD AUTO: 170 10E3/UL (ref 150–450)
POTASSIUM BLD-SCNC: 3.7 MMOL/L (ref 3.4–5.3)
RBC # BLD AUTO: 4.07 10E6/UL (ref 4.4–5.9)
SODIUM SERPL-SCNC: 140 MMOL/L (ref 133–144)
TRIGL SERPL-MCNC: 86 MG/DL
UFH PPP CHRO-ACNC: 0.6 IU/ML
UFH PPP CHRO-ACNC: 1.04 IU/ML
WBC # BLD AUTO: 7.9 10E3/UL (ref 4–11)

## 2021-09-27 PROCEDURE — 250N000013 HC RX MED GY IP 250 OP 250 PS 637: Performed by: PHYSICIAN ASSISTANT

## 2021-09-27 PROCEDURE — 36415 COLL VENOUS BLD VENIPUNCTURE: CPT | Performed by: INTERNAL MEDICINE

## 2021-09-27 PROCEDURE — 255N000002 HC RX 255 OP 636: Performed by: FAMILY MEDICINE

## 2021-09-27 PROCEDURE — 70553 MRI BRAIN STEM W/O & W/DYE: CPT

## 2021-09-27 PROCEDURE — 80048 BASIC METABOLIC PNL TOTAL CA: CPT | Performed by: INTERNAL MEDICINE

## 2021-09-27 PROCEDURE — 70544 MR ANGIOGRAPHY HEAD W/O DYE: CPT

## 2021-09-27 PROCEDURE — 250N000013 HC RX MED GY IP 250 OP 250 PS 637: Performed by: INTERNAL MEDICINE

## 2021-09-27 PROCEDURE — 70549 MR ANGIOGRAPH NECK W/O&W/DYE: CPT

## 2021-09-27 PROCEDURE — 85520 HEPARIN ASSAY: CPT | Performed by: FAMILY MEDICINE

## 2021-09-27 PROCEDURE — 85520 HEPARIN ASSAY: CPT | Performed by: INTERNAL MEDICINE

## 2021-09-27 PROCEDURE — 36415 COLL VENOUS BLD VENIPUNCTURE: CPT | Performed by: FAMILY MEDICINE

## 2021-09-27 PROCEDURE — 93306 TTE W/DOPPLER COMPLETE: CPT | Mod: 26 | Performed by: INTERNAL MEDICINE

## 2021-09-27 PROCEDURE — 120N000001 HC R&B MED SURG/OB

## 2021-09-27 PROCEDURE — 250N000011 HC RX IP 250 OP 636: Performed by: INTERNAL MEDICINE

## 2021-09-27 PROCEDURE — 85014 HEMATOCRIT: CPT | Performed by: INTERNAL MEDICINE

## 2021-09-27 PROCEDURE — 80061 LIPID PANEL: CPT | Performed by: INTERNAL MEDICINE

## 2021-09-27 PROCEDURE — 97530 THERAPEUTIC ACTIVITIES: CPT | Mod: GO

## 2021-09-27 PROCEDURE — 258N000003 HC RX IP 258 OP 636: Performed by: FAMILY MEDICINE

## 2021-09-27 PROCEDURE — 93306 TTE W/DOPPLER COMPLETE: CPT

## 2021-09-27 PROCEDURE — 97165 OT EVAL LOW COMPLEX 30 MIN: CPT | Mod: GO

## 2021-09-27 PROCEDURE — 99233 SBSQ HOSP IP/OBS HIGH 50: CPT | Performed by: PHYSICIAN ASSISTANT

## 2021-09-27 PROCEDURE — A9585 GADOBUTROL INJECTION: HCPCS | Performed by: FAMILY MEDICINE

## 2021-09-27 RX ORDER — NALOXONE HYDROCHLORIDE 0.4 MG/ML
0.4 INJECTION, SOLUTION INTRAMUSCULAR; INTRAVENOUS; SUBCUTANEOUS
Status: DISCONTINUED | OUTPATIENT
Start: 2021-09-27 | End: 2021-09-28 | Stop reason: HOSPADM

## 2021-09-27 RX ORDER — GADOBUTROL 604.72 MG/ML
10 INJECTION INTRAVENOUS ONCE
Status: COMPLETED | OUTPATIENT
Start: 2021-09-27 | End: 2021-09-27

## 2021-09-27 RX ORDER — NALOXONE HYDROCHLORIDE 0.4 MG/ML
0.2 INJECTION, SOLUTION INTRAMUSCULAR; INTRAVENOUS; SUBCUTANEOUS
Status: DISCONTINUED | OUTPATIENT
Start: 2021-09-27 | End: 2021-09-28 | Stop reason: HOSPADM

## 2021-09-27 RX ORDER — HYDROCODONE BITARTRATE AND ACETAMINOPHEN 5; 325 MG/1; MG/1
1 TABLET ORAL EVERY 6 HOURS PRN
Status: DISCONTINUED | OUTPATIENT
Start: 2021-09-27 | End: 2021-09-28 | Stop reason: HOSPADM

## 2021-09-27 RX ORDER — CARVEDILOL 6.25 MG/1
6.25 TABLET ORAL 2 TIMES DAILY WITH MEALS
Status: DISCONTINUED | OUTPATIENT
Start: 2021-09-27 | End: 2021-09-28 | Stop reason: HOSPADM

## 2021-09-27 RX ADMIN — HEPARIN SODIUM 1100 UNITS/HR: 10000 INJECTION, SOLUTION INTRAVENOUS at 05:07

## 2021-09-27 RX ADMIN — SODIUM CHLORIDE 50 ML: 9 INJECTION, SOLUTION INTRAVENOUS at 08:09

## 2021-09-27 RX ADMIN — CARVEDILOL 6.25 MG: 6.25 TABLET, FILM COATED ORAL at 18:23

## 2021-09-27 RX ADMIN — GADOBUTROL 10 ML: 604.72 INJECTION INTRAVENOUS at 08:09

## 2021-09-27 RX ADMIN — ACETAMINOPHEN 1000 MG: 500 TABLET, FILM COATED ORAL at 09:44

## 2021-09-27 RX ADMIN — DICLOFENAC SODIUM 2 G: 10 GEL TOPICAL at 22:25

## 2021-09-27 RX ADMIN — ACETAMINOPHEN 1000 MG: 500 TABLET, FILM COATED ORAL at 14:48

## 2021-09-27 RX ADMIN — CARVEDILOL 12.5 MG: 12.5 TABLET, FILM COATED ORAL at 09:44

## 2021-09-27 RX ADMIN — Medication 1 TABLET: at 09:45

## 2021-09-27 RX ADMIN — HEPARIN SODIUM 9 UNITS/HR: 10000 INJECTION, SOLUTION INTRAVENOUS at 07:26

## 2021-09-27 RX ADMIN — ATORVASTATIN CALCIUM 40 MG: 20 TABLET, FILM COATED ORAL at 21:42

## 2021-09-27 RX ADMIN — ACETAMINOPHEN 1000 MG: 500 TABLET, FILM COATED ORAL at 20:07

## 2021-09-27 RX ADMIN — ASPIRIN 81 MG: 81 TABLET ORAL at 09:44

## 2021-09-27 ASSESSMENT — ACTIVITIES OF DAILY LIVING (ADL)
ADLS_ACUITY_SCORE: 4
ADLS_ACUITY_SCORE: 6

## 2021-09-27 NOTE — PROGRESS NOTES
Pharmacy Consult to evaluate for medication related stroke core measures    Liu Ragsdale, 81 year old male admitted for weakness, code stroke on 9/26/2021.    Thrombolytic was not given because of symptoms short lived and pt improved    VTE Prophylaxis Heparin given on 9/26 as appropriate prior to end of hospital day 2.    Antithrombotic: aspirin and heparin inpt started on 9/26, as appropriate by end of hospital day 2. Further antithrombotics to be determined.     Anticoagulation if history of A-fib/flutter: Patient does not have history of A-fib/flutter - anticoagulation not required for medication related stroke core measures.     LDL Cholesterol Calculated   Date Value Ref Range Status   09/27/2021 15 <=100 mg/dL Final   08/19/2020 27 <100 mg/dL Final     Comment:     Desirable:       <100 mg/dl       Patient's home statin, Lipitor (atorvastatin) restarted; continue statin on discharge to meet quality measures, unless contraindicated.     Recommendations: awaiting antithrombotic orders    Thank you for the consult.    Ashley Schoen, RPH 9/27/2021 1:53 PM

## 2021-09-27 NOTE — PROGRESS NOTES
"[facility]    Medicine Progress Note - Hospitalist Service       Date of Admission:  9/26/2021    Assessment & Plan           Liu Ragsdale is a 81 year old male with past medical history of hypertension, coronary artery disease status post stent in 2020, hyperlipidemia, bilateral carpal tunnel syndrome, untreated sleep apnea who was admitted on 9/26/2021 with near syncope and found to have right carotid artery occlusion, possible acute ischemic stroke    Near Syncope  Patient reported near loss of consciousness while sitting at [employer]. Likely neurogenic syncope that unmasked the right carotid artery occlusion as discussed below.  --Telemetry, no signs of arrhythmia thus far  -- Echo shows normal LV function, EF 60-65%, normal LV wall motion, trivial pericardial effusion  -- monitor blood pressures  --management of right carotid artery occulusion as below    Right Carotid Artery Occlusion  Possible acute ischemic stroke   CTA head/neck shows right ICA occluded at the skull base from near-total occlusion of the right carotid artery throughout the entirety of the neck.  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 Stroke neuro was consulted by ED who recommended starting heparin and MRI/MRA to further evaluate.   MRI brain shows \"Multiple small infarcts scattered throughout the right middle cerebral artery distribution\".   MRA neck shows \"Improved flow within the right internal carotid artery; Severe stenosis of the proximal right internal carotid artery, probably 70% or greater; Multiple severe stenoses of the right vertebral artery at the V1, V2, and V4 segments, unchanged; Moderate stenosis of the dominant left vertebral artery at the V2 segment related to osseous cervical spine degenerative change, unchanged. \"  He is having ongoing mild clumsiness of the left hand, left hand  weakness and reported increase in chronic " left pointer finger numbness/tingling.  Discussed with stroke neurology following imaging, plan for IV heparin for 48 hours, repeat CTA at that time to help discern chronicity of occulusion based on signs of recanalization. Plan to attempt to transfer patient for further neurology evaluation as he may need diagnostic angiogram though beds are not available at this time, okay to remain here while on the IV heparin while he waits for a bed. Will need to call stroke neurology after CTA if still at this facility.   --high intensity heparin drip per stroke neuro, plan for 48 hours then repeat CTA of head  --CTA head planned for 2 PM on 9/28/21  --would need STAT head CT for any neurological changes.  --goal blood pressure 150s - 170s  --reducing dose of carvedilol to 6.25 mg with holding parameters for SBP < 150  --anti-hypertensive held for permissive hypertension, continuing to hold losartan, amlodipine, Imdur and hydrochlorothiazide  --PT/OT/care coordination  --Referral to vascular surgery as outpatient  -- attempting to transfer to [facility]    Coronary artery disease  Hypertension  Hyperlipidemia  coronary artery disease diagnosed in 03/2020 at which time he was noted to have a  of the proximal left circumflex into the OM, moderate to severe ramus disease and moderate to severe RCA/PLB disease.  He underwent drug-eluting stent placement from the proximal left circumflex into the OM1.  He has residual small vessel coronary artery disease as well as moderate to severe stenosis in the ramus and RCA/YAA for which medical management has been recommended.  An echocardiogram in 03/2020 that showed preserved LV systolic function.   --Continue carvedilol, reduced dose as above, and atorvastatin 40 mg daily and aspirin  -- holding home losartan to allow for permissive hypertension during the acute stroke period     Hypertension  Changes in outpatient regimen over the past year due to poor control, goal from a cardiac  standpoint is for blood pressure to be <120/80. Currently on amlodipine 2.5 daily, carvedilol 12.5 mg BID, hydrochlorothiazide 25 mg daily, losartan 100 mg daily, Imdur 60 mg daily. There has been discussion to stop amlodipine (had lower extremity edema on higher doses) if blood pressure well controlled or increase carvedilol or Imdur if blood pressure remained elevated.  Blood pressures have been 150s-170s though last check 109, will have nursing recheck to see if accurate reading.  -- continue home carvedilol at reduced dose   -- holding home amlodipine, HCTZ, losartan to allow for permissive hypertension during the acute stroke period     Acute kidney injury on CKD  Baseline creatinine 1.05-1.13, GFR 61-69. Creatinine up to 1.72, GFR 37 in 4/2021 on high dose of HCTZ.  No recheck was done after that.  On admission creatinine 1.32.  Rare use of NSAIDs.   -- Creatinine trend 1.32 --> 1.46  -- Recheck in morning  -- holding home hydrochlorothiazide, losartan      Obstructive sleep apnea: does not wear CPAP    Bilateral carpal tunnel   Recently diagnosed by his PCP last week.  He has been wearing braces at night which is helpful.  He has noticed some difficulty with hand , but feels it is worse since the event this morning.    COVID status   - negative screening on admission 9/26/21  - COVID vaccination complete, moderna 2/16/21 and 3/16/21     Diet: Low Saturated Fat Na <2400 mg    DVT Prophylaxis: Heparin drip  Vásquez Catheter: Not present  Central Lines: None  Code Status: Full Code      Disposition Plan   Expected discharge: likely 1-4 days recommended to prior living arrangement once work up complete and no barriers to discharge identified.     The patient's care was discussed with the Attending Physician, Dr. Tevin Falcon and Patient. Discussed via telephone with stroke neurology as above.    Deidre Byrd PA-C  Hospitalist Service  [facility]  Securely message with the  [employer] Web Console (learn more here)  Text page via [facility] Paging/Directory  ______________________________________________________________________    Interval History   Left hand initially had improved following event though over the course of his stay he has noticed worsening  strength. Yesterday he was able to tap his fingers, today he is unable to do this on his left hand. He feels his left hand/arm and somewhat clumsy. He has increased tingling of his left pointer finger though he does have chronic tingling of this finger at baseline.     Patient denies fever, chills, lightheadedness, dizziness, cough, sore throat, shortness of breath, palpitations, chest pain, abdominal pain, nausea, vomiting, diarrhea, or changes in urination.     Data reviewed today: I reviewed all medications, new labs and imaging results over the last 24 hours. I personally reviewed no images or EKG's today.    Physical Exam   Vital Signs: Temp: 97.8  F (36.6  C) Temp src: Oral BP: (!) 170/74 (RN notified) Pulse: 76   Resp: 16 SpO2: 97 % O2 Device: None (Room air)    Weight: 168 lbs 6.9 oz  Constitutional: laying down comfortably in bed, awake, alert, cooperative, no apparent distress, and appears stated age  ENT: oropharynx is clear and moist. Tongues midline, symmetric palatal rise  Respiratory: No increased work of breathing, good air exchange, clear to auscultation bilaterally, no crackles or wheezing  Cardiovascular: regular rate and rhythm, normal, and no murmur noted. No lower extremity edema. Radial pulses are 2+ bilaterally.  GI: normal bowel sounds, soft, non-distended, non-tender  Skin: warm and dry.   Musculoskeletal: moving all 4 extremities though some difficulty with coordination of the left upper extremity.  Neurologic: Awake, alert, oriented to name, place and time.  Cranial nerves II-XII are grossly intact. Left  strength is decreased, 3/5, right  strength appropriate. Aside from left hand strength, upper and  lower extremity strength is 5/5 bilaterally. Unable to do rapid alternating movement of fingers on the left. Normal finger to nose bilaterally. Normal heel to shin bilaterally. Reported altered sensation to left pointer finger, chronic though slightly worse otherwise sensation is grossly intact to light touch.   Neuropsychiatric: calm, pleasant, cooperative. Appropriate thought process/content. Short term memory is grossly intact.    Data   Recent Labs   Lab 09/27/21  0420 09/26/21  1138 09/26/21  1113   WBC 7.9  --  8.4   HGB 11.9*  --  13.1*   MCV 88  --  88     --  151   INR  --   --  1.03     --  139   POTASSIUM 3.7  --  4.0   CHLORIDE 107  --  108   CO2 28  --  27   BUN 30  --  22   CR 1.46*  --  1.32*   ANIONGAP 5  --  4   GWEN 8.3*  --  8.5   * 117* 113*   TROPONIN  --   --  <0.015     Recent Results (from the past 24 hour(s))   CT Head w/o Contrast    Narrative    EXAM: CT HEAD WITHOUT AND WITH CONTRAST, CTA  HEAD NECK WITH CONTRAST  LOCATION: [facility]  DATE/TIME: 09/26/2021, 11:41 AM    INDICATION: Code stroke to evaluate for potential thrombolysis and thrombectomy.  Please read immediately.  COMPARISON: CTA 11/29/2019.  CONTRAST: 70 mL Isovue-370.  TECHNIQUE: Head and neck CT angiogram with IV contrast. Noncontrast head CT followed by axial helical CT images of the head and neck vessels obtained during the arterial phase of intravenous contrast administration. Axial 2D reconstructed images and   multiplanar 3D MIP reconstructed images of the head and neck vessels were performed by the technologist. Dose reduction techniques were used. All stenosis measurements made according to NASCET criteria unless otherwise specified.    FINDINGS:   NONCONTRAST HEAD CT:   INTRACRANIAL CONTENTS: No intracranial hemorrhage, extra-axial collection, or mass effect.  No CT evidence of acute infarct. Normal parenchymal attenuation. Normal ventricles and sulci.     VISUALIZED  ORBITS/SINUSES/MASTOIDS: No intraorbital abnormality. No paranasal sinus mucosal disease. No middle ear or mastoid effusion.    BONES/SOFT TISSUES: No acute abnormality.    HEAD CTA:  The right ICA is occluded at the skull base likely from poor flow through the neck. Gradual opacification of the supraclinoid and cavernous right ICA likely from retrograde collateral flow. Small right A1 segment appears to be patent. Questionable right   P1 segment although it is diminutive in size if at all present. No definite vascular cutoff of the proximal ACAs, MCAs, or PCAs.    Intracranial right vertebral artery is smaller in size compared to the left. Scattered apparent stenoses of the right vertebral artery including a moderate focal stenosis of the distal right vertebral artery just prior to its insertion to the basilar   artery. This is unchanged since prior and may be due to atherosclerotic disease or chronic dissection.    NECK CTA:  RIGHT CAROTID: The right common carotid artery is opacified at its very proximal aspect. There is mixed opacification of the very proximal common carotid artery which then becomes nonopacified 2 cm after its origin. The right common and internal carotid   arteries remain nonopacified throughout the neck up to the skull base. This is new since CTA 11/29/2019. The right external carotid artery branches are opacified possibly from collateral flow. There is calcified atherosclerotic disease in the expected   location of the right carotid bifurcation.    LEFT CAROTID: Mild soft plaque at the carotid bifurcation without stenosis.    VERTEBRAL ARTERIES: Luminal irregularity and stenosis at the origin of the right vertebral artery which is unchanged since 11/29/2019. Mild luminal irregularity is seen throughout the right vertebral artery in the neck up through at least the V3 segment.   This is likely due to a chronic dissection. Left vertebral artery appears widely patent throughout the neck. The  left vertebral artery is larger than the right.    AORTIC ARCH: Classic aortic arch anatomy.    NONVASCULAR STRUCTURES: Unremarkable.      Impression    IMPRESSION:   HEAD CT:  1.  No acute intracranial hemorrhage, mass, or herniation.    HEAD CTA:   1.  Right ICA is occluded at the skull base likely from poor flow through the neck. Right ICA is very opacified by the [place] with a small right A1 segment and possibly a very diminutive right P1 segment.  2.  No definite vascular cutoff of the proximal ACAs, MCAs, or PCAs.  3.  Luminal irregularity and stenosis of the intracranial right vertebral artery which could be due to intracranial atherosclerotic disease versus chronic dissection. Appearance is unchanged since 11/29/2019.    NECK CTA:  1.  Occlusion of the right carotid artery almost throughout its entirety in the neck. The very proximal portion of the right common carotid artery is opacified but quickly loses opacification and is likely occluded. Right RILEY is occluded up to the skull   base. Right external carotid artery branches appear to be present likely from collateral flow.  2.  Luminal irregularity and stenosis of the right vertebral artery throughout the neck particularly at its very proximal aspect. This is unchanged since CTA 11/29/2019 and is likely due to a chronic dissection.  3.  Mild soft plaque in the left carotid artery without significant stenosis.  4.  Left vertebral artery appears widely patent throughout the neck. Left vertebral artery is larger than the right.      Results discussed with Aung Izaguirre at 12:28 PM on 09/26/2021.     CTA Head Neck with Contrast    Narrative    EXAM: CT HEAD WITHOUT AND WITH CONTRAST, CTA  HEAD NECK WITH CONTRAST  LOCATION: [facility]  DATE/TIME: 09/26/2021, 11:41 AM    INDICATION: Code stroke to evaluate for potential thrombolysis and thrombectomy.  Please read immediately.  COMPARISON: CTA 11/29/2019.  CONTRAST: 70 mL  Isovue-370.  TECHNIQUE: Head and neck CT angiogram with IV contrast. Noncontrast head CT followed by axial helical CT images of the head and neck vessels obtained during the arterial phase of intravenous contrast administration. Axial 2D reconstructed images and   multiplanar 3D MIP reconstructed images of the head and neck vessels were performed by the technologist. Dose reduction techniques were used. All stenosis measurements made according to NASCET criteria unless otherwise specified.    FINDINGS:   NONCONTRAST HEAD CT:   INTRACRANIAL CONTENTS: No intracranial hemorrhage, extra-axial collection, or mass effect.  No CT evidence of acute infarct. Normal parenchymal attenuation. Normal ventricles and sulci.     VISUALIZED ORBITS/SINUSES/MASTOIDS: No intraorbital abnormality. No paranasal sinus mucosal disease. No middle ear or mastoid effusion.    BONES/SOFT TISSUES: No acute abnormality.    HEAD CTA:  The right ICA is occluded at the skull base likely from poor flow through the neck. Gradual opacification of the supraclinoid and cavernous right ICA likely from retrograde collateral flow. Small right A1 segment appears to be patent. Questionable right   P1 segment although it is diminutive in size if at all present. No definite vascular cutoff of the proximal ACAs, MCAs, or PCAs.    Intracranial right vertebral artery is smaller in size compared to the left. Scattered apparent stenoses of the right vertebral artery including a moderate focal stenosis of the distal right vertebral artery just prior to its insertion to the basilar   artery. This is unchanged since prior and may be due to atherosclerotic disease or chronic dissection.    NECK CTA:  RIGHT CAROTID: The right common carotid artery is opacified at its very proximal aspect. There is mixed opacification of the very proximal common carotid artery which then becomes nonopacified 2 cm after its origin. The right common and internal carotid   arteries remain  nonopacified throughout the neck up to the skull base. This is new since CTA 11/29/2019. The right external carotid artery branches are opacified possibly from collateral flow. There is calcified atherosclerotic disease in the expected   location of the right carotid bifurcation.    LEFT CAROTID: Mild soft plaque at the carotid bifurcation without stenosis.    VERTEBRAL ARTERIES: Luminal irregularity and stenosis at the origin of the right vertebral artery which is unchanged since 11/29/2019. Mild luminal irregularity is seen throughout the right vertebral artery in the neck up through at least the V3 segment.   This is likely due to a chronic dissection. Left vertebral artery appears widely patent throughout the neck. The left vertebral artery is larger than the right.    AORTIC ARCH: Classic aortic arch anatomy.    NONVASCULAR STRUCTURES: Unremarkable.      Impression    IMPRESSION:   HEAD CT:  1.  No acute intracranial hemorrhage, mass, or herniation.    HEAD CTA:   1.  Right ICA is occluded at the skull base likely from poor flow through the neck. Right ICA is very opacified by the [place] with a small right A1 segment and possibly a very diminutive right P1 segment.  2.  No definite vascular cutoff of the proximal ACAs, MCAs, or PCAs.  3.  Luminal irregularity and stenosis of the intracranial right vertebral artery which could be due to intracranial atherosclerotic disease versus chronic dissection. Appearance is unchanged since 11/29/2019.    NECK CTA:  1.  Occlusion of the right carotid artery almost throughout its entirety in the neck. The very proximal portion of the right common carotid artery is opacified but quickly loses opacification and is likely occluded. Right RILEY is occluded up to the skull   base. Right external carotid artery branches appear to be present likely from collateral flow.  2.  Luminal irregularity and stenosis of the right vertebral artery throughout the neck particularly at  its very proximal aspect. This is unchanged since CTA 11/29/2019 and is likely due to a chronic dissection.  3.  Mild soft plaque in the left carotid artery without significant stenosis.  4.  Left vertebral artery appears widely patent throughout the neck. Left vertebral artery is larger than the right.      Results discussed with Aung Izaguirre at 12:28 PM on 09/26/2021.       Medications     heparin 9 Units/hr (09/27/21 0726)     - MEDICATION INSTRUCTIONS -       - MEDICATION INSTRUCTIONS -       - MEDICATION INSTRUCTIONS -       - MEDICATION INSTRUCTIONS -         acetaminophen  1,000 mg Oral TID     amLODIPine  2.5 mg Oral Daily     aspirin  81 mg Oral Daily     atorvastatin  40 mg Oral At Bedtime     carvedilol  12.5 mg Oral BID w/meals     hydrochlorothiazide  25 mg Oral Daily     isosorbide mononitrate  60 mg Oral Daily     losartan  100 mg Oral Daily     multivitamin, therapeutic  1 tablet Oral Daily     sodium chloride (PF)  3 mL Intracatheter Q8H     sodium chloride (PF)  3 mL Intracatheter Q8H

## 2021-09-27 NOTE — PLAN OF CARE
MD Notification    Notified Person: MD    Notified Person Name: Telehospitalist - Paola Lorenz     Notification Date/Time: 9/26 @ 9643    Notification Interaction: Vocera Message    Purpose of Notification: Lab results: Anti Xa >1.10    Comments: Per protocol - Pause heparin infusion for 1 hr, decrease by 300 units/hr

## 2021-09-27 NOTE — PLAN OF CARE
PT: Spent time discussing plan of care with patient. He declines any PT needs at this time stating he is moving fine with no balance, coordination or sensation difficulties. OT to check in with patient regarding LUE deficits.     Robert Karimi, PT, DPT

## 2021-09-27 NOTE — CONSULTS
Care Management Initial Consult    General Information  Assessment completed with: Patient,    Type of CM/SW Visit: Initial Assessment    Primary Care Provider verified and updated as needed: Yes   Readmission within the last 30 days:        Reason for Consult: other (see comments) (Stroke order set)  Advance Care Planning:          Communication Assessment  Patient's communication style: spoken language (English or Bilingual)    Hearing Difficulty or Deaf: no   Wear Glasses or Blind: yes    Cognitive  Cognitive/Neuro/Behavioral: WDL  Level of Consciousness: alert     Orientation: oriented x 4  Mood/Behavior: calm  Best Language: 0 - No aphasia  Speech: clear    Living Environment:   People in home: spouse     Current living Arrangements: house      Able to return to prior arrangements: yes     Family/Social Support:  Care provided by: self  Provides care for: spouse  Marital Status:   Wife, Children  Kelsi       Description of Support System: Supportive, Involved       Current Resources:   Patient receiving home care services: No     Community Resources: None  Equipment currently used at home: none  Supplies currently used at home:      Employment/Financial:  Employment Status:          Financial Concerns:         Lifestyle & Psychosocial Needs:  Social Determinants of Health     Tobacco Use: Low Risk      Smoking Tobacco Use: Never Smoker     Smokeless Tobacco Use: Never Used   Alcohol Use:      Frequency of Alcohol Consumption:      Average Number of Drinks:      Frequency of Binge Drinking:    Financial Resource Strain:      Difficulty of Paying Living Expenses:    Food Insecurity:      Worried About Running Out of Food in the Last Year:      Ran Out of Food in the Last Year:    Transportation Needs:      Lack of Transportation (Medical):      Lack of Transportation (Non-Medical):    Physical Activity:      Days of Exercise per Week:      Minutes of Exercise per Session:    Stress:      Feeling of Stress  :    Social Connections:      Frequency of Communication with Friends and Family:      Frequency of Social Gatherings with Friends and Family:      Attends Confucianist Services:      Active Member of Clubs or Organizations:      Attends Club or Organization Meetings:      Marital Status:    Intimate Partner Violence:      Fear of Current or Ex-Partner:      Emotionally Abused:      Physically Abused:      Sexually Abused:    Depression: Not at risk     PHQ-2 Score: 0   Housing Stability:      Unable to Pay for Housing in the Last Year:      Number of Places Lived in the Last Year:      Unstable Housing in the Last Year:      Functional Status:  Prior to admission patient needed assistance:        Mental Health Status:        Chemical Dependency Status:          Values/Beliefs:  Spiritual, Cultural Beliefs, Confucianist Practices, Values that affect care:               Additional Information:  Received referral from [facility] order set.  Completed initial assessment with patient by phone.   Patient lives in a home with his wife, Kelsi.  At baseline, patient is independent with mobility.  There are stairs to the basement within the home, which he states he is able to manage well.  Patient is active, drives, and is able to complete his own self cares.  Patient does NOT receive any in-home services.  Patient states his wife, Kelsi, has just started to show signs of short term memory loss.  Patient states his two sons and her daughter will assist Kelsi at home while patient is hospitalized.  Reviewed with patient the recommendation for outpatient O/T.  Patient is in agreement with recommendation.  Patient states there has been discussion with the provider about possible transfer to the [facility] for further neurology work up.  Plan TBD.  Patient confirms that his PCP is Dr. Rogers at [facility].  Family transport at discharge.    MARTIN Curry  [facility] [phone]/ [facility] [phone]  [facility]  Management

## 2021-09-27 NOTE — PLAN OF CARE
Occupational Therapy Discharge Summary    Reason for therapy discharge:    All goals and outcomes met, no further needs identified.    Progress towards therapy goal(s). See goals on Care Plan in [facility] electronic health record for goal details.  Goals met    Therapy recommendation(s):    Please put in OP Occupational therapy Orders upon discharge to address Left hand coordination deficits.

## 2021-09-27 NOTE — CONSULTS
"[facility]    Stroke Telephone Note    I was called by Tevin Falcon Md on 09/27/21 regarding patient Liu Ragsdale. The patient is a  81 year old male who presented to the emergency department on 9/26/2021 for evaluation after a near syncopal event.  Patient reports his episode occurred around 915 this morning while he was in [place].  Patient became diaphoretic and noted new onset tunnellike vision.  This episode was followed by a transient episode of left arm weakness and \"thick\" speech that lasted approximately 30 minutes before resolving.CT was negative. CTA showed R common carotid occlusion. He was started on high intensity heparin . Follow up MRI showed R hemispheric infarct and improved flow in R common carotid and residual R ICA 70% stenosis.     Imaging Findings   As stated above    Impression  Stroke   R symptomatic carotid stenosis    Recommendations   Continue IV heparin for now  Obtain STAT CT head with neuro changes  Likely will need transfer to [place] or [facility] for carotid revascularization  Patient was put on transfer wait list for [place]  Will touch base will team in AM regarding further recommendations and plan to switch heparin to DAPT.   Please reach out to stroke team in AM for further recommendations.     My recommendations are based on the information provided over the phone by Liu Ragsdale's in-person providers. They are not intended to replace the clinical judgment of his in-person providers. I was not requested to personally see or examine the patient at this time.    Elda Yates MD  Vascular Neurology  To page me or covering stroke neurology team member, click here: [facility]COM   Choose \"On Call\" tab at top, then search dropdown box for \"Neurology Adult\", select location, press Enter, then look for stroke/neuro ICU/telestroke.           "

## 2021-09-27 NOTE — PROGRESS NOTES
09/27/21 1100   Quick Adds   Type of Visit Initial Occupational Therapy Evaluation   Living Environment   People in home spouse   Current Living Arrangements house   Disability/Function   Hearing Difficulty or Deaf no   Concentrating, Remembering or Making Decisions Difficulty no   Difficulty Communicating no   Difficulty Eating/Swallowing no   Walking or Climbing Stairs Difficulty no   Dressing/Bathing Difficulty no   Toileting issues no   Doing Errands Independently Difficulty (such as shopping) no   Fall history within last six months no   Change in Functional Status Since Onset of Current Illness/Injury yes   General Information   Onset of Illness/Injury or Date of Surgery 09/26/21   Referring Physician Deidre Castillo, DO   Patient/Family Therapy Goal Statement (OT) to return home. L hand to improve.    Additional Occupational Profile Info/Pertinent History of Current Problem Liu Ragsdale is a 81 year old male with past medical history of hypertension, coronary artery disease status post stent in 2020, hyperlipidemia, bilateral carpal tunnel syndrome, untreated sleep apnea who was admitted on 9/26/2021 with near syncope and found to have right carotid artery occlusion, possible acute ischemic stroke   Cognitive Status Examination   Orientation Status orientation to person, place and time   Visual Perception   Visual Impairment/Limitations WNL   Pain Assessment   Patient Currently in Pain No   Range of Motion Comprehensive   Comment, General Range of Motion B UE ROM: WNL. Some limitation to R shoulder d/t hx of rotator cuff injury (this is baseline)   Strength Comprehensive (MMT)   Comment, General Manual Muscle Testing (MMT) Assessment B UE strength: WNL and equal. L  strength weaker than R  strength    Coordination   Gross Motor Coordination normal finger to nose test   Fine Motor Coordination L hand: difficulty with finger to thumb opposition. States writing this morning was difficult.     Coordination Comments Left hand dominant.    Bed Mobility   Comment (Bed Mobility) independent    Transfers   Transfer Comments independent    Balance   Balance Comments independent    Clinical Impression   Criteria for Skilled Therapeutic Interventions Met (OT) yes;skilled treatment is necessary   OT Diagnosis impaired L hand coordination    OT Problem List-Impairments impacting ADL coordination;strength   Assessment of Occupational Performance 1-3 Performance Deficits   Identified Performance Deficits fine motor skills    Planned Therapy Interventions (OT) strengthening;fine motor coordination training   Clinical Decision Making Complexity (OT) low complexity   Therapy Frequency (OT) 1x eval and treat   Risk & Benefits of therapy have been explained evaluation/treatment results reviewed;care plan/treatment goals reviewed;risks/benefits reviewed;current/potential barriers reviewed;participants voiced agreement with care plan;participants included;patient   OT Discharge Planning    OT Discharge Recommendation (DC Rec) home with outpatient occupational therapy   OT Rationale for DC Rec OP OT to address L hand coordination and strength    Total Evaluation Time (Minutes)   Total Evaluation Time (Minutes) 10

## 2021-09-28 ENCOUNTER — HOSPITAL ENCOUNTER (INPATIENT)
Facility: CLINIC | Age: 81
LOS: 3 days | Discharge: HOME | DRG: 038 | End: 2021-10-01
Attending: PSYCHIATRY & NEUROLOGY | Admitting: PSYCHIATRY & NEUROLOGY
Payer: MEDICARE

## 2021-09-28 ENCOUNTER — ANESTHESIA EVENT (OUTPATIENT)
Dept: SURGERY | Facility: CLINIC | Age: 81
DRG: 038 | End: 2021-09-28
Payer: MEDICARE

## 2021-09-28 VITALS
HEIGHT: 62 IN | OXYGEN SATURATION: 95 % | DIASTOLIC BLOOD PRESSURE: 47 MMHG | TEMPERATURE: 98.5 F | WEIGHT: 168.43 LBS | HEART RATE: 55 BPM | BODY MASS INDEX: 31 KG/M2 | SYSTOLIC BLOOD PRESSURE: 145 MMHG | RESPIRATION RATE: 18 BRPM

## 2021-09-28 DIAGNOSIS — I65.21 RIGHT CAROTID ARTERY OCCLUSION: Primary | ICD-10-CM

## 2021-09-28 DIAGNOSIS — I63.9 ISCHEMIC STROKE (H): ICD-10-CM

## 2021-09-28 LAB
ANION GAP SERPL CALCULATED.3IONS-SCNC: 4 MMOL/L (ref 3–14)
BUN SERPL-MCNC: 25 MG/DL (ref 7–30)
CALCIUM SERPL-MCNC: 8.8 MG/DL (ref 8.5–10.1)
CHLORIDE BLD-SCNC: 110 MMOL/L (ref 94–109)
CO2 SERPL-SCNC: 26 MMOL/L (ref 20–32)
CREAT SERPL-MCNC: 1.25 MG/DL (ref 0.66–1.25)
CREAT SERPL-MCNC: 1.36 MG/DL (ref 0.66–1.25)
ERYTHROCYTE [DISTWIDTH] IN BLOOD BY AUTOMATED COUNT: 12.6 % (ref 10–15)
GFR SERPL CREATININE-BSD FRML MDRD: 49 ML/MIN/1.73M2
GFR SERPL CREATININE-BSD FRML MDRD: 54 ML/MIN/1.73M2
GLUCOSE BLD-MCNC: 98 MG/DL (ref 70–99)
GLUCOSE BLDC GLUCOMTR-MCNC: 93 MG/DL (ref 70–99)
GLUCOSE BLDC GLUCOMTR-MCNC: 95 MG/DL (ref 70–99)
HCT VFR BLD AUTO: 37.7 % (ref 40–53)
HGB BLD-MCNC: 12.8 G/DL (ref 13.3–17.7)
MCH RBC QN AUTO: 29.4 PG (ref 26.5–33)
MCHC RBC AUTO-ENTMCNC: 34 G/DL (ref 31.5–36.5)
MCV RBC AUTO: 87 FL (ref 78–100)
PLATELET # BLD AUTO: 169 10E3/UL (ref 150–450)
POTASSIUM BLD-SCNC: 3.8 MMOL/L (ref 3.4–5.3)
RBC # BLD AUTO: 4.35 10E6/UL (ref 4.4–5.9)
SODIUM SERPL-SCNC: 140 MMOL/L (ref 133–144)
UFH PPP CHRO-ACNC: 0.56 IU/ML
WBC # BLD AUTO: 5.8 10E3/UL (ref 4–11)

## 2021-09-28 PROCEDURE — 250N000013 HC RX MED GY IP 250 OP 250 PS 637: Performed by: PHYSICIAN ASSISTANT

## 2021-09-28 PROCEDURE — U0005 INFEC AGEN DETEC AMPLI PROBE: HCPCS | Performed by: STUDENT IN AN ORGANIZED HEALTH CARE EDUCATION/TRAINING PROGRAM

## 2021-09-28 PROCEDURE — 36415 COLL VENOUS BLD VENIPUNCTURE: CPT | Performed by: FAMILY MEDICINE

## 2021-09-28 PROCEDURE — 250N000013 HC RX MED GY IP 250 OP 250 PS 637: Performed by: INTERNAL MEDICINE

## 2021-09-28 PROCEDURE — 85520 HEPARIN ASSAY: CPT | Performed by: FAMILY MEDICINE

## 2021-09-28 PROCEDURE — 120N000002 HC R&B MED SURG/OB UMMC

## 2021-09-28 PROCEDURE — 99233 SBSQ HOSP IP/OBS HIGH 50: CPT

## 2021-09-28 PROCEDURE — 999N000226 HC STATISTIC SLP IP EVAL DEFER

## 2021-09-28 PROCEDURE — 80048 BASIC METABOLIC PNL TOTAL CA: CPT | Performed by: INTERNAL MEDICINE

## 2021-09-28 PROCEDURE — 85027 COMPLETE CBC AUTOMATED: CPT | Performed by: INTERNAL MEDICINE

## 2021-09-28 PROCEDURE — 82565 ASSAY OF CREATININE: CPT | Performed by: STUDENT IN AN ORGANIZED HEALTH CARE EDUCATION/TRAINING PROGRAM

## 2021-09-28 PROCEDURE — 250N000011 HC RX IP 250 OP 636: Performed by: STUDENT IN AN ORGANIZED HEALTH CARE EDUCATION/TRAINING PROGRAM

## 2021-09-28 PROCEDURE — 250N000013 HC RX MED GY IP 250 OP 250 PS 637: Performed by: STUDENT IN AN ORGANIZED HEALTH CARE EDUCATION/TRAINING PROGRAM

## 2021-09-28 PROCEDURE — 36415 COLL VENOUS BLD VENIPUNCTURE: CPT | Performed by: STUDENT IN AN ORGANIZED HEALTH CARE EDUCATION/TRAINING PROGRAM

## 2021-09-28 PROCEDURE — 250N000013 HC RX MED GY IP 250 OP 250 PS 637

## 2021-09-28 PROCEDURE — 36415 COLL VENOUS BLD VENIPUNCTURE: CPT | Performed by: INTERNAL MEDICINE

## 2021-09-28 RX ORDER — LABETALOL HYDROCHLORIDE 5 MG/ML
10-20 INJECTION, SOLUTION INTRAVENOUS EVERY 10 MIN PRN
Status: DISCONTINUED | OUTPATIENT
Start: 2021-09-28 | End: 2021-09-29

## 2021-09-28 RX ORDER — ATORVASTATIN CALCIUM 20 MG/1
20 TABLET, FILM COATED ORAL DAILY
Status: DISCONTINUED | OUTPATIENT
Start: 2021-09-29 | End: 2021-10-01

## 2021-09-28 RX ORDER — ASPIRIN 325 MG
325 TABLET ORAL DAILY
Qty: 100 TABLET | Refills: 0 | Status: ON HOLD | OUTPATIENT
Start: 2021-09-29 | End: 2023-01-01

## 2021-09-28 RX ORDER — CARVEDILOL 6.25 MG/1
6.25 TABLET ORAL 2 TIMES DAILY WITH MEALS
Qty: 60 TABLET | Refills: 0 | Status: SHIPPED | OUTPATIENT
Start: 2021-09-28 | End: 2021-12-09

## 2021-09-28 RX ORDER — ACETAMINOPHEN 325 MG/1
650 TABLET ORAL EVERY 4 HOURS PRN
Status: DISCONTINUED | OUTPATIENT
Start: 2021-09-28 | End: 2021-10-01 | Stop reason: HOSPADM

## 2021-09-28 RX ORDER — ASPIRIN 325 MG
325 TABLET ORAL DAILY
Status: DISCONTINUED | OUTPATIENT
Start: 2021-09-29 | End: 2021-10-01 | Stop reason: HOSPADM

## 2021-09-28 RX ORDER — LIDOCAINE 40 MG/G
CREAM TOPICAL
Status: DISCONTINUED | OUTPATIENT
Start: 2021-09-28 | End: 2021-09-29

## 2021-09-28 RX ORDER — HYDRALAZINE HYDROCHLORIDE 20 MG/ML
10-20 INJECTION INTRAMUSCULAR; INTRAVENOUS
Status: DISCONTINUED | OUTPATIENT
Start: 2021-09-28 | End: 2021-09-29

## 2021-09-28 RX ORDER — ASPIRIN 325 MG
325 TABLET ORAL DAILY
Status: DISCONTINUED | OUTPATIENT
Start: 2021-09-29 | End: 2021-09-28 | Stop reason: HOSPADM

## 2021-09-28 RX ORDER — ATORVASTATIN CALCIUM 40 MG/1
40 TABLET, FILM COATED ORAL DAILY
Status: DISCONTINUED | OUTPATIENT
Start: 2021-09-28 | End: 2021-09-28

## 2021-09-28 RX ORDER — AMOXICILLIN 250 MG
1-2 CAPSULE ORAL 2 TIMES DAILY
Status: DISCONTINUED | OUTPATIENT
Start: 2021-09-28 | End: 2021-09-28

## 2021-09-28 RX ORDER — AMOXICILLIN 250 MG
1-2 CAPSULE ORAL
Status: DISCONTINUED | OUTPATIENT
Start: 2021-09-28 | End: 2021-10-01 | Stop reason: HOSPADM

## 2021-09-28 RX ADMIN — ASPIRIN 81 MG: 81 TABLET ORAL at 08:25

## 2021-09-28 RX ADMIN — ACETAMINOPHEN 650 MG: 325 TABLET, FILM COATED ORAL at 22:13

## 2021-09-28 RX ADMIN — ACETAMINOPHEN 1000 MG: 500 TABLET, FILM COATED ORAL at 08:25

## 2021-09-28 RX ADMIN — DICLOFENAC SODIUM 2 G: 10 GEL TOPICAL at 20:30

## 2021-09-28 RX ADMIN — ASPIRIN 325 MG: 325 TABLET ORAL at 12:57

## 2021-09-28 RX ADMIN — CARVEDILOL 6.25 MG: 6.25 TABLET, FILM COATED ORAL at 08:24

## 2021-09-28 RX ADMIN — DICLOFENAC SODIUM 2 G: 10 GEL TOPICAL at 11:11

## 2021-09-28 RX ADMIN — ENOXAPARIN SODIUM 40 MG: 40 INJECTION SUBCUTANEOUS at 16:54

## 2021-09-28 RX ADMIN — Medication 1 TABLET: at 08:26

## 2021-09-28 ASSESSMENT — ACTIVITIES OF DAILY LIVING (ADL)
ADLS_ACUITY_SCORE: 6
ADLS_ACUITY_SCORE: 8
ADLS_ACUITY_SCORE: 12
ADLS_ACUITY_SCORE: 11
ADLS_ACUITY_SCORE: 6
ADLS_ACUITY_SCORE: 6

## 2021-09-28 ASSESSMENT — VISUAL ACUITY: OU: NORMAL ACUITY

## 2021-09-28 NOTE — PROGRESS NOTES
Patient requesting Voltaren gel for right shoulder pain. He uses this at home. Order received from bello MELLO.

## 2021-09-28 NOTE — DISCHARGE SUMMARY
[facility]    Discharge Summary  Hospital Medicine    Date of Admission:  9/26/2021  Date of Discharge:  9/28/2021   Discharging Provider: Conor Tabor  Date of Service: 9/28/2021      Primary Care     [facility]  [id number] [facility] [id number]      Identification and Chief Compaint:  Liu Ragsdale is a 81 year old male with past medical history of hypertension, coronary artery disease status post stent in 2020, hyperlipidemia, bilateral carpal tunnel syndrome, untreated sleep apnea who was admitted on 9/26/2021 with near syncope.    Discharge Diagnoses       Near syncope    Symptomatic right carotid artery occlusion    Acute hemispheric ischemic stroke    Hyperlipidemia LDL goal <70    Coronary artery disease    Hypertension    Acute kidney injury on CKD 3    Obstructive sleep apnea    Obesity, BMI 30.8    Discharge Disposition   Transferred to [facility] Hospitalist [facility] for Stroke Neurology consultation and care.    Discharge Orders      Reason for your hospital stay    You had an episode last Sunday in [place] in which you nearly passed out.  Evaluation here reveals that you have occlusion of the right carotid artery (a large artery which supplies blood to the right half of the brain), and also revealed that you have had a stroke on the right side of your brain.  Fortunately, your symptoms have been mild, and seem to be improving.  During your hospital stay here, we have been in touch with Dr. Yates with Stroke Neurology at the [facility].  He has been largely directing your neurologic care while you have been here.  Dr. Yates has advised that you be transferred to the [facility] for ongoing evaluation and treatment of the carotid disease and recent stroke, though there was not initially a bed available for you.  Fortunately, a bed has become available this afternoon, so we are arranging transfer for you there.      Activity - Up ad arelis     Full Code        Discharge Medications   Current Discharge Medication List      START taking these medications    Details   aspirin (ASA) 325 MG tablet Take 1 tablet (325 mg) by mouth daily  Qty: 100 tablet, Refills: 0    Associated Diagnoses: Cerebrovascular accident (CVA) due to occlusion of right middle cerebral artery (H)         CONTINUE these medications which have CHANGED    Details   carvedilol (COREG) 6.25 MG tablet Take 1 tablet (6.25 mg) by mouth 2 times daily (with meals)  Qty: 60 tablet, Refills: 0    Associated Diagnoses: Right carotid artery occlusion         CONTINUE these medications which have NOT CHANGED    Details   acetaminophen (TYLENOL) 325 MG tablet Take 3 tablets (975 mg) by mouth 3 times daily  Refills: 0    Associated Diagnoses: History of total hip replacement, left      atorvastatin (LIPITOR) 40 MG tablet Take 1 tablet (40 mg) by mouth daily  Qty: 90 tablet, Refills: 3    Associated Diagnoses: Coronary artery disease involving native coronary artery of native heart without angina pectoris; Hyperlipidemia LDL goal <70      diclofenac (VOLTAREN) 1 % topical gel Place 4 g onto the skin 3 times daily as needed for moderate pain (Shoulder)  Qty: 100 g, Refills: 1    Associated Diagnoses: Primary osteoarthritis of right shoulder      Multiple Vitamin (MULTI VITAMIN PO) Take 1 oz by mouth daily Eniva VIBE-liquid      NONFORMULARY Take 1 oz by mouth daily Eniva Cell-Ready Multi Minerals is a concentrated, ionic liquid mineral dietary supplement blend to help support nutritional balance and wellbeing.  Combine with 8 ounces of water / juice      nitroGLYcerin (NITROSTAT) 0.3 MG sublingual tablet For chest pain place 1 tablet under the tongue every 5 minutes for 3 doses. If symptoms persist 5 minutes after 1st dose call 911.  Qty: 30 tablet, Refills: 3    Associated Diagnoses: Positive cardiac stress test; Angina at rest (H)         STOP taking these medications        amLODIPine (NORVASC) 2.5 MG tablet Comments:   Reason for Stopping:         aspirin 81 MG EC tablet Comments:   Reason for Stopping:         hydrochlorothiazide (HYDRODIURIL) 25 MG tablet Comments:   Reason for Stopping:         isosorbide mononitrate (IMDUR) 30 MG 24 hr tablet Comments:   Reason for Stopping:         losartan (COZAAR) 100 MG tablet Comments:   Reason for Stopping:             Allergies   Allergies   Allergen Reactions     Metoprolol Other (See Comments)     Side effects     Ampicillin Rash       Consultations This Hospital Stay    PHARMACY IP CONSULT  PHYSICAL THERAPY ADULT IP CONSULT  OCCUPATIONAL THERAPY ADULT IP CONSULT  CARE MANAGEMENT / SOCIAL WORK IP CONSULT    Significant Results and Procedures   Procedures    None.    Data   Results for orders placed or performed during the hospital encounter of 09/26/21   CT Head w/o Contrast    Narrative    EXAM: CT HEAD WITHOUT AND WITH CONTRAST, CTA  HEAD NECK WITH CONTRAST  LOCATION: [facility]  DATE/TIME: 09/26/2021, 11:41 AM    INDICATION: Code stroke to evaluate for potential thrombolysis and thrombectomy.  Please read immediately.  COMPARISON: CTA 11/29/2019.  CONTRAST: 70 mL Isovue-370.  TECHNIQUE: Head and neck CT angiogram with IV contrast. Noncontrast head CT followed by axial helical CT images of the head and neck vessels obtained during the arterial phase of intravenous contrast administration. Axial 2D reconstructed images and   multiplanar 3D MIP reconstructed images of the head and neck vessels were performed by the technologist. Dose reduction techniques were used. All stenosis measurements made according to NASCET criteria unless otherwise specified.    FINDINGS:   NONCONTRAST HEAD CT:   INTRACRANIAL CONTENTS: No intracranial hemorrhage, extra-axial collection, or mass effect.  No CT evidence of acute infarct. Normal parenchymal attenuation. Normal ventricles and sulci.     VISUALIZED ORBITS/SINUSES/MASTOIDS:  No intraorbital abnormality. No paranasal sinus mucosal disease. No middle ear or mastoid effusion.    BONES/SOFT TISSUES: No acute abnormality.    HEAD CTA:  The right ICA is occluded at the skull base likely from poor flow through the neck. Gradual opacification of the supraclinoid and cavernous right ICA likely from retrograde collateral flow. Small right A1 segment appears to be patent. Questionable right   P1 segment although it is diminutive in size if at all present. No definite vascular cutoff of the proximal ACAs, MCAs, or PCAs.    Intracranial right vertebral artery is smaller in size compared to the left. Scattered apparent stenoses of the right vertebral artery including a moderate focal stenosis of the distal right vertebral artery just prior to its insertion to the basilar   artery. This is unchanged since prior and may be due to atherosclerotic disease or chronic dissection.    NECK CTA:  RIGHT CAROTID: The right common carotid artery is opacified at its very proximal aspect. There is mixed opacification of the very proximal common carotid artery which then becomes nonopacified 2 cm after its origin. The right common and internal carotid   arteries remain nonopacified throughout the neck up to the skull base. This is new since CTA 11/29/2019. The right external carotid artery branches are opacified possibly from collateral flow. There is calcified atherosclerotic disease in the expected   location of the right carotid bifurcation.    LEFT CAROTID: Mild soft plaque at the carotid bifurcation without stenosis.    VERTEBRAL ARTERIES: Luminal irregularity and stenosis at the origin of the right vertebral artery which is unchanged since 11/29/2019. Mild luminal irregularity is seen throughout the right vertebral artery in the neck up through at least the V3 segment.   This is likely due to a chronic dissection. Left vertebral artery appears widely patent throughout the neck. The left vertebral artery is  larger than the right.    AORTIC ARCH: Classic aortic arch anatomy.    NONVASCULAR STRUCTURES: Unremarkable.      Impression    IMPRESSION:   HEAD CT:  1.  No acute intracranial hemorrhage, mass, or herniation.    HEAD CTA:   1.  Right ICA is occluded at the skull base likely from poor flow through the neck. Right ICA is very opacified by the [place] with a small right A1 segment and possibly a very diminutive right P1 segment.  2.  No definite vascular cutoff of the proximal ACAs, MCAs, or PCAs.  3.  Luminal irregularity and stenosis of the intracranial right vertebral artery which could be due to intracranial atherosclerotic disease versus chronic dissection. Appearance is unchanged since 11/29/2019.    NECK CTA:  1.  Occlusion of the right carotid artery almost throughout its entirety in the neck. The very proximal portion of the right common carotid artery is opacified but quickly loses opacification and is likely occluded. Right RILEY is occluded up to the skull   base. Right external carotid artery branches appear to be present likely from collateral flow.  2.  Luminal irregularity and stenosis of the right vertebral artery throughout the neck particularly at its very proximal aspect. This is unchanged since CTA 11/29/2019 and is likely due to a chronic dissection.  3.  Mild soft plaque in the left carotid artery without significant stenosis.  4.  Left vertebral artery appears widely patent throughout the neck. Left vertebral artery is larger than the right.      Results discussed with Aung Izaguirre at 12:28 PM on 09/26/2021.     CTA Head Neck with Contrast    Narrative    EXAM: CT HEAD WITHOUT AND WITH CONTRAST, CTA  HEAD NECK WITH CONTRAST  LOCATION: [facility]  DATE/TIME: 09/26/2021, 11:41 AM    INDICATION: Code stroke to evaluate for potential thrombolysis and thrombectomy.  Please read immediately.  COMPARISON: CTA 11/29/2019.  CONTRAST: 70 mL Isovue-370.  TECHNIQUE:  Head and neck CT angiogram with IV contrast. Noncontrast head CT followed by axial helical CT images of the head and neck vessels obtained during the arterial phase of intravenous contrast administration. Axial 2D reconstructed images and   multiplanar 3D MIP reconstructed images of the head and neck vessels were performed by the technologist. Dose reduction techniques were used. All stenosis measurements made according to NASCET criteria unless otherwise specified.    FINDINGS:   NONCONTRAST HEAD CT:   INTRACRANIAL CONTENTS: No intracranial hemorrhage, extra-axial collection, or mass effect.  No CT evidence of acute infarct. Normal parenchymal attenuation. Normal ventricles and sulci.     VISUALIZED ORBITS/SINUSES/MASTOIDS: No intraorbital abnormality. No paranasal sinus mucosal disease. No middle ear or mastoid effusion.    BONES/SOFT TISSUES: No acute abnormality.    HEAD CTA:  The right ICA is occluded at the skull base likely from poor flow through the neck. Gradual opacification of the supraclinoid and cavernous right ICA likely from retrograde collateral flow. Small right A1 segment appears to be patent. Questionable right   P1 segment although it is diminutive in size if at all present. No definite vascular cutoff of the proximal ACAs, MCAs, or PCAs.    Intracranial right vertebral artery is smaller in size compared to the left. Scattered apparent stenoses of the right vertebral artery including a moderate focal stenosis of the distal right vertebral artery just prior to its insertion to the basilar   artery. This is unchanged since prior and may be due to atherosclerotic disease or chronic dissection.    NECK CTA:  RIGHT CAROTID: The right common carotid artery is opacified at its very proximal aspect. There is mixed opacification of the very proximal common carotid artery which then becomes nonopacified 2 cm after its origin. The right common and internal carotid   arteries remain nonopacified throughout  the neck up to the skull base. This is new since CTA 11/29/2019. The right external carotid artery branches are opacified possibly from collateral flow. There is calcified atherosclerotic disease in the expected   location of the right carotid bifurcation.    LEFT CAROTID: Mild soft plaque at the carotid bifurcation without stenosis.    VERTEBRAL ARTERIES: Luminal irregularity and stenosis at the origin of the right vertebral artery which is unchanged since 11/29/2019. Mild luminal irregularity is seen throughout the right vertebral artery in the neck up through at least the V3 segment.   This is likely due to a chronic dissection. Left vertebral artery appears widely patent throughout the neck. The left vertebral artery is larger than the right.    AORTIC ARCH: Classic aortic arch anatomy.    NONVASCULAR STRUCTURES: Unremarkable.      Impression    IMPRESSION:   HEAD CT:  1.  No acute intracranial hemorrhage, mass, or herniation.    HEAD CTA:   1.  Right ICA is occluded at the skull base likely from poor flow through the neck. Right ICA is very opacified by the [place] with a small right A1 segment and possibly a very diminutive right P1 segment.  2.  No definite vascular cutoff of the proximal ACAs, MCAs, or PCAs.  3.  Luminal irregularity and stenosis of the intracranial right vertebral artery which could be due to intracranial atherosclerotic disease versus chronic dissection. Appearance is unchanged since 11/29/2019.    NECK CTA:  1.  Occlusion of the right carotid artery almost throughout its entirety in the neck. The very proximal portion of the right common carotid artery is opacified but quickly loses opacification and is likely occluded. Right RILEY is occluded up to the skull   base. Right external carotid artery branches appear to be present likely from collateral flow.  2.  Luminal irregularity and stenosis of the right vertebral artery throughout the neck particularly at its very proximal  aspect. This is unchanged since CTA 11/29/2019 and is likely due to a chronic dissection.  3.  Mild soft plaque in the left carotid artery without significant stenosis.  4.  Left vertebral artery appears widely patent throughout the neck. Left vertebral artery is larger than the right.      Results discussed with Aung Izaguirre at 12:28 PM on 09/26/2021.     MR Brain w/o & w Contrast    Narrative    MRI BRAIN WITHOUT AND WITH CONTRAST  9/27/2021 9:01 AM    HISTORY:  Neurologic deficit, acute, stroke suspected.    TECHNIQUE:  Multiplanar, multisequence MRI of the brain without and  with 10 mL Gadavist    COMPARISON: Head CT 9/26/2021    FINDINGS:  Multiple small areas of diffusion restriction are present scattered  throughout the right middle cerebral artery distribution involving the  right posterior frontal lobe, parietal lobe, and temporal lobe.    Mild parenchymal volume loss is present. Scattered frontoparietal  predominant white matter T2 hyperintensities likely represent chronic  small vessel ischemic change. Small old right cerebellar infarcts. No  evidence of hemorrhage, mass, mass effect, or hydrocephalus. No  abnormal enhancement. Major intracranial flow voids are maintained.    Marrow signal is within normal limits. Trace paranasal sinus mucosal  thickening. The visualized tympanic and mastoid cavities are  unremarkable. Bilateral lens replacements.      Impression    IMPRESSION:    1. Multiple small infarcts scattered throughout the right middle  cerebral artery distribution.  2. No evidence of hemorrhage or significant mass effect.  3. Small old right cerebellar infarcts.  4. Volume loss and scattered white matter T2 hyperintensities which  likely represent chronic small vessel ischemic change.    JET GRAHAM MD         SYSTEM ID:  [email or web address]   MRA Neck (Carotids) wo & w Contrast    Narrative    MRA NECK WITHOUT AND WITH CONTRAST  9/27/2021 9:01 AM     HISTORY: Neuro deficit, acute, stroke  suspected.    TECHNIQUE: 2D time-of-flight MR angiogram of the neck without contrast  and 3D MR angiogram of the neck with  10 mL Gadavist. Estimates of  carotid stenoses are made relative to the distal internal carotid  artery diameters except as noted.    COMPARISON: CTA of the head and neck 9/26/2021.    FINDINGS:  Motion-limited exam. Improved flow within the right  internal carotid artery. Asymmetrically decreased flow-related  enhancement within the right carotid system compared to the left.  Multiple severe stenoses of the right vertebral artery at the V1, V2,  and V4 segments, unchanged. Moderate stenosis of the dominant left  vertebral artery at the V2 segment related to osseous cervical spine  degenerative change, unchanged. Severe stenosis of the proximal right  internal carotid artery, probably 70% or greater. No evidence of  flow-limiting stenosis at the left carotid bifurcation. Otherwise, no  evidence of large vessel occlusion or high-grade stenosis.      Impression    IMPRESSION:    1. Improved flow within the right internal carotid artery.  2. Severe stenosis of the proximal right internal carotid artery,  probably 70% or greater.  3. 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   4. Moderate stenosis of the dominant left vertebral artery at the V2  segment related to osseous cervical spine degenerative change,  unchanged.     JET GRAHAM MD         SYSTEM ID:  [email or web address]   MRA Brain ([place]) wo Contrast    Narrative    MR ANGIOGRAM OF THE HEAD WITHOUT CONTRAST   9/27/2021 9:01 AM     HISTORY: Neurologic deficit, acute, stroke suspected.    TECHNIQUE:  3D time-of-flight MR angiogram of the head without  contrast.    COMPARISON: CTA of the head and neck 9/26/2021    FINDINGS: Motion limited exam. Improved flow within the right internal  carotid artery. The flow related signal corresponding to the right  internal carotid artery is  asymmetrically small and hypointense  compared to the left. Severe stenosis of the right vertebral artery at  the proximal V4 segment, better demonstrated on neck MRA. The  vertebral arteries, basilar artery, and posterior cerebral arteries  are otherwise patent. The internal carotid arteries, anterior cerebral  arteries, and middle cerebral arteries are patent. No evidence of  large vessel occlusion or high-grade stenosis. No evidence of aneurysm  or vascular malformation.      Impression    IMPRESSION:    1. Improved flow within the right internal carotid artery.  2. Severe stenosis of the right vertebral artery at the proximal V4  segment, better demonstrated on neck MRA.  3. Otherwise, no evidence of large vessel occlusion or high-grade  stenosis.      JET GRAHAM MD         SYSTEM ID:  [email or web address]   Echocardiogram Complete - For age > 60 yrs     Value    LVEF  60-65%    Narrative    [id number]  [id number]  [id number]  [id number]^STEPHON^JOSE ARMANDO^EMERITA     [facility]  Echocardiography Laboratory  [address] [place] [address]     Name: GISELE LOZANO  MRN: [id number]  : 1940  Study Date: 2021 07:43 AM  Age: 81 yrs  Gender: Male  Patient Location: [facility]  Reason For Study: Cerebrovascular Incident  Ordering Physician: JOSE ARMANDO SZYMANSKI  Referring Physician: Piyush Rogers  Performed By: Lillian Murillo RDCS     BSA: 1.8 m2  Height: 62 in  Weight: 168 lb  HR: 63  BP: 166/64 mmHg  ______________________________________________________________________________  Procedure  Complete Portable Echo Adult. Complete Echo Adult.  ______________________________________________________________________________  Interpretation Summary     The left ventricle is normal in size.  Left ventricular systolic function is normal.  The visual ejection fraction is 60-65%.  Normal left ventricular wall motion  Trivial pericardial effusion  Sinus rhythm was noted.  Compared to prior study, there is no  significant change.  ______________________________________________________________________________  Left Ventricle  The left ventricle is normal in size. There is normal left ventricular wall  thickness. Left ventricular systolic function is normal. Grade I or early  diastolic dysfunction. The visual ejection fraction is 60-65%. Normal left  ventricular wall motion.     Right Ventricle  The right ventricle is normal size. The right ventricular systolic function is  normal.     Atria  Normal left atrial size. Right atrial size is normal. Intact atrial septum.  There is no color Doppler evidence of a PFO.     Mitral Valve  The mitral valve is normal in structure and function. There is trace mitral  regurgitation.     Tricuspid Valve  The tricuspid valve is normal in structure and function. There is trace  tricuspid regurgitation. IVC diameter <2.1 cm collapsing >50% with sniff  suggests a normal RA pressure of 3 mmHg. Right ventricular systolic pressure  could not be approximated due to inadequate tricuspid regurgitation.     Aortic Valve  The aortic valve is trileaflet with aortic valve sclerosis. No aortic  regurgitation is present. No aortic stenosis is present.     Pulmonic Valve  The pulmonic valve is not well seen, but is grossly normal. There is trace  pulmonic valvular regurgitation.     Vessels  The aortic root is normal size.     Pericardium  Trivial pericardial effusion.     Rhythm  Sinus rhythm was noted.  ______________________________________________________________________________  MMode/2D Measurements & Calculations  IVSd: 1.1 cm     LVIDd: 4.1 cm  LVIDs: 2.3 cm  LVPWd: 0.87 cm  FS: 42.7 %  LV mass(C)d: 122.8 grams  LV mass(C)dI: 69.2 grams/m2  Ao root diam: 3.2 cm  LA dimension: 3.8 cm  asc Aorta Diam: 2.9 cm  LA/Ao: 1.2  LA Volume (BP): 39.0 ml  LA Volume Index (BP): 22.0 ml/m2  RWT: 0.43     Doppler Measurements & Calculations  MV E max david: 84.4 cm/sec  MV A max david: 105.6 cm/sec  MV E/A:  "0.80  MV dec time: 0.26 sec  E/E' av.2  Lateral E/e': 13.1  Medial E/e': 15.2     ______________________________________________________________________________  Report approved by: Romi Anthony 2021 08:37 AM             History of Present Illness   (From H&P) Liu Ragsdale is a 81 year old male with past medical history of hypertension, coronary artery disease status post stent in , hyperlipidemia, bilateral carpal tunnel syndrome, untreated sleep apnea who was admitted on 2021 with near syncope and found to have right carotid artery occlusion, possible acute ischemic stroke     He was sitting in [employer] when he felt not quite right.   He saw the words of the song he was signing \"go dark \".  He became diaphoretic.  On the scene, he says that EMS said the blood pressure was 'low\" 120/60, pulse unknown.  He felt back to normal within 5-10 min.  Concurrently, he felt that his 'tongue was thick' and wife noted slurred speech.  Since the episode in [employer] he has noted his left hand felt weak and clumsy.  The left hand symptoms are intermittent since arrival to ER.  He also feels washed out since the episode -this is better now.     He was seen by his PCP earlier this week and diagnosed with carpal tunnel syndrome in bilateral hands (R>L); symptoms are hand tingling.  The hand symptoms he is currently having are different than his chronic CTS symptoms.       No chest pain, shortness of breath during episode     He had a similar episode 2 years ago; occurred while standing, and had overt syncope.  He did not seek medical attention at the time.    Hospital Course     Liu Ragsdale is a 81 year old male with past medical history of hypertension, coronary artery disease status post stent in , hyperlipidemia, bilateral carpal tunnel syndrome, untreated sleep apnea who was admitted on 2021 with near syncope.     Near Syncope  Patient reported near loss of consciousness while sitting at " "[employer], likely a function of symptomatic right carotid artery occlusion.  No dysrhythmia has been seen on telemetry thus far here.  Echocardiogram 9/27/2021 was notable only for grade 1 diastolic dysfunction was otherwise negative.  There has been no recurrence of syncope or near syncope.  The patient is up and ambulatory unassisted, and is steady while doing so.  -See management of right carotid artery occlusion below.     Symptomatic Right Carotid Artery Occlusion  Acute right hemispheric ischemic stroke  CTA head/neck 9/27/2021 showed right ICA occluded at the skull base from near-total occlusion of the right carotid artery throughout the entirety of the neck.  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 Stroke neuro was consulted by ED who recommended starting heparin and MRI/MRA to further evaluate.  MRI brain 9/27/2021 showed \"Multiple small infarcts scattered throughout the right middle cerebral artery distribution\".  MRA neck 9/27/2021 showed \"Improved flow within the right internal carotid artery; Severe stenosis of the proximal right internal carotid artery, probably 70% or greater; Multiple severe stenoses of the right vertebral artery at the V1, V2, and V4 segments, unchanged; Moderate stenosis of the dominant left vertebral artery at the V2 segment related to osseous cervical spine degenerative change, unchanged.\"     Discussed again with stroke neurology, Dr Yates, following above imaging, neurology note in chart reviewed.  Recommendation is for IV heparin for 48 hours, then repeat CTA to help discern chronicity of occulusion based on signs of recanalization.  Dr. Yates advised that transfer to the [facility] or [facility] for carotid vascularization would likely be necessary; the patient has been put on the transfer waiting list for transfer to [facility].       Today, the patient's only residual deficit is reduced  strength of the left " hand and slight clumsiness of the left hand.  Left proximal upper extremity weakness noted yesterday has resolved.  The patient now feels that his speech is normal.  He denies any sensory abnormalities this morning.      Today, 9/28/2021, I received a call from Dr Yates's assistant; the two of them had just gotten done discussing this patient's care.  Their recommendations:     1.  Discontinue heparin gtts.  2.  Begin  mg every day.  3.  Proceed with planned repeat CT angiogram brain scheduled for 1400 today.  However, transfer to a center at which Stroke Neurology service consultation is available is recommended regardless of findings on that CTA.  Therefore, I will check with bed utilization where we  the line for a [facility] bed.  If [facility] is unavailable,  suggested transfer to [facility].    Shortly after that conversation, I received word that the patient has been accepted for transfer to the [facility] hospital medicine service, where stroke neurology will participate in his care.     -Transfer to [facility] this morning.  -The repeat CTA head planned for 1400 today, 9/28/2021, will be delayed until [facility] transfer.  -Continue aspirin 325 mg p.o. daily.  -Continue permissive hypertension goal BP syst 150s - 170s, preadmission anti-hypertensives are on hold:  losartan, amlodipine, isosorbide, and hydrochlorothiazide.  -Carvedilol 6.25 mg twice daily underway; this will be held if systolic blood pressure is less than 150.  -PT and OT therapies continue.     Coronary artery disease  Hyperlipidemia  Coronary artery disease diagnosed in 3/2020 at which time he was noted to have a  of the proximal left circumflex into the OM, moderate to severe ramus disease and moderate to severe RCA/PLB disease.  He underwent drug-eluting stent placement from the proximal left circumflex into the OM1.  He had residual small vessel coronary artery disease as well as moderate to severe stenosis in the ramus and RCA/YAA for  which medical management was  recommended.    Echocardiogram 9/27/2021 demonstrated normal left ventricular systolic function.  He has had no preadmission exertional chest pain.  -Continue carvedilol, though preadmission dose has been reduced to 6.25 mg p.o. twice daily here to allow for permissive hypertension after stroke, see above.    -Continue aspirin 325 mg daily.    -Continue atorvastatin 40 mg p.o. nightly.      Hypertension  Changes in outpatient regimen over the past year due to poor control, outpatient goal from a cardiac standpoint is for blood pressure to be <120/80.  Prior to admission was on amlodipine 2.5 daily, carvedilol 12.5 mg BID, hydrochlorothiazide 25 mg daily, losartan 100 mg daily, isosorbide 60 mg daily. There had been discussion to stop amlodipine (had lower extremity edema on higher doses) if blood pressure well controlled, or to increase carvedilol or isosorbide if blood pressure remained elevated.  Aside from carvedilol, preadmission antihypertensives are on hold to allow for permissive hypertension after stroke, see above.  Carvedilol was reduced to 6.25 mg twice daily.  With only a couple of exceptions, systolic blood pressure has been in the target range of 150-170.  -Continue permissive hypertension, details above under stroke management.     Acute kidney injury on CKD 3  Baseline GFR 61 - 69 over the past year.  Admission GFR 51 --> 45 --> 49 today.  -Preadmission losartan and hydrochlorothiazide are on hold for permissive hypertension as above.  -Follow renal function.     Obstructive sleep apnea  Does not wear CPAP.     Bilateral carpal tunnel   Recently diagnosed by his PCP last week.  He has been wearing braces at night which is helpful.  He has noticed some difficulty with hand , but feels it is worse since his 9/27/2021 stroke.  -Continue wrist orthotic use at night.     COVID status   -Negative screening on admission 9/26/2021.  -COVID vaccination complete, Moderna  2/16/2021 and 3/16/2021    Pending Results   Unresulted Labs Ordered in the Past 30 Days of this Admission     No orders found from 8/27/2021 to 9/27/2021.          Physical Exam   Temp:  [97.5  F (36.4  C)-98.5  F (36.9  C)] 98.5  F (36.9  C)  Pulse:  [55-75] 55  Resp:  [16-18] 18  BP: (143-171)/(47-66) 145/47  SpO2:  [94 %-97 %] 95 %  Vitals:    09/26/21 1056 09/26/21 1557   Weight: 77.1 kg (170 lb) 76.4 kg (168 lb 6.9 oz)       GENERAL: Very pleasant man, lying in bed, appears comfortable.  EYES: Eyes grossly normal to inspection, extraocular movements intact  HENT: Nares patent bilaterally, no discharge.    NECK: Trachea midline, no stridor.   RESP: No accessory muscle use.  Lungs clear throughout on inspiration and expiration.  Expiration not prolonged, no wheeze.  CV: Regular rate and rhythm, non-tachycardic.  Normal S1 S2, no murmur or extra sound.  No lower extremity edema.  ABDOMEN: Soft, non-tender, no guarding.  Liver and spleen not enlarged, no masses palpable.  Bowel sounds positive.  MS: No bony deformities noted.  No red or inflamed joints.  SKIN: Warm and dry, no rashes.  NEURO: Alert, oriented, conversant.  Speech is clear.  Cranial nerves III - XII are intact.  There is a mild reduction in  strength over the left hand compared to the right.  He is able to oppose his thumb to his fifth digit, though repetitive thumb to digit touching is clumsy.  I do not appreciate any weakness in the left upper extremity compared to the right by muscle group testing.  Lower extremity strength is intact.  PSYCH: Calm, alert, conversant.  Able to articulate logical thoughts, no tangential thoughts, no hallucinations or delusions.  Affect normal.    The discharge plan was discussed with the patient and with the Stroke Neurology team at [facility].    Total time on this discharge was 40 minutes.    Conor Tabor MD   [facility]

## 2021-09-28 NOTE — PROGRESS NOTES
Patient rested in bed this afternoon and into the evening. Family visited. He was updated earlier of MRI results and is under the impression that an additional MRI will be performed. Plan to transfer to higher level of care when a bed is available. Certain medications held per cardiology today and patient reports BP being elevated from baseline, but understands why.   IV infusing heparin gtt at 900 units/ hr and Xa level ordered for 0030 tonight. Last lab was first within range and was 0.6.   Still reports weakness to left hand, especially the pointer finger. Poor fine motor skill noted to that hand. No other stroke-like symptoms noted this shift.

## 2021-09-28 NOTE — PLAN OF CARE
SLP consult received. Per chart review and discussion with RN the patient does not have any speech or swallow concerns so formal eval is not needed. SLP will defer at this time.

## 2021-09-28 NOTE — CONSULTS
"[facility]       NEUROSURGERY CONSULTATION NOTE    This consultation was requested by Dr. Yates from the Neurology Stroke service.    Reason for Consultation: Right carotid stenosis    HPI: Liu Ragsdale is a 81 year old left-handed man with a PMH of HTN, HLD, CKD3, CAD s/p stenting in 2020 on ASA 81mg, who presented to an OSH on 9/26 after an episode of lightheadedness, blurry vision (\"tunnel vision\") and diaphoresis while at [employer]. EMS was called, and he subsequently demonstrated LUE weakness and slurred speech, which grossly resolved after 30 minutes. He was seen by Neurology at the referring hospital, found to have a R carotid artery near-complete occlusion w/ likely thrombus extending from carotid origin to skull base. He was started on heparin gtt, on follow-up imaging with MRA, carotid artery had recanalized and only a carotid bifurcation atherosclerotic plaque with >70% stenosis was noted. He was transferred to [facility] for Neurosurgical assessment. Currently, he endorses LUE hand \"clumsiness\" and numbness in his first 3 digits. He denies headaches, vision changes.         PAST MEDICAL HISTORY:   Past Medical History:   Diagnosis Date     Angina at rest (H)      Arthritis      Cerebrovascular accident (H) 9/27/2021     Coronary artery disease involving native coronary artery of native heart without angina pectoris      Hypertension        PAST SURGICAL HISTORY:   Past Surgical History:   Procedure Laterality Date     ARTHROPLASTY HIP Left 02/03/2021    Procedure: Total Hip Arthroplasty;  Surgeon: Andrew Arriola MD;  Location: [facility] OR     COLONOSCOPY N/A 06/16/2016    Procedure: COLONOSCOPY;  Surgeon: Randy Avendano MD;  Location: [facility] GI     CV LEFT HEART CATH Left 03/10/2020    Procedure: Left Heart Cath;  Surgeon: Vicente Lopez MD;  Location:  HEART CARDIAC CATH LAB     EYE SURGERY  2005    cataract surgery     VASECTOMY  1981       FAMILY HISTORY: "   Family History   Problem Relation Age of Onset     Hypertension Sister      Kidney failure Sister      Chronic Obstructive Pulmonary Disease Sister      Thyroid Disease Sister      Seizure Disorder Paternal Grandmother      Mitral valve prolapse Daughter      No Known Problems Son      Cerebrovascular Disease No family hx of        SOCIAL HISTORY:   Currently retired, 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     Social History     Tobacco Use     Smoking status: Never Smoker     Smokeless tobacco: Never Used   Substance Use Topics     Alcohol use: Yes     Comment: Beth only       MEDICATIONS:  Medications Prior to Admission   Medication Sig Dispense Refill Last Dose     acetaminophen (TYLENOL) 325 MG tablet Take 3 tablets (975 mg) by mouth 3 times daily  0      [START ON 9/29/2021] aspirin (ASA) 325 MG tablet Take 1 tablet (325 mg) by mouth daily 100 tablet 0      atorvastatin (LIPITOR) 40 MG tablet Take 1 tablet (40 mg) by mouth daily 90 tablet 3      carvedilol (COREG) 6.25 MG tablet Take 1 tablet (6.25 mg) by mouth 2 times daily (with meals) 60 tablet 0      diclofenac (VOLTAREN) 1 % topical gel Place 4 g onto the skin 3 times daily as needed for moderate pain (Shoulder) 100 g 1      Multiple Vitamin (MULTI VITAMIN PO) Take 1 oz by mouth daily Eniva VIBE-liquid        nitroGLYcerin (NITROSTAT) 0.3 MG sublingual tablet For chest pain place 1 tablet under the tongue every 5 minutes for 3 doses. If symptoms persist 5 minutes after 1st dose call 911. 30 tablet 3      NONFORMULARY Take 1 oz by mouth daily Eniva Cell-Ready Multi Minerals is a concentrated, ionic liquid mineral dietary supplement blend to help support nutritional balance and wellbeing.  Combine with 8 ounces of water / juice          Allergies:  Allergies   Allergen Reactions     Metoprolol Other (See Comments)     Side effects : dry mouth     Ampicillin Rash       ROS: 10 point ROS of systems including Constitutional, Eyes, Respiratory,  Cardiovascular, Gastroenterology, Genitourinary, Integumentary, Muscularskeletal, Psychiatric were all negative except for pertinent positives noted in my HPI.    Physical exam:   Blood pressure (!) 162/75, pulse 62, temperature 98.6  F (37  C), temperature source Oral, resp. rate 16, SpO2 96 %.  CV: BP and HR as noted above  PULM: breathing comfortably on room air  ABD: soft, non-distended  NEUROLOGIC:  -- Awake; Alert; oriented x 3  -- Follows commands briskly  -- +repetition, calculation, and naming  -- Speech fluent, spontaneous. No aphasia or dysarthria.  -- no gaze preference. No apparent hemineglect.  Cranial Nerves:  -- visual fields full to confrontation, PERRL 3-2mm bilat and brisk, extraocular movements intact  -- face symmetrical, tongue midline  -- sensory V1-V3 intact bilaterally  -- palate elevates symmetrically, uvula midline  -- hearing grossly intact bilat  -- Trapezii 5/5 strength bilat symmetric  -- Cerebellar: Finger nose finger without dysmetria, intact rapid alternating motions bilaterally    Motor:  Normal bulk / tone; no tremor, rigidity, or bradykinesia.  No muscle wasting or fasciculations  No Pronator Drift     Delt Bi Tri Hand Flexion/  Extension Iliopsoas Quadriceps Hamstrings Tibialis Anterior Gastroc    C5 C6 C7 C8/T1 L2 L3 L4-S1 L4 S1   R 5 5 5 5 5 5 5 5 5   L 5 5 5 4+ 5 5 5 5 5   Sensory:  Diminished to light touch left first 3 digits.      Reflexes:     Bi Tri BR Prosper Pat Ach Bab    C5-6 C7-8 C6 UMN L2-4 S1 UMN   R 2+ 2+ 2+ Norm 2+ 2+ Norm   L 2+ 2+ 2+ Norm 2+ 2+ Norm     Gait: Deferred      LABS:  Last Comprehensive Metabolic Panel:  Sodium   Date Value Ref Range Status   09/28/2021 140 133 - 144 mmol/L Final   04/15/2021 140 133 - 144 mmol/L Final     Potassium   Date Value Ref Range Status   09/28/2021 3.8 3.4 - 5.3 mmol/L Final   04/15/2021 3.8 3.4 - 5.3 mmol/L Final     Chloride   Date Value Ref Range Status   09/28/2021 110 (H) 94 - 109 mmol/L Final   04/15/2021 105 94 - 109  mmol/L Final     Carbon Dioxide   Date Value Ref Range Status   04/15/2021 28 20 - 32 mmol/L Final     Carbon Dioxide (CO2)   Date Value Ref Range Status   09/28/2021 26 20 - 32 mmol/L Final     Anion Gap   Date Value Ref Range Status   09/28/2021 4 3 - 14 mmol/L Final   04/15/2021 7 3 - 14 mmol/L Final     Glucose   Date Value Ref Range Status   09/28/2021 98 70 - 99 mg/dL Final   04/15/2021 84 70 - 99 mg/dL Final     Comment:     Non Fasting     Urea Nitrogen   Date Value Ref Range Status   09/28/2021 25 7 - 30 mg/dL Final   04/15/2021 26 7 - 30 mg/dL Final     Creatinine   Date Value Ref Range Status   09/28/2021 1.36 (H) 0.66 - 1.25 mg/dL Final   04/15/2021 1.72 (H) 0.66 - 1.25 mg/dL Final     GFR Estimate   Date Value Ref Range Status   09/28/2021 49 (L) >60 mL/min/1.73m2 Final     Comment:     As of July 11, 2021, eGFR is calculated by the CKD-EPI creatinine equation, without race adjustment. eGFR can be influenced by muscle mass, exercise, and diet. The reported eGFR is an estimation only and is only applicable if the renal function is stable.   04/15/2021 37 (L) >60 mL/min/[1.73_m2] Final     Comment:     Non  GFR Calc  Starting 12/18/2018, serum creatinine based estimated GFR (eGFR) will be   calculated using the Chronic Kidney Disease Epidemiology Collaboration   (CKD-EPI) equation.       Calcium   Date Value Ref Range Status   09/28/2021 8.8 8.5 - 10.1 mg/dL Final   04/15/2021 9.2 8.5 - 10.1 mg/dL Final     Lab Results   Component Value Date    WBC 5.8 09/28/2021    WBC 6.6 04/15/2021     Lab Results   Component Value Date    RBC 4.35 09/28/2021    RBC 3.78 04/15/2021     Lab Results   Component Value Date    HGB 12.8 09/28/2021    HGB 11.3 04/15/2021     Lab Results   Component Value Date    HCT 37.7 09/28/2021    HCT 34.3 04/15/2021     Lab Results   Component Value Date    MCV 87 09/28/2021    MCV 91 04/15/2021     Lab Results   Component Value Date    MCH 29.4 09/28/2021    MCH 29.9  04/15/2021     Lab Results   Component Value Date    MCHC 34.0 09/28/2021    MCHC 32.9 04/15/2021     Lab Results   Component Value Date    RDW 12.6 09/28/2021    RDW 12.3 04/15/2021     Lab Results   Component Value Date     09/28/2021     04/15/2021         IMAGING:  CT:   IMPRESSION:   HEAD CT:  1.  No acute intracranial hemorrhage, mass, or herniation.     HEAD CTA:   1.  Right ICA is occluded at the skull base likely from poor flow through the neck. Right ICA is very opacified by the [place] with a small right A1 segment and possibly a very diminutive right P1 segment.  2.  No definite vascular cutoff of the proximal ACAs, MCAs, or PCAs.  3.  Luminal irregularity and stenosis of the intracranial right vertebral artery which could be due to intracranial atherosclerotic disease versus chronic dissection. Appearance is unchanged since 11/29/2019.     NECK CTA:  1.  Occlusion of the right carotid artery almost throughout its entirety in the neck. The very proximal portion of the right common carotid artery is opacified but quickly loses opacification and is likely occluded. Right RIELY is occluded up to the skull   base. Right external carotid artery branches appear to be present likely from collateral flow.  2.  Luminal irregularity and stenosis of the right vertebral artery throughout the neck particularly at its very proximal aspect. This is unchanged since CTA 11/29/2019 and is likely due to a chronic dissection.  3.  Mild soft plaque in the left carotid artery without significant stenosis.  4.  Left vertebral artery appears widely patent throughout the neck. Left vertebral artery is larger than the right.     MRI Brain:   IMPRESSION:     1. Multiple small infarcts scattered throughout the right middle  cerebral artery distribution.  2. No evidence of hemorrhage or significant mass effect.  3. Small old right cerebellar infarcts.  4. Volume loss and scattered white matter T2  hyperintensities which  likely represent chronic small vessel ischemic change.  MRA neck: IMPRESSION:    1. Improved flow within the right internal carotid artery.  2. Severe stenosis of the proximal right internal carotid artery,  probably 70% or greater.  3. 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   4. Moderate stenosis of the dominant left vertebral artery at the V2  segment related to osseous cervical spine degenerative change,  unchanged.     ASSESSMENT: 80 yo w/ symptomatic R ICA stenosis >70%, circumferential carotid bifurcation plaque, meets indication for carotid endarterectomy.     Carotid bifurcation at C3-4 level, no history of neck radiation, adequate head turn to left >45 degrees.       In addition to the assessment of diagnoses detailed above, this 81 year old male  patient admitted from transfer from another acute care hospital has the following conditions contributing to the complexity of their medical care:    Coronary artery disease,  Coagulation defect based on pre-admission aspirin use,  Chronic kidney disease stage 3,    RECOMMENDATIONS:  Continue ASA 325mg  Will plan for OR for CEA pending availability   Neurosurgery will continue to follow    The patient was discussed with Dr. Etienne, neurosurgery chief resident, and Dr. Andersen, neurosurgery staff, and they agree with the above.    Jolly Bee MD  Neurosurgery Resident, PGY-2    I have reviewed the history. I have seen and examined the patient myself and agree with the assessment and plan above. We will plan on CEA later this week. Stay on aspirin.  CORINE Andersen MD

## 2021-09-28 NOTE — PLAN OF CARE
"Patient alert and oriented x4. Neuros stable. Up with SBA. Given scheduled tylenol and voltaren gel for pain. Xa level was in goal range overnight so no rate change. Heparin continues at 900 units/hr.     BP (!) 143/66   Pulse 61   Temp 98.1  F (36.7  C) (Oral)   Resp 16   Ht 1.575 m (5' 2\")   Wt 76.4 kg (168 lb 6.9 oz)   SpO2 94%   BMI 30.81 kg/m      "

## 2021-09-28 NOTE — H&P
[facility]    Stroke Admission Note    Chief Complaint  Right ICA stenosis    MAYA Ragsdale is a 81 year old left-handed male with PMH significant for CAD s/p stenting in 2020, HTN, HLD, sleep apnea, b/l carpal tunnel who is presenting as a transfer from [facility] for right ICA stenosis.     Patient presented to OSH after having a reported syncopal episode while at [employer] on Sunday 9/26/21. CT head and CTA head and neck completed that showed completed occlusion of the right ICA at the skull base. He was started on high intensity heparin infusion. IV tPA was not given as patient had quickly resolving symptoms. In addition, endovascular treatment not pursued as NIHSS was 0 and he was asymptomatic. MRI brain completed the following day demonstrated multiple small infarcts scattered throughout the right middle cerebral artery distribuation. MRA neck showed mild recannulization of the right ICA with residual stenosis measuring greater than 70%. Patient transferred to [facility] for neurosurgery evaluation for CEA vs stenting.     Upon arrival to [facility] patient notes that the only residual symptoms are left 1st and 2nd digit numbness and weakness. He is unable to make a tight  in his left hand. He reports similar symptoms in the past that he attributed to carpal tunnel. He re-iterates the story of what brought him to the hospital, including a syncopal event at [employer]. Notes episode was proceeded by diaphoresis and tunnel vision. Denies any confusion after the event but did think he had difficulty getting out his words. Denying any other weakness, sensory changes, vision changes, changes in speech, gait disturbances. He does think that his blood pressure has been lower than normal due to changes in his anti hypertensives.     Patient asks appropriate questions about hospital admission. He notes that his wife has been diagnosed with dementia and her  memory has been declining. He is the primary caregiver for her.     TPA Treatment   Not given due to minor/isolated/quickly resolving symptoms.    Endovascular Treatment  Proximal vessel occlusion present, but endovascular treatment not initiated due to NIHSS 0 and asymptomatic    Impression   Acute ischemic stroke of right MCA distribution due to large-artery atherosclerosis  Right ICA stenosis    Plan  #Right MCA acute ischemic stroke  #Right ICA stenosis, symptomatic  - Admit to Neurology  - Neurosurgery consult for evaluation of CEA vs stenting  - Neurochecks Q 4 hours  - No longer needing permissive HTN, goal SBP less than 180 to allow adequate perfusion with stenotic ICA  - Continue  mg daily  - Atorvastatin decreased from 40 mg to 20 mg daily as LDL was 15  - LDL: 15, A1c: 6.0  - Telemetry  - Bedside Glucose Monitoring  - PT/OT/SLP   - Stroke Education, depression and apnea screen prior to discharge    #CAD  #HTN  #HLD  Patient underwent stenting in March 2020. Echocardiogram completed 9/26 with EF 60 - 65%, normal atria size.  - Holding home anti-hypertensive for SBP goal less than 180  - Atorvastatin 20 mg daily    #ADRIANA  Baseline Cr 1.05 - 1.13. Cr this morning 1.36 at outside hospital.   - Re-check Cr here  - Monitor urine output  - Avoid nephrotoxic medications    #FANI  Does not use a CPAP at home  - Sleep referral at discharge    #Bilateral carpal tunnel  Patient reports that his PCP has recently referred him for further work up of carpal tunnel as he has failed conservative treatment.  - Follow up outpatient        Prophylaxis            For VTE Prevention:  - enoxaparin    For Acid Suppression:  - GI prophylaxis is not indicated    Code Status  Full Code    During initial physical assessment, the plan of care was discussed and developed with patient.  Plan of care includes: neurosurgery evaluation for CEA vs stent.    Patient was admitted via transfer from [facility]    The  patient will be admitted to the Neuro Critical Care/Stroke team..     The patient was discussed with the Stroke Staff is Dr. Yates.    Deidre Chester MD  Neurology Resident PGY2  ASCOM [id number]  ___________________________________________________  Nutrition: Regular diet  None    Clinically Significant Risk Factors Present on Admission             # Platelet Defect: home medication list includes an antiplatelet medication    # CKD, Stage 3a (GFR 45-59): Will monitor and treat as appropriate  - last Cr =  1.36 mg/dL (Ref range: 0.66 - 1.25 mg/dL)  - last GFR = 49 mL/min/1.73m2 (Ref range: >60 mL/min/1.73m2)    Past Medical History   Past Medical History:   Diagnosis Date     Angina at rest (H)      Arthritis      Cerebrovascular accident (H) 9/27/2021     Coronary artery disease involving native coronary artery of native heart without angina pectoris      Hypertension      Past Surgical History   Past Surgical History:   Procedure Laterality Date     ARTHROPLASTY HIP Left 02/03/2021    Procedure: Total Hip Arthroplasty;  Surgeon: Andrew Arriola MD;  Location: [facility] OR     COLONOSCOPY N/A 06/16/2016    Procedure: COLONOSCOPY;  Surgeon: Randy Avendano MD;  Location: [facility] GI     CV LEFT HEART CATH Left 03/10/2020    Procedure: Left Heart Cath;  Surgeon: Vicente Lopez MD;  Location:  HEART CARDIAC CATH LAB     EYE SURGERY  2005    cataract surgery     VASECTOMY  1981     Medications   Home Meds  Prior to Admission medications    Medication Sig Start Date End Date Taking? Authorizing Provider   acetaminophen (TYLENOL) 325 MG tablet Take 3 tablets (975 mg) by mouth 3 times daily 2/3/21   Andrew Arriola MD   aspirin (ASA) 325 MG tablet Take 1 tablet (325 mg) by mouth daily 9/29/21   Conor Tabor MD   atorvastatin (LIPITOR) 40 MG tablet Take 1 tablet (40 mg) by mouth daily 6/7/21   Megan Cortez APRN CNP   carvedilol (COREG) 6.25 MG tablet Take 1 tablet (6.25 mg) by mouth 2 times daily (with  meals) 9/28/21   Conor Tabor MD   diclofenac (VOLTAREN) 1 % topical gel Place 4 g onto the skin 3 times daily as needed for moderate pain (Shoulder) 8/12/20   Piyush Rogers MD   Multiple Vitamin (MULTI VITAMIN PO) Take 1 oz by mouth daily Eniva VIBE-liquid    Reported, Patient   nitroGLYcerin (NITROSTAT) 0.3 MG sublingual tablet For chest pain place 1 tablet under the tongue every 5 minutes for 3 doses. If symptoms persist 5 minutes after 1st dose call 911. 2/27/20   Audrey Díaz MD   NONFORMULARY Take 1 oz by mouth daily Eniva Cell-Ready Multi Minerals is a concentrated, ionic liquid mineral dietary supplement blend to help support nutritional balance and wellbeing.  Combine with 8 ounces of water / juice    Reported, Patient         Allergies   Allergies   Allergen Reactions     Metoprolol Other (See Comments)     Side effects     Ampicillin Rash     Family History   Family History   Problem Relation Age of Onset     Hypertension Sister      Kidney failure Sister      Chronic Obstructive Pulmonary Disease Sister      Thyroid Disease Sister      Seizure Disorder Paternal Grandmother      Mitral valve prolapse Daughter      No Known Problems Son      Cerebrovascular Disease No family hx of      Social History   Social History     Tobacco Use     Smoking status: Never Smoker     Smokeless tobacco: Never Used   Substance Use Topics     Alcohol use: Yes     Comment: Beth only     Drug use: No       Review of Systems   The 10 point Review of Systems is negative other than noted in the HPI or here.        PHYSICAL EXAMINATION  Temp:  [97.5  F (36.4  C)-98.5  F (36.9  C)] 98.5  F (36.9  C)  Pulse:  [55-75] 55  Resp:  [16-18] 18  BP: (143-171)/(47-66) 145/47  SpO2:  [94 %-97 %] 95 %    General:  patient lying in bed without any acute distress    HEENT:  normocephalic/atraumatic  Cardio:  RRR  Pulmonary:  no respiratory distress  Abdomen:  soft, non-tender  Extremities:  no edema  Skin:  intact,  warm/dry     Neurologic  Mental Status:  alert, oriented x 3, follows commands, speech clear and fluent, naming and repetition normal  Cranial Nerves:  visual fields intact, PERRL, EOMI with normal smooth pursuit, facial sensation intact and symmetric, facial movements symmetric, hearing not formally tested but intact to conversation, palate elevation symmetric and uvula midline, no dysarthria, shoulder shrug strong bilaterally, tongue protrusion midline  Motor:  normal muscle tone and bulk, no abnormal movements, able to move all limbs spontaneously, no pronator drift, decreased  strength on the left side compared to the right  Reflexes:  toes down-going  Sensory:  light touch sensation intact and symmetric throughout upper and lower extremities, no extinction on double simultaneous stimulation   Coordination:  normal finger-to-nose and heel-to-shin bilaterally without dysmetria  Station/Gait:  deferred    Dysphagia Screen  Passed screening, no dysarthria - Regular Diet with thin liquids  09/28/2021     Modified [place] Score (Pre-morbid)  0-No deficits    Stroke Scales    NIHSS  Interval transfer (09/28/21 1612)   Interval Comments     1a. Level of Consciousness 0-->Alert, keenly responsive   1b. LOC Questions 0-->Answers both questions correctly   1c. LOC Commands 0-->Performs both tasks correctly   2.   Best Gaze 0-->Normal   3.   Visual 0-->No visual loss   4.   Facial Palsy 0-->Normal symmetrical movements   5a. Motor Arm, Left 0-->No drift, limb holds 90 (or 45) degrees for full 10 secs   5b. Motor Arm, Right 0-->No drift, limb holds 90 (or 45) degrees for full 10 secs   6a. Motor Leg, Left 0-->No drift, leg holds 30 degree position for full 5 secs   6b. Motor Leg, right 0-->No drift, leg holds 30 degree position for full 5 secs   7.   Limb Ataxia 0-->Absent   8.   Sensory 0-->Normal, no sensory loss   9.   Best Language 0-->No aphasia, normal   10. Dysarthria 0-->Normal   11. Extinction and Inattention   0-->No abnormality   Total 0 (09/28/21 1612)       Imaging  I personally reviewed all imaging; relevant findings per the HPI.    Lab Results Data   CBC  Recent Labs   Lab 09/28/21 0448 09/27/21  0420 09/26/21  1113   WBC 5.8 7.9 8.4   RBC 4.35* 4.07* 4.41   HGB 12.8* 11.9* 13.1*   HCT 37.7* 35.6* 38.9*    170 151     Basic Metabolic Panel   Recent Labs   Lab 09/28/21  0448 09/27/21  0420 09/26/21  1138 09/26/21  1113    140  --  139   POTASSIUM 3.8 3.7  --  4.0   CHLORIDE 110* 107  --  108   CO2 26 28  --  27   BUN 25 30  --  22   CR 1.36* 1.46*  --  1.32*   GLC 98 104* 117* 113*   GWEN 8.8 8.3*  --  8.5     Liver Panel  No results for input(s): PROTTOTAL, ALBUMIN, BILITOTAL, ALKPHOS, AST, ALT, BILIDIRECT in the last 168 hours.  INR    Recent Labs   Lab Test 09/26/21  1113 02/03/21  1220 03/10/20  0955   INR 1.03 1.03 1.04      Lipid Profile    Recent Labs   Lab Test 09/27/21  0420 08/19/20  1920 07/17/20  0927 03/16/16  1040 03/19/15  1030 02/07/14  1110 02/07/14  1110 12/12/13  0948 12/12/13  0948   CHOL 99 123 139   < > 192   < > 231*   < > 223*   HDL 67 76 56   < > 55   < > 59   < > 66   LDL 15 27 50   < > 102   < > 124   < > 133*   TRIG 86 100 167*   < > 176*   < > 240*   < > 121   CHOLHDLRATIO  --   --   --   --  3.5  --  4.0  --  3.0    < > = values in this interval not displayed.     A1C    Recent Labs   Lab Test 09/26/21  1113 01/23/17  1020   A1C 6.0* 6.0     Troponin I    Recent Labs   Lab 09/26/21  1113   TROPONIN <0.015          Stroke Code / Stroke Consult Data Data    Not a stroke code

## 2021-09-28 NOTE — PROGRESS NOTES
Pt to transfer to  of [facility] [address] [facility].  Pt is aware, notified his family.  Report given to JUICE Marcelo at .  All belongings packed.  Awaiting transport.

## 2021-09-28 NOTE — PROGRESS NOTES
"[facility]    Medicine Progress Note - Hospitalist Service       Date of Admission:  9/26/2021    Assessment & Plan           Liu Ragsdale is a 81 year old male with past medical history of hypertension, coronary artery disease status post stent in 2020, hyperlipidemia, bilateral carpal tunnel syndrome, untreated sleep apnea who was admitted on 9/26/2021 with near syncope.    Near Syncope  Patient reported near loss of consciousness while sitting at [employer], likely a function of symptomatic right carotid artery occlusion.  No dysrhythmia has been seen on telemetry thus far here.  Echocardiogram 9/27/2021 was notable only for grade 1 diastolic dysfunction was otherwise negative.  There has been no recurrence of syncope or near syncope.  The patient is up and ambulatory unassisted, and is steady while doing so.  -See management of right carotid artery occlusion below.    Symptomatic Right Carotid Artery Occlusion  Acute right hemispheric ischemic stroke  CTA head/neck 9/27/2021 showed right ICA occluded at the skull base from near-total occlusion of the right carotid artery throughout the entirety of the neck.  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 Stroke neuro was consulted by ED who recommended starting heparin and MRI/MRA to further evaluate.  MRI brain 9/27/2021 showed \"Multiple small infarcts scattered throughout the right middle cerebral artery distribution\".  MRA neck 9/27/2021 showed \"Improved flow within the right internal carotid artery; Severe stenosis of the proximal right internal carotid artery, probably 70% or greater; Multiple severe stenoses of the right vertebral artery at the V1, V2, and V4 segments, unchanged; Moderate stenosis of the dominant left vertebral artery at the V2 segment related to osseous cervical spine degenerative change, unchanged.\"    Discussed again with stroke neurology, Dr Yates, " following above imaging, neurology note in chart reviewed.  Recommendation is for IV heparin for 48 hours, then repeat CTA to help discern chronicity of occulusion based on signs of recanalization.  Dr. Yates advised that transfer to the [facility] for carotid vascularization would likely be necessary; the patient has been put on the transfer waiting list for transfer to [facility].      Today, the patient's only residual deficit is reduced  strength of the left hand and slight clumsiness of the left hand.  Left proximal upper extremity weakness noted yesterday has resolved.  The patient now feels that his speech is normal.  He denies any sensory abnormalities this morning.       -Continue high intensity heparin drip.  -CTA head planned for 1400 on 9/28/2021  -Continue permissive hypertension goal BP syst 150s - 170s, preadmission anti-hypertensives are on hold:  losartan, amlodipine, isosorbide, and hydrochlorothiazide.  -Carvedilol 6.25 mg twice daily underway; this will be held if systolic blood pressure is less than 150.  -PT and OT therapies continue.  -According to Dr. Yates's note from yesterday, he will contact us this afternoon after review of repeat CTA.  -Patient remains on the transfer list to [facility].  Will reassess his status later in the morning.      Coronary artery disease  Hyperlipidemia  Coronary artery disease diagnosed in 3/2020 at which time he was noted to have a  of the proximal left circumflex into the OM, moderate to severe ramus disease and moderate to severe RCA/PLB disease.  He underwent drug-eluting stent placement from the proximal left circumflex into the OM1.  He had residual small vessel coronary artery disease as well as moderate to severe stenosis in the ramus and RCA/YAA for which medical management was  recommended.    Echocardiogram 9/27/2021 demonstrated normal left ventricular systolic function.  He has had no preadmission exertional chest pain.  -Continue  carvedilol, though preadmission dose has been reduced to 6.25 mg p.o. twice daily here to allow for permissive hypertension after stroke, see above.    -Continue aspirin 81 mg daily.    -Continue atorvastatin 40 mg p.o. nightly.     Hypertension  Changes in outpatient regimen over the past year due to poor control, outpatient goal from a cardiac standpoint is for blood pressure to be <120/80.  Prior to admission was on amlodipine 2.5 daily, carvedilol 12.5 mg BID, hydrochlorothiazide 25 mg daily, losartan 100 mg daily, isosorbide 60 mg daily. There had been discussion to stop amlodipine (had lower extremity edema on higher doses) if blood pressure well controlled, or to increase carvedilol or isosorbide if blood pressure remained elevated.  Aside from carvedilol, preadmission antihypertensives are on hold to allow for permissive hypertension after stroke, see above.  Carvedilol was reduced to 6.25 mg twice daily.  With only a couple of exceptions, systolic blood pressure has been in the target range of 150-170.  -Continue permissive hypertension, details above under stroke management.    Acute kidney injury on CKD 3  Baseline GFR 61 - 69 over the past year.  Admission GFR 51 --> 45 --> 49 today.  -Preadmission losartan and hydrochlorothiazide are on hold for permissive hypertension as above.  -Follow renal function.    Obstructive sleep apnea  Does not wear CPAP.    Bilateral carpal tunnel   Recently diagnosed by his PCP last week.  He has been wearing braces at night which is helpful.  He has noticed some difficulty with hand , but feels it is worse since his 9/27/2021 stroke.  -Continue wrist orthotic use at night.    COVID status   -Negative screening on admission 9/26/2021.  -COVID vaccination complete, Moderna 2/16/2021 and 3/16/2021     Diet: Low Saturated Fat Na <2400 mg    DVT Prophylaxis: Heparin drip  Vásquez Catheter: Not present  Central Lines: None  Code Status: Full Code      Disposition Plan    Expected discharge: Anticipate transfer to [facility] stroke neurology when bed available.     The patient's care was discussed with the patient.  Plan to discuss again with Dr. Yates, Stroke Neurology, after repeat CTA done this afternoon.    Conor Tabor MD  Hospitalist Service  [facility]  Securely message with the Vocera Web Console (learn more here)  Text page via [facility] Paging/Directory  ______________________________________________________________________    Interval History   His only residual neurologic concern is that of reduced left hand  strength.  However, it has improved from yesterday.  He is still unable to keep a firm hold on a cup of water, however.  He is again able to oppose thumb to fifth digit which he could not do yesterday.  He had some numbness across the left hand yesterday which has resolved.  He also had some mild proximal left upper extremity weakness on admission here, which has resolved.  He has been up and ambulating in the room unassisted, and is steady when doing so.  The only dizziness that he appreciates is when he bends over from the waist.  He feels that his speech is back to normal.    Denies dyspnea or cough.  He has had no exertional chest pain, palpitations, or orthopnea.  He describes urinary urgency and frequency, not new, and occasional urinary incontinence here, which is new.    Data reviewed today: I reviewed all medications, new labs and imaging results over the last 24 hours. I personally reviewed no images or EKG's today.    Physical Exam   Vital Signs: Temp: 98.2  F (36.8  C) Temp src: Oral BP: (!) 151/62 Pulse: 57   Resp: 16 SpO2: 95 % O2 Device: None (Room air)    Weight: 168 lbs 6.9 oz    GENERAL: Very pleasant man, lying in bed, appears comfortable.  EYES: Eyes grossly normal to inspection, extraocular movements intact  HENT: Nares patent bilaterally, no discharge.    NECK: Trachea midline, no stridor.   RESP: No accessory  muscle use.  Lungs clear throughout on inspiration and expiration.  Expiration not prolonged, no wheeze.  CV: Regular rate and rhythm, non-tachycardic.  Normal S1 S2, no murmur or extra sound.  No lower extremity edema.  ABDOMEN: Soft, non-tender, no guarding.  Liver and spleen not enlarged, no masses palpable.  Bowel sounds positive.  MS: No bony deformities noted.  No red or inflamed joints.  SKIN: Warm and dry, no rashes.  NEURO: Alert, oriented, conversant.  Speech is clear.  Cranial nerves III - XII are intact.  There is a mild reduction in  strength over the left hand compared to the right.  He is able to oppose his thumb to his fifth digit, though repetitive thumb to digit touching is clumsy.  I do not appreciate any weakness in the left upper extremity compared to the right by muscle group testing.  Lower extremity strength is intact.  PSYCH: Calm, alert, conversant.  Able to articulate logical thoughts, no tangential thoughts, no hallucinations or delusions.  Affect normal.        Data   Recent Labs   Lab 21  0448 21  0420 21  1138 21  1113   WBC 5.8 7.9  --  8.4   HGB 12.8* 11.9*  --  13.1*   MCV 87 88  --  88    170  --  151   INR  --   --   --  1.03    140  --  139   POTASSIUM 3.8 3.7  --  4.0   CHLORIDE 110* 107  --  108   CO2 26 28  --  27   BUN 25 30  --  22   CR 1.36* 1.46*  --  1.32*   ANIONGAP 4 5  --  4   GWEN 8.8 8.3*  --  8.5   GLC 98 104* 117* 113*   TROPONIN  --   --   --  <0.015     Recent Results (from the past 24 hour(s))   Echocardiogram Complete - For age > 60 yrs   Result Value    LVEF  60-65%    Narrative    [id number]  [id number]  [id number]  [id number]^STEPHON^JOSE ARMANDO^EMERITA     [facility]  Echocardiography Laboratory  [address] [place] [address]     Name: GISELE LOZANO  MRN: [id number]  : 1940  Study Date: 2021 07:43 AM  Age: 81 yrs  Gender: Male  Patient Location: [facility]  Reason For Study: Cerebrovascular  Incident  Ordering Physician: JOSE ARMANDO SZYMANSKI  Referring Physician: Piyush Rogers  Performed By: Lillian Murillo DRISS     BSA: 1.8 m2  Height: 62 in  Weight: 168 lb  HR: 63  BP: 166/64 mmHg  ______________________________________________________________________________  Procedure  Complete Portable Echo Adult. Complete Echo Adult.  ______________________________________________________________________________  Interpretation Summary     The left ventricle is normal in size.  Left ventricular systolic function is normal.  The visual ejection fraction is 60-65%.  Normal left ventricular wall motion  Trivial pericardial effusion  Sinus rhythm was noted.  Compared to prior study, there is no significant change.  ______________________________________________________________________________  Left Ventricle  The left ventricle is normal in size. There is normal left ventricular wall  thickness. Left ventricular systolic function is normal. Grade I or early  diastolic dysfunction. The visual ejection fraction is 60-65%. Normal left  ventricular wall motion.     Right Ventricle  The right ventricle is normal size. The right ventricular systolic function is  normal.     Atria  Normal left atrial size. Right atrial size is normal. Intact atrial septum.  There is no color Doppler evidence of a PFO.     Mitral Valve  The mitral valve is normal in structure and function. There is trace mitral  regurgitation.     Tricuspid Valve  The tricuspid valve is normal in structure and function. There is trace  tricuspid regurgitation. IVC diameter <2.1 cm collapsing >50% with sniff  suggests a normal RA pressure of 3 mmHg. Right ventricular systolic pressure  could not be approximated due to inadequate tricuspid regurgitation.     Aortic Valve  The aortic valve is trileaflet with aortic valve sclerosis. No aortic  regurgitation is present. No aortic stenosis is present.     Pulmonic Valve  The pulmonic valve is not well seen, but is  grossly normal. There is trace  pulmonic valvular regurgitation.     Vessels  The aortic root is normal size.     Pericardium  Trivial pericardial effusion.     Rhythm  Sinus rhythm was noted.  ______________________________________________________________________________  MMode/2D Measurements & Calculations  IVSd: 1.1 cm     LVIDd: 4.1 cm  LVIDs: 2.3 cm  LVPWd: 0.87 cm  FS: 42.7 %  LV mass(C)d: 122.8 grams  LV mass(C)dI: 69.2 grams/m2  Ao root diam: 3.2 cm  LA dimension: 3.8 cm  asc Aorta Diam: 2.9 cm  LA/Ao: 1.2  LA Volume (BP): 39.0 ml  LA Volume Index (BP): 22.0 ml/m2  RWT: 0.43     Doppler Measurements & Calculations  MV E max david: 84.4 cm/sec  MV A max david: 105.6 cm/sec  MV E/A: 0.80  MV dec time: 0.26 sec  E/E' av.2  Lateral E/e': 13.1  Medial E/e': 15.2     ______________________________________________________________________________  Report approved by: Romi Anthony 2021 08:37 AM         MRA Neck (Carotids) wo & w Contrast    Narrative    MRA NECK WITHOUT AND WITH CONTRAST  2021 9:01 AM     HISTORY: Neuro deficit, acute, stroke suspected.    TECHNIQUE: 2D time-of-flight MR angiogram of the neck without contrast  and 3D MR angiogram of the neck with  10 mL Gadavist. Estimates of  carotid stenoses are made relative to the distal internal carotid  artery diameters except as noted.    COMPARISON: CTA of the head and neck 2021.    FINDINGS:  Motion-limited exam. Improved flow within the right  internal carotid artery. Asymmetrically decreased flow-related  enhancement within the right carotid system compared to the left.  Multiple severe stenoses of the right vertebral artery at the V1, V2,  and V4 segments, unchanged. Moderate stenosis of the dominant left  vertebral artery at the V2 segment related to osseous cervical spine  degenerative change, unchanged. Severe stenosis of the proximal right  internal carotid artery, probably 70% or greater. No evidence of  flow-limiting stenosis  at the left carotid bifurcation. Otherwise, no  evidence of large vessel occlusion or high-grade stenosis.      Impression    IMPRESSION:    1. Improved flow within the right internal carotid artery.  2. Severe stenosis of the proximal right internal carotid artery,  probably 70% or greater.  3. 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   4. Moderate stenosis of the dominant left vertebral artery at the V2  segment related to osseous cervical spine degenerative change,  unchanged.     JET GRAHAM MD         SYSTEM ID:  [email or web address]   MR Brain w/o & w Contrast    Narrative    MRI BRAIN WITHOUT AND WITH CONTRAST  9/27/2021 9:01 AM    HISTORY:  Neurologic deficit, acute, stroke suspected.    TECHNIQUE:  Multiplanar, multisequence MRI of the brain without and  with 10 mL Gadavist    COMPARISON: Head CT 9/26/2021    FINDINGS:  Multiple small areas of diffusion restriction are present scattered  throughout the right middle cerebral artery distribution involving the  right posterior frontal lobe, parietal lobe, and temporal lobe.    Mild parenchymal volume loss is present. Scattered frontoparietal  predominant white matter T2 hyperintensities likely represent chronic  small vessel ischemic change. Small old right cerebellar infarcts. No  evidence of hemorrhage, mass, mass effect, or hydrocephalus. No  abnormal enhancement. Major intracranial flow voids are maintained.    Marrow signal is within normal limits. Trace paranasal sinus mucosal  thickening. The visualized tympanic and mastoid cavities are  unremarkable. Bilateral lens replacements.      Impression    IMPRESSION:    1. Multiple small infarcts scattered throughout the right middle  cerebral artery distribution.  2. No evidence of hemorrhage or significant mass effect.  3. Small old right cerebellar infarcts.  4. Volume loss and scattered white matter T2 hyperintensities which  likely represent chronic small  vessel ischemic change.    JET GRAHAM MD         SYSTEM ID:  [email or web address]   MRA Brain ([place] [facility] [place]) wo Contrast    Narrative    MR ANGIOGRAM OF THE HEAD WITHOUT CONTRAST   9/27/2021 9:01 AM     HISTORY: Neurologic deficit, acute, stroke suspected.    TECHNIQUE:  3D time-of-flight MR angiogram of the head without  contrast.    COMPARISON: CTA of the head and neck 9/26/2021    FINDINGS: Motion limited exam. Improved flow within the right internal  carotid artery. The flow related signal corresponding to the right  internal carotid artery is asymmetrically small and hypointense  compared to the left. Severe stenosis of the right vertebral artery at  the proximal V4 segment, better demonstrated on neck MRA. The  vertebral arteries, basilar artery, and posterior cerebral arteries  are otherwise patent. The internal carotid arteries, anterior cerebral  arteries, and middle cerebral arteries are patent. No evidence of  large vessel occlusion or high-grade stenosis. No evidence of aneurysm  or vascular malformation.      Impression    IMPRESSION:    1. Improved flow within the right internal carotid artery.  2. Severe stenosis of the right vertebral artery at the proximal V4  segment, better demonstrated on neck MRA.  3. Otherwise, no evidence of large vessel occlusion or high-grade  stenosis.      JET GRAHAM MD         SYSTEM ID:  [email or web address]     Medications     heparin 900 Units/hr (09/28/21 0604)     - MEDICATION INSTRUCTIONS -       - MEDICATION INSTRUCTIONS -       - MEDICATION INSTRUCTIONS -       - MEDICATION INSTRUCTIONS -         acetaminophen  1,000 mg Oral TID     [Held by provider] amLODIPine  2.5 mg Oral Daily     aspirin  81 mg Oral Daily     atorvastatin  40 mg Oral At Bedtime     carvedilol  6.25 mg Oral BID w/meals     diclofenac  2 g Topical 4x Daily     [Held by provider] hydrochlorothiazide  25 mg Oral Daily     [Held by provider] isosorbide mononitrate  60 mg Oral Daily     [Held by provider]  losartan  100 mg Oral Daily     multivitamin, therapeutic  1 tablet Oral Daily     sodium chloride (PF)  3 mL Intracatheter Q8H     sodium chloride (PF)  3 mL Intracatheter Q8H     Conor Tabor MD  [facility]

## 2021-09-28 NOTE — PROGRESS NOTES
DISCUSSION WITH STROKE NEUROLOGY    I received a call from Dr Yates's assistant; the two of them just got done discussing this patient's care.  Their recommendations:    1.  Discontinue heparin gtts.  2.  Begin  mg every day.  3.  Proceed with planned repeat CT angiogram brain scheduled for 1400 today.  However, transfer to a center at which Stroke Neurology service consultation is available is recommended regardless of findings on that CTA.  Therefore, I will check with bed utilization where we  the line for a [facility] bed.  If [facility] is unavailable,  suggests transfer to [facility].    Conor Tabor [facility] Medicine

## 2021-09-28 NOTE — PROGRESS NOTES
"Care Management Follow Up    Length of Stay (days): 2    Expected Discharge Date:  TBD     Concerns to be Addressed: all concerns addressed in this encounter, no discharge needs identified, denies needs/concerns at this time       Patient plan of care discussed at interdisciplinary rounds: Yes    Anticipated Discharge Disposition: Home and do outpatient rehab (PT, OT, SLP, Cardiac or Pulmonary)     Anticipated Discharge Services:  NA  Anticipated Discharge DME:  NA    Referrals Placed by CM/SW: Internal Clinic Care Coordination  Private pay costs discussed: Not applicable    Additional Information:  Previous SW note indicates, \"Patient lives in a home with his wife, Kelsi.  At baseline, patient is independent with mobility.  There are stairs to the basement within the home, which he states he is able to manage well.  Patient is active, drives, and is able to complete his own self cares.  Patient does NOT receive any in-home services.  Patient states his wife, Kelsi, has just started to show signs of short term memory loss.  Patient states his two sons and her daughter will assist Kelsi at home while patient is hospitalized.  Reviewed with patient the recommendation for outpatient O/T.  Patient is in agreement with recommendation.\"    Per MD note, \"Proceed with planned repeat CT angiogram brain scheduled for 1400 today.  However, transfer to a center at which Stroke Neurology service consultation is available is recommended regardless of findings on that CTA.  Therefore, I will check with bed utilization where we  the line for a [facility] bed.  If [facility] is unavailable,  suggests transfer to [facility].\"    MARTIN Rodríguez        "

## 2021-09-28 NOTE — LETTER
Transition Communication Hand-off for Care Transitions to Next Level of Care Provider    Name: Liu Ragsdale  : 1940  MRN #: [id number]  Primary Care Provider: Piyush Rogers     Primary Clinic: [address] [place] [id number]     Reason for Hospitalization:  Ischemic stroke (H) [I63.9]  Admit Date/Time: 2021  3:12 PM  Discharge Date: 10/1/21  Payor Source: Payor: MEDICA / Plan: MEDICA PRIME SOLUTION / Product Type: Indemnity /          Reason for Communication Hand-off Referral: Other continuity of care    Discharge Plan: per attached AVS     Concern for non-adherence with plan of care:   Y/N no  Discharge Needs Assessment:  Needs      Most Recent Value   Equipment Currently Used at Home  none        Follow-up plan:    Future Appointments   Date Time Provider Department Center   10/25/2021 12:00 PM [id number]   10/28/2021 11:00 AM Raymond Andersen MD [facility]       Any outstanding tests or procedures:        Radiology & Cardiology Orders     Future Labs/Procedures Complete By Expires    US Carotid Right  2021 (Approximate) 2022        Referrals     Future Labs/Procedures    Neurology Adult Referral     Comments:    Please be aware that coverage of these services is subject to the terms and limitations of your health insurance plan.  Call member services at your health plan with any benefit or coverage questions.  Please call to schedule your appointment      Occupational Therapy Referral     Process Instructions:    Work Related Injury: Functional Capacity and Work Conditioning are only offered at [facility] and [facility] (service can be provided by PT or OT).    *This therapy referral will be filtered to a centralized scheduling office at [facility] and the patient will receive a call to schedule an appointment at a [place] location most convenient for them. *    Comments:    If you have not heard from the scheduling office  within 2 business days, please call [phone] for all locations, with the exception of [place], please call [phone] and [facility], please call [phone].    Please be aware that coverage of these services is subject to the terms and limitations of your health insurance plan.  Call member services at your health plan with any benefit or coverage questions.      SLEEP EVALUATION & MANAGEMENT REFERRAL - ADULT -     Comments:    Please be aware that coverage of these services is subject to the terms and limitations of your health insurance plan.  Call member services at your health plan with any benefit or coverage questions.      Please bring the following to your appointment:    >>   List of current medications   >>   This referral request   >>   Any documents/labs given to you for this referral      [facility] [place]              Key Recommendations:  See attached AVS    Harlan Razo, RN    AVS/Discharge Summary is the source of truth; this is a helpful guide for improved communication of patient story

## 2021-09-29 ENCOUNTER — APPOINTMENT (OUTPATIENT)
Dept: CT IMAGING | Facility: CLINIC | Age: 81
DRG: 038 | End: 2021-09-29
Attending: PSYCHIATRY & NEUROLOGY
Payer: MEDICARE

## 2021-09-29 ENCOUNTER — ANESTHESIA (OUTPATIENT)
Dept: SURGERY | Facility: CLINIC | Age: 81
DRG: 038 | End: 2021-09-29
Payer: MEDICARE

## 2021-09-29 LAB
ABO/RH(D): NORMAL
ANION GAP SERPL CALCULATED.3IONS-SCNC: 5 MMOL/L (ref 3–14)
ANTIBODY SCREEN: NEGATIVE
APTT PPP: 24 SECONDS (ref 22–38)
BUN SERPL-MCNC: 22 MG/DL (ref 7–30)
CALCIUM SERPL-MCNC: 9.1 MG/DL (ref 8.5–10.1)
CHLORIDE BLD-SCNC: 110 MMOL/L (ref 94–109)
CO2 SERPL-SCNC: 25 MMOL/L (ref 20–32)
CREAT SERPL-MCNC: 1.12 MG/DL (ref 0.66–1.25)
ERYTHROCYTE [DISTWIDTH] IN BLOOD BY AUTOMATED COUNT: 12.9 % (ref 10–15)
GFR SERPL CREATININE-BSD FRML MDRD: 62 ML/MIN/1.73M2
GLUCOSE BLD-MCNC: 94 MG/DL (ref 70–99)
GLUCOSE BLDC GLUCOMTR-MCNC: 87 MG/DL (ref 70–99)
HCT VFR BLD AUTO: 38.7 % (ref 40–53)
HGB BLD-MCNC: 13.1 G/DL (ref 13.3–17.7)
INR PPP: 0.84 (ref 0.85–1.15)
MCH RBC QN AUTO: 30 PG (ref 26.5–33)
MCHC RBC AUTO-ENTMCNC: 33.9 G/DL (ref 31.5–36.5)
MCV RBC AUTO: 89 FL (ref 78–100)
PLATELET # BLD AUTO: 164 10E3/UL (ref 150–450)
POTASSIUM BLD-SCNC: 4 MMOL/L (ref 3.4–5.3)
RBC # BLD AUTO: 4.37 10E6/UL (ref 4.4–5.9)
SARS-COV-2 RNA RESP QL NAA+PROBE: NEGATIVE
SODIUM SERPL-SCNC: 140 MMOL/L (ref 133–144)
SPECIMEN EXPIRATION DATE: NORMAL
WBC # BLD AUTO: 5.3 10E3/UL (ref 4–11)

## 2021-09-29 PROCEDURE — 70450 CT HEAD/BRAIN W/O DYE: CPT | Mod: 77

## 2021-09-29 PROCEDURE — 278N000051 HC OR IMPLANT GENERAL: Performed by: NEUROLOGICAL SURGERY

## 2021-09-29 PROCEDURE — 85730 THROMBOPLASTIN TIME PARTIAL: CPT | Performed by: STUDENT IN AN ORGANIZED HEALTH CARE EDUCATION/TRAINING PROGRAM

## 2021-09-29 PROCEDURE — 258N000003 HC RX IP 258 OP 636

## 2021-09-29 PROCEDURE — 250N000013 HC RX MED GY IP 250 OP 250 PS 637

## 2021-09-29 PROCEDURE — 360N000077 HC SURGERY LEVEL 4, PER MIN: Performed by: NEUROLOGICAL SURGERY

## 2021-09-29 PROCEDURE — 258N000003 HC RX IP 258 OP 636: Performed by: NURSE ANESTHETIST, CERTIFIED REGISTERED

## 2021-09-29 PROCEDURE — 250N000009 HC RX 250: Performed by: NURSE ANESTHETIST, CERTIFIED REGISTERED

## 2021-09-29 PROCEDURE — 250N000011 HC RX IP 250 OP 636: Performed by: NEUROLOGICAL SURGERY

## 2021-09-29 PROCEDURE — 88304 TISSUE EXAM BY PATHOLOGIST: CPT | Mod: TC | Performed by: NEUROLOGICAL SURGERY

## 2021-09-29 PROCEDURE — 36415 COLL VENOUS BLD VENIPUNCTURE: CPT | Performed by: STUDENT IN AN ORGANIZED HEALTH CARE EDUCATION/TRAINING PROGRAM

## 2021-09-29 PROCEDURE — 258N000003 HC RX IP 258 OP 636: Performed by: ANESTHESIOLOGY

## 2021-09-29 PROCEDURE — 80048 BASIC METABOLIC PNL TOTAL CA: CPT | Performed by: STUDENT IN AN ORGANIZED HEALTH CARE EDUCATION/TRAINING PROGRAM

## 2021-09-29 PROCEDURE — 200N000002 HC R&B ICU UMMC

## 2021-09-29 PROCEDURE — 710N000010 HC RECOVERY PHASE 1, LEVEL 2, PER MIN: Performed by: NEUROLOGICAL SURGERY

## 2021-09-29 PROCEDURE — 70498 CT ANGIOGRAPHY NECK: CPT

## 2021-09-29 PROCEDURE — 250N000025 HC SEVOFLURANE, PER MIN: Performed by: NEUROLOGICAL SURGERY

## 2021-09-29 PROCEDURE — 88311 DECALCIFY TISSUE: CPT | Mod: TC | Performed by: NEUROLOGICAL SURGERY

## 2021-09-29 PROCEDURE — 250N000011 HC RX IP 250 OP 636: Performed by: ANESTHESIOLOGY

## 2021-09-29 PROCEDURE — 250N000011 HC RX IP 250 OP 636: Performed by: NURSE ANESTHETIST, CERTIFIED REGISTERED

## 2021-09-29 PROCEDURE — 250N000011 HC RX IP 250 OP 636: Performed by: RADIOLOGY

## 2021-09-29 PROCEDURE — 999N000015 HC STATISTIC ARTERIAL MONITORING DAILY

## 2021-09-29 PROCEDURE — 86900 BLOOD TYPING SEROLOGIC ABO: CPT | Performed by: STUDENT IN AN ORGANIZED HEALTH CARE EDUCATION/TRAINING PROGRAM

## 2021-09-29 PROCEDURE — 250N000011 HC RX IP 250 OP 636

## 2021-09-29 PROCEDURE — 99223 1ST HOSP IP/OBS HIGH 75: CPT | Mod: GC | Performed by: PSYCHIATRY & NEUROLOGY

## 2021-09-29 PROCEDURE — 250N000009 HC RX 250: Performed by: NEUROLOGICAL SURGERY

## 2021-09-29 PROCEDURE — 70450 CT HEAD/BRAIN W/O DYE: CPT

## 2021-09-29 PROCEDURE — 370N000017 HC ANESTHESIA TECHNICAL FEE, PER MIN: Performed by: NEUROLOGICAL SURGERY

## 2021-09-29 PROCEDURE — 250N000011 HC RX IP 250 OP 636: Performed by: STUDENT IN AN ORGANIZED HEALTH CARE EDUCATION/TRAINING PROGRAM

## 2021-09-29 PROCEDURE — 70496 CT ANGIOGRAPHY HEAD: CPT | Mod: 26 | Performed by: RADIOLOGY

## 2021-09-29 PROCEDURE — 272N000001 HC OR GENERAL SUPPLY STERILE: Performed by: NEUROLOGICAL SURGERY

## 2021-09-29 PROCEDURE — 03CH0ZZ EXTIRPATION OF MATTER FROM RIGHT COMMON CAROTID ARTERY, OPEN APPROACH: ICD-10-PCS | Performed by: NEUROLOGICAL SURGERY

## 2021-09-29 PROCEDURE — 70498 CT ANGIOGRAPHY NECK: CPT | Mod: 26 | Performed by: RADIOLOGY

## 2021-09-29 PROCEDURE — 272N000002 HC OR SUPPLY OTHER OPNP: Performed by: NEUROLOGICAL SURGERY

## 2021-09-29 PROCEDURE — 35301 RECHANNELING OF ARTERY: CPT | Mod: RT | Performed by: NEUROLOGICAL SURGERY

## 2021-09-29 PROCEDURE — 85610 PROTHROMBIN TIME: CPT | Performed by: STUDENT IN AN ORGANIZED HEALTH CARE EDUCATION/TRAINING PROGRAM

## 2021-09-29 PROCEDURE — 250N000009 HC RX 250: Performed by: PSYCHIATRY & NEUROLOGY

## 2021-09-29 PROCEDURE — 85027 COMPLETE CBC AUTOMATED: CPT | Performed by: STUDENT IN AN ORGANIZED HEALTH CARE EDUCATION/TRAINING PROGRAM

## 2021-09-29 PROCEDURE — 999N000157 HC STATISTIC RCP TIME EA 10 MIN

## 2021-09-29 PROCEDURE — 999N000141 HC STATISTIC PRE-PROCEDURE NURSING ASSESSMENT: Performed by: NEUROLOGICAL SURGERY

## 2021-09-29 RX ORDER — CLINDAMYCIN PHOSPHATE 900 MG/50ML
900 INJECTION, SOLUTION INTRAVENOUS
Status: COMPLETED | OUTPATIENT
Start: 2021-09-29 | End: 2021-09-29

## 2021-09-29 RX ORDER — SODIUM CHLORIDE, SODIUM LACTATE, POTASSIUM CHLORIDE, CALCIUM CHLORIDE 600; 310; 30; 20 MG/100ML; MG/100ML; MG/100ML; MG/100ML
INJECTION, SOLUTION INTRAVENOUS CONTINUOUS
Status: DISCONTINUED | OUTPATIENT
Start: 2021-09-29 | End: 2021-09-30

## 2021-09-29 RX ORDER — HYDRALAZINE HYDROCHLORIDE 20 MG/ML
2.5-5 INJECTION INTRAMUSCULAR; INTRAVENOUS EVERY 10 MIN PRN
Status: DISCONTINUED | OUTPATIENT
Start: 2021-09-29 | End: 2021-09-29 | Stop reason: HOSPADM

## 2021-09-29 RX ORDER — HALOPERIDOL 5 MG/ML
1 INJECTION INTRAMUSCULAR
Status: DISCONTINUED | OUTPATIENT
Start: 2021-09-29 | End: 2021-09-29 | Stop reason: HOSPADM

## 2021-09-29 RX ORDER — OXYCODONE HYDROCHLORIDE 5 MG/1
5 TABLET ORAL EVERY 4 HOURS PRN
Status: DISCONTINUED | OUTPATIENT
Start: 2021-09-29 | End: 2021-09-29

## 2021-09-29 RX ORDER — ACETAMINOPHEN 325 MG/1
650 TABLET ORAL EVERY 4 HOURS PRN
Status: DISCONTINUED | OUTPATIENT
Start: 2021-10-02 | End: 2021-10-01 | Stop reason: HOSPADM

## 2021-09-29 RX ORDER — ONDANSETRON 4 MG/1
4 TABLET, ORALLY DISINTEGRATING ORAL EVERY 30 MIN PRN
Status: DISCONTINUED | OUTPATIENT
Start: 2021-09-29 | End: 2021-09-29

## 2021-09-29 RX ORDER — OXYCODONE HYDROCHLORIDE 5 MG/1
5 TABLET ORAL EVERY 4 HOURS PRN
Status: DISCONTINUED | OUTPATIENT
Start: 2021-09-29 | End: 2021-10-01 | Stop reason: HOSPADM

## 2021-09-29 RX ORDER — AMOXICILLIN 250 MG
1 CAPSULE ORAL 2 TIMES DAILY
Status: DISCONTINUED | OUTPATIENT
Start: 2021-09-29 | End: 2021-10-01 | Stop reason: HOSPADM

## 2021-09-29 RX ORDER — NITROGLYCERIN 10 MG/100ML
INJECTION INTRAVENOUS PRN
Status: DISCONTINUED | OUTPATIENT
Start: 2021-09-29 | End: 2021-09-29

## 2021-09-29 RX ORDER — LIDOCAINE 40 MG/G
CREAM TOPICAL
Status: DISCONTINUED | OUTPATIENT
Start: 2021-09-29 | End: 2021-10-01 | Stop reason: HOSPADM

## 2021-09-29 RX ORDER — LIDOCAINE HYDROCHLORIDE 20 MG/ML
INJECTION, SOLUTION INFILTRATION; PERINEURAL PRN
Status: DISCONTINUED | OUTPATIENT
Start: 2021-09-29 | End: 2021-09-29

## 2021-09-29 RX ORDER — SODIUM CHLORIDE 9 MG/ML
INJECTION, SOLUTION INTRAVENOUS CONTINUOUS
Status: ACTIVE | OUTPATIENT
Start: 2021-09-29 | End: 2021-09-30

## 2021-09-29 RX ORDER — BISACODYL 10 MG
10 SUPPOSITORY, RECTAL RECTAL DAILY PRN
Status: DISCONTINUED | OUTPATIENT
Start: 2021-09-29 | End: 2021-10-01 | Stop reason: HOSPADM

## 2021-09-29 RX ORDER — IOPAMIDOL 755 MG/ML
75 INJECTION, SOLUTION INTRAVASCULAR ONCE
Status: COMPLETED | OUTPATIENT
Start: 2021-09-29 | End: 2021-09-29

## 2021-09-29 RX ORDER — FENTANYL CITRATE 50 UG/ML
50 INJECTION, SOLUTION INTRAMUSCULAR; INTRAVENOUS EVERY 5 MIN PRN
Status: DISCONTINUED | OUTPATIENT
Start: 2021-09-29 | End: 2021-09-29 | Stop reason: HOSPADM

## 2021-09-29 RX ORDER — LIDOCAINE 40 MG/G
CREAM TOPICAL
Status: DISCONTINUED | OUTPATIENT
Start: 2021-09-29 | End: 2021-09-29 | Stop reason: HOSPADM

## 2021-09-29 RX ORDER — HEPARIN SODIUM 1000 [USP'U]/ML
INJECTION, SOLUTION INTRAVENOUS; SUBCUTANEOUS PRN
Status: DISCONTINUED | OUTPATIENT
Start: 2021-09-29 | End: 2021-09-29

## 2021-09-29 RX ORDER — EPHEDRINE SULFATE 50 MG/ML
INJECTION, SOLUTION INTRAMUSCULAR; INTRAVENOUS; SUBCUTANEOUS PRN
Status: DISCONTINUED | OUTPATIENT
Start: 2021-09-29 | End: 2021-09-29

## 2021-09-29 RX ORDER — HYDROMORPHONE HYDROCHLORIDE 1 MG/ML
0.5 INJECTION, SOLUTION INTRAMUSCULAR; INTRAVENOUS; SUBCUTANEOUS EVERY 5 MIN PRN
Status: DISCONTINUED | OUTPATIENT
Start: 2021-09-29 | End: 2021-09-29 | Stop reason: HOSPADM

## 2021-09-29 RX ORDER — POLYETHYLENE GLYCOL 3350 17 G/17G
17 POWDER, FOR SOLUTION ORAL DAILY
Status: DISCONTINUED | OUTPATIENT
Start: 2021-09-30 | End: 2021-10-01 | Stop reason: HOSPADM

## 2021-09-29 RX ORDER — FENTANYL CITRATE 50 UG/ML
25-50 INJECTION, SOLUTION INTRAMUSCULAR; INTRAVENOUS EVERY 5 MIN PRN
Status: DISCONTINUED | OUTPATIENT
Start: 2021-09-29 | End: 2021-09-29 | Stop reason: HOSPADM

## 2021-09-29 RX ORDER — FENTANYL CITRATE 50 UG/ML
25 INJECTION, SOLUTION INTRAMUSCULAR; INTRAVENOUS
Status: DISCONTINUED | OUTPATIENT
Start: 2021-09-29 | End: 2021-09-29

## 2021-09-29 RX ORDER — PROCHLORPERAZINE MALEATE 5 MG
5 TABLET ORAL EVERY 6 HOURS PRN
Status: DISCONTINUED | OUTPATIENT
Start: 2021-09-29 | End: 2021-10-01 | Stop reason: HOSPADM

## 2021-09-29 RX ORDER — LABETALOL HYDROCHLORIDE 5 MG/ML
INJECTION, SOLUTION INTRAVENOUS
Status: COMPLETED
Start: 2021-09-29 | End: 2021-09-29

## 2021-09-29 RX ORDER — CARVEDILOL 3.12 MG/1
6.25 TABLET ORAL 2 TIMES DAILY WITH MEALS
Status: DISCONTINUED | OUTPATIENT
Start: 2021-09-29 | End: 2021-10-01 | Stop reason: HOSPADM

## 2021-09-29 RX ORDER — OXYCODONE HYDROCHLORIDE 5 MG/1
5 TABLET ORAL EVERY 4 HOURS PRN
Status: DISCONTINUED | OUTPATIENT
Start: 2021-09-29 | End: 2021-09-29 | Stop reason: HOSPADM

## 2021-09-29 RX ORDER — LABETALOL HYDROCHLORIDE 5 MG/ML
10 INJECTION, SOLUTION INTRAVENOUS
Status: COMPLETED | OUTPATIENT
Start: 2021-09-29 | End: 2021-09-29

## 2021-09-29 RX ORDER — LABETALOL HYDROCHLORIDE 5 MG/ML
10 INJECTION, SOLUTION INTRAVENOUS EVERY 10 MIN PRN
Status: DISCONTINUED | OUTPATIENT
Start: 2021-09-29 | End: 2021-09-29

## 2021-09-29 RX ORDER — ACETAMINOPHEN 325 MG/1
975 TABLET ORAL EVERY 8 HOURS
Status: DISCONTINUED | OUTPATIENT
Start: 2021-09-29 | End: 2021-10-01 | Stop reason: HOSPADM

## 2021-09-29 RX ORDER — LIDOCAINE HYDROCHLORIDE AND EPINEPHRINE 10; 10 MG/ML; UG/ML
INJECTION, SOLUTION INFILTRATION; PERINEURAL PRN
Status: DISCONTINUED | OUTPATIENT
Start: 2021-09-29 | End: 2021-09-29 | Stop reason: HOSPADM

## 2021-09-29 RX ORDER — SODIUM CHLORIDE, SODIUM LACTATE, POTASSIUM CHLORIDE, CALCIUM CHLORIDE 600; 310; 30; 20 MG/100ML; MG/100ML; MG/100ML; MG/100ML
INJECTION, SOLUTION INTRAVENOUS CONTINUOUS PRN
Status: DISCONTINUED | OUTPATIENT
Start: 2021-09-29 | End: 2021-09-29

## 2021-09-29 RX ORDER — ONDANSETRON 2 MG/ML
INJECTION INTRAMUSCULAR; INTRAVENOUS PRN
Status: DISCONTINUED | OUTPATIENT
Start: 2021-09-29 | End: 2021-09-29

## 2021-09-29 RX ORDER — DEXAMETHASONE SODIUM PHOSPHATE 4 MG/ML
INJECTION, SOLUTION INTRA-ARTICULAR; INTRALESIONAL; INTRAMUSCULAR; INTRAVENOUS; SOFT TISSUE PRN
Status: DISCONTINUED | OUTPATIENT
Start: 2021-09-29 | End: 2021-09-29

## 2021-09-29 RX ORDER — LABETALOL 20 MG/4 ML (5 MG/ML) INTRAVENOUS SYRINGE
PRN
Status: DISCONTINUED | OUTPATIENT
Start: 2021-09-29 | End: 2021-09-29

## 2021-09-29 RX ORDER — FENTANYL CITRATE 50 UG/ML
INJECTION, SOLUTION INTRAMUSCULAR; INTRAVENOUS PRN
Status: DISCONTINUED | OUTPATIENT
Start: 2021-09-29 | End: 2021-09-29

## 2021-09-29 RX ORDER — ONDANSETRON 2 MG/ML
4 INJECTION INTRAMUSCULAR; INTRAVENOUS EVERY 6 HOURS PRN
Status: DISCONTINUED | OUTPATIENT
Start: 2021-09-29 | End: 2021-10-01 | Stop reason: HOSPADM

## 2021-09-29 RX ORDER — PROPOFOL 10 MG/ML
INJECTION, EMULSION INTRAVENOUS PRN
Status: DISCONTINUED | OUTPATIENT
Start: 2021-09-29 | End: 2021-09-29

## 2021-09-29 RX ORDER — PHENYLEPHRINE HCL IN 0.9% NACL 50MG/250ML
.1-1 PLASTIC BAG, INJECTION (ML) INTRAVENOUS CONTINUOUS
Status: DISCONTINUED | OUTPATIENT
Start: 2021-09-29 | End: 2021-09-29 | Stop reason: HOSPADM

## 2021-09-29 RX ORDER — HYDROMORPHONE HCL IN WATER/PF 6 MG/30 ML
0.2 PATIENT CONTROLLED ANALGESIA SYRINGE INTRAVENOUS EVERY 5 MIN PRN
Status: DISCONTINUED | OUTPATIENT
Start: 2021-09-29 | End: 2021-09-29 | Stop reason: HOSPADM

## 2021-09-29 RX ORDER — LABETALOL HYDROCHLORIDE 5 MG/ML
10-40 INJECTION, SOLUTION INTRAVENOUS EVERY 10 MIN PRN
Status: DISCONTINUED | OUTPATIENT
Start: 2021-09-29 | End: 2021-10-01 | Stop reason: HOSPADM

## 2021-09-29 RX ORDER — SODIUM CHLORIDE, SODIUM LACTATE, POTASSIUM CHLORIDE, CALCIUM CHLORIDE 600; 310; 30; 20 MG/100ML; MG/100ML; MG/100ML; MG/100ML
INJECTION, SOLUTION INTRAVENOUS CONTINUOUS
Status: DISCONTINUED | OUTPATIENT
Start: 2021-09-29 | End: 2021-09-29 | Stop reason: HOSPADM

## 2021-09-29 RX ORDER — MEPERIDINE HYDROCHLORIDE 25 MG/ML
12.5 INJECTION INTRAMUSCULAR; INTRAVENOUS; SUBCUTANEOUS
Status: DISCONTINUED | OUTPATIENT
Start: 2021-09-29 | End: 2021-09-29

## 2021-09-29 RX ORDER — ONDANSETRON 4 MG/1
4 TABLET, ORALLY DISINTEGRATING ORAL EVERY 6 HOURS PRN
Status: DISCONTINUED | OUTPATIENT
Start: 2021-09-29 | End: 2021-10-01 | Stop reason: HOSPADM

## 2021-09-29 RX ORDER — ONDANSETRON 2 MG/ML
4 INJECTION INTRAMUSCULAR; INTRAVENOUS EVERY 30 MIN PRN
Status: DISCONTINUED | OUTPATIENT
Start: 2021-09-29 | End: 2021-09-29

## 2021-09-29 RX ORDER — CLINDAMYCIN PHOSPHATE 900 MG/50ML
900 INJECTION, SOLUTION INTRAVENOUS SEE ADMIN INSTRUCTIONS
Status: DISCONTINUED | OUTPATIENT
Start: 2021-09-29 | End: 2021-09-29 | Stop reason: HOSPADM

## 2021-09-29 RX ORDER — LABETALOL HYDROCHLORIDE 5 MG/ML
10 INJECTION, SOLUTION INTRAVENOUS
Status: DISCONTINUED | OUTPATIENT
Start: 2021-09-29 | End: 2021-09-29 | Stop reason: HOSPADM

## 2021-09-29 RX ADMIN — PHENYLEPHRINE HYDROCHLORIDE 50 MCG: 10 INJECTION INTRAVENOUS at 15:06

## 2021-09-29 RX ADMIN — FENTANYL CITRATE 50 MCG: 50 INJECTION, SOLUTION INTRAMUSCULAR; INTRAVENOUS at 10:56

## 2021-09-29 RX ADMIN — HYDRALAZINE HYDROCHLORIDE 5 MG: 20 INJECTION INTRAMUSCULAR; INTRAVENOUS at 17:00

## 2021-09-29 RX ADMIN — SODIUM CHLORIDE, POTASSIUM CHLORIDE, SODIUM LACTATE AND CALCIUM CHLORIDE: 600; 310; 30; 20 INJECTION, SOLUTION INTRAVENOUS at 21:29

## 2021-09-29 RX ADMIN — LABETALOL 20 MG/4 ML (5 MG/ML) INTRAVENOUS SYRINGE 5 MG: at 15:53

## 2021-09-29 RX ADMIN — LABETALOL 20 MG/4 ML (5 MG/ML) INTRAVENOUS SYRINGE 5 MG: at 15:58

## 2021-09-29 RX ADMIN — PHENYLEPHRINE HYDROCHLORIDE 50 MCG: 10 INJECTION INTRAVENOUS at 15:15

## 2021-09-29 RX ADMIN — ACETAMINOPHEN 650 MG: 325 TABLET, FILM COATED ORAL at 20:30

## 2021-09-29 RX ADMIN — PHENYLEPHRINE HYDROCHLORIDE 50 MCG: 10 INJECTION INTRAVENOUS at 14:23

## 2021-09-29 RX ADMIN — PROPOFOL 20 MG: 10 INJECTION, EMULSION INTRAVENOUS at 15:32

## 2021-09-29 RX ADMIN — NITROGLYCERIN 50 MCG: 10 INJECTION INTRAVENOUS at 11:49

## 2021-09-29 RX ADMIN — NITROGLYCERIN 25 MCG: 10 INJECTION INTRAVENOUS at 15:22

## 2021-09-29 RX ADMIN — NITROGLYCERIN 25 MCG: 10 INJECTION INTRAVENOUS at 11:13

## 2021-09-29 RX ADMIN — DEXAMETHASONE SODIUM PHOSPHATE 8 MG: 4 INJECTION, SOLUTION INTRA-ARTICULAR; INTRALESIONAL; INTRAMUSCULAR; INTRAVENOUS; SOFT TISSUE at 10:05

## 2021-09-29 RX ADMIN — PHENYLEPHRINE HYDROCHLORIDE 100 MCG: 10 INJECTION INTRAVENOUS at 09:59

## 2021-09-29 RX ADMIN — LABETALOL HYDROCHLORIDE 10 MG: 5 INJECTION, SOLUTION INTRAVENOUS at 17:01

## 2021-09-29 RX ADMIN — Medication 0.4 MCG/KG/MIN: at 11:02

## 2021-09-29 RX ADMIN — LIDOCAINE HYDROCHLORIDE 50 MG: 20 INJECTION, SOLUTION INFILTRATION; PERINEURAL at 09:59

## 2021-09-29 RX ADMIN — NITROGLYCERIN 50 MCG: 10 INJECTION INTRAVENOUS at 14:00

## 2021-09-29 RX ADMIN — SODIUM CHLORIDE: 9 INJECTION, SOLUTION INTRAVENOUS at 16:27

## 2021-09-29 RX ADMIN — NITROGLYCERIN 25 MCG: 10 INJECTION INTRAVENOUS at 11:47

## 2021-09-29 RX ADMIN — HEPARIN SODIUM 6000 UNITS: 1000 INJECTION INTRAVENOUS; SUBCUTANEOUS at 11:36

## 2021-09-29 RX ADMIN — HEPARIN SODIUM 500 UNITS: 1000 INJECTION INTRAVENOUS; SUBCUTANEOUS at 11:39

## 2021-09-29 RX ADMIN — PHENYLEPHRINE HYDROCHLORIDE 50 MCG: 10 INJECTION INTRAVENOUS at 15:07

## 2021-09-29 RX ADMIN — ONDANSETRON 4 MG: 2 INJECTION INTRAMUSCULAR; INTRAVENOUS at 14:27

## 2021-09-29 RX ADMIN — HYDROMORPHONE HYDROCHLORIDE 0.5 MG: 1 INJECTION, SOLUTION INTRAMUSCULAR; INTRAVENOUS; SUBCUTANEOUS at 15:00

## 2021-09-29 RX ADMIN — PROPOFOL 20 MG: 10 INJECTION, EMULSION INTRAVENOUS at 15:17

## 2021-09-29 RX ADMIN — NITROGLYCERIN 25 MCG: 10 INJECTION INTRAVENOUS at 12:04

## 2021-09-29 RX ADMIN — CARVEDILOL 6.25 MG: 3.12 TABLET, FILM COATED ORAL at 20:30

## 2021-09-29 RX ADMIN — LABETALOL HYDROCHLORIDE 10 MG: 5 INJECTION, SOLUTION INTRAVENOUS at 20:00

## 2021-09-29 RX ADMIN — PHENYLEPHRINE HYDROCHLORIDE 50 MCG: 10 INJECTION INTRAVENOUS at 12:49

## 2021-09-29 RX ADMIN — SODIUM CHLORIDE, SODIUM LACTATE, POTASSIUM CHLORIDE, CALCIUM CHLORIDE: 600; 310; 30; 20 INJECTION, SOLUTION INTRAVENOUS at 10:17

## 2021-09-29 RX ADMIN — HEPARIN SODIUM 1000 UNITS: 1000 INJECTION INTRAVENOUS; SUBCUTANEOUS at 12:35

## 2021-09-29 RX ADMIN — NITROGLYCERIN 25 MCG: 10 INJECTION INTRAVENOUS at 15:11

## 2021-09-29 RX ADMIN — PHENYLEPHRINE HYDROCHLORIDE 50 MCG: 10 INJECTION INTRAVENOUS at 12:41

## 2021-09-29 RX ADMIN — PHENYLEPHRINE HYDROCHLORIDE 50 MCG: 10 INJECTION INTRAVENOUS at 15:16

## 2021-09-29 RX ADMIN — PHENYLEPHRINE HYDROCHLORIDE 50 MCG: 10 INJECTION INTRAVENOUS at 14:38

## 2021-09-29 RX ADMIN — PROPOFOL 20 MG: 10 INJECTION, EMULSION INTRAVENOUS at 15:01

## 2021-09-29 RX ADMIN — Medication 5 MG: at 11:00

## 2021-09-29 RX ADMIN — HYDROMORPHONE HYDROCHLORIDE 0.5 MG: 1 INJECTION, SOLUTION INTRAMUSCULAR; INTRAVENOUS; SUBCUTANEOUS at 15:19

## 2021-09-29 RX ADMIN — PHENYLEPHRINE HYDROCHLORIDE 100 MCG: 10 INJECTION INTRAVENOUS at 10:25

## 2021-09-29 RX ADMIN — PROPOFOL 20 MG: 10 INJECTION, EMULSION INTRAVENOUS at 15:08

## 2021-09-29 RX ADMIN — PHENYLEPHRINE HYDROCHLORIDE 25 MCG: 10 INJECTION INTRAVENOUS at 15:28

## 2021-09-29 RX ADMIN — NITROGLYCERIN 25 MCG: 10 INJECTION INTRAVENOUS at 13:45

## 2021-09-29 RX ADMIN — FENTANYL CITRATE 50 MCG: 50 INJECTION, SOLUTION INTRAMUSCULAR; INTRAVENOUS at 12:05

## 2021-09-29 RX ADMIN — PHENYLEPHRINE HYDROCHLORIDE 100 MCG: 10 INJECTION INTRAVENOUS at 10:15

## 2021-09-29 RX ADMIN — ROCURONIUM BROMIDE 50 MG: 10 INJECTION INTRAVENOUS at 10:00

## 2021-09-29 RX ADMIN — PROPOFOL 50 MG: 10 INJECTION, EMULSION INTRAVENOUS at 10:05

## 2021-09-29 RX ADMIN — PROPOFOL 130 MG: 10 INJECTION, EMULSION INTRAVENOUS at 09:59

## 2021-09-29 RX ADMIN — ROCURONIUM BROMIDE 30 MG: 10 INJECTION INTRAVENOUS at 13:11

## 2021-09-29 RX ADMIN — IOPAMIDOL 75 ML: 755 INJECTION, SOLUTION INTRAVENOUS at 09:21

## 2021-09-29 RX ADMIN — SUGAMMADEX 200 MG: 100 INJECTION, SOLUTION INTRAVENOUS at 15:35

## 2021-09-29 RX ADMIN — FENTANYL CITRATE 50 MCG: 50 INJECTION, SOLUTION INTRAMUSCULAR; INTRAVENOUS at 11:19

## 2021-09-29 RX ADMIN — ROCURONIUM BROMIDE 50 MG: 10 INJECTION INTRAVENOUS at 12:03

## 2021-09-29 RX ADMIN — PROPOFOL 20 MG: 10 INJECTION, EMULSION INTRAVENOUS at 15:19

## 2021-09-29 RX ADMIN — SODIUM CHLORIDE, POTASSIUM CHLORIDE, SODIUM LACTATE AND CALCIUM CHLORIDE: 600; 310; 30; 20 INJECTION, SOLUTION INTRAVENOUS at 12:01

## 2021-09-29 RX ADMIN — LABETALOL HYDROCHLORIDE 20 MG: 5 INJECTION, SOLUTION INTRAVENOUS at 20:30

## 2021-09-29 RX ADMIN — CLINDAMYCIN PHOSPHATE 900 MG: 900 INJECTION, SOLUTION INTRAVENOUS at 10:15

## 2021-09-29 RX ADMIN — PHENYLEPHRINE HYDROCHLORIDE 50 MCG: 10 INJECTION INTRAVENOUS at 14:48

## 2021-09-29 RX ADMIN — FENTANYL CITRATE 100 MCG: 50 INJECTION, SOLUTION INTRAMUSCULAR; INTRAVENOUS at 09:59

## 2021-09-29 RX ADMIN — PHENYLEPHRINE HYDROCHLORIDE 50 MCG: 10 INJECTION INTRAVENOUS at 12:56

## 2021-09-29 RX ADMIN — Medication 5 MG: at 10:10

## 2021-09-29 RX ADMIN — PHENYLEPHRINE HYDROCHLORIDE 50 MCG: 10 INJECTION INTRAVENOUS at 13:16

## 2021-09-29 RX ADMIN — SODIUM CHLORIDE, POTASSIUM CHLORIDE, SODIUM LACTATE AND CALCIUM CHLORIDE: 600; 310; 30; 20 INJECTION, SOLUTION INTRAVENOUS at 09:52

## 2021-09-29 RX ADMIN — HEPARIN SODIUM 1000 UNITS: 1000 INJECTION INTRAVENOUS; SUBCUTANEOUS at 13:36

## 2021-09-29 RX ADMIN — PROPOFOL 20 MG: 10 INJECTION, EMULSION INTRAVENOUS at 15:10

## 2021-09-29 ASSESSMENT — VISUAL ACUITY
OU: NORMAL ACUITY
OU: NORMAL ACUITY

## 2021-09-29 ASSESSMENT — ACTIVITIES OF DAILY LIVING (ADL)
ADLS_ACUITY_SCORE: 13

## 2021-09-29 NOTE — PLAN OF CARE
Problem: Pain Acute  Goal: Acceptable Pain Control and Functional Ability  Outcome: Improving     Patient admitted to 4a from PACU. Complaining of only minor ache from incision site on right side of neck from carotid endart. Not requesting any pain medication and is Aox4.

## 2021-09-29 NOTE — PLAN OF CARE
Admitted/transferred from:[facility]  Reason for admission/transfer: post op monitoring  2 RN skin assessment: completed by any ramirez  Result of skin assessment and interventions/actions: intact   Height, weight, drug calc weight:85.7 kg  Patient belongings none present- [facility] states on floor where he came from  MDRO education added to care plan oriented to unit  ?

## 2021-09-29 NOTE — PROGRESS NOTES
[facility], [place]   09/29/2021  Neurosurgery Progress Note:    Assessment:  Liu Ragsdale is a 81 year old male with symptomatic R ICA stenosis >70%, circumferential carotid bifurcation plaque, meets indication for carotid endarterectomy.      Carotid bifurcation at C3-4 level, no history of neck radiation, adequate head turn to left >45 degrees.        In addition to the assessment of diagnoses detailed above, this 81 year old male  patient admitted from transfer from another acute care hospital has the following conditions contributing to the complexity of their medical care:     Coronary artery disease,  Coagulation defect based on pre-admission aspirin use,  Chronic kidney disease stage 3,    Plan:  - ASA  - OR today    -----------------------------------  Josiah Moseley MD  Neurosurgery Resident, PGY-3    Please contact neurosurgery resident on call with questions.    Dial * * [phone], enter [phone] when prompted.   -----------------------------------    Interval History: Admitted    Objective:   Temp:  [97.7  F (36.5  C)-98.6  F (37  C)] 97.7  F (36.5  C)  Pulse:  [55-66] 64  Resp:  [16-18] 16  BP: (143-187)/(47-81) 161/66  SpO2:  [94 %-100 %] 98 %  No intake/output data recorded.    Gen: Appears comfortable, NAD  Neurologic:  -- Awake; Alert; oriented x 3  -- Follows commands briskly  -- +repetition, calculation, and naming  -- Speech fluent, spontaneous. No aphasia or dysarthria.  -- no gaze preference. No apparent hemineglect.  Cranial Nerves:  -- visual fields full to confrontation, PERRL 3-2mm bilat and brisk, extraocular movements intact  -- face symmetrical, tongue midline  -- sensory V1-V3 intact bilaterally  -- palate elevates symmetrically, uvula midline  -- hearing grossly intact bilat  -- Trapezii 5/5 strength bilat symmetric  -- Cerebellar: Finger nose finger without dysmetria, intact rapid alternating motions bilaterally     Motor:  Normal bulk / tone; no tremor,  rigidity, or bradykinesia.  No muscle wasting or fasciculations  No Pronator Drift       Delt Bi Tri Hand Flexion/  Extension Iliopsoas Quadriceps Hamstrings Tibialis Anterior Gastroc     C5 C6 C7 C8/T1 L2 L3 L4-S1 L4 S1   R 5 5 5 5 5 5 5 5 5   L 5 5 5 4+ 5 5 5 5 5   Sensory:  Diminished to light touch left first 3 digits.       Reflexes:       Bi Tri BR Prosper Pat Ach Bab     C5-6 C7-8 C6 UMN L2-4 S1 UMN   R 2+ 2+ 2+ Norm 2+ 2+ Norm   L 2+ 2+ 2+ Norm 2+ 2+ Norm      Gait: Deferred    LABS:  Recent Labs   Lab 09/28/21  2110 09/28/21  1725 09/28/21  1629 09/28/21  0448 09/27/21  0420 09/27/21  0420 09/26/21  1138 09/26/21  1113   NA  --   --   --  140  --  140  --  139   POTASSIUM  --   --   --  3.8  --  3.7  --  4.0   CHLORIDE  --   --   --  110*  --  107  --  108   CO2  --   --   --  26  --  28  --  27   ANIONGAP  --   --   --  4  --  5  --  4   GLC 93 95  --  98   < > 104*   < > 113*   BUN  --   --   --  25  --  30  --  22   CR  --   --  1.25 1.36*  --  1.46*   < > 1.32*   GWEN  --   --   --  8.8  --  8.3*  --  8.5    < > = values in this interval not displayed.       Recent Labs   Lab 09/28/21 0448   WBC 5.8   RBC 4.35*   HGB 12.8*   HCT 37.7*   MCV 87   MCH 29.4   MCHC 34.0   RDW 12.6          IMAGING:  No results found for this or any previous visit (from the past 24 hour(s)).

## 2021-09-29 NOTE — BRIEF OP NOTE
[facility]    Brief Operative Note    Pre-operative diagnosis: Stenosis of right carotid artery [I65.21]  Post-operative diagnosis Same as pre-operative diagnosis    Procedure: Procedure(s):  ENDARTERECTOMY, CAROTID  Surgeon: Surgeon(s) and Role:     * Raymond Andersen MD - Primary     * Avelino Etienne MD - Resident - Assisting  Anesthesia: General   Estimated blood loss: 50 ml  Drains: 10Fr Flat Tino-Jansen Drain  Specimens:   ID Type Source Tests Collected by Time Destination   1 : Right Carotid Plaque  Tissue Artery, Carotid SURGICAL PATHOLOGY EXAM Raymond Andersen MD 9/29/2021  1:49 PM      Findings:   Neuromonitoring signals stable throughout case. Large circumferential calcified plaque found at the bifurcation.   Complications: None.  Implants: * No implants in log *

## 2021-09-29 NOTE — ANESTHESIA POSTPROCEDURE EVALUATION
Patient: Liu Ragsdale    Procedure(s):  ENDARTERECTOMY, CAROTID    Diagnosis:Stenosis of right carotid artery [I65.21]  Diagnosis Additional Information: No value filed.    Anesthesia Type:  General    Note:  Disposition: ICU; Admission; Inpatient   Postop Pain Control: Uneventful            Sign Out: Well controlled pain   PONV: No   Neuro/Psych: Uneventful            Sign Out: Acceptable/Baseline neuro status   Airway/Respiratory: Uneventful            Sign Out: Acceptable/Baseline resp. status   CV/Hemodynamics: Uneventful            Sign Out: Acceptable CV status; No obvious hypovolemia; No obvious fluid overload   Other NRE: NONE   DID A NON-ROUTINE EVENT OCCUR? No           Last vitals:  Vitals Value Taken Time   /63 09/29/21 1630   Temp 37.3  C (99.2  F) 09/29/21 1600   Pulse 74 09/29/21 1637   Resp 8 09/29/21 1637   SpO2 97 % 09/29/21 1637   Vitals shown include unvalidated device data.    Electronically Signed By: Bryce Brown MD  September 29, 2021  4:39 PM

## 2021-09-29 NOTE — ANESTHESIA PROCEDURE NOTES
Arterial Line Procedure Note    Pre-Procedure   Staff -        Anesthesiologist:  Crista Goff MD       Performed By: anesthesiologist       Location: OR       Procedure Start/Stop Times: 9/29/2021 10:10 AM and 9/29/2021 10:12 AM       Pre-Anesthestic Checklist: patient identified, IV checked, risks and benefits discussed, informed consent, monitors and equipment checked, pre-op evaluation and at physician/surgeon's request  Timeout:       Correct Patient: Yes        Correct Procedure: Yes        Correct Site: Yes        Correct Position: Yes   Procedure   Procedure: arterial line and new line       Laterality: right       Insertion Site: radial.  Sterile Prep        Skin prep: Chloraprep  Insertion/Injection        Technique: Seldinger Technique        Catheter Type/Size: 20 G, 1.75 in/4.5 cm quick cath (integral wire)  Narrative         Secured by: other       Tegaderm dressing used.       Complications: None apparent,        Arterial waveform: Yes        IBP within 10% of NIBP: Yes

## 2021-09-29 NOTE — ANESTHESIA CARE TRANSFER NOTE
Patient: Liu Ragsdale    Procedure(s):  ENDARTERECTOMY, CAROTID    Diagnosis: Stenosis of right carotid artery [I65.21]  Diagnosis Additional Information: No value filed.    Anesthesia Type:   General     Note:    Oropharynx: spontaneously breathing  Level of Consciousness: awake  Oxygen Supplementation: face mask    Independent Airway: airway patency satisfactory and stable  Dentition: dentition unchanged  Vital Signs Stable: post-procedure vital signs reviewed and stable  Report to RN Given: handoff report given  Patient transferred to: PACU    Handoff Report: Identifed the Patient, Identified the Reponsible Provider, Reviewed the pertinent medical history, Discussed the surgical course, Reviewed Intra-OP anesthesia mangement and issues during anesthesia, Set expectations for post-procedure period and Allowed opportunity for questions and acknowledgement of understanding      Vitals:  Vitals Value Taken Time   /70 09/29/21 1556   Temp     Pulse 76 09/29/21 1556   Resp     SpO2 99 % 09/29/21 1558   Vitals shown include unvalidated device data.    Electronically Signed By: GIO Dhillon CRNA  September 29, 2021  3:59 PM

## 2021-09-29 NOTE — PLAN OF CARE
Status: Had episode of near syncope 9/26, found to have R carotid artery near-complete occlusion  Vitals: Hypertensive within parameters  Neuros: A&Ox4, strengths 5/5 throughout, baseline numbness/tingling in hands, L worse than R   IV: PIV SL  Resp/trach: Clear lung sounds, on RA  Diet: Regular, NPO @ midnight  Bowel status: LBM this AM  : Voiding, intermittent incontinence  Pain: R shoulder pain controlled with Voltaren gel and tylenol   Activity: Up SBA  Social: Pt contacted step-son and daughter this evening in regards to surgery tomorrow. They plan to be here tomorrow post-operatively, along with pts wife.  Plan: OR @ 3087 tomorrow for R carotid endarterectomy      [facility]  Belongings/meds:  Clothing remains with patient, voltaren gel from [facility]   2 RN Skin Assessment Completed by:  Fannie ISBELL  Non-intact findings documented (yes/no/NA):   - scab on L hand  - small scratches to LUE

## 2021-09-29 NOTE — ANESTHESIA PROCEDURE NOTES
Airway       Patient location during procedure: OR       Procedure Start/Stop Times: 9/29/2021 10:07 AM  Staff -        Anesthesiologist:  Crista Goff MD       CRNA: Yu Roman APRN CRNA       Other Anesthesia Staff: Annette Loyola       Performed By: CRNA  Consent for Airway        Urgency: elective  Indications and Patient Condition       Indications for airway management: brenda-procedural       Induction type:intravenous       Mask difficulty assessment: 2 - vent by mask + OA or adjuvant +/- NMBA    Final Airway Details       Final airway type: endotracheal airway       Successful airway: ETT - single and Oral  Endotracheal Airway Details        ETT size (mm): 7.5       Cuffed: yes       Successful intubation technique: video laryngoscopy       VL Blade Size: MAC 3       Grade View of Cords: 1       Position: Left       Measured from: gums/teeth       Secured at (cm): 23       Bite block used: None    Post intubation assessment        Placement verified by: capnometry, equal breath sounds and chest rise        Number of attempts at approach: 2 (first by SRNA)       Number of other approaches attempted: 0       Secured with: silk tape       Ease of procedure: easy       Dentition: Intact and Unchanged

## 2021-09-29 NOTE — PLAN OF CARE
Admitted for R ICA stenosis neat total. VSS on RA. Neuros intact  ex BUE numbness to hands. PIV SL. NPO since midnight but otherwise regular diet. Denies pain. Up SBA. Voids without difficulty. Scrub completed. Plan endarterectomy at 0745.

## 2021-09-29 NOTE — ANESTHESIA PREPROCEDURE EVALUATION
Anesthesia Pre-Procedure Evaluation    Patient: Liu Ragsdale   MRN: [id number] : 1940        Preoperative Diagnosis: Stenosis of right carotid artery [I65.21]   Procedure : Procedure(s):  ENDARTERECTOMY, CAROTID     Past Medical History:   Diagnosis Date     Angina at rest (H)      Arthritis      Cerebrovascular accident (H) 2021     Coronary artery disease involving native coronary artery of native heart without angina pectoris      Hypertension       Past Surgical History:   Procedure Laterality Date     ARTHROPLASTY HIP Left 2021    Procedure: Total Hip Arthroplasty;  Surgeon: Andrew Arriola MD;  Location: [facility]     COLONOSCOPY N/A 2016    Procedure: COLONOSCOPY;  Surgeon: Randy Avendano MD;  Location: [facility] GI     CV LEFT HEART CATH Left 03/10/2020    Procedure: Left Heart Cath;  Surgeon: Vicente Lopez MD;  Location:  HEART CARDIAC CATH LAB     EYE SURGERY      cataract surgery     VASECTOMY        Allergies   Allergen Reactions     Metoprolol Other (See Comments)     Side effects : dry mouth     Ampicillin Rash      Social History     Tobacco Use     Smoking status: Never Smoker     Smokeless tobacco: Never Used   Substance Use Topics     Alcohol use: Yes     Comment: [place] only      Wt Readings from Last 1 Encounters:   21 76.4 kg (168 lb 6.9 oz)        Anesthesia Evaluation            ROS/MED HX  ENT/Pulmonary:     (+) sleep apnea, allergic rhinitis,     Neurologic:     (+) CVA, date: 21, with deficits, TIA,     Cardiovascular:     (+) Dyslipidemia hypertension--CAD --stent-Taking blood thinners fainting (syncope). Previous cardiac testing   Echo: Date: 21 Results:  Interpretation Summary     The left ventricle is normal in size.  Left ventricular systolic function is normal.  The visual ejection fraction is 60-65%.  Normal left ventricular wall motion  Trivial pericardial effusion  Sinus rhythm was noted.  Compared to prior study, there is no  significant change  Stress Test: Date: Results:    ECG Reviewed: Date: Results:    Cath: Date: Results:      METS/Exercise Tolerance: >4 METS    Hematologic:     (+) anemia,     Musculoskeletal:  - neg musculoskeletal ROS     GI/Hepatic:    (-) GERD   Renal/Genitourinary:     (+) renal disease,     Endo:     (+) Obesity,     Psychiatric/Substance Use:  - neg psychiatric ROS     Infectious Disease:  - neg infectious disease ROS     Malignancy:  - neg malignancy ROS     Other:  - neg other ROS          Physical Exam    Airway        Mallampati: IV   TM distance: > 3 FB   Neck ROM: full   Mouth opening: > 3 cm    Respiratory Devices and Support         Dental       (+) missing      Cardiovascular          Rhythm and rate: regular and normal     Pulmonary           breath sounds clear to auscultation       Other findings: Right carotid bruit; left hand  strength 4/5    OUTSIDE LABS:  CBC:   Lab Results   Component Value Date    WBC 5.8 09/28/2021    WBC 7.9 09/27/2021    HGB 12.8 (L) 09/28/2021    HGB 11.9 (L) 09/27/2021    HCT 37.7 (L) 09/28/2021    HCT 35.6 (L) 09/27/2021     09/28/2021     09/27/2021     BMP:   Lab Results   Component Value Date     09/28/2021     09/27/2021    POTASSIUM 3.8 09/28/2021    POTASSIUM 3.7 09/27/2021    CHLORIDE 110 (H) 09/28/2021    CHLORIDE 107 09/27/2021    CO2 26 09/28/2021    CO2 28 09/27/2021    BUN 25 09/28/2021    BUN 30 09/27/2021    CR 1.25 09/28/2021    CR 1.36 (H) 09/28/2021    GLC 93 09/28/2021    GLC 95 09/28/2021     COAGS:   Lab Results   Component Value Date    PTT 34 09/26/2021    INR 1.03 09/26/2021     POC: No results found for: BGM, HCG, HCGS  HEPATIC:   Lab Results   Component Value Date    ALBUMIN 3.5 06/03/2019    PROTTOTAL 7.3 06/03/2019    ALT 29 08/19/2020    AST 17 06/03/2019    ALKPHOS 86 06/03/2019    BILITOTAL 0.5 06/03/2019     OTHER:   Lab Results   Component Value Date    A1C 6.0 (H) 09/26/2021    GWEN 8.8 09/28/2021     LIPASE 57 03/31/2007    AMYLASE 72 03/31/2007    TSH 1.41 05/30/2014       Anesthesia Plan    ASA Status:  4   NPO Status:  NPO Appropriate    Anesthesia Type: General.     - Airway: ETT   Induction: Intravenous.   Maintenance: Balanced.   Techniques and Equipment:     - Lines/Monitors: 2nd IV, Arterial Line     Consents    Anesthesia Plan(s) and associated risks, benefits, and realistic alternatives discussed. Questions answered and patient/representative(s) expressed understanding.     - Discussed with:  Patient      - Patient is DNR/DNI Status: No    Use of blood products discussed: Yes.     - Discussed with: Patient.     - Consented: consented to blood products            Reason for refusal: other.     Postoperative Care    Pain management: IV analgesics, Oral pain medications.   PONV prophylaxis: Ondansetron (or other 5HT-3), Dexamethasone or Solumedrol     Comments:                DEBBI GRANDA MD

## 2021-09-30 ENCOUNTER — APPOINTMENT (OUTPATIENT)
Dept: EDUCATION SERVICES | Facility: CLINIC | Age: 81
DRG: 038 | End: 2021-09-30
Attending: PSYCHIATRY & NEUROLOGY
Payer: MEDICARE

## 2021-09-30 ENCOUNTER — APPOINTMENT (OUTPATIENT)
Dept: OCCUPATIONAL THERAPY | Facility: CLINIC | Age: 81
DRG: 038 | End: 2021-09-30
Attending: PSYCHIATRY & NEUROLOGY
Payer: MEDICARE

## 2021-09-30 LAB
ALBUMIN SERPL-MCNC: 2.8 G/DL (ref 3.4–5)
ANION GAP SERPL CALCULATED.3IONS-SCNC: 8 MMOL/L (ref 3–14)
BUN SERPL-MCNC: 20 MG/DL (ref 7–30)
CALCIUM SERPL-MCNC: 8.2 MG/DL (ref 8.5–10.1)
CHLORIDE BLD-SCNC: 110 MMOL/L (ref 94–109)
CO2 SERPL-SCNC: 23 MMOL/L (ref 20–32)
CREAT SERPL-MCNC: 1.17 MG/DL (ref 0.66–1.25)
ERYTHROCYTE [DISTWIDTH] IN BLOOD BY AUTOMATED COUNT: 13.4 % (ref 10–15)
GFR SERPL CREATININE-BSD FRML MDRD: 59 ML/MIN/1.73M2
GLUCOSE BLD-MCNC: 128 MG/DL (ref 70–99)
HCT VFR BLD AUTO: 34.8 % (ref 40–53)
HGB BLD-MCNC: 11.7 G/DL (ref 13.3–17.7)
MAGNESIUM SERPL-MCNC: 1.8 MG/DL (ref 1.6–2.3)
MCH RBC QN AUTO: 29.8 PG (ref 26.5–33)
MCHC RBC AUTO-ENTMCNC: 33.6 G/DL (ref 31.5–36.5)
MCV RBC AUTO: 89 FL (ref 78–100)
PHOSPHATE SERPL-MCNC: 3 MG/DL (ref 2.5–4.5)
PLATELET # BLD AUTO: 151 10E3/UL (ref 150–450)
POTASSIUM BLD-SCNC: 4 MMOL/L (ref 3.4–5.3)
RBC # BLD AUTO: 3.92 10E6/UL (ref 4.4–5.9)
SODIUM SERPL-SCNC: 141 MMOL/L (ref 133–144)
WBC # BLD AUTO: 12.3 10E3/UL (ref 4–11)

## 2021-09-30 PROCEDURE — 999N000157 HC STATISTIC RCP TIME EA 10 MIN

## 2021-09-30 PROCEDURE — 80069 RENAL FUNCTION PANEL: CPT | Performed by: STUDENT IN AN ORGANIZED HEALTH CARE EDUCATION/TRAINING PROGRAM

## 2021-09-30 PROCEDURE — 85014 HEMATOCRIT: CPT | Performed by: STUDENT IN AN ORGANIZED HEALTH CARE EDUCATION/TRAINING PROGRAM

## 2021-09-30 PROCEDURE — 120N000002 HC R&B MED SURG/OB UMMC

## 2021-09-30 PROCEDURE — 97530 THERAPEUTIC ACTIVITIES: CPT | Mod: GO | Performed by: OCCUPATIONAL THERAPIST

## 2021-09-30 PROCEDURE — 99232 SBSQ HOSP IP/OBS MODERATE 35: CPT | Mod: GC | Performed by: PSYCHIATRY & NEUROLOGY

## 2021-09-30 PROCEDURE — 250N000013 HC RX MED GY IP 250 OP 250 PS 637

## 2021-09-30 PROCEDURE — 97535 SELF CARE MNGMENT TRAINING: CPT | Mod: GO | Performed by: OCCUPATIONAL THERAPIST

## 2021-09-30 PROCEDURE — 83735 ASSAY OF MAGNESIUM: CPT | Performed by: STUDENT IN AN ORGANIZED HEALTH CARE EDUCATION/TRAINING PROGRAM

## 2021-09-30 PROCEDURE — 250N000011 HC RX IP 250 OP 636: Performed by: STUDENT IN AN ORGANIZED HEALTH CARE EDUCATION/TRAINING PROGRAM

## 2021-09-30 PROCEDURE — 999N000015 HC STATISTIC ARTERIAL MONITORING DAILY

## 2021-09-30 PROCEDURE — 97165 OT EVAL LOW COMPLEX 30 MIN: CPT | Mod: GO | Performed by: OCCUPATIONAL THERAPIST

## 2021-09-30 PROCEDURE — 999N000147 HC STATISTIC PT IP EVAL DEFER

## 2021-09-30 PROCEDURE — 258N000003 HC RX IP 258 OP 636: Performed by: ANESTHESIOLOGY

## 2021-09-30 RX ORDER — MAGNESIUM SULFATE HEPTAHYDRATE 40 MG/ML
2 INJECTION, SOLUTION INTRAVENOUS ONCE
Status: COMPLETED | OUTPATIENT
Start: 2021-09-30 | End: 2021-09-30

## 2021-09-30 RX ADMIN — SODIUM CHLORIDE, POTASSIUM CHLORIDE, SODIUM LACTATE AND CALCIUM CHLORIDE: 600; 310; 30; 20 INJECTION, SOLUTION INTRAVENOUS at 06:16

## 2021-09-30 RX ADMIN — DOCUSATE SODIUM 50 MG AND SENNOSIDES 8.6 MG 1 TABLET: 8.6; 5 TABLET, FILM COATED ORAL at 07:55

## 2021-09-30 RX ADMIN — CARVEDILOL 6.25 MG: 3.12 TABLET, FILM COATED ORAL at 07:55

## 2021-09-30 RX ADMIN — ACETAMINOPHEN 975 MG: 325 TABLET, FILM COATED ORAL at 04:07

## 2021-09-30 RX ADMIN — MAGNESIUM SULFATE HEPTAHYDRATE 2 G: 40 INJECTION, SOLUTION INTRAVENOUS at 06:33

## 2021-09-30 RX ADMIN — ATORVASTATIN CALCIUM 20 MG: 20 TABLET, FILM COATED ORAL at 07:55

## 2021-09-30 RX ADMIN — DICLOFENAC SODIUM 2 G: 10 GEL TOPICAL at 20:39

## 2021-09-30 RX ADMIN — ASPIRIN 325 MG ORAL TABLET 325 MG: 325 PILL ORAL at 07:55

## 2021-09-30 RX ADMIN — CARVEDILOL 6.25 MG: 3.12 TABLET, FILM COATED ORAL at 18:15

## 2021-09-30 RX ADMIN — DOCUSATE SODIUM 50 MG AND SENNOSIDES 8.6 MG 1 TABLET: 8.6; 5 TABLET, FILM COATED ORAL at 20:39

## 2021-09-30 RX ADMIN — ACETAMINOPHEN 975 MG: 325 TABLET, FILM COATED ORAL at 20:39

## 2021-09-30 ASSESSMENT — ACTIVITIES OF DAILY LIVING (ADL)
PREVIOUS_RESPONSIBILITIES: MEAL PREP;HOUSEKEEPING;LAUNDRY;SHOPPING;YARDWORK;MEDICATION MANAGEMENT;FINANCES;DRIVING
ADLS_ACUITY_SCORE: 11
ADLS_ACUITY_SCORE: 13
ADLS_ACUITY_SCORE: 12
ADLS_ACUITY_SCORE: 13
ADLS_ACUITY_SCORE: 13
ADLS_ACUITY_SCORE: 10

## 2021-09-30 NOTE — CONSULTS
Stroke Education Note    The following information has been reviewed with the patient:    1. Warning signs of stroke    2. Calling 911 if having warning signs of stroke    3. All modifiable risk factors: hypertension, CAD, atrial fib, diabetes, hypercholesterolemia, smoking, substance abuse, diet, physical inactivity, obesity, sleep apnea.    4. Patient's risk factors for stroke which include: HTN, CAD with stenting, HTN, HLD, sleep apnea, physical inactivity, unhealthy diet    5. Follow-up plan for after discharge    6. Discharge medications which include: Aspirin, Coreg    In addition, the above information was given to the patient in writing as a part of the [facility] Stroke Class Handout.    Learner's response to risk factors / lifestyle modification education: Taking steps     DANNI Carney RN     Carotid endarterectomy discharge education was completed with patient as well    Carotid endarterectomy Discharge education handout given.

## 2021-09-30 NOTE — PROGRESS NOTES
Neurosurgery Brief Progress Note    JUSTYNA drain removal    Drain not deemed to be necessary with low output. Drain site was prepped in usual sterile fashion with chloraprep. The drain was taken off suction. The sutures securing the drain were cut and the site was re-prepped. The drain was removed with tip intact. Steri-strips were placed with adequate hemostasis. No immediate complication was observed and the patient tolerated the procedure well.     Jolly Bee MD  Neurosurgery, PGY-2    Please contact neurosurgery resident on call with questions.    Dial * * [phone], enter [phone] when prompted.

## 2021-09-30 NOTE — PROGRESS NOTES
[facility], [place]   09/30/2021  Neurosurgery Progress Note:    Assessment:  Liu Ragsdale is a 81 year old male with symptomatic R ICA stenosis >70%, circumferential carotid bifurcation plaque, meets indication for carotid endarterectomy. He is now s/p right CEA on 9/29.     Carotid bifurcation at C3-4 level, no history of neck radiation, adequate head turn to left >45 degrees.        In addition to the assessment of diagnoses detailed above, this 81 year old male  patient admitted from transfer from another acute care hospital has the following conditions contributing to the complexity of their medical care:     Coronary artery disease,  Coagulation defect based on pre-admission aspirin use,  Chronic kidney disease stage 3,    Plan:  - ASA  - Serial neuro exams  - Monitor JUSTYNA drain  - SBP < 160  - Pain control  - HOB > 30 degrees  - Advance diet as tolerated  - Bowel regimen  - PRN antiemetics  - IVF until taking adequate PO  - PT/OT  - SCDs for DVT proph    -----------------------------------  Josiah Moseley MD  Neurosurgery Resident, PGY-3    Please contact neurosurgery resident on call with questions.    Dial * * [phone], enter [phone] when prompted.   -----------------------------------    Interval History: OR yesterday    Objective:   Temp:  [97.6  F (36.4  C)-99.2  F (37.3  C)] 97.6  F (36.4  C)  Pulse:  [67-93] 81  Resp:  [10-18] 18  BP: (144-175)/(61-85) 151/85  MAP:  [68 mmHg-106 mmHg] 69 mmHg  Arterial Line BP: (120-179)/(24-70) 120/40  SpO2:  [93 %-98 %] 94 %  I/O last 3 completed shifts:  In: 2310.67 [I.V.:2260.67; IV Piggyback:50]  Out: 1670 [Urine:1600; Drains:20; Blood:50]    Gen: Appears comfortable, NAD  Neurologic:  -- Awake; Alert; oriented x 3  -- Follows commands briskly  -- +repetition, calculation, and naming  -- Speech fluent, spontaneous. No aphasia or dysarthria.  -- no gaze preference. No apparent hemineglect.  Cranial Nerves:  -- visual fields full to  confrontation, PERRL 3-2mm bilat and brisk, extraocular movements intact  -- face symmetrical, tongue midline  -- sensory V1-V3 intact bilaterally  -- palate elevates symmetrically, uvula midline  -- hearing grossly intact bilat  -- Trapezii 5/5 strength bilat symmetric  -- Cerebellar: Finger nose finger without dysmetria, intact rapid alternating motions bilaterally     Motor:  Normal bulk / tone; no tremor, rigidity, or bradykinesia.  No muscle wasting or fasciculations  No Pronator Drift       Delt Bi Tri Hand Flexion/  Extension Iliopsoas Quadriceps Hamstrings Tibialis Anterior Gastroc     C5 C6 C7 C8/T1 L2 L3 L4-S1 L4 S1   R 5 5 5 5 5 5 5 5 5   L 5 5 5 4+ 5 5 5 5 5   Sensory:  Diminished to light touch left first 3 digits.       Reflexes:       Bi Tri BR Prosper Pat Ach Bab     C5-6 C7-8 C6 UMN L2-4 S1 UMN   R 2+ 2+ 2+ Norm 2+ 2+ Norm   L 2+ 2+ 2+ Norm 2+ 2+ Norm      Gait: Deferred    LABS:  Recent Labs   Lab 09/29/21  0754 09/29/21  0400 09/28/21  2110 09/28/21  1725 09/28/21  1629 09/28/21  0448 09/27/21  0420 09/27/21  0420     --   --   --   --  140  --  140   POTASSIUM 4.0  --   --   --   --  3.8  --  3.7   CHLORIDE 110*  --   --   --   --  110*  --  107   CO2 25  --   --   --   --  26  --  28   ANIONGAP 5  --   --   --   --  4  --  5   GLC 94 87 93   < >  --  98   < > 104*   BUN 22  --   --   --   --  25  --  30   CR 1.12  --   --   --  1.25 1.36*   < > 1.46*   GWEN 9.1  --   --   --   --  8.8  --  8.3*    < > = values in this interval not displayed.       Recent Labs   Lab 09/29/21  0903   WBC 5.3   RBC 4.37*   HGB 13.1*   HCT 38.7*   MCV 89   MCH 30.0   MCHC 33.9   RDW 12.9          IMAGING:  Recent Results (from the past 24 hour(s))   CT Head w/o Contrast    Narrative    Stealth CT imaging for purposes of stereotactic evaluation    Provided History: Carotid stenosis screening, no risk factors    Comparison: Brain MRI from 9/27/2021.    Technique: CT imaging performed with axial, sagittal, and  coronal  reconstructed images obtained without intravenous contrast.    Findings: Limited imaging for stereotactic localization demonstrates  no overt mass effect, midline shift, or acute intracranial hemorrhage.  The ventricles are not enlarged, and there is no evidence of  hydrocephalus. Atherosclerotic calcifications within bilateral carotid  bulbs and vertebral arteries. Severe atherosclerotic calcification  within the right carotid bifurcation. Please see dedicated head and  neck angiogram for details.    The paranasal sinuses and mastoid air cells are clear. Bilateral  pseudophakia.      Impression    Impression: Limited imaging performed primarily for the purposes of  stereotactic localization. No acute intracranial pathology. Vascular  calcifications, as described above.    I have personally reviewed the examination and initial interpretation  and I agree with the findings.    JESSICA PANCHAL MD         SYSTEM ID:  [id number]   CTA Head Neck with Contrast    Narrative    CTA  HEAD NECK WITH CONTRAST 9/29/2021 10:03 AM    CT angiogram of the neck   CT angiogram of the base of the brain with contrast  Reconstruction on the 3D workstation    Provided History:  Carotid stenosis screening, no risk factors    Comparison:  MRI 9/27/2021.      Technique:  HEAD and NECK CTA: During rapid bolus intravenous injection of  nonionic contrast material, axial images were obtained using thin  collimation multidetector helical technique from the base of the upper  aortic arch through the [place]. This CT angiogram data was  reconstructed at thin intervals with mild overlap. Images were sent to  the 3D workstation, and 3D reconstructions were obtained. The axial  source images, multiplanar reformations, 3D reconstructions in both  maximum intensity projection display and volume rendered models were  reviewed, with reconstructions performed by the technologist.    Contrast: iopamidol (ISOVUE-370) solution 75  mL    Findings:    Head CTA demonstrates no aneurysm or stenosis of the major  intracranial arteries. There is atherosclerosis in the intracranial  segments of both internal carotid arteries. The left internal carotid  artery bifurcates into the left anterior and middle cerebral artery.  The left anterior cerebral artery supplies the right A2 segment. There  is a hypoplastic right A1 segment and a normal right middle cerebral  artery. The proximal and distal branches of both anterior and middle  cerebral arteries appear unremarkable. The anterior communicating  artery appears normal. Both posterior communicating arteries are not  well visualized. The basilar artery appears normal and bifurcates into  both posterior cerebral arteries which also appear unremarkable.  There is atherosclerotic narrowing in the V4 segment of hypoplastic  right vertebral artery. The left vertebral artery is dominant.    Neck CTA demonstrates near-complete occlusion of the right internal  carotid artery at the carotid bifurcation which is at the level of  C3-C4 .  The right distal internal carotid artery appears to be patent.  The left carotid arteries appear patent.   There is atherosclerotic plaque at the origin of the left vertebral  artery from the left subclavian artery. The normal distal right  internal carotid artery measures 3.5 mm. The normal distal left  internal carotid artery measures 4.5 mm.     No mass within the visualized portions of the cervical soft tissues or  lung apices.       Impression    Impression:      1. There is a near complete occlusion of right internal carotid artery  at the bifurcation. The right internal carotid artery is patent distal  to the stenosis.  2. There is nonocclusive atherosclerosis involving the intracranial  segments of both internal carotid arteries.  3. Remaining major cervical arteries appear patent.    I have personally reviewed the examination and initial interpretation  and I agree with  the findings.    JESSICA PANCHAL MD         SYSTEM ID:  [id number]   CT Head w/o Contrast    Narrative    EXAM: CT HEAD W/O CONTRAST  9/29/2021 7:41 PM     HISTORY:  mental status change       COMPARISON:  9/29/2021, MRI 9/27/2021    TECHNIQUE: Using multidetector thin collimation helical acquisition  technique, axial, coronal and sagittal CT images from the skull base  to the vertex were obtained without intravenous contrast.    Findings: No intracranial hemorrhage, mass effect, or midline shift.  Multiple subcortical hypodensities from prior infarcts. No  ventriculomegaly. Patent basal cisterns.    Atraumatic scalp, calvarium and skull base. Clear paranasal sinuses  and mastoid air cells. Intact globes and intraocular structures.      Impression    Impression: No acute intracranial pathology.     I have personally reviewed the examination and initial interpretation  and I agree with the findings.    MADELIN FIERRO MD         SYSTEM ID:  [id number]

## 2021-09-30 NOTE — PROGRESS NOTES
09/30/21 1300   Quick Adds   Type of Visit Initial Occupational Therapy Evaluation   Living Environment   People in home spouse   Current Living Arrangements house   Home Accessibility stairs to enter home;stairs within home   Transportation Anticipated car, drives self;family or friend will provide   Living Environment Comments Lives in a house with his wife who has memory deficits, he can leave her ~2 hours at a time but is her caregiver.  Retired   Self-Care   Usual Activity Tolerance excellent   Current Activity Tolerance good   Regular Exercise Yes   Activity/Exercise Type walking   Exercise Amount/Frequency   (not as much as he would like)   Equipment Currently Used at Home none   Activity/Exercise/Self-Care Comment IND with IADLs such as lawn care, walks around driveway which is long   Instrumental Activities of Daily Living (IADL)   Previous Responsibilities meal prep;housekeeping;laundry;shopping;yardwork;medication management;finances;driving   IADL Comments caregiver for wife   Disability/Function   Hearing Difficulty or Deaf no   Wear Glasses or Blind yes   Vision Management readers   Concentrating, Remembering or Making Decisions Difficulty no   Difficulty Communicating no   Difficulty Eating/Swallowing no   Walking or Climbing Stairs Difficulty no   Dressing/Bathing Difficulty no   Toileting issues no   Doing Errands Independently Difficulty (such as shopping) no   Fall history within last six months no   Change in Functional Status Since Onset of Current Illness/Injury yes   General Information   Onset of Illness/Injury or Date of Surgery 09/29/21   Referring Physician Dr Trudy MTZ   Patient/Family Therapy Goal Statement (OT) dc home, improve L hand function   Additional Occupational Profile Info/Pertinent History of Current Problem  81 year old male with symptomatic R ICA stenosis >70%, circumferential carotid bifurcation plaque, meets indication for carotid endarterectomy. He is now s/p right CEA  "on 9/29.   General Observations and Info up w A, ok per RN   Cognitive Status Examination   Orientation Status orientation to person, place and time   Affect/Mental Status (Cognitive) WFL   Visual Perception   Visual Impairment/Limitations WFL   Impact of Vision Impairment on Function (Vision) initial deficits during stroke all resolved   Sensory   Sensory Comments slight deficit on L hand   Pain Assessment   Patient Currently in Pain No   Integumentary/Edema   Integumentary/Edema no deficits were identifed   Posture   Posture Comments intact   Range of Motion Comprehensive   General Range of Motion no range of motion deficits identified   Comment, General Range of Motion slight swelling to L hand, some limitation to R shoulder 2/2 baseline injury   Strength Comprehensive (MMT)   Comment, General Manual Muscle Testing (MMT) Assessment L  weak, all else WFL   Coordination   Upper Extremity Coordination Left UE impaired   Fine Motor Coordination improved from 9/27 eval at prior hospital, feels \"clumsy\"   Coordination Comments deficits on L, dominant hand   Bed Mobility   Bed Mobility No deficits identified   Transfers   Transfer Comments SBA initially, assist only for line management   Balance   Balance Comments intact   Activities of Daily Living   BADL Assessment lower body dressing;grooming;feeding;toileting   Lower Body Dressing Assessment   [place] Level (Lower Body Dressing) independent   Grooming Assessment   [place] Level (Grooming) independent   Comment (Grooming) clumsy L hand although needed no assist   Eating/Self Feeding   [place] Level (Feeding) independent   Toileting   [place] Level (Toileting) independent   Clinical Impression   Criteria for Skilled Therapeutic Interventions Met (OT) yes   OT Diagnosis stroke and cardiac procedure   OT Problem List-Impairments impacting ADL coordination;strength   Assessment of Occupational Performance 1-3 Performance Deficits   Identified " Performance Deficits FMC/home maagement, med management, handwriting   Planned Therapy Interventions (OT) strengthening;fine motor coordination training   Clinical Decision Making Complexity (OT) low complexity   Therapy Frequency (OT) 3x/week   Predicted Duration of Therapy 1 week   Comment-Clinical Impression Pt has orders for hand therapy 2/2 carpel tunnel which he planned to schedule soon   OT Discharge Planning    OT Discharge Recommendation (DC Rec) home with outpatient occupational therapy   OT Rationale for DC Rec OP OT for L hand coordination and strength   OT Brief overview of current status  IND in room and hallway ambulation no AE, VSS see VS flowsheet   Total Evaluation Time (Minutes)   Total Evaluation Time (Minutes) 5

## 2021-09-30 NOTE — PLAN OF CARE
Neuro: pt alert and oriented x4, PERRLA, slight LUE weakness, LUE baseline numbness and tingling and slight L facial droop, other wise neuro intact   Stand by assist   Cardiac: NSR, labatolol given x1 to maintain SBP goal < 160  Respiratory: on 2L NC over night for FANI, clear lung sounds  GI: regular diet, no BM  : voiding spontaneously in commode   Labs: Mg replaced per protocol      Plan: Continue to monitor neuro status

## 2021-09-30 NOTE — PHARMACY
Pharmacy Consult to evaluate for medication related stroke core measures    Liu Ragsdale, 81 year old male admitted for R ICA stenosis on 9/28/2021 after presenting to [facility] with stroke symptoms.    Thrombolytic was not given because of not indicated in ICA stenosis    VTE Prophylaxis SCDs /PCDs placed on 09/28/2021, as appropriate prior to end of hospital day 2.    Antithrombotic: aspirin started on 09/29/2021, as appropriate by end of hospital day 2. Continue antithrombotic therapy on discharge to meet quality measures, unless contraindicated.    Anticoagulation if history of A-fib/flutter: Patient does not have history of A-fib/flutter - anticoagulation not required for medication related stroke core measures.     LDL Cholesterol Calculated   Date Value Ref Range Status   09/27/2021 15 <=100 mg/dL Final   08/19/2020 27 <100 mg/dL Final     Comment:     Desirable:       <100 mg/dl       Patient currently receiving Lipitor (atorvastatin) continue statin on discharge to meet quality measures, unless contraindicated.    Recommendations: None at this time    Thank you for the consult.    Mine Gabriel [facility] 9/30/2021 12:35 PM

## 2021-09-30 NOTE — PLAN OF CARE
Physical Therapy: Orders received. Chart reviewed and discussed with care team.? Physical Therapy not indicated due to pt mobilizing mod I to I with OT.? Defer discharge recommendations to OT.? Will complete orders.

## 2021-09-30 NOTE — PROGRESS NOTES
[facility]  NeuroCritical Care Progress Note    Patient Name:  Liu Ragsdale  MRN:  [id number]    :  1940  Date of Service:  2021  Primary care provider:  Piyush Rogers        24 HOUR EVENTS:  - Received CEA yesterday  - Doing well overnight, occasionally needing PRNs for BPs  - JUSTYNA drain pulled this AM    ASSESSMENT/PLAN:  Liu Ragsdale is a 81 year old h/o CAD s/p stenting in , HTN, HLD, sleep apnea, b/l carpal tunnel  admitted 2021 following a syncopal episode with CTA showing R ICA occlusion and MRI with numerous small R sided infarcts now s/p CEA. Exam with L hand weakness, but otherwise intact.     NEURO:  #Right MCA acute ischemic stroke  #Symptomatic right ICA stenosis s/p R CEA 21  - Neurochecks Q 4 hours  - SBP goal <160  - Continue  mg daily  - Atorvastatin 20 mg daily  - LDL: 15, A1c: 6.0  - Telemetry  - Bedside Glucose Monitoring  - PT/OT/SLP   - Stroke Education, depression and apnea screen prior to discharge  - Carotid ultrasound in 1 month    #Pain/Sedation  - Scheduled Tylenol  - PRN Oxy    #Bilateral carpal tunnel  Patient reports that his PCP has recently referred him for further work up of carpal tunnel as he has failed conservative treatment.  - Follow up outpatient    CARDIO:  #CAD  #HTN  #HLD  Patient underwent stenting in 2020. Echocardiogram completed  with EF 60 - 65%, normal atria size.  - Coreg 6.25 BID  - Atorvastatin 20 mg daily  - SBP goal < 160    PULM:  #FANI  Does not use a CPAP at home  - Sleep referral at discharge  - Intermittent O2 use at night    GI:  No active issues   - PRN antiemetics and bowel regimen available    RENAL:  #ADRIANA on CKD3   Baseline Cr 1.05 - 1.13.   - Avoid nephrotoxic medications  - Patient able to take PO, stopping LR @ 100 ml/hr    ENDO:  A1c 6.0  No active issues    HEME:  #Anemia  Developed post-operatively, likely due to blood loss from procedure  - Continue to  monitor    ID:  #Mild Leukocytosis  WBC to 12.3 post-op, likely due to operation  - Continue to monitor     Checklist:  FEN: Regular diet  LDA: PIVx2, pulling A-line  PPx:   - DVT: SCDs  - GI: Not indicated  Code: Full    Dispo: Transer to 6A    Patient discussed with Attending Dr. Lee .    Constantine Oliveros MD  PGY-2 Neurology Resident  ASCOM *[phone]    ______________________________    24 HOUR VITAL SIGNS SUMMARY:   Temperatures:  Current - Temp: 97.6  F (36.4  C); Max - Temp  Av.1  F (36.7  C)  Min: 97.6  F (36.4  C)  Max: 99.2  F (37.3  C)  Respiration range: Resp  Av.7  Min: 10  Max: 18  Pulse range: Pulse  Av.2  Min: 71  Max: 93  Blood pressure range: Systolic (24hrs), Avg:160 , Min:144 , Max:175   ; Diastolic (24hrs), Av, Min:65, Max:85    Pulse oximetry range: SpO2  Av.8 %  Min: 92 %  Max: 99 %    VENTILATOR SETTINGS:  Resp: 16        Intake/Output Summary (Last 24 hours) at 2021 0725  Last data filed at 2021 0700  Gross per 24 hour   Intake 3287.67 ml   Output 2007 ml   Net 1280.67 ml       Arterial Line BP: (120-179)/(24-70) 153/59  MAP:  [68 mmHg-106 mmHg] 94 mmHg  BP - Mean:  [] 96    PHYSICAL EXAMINATION:   BP (!) 151/85   Pulse 82   Temp 97.6  F (36.4  C) (Oral)   Resp 16   Wt 78.3 kg (172 lb 9.9 oz)   SpO2 99%   BMI 31.57 kg/m      General: Awake and alert, Lying in bed, NAD, fully cooperative  HEENT: Normocephalic, atraumatic, no epistaxis, no oral lesions, MMM  Resp: no increased work of breathing  Extremities: Warm and well perfused, peripheral pulses present, no edema  Psych: Normal mood and affect    Neuro:  Mental status: Awake, alert, attentive; oriented to P/P/T/M  Speech: Fluent, comprehension and repetition intact; No dysarthria  Cranial nerves: Eyes conjugate, PERRLA, EOMI w/ normal and smooth pursuit, face expression symmetric, tongue/uvula midline.  Motor: Tone normal. 5/5 strength in x4E. No atrophy or twitches. Pronator drift absent  bilaterally.   Sensory: Intact to LT x4E; No lateral extinction   Coordination: FNF intact bilaterally, no dysmetria;   Gait: Deferred    CURRENT MEDICATIONS:    acetaminophen  975 mg Oral Q8H     aspirin  325 mg Oral Daily     atorvastatin  20 mg Oral Daily     carvedilol  6.25 mg Oral BID w/meals     magnesium sulfate  2 g Intravenous Once     polyethylene glycol  17 g Oral Daily     senna-docusate  1 tablet Oral BID     sodium chloride (PF)  3 mL Intracatheter Q8H     sodium chloride (PF)  3 mL Intracatheter Q8H       PRN MEDICATIONS:  [START ON 10/2/2021] acetaminophen, acetaminophen, sore throat lozenge, bisacodyl, diclofenac, labetalol, lidocaine 4%, lidocaine (buffered or not buffered), magnesium hydroxide, - MEDICATION INSTRUCTIONS -, - MEDICATION INSTRUCTIONS -, ondansetron **OR** ondansetron, oxyCODONE **OR** oxyCODONE, prochlorperazine **OR** prochlorperazine, BETA BLOCKER NOT PRESCRIBED, senna-docusate, sodium chloride (PF), sodium chloride (PF)    INFUSIONS:    lactated ringers 100 mL/hr at 09/30/21 0616     - MEDICATION INSTRUCTIONS -       - MEDICATION INSTRUCTIONS -       BETA BLOCKER NOT PRESCRIBED         ALLERGIES:  Allergies   Allergen Reactions     Metoprolol Other (See Comments)     Side effects : dry mouth     Ampicillin Rash         LABS/STUDIES:  Recent Labs   Lab Test 09/30/21  0406 09/29/21  0903 09/29/21  0754 09/29/21  0400 09/28/21  1725 09/28/21  1629 09/28/21  0448 09/28/21  0448     --  140  --   --   --   --  140   POTASSIUM 4.0  --  4.0  --   --   --   --  3.8   CHLORIDE 110*  --  110*  --   --   --   --  110*   CO2 23  --  25  --   --   --   --  26   ANIONGAP 8  --  5  --   --   --   --  4   *  --  94 87   < >  --   --  98   BUN 20  --  22  --   --   --   --  25   CR 1.17  --  1.12  --   --  1.25   < > 1.36*   GWEN 8.2*  --  9.1  --   --   --   --  8.8   WBC 12.3* 5.3  --   --   --   --   --  5.8   RBC 3.92* 4.37*  --   --   --   --   --  4.35*   HGB 11.7* 13.1*  --    --   --   --   --  12.8*    164  --   --   --   --   --  169    < > = values in this interval not displayed.       Recent Labs   Lab Test 09/29/21  0754 09/26/21  1318 09/26/21  1113 02/03/21  1220 03/10/20  0955 03/10/20  0955   INR 0.84*  --  1.03 1.03   < > 1.04   PTT 24 34  --   --   --  35    < > = values in this interval not displayed.       No lab results found in last 7 days.      IMAGING:  No new imaging

## 2021-09-30 NOTE — OP NOTE
Procedure Date: 09/29/2021    PREOPERATIVE DIAGNOSES:  Symptomatic, greater than 70% right carotid stenosis.    POSTOPERATIVE DIAGNOSES:  Symptomatic, greater than 70% right carotid stenosis.    TITLE OF PROCEDURE:  Right carotid endarterectomy.    ANESTHESIA:  GETA    ATTENDING SURGEON:  Raymond Andersen MD    RESIDENT SURGEONS:  1.  Dr. Avelino Etienne  2.  Dr. Antoinette Camarillo.    HISTORY OF PRESENT ILLNESS:  Mr. Ragsdale is an 81-year-old left-handed man who presented with right hemispheric strokes and right common carotid artery and internal carotid artery occlusion.  He was placed on anticoagulation and the occlusion resolved, revealing severe stenosis at the origin of the right internal carotid artery.  His main clinical deficit is left upper extremity weakness.  We have determined that carotid revascularization is indicated to reduce the risk of recurrent stroke.  The plaque is concentrically calcified and we have determined that carotid endarterectomy is superior to stent placement for revascularization.  He presents for the above-stated procedure.    DESCRIPTION OF PROCEDURE:  Upon intubation and induction of general anesthesia, the patient was placed in supine position on the operating table.  A gel roll was placed under the shoulders and his neck was rotated about 20 degrees to the left.  Electrodes for SSEP and EEG monitoring were established by the [employer] staff.  We planned an incision along the anterior border of the right sternocleidomastoid.  His right neck and anterior chest wall were prepared and draped in the usual sterile fashion.  The planned incision was infiltrated with 1% lidocaine with epinephrine.  A timeout was performed.     We centered the incision around the angle of the mandible.  The incision was carried down to the subcutaneous tissue, which was divided with monopolar cautery.  We divided the platysma with the Metzenbaum scissors.  The anterior border of the SCM was dissected sharply  from the deep cervical fascia.  The carotid sheath was opened and we identified the right common carotid artery (CCA).  We isolated this vessel and placed a vessel loop around it.  The patient received 6500 units of IV heparin bolus.  He was later redosed an additional 2000 units during cross-clamping.  The wound edges were retracted with fishhooks and the Lone Star ring.  We continued the dissection cephalad.  A large common facial vein was isolated, ligated and divided.  This provided visualization of the carotid bifurcation.  We followed the ansa superiorly to the hypoglossal nerve.  A temporary silk ligature was placed around the right superior thyroid artery and prominent ascending pharyngeal artery.  A vessel loop was placed around the main trunk of the external carotid artery (ECA).  Finally, we identified the internal carotid artery (ICA).  We dissected the ICA and identified the tortuosity distal to the calcified plaque.  A vessel loop was placed around the ICA distal to the plaque.  The patient's systolic blood pressures were raised into the 160s by Anesthesiology.  We applied a long straight Sugita aneurysm clip across the distal ICA.  There were no changes in monitoring after 2 minutes and we continued cross-clamping.  We applied another Sugita clip to the ECA.  Finally, we applied an angled Renea clamp to the distal CCA.    An arteriotomy was initiated in the distal CCA with an 11 blade.  There was some back bleeding from the ECA and we adjusted the clip to stop the bleeding.  The arteriotomy was extended with Lopez scissors into the ICA.  The plaque was heavily calcified and the arteriotomy was extended superficial to the plaque into the ICA.  There was a minimal plaque in the CCA and most of the stenosis was at the carotid bifurcation and proximal ICA.  We developed a plane between the plaque and the intima.  The plaque was transected in the distal CCA.  We everted the plaque from the ECA.  Finally,  we removed the plaque from the ICA.  There was a raised edge between the plaque and the native vessel and the native ICA.  We extended the arteriotomy in the ICA and removed additional plaque until the edge had a smoother transition.  We tacked down the back wall of the plaque with a double arm 7-0 Prolene.  The lumen was irrigated with heparinized saline and residual fronds of plaque were removed.  The plaque was sent off for permanent pathology.  We closed the arteriotomy with a double arm 6-0 Prolene starting at each end.  We closed the arteriotomy in a simple running fashion.  We intermittently confirmed brisk back bleeding from the ICA.  After closing the arteriotomy, the clamps were removed from the CCA and the ECA for 30 seconds.  There was no breakthrough bleeding from the suture line.  The clip was removed from the ICA.  There was a strong pulse in the ICA with no thrill.  The crossclamp time was 75 minutes.  The wound was irrigated and bleeding points were secured.  A layer of Surgicel and Gelfoam was applied to the carotid suture line.  A 10 flat JUSTYNA drain was placed in the wound and tunneled out of the skin through a separate stab incision.    The platysma was reapproximated with interrupted 2-0 Vicryl.  The subcutaneous tissue was closed with interrupted inverted 3-0 Vicryl.  Skin was closed with a 4-0 Monocryl in a running subcuticular fashion.  A sterile dressing was applied.  There were no changes in SSEP or EEG monitoring throughout the operation.  Blood loss was approximately 50 mL.  Sponge and needle counts were correct at the end of the case.  The patient was extubated and transported to the recovery room without incident.  I was present for the key portions and immediately available for the entire operation.      Raymond Andersen MD        D: 2021   T: 2021   MT: [facility]    Name:     GISELE LOZANO  MRN:      [id number]        Account:        [id number]   :       1940           Procedure Date: 09/29/2021     Document: [id number]

## 2021-09-30 NOTE — CONSULTS
Care Management Initial Consult    General Information  Assessment completed with: Patient,    Type of CM/SW Visit: Initial Assessment    Primary Care Provider verified and updated as needed:     Readmission within the last 30 days: current reason for admission unrelated to previous admission      Reason for Consult: discharge planning  Advance Care Planning:  Has ACP docs; HCD indicates wife is HCA          Communication Assessment  Patient's communication style: spoken language (English or Bilingual)    Hearing Difficulty or Deaf: no   Wear Glasses or Blind: yes    Cognitive  Cognitive/Neuro/Behavioral: WDL  Level of Consciousness: alert     Orientation: oriented x 4  Mood/Behavior: calm, cooperative, behavior appropriate to situation  Best Language: 0 - No aphasia  Speech: clear, spontaneous, logical    Living Environment:   People in home: spouse  Kelsi-wife  Current living Arrangements:        Able to return to prior arrangements: yes       Family/Social Support:  Care provided by:  Wife, children  Provides care for:  N/A     Children, Wife  Kelsi       Description of Support System: Supportive, Involved    Support Assessment: Adequate family and caregiver support    Current Resources:   Patient receiving home care services:  None     Community Resources:    Equipment currently used at home: none  Supplies currently used at home:      Employment/Financial:  Employment Status: retired        Financial Concerns: No concerns identified           Lifestyle & Psychosocial Needs:  Social Determinants of Health     Tobacco Use: Low Risk      Smoking Tobacco Use: Never Smoker     Smokeless Tobacco Use: Never Used   Alcohol Use:      Frequency of Alcohol Consumption:      Average Number of Drinks:      Frequency of Binge Drinking:    Financial Resource Strain:      Difficulty of Paying Living Expenses:    Food Insecurity:      Worried About Running Out of Food in the Last Year:      Ran Out of Food in the Last Year:     Transportation Needs:      Lack of Transportation (Medical):      Lack of Transportation (Non-Medical):    Physical Activity:      Days of Exercise per Week:      Minutes of Exercise per Session:    Stress:      Feeling of Stress :    Social Connections:      Frequency of Communication with Friends and Family:      Frequency of Social Gatherings with Friends and Family:      Attends Evangelical Services:      Active Member of Clubs or Organizations:      Attends Club or Organization Meetings:      Marital Status:    Intimate Partner Violence:      Fear of Current or Ex-Partner:      Emotionally Abused:      Physically Abused:      Sexually Abused:    Depression: Not at risk     PHQ-2 Score: 0   Housing Stability:      Unable to Pay for Housing in the Last Year:      Number of Places Lived in the Last Year:      Unstable Housing in the Last Year:        Functional Status:  Prior to admission patient needed assistance:   Pt states he was independent with mobility and ADL's PTA. Pt states he could drive PTA.          Mental Health Status:  No concerns identified.       Chemical Dependency Status:  No concerns identified.               Values/Beliefs:  Spiritual, Cultural Beliefs, Evangelical Practices, Values that affect care: yes  Description of Beliefs that Will Affect Care: Pt states he has very supportive [employer]            Additional Information:    SW consult placed for discharge planning and stroke.     SW met with pt at bedside. Pt remained communicative, alert and oriented, and pleasant throughout assessment.     Pt described self as independent with mobility, ADL's, and basic living skills PTA. Pt states he anticipates he will return home after hospitalization, and his daughter or step-son will transport.     Pt expressed some concern about his wife. He states she has recently developed some short term memory loss and declining cognition. Pt asserts she is still independent with mobility, ADL's, and  can drive self; however, she may need more assistance at home soon. Pt states his wife's son will be staying with wife at their home today.     SW briefly discussed discharge planning. Pt states his daughter works until 4pm weekdays, but could stay with him evenings and overnight, if needed.      FELISA Adorno, [facility]  Acute Care Float   [facility]

## 2021-09-30 NOTE — PROGRESS NOTES
[facility]    Stroke Progress Note    Interval Events- No acute events overnight  - Underwent R CEA today  - Post-op endorses only minor ache at incision site, received Tylenol and does not want other pain medication  - Brief floater in R eye while getting post-op CT, did not obstruct vision    Impression   Acute ischemic stroke of right MCA distribution due to large-artery atherosclerosis  Right ICA stenosis     Plan  #Right MCA acute ischemic stroke  #Right ICA stenosis, symptomatic  - R CEA today for symptomatic ICA stenosis  - Neurochecks Q 4 hours  - SBP <180 pre-op, post-op has been 150-160s  - Continue  mg daily  - Atorvastatin 20 mg daily  - LDL: 15, A1c: 6.0  - Telemetry  - Bedside Glucose Monitoring  - PT/OT/SLP   - Stroke Education, depression and apnea screen prior to discharge     #CAD  #HTN  #HLD  Patient underwent stenting in March 2020. Echocardiogram completed 9/26 with EF 60 - 65%, normal atria size.  - Coreg 6.25 BID  - Atorvastatin 20 mg daily     #ADRIANA on CKD3 - improved  Baseline Cr 1.05 - 1.13. Cr normalized to 1.12  - Re-check Cr here  - Monitor urine output  - Avoid nephrotoxic medications     #FANI  Does not use a CPAP at home  - Sleep referral at discharge     #Bilateral carpal tunnel  Patient reports that his PCP has recently referred him for further work up of carpal tunnel as he has failed conservative treatment.  - Follow up outpatient     Prophylaxis            For VTE Prevention:  - Holding PTA enoxaparin for OR     For Acid Suppression:  - GI prophylaxis is not indicated     Code Status  Full Code  ______________________________________________________    Clinically Significant Risk Factors Present on Admission                 Medications   Scheduled Meds    aspirin  325 mg Oral Daily     atorvastatin  20 mg Oral Daily     [Held by provider] enoxaparin ANTICOAGULANT  40 mg Subcutaneous Q24H     sodium chloride (PF)  3 mL  Intracatheter Q8H       Infusion Meds    - MEDICATION INSTRUCTIONS -       - MEDICATION INSTRUCTIONS -       sodium chloride 75 mL/hr at 09/29/21 1627       PRN Meds  acetaminophen, diclofenac, labetalol **OR** hydrALAZINE, lidocaine 4%, lidocaine (buffered or not buffered), - MEDICATION INSTRUCTIONS -, - MEDICATION INSTRUCTIONS -, senna-docusate, sodium chloride (PF)       PHYSICAL EXAMINATION  Temp:  [97.7  F (36.5  C)-99.2  F (37.3  C)] 97.9  F (36.6  C)  Pulse:  [57-87] 87  Resp:  [10-16] 10  BP: (144-187)/(61-85) 151/85  MAP:  [76 mmHg-99 mmHg] 99 mmHg  Arterial Line BP: (132-149)/(46-68) 148/68  SpO2:  [94 %-100 %] 97 %      General Exam  General:  patient lying in bed without any acute distress    HEENT:  Gauze dressing over surgical site/right neck, drain emerging from beneath dressing  Cardio:  RRR  Pulmonary:  no respiratory distress  Abdomen:  non-tender, non-distended  Extremities:  no edema  Skin:  intact, warm/dry     Neuro Exam  Mental Status:  alert, oriented x 3, follows commands, speech clear and fluent, naming and repetition normal  Cranial Nerves:  visual fields intact, PERRL, EOMI with normal smooth pursuit, facial sensation intact and symmetric, facial movements symmetric, hearing not formally tested but intact to conversation, palate elevation symmetric and uvula midline, no dysarthria, shoulder shrug strong bilaterally, tongue protrusion midline  Motor:  normal muscle tone and bulk, no abnormal movements, able to move all limbs spontaneously, strength 5/5 throughout upper and lower extremities, no pronator drift  Sensory:  light touch sensation intact and symmetric throughout upper and lower extremities, no extinction on double simultaneous stimulation   Coordination:  normal finger-to-nose and heel-to-shin bilaterally without dysmetria, rapid alternating movements symmetric  Station/Gait:  deferred    Stroke Scales    NIHSS  Interval transfer (09/28/21 1612)   Interval Comments Status post R  CEA (09/29/21 2115)   1a. Level of Consciousness 0-->Alert, keenly responsive   1b. LOC Questions 0-->Answers both questions correctly   1c. LOC Commands 0-->Performs both tasks correctly   2.   Best Gaze 0-->Normal   3.   Visual 0-->No visual loss   4.   Facial Palsy 0-->Normal symmetrical movements   5a. Motor Arm, Left 0-->No drift, limb holds 90 (or 45) degrees for full 10 secs   5b. Motor Arm, Right 0-->No drift, limb holds 90 (or 45) degrees for full 10 secs   6a. Motor Leg, Left 0-->No drift, leg holds 30 degree position for full 5 secs   6b. Motor Leg, right 0-->No drift, leg holds 30 degree position for full 5 secs   7.   Limb Ataxia 0-->Absent   8.   Sensory 0-->Normal, no sensory loss   9.   Best Language 0-->No aphasia, normal   10. Dysarthria 0-->Normal   11. Extinction and Inattention  0-->No abnormality   Total 0 (09/29/21 2115)       Imaging  I personally reviewed all imaging; relevant findings per HPI.     Lab Results Data   CBC  Recent Labs   Lab 09/29/21  0903 09/28/21  0448 09/27/21  0420   WBC 5.3 5.8 7.9   RBC 4.37* 4.35* 4.07*   HGB 13.1* 12.8* 11.9*   HCT 38.7* 37.7* 35.6*    169 170     Basic Metabolic Panel    Recent Labs   Lab 09/29/21  0754 09/29/21  0400 09/28/21 2110 09/28/21  1725 09/28/21  1629 09/28/21  0448 09/27/21  0420 09/27/21  0420     --   --   --   --  140  --  140   POTASSIUM 4.0  --   --   --   --  3.8  --  3.7   CHLORIDE 110*  --   --   --   --  110*  --  107   CO2 25  --   --   --   --  26  --  28   BUN 22  --   --   --   --  25  --  30   CR 1.12  --   --   --  1.25 1.36*   < > 1.46*   GLC 94 87 93   < >  --  98   < > 104*   GWEN 9.1  --   --   --   --  8.8  --  8.3*    < > = values in this interval not displayed.     Liver Panel  No results for input(s): PROTTOTAL, ALBUMIN, BILITOTAL, ALKPHOS, AST, ALT, BILIDIRECT in the last 168 hours.  INR    Recent Labs   Lab Test 09/29/21  0754 09/26/21  1113 02/03/21  1220   INR 0.84* 1.03 1.03      Lipid Profile     Recent Labs   Lab Test 09/27/21  0420 08/19/20  1920 07/17/20  0927 03/16/16  1040 03/19/15  1030 02/07/14  1110 02/07/14  1110 12/12/13  0948 12/12/13  0948   CHOL 99 123 139   < > 192   < > 231*   < > 223*   HDL 67 76 56   < > 55   < > 59   < > 66   LDL 15 27 50   < > 102   < > 124   < > 133*   TRIG 86 100 167*   < > 176*   < > 240*   < > 121   CHOLHDLRATIO  --   --   --   --  3.5  --  4.0  --  3.0    < > = values in this interval not displayed.     A1C    Recent Labs   Lab Test 09/26/21  1113 01/23/17  1020   A1C 6.0* 6.0     Troponin I    Recent Labs   Lab 09/26/21  1113   TROPONIN <0.015

## 2021-10-01 VITALS
WEIGHT: 173.9 LBS | BODY MASS INDEX: 31.81 KG/M2 | RESPIRATION RATE: 16 BRPM | OXYGEN SATURATION: 98 % | SYSTOLIC BLOOD PRESSURE: 147 MMHG | HEART RATE: 64 BPM | TEMPERATURE: 96 F | DIASTOLIC BLOOD PRESSURE: 61 MMHG

## 2021-10-01 LAB
ALBUMIN SERPL-MCNC: 3 G/DL (ref 3.4–5)
ANION GAP SERPL CALCULATED.3IONS-SCNC: 2 MMOL/L (ref 3–14)
BUN SERPL-MCNC: 25 MG/DL (ref 7–30)
CALCIUM SERPL-MCNC: 8.3 MG/DL (ref 8.5–10.1)
CHLORIDE BLD-SCNC: 112 MMOL/L (ref 94–109)
CO2 SERPL-SCNC: 27 MMOL/L (ref 20–32)
CREAT SERPL-MCNC: 1.26 MG/DL (ref 0.66–1.25)
ERYTHROCYTE [DISTWIDTH] IN BLOOD BY AUTOMATED COUNT: 13.6 % (ref 10–15)
GFR SERPL CREATININE-BSD FRML MDRD: 54 ML/MIN/1.73M2
GLUCOSE BLD-MCNC: 95 MG/DL (ref 70–99)
HCT VFR BLD AUTO: 34.8 % (ref 40–53)
HGB BLD-MCNC: 11.4 G/DL (ref 13.3–17.7)
MAGNESIUM SERPL-MCNC: 2.3 MG/DL (ref 1.6–2.3)
MCH RBC QN AUTO: 29.8 PG (ref 26.5–33)
MCHC RBC AUTO-ENTMCNC: 32.8 G/DL (ref 31.5–36.5)
MCV RBC AUTO: 91 FL (ref 78–100)
PHOSPHATE SERPL-MCNC: 2.3 MG/DL (ref 2.5–4.5)
PHOSPHATE SERPL-MCNC: 2.3 MG/DL (ref 2.5–4.5)
PLATELET # BLD AUTO: 136 10E3/UL (ref 150–450)
POTASSIUM BLD-SCNC: 4.2 MMOL/L (ref 3.4–5.3)
POTASSIUM BLD-SCNC: 4.2 MMOL/L (ref 3.4–5.3)
RBC # BLD AUTO: 3.83 10E6/UL (ref 4.4–5.9)
SODIUM SERPL-SCNC: 141 MMOL/L (ref 133–144)
WBC # BLD AUTO: 8.6 10E3/UL (ref 4–11)

## 2021-10-01 PROCEDURE — 99238 HOSP IP/OBS DSCHRG MGMT 30/<: CPT | Mod: GC | Performed by: PSYCHIATRY & NEUROLOGY

## 2021-10-01 PROCEDURE — 250N000013 HC RX MED GY IP 250 OP 250 PS 637: Performed by: STUDENT IN AN ORGANIZED HEALTH CARE EDUCATION/TRAINING PROGRAM

## 2021-10-01 PROCEDURE — 36415 COLL VENOUS BLD VENIPUNCTURE: CPT | Performed by: STUDENT IN AN ORGANIZED HEALTH CARE EDUCATION/TRAINING PROGRAM

## 2021-10-01 PROCEDURE — 80069 RENAL FUNCTION PANEL: CPT | Performed by: STUDENT IN AN ORGANIZED HEALTH CARE EDUCATION/TRAINING PROGRAM

## 2021-10-01 PROCEDURE — 85027 COMPLETE CBC AUTOMATED: CPT | Performed by: STUDENT IN AN ORGANIZED HEALTH CARE EDUCATION/TRAINING PROGRAM

## 2021-10-01 PROCEDURE — 250N000009 HC RX 250: Performed by: STUDENT IN AN ORGANIZED HEALTH CARE EDUCATION/TRAINING PROGRAM

## 2021-10-01 PROCEDURE — 83735 ASSAY OF MAGNESIUM: CPT | Performed by: STUDENT IN AN ORGANIZED HEALTH CARE EDUCATION/TRAINING PROGRAM

## 2021-10-01 RX ORDER — ATORVASTATIN CALCIUM 10 MG/1
10 TABLET, FILM COATED ORAL DAILY
Status: DISCONTINUED | OUTPATIENT
Start: 2021-10-02 | End: 2021-10-01 | Stop reason: HOSPADM

## 2021-10-01 RX ORDER — ATORVASTATIN CALCIUM 10 MG/1
10 TABLET, FILM COATED ORAL DAILY
Qty: 30 TABLET | Refills: 0 | Status: SHIPPED | OUTPATIENT
Start: 2021-10-02 | End: 2022-05-16

## 2021-10-01 RX ORDER — OXYMETAZOLINE HYDROCHLORIDE 0.05 G/100ML
2 SPRAY NASAL 2 TIMES DAILY PRN
Status: DISCONTINUED | OUTPATIENT
Start: 2021-10-01 | End: 2021-10-01 | Stop reason: HOSPADM

## 2021-10-01 RX ORDER — AMLODIPINE BESYLATE 5 MG/1
5 TABLET ORAL DAILY
Qty: 30 TABLET | Refills: 0 | Status: SHIPPED | OUTPATIENT
Start: 2021-10-01 | End: 2022-02-23

## 2021-10-01 RX ADMIN — ACETAMINOPHEN 975 MG: 325 TABLET, FILM COATED ORAL at 03:39

## 2021-10-01 RX ADMIN — OXYMETAZOLINE HYDROCHLORIDE 2 SPRAY: 0.05 SPRAY NASAL at 03:46

## 2021-10-01 RX ADMIN — DOCUSATE SODIUM 50 MG AND SENNOSIDES 8.6 MG 1 TABLET: 8.6; 5 TABLET, FILM COATED ORAL at 08:47

## 2021-10-01 RX ADMIN — ATORVASTATIN CALCIUM 20 MG: 20 TABLET, FILM COATED ORAL at 08:47

## 2021-10-01 RX ADMIN — ASPIRIN 325 MG ORAL TABLET 325 MG: 325 PILL ORAL at 08:47

## 2021-10-01 RX ADMIN — CARVEDILOL 6.25 MG: 3.12 TABLET, FILM COATED ORAL at 08:47

## 2021-10-01 RX ADMIN — POLYETHYLENE GLYCOL 3350 17 G: 17 POWDER, FOR SOLUTION ORAL at 08:47

## 2021-10-01 ASSESSMENT — ACTIVITIES OF DAILY LIVING (ADL)
ADLS_ACUITY_SCORE: 10
ADLS_ACUITY_SCORE: 10
ADLS_ACUITY_SCORE: 8
ADLS_ACUITY_SCORE: 10

## 2021-10-01 ASSESSMENT — VISUAL ACUITY: OU: BASELINE

## 2021-10-01 NOTE — PLAN OF CARE
No changes in neuro status.. slight weakness on left arm/hand with baseline numbness/tingliness d/t carpal tunnel per pt.  No c/o pain. JUSTYNA d/c'd per MD. Pt tolerated regular diet. Up in chair and ambulated to the bathroom with SBA multiple times.  Plan to transfer to the floor when bed is available.

## 2021-10-01 NOTE — PROGRESS NOTES
[facility], [place]   10/01/2021  Neurosurgery Progress Note:    Assessment:  Liu Ragsdale is a 81 year old male with symptomatic R ICA stenosis >70%, circumferential carotid bifurcation plaque, meets indication for carotid endarterectomy. He is now s/p right CEA on 9/29.     Carotid bifurcation at C3-4 level, no history of neck radiation, adequate head turn to left >45 degrees.        In addition to the assessment of diagnoses detailed above, this 81 year old male  patient admitted from transfer from another acute care hospital has the following conditions contributing to the complexity of their medical care:     Coronary artery disease,  Coagulation defect based on pre-admission aspirin use,  Chronic kidney disease stage 3,    Plan:  - ASA  - Serial neuro exams  - Follow up in 1 month with carotid US  - Remainder of cares per primary team  - Ok to discharge from neurosurgery perspective    -----------------------------------  Josiah Moseley MD  Neurosurgery Resident, PGY-3    Please contact neurosurgery resident on call with questions.    Dial * * [phone], enter [phone] when prompted.   -----------------------------------    Interval History: JUSTYNA removed    Objective:   Temp:  [97  F (36.1  C)-98.8  F (37.1  C)] 97.4  F (36.3  C)  Pulse:  [68-88] 75  Resp:  [16-21] 16  BP: (122-169)/(45-79) 157/66  Cuff Mean (mmHg):  [68] 68  MAP:  [82 mmHg-99 mmHg] 82 mmHg  Arterial Line BP: (137-158)/(53-63) 137/53  SpO2:  [92 %-100 %] 100 %  I/O last 3 completed shifts:  In: 1159 [P.O.:850; I.V.:309]  Out: 1300 [Urine:1300]    Gen: Appears comfortable, NAD  Wound: clean, dry, intact  Neurologic:  -- Awake; Alert; oriented x 3  -- Follows commands briskly  -- +repetition, calculation, and naming  -- Speech fluent, spontaneous. No aphasia or dysarthria.  -- no gaze preference. No apparent hemineglect.  Cranial Nerves:  -- visual fields full to confrontation, PERRL 3-2mm bilat and brisk, extraocular  movements intact  -- face symmetrical, tongue midline  -- sensory V1-V3 intact bilaterally  -- palate elevates symmetrically, uvula midline  -- hearing grossly intact bilat  -- Trapezii 5/5 strength bilat symmetric  -- Cerebellar: Finger nose finger without dysmetria, intact rapid alternating motions bilaterally     Motor:  Normal bulk / tone; no tremor, rigidity, or bradykinesia.  No muscle wasting or fasciculations  No Pronator Drift       Delt Bi Tri Hand Flexion/  Extension Iliopsoas Quadriceps Hamstrings Tibialis Anterior Gastroc     C5 C6 C7 C8/T1 L2 L3 L4-S1 L4 S1   R 5 5 5 5 5 5 5 5 5   L 5 5 5 4 5 5 5 5 5   Sensory:  Diminished to light touch left first 3 digits.       Reflexes:       Bi Tri BR Prosper Pat Ach Bab     C5-6 C7-8 C6 UMN L2-4 S1 UMN   R 2+ 2+ 2+ Norm 2+ 2+ Norm   L 2+ 2+ 2+ Norm 2+ 2+ Norm      Gait: Deferred    LABS:  Recent Labs   Lab 10/01/21  0529 09/30/21  0406 09/29/21  0754    141 140   POTASSIUM 4.2  4.2 4.0 4.0   CHLORIDE 112* 110* 110*   CO2 27 23 25   ANIONGAP 2* 8 5   GLC 95 128* 94   BUN 25 20 22   CR 1.26* 1.17 1.12   GWEN 8.3* 8.2* 9.1       Recent Labs   Lab 10/01/21  0529   WBC 8.6   RBC 3.83*   HGB 11.4*   HCT 34.8*   MCV 91   MCH 29.8   MCHC 32.8   RDW 13.6   *       IMAGING:  No results found for this or any previous visit (from the past 24 hour(s)).

## 2021-10-01 NOTE — PLAN OF CARE
Arrived from: 4A   Belongings: remain with patient    2 RN Skin Assessment Completed by: Kathy, RN and Tootie RN   Non-intact findings documented (yes/no/NA): R neck incision covered with primapore marked with dried drainage    Status: s/p R carotid endarterectomy on 9/29 after R ischemic stroke  Vitals: VSS, 1-2L via NC overnight d/t FANI  Neuros: AO x 4. LUE 4/5, AOE 5/5. N/T to left hand, baseline  IV: PIV x2 SL  Resp/trach: LS clear, denies SOB  Diet: Regular  Bowel status: LBM 9/30 per pt  : voiding without difficulty   Skin: see above  Pain: denies  Activity: SBA  Social: cooperative with cares  Plan: continue to follow plan of care

## 2021-10-01 NOTE — PLAN OF CARE
Patient discharged home today at 1220. Patient is stable and medically ready for discharge. Patient was sent home with all belongings and discharge medications were sent with patient. Discharge paperwork was given to and discussed with patient. Patient verbalized understanding of discharge instructions and follow up appointments. Will continue to monitor.    Pt s/p R carotid endarterectomy on 9/29 after R ischemic stroke. VSS on RA. HTN within parameters. A&Ox4, slight L droop. LUE 4/5. N/T at baseline to L hand. PIV removed for discharge. BM 9/30. Dressing removed for discharge, incision WDL. Up ad arelis in room. Continue to monitor and follow POC.

## 2021-10-01 NOTE — DISCHARGE SUMMARY
[facility]    Neurology Stroke Discharge Summary    Date of Admission: 9/28/2021  Date of Discharge: 10/01/2021    Disposition: Discharged to home  Primary Care Physician: Piyush Rogers      Admission Diagnosis:   R ICA occlussion    Discharge Diagnosis:   Acute ischemic stroke of R MCA due to large-artery atherosclerosis      Problem Leading to Hospitalization (from [facility]):   Syncope    Please see H&P dated 9/28/2021 for further details about presentation.    Brief Hospital Course:   Patient presented with Syncope.      Found to have LUE weakness, slurring of speech and numerous small R-sided infarcts.      IV tPA was not given.      Work-up as stated below under Pertinent Investigations.    Etiology is thought to be large-artery athersclerosis.      Rehab evaluation: OT.     Smoking Cessation: education provided, support referral ordered, patient is not a smoker    BP Long-term Goal: 130/80 or less    Antithrombotic/Anticoagulant Agent: aspirin 325 mg    Statins: Prior to admission statin agent changed to Atorvastatin 10 mg due to low LDL < 40.       Hgb A1C Goal: < 7.0    Complications: None.     Other problems addressed during this hospitalization:  Sleep Apnea    PERTINENT INVESTIGATIONS    Labs  Lipid Panel: Recent Labs   Lab Test 09/27/21  0420 03/16/16  1040 03/19/15  1030   CHOL 99   < > 192   HDL 67   < > 55   LDL 15   < > 102   TRIG 86   < > 176*   CHOLHDLRATIO  --   --  3.5    < > = values in this interval not displayed.     A1C:   Lab Results   Component Value Date    A1C 6.0 09/26/2021    A1C 6.0 01/23/2017     INR:   Recent Labs   Lab 09/29/21  0754 09/26/21  1113   INR 0.84* 1.03      Coag Panel / Hypercoag Workup: Not indicated  Pending test results: None    Echo:   The left ventricle is normal in size.  Left ventricular systolic function is normal.  The visual ejection fraction is 60-65%.  Normal left ventricular wall motion  Trivial pericardial  effusion  Sinus rhythm was noted.  Compared to prior study, there is no significant change.      MRI Brain:  1. Multiple small infarcts scattered throughout the right middle  cerebral artery distribution.  2. No evidence of hemorrhage or significant mass effect.  3. Small old right cerebellar infarcts.  4. Volume loss and scattered white matter T2 hyperintensities which  likely represent chronic small vessel ischemic change.    MRA Neck:  1. Improved flow within the right internal carotid artery.  2. Severe stenosis of the proximal right internal carotid artery,  probably 70% or greater.  3. 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   4. Moderate stenosis of the dominant left vertebral artery at the V2  segment related to osseous cervical spine degenerative change,  unchanged.     Endovascular procedure:Right Carotid Endarterectomy    Cardiac Monitoring: Patient had > 24 hrs of cardiac monitor while in hospital.    Findings: No atrial fibrillation was found.    Sleep Apnea Screen:   Questions/Answers      1. Prior to your stroke, have you been told that you snore? Yes.    2. Prior to your stroke, have you been told that you struggle to breath while you are sleeping? Yes.    3. Prior to your stroke, do you feel tired and sleepy even after getting a normal night of sleep? No.    Sleep Apnea Screen Findings: Patient has 2 or more symptoms of sleep apnea.  Outpatient sleep study is recommended.    Education discussed with: patient on blood pressure management, cholesterol management, medical management and follow-up recommendations/plan.    During daily rounds, the plan of care was discussed and developed with patient.  Plan of care includes: Follow-up with PCP to evaluate and manage BP control..    PHYSICAL EXAMINATION  Vital Signs:  B/P: 159/67, T: 97.4, P: 69, R: 16    General:  patient lying in bed without any acute distress     HEENT:   normocephalic/atraumatic  Cardio:  RRR  Pulmonary:  no respiratory distress  Abdomen:  soft, non-tender, non-distended, bowel sounds present  Extremities:  no edema  Skin:  intact, warm/dry     Neurologic  Mental Status:  fully alert, attentive and oriented, follows commands, speech clear and fluent  Cranial Nerves:  visual fields intact, PERRL, EOMI with normal smooth pursuit, facial sensation intact and symmetric, facial movements symmetric, hearing not formally tested but intact to conversation, palate elevation symmetric and uvula midline, no dysarthria, shoulder shrug strong bilaterally, tongue protrusion midline  Motor:  no abnormal movements, normal tone throughout, normal muscle bulk, no pronator drift, able to move all limbs spontaneously, strength 5/5 throughout upper and lower extremities except for 3/5 in 1st, 2nd & 3rd finger of LEFT hand  Reflexes:  2+ and symmetric throughout, no clonus, toes downgoing  Sensory:  intact/symmetric to light touch and pin prick throughout upper and lower extremities  Coordination:  FNF and HS intact without dysmetria  Station/Gait:  normal width, stride length, turn, and arm swing     National Institutes of Health Stroke Scale (on day of discharge)  NIHSS Total Score: 0    Modified Kritsie Score (Discharge)  1-No significant disability despite symptoms    Medications    Current Discharge Medication List      START taking these medications    Details   amLODIPine (NORVASC) 5 MG tablet Take 1 tablet (5 mg) by mouth daily  Qty: 30 tablet, Refills: 0    Associated Diagnoses: Right carotid artery occlusion         CONTINUE these medications which have CHANGED    Details   atorvastatin (LIPITOR) 10 MG tablet Take 1 tablet (10 mg) by mouth daily  Qty: 30 tablet, Refills: 0    Associated Diagnoses: Right carotid artery occlusion         CONTINUE these medications which have NOT CHANGED    Details   acetaminophen (TYLENOL) 325 MG tablet Take 3 tablets (975 mg) by mouth 3 times  daily  Refills: 0    Associated Diagnoses: History of total hip replacement, left      aspirin (ASA) 325 MG tablet Take 1 tablet (325 mg) by mouth daily  Qty: 100 tablet, Refills: 0    Associated Diagnoses: Cerebrovascular accident (CVA) due to occlusion of right middle cerebral artery (H)      carvedilol (COREG) 6.25 MG tablet Take 1 tablet (6.25 mg) by mouth 2 times daily (with meals)  Qty: 60 tablet, Refills: 0    Associated Diagnoses: Right carotid artery occlusion      diclofenac (VOLTAREN) 1 % topical gel Place 4 g onto the skin 3 times daily as needed for moderate pain (Shoulder)  Qty: 100 g, Refills: 1    Associated Diagnoses: Primary osteoarthritis of right shoulder      Multiple Vitamin (MULTI VITAMIN PO) Take 1 oz by mouth daily Eniva VIBE-liquid      nitroGLYcerin (NITROSTAT) 0.3 MG sublingual tablet For chest pain place 1 tablet under the tongue every 5 minutes for 3 doses. If symptoms persist 5 minutes after 1st dose call 911.  Qty: 30 tablet, Refills: 3    Associated Diagnoses: Positive cardiac stress test; Angina at rest (H)      NONFORMULARY Take 1 oz by mouth daily Eniva Cell-Ready Multi Minerals is a concentrated, ionic liquid mineral dietary supplement blend to help support nutritional balance and wellbeing.  Combine with 8 ounces of water / juice             Additional recommendations and follow up:       US Carotid Right     Occupational Therapy Referral      Follow Up ([facility])    Follow-up with Dr. Andersen in one month with carotid ultrasound    Appointments on [place] and/or [facility] (with [facility] or [facility] provider or service). Call [phone] if you haven't heard regarding these appointments within 7 days of discharge.     Reason for your hospital stay    Carotid endarterectomy for stroke prevention.     Activity    Your activity upon discharge: activity as tolerated, ambulate in house, and no heavy lifting.     Reason for your hospital stay    Carotid endarterectomy for Right MCA  stroke     Activity    Your activity upon discharge: activity as tolerated     Adult [facility] Follow-up and recommended labs and tests    Follow up with primary care provider, Piyush Rogers, within 7 days to evaluate medication change and to follow up on results.  The following labs/tests are recommended: BMP & Blood Pressure.      Appointments on [place] and/or [facility] (with [facility] or [facility] provider or service). Call [phone] if you haven't heard regarding these appointments within 7 days of discharge.     Follow Up and recommended labs and tests    Follow up with local General Neurologist, within 4-6 weeks regarding your stroke recovery and carpal tunnel syndrome.     Diet    Follow this diet upon discharge: Regular       Patient was seen and discussed with the Attending, Dr. Yates.    Hemanth Padilla, DO  [facility] [id number]

## 2021-10-01 NOTE — PLAN OF CARE
Status: s/p R carotid endarterectomy on 9/29 after R ischemic stroke  Vitals: VSS, on 1L via NC overnight d/t FANI  Neuros: AO x 4. LUE 4/5, AOE 5/5. N/T to left hand, baseline. L droop  IV: PIV x2 SL  Resp/trach: LS clear, denies SOB  Diet: Regular  Bowel status: LBM 9/30, BS+  : voiding without difficulty   Skin: R neck incision covered with primapore marked with no extension  Pain: denies  Activity: SBA  Social: cooperative with cares  Plan: continue to follow plan of care  Updates this shift: pt reported nasal congestion, prn nasal spray given x1 with relief

## 2021-10-01 NOTE — PLAN OF CARE
Occupational Therapy Discharge Summary    Reason for therapy discharge:    Discharged to home.    Progress towards therapy goal(s). See goals on Care Plan in [facility] electronic health record for goal details.  Goals partially met.  Barriers to achieving goals:   discharge from facility.    Therapy recommendation(s):    Continued therapy is recommended.  Rationale/Recommendations:  Not seen for therapies today. Previous therapist recommending OP OT for hand coordination/strength.

## 2021-10-01 NOTE — DISCHARGE INSTRUCTIONS
Follow-up with your PCP in 1 week to check your BP and repeat BNP.    Follow-up with a General Neurologist in 4-6 weeks to assess your stroke recovery and evaluate your carpal tunnel syndrome.    Follow-up with Sleep Study to assess your Obstructive Sleep Apnea.

## 2021-10-01 NOTE — PROGRESS NOTES
Care Management Follow Up    Length of Stay (days): 3    Expected Discharge Date:  To be determined     Concerns to be Addressed: discharge planning     Patient plan of care discussed at interdisciplinary rounds: Yes    Anticipated Discharge Disposition:  Home         Additional Information:  SW reviewed OT/Physical Therapy evals.  On 9/30/2021, OT recommends return to home and Physical Therapy deferred.   Nursing notes indicate that pt is oriented x4 and up with SBA.      No further SW intervention planned at this time as discharge to home is anticipated.    MARTIN Ley  Social Work, 6A  Phone:  [phone]  Pager:  [phone]  10/1/2021          RORY Bell

## 2021-10-02 ENCOUNTER — PATIENT OUTREACH (OUTPATIENT)
Dept: CARE COORDINATION | Facility: CLINIC | Age: 81
End: 2021-10-02

## 2021-10-02 DIAGNOSIS — Z71.89 OTHER SPECIFIED COUNSELING: ICD-10-CM

## 2021-10-02 NOTE — PROGRESS NOTES
Clinic Care Coordination Contact  Community Health Worker Initial Outreach      Patient accepts CC:No, Pt declined needs for extra support> Financial, Social, Medical.  Pt reported that his BP readings is 146/63.    Advised Pt to have two readings, in between them one minute (to take off Cuff)  And re-put it again to have second reading.talked about healthy diet- add fruits- Veggies to his diet/ drinking plenty of water during day.    Asked Pt if he want to talk with nurse, Pt declined it.  Advised Pt incase felt numbness, chest pain,pain in left arm to seek for a help quickly, and see a doctor.     Pt verbalized understanding.         Clinic Care Coordination Contact  [facility]: Post-Discharge Note  SITUATION                                                      Admission:    Admission Date: 09/28/21   Reason for Admission: Acute ischemic stroke of R MCA due to large-artery atherosclerosis  Discharge:   Discharge Date: 10/01/21  Discharge Diagnosis: Acute ischemic stroke of R MCA due to large-artery atherosclerosis    BACKGROUND                                                      In summary 81 year old with symptomatic R carotid stenosis with a stroke s/p R CEA. Doing well. BP well controlled. LDL was 15 so will keep him on 10 of lipitor due to hx of carotid disease and CAD. Aspirin 325 mg daily for stroke prevention. Long term BP goal is <130/80. Discharged home on coreg 6.25 mg BID and amlodipine 5 mg . Continue to titrate BP as outpatient. Follow up with general neurology as outpatient.       ASSESSMENT      Enrollment  Primary Care Care Coordination Status: Declined    Discharge Assessment  How are you doing now that you are home?: feeling better  How are your symptoms? (Red Flag symptoms escalate to triage hotline per guidelines): Improved  Do you feel your condition is stable enough to be safe at home until your provider visit?: Yes  Does the patient have their discharge instructions? : Yes  Does the  patient have questions regarding their discharge instructions? : No  Were you started on any new medications or were there changes to any of your previous medications? : Yes  Does the patient have all of their medications?: Yes  Do you have questions regarding any of your medications? : No  Do you have all of your needed medical supplies or equipment (DME)?  (i.e. oxygen tank, CPAP, cane, etc.): Yes  Discharge follow-up appointment scheduled within 14 calendar days? : Yes  Discharge Follow Up Appointment Date: 10/04/21  Discharge Follow Up Appointment Scheduled with?: Specialty Care Provider    Post-op (W [facility]A Only)  If the patient had a surgery or procedure, do they have any questions for a nurse?: No             PLAN                                                      Outpatient Plan:      Follow Up ([facility])     Follow-up with Dr. Andersen in one month with carotid ultrasound     Appointments on [place] and/or [facility] (with [facility] or [facility] provider or service). Call [phone] if you haven't heard regarding these appointments within 7 days of discharge.          Future Appointments   Date Time Provider Department Center   10/25/2021 12:00 PM [id number]   10/28/2021 11:00 AM Raymond Andersen MD [facility]         For any urgent concerns, please contact our 24 hour nurse triage line: [phone] ([phone])         SUKH Ly  [phone]  [facility]

## 2021-10-05 LAB
PATH REPORT.COMMENTS IMP SPEC: NORMAL
PATH REPORT.COMMENTS IMP SPEC: NORMAL
PATH REPORT.FINAL DX SPEC: NORMAL
PATH REPORT.GROSS SPEC: NORMAL
PATH REPORT.MICROSCOPIC SPEC OTHER STN: NORMAL
PATH REPORT.RELEVANT HX SPEC: NORMAL
PHOTO IMAGE: NORMAL

## 2021-10-05 PROCEDURE — 88311 DECALCIFY TISSUE: CPT | Mod: 26 | Performed by: PATHOLOGY

## 2021-10-05 PROCEDURE — 88304 TISSUE EXAM BY PATHOLOGIST: CPT | Mod: 26 | Performed by: PATHOLOGY

## 2021-10-06 ENCOUNTER — OFFICE VISIT (OUTPATIENT)
Dept: FAMILY MEDICINE | Facility: CLINIC | Age: 81
End: 2021-10-06
Payer: COMMERCIAL

## 2021-10-06 VITALS
DIASTOLIC BLOOD PRESSURE: 62 MMHG | SYSTOLIC BLOOD PRESSURE: 146 MMHG | TEMPERATURE: 97.4 F | HEART RATE: 60 BPM | BODY MASS INDEX: 32.13 KG/M2 | HEIGHT: 62 IN | WEIGHT: 174.6 LBS | RESPIRATION RATE: 16 BRPM | OXYGEN SATURATION: 98 %

## 2021-10-06 DIAGNOSIS — I10 BENIGN ESSENTIAL HYPERTENSION: ICD-10-CM

## 2021-10-06 DIAGNOSIS — Z98.890 HISTORY OF RIGHT-SIDED CAROTID ENDARTERECTOMY: ICD-10-CM

## 2021-10-06 DIAGNOSIS — Z86.73 H/O ISCHEMIC RIGHT MCA STROKE: ICD-10-CM

## 2021-10-06 DIAGNOSIS — Z92.89 HISTORY OF RECENT HOSPITALIZATION: Primary | ICD-10-CM

## 2021-10-06 LAB
ANION GAP SERPL CALCULATED.3IONS-SCNC: 3 MMOL/L (ref 3–14)
BUN SERPL-MCNC: 18 MG/DL (ref 7–30)
CALCIUM SERPL-MCNC: 9 MG/DL (ref 8.5–10.1)
CHLORIDE BLD-SCNC: 108 MMOL/L (ref 94–109)
CO2 SERPL-SCNC: 28 MMOL/L (ref 20–32)
CREAT SERPL-MCNC: 1.29 MG/DL (ref 0.66–1.25)
GFR SERPL CREATININE-BSD FRML MDRD: 52 ML/MIN/1.73M2
GLUCOSE BLD-MCNC: 100 MG/DL (ref 70–99)
POTASSIUM BLD-SCNC: 4.5 MMOL/L (ref 3.4–5.3)
SODIUM SERPL-SCNC: 139 MMOL/L (ref 133–144)

## 2021-10-06 PROCEDURE — 80048 BASIC METABOLIC PNL TOTAL CA: CPT | Performed by: FAMILY MEDICINE

## 2021-10-06 PROCEDURE — 99495 TRANSJ CARE MGMT MOD F2F 14D: CPT | Performed by: FAMILY MEDICINE

## 2021-10-06 PROCEDURE — 36415 COLL VENOUS BLD VENIPUNCTURE: CPT | Performed by: FAMILY MEDICINE

## 2021-10-06 ASSESSMENT — PAIN SCALES - GENERAL: PAINLEVEL: NO PAIN (0)

## 2021-10-06 ASSESSMENT — MIFFLIN-ST. JEOR: SCORE: 1384.48

## 2021-10-06 NOTE — PROGRESS NOTES
"    Assessment & Plan     History of recent hospitalization  Patient was hospitalized recently with right MCA infarct, underwent right carotid endarterectomy, doing much better now.  Physical examination as described.  Blood pressure slightly elevated, patient would like to continue amlodipine, carvedilol current dose and will work on diet and exercise.  BMP ordered to monitor electrolytes and renal function.  Neurology referral placed.  Patient has appointment with neurosurgery later this month.  Patient understood and in agreement with above plan.  All questions answered.    H/O ischemic right MCA stroke  - Adult Neurology Referral; Future    Benign essential hypertension  As above, will continue amlodipine and carvedilol for now.  Follow-up with RN in 2 weeks for repeat blood pressure check.  - Basic metabolic panel  (Ca, Cl, CO2, Creat, Gluc, K, Na, BUN); Future    History of right-sided carotid endarterectomy  - Adult Neurology Referral; Future       BMI:   Estimated body mass index is 31.72 kg/m  as calculated from the following:    Height as of this encounter: 1.58 m (5' 2.21\").    Weight as of this encounter: 79.2 kg (174 lb 9.6 oz).   Weight management plan: Discussed healthy diet and exercise guidelines    Patient Instructions       Patient Education     Eating Heart-Healthy Food: Using the DASH Plan    Eating for your heart doesn t have to be hard or boring. You just need to know how to make healthier choices. The DASH eating plan has been developed to help you do just that. DASH stands for Dietary Approaches to Stop Hypertension. It is a plan that has been proven to be healthier for your heart and to lower your risk for high blood pressure. It can also help lower your risk for cancer, heart disease, osteoporosis, and diabetes.  Choosing from each food group  Choose foods from each of the food groups below each day. Try to get the recommended number of servings for each food group. The serving numbers are " based on a diet of 2,000 calories a day. Talk with your healthcare provider if you re not sure about your calorie needs. Along with getting the correct servings, the DASH plan also advises less than 2,300 mg of salt (sodium) per day. Lowering sodium intake to 1,500 mg per day lowers blood pressure even more. (There's about 2,300 mg of sodium in 1 teaspoon of salt.)      Grains  Servings: 6 to 8 a day  A serving is:    1 slice bread    1 ounce dry cereal    Half a cup cooked rice, pasta or cereal  Best choices: Whole grains and any grains high in fiber. Vegetables  Servings: 4 to 5 a day  A serving is:    1 cup raw leafy vegetable    Half a cup cut-up raw or cooked vegetable    Half a cup vegetable juice  Best choices: Fresh or frozen vegetables prepared without added salt or fat.   Fruits  Servings: 4 to 5 a day  A serving is:    1 medium fruit    One-quarter cup dried fruit    Half a cup fresh, frozen, or canned fruit    Half a cup of 100% fruit juices  Best choices: A variety of fresh fruits of different colors. Whole fruits are a better choice than fruit juices. Low-fat or fat-free dairy  Servings: 2 to 3 a day  A serving is:    1 cup milk    1 cup yogurt    One and a half ounces cheese  Best choices: Skim or 1% milk, low-fat or fat-free yogurt or buttermilk, and low-fat cheeses.         Lean meats, poultry, fish  Servings: 6 or fewer a day  A serving is:    1 ounce cooked meats, poultry, or fish    1 egg  Best choices: Lean poultry and fish. Trim away visible fat. Broil, grill, roast, or boil instead of frying. Remove skin from poultry before eating. Limit how much red meat you eat.  Nuts, seeds, beans  Servings: 4 to 5 a week  A serving is:    One-third cup nuts (one and a half ounces)    2 tablespoons nut butter or seeds    Half a cup cooked dry beans or legumes  Best choices: Dry roasted nuts with no salt added, lentils, kidney beans, garbanzo beans, and whole cotter beans.   Fats and oils  Servings: 2 to 3 a  day  A serving is:    1 teaspoon vegetable oil    1 teaspoon soft margarine    1 tablespoon mayonnaise    2 tablespoons salad dressing  Best choices: Nut and vegetable oils (nontropical vegetable oils), such as olive and canola oil. Sweets  Servings: 5 a week or fewer  A serving is:    1 tablespoon sugar, maple syrup, or honey    1 tablespoon jam or jelly    1 half-ounce jelly beans (about 15)    1 cup lemonade  Best choices: Dried fruit can be a satisfying sweet. Choose low-fat sweets. And watch your serving sizes!      For more on the DASH eating plan, visit:  www.nhlbi.nih.gov/health/health-topics/topics/dash   [employer] last reviewed this educational content on 7/1/2019 2000-2021 [employer]. All rights reserved. This information is not intended as a substitute for professional medical care. Always follow your healthcare professional's instructions.                 Piyush Rogers MD  [facility]    Eder Hatfield is a 81 year old who presents for the following health issues  accompanied by his spouse:    HPI     Post Discharge Outreach 10/2/2021   Admission Date 9/28/2021   Reason for Admission Acute ischemic stroke of R MCA due to large-artery atherosclerosis   Discharge Date 10/1/2021   Discharge Diagnosis Acute ischemic stroke of R MCA due to large-artery atherosclerosis   How are you doing now that you are home? feeling better   How are your symptoms? (Red Flag symptoms escalate to triage hotline per guidelines) Improved   Do you feel your condition is stable enough to be safe at home until your provider visit? Yes   Does the patient have their discharge instructions?  Yes   Does the patient have questions regarding their discharge instructions?  No   Were you started on any new medications or were there changes to any of your previous medications?  Yes   Does the patient have all of their medications? Yes   Do you have questions regarding any of your  "medications?  No   Do you have all of your needed medical supplies or equipment (DME)?  (i.e. oxygen tank, CPAP, cane, etc.) Yes   Discharge follow-up appointment scheduled within 14 calendar days?  Yes   Discharge Follow Up Appointment Date 10/4/2021   Discharge Follow Up Appointment Scheduled with? Specialty Care Provider     Hospital Follow-up Visit:    Hospital/Nursing Home/IP Rehab Facility: [facility]  Date of Admission: 09/28/21  Date of Discharge: 10/01/21   Reason(s) for Admission: Ischemic stroke (H)       Was your hospitalization related to COVID-19? No   Problems taking medications regularly:  None  Medication changes since discharge:  DISCONTINUED HYDROCHLOROTHIAZIDE, HYDROXIZINE ISOSORBIDE MONONITRATE, LOSARTAN   Problems adhering to non-medication therapy:  TRYING TO ACCOMMODATE A MEDITERRANEAN DIET     Summary of hospitalization:  [facility] discharge summary reviewed  Diagnostic Tests/Treatments reviewed.  Follow up needed: labs  Other Healthcare Providers Involved in Patient s Care:         None  Update since discharge: improved.   Post Discharge Medication Reconciliation: discharge medications reconciled, continue medications without change.  Plan of care communicated with patient       Review of Systems   Constitutional, HEENT, cardiovascular, pulmonary, GI, , musculoskeletal, neuro, skin, endocrine and psych systems are negative, except as otherwise noted.      Objective    BP (!) 146/62 (BP Location: Right arm, Patient Position: Sitting, Cuff Size: Adult Regular)   Pulse 60   Temp 97.4  F (36.3  C) (Tympanic)   Resp 16   Ht 1.58 m (5' 2.21\")   Wt 79.2 kg (174 lb 9.6 oz)   SpO2 98%   BMI 31.72 kg/m    Body mass index is 31.72 kg/m .  Physical Exam   GENERAL: alert and no distress  EYES: Eyes grossly normal to inspection, PERRL and conjunctivae and sclerae normal  HENT: " normal cephalic/atraumatic, nose and mouth without ulcers or lesions, oropharynx clear and oral mucous membranes moist  NECK: no adenopathy, no asymmetry, masses, or scars and thyroid normal to palpation  RESP: lungs clear to auscultation - no rales, rhonchi or wheezes  CV: regular rate and rhythm, normal S1 S2, no S3 or S4, no murmur, click or rub, no peripheral edema and peripheral pulses strong  ABDOMEN: soft, nontender  MS: no gross musculoskeletal defects noted, no edema  NEURO: Normal strength and tone, mentation intact and speech normal  PSYCH: mentation appears normal, affect normal/bright

## 2021-10-06 NOTE — PATIENT INSTRUCTIONS
Patient Education     Eating Heart-Healthy Food: Using the DASH Plan    Eating for your heart doesn t have to be hard or boring. You just need to know how to make healthier choices. The DASH eating plan has been developed to help you do just that. DASH stands for Dietary Approaches to Stop Hypertension. It is a plan that has been proven to be healthier for your heart and to lower your risk for high blood pressure. It can also help lower your risk for cancer, heart disease, osteoporosis, and diabetes.  Choosing from each food group  Choose foods from each of the food groups below each day. Try to get the recommended number of servings for each food group. The serving numbers are based on a diet of 2,000 calories a day. Talk with your healthcare provider if you re not sure about your calorie needs. Along with getting the correct servings, the DASH plan also advises less than 2,300 mg of salt (sodium) per day. Lowering sodium intake to 1,500 mg per day lowers blood pressure even more. (There's about 2,300 mg of sodium in 1 teaspoon of salt.)      Grains  Servings: 6 to 8 a day  A serving is:    1 slice bread    1 ounce dry cereal    Half a cup cooked rice, pasta or cereal  Best choices: Whole grains and any grains high in fiber. Vegetables  Servings: 4 to 5 a day  A serving is:    1 cup raw leafy vegetable    Half a cup cut-up raw or cooked vegetable    Half a cup vegetable juice  Best choices: Fresh or frozen vegetables prepared without added salt or fat.   Fruits  Servings: 4 to 5 a day  A serving is:    1 medium fruit    One-quarter cup dried fruit    Half a cup fresh, frozen, or canned fruit    Half a cup of 100% fruit juices  Best choices: A variety of fresh fruits of different colors. Whole fruits are a better choice than fruit juices. Low-fat or fat-free dairy  Servings: 2 to 3 a day  A serving is:    1 cup milk    1 cup yogurt    One and a half ounces cheese  Best choices: Skim or 1% milk, low-fat or fat-free  yogurt or buttermilk, and low-fat cheeses.         Lean meats, poultry, fish  Servings: 6 or fewer a day  A serving is:    1 ounce cooked meats, poultry, or fish    1 egg  Best choices: Lean poultry and fish. Trim away visible fat. Broil, grill, roast, or boil instead of frying. Remove skin from poultry before eating. Limit how much red meat you eat.  Nuts, seeds, beans  Servings: 4 to 5 a week  A serving is:    One-third cup nuts (one and a half ounces)    2 tablespoons nut butter or seeds    Half a cup cooked dry beans or legumes  Best choices: Dry roasted nuts with no salt added, lentils, kidney beans, garbanzo beans, and whole cotter beans.   Fats and oils  Servings: 2 to 3 a day  A serving is:    1 teaspoon vegetable oil    1 teaspoon soft margarine    1 tablespoon mayonnaise    2 tablespoons salad dressing  Best choices: Nut and vegetable oils (nontropical vegetable oils), such as olive and canola oil. Sweets  Servings: 5 a week or fewer  A serving is:    1 tablespoon sugar, maple syrup, or honey    1 tablespoon jam or jelly    1 half-ounce jelly beans (about 15)    1 cup lemonade  Best choices: Dried fruit can be a satisfying sweet. Choose low-fat sweets. And watch your serving sizes!      For more on the DASH eating plan, visit:  www.nhlbi.nih.gov/health/health-topics/topics/dash   Beka last reviewed this educational content on 7/1/2019 2000-2021 [employer]. All rights reserved. This information is not intended as a substitute for professional medical care. Always follow your healthcare professional's instructions.

## 2021-10-25 ENCOUNTER — ANCILLARY PROCEDURE (OUTPATIENT)
Dept: ULTRASOUND IMAGING | Facility: CLINIC | Age: 81
End: 2021-10-25
Attending: NURSE PRACTITIONER
Payer: COMMERCIAL

## 2021-10-25 DIAGNOSIS — I65.21 RIGHT CAROTID ARTERY OCCLUSION: ICD-10-CM

## 2021-10-25 PROCEDURE — 93880 EXTRACRANIAL BILAT STUDY: CPT | Performed by: RADIOLOGY

## 2021-10-28 ENCOUNTER — VIRTUAL VISIT (OUTPATIENT)
Dept: NEUROSURGERY | Facility: CLINIC | Age: 81
End: 2021-10-28
Payer: COMMERCIAL

## 2021-10-28 DIAGNOSIS — I65.23 BILATERAL CAROTID ARTERY STENOSIS: Primary | ICD-10-CM

## 2021-10-28 PROCEDURE — 99207 PR NO DOCUMENTATION ON VISIT: CPT | Performed by: NEUROLOGICAL SURGERY

## 2021-10-28 NOTE — LETTER
10/28/2021      RE: Liu Ragsdale  [address] [facility] [address]       Telephone visit 10 minutes. Some weakness in left index finger otherwise left sided strength is good. Repeat carotid ultrasound in one year at [place]. See dictated note.  CORINE Andersen MD    Service Date: 10/28/2021    Piyush Rogers MD  [facility]  [address] [place] [address]  [address]    RE:  Liu Ragsdale  MRN:  [id number]  :  1940    Dear Dr. Rogers:    We spoke to Mr. Ragsdale as part of a telephone followup in Cerebrovascular Clinic on 10/28/2021.  He is about a month out from a right carotid endarterectomy for symptomatic, severe carotid stenosis.  He presented with right hemispheric strokes and had right common carotid artery and internal carotid artery occlusion.  The occlusion resolved with anticoagulation, revealing severe stenosis at the origin of the right ICA. He underwent endarterectomy successfully.    He is feeling well overall.  Much of his left upper extremity weakness has resolved with the exception of the left index finger, which remains a slower than the rest of the hand.  He is ambulating well and is independent.  He has been doing some physical therapy.  He denies any discomfort at the surgical site.    Over the phone, he sounded well.  His speech, language and phonation were normal.    REVIEW OF STUDIES:  We went over his carotid ultrasound from 10/25/2021.  In the right ICA, peak velocity was 87 cm per second with a ratio 0.82.  On the left side, peak velocity is 57 cm per second with a ratio 0.7.  All of these values are favorable and do not suggest any stenosis.    IMPRESSION AND PLAN:  We will repeat a carotid ultrasound in 1 year and this can be done at [facility].  We will follow up with him by phone afterwards.  He will continue to work with you regarding optimal dosing of his antihypertensives.  He should remain on a daily aspirin indefinitely.  Please do not  hesitate to contact us with questions.    Sincerely,    Raymond Andersen MD        D: 2021   T: 2021   MT: mary    Name:     GISELE LOZANO  MRN:      [id number]        Account:      [id number]   :      1940           Service Date: 10/28/2021       Document: [id number]

## 2021-10-28 NOTE — LETTER
10/28/2021      RE: Liu Ragsdale  [address] [facility] [address]       Telephone visit 10 minutes. Some weakness in left index finger otherwise left sided strength is good. Repeat carotid ultrasound in one year at [place]. See dictated note.  MD Raymond Vieyra MD       Home

## 2021-10-28 NOTE — LETTER
10/28/2021       RE: Liu Ragsdale  [address] [facility] [address]     Dear Colleague,    Thank you for referring your patient, Liu Ragsdale, to the [facility] NEUROS[facility]GERY [facility]INIC [place] at [facility]. Please see a copy of my visit note below.    Telephone visit 10 minutes. Some weakness in left index finger otherwise left sided strength is good. Repeat carotid ultrasound in one year at [place]. See dictated note.  CORINE Andersen MD    Service Date: 10/28/2021    Piyush Rogers MD  [facility]  [address] [place] [address]  [address]    RE:  Liu Ragsdale  MRN:  [id number]  :  1940    Dear Dr. Rogers:    We spoke to Mr. Ragsdale as part of a telephone followup in Cerebrovascular Clinic on 10/28/2021.  He is about a month out from a right carotid endarterectomy for symptomatic, severe carotid stenosis.  He presented with right hemispheric strokes and had right common carotid artery and internal carotid artery occlusion.  The occlusion resolved with anticoagulation, revealing severe stenosis at the origin of the right ICA. He underwent endarterectomy successfully.    He is feeling well overall.  Much of his left upper extremity weakness has resolved with the exception of the left index finger, which remains a slower than the rest of the hand.  He is ambulating well and is independent.  He has been doing some physical therapy.  He denies any discomfort at the surgical site.    Over the phone, he sounded well.  His speech, language and phonation were normal.    REVIEW OF STUDIES:  We went over his carotid ultrasound from 10/25/2021.  In the right ICA, peak velocity was 87 cm per second with a ratio 0.82.  On the left side, peak velocity is 57 cm per second with a ratio 0.7.  All of these values are favorable and do not suggest any stenosis.    IMPRESSION AND PLAN:  We will repeat a carotid ultrasound in 1 year and this can  be done at [facility].  We will follow up with him by phone afterwards.  He will continue to work with you regarding optimal dosing of his antihypertensives.  He should remain on a daily aspirin indefinitely.  Please do not hesitate to contact us with questions.    Sincerely,    Raymond Andersen MD        D: 2021   T: 2021   MT: mary    Name:     GISELE LOZANOMicaela  MRN:      [id number]        Account:      [id number]   :      1940           Service Date: 10/28/2021       Document: [id number]

## 2021-10-28 NOTE — LETTER
10/28/2021       RE: Liu Ragsdale  [address] [facility] [address]     Dear Colleague,    Thank you for referring your patient, Liu Ragsdale, to the [facility]OS[facility]ERY [facility]INIC [facility]. Please see a copy of my visit note below.    Telephone visit 10 minutes. Some weakness in left index finger otherwise left sided strength is good. Repeat carotid ultrasound in one year at [place]. See dictated note.  CORINE Andersen MD      Again, thank you for allowing me to participate in the care of your patient.      Sincerely,    Raymond Andersen MD

## 2021-10-28 NOTE — NURSING NOTE
Spoke with patient, Dr Andersen running late, patient is not avialable until 830pm today.  Will inform Dr Andersen, if they don't connect today I will call patient tomorrow to reschedule.   Voices understanding.

## 2021-11-26 NOTE — PROGRESS NOTES
Service Date: 10/28/2021    Piyush Rogers MD  [place] [id number]    RE:  Liu Ragsdale  MRN:  [id number]  :  1940    Dear Dr. Rogers:    We spoke to Mr. Ragsdale as part of a telephone followup in Cerebrovascular Clinic on 10/28/2021.  He is about a month out from a right carotid endarterectomy for symptomatic, severe carotid stenosis.  He presented with right hemispheric strokes and had right common carotid artery and internal carotid artery occlusion.  The occlusion resolved with anticoagulation, revealing severe stenosis at the origin of the right ICA. He underwent endarterectomy successfully.    He is feeling well overall.  Much of his left upper extremity weakness has resolved with the exception of the left index finger, which remains a slower than the rest of the hand.  He is ambulating well and is independent.  He has been doing some physical therapy.  He denies any discomfort at the surgical site.    Over the phone, he sounded well.  His speech, language and phonation were normal.    REVIEW OF STUDIES:  We went over his carotid ultrasound from 10/25/2021.  In the right ICA, peak velocity was 87 cm per second with a ratio 0.82.  On the left side, peak velocity is 57 cm per second with a ratio 0.7.  All of these values are favorable and do not suggest any stenosis.    IMPRESSION AND PLAN:  We will repeat a carotid ultrasound in 1 year and this can be done at [facility].  We will follow up with him by phone afterwards.  He will continue to work with you regarding optimal dosing of his antihypertensives.  He should remain on a daily aspirin indefinitely.  Please do not hesitate to contact us with questions.    Sincerely,    Raymond Andersen MD        D: 2021   T: 2021   MT: mary    Name:     LIU RAGSDALE  MRN:      [id number]        Account:      [id number]   :      1940           Service Date: 10/28/2021        Document: [id number]

## 2021-11-26 NOTE — PROGRESS NOTES
Telephone visit 10 minutes. Some weakness in left index finger otherwise left sided strength is good. Repeat carotid ultrasound in one year at [place]. See dictated note.  CORINE Andersen MD

## 2021-11-26 NOTE — PATIENT INSTRUCTIONS
Stay on daily aspirin    Repeat carotid ultrasound in one year at [place] (ordered)    Follow up by phone after ultrasound    If you have any questions please contact me at [phone], option 3.    Hoang Shea RN, CNRN  Stroke & Endovascular Care Coordinator    Thank you for choosing [facility] for your health care needs.

## 2021-12-02 VITALS — WEIGHT: 175 LBS | BODY MASS INDEX: 32.2 KG/M2 | HEIGHT: 62 IN

## 2021-12-02 DIAGNOSIS — I65.21 RIGHT CAROTID ARTERY OCCLUSION: ICD-10-CM

## 2021-12-02 ASSESSMENT — MIFFLIN-ST. JEOR: SCORE: 1383.04

## 2021-12-02 NOTE — PROGRESS NOTES
"Liu Ragsdale is a 81 year old who is being evaluated via a billable video visit.      How would you like to obtain your AVS? MyChart  If the video visit is dropped, the invitation should be resent by: Text to cell phone: [phone]  Will anyone else be joining your video visit? No    Are you currently in the state of [place] Yes  Does patient have any form of state insurance?No   Do you have wifi? Yes  Do you have a smart phone/device?Yes  Can you download an tr on your phone comfortably with out assistance including You Tube? Yes    If patient encounters technical issues they should call [phone] :[id number]}    Julee Mora CMA    The patient has been notified of following:      \"This telephone visit will be conducted via a call between you and your physician/provider. We have found that certain health care needs can be provided without the need for a physical exam.  This service lets us provide the care you need with a short phone conversation.  If a prescription is necessary we can send it directly to your pharmacy.  If lab work is needed we can place an order for that and you can then stop by our lab to have the test done at a later time.     Telephone visits are billed at different rates depending on your insurance coverage. During this emergency period, for some insurers they may be billed the same as an in-person visit.  Please reach out to your insurance provider with any questions.     If during the course of the call the physician/provider feels a telephone visit is not appropriate, you will not be charged for this service.\"     Patient has given verbal consent to a Telephone visit? Yes      How would you like to obtain your AVS? Mail a copy    If the telephone visit is dropped, the invitation should be resent by: [phone]    Telephone Visit Details:     Telephone Visit Start Time: 10:12 AM    Telephone Visit End Time:10:33 AM    Video was not available for this visit.    Thank you for the " opportunity to participate in the care of  Liu Ragsdale.    Assessment and Plan:    In summary Liu Ragsdale is a 81 year old year old male here for sleep disturbance.  1. Sleep related hypoxia/History of FANI/Snoring/Ischemic stroke   Liu Ragsdale has high risk for obstructive sleep apnea based on the history of sleep related hypoxia, FANI and snoring. I educated the patient on the underlying pathophysiology of obstructive sleep apnea. We reviewed the risks associated with sleep apnea, including increased cardiovascular risk and overall death. We talked about treatments briefly. I recommend getting an baseline nocturnal polysomnography. The patient should return to the clinic to discuss results and treatment option in a patient-centered approach.    History of present illness:    He is a 81 year old male who comes to the virtual clinic with a chief complaint of sleep related hypoxia. The patient was diagnosed with FANI at our facility in 2015 (AHI=12.4). He decided not to pursue any therapy at that time. However about 2 months ago, during a recent hospitalization for a procedure he was found to have significant sleep related hypoxia that required nasal canula oxygen for sleep. He also has a history of CVA.     Ideal Sleep-Wake Cycle(devoid of societal pressure):    Patient would try to initiate sleep at around 9:30-10 PM with a sleep latency of 10 minutes. The patient would have 2-3 awakenings. Final wake up time is around 6:30-7 AM.    JORGE LUIS:  JORGE LUIS Total Score: 3  Total score - [place]field: 2 (12/2/2021 10:00 AM)    Patient told to return in one week after the sleep study is interpreted.    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     SOB (shortness of breath) on exertion     Arthritis of right glenohumeral joint- moderate     Arthritis of right acromioclavicular joint- advanced     Positive cardiac stress test     Status post coronary angiogram     Coronary artery disease involving native coronary artery of native heart without angina pectoris     Degenerative joint disease of left hip     Near syncope     Right carotid artery occlusion     Cerebrovascular accident (H)     Ischemic stroke (H)     Past Medical History:   Diagnosis Date     Angina at rest (H)      Arthritis      Cerebrovascular accident (H) 9/27/2021     Coronary artery disease involving native coronary artery of native heart without angina pectoris      Hypertension      Past Surgical History:   Procedure Laterality Date     ARTHROPLASTY HIP Left 02/03/2021    Procedure: Total Hip Arthroplasty;  Surgeon: Andrew Arriola MD;  Location: [facility] OR     COLONOSCOPY N/A 06/16/2016    Procedure: COLONOSCOPY;  Surgeon: Randy Avendano MD;  Location: [facility] GI     CV LEFT HEART CATH Left 03/10/2020    Procedure: Left Heart Cath;  Surgeon: Vicente Lopez MD;  Location:  HEART CARDIAC CATH LAB     ENDARTERECTOMY CAROTID Right 9/29/2021    Procedure: ENDARTERECTOMY, CAROTID;  Surgeon: Raymond Andersen MD;  Location:  OR     EYE SURGERY  2005    cataract surgery     VASECTOMY  1981     Current Outpatient Medications   Medication Sig Dispense Refill     acetaminophen (TYLENOL) 325 MG tablet Take 3 tablets (975 mg) by mouth 3 times daily  0     amLODIPine (NORVASC) 5 MG tablet Take 1 tablet (5 mg) by mouth daily 30 tablet 0     aspirin (ASA) 325 MG tablet Take 1 tablet (325 mg) by mouth daily 100 tablet 0     atorvastatin (LIPITOR) 10 MG tablet Take 1 tablet (10 mg) by mouth daily 30 tablet 0     carvedilol (COREG) 6.25 MG tablet Take 1 tablet (6.25 mg) by mouth 2 times daily (with meals) 60 tablet 0     diclofenac (VOLTAREN) 1 % topical gel  Place 4 g onto the skin 3 times daily as needed for moderate pain (Shoulder) 100 g 1     Multiple Vitamin (MULTI VITAMIN PO) Take 1 oz by mouth daily Eniva VIBE-liquid       nitroGLYcerin (NITROSTAT) 0.3 MG sublingual tablet For chest pain place 1 tablet under the tongue every 5 minutes for 3 doses. If symptoms persist 5 minutes after 1st dose call 911. 30 tablet 3     NONFORMULARY Take 1 oz by mouth daily Eniva Cell-Ready Multi Minerals is a concentrated, ionic liquid mineral dietary supplement blend to help support nutritional balance and wellbeing.  Combine with 8 ounces of water / juice       Metoprolol and Ampicillin  Social History     Socioeconomic History     Marital status:      Spouse name: Not on file     Number of children: Not on file     Years of education: Not on file     Highest education level: Not on file   Occupational History     Occupation:    Tobacco Use     Smoking status: Never Smoker     Smokeless tobacco: Never Used   Vaping Use     Vaping Use: Never used   Substance and Sexual Activity     Alcohol use: Yes     Comment: Beth only     Drug use: No     Sexual activity: Not Currently     Partners: Female     Birth control/protection: Male Surgical     Comment: [id number]   Other Topics Concern     Parent/sibling w/ CABG, MI or angioplasty before 65F 55M? No   Social History Narrative     Not on file     Social Determinants of Health     Financial Resource Strain: Not on file   Food Insecurity: Not on file   Transportation Needs: Not on file   Physical Activity: Not on file   Stress: Not on file   Social Connections: Not on file   Intimate Partner Violence: Not on file   Housing Stability: Not on file     Family History   Problem Relation Age of Onset     Hypertension Sister      Kidney failure Sister      Chronic Obstructive Pulmonary Disease Sister      Thyroid Disease Sister      Seizure Disorder Paternal Grandmother      Mitral valve prolapse Daughter      No Known  Problems Son      Cerebrovascular Disease No family hx of       Labs/Studies:     No results found for: PH, PHARTERIAL, PO2, TC3JVQXGFNV, SAT, PCO2, HCO3, BASEEXCESS, LUIZ, BEB  Lab Results   Component Value Date    TSH 1.41 05/30/2014     Lab Results   Component Value Date     (H) 10/06/2021    GLC 95 10/01/2021     Lab Results   Component Value Date    HGB 11.4 (L) 10/01/2021    HGB 11.7 (L) 09/30/2021     Lab Results   Component Value Date    BUN 18 10/06/2021    BUN 25 10/01/2021    CR 1.29 (H) 10/06/2021    CR 1.26 (H) 10/01/2021     Lab Results   Component Value Date    AST 17 06/03/2019    AST 19 01/22/2018    ALT 29 08/19/2020    ALT 24 07/17/2020    ALKPHOS 86 06/03/2019    ALKPHOS 113 01/22/2018    BILITOTAL 0.5 06/03/2019    BILITOTAL 1.0 01/22/2018    BILICONJ 0.0 03/31/2007     No results found for: UAMP, UBARB, BENZODIAZEUR, UCANN, UCOC, OPIT, UPCP    Recent Labs   Lab Test 10/06/21  1032 10/01/21  0529    141   POTASSIUM 4.5 4.2  4.2   CHLORIDE 108 112*   CO2 28 27   ANIONGAP 3 2*   * 95   BUN 18 25   CR 1.29* 1.26*   GWEN 9.0 8.3*       No results found for: WALLY Hamm DO  Board Certified in Internal Medicine and Sleep Medicine    (Note created with Dragon voice recognition and unintended spelling errors and word substitutions may occur)

## 2021-12-02 NOTE — PATIENT INSTRUCTIONS
Your BMI is Body mass index is 32.01 kg/m .  Weight management is a personal decision.  If you are interested in exploring weight loss strategies, the following discussion covers the approaches that may be successful. Body mass index (BMI) is one way to tell whether you are at a healthy weight, overweight, or obese. It measures your weight in relation to your height.  A BMI of 18.5 to 24.9 is in the healthy range. A person with a BMI of 25 to 29.9 is considered overweight, and someone with a BMI of 30 or greater is considered obese. More than two-thirds of American adults are considered overweight or obese.  Being overweight or obese increases the risk for further weight gain. Excess weight may lead to heart disease and diabetes.  Creating and following plans for healthy eating and physical activity may help you improve your health.  Weight control is part of healthy lifestyle and includes exercise, emotional health, and healthy eating habits. Careful eating habits lifelong are the mainstay of weight control. Though there are significant health benefits from weight loss, long-term weight loss with diet alone may be very difficult to achieve- studies show long-term success with dietary management in less than 10% of people. Attaining a healthy weight may be especially difficult to achieve in those with severe obesity. In some cases, medications, devices and surgical management might be considered.  What can you do?  If you are overweight or obese and are interested in methods for weight loss, you should discuss this with your provider.     Consider reducing daily calorie intake by 500 calories.     Keep a food journal.     Avoiding skipping meals, consider cutting portions instead.    Diet combined with exercise helps maintain muscle while optimizing fat loss. Strength training is particularly important for building and maintaining muscle mass. Exercise helps reduce stress, increase energy, and improves fitness.  Increasing exercise without diet control, however, may not burn enough calories to loose weight.       Start walking three days a week 10-20 minutes at a time    Work towards walking thirty minutes five days a week     Eventually, increase the speed of your walking for 1-2 minutes at time    In addition, we recommend that you review healthy lifestyles and methods for weight loss available through the National Institutes of Health patient information sites:  http://win.niddk.nih.gov/publications/index.htm    And look into health and wellness programs that may be available through your health insurance provider, employer, local community center, or [employer].    Weight management plan: Patient was referred to their PCP to discuss a diet and exercise plan.  Patient education: What is a sleep study?     What is a sleep study? -- A sleep study is a test that measures how well you sleep and checks for sleep problems. For some sleep studies, you stay overnight in a sleep lab at a hospital or sleep center.     What happens during a sleep study? -- Before you go to sleep, a technician attaches small, sticky patches called  electrodes  to your head, chest, and legs. He or she will also place a small tube beneath your nose and might wrap 1 or 2 belts around your chest.   Each of these items has wires that connect to monitors. The monitors record your movement, brain activity, breathing, and other body functions while you sleep.  If you have a history of trouble falling asleep, your doctor might prescribe a medicine to help you fall asleep in the lab. If you have never taken the medicine before, your doctor might ask you take it on a night before your sleep study to see how it affects you.   Why might my doctor order a sleep study? -- Your doctor will order a sleep study if he or she thinks you have sleep apnea or a different condition that makes you:   ?Have sudden jerking leg movements while you sleep, called  periodic limb  movements.    ?Feel very sleepy during the day and fall asleep all of a sudden, called  narcolepsy.    ?Have trouble falling asleep or staying asleep over a long period of time, called  chronic insomnia.    ?Do odd things while you sleep, such as walking.  How should I prepare for a sleep study? -- On the day of your sleep study, you should:   ?Avoid alcohol   ?Avoid drinking coffee, tea, sodas, and other drinks that have caffeine in the afternoon and evening   ?Take all of your regular medicines     The cost of care estimate line is [phone]. They are able to give the patient an estimate of the charges and also an estimate of their insurance coverage/patient responsibility.   After your sleep study is performed, please call us at [phone] or [phone]  to schedule for a follow up to review the results of the sleep study.    Please bring one tab of low dose melatonin 3 mg or less to the night of the study.    Melatonin intake is completely voluntary.    You may take own melatonin after arrival to sleep center. Do not drive or operate machinery after intake of melatonin.

## 2021-12-03 ENCOUNTER — VIRTUAL VISIT (OUTPATIENT)
Dept: SLEEP MEDICINE | Facility: CLINIC | Age: 81
End: 2021-12-03
Payer: COMMERCIAL

## 2021-12-03 DIAGNOSIS — R06.83 SNORING: ICD-10-CM

## 2021-12-03 DIAGNOSIS — Z86.69 HISTORY OF OBSTRUCTIVE SLEEP APNEA: ICD-10-CM

## 2021-12-03 DIAGNOSIS — G47.34 SLEEP RELATED HYPOXIA: Primary | ICD-10-CM

## 2021-12-03 DIAGNOSIS — I63.9 ISCHEMIC STROKE (H): ICD-10-CM

## 2021-12-03 PROCEDURE — 99203 OFFICE O/P NEW LOW 30 MIN: CPT | Mod: 95 | Performed by: INTERNAL MEDICINE

## 2021-12-09 RX ORDER — CARVEDILOL 6.25 MG/1
TABLET ORAL
Qty: 60 TABLET | Refills: 0 | OUTPATIENT
Start: 2021-12-09

## 2021-12-09 RX ORDER — CARVEDILOL 6.25 MG/1
6.25 TABLET ORAL 2 TIMES DAILY WITH MEALS
Qty: 180 TABLET | Refills: 1 | Status: SHIPPED | OUTPATIENT
Start: 2021-12-09 | End: 2022-07-12

## 2021-12-09 NOTE — TELEPHONE ENCOUNTER
I saw this patient during an inpatient stay, and have no follow-up capabilities.  Please refer this refill request to the patient's primary care team.

## 2021-12-14 ENCOUNTER — OFFICE VISIT (OUTPATIENT)
Dept: NEUROLOGY | Facility: CLINIC | Age: 81
End: 2021-12-14
Payer: COMMERCIAL

## 2021-12-14 VITALS
DIASTOLIC BLOOD PRESSURE: 81 MMHG | BODY MASS INDEX: 32.87 KG/M2 | HEART RATE: 65 BPM | SYSTOLIC BLOOD PRESSURE: 182 MMHG | HEIGHT: 62 IN | WEIGHT: 178.6 LBS

## 2021-12-14 DIAGNOSIS — Z98.890 HISTORY OF RIGHT-SIDED CAROTID ENDARTERECTOMY: ICD-10-CM

## 2021-12-14 DIAGNOSIS — Z86.73 HISTORY OF STROKE: Primary | ICD-10-CM

## 2021-12-14 PROCEDURE — 2894A VOIDCORRECT: CPT | Performed by: PSYCHIATRY & NEUROLOGY

## 2021-12-14 PROCEDURE — 99215 OFFICE O/P EST HI 40 MIN: CPT | Performed by: PSYCHIATRY & NEUROLOGY

## 2021-12-14 ASSESSMENT — MIFFLIN-ST. JEOR: SCORE: 1399.37

## 2021-12-14 NOTE — PATIENT INSTRUCTIONS
Plan:  -- Continue the daily aspirin and Lipitor.  -- Continue to work with Dr. Rogers with regards to managing your blood pressure.  Goal is <140/90.  -- I agree with your plan to do an updated sleep study.  As we discussed, untreated sleep apnea can be a risk factor for stroke and heart attacks.  -- Follow-up with Neurosurgery/repeat carotid ultrasound at the 1 year point as recommended.    -- Follow-up with Neurology as needed.

## 2021-12-14 NOTE — PROGRESS NOTES
INITIAL NEUROLOGY CONSULTATION    DATE OF VISIT: 12/14/2021  MRN: [id number]  PATIENT NAME: Liu Ragsdale  YOB: 1940    REFERRING PROVIDER: No ref. provider found    Chief Complaint   Patient presents with     Stroke     Follow up        SUBJECTIVE:                                                      HPI:   Liu Ragsdale is a 81 year old male whom I have been asked by Dr. Rogers to see in consultation for stroke.  The patient presented to the [facility] emergency room on 9.26.21 with a near syncopal event.  He was found to have left-sided weakness and slurred speech on exam.  He was transferred to the [facility] upon discovery of symptomatic right carotid stenosis with a right MCA strokes. He underwent carotid endarterectomy while hospitalized.  He did follow-up with neurosurgery about a month after his surgery.  He reported he was doing well.  Plan from the neurosurgery standpoint is to repeat the carotid ultrasound in 1 year and a phone visit thereafter.    Liu is on aspirin, Lipitor and amlodipine and carvedilol for hypertension.  It appears that he was not on an aspirin prior to the strokes.    Liu tells me that initially he had some upper full extremity weakness and sensory changes.  He now is left with just some difficulties with dexterity with the index finger and thumb on the left.  This is his dominant hand.  Says his writing looks like a first graders. He says that occupational therapy said there was improvement on his exam and function when he had his last check, last week.  He admits he has not been as good about doing his putty exercises at home.  Since the stroke, he has noticed that his vision is maybe not quite as good as it used to be and he relies on his reading glasses more.  No cuts in his vision to speak of.    He says that he was on a baby aspirin prior to the strokes.  He is doing fine on the higher dose.  He says he has also been on the Lipitor for a  while.  LDL was 15 two months ago.  No history of arrhythmia. He feels that his hospital stay went very well.  He is very happy with his care.    He is not a smoker.  Rare alcohol use.    He says that the last night he was in the hospital they treated him with some oxygen. He had a sleep study 4-5 years ago and was borderline.  He recently met with a sleep specialist and has plans to schedule that another sleep study in January.    He did not take his morning BP medication today. He says that at home his BP has been in the 140s over 80s.  They did make some adjustments in his medications when he was hospitalized.  Hemoglobin A1c was 6.0% in 9.2021.    Past Medical History:   Diagnosis Date     Angina at rest (H)      Arthritis      Cerebrovascular accident (H) 9/27/2021     Coronary artery disease involving native coronary artery of native heart without angina pectoris      Hypertension      Past Surgical History:   Procedure Laterality Date     ARTHROPLASTY HIP Left 02/03/2021    Procedure: Total Hip Arthroplasty;  Surgeon: Andrew Arriola MD;  Location: [facility] OR     COLONOSCOPY N/A 06/16/2016    Procedure: COLONOSCOPY;  Surgeon: Randy Avendano MD;  Location: [facility]     CV LEFT HEART CATH Left 03/10/2020    Procedure: Left Heart Cath;  Surgeon: Vicente Lopez MD;  Location:  HEART CARDIAC CATH LAB     ENDARTERECTOMY CAROTID Right 9/29/2021    Procedure: ENDARTERECTOMY, CAROTID;  Surgeon: Raymond Andersen MD;  Location:  OR     EYE SURGERY  2005    cataract surgery     VASECTOMY  1981       acetaminophen (TYLENOL) 325 MG tablet, Take 3 tablets (975 mg) by mouth 3 times daily  amLODIPine (NORVASC) 5 MG tablet, Take 1 tablet (5 mg) by mouth daily  aspirin (ASA) 325 MG tablet, Take 1 tablet (325 mg) by mouth daily  atorvastatin (LIPITOR) 10 MG tablet, Take 1 tablet (10 mg) by mouth daily  carvedilol (COREG) 6.25 MG tablet, Take 1 tablet (6.25 mg) by mouth 2 times daily (with meals)  diclofenac  "(VOLTAREN) 1 % topical gel, Place 4 g onto the skin 3 times daily as needed for moderate pain (Shoulder)  Multiple Vitamin (MULTI VITAMIN PO), Take 1 oz by mouth daily Eniva VIBE-liquid  nitroGLYcerin (NITROSTAT) 0.3 MG sublingual tablet, For chest pain place 1 tablet under the tongue every 5 minutes for 3 doses. If symptoms persist 5 minutes after 1st dose call 911.  NONFORMULARY, Take 1 oz by mouth daily Eniva Cell-Ready Multi Minerals is a concentrated, ionic liquid mineral dietary supplement blend to help support nutritional balance and wellbeing.  Combine with 8 ounces of water / juice    No current facility-administered medications on file prior to visit.    Allergies   Allergen Reactions     Metoprolol Other (See Comments)     Side effects : dry mouth     Ampicillin Rash        Problem (# of Occurrences) Relation (Name,Age of Onset)    Chronic Obstructive Pulmonary Disease (1) Sister (bhavani valverde)    Hypertension (1) Sister (bhavani valverde)    Kidney failure (1) Sister (bhavani valverde)    Mitral valve prolapse (1) Daughter (Cassandra)    No Known Problems (1) Son (Joel)    Seizure Disorder (1) Paternal Grandmother    Thyroid Disease (1) Sister (sue jacobs)       Negative family history of: Cerebrovascular Disease        Social History     Tobacco Use     Smoking status: Never Smoker     Smokeless tobacco: Never Used   Vaping Use     Vaping Use: Never used   Substance Use Topics     Alcohol use: Yes     Comment: Beth only     Drug use: No       REVIEW OF SYSTEMS:                                                      10-point review of systems is negative except as mentioned above in HPI.     EXAM:                                                      Physical Exam:   Vitals: BP (!) 182/81 (BP Location: Right arm, Patient Position: Sitting)   Pulse 65   Ht 1.575 m (5' 2\")   Wt 81 kg (178 lb 9.6 oz)   BMI 32.67 kg/m    BMI= Body mass index is 32.67 kg/m .  GENERAL: NAD.  HEENT: NC/AT.   CV: RRR. S1, S2. "   NECK: No bruits.  PULM: Non-labored breathing.   Neurologic:  MENTAL STATUS: Alert, attentive. Speech is fluent. Normal comprehension.  Normal concentration. Adequate fund of knowledge.   CRANIAL NERVES: Discs technically difficult to visualize. Visual fields intact to confrontation. Pupils equally, round and reactive to light. Facial sensation and movement normal. EOM full. Hearing intact to conversation. Trapezius strength intact. Palate moves symmetrically. Tongue midline.  MOTOR: 5/5 in proximal and distal muscle groups of upper and lower extremities. Tone and bulk normal.   DTRs: Intact and symmetric in biceps, BR, patellae.  Babinski down-going bilaterally.   SENSATION: Normal light touch and pinprick. Intact proprioception at great toes. Vibration: Diminished at both ankles (Rworse>L).   COORDINATION: Normal finger nose finger. Finger tapping normal. Knee heel shin normal.  STATION AND GAIT: Romberg negative. Good postural reflexes. Casual gait is normal.  Left hand-dominant.    Relevant Data:  MRI Brain (9.26.21):  IMPRESSION:     1. Multiple small infarcts scattered throughout the right middle  cerebral artery distribution.  2. No evidence of hemorrhage or significant mass effect.  3. Small old right cerebellar infarcts.  4. Volume loss and scattered white matter T2 hyperintensities which  likely represent chronic small vessel ischemic change.    MRA Head and Neck (9.26.21):  IMPRESSION:    1. Improved flow within the right internal carotid artery.  2. Severe stenosis of the right vertebral artery at the proximal V4  segment, better demonstrated on neck MRA.  3. Otherwise, no evidence of large vessel occlusion or high-grade  stenosis.      IMPRESSION:    1. Improved flow within the right internal carotid artery.  2. Severe stenosis of the proximal right internal carotid artery,  probably 70% or greater.  3. 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    4. Moderate stenosis of the dominant left vertebral artery at the V2  segment related to osseous cervical spine degenerative change,  unchanged.     CTA Head and Neck (9.28.21):  Impression:       1. There is a near complete occlusion of right internal carotid artery  at the bifurcation. The right internal carotid artery is patent distal  to the stenosis.  2. There is nonocclusive atherosclerosis involving the intracranial  segments of both internal carotid arteries.  3. Remaining major cervical arteries appear patent.     Carotid US (10.25.21):  IMPRESSION:     1. RIGHT ICA: No significant residual stenosis or plaque after carotid  endarterectomy. Normal.     2. LEFT ICA:  Less than 50% diameter narrowing by grayscale imaging  and sonographic velocity criteria.     Imaging reviewed independently by me.  Agree with radiology read.    ASSESSMENT and PLAN:                                                      Assessment:     ICD-10-CM    1. History of stroke  Z86.73    2. History of right-sided carotid endarterectomy  Z98.890         Mr. Ragsdale is a pleasant 81-year-old man here for follow-up regarding right MCA strokes/symptomatic carotid stenosis, status post right CEA.  We reviewed his stroke risk factors in clinic today.  His blood pressure was a little bit high so I did recommend that he follow-up with Dr. Rogers regarding this.  He also has plans to follow-up with regards to possible sleep apnea.  Otherwise his current regimen seems to be appropriate for management of vascular risks.  We can plan to follow-up as needed, since he already has neurosurgery follow-up.  I am happy to see Liu back if any questions arise.    Liu to follow up with Primary Care provider regarding elevated blood pressure.    Plan:  -- Continue the daily aspirin and Lipitor.  -- Continue to work with Dr. Rogers with regards to managing your blood pressure/blood sugar.  Goal BP is <140/90.  -- I agree with your plan to do an updated  sleep study.  As we discussed, untreated sleep apnea can be a risk factor for stroke and heart attacks.  -- Follow-up with Neurosurgery/repeat carotid ultrasound at the 1 year point as recommended.    -- Follow-up with Neurology as needed.    Total Time: 45 minutes were spent with the patient and in chart review/documentation (as described above in Assessment and Plan) /coordinating the care on date of service.    Audrey Albarado MD  Neurology    CC: Piyush Rogers MD    Dragon software used in the dictation of this note.

## 2021-12-14 NOTE — LETTER
12/14/2021         RE: Liu Ragsdale  [address] [facility] [id number]        Dear Colleague,    Thank you for referring your patient, Liu Ragsdale, to the [facility] [place]. Please see a copy of my visit note below.    INITIAL NEUROLOGY CONSULTATION    DATE OF VISIT: 12/14/2021  MRN: [id number]  PATIENT NAME: Liu Ragsdale  YOB: 1940    REFERRING PROVIDER: No ref. provider found    Chief Complaint   Patient presents with     Stroke     Follow up        SUBJECTIVE:                                                      HPI:   Liu Ragsdale is a 81 year old male whom I have been asked by Dr. Rogers to see in consultation for stroke.  The patient presented to the [facility] emergency room on 9.26.21 with a near syncopal event.  He was found to have left-sided weakness and slurred speech on exam.  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 He underwent carotid endarterectomy while hospitalized.  He did follow-up with neurosurgery about a month after his surgery.  He reported he was doing well.  Plan from the neurosurgery standpoint is to repeat the carotid ultrasound in 1 year and a phone visit thereafter.    Liu is on aspirin, Lipitor and amlodipine and carvedilol for hypertension.  It appears that he was not on an aspirin prior to the strokes.    Liu tells me that initially he had some upper full extremity weakness and sensory changes.  He now is left with just some difficulties with dexterity with the index finger and thumb on the left.  This is his dominant hand.  Says his writing looks like a first graders. He says that occupational therapy said there was improvement on his exam and function when he had his last check, last week.  He admits he has not been as good about doing his putty exercises at home.  Since the stroke, he has noticed that his vision is  maybe not quite as good as it used to be and he relies on his reading glasses more.  No cuts in his vision to speak of.    He says that he was on a baby aspirin prior to the strokes.  He is doing fine on the higher dose.  He says he has also been on the Lipitor for a while.  LDL was 15 two months ago.  No history of arrhythmia. He feels that his hospital stay went very well.  He is very happy with his care.    He is not a smoker.  Rare alcohol use.    He says that the last night he was in the hospital they treated him with some oxygen. He had a sleep study 4-5 years ago and was borderline.  He recently met with a sleep specialist and has plans to schedule that another sleep study in January.    He did not take his morning BP medication today. He says that at home his BP has been in the 140s over 80s.  They did make some adjustments in his medications when he was hospitalized.  Hemoglobin A1c was 6.0% in 9.2021.    Past Medical History:   Diagnosis Date     Angina at rest (H)      Arthritis      Cerebrovascular accident (H) 9/27/2021     Coronary artery disease involving native coronary artery of native heart without angina pectoris      Hypertension      Past Surgical History:   Procedure Laterality Date     ARTHROPLASTY HIP Left 02/03/2021    Procedure: Total Hip Arthroplasty;  Surgeon: Andrew Arriola MD;  Location: [facility] OR     COLONOSCOPY N/A 06/16/2016    Procedure: COLONOSCOPY;  Surgeon: Randy Avendano MD;  Location: [facility]     CV LEFT HEART CATH Left 03/10/2020    Procedure: Left Heart Cath;  Surgeon: Vicente Lopez MD;  Location:  HEART CARDIAC CATH LAB     ENDARTERECTOMY CAROTID Right 9/29/2021    Procedure: ENDARTERECTOMY, CAROTID;  Surgeon: Raymond Andersen MD;  Location:  OR     EYE SURGERY  2005    cataract surgery     VASECTOMY  1981       acetaminophen (TYLENOL) 325 MG tablet, Take 3 tablets (975 mg) by mouth 3 times daily  amLODIPine (NORVASC) 5 MG tablet, Take 1 tablet (5 mg) by  mouth daily  aspirin (ASA) 325 MG tablet, Take 1 tablet (325 mg) by mouth daily  atorvastatin (LIPITOR) 10 MG tablet, Take 1 tablet (10 mg) by mouth daily  carvedilol (COREG) 6.25 MG tablet, Take 1 tablet (6.25 mg) by mouth 2 times daily (with meals)  diclofenac (VOLTAREN) 1 % topical gel, Place 4 g onto the skin 3 times daily as needed for moderate pain (Shoulder)  Multiple Vitamin (MULTI VITAMIN PO), Take 1 oz by mouth daily Eniva VIBE-liquid  nitroGLYcerin (NITROSTAT) 0.3 MG sublingual tablet, For chest pain place 1 tablet under the tongue every 5 minutes for 3 doses. If symptoms persist 5 minutes after 1st dose call 911.  NONFORMULARY, Take 1 oz by mouth daily Eniva Cell-Ready Multi Minerals is a concentrated, ionic liquid mineral dietary supplement blend to help support nutritional balance and wellbeing.  Combine with 8 ounces of water / juice    No current facility-administered medications on file prior to visit.    Allergies   Allergen Reactions     Metoprolol Other (See Comments)     Side effects : dry mouth     Ampicillin Rash        Problem (# of Occurrences) Relation (Name,Age of Onset)    Chronic Obstructive Pulmonary Disease (1) Sister (bhavani valverde)    Hypertension (1) Sister (bhavani valverde)    Kidney failure (1) Sister (bhavani valverde)    Mitral valve prolapse (1) Daughter (Cassandra)    No Known Problems (1) Son (Joel)    Seizure Disorder (1) Paternal Grandmother    Thyroid Disease (1) Sister (sue jacobs)       Negative family history of: Cerebrovascular Disease        Social History     Tobacco Use     Smoking status: Never Smoker     Smokeless tobacco: Never Used   Vaping Use     Vaping Use: Never used   Substance Use Topics     Alcohol use: Yes     Comment: Beth only     Drug use: No       REVIEW OF SYSTEMS:                                                      10-point review of systems is negative except as mentioned above in HPI.     EXAM:                                                   "    Physical Exam:   Vitals: BP (!) 182/81 (BP Location: Right arm, Patient Position: Sitting)   Pulse 65   Ht 1.575 m (5' 2\")   Wt 81 kg (178 lb 9.6 oz)   BMI 32.67 kg/m    BMI= Body mass index is 32.67 kg/m .  GENERAL: NAD.  HEENT: NC/AT.   CV: RRR. S1, S2.   NECK: No bruits.  PULM: Non-labored breathing.   Neurologic:  MENTAL STATUS: Alert, attentive. Speech is fluent. Normal comprehension.  Normal concentration. Adequate fund of knowledge.   CRANIAL NERVES: Discs technically difficult to visualize. Visual fields intact to confrontation. Pupils equally, round and reactive to light. Facial sensation and movement normal. EOM full. Hearing intact to conversation. Trapezius strength intact. Palate moves symmetrically. Tongue midline.  MOTOR: 5/5 in proximal and distal muscle groups of upper and lower extremities. Tone and bulk normal.   DTRs: Intact and symmetric in biceps, BR, patellae.  Babinski down-going bilaterally.   SENSATION: Normal light touch and pinprick. Intact proprioception at great toes. Vibration: Diminished at both ankles (Rworse>L).   COORDINATION: Normal finger nose finger. Finger tapping normal. Knee heel shin normal.  STATION AND GAIT: Romberg negative. Good postural reflexes. Casual gait is normal.  Left hand-dominant.    Relevant Data:  MRI Brain (9.26.21):  IMPRESSION:     1. Multiple small infarcts scattered throughout the right middle  cerebral artery distribution.  2. No evidence of hemorrhage or significant mass effect.  3. Small old right cerebellar infarcts.  4. Volume loss and scattered white matter T2 hyperintensities which  likely represent chronic small vessel ischemic change.    MRA Head and Neck (9.26.21):  IMPRESSION:    1. Improved flow within the right internal carotid artery.  2. Severe stenosis of the right vertebral artery at the proximal V4  segment, better demonstrated on neck MRA.  3. Otherwise, no evidence of large vessel occlusion or high-grade  stenosis.  "     IMPRESSION:    1. Improved flow within the right internal carotid artery.  2. Severe stenosis of the proximal right internal carotid artery,  probably 70% or greater.  3. 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   4. Moderate stenosis of the dominant left vertebral artery at the V2  segment related to osseous cervical spine degenerative change,  unchanged.     CTA Head and Neck (9.28.21):  Impression:       1. There is a near complete occlusion of right internal carotid artery  at the bifurcation. The right internal carotid artery is patent distal  to the stenosis.  2. There is nonocclusive atherosclerosis involving the intracranial  segments of both internal carotid arteries.  3. Remaining major cervical arteries appear patent.     Carotid US (10.25.21):  IMPRESSION:     1. RIGHT ICA: No significant residual stenosis or plaque after carotid  endarterectomy. Normal.     2. LEFT ICA:  Less than 50% diameter narrowing by grayscale imaging  and sonographic velocity criteria.     Imaging reviewed independently by me.  Agree with radiology read.    ASSESSMENT and PLAN:                                                      Assessment:     ICD-10-CM    1. History of stroke  Z86.73    2. History of right-sided carotid endarterectomy  Z98.890         Mr. Ragsdale is a pleasant 81-year-old man here for follow-up regarding right MCA strokes/symptomatic carotid stenosis, status post right CEA.  We reviewed his stroke risk factors in clinic today.  His blood pressure was a little bit high so I did recommend that he follow-up with Dr. Rogers regarding this.  He also has plans to follow-up with regards to possible sleep apnea.  Otherwise his current regimen seems to be appropriate for management of vascular risks.  We can plan to follow-up as needed, since he already has neurosurgery follow-up.  I am happy to see Liu back if any questions arise.    Liu to follow up  with Primary Care provider regarding elevated blood pressure.    Plan:  -- Continue the daily aspirin and Lipitor.  -- Continue to work with Dr. Rogers with regards to managing your blood pressure/blood sugar.  Goal BP is <140/90.  -- I agree with your plan to do an updated sleep study.  As we discussed, untreated sleep apnea can be a risk factor for stroke and heart attacks.  -- Follow-up with Neurosurgery/repeat carotid ultrasound at the 1 year point as recommended.    -- Follow-up with Neurology as needed.    Total Time: 45 minutes were spent with the patient and in chart review/documentation (as described above in Assessment and Plan) /coordinating the care on date of service.    Audrey Albarado MD  Neurology    CC: Piyush Rogers MD    Dragon software used in the dictation of this note.        Again, thank you for allowing me to participate in the care of your patient.        Sincerely,        Audrey Albarado MD

## 2021-12-14 NOTE — NURSING NOTE
Chief Complaint   Patient presents with     Stroke     Follow up      Layo Joaquin CMA on 12/14/2021 at 8:59 AM

## 2021-12-28 ENCOUNTER — OFFICE VISIT (OUTPATIENT)
Dept: NEUROLOGY | Facility: CLINIC | Age: 81
End: 2021-12-28
Attending: FAMILY MEDICINE
Payer: COMMERCIAL

## 2021-12-28 VITALS
HEIGHT: 62 IN | HEART RATE: 80 BPM | SYSTOLIC BLOOD PRESSURE: 141 MMHG | WEIGHT: 178 LBS | OXYGEN SATURATION: 96 % | BODY MASS INDEX: 32.76 KG/M2 | DIASTOLIC BLOOD PRESSURE: 74 MMHG

## 2021-12-28 DIAGNOSIS — Z86.73 H/O ISCHEMIC RIGHT MCA STROKE: ICD-10-CM

## 2021-12-28 DIAGNOSIS — Z98.890 HISTORY OF RIGHT-SIDED CAROTID ENDARTERECTOMY: ICD-10-CM

## 2021-12-28 DIAGNOSIS — R53.1 LEFT-SIDED WEAKNESS: Primary | ICD-10-CM

## 2021-12-28 PROCEDURE — 99215 OFFICE O/P EST HI 40 MIN: CPT | Performed by: STUDENT IN AN ORGANIZED HEALTH CARE EDUCATION/TRAINING PROGRAM

## 2021-12-28 PROCEDURE — G0463 HOSPITAL OUTPT CLINIC VISIT: HCPCS

## 2021-12-28 ASSESSMENT — MIFFLIN-ST. JEOR: SCORE: 1396.65

## 2021-12-28 NOTE — NURSING NOTE
"Liu Ragsdale is a 81 year old male who presents for:  Chief Complaint   Patient presents with     Follow Up     Carotid artery surgery        Initial Vitals:  BP (!) 141/74   Pulse 80   Ht 1.575 m (5' 2\")   Wt 80.7 kg (178 lb)   SpO2 96%   BMI 32.56 kg/m   Estimated body mass index is 32.56 kg/m  as calculated from the following:    Height as of this encounter: 1.575 m (5' 2\").    Weight as of this encounter: 80.7 kg (178 lb).. Body surface area is 1.88 meters squared. BP completed using cuff size: regular    Nursing Comments: hypertension  Patient did not take his blood pressure medication      Stacey Stinson    "

## 2021-12-28 NOTE — PROGRESS NOTES
"[facility] [place]  Section of General Neurology  New Patient Visit      Liu Ragsdale MRN# [id number]   Age: 81 year old YOB: 1940     Requesting physician: Piyush Kumari     Reason for Consultation: Stroke        History of Presenting Symptoms:   Liu Ragsdale is a 81 year old male who presents today for evaluation of recent stroke.    He recently saw Dr. Albarado in neurology in this regard on 12/14 where the plan was:  \"   Plan:  -- Continue the daily aspirin and Lipitor.  -- Continue to work with Dr. Rogers with regards to managing your blood pressure/blood sugar.  Goal BP is <140/90.  -- I agree with your plan to do an updated sleep study.  As we discussed, untreated sleep apnea can be a risk factor for stroke and heart attacks.  -- Follow-up with Neurosurgery/repeat carotid ultrasound at the 1 year point as recommended.    -- Follow-up with Neurology as needed.\"    It was noted in this visit:\"The patient presented to the [facility] emergency room on 9.26.21 with a near syncopal event.  He was found to have left-sided weakness and slurred speech on exam.  He was transferred to the [facility] upon discovery of symptomatic right carotid stenosis with a right MCA strokes. He underwent carotid endarterectomy while hospitalized.  He did follow-up with neurosurgery about a month after his surgery.  He reported he was doing well.  Plan from the neurosurgery standpoint is to repeat the carotid ultrasound in 1 year and a phone visit thereafter.\"    He was on baby aspirin prior to this stroke but now is on 325.  The mechanism is 2/2 intracranial athero/R carotid stenosis.  He is on norvasc 5 mg Coreg 6.25 mg BID    We discussed that I am a similar type of doctor as his 12/14 visit but he would welcome another opinion in this regard.   He notes his biggest issue remains L index finger strenght and L hand clusminess.  He has been graduated from OT in this " regard.   He lives in [place].   He is a retired  for [employer].   He notes he eats a lot of vegetables.  He enjoys playing with great grand kids.    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    He gets strength from his [employer] and bible study Wednesday mornings.    He states he usually runs 140s usually he didn't take his medications this AM given the drive and having to urinate.    He has a sleep referral in place, plans on being seen at [place] given snoring.  Had a study 10 years ago that was borderline.    He only wakes up to go to the bathroom, wife says he snores.    He is a left handed person.        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     SOB (shortness of breath) on exertion     Arthritis of right glenohumeral joint- moderate     Arthritis of right acromioclavicular joint- advanced     Positive cardiac stress test     Status post coronary angiogram     Coronary artery disease involving native coronary artery of native heart without angina pectoris     Degenerative joint disease of left hip     Near syncope     Right carotid artery occlusion     Cerebrovascular accident (H)     Ischemic stroke (H)     Past Medical History:   Diagnosis Date     Angina at rest (H)      Arthritis      Cerebrovascular accident (H) 9/27/2021     Coronary artery disease involving native coronary artery of native heart without angina pectoris      Hypertension         Past Surgical History:     Past Surgical History:   Procedure Laterality Date     ARTHROPLASTY HIP Left 02/03/2021    Procedure: Total Hip Arthroplasty;  Surgeon:  Andrew Arriola MD;  Location: [facility] OR     COLONOSCOPY N/A 06/16/2016    Procedure: COLONOSCOPY;  Surgeon: Randy Avendano MD;  Location: [facility] GI     CV LEFT HEART CATH Left 03/10/2020    Procedure: Left Heart Cath;  Surgeon: Vicente Lopez MD;  Location:  HEART CARDIAC CATH LAB     ENDARTERECTOMY CAROTID Right 9/29/2021    Procedure: ENDARTERECTOMY, CAROTID;  Surgeon: Raymond Andersen MD;  Location:  OR     EYE SURGERY  2005    cataract surgery     VASECTOMY  1981        Social History:     Social History     Tobacco Use     Smoking status: Never Smoker     Smokeless tobacco: Never Used   Vaping Use     Vaping Use: Never used   Substance Use Topics     Alcohol use: Yes     Comment: Beth only     Drug use: No        Family History:     Family History   Problem Relation Age of Onset     Hypertension Sister      Kidney failure Sister      Chronic Obstructive Pulmonary Disease Sister      Thyroid Disease Sister      Seizure Disorder Paternal Grandmother      Mitral valve prolapse Daughter      No Known Problems Son      Cerebrovascular Disease No family hx of         Medications:     Current Outpatient Medications   Medication Sig     acetaminophen (TYLENOL) 325 MG tablet Take 3 tablets (975 mg) by mouth 3 times daily     amLODIPine (NORVASC) 5 MG tablet Take 1 tablet (5 mg) by mouth daily     aspirin (ASA) 325 MG tablet Take 1 tablet (325 mg) by mouth daily     atorvastatin (LIPITOR) 10 MG tablet Take 1 tablet (10 mg) by mouth daily     carvedilol (COREG) 6.25 MG tablet Take 1 tablet (6.25 mg) by mouth 2 times daily (with meals)     diclofenac (VOLTAREN) 1 % topical gel Place 4 g onto the skin 3 times daily as needed for moderate pain (Shoulder)     Multiple Vitamin (MULTI VITAMIN PO) Take 1 oz by mouth daily Eniva VIBE-liquid     nitroGLYcerin (NITROSTAT) 0.3 MG sublingual tablet For chest pain place 1 tablet under the tongue every 5 minutes for 3 doses. If symptoms persist 5 minutes after 1st  "dose call 911.     NONFORMULARY Take 1 oz by mouth daily Eniva Cell-Ready Multi Minerals is a concentrated, ionic liquid mineral dietary supplement blend to help support nutritional balance and wellbeing.  Combine with 8 ounces of water / juice     No current facility-administered medications for this visit.        Allergies:     Allergies   Allergen Reactions     Metoprolol Other (See Comments)     Side effects : dry mouth     Ampicillin Rash        Review of Systems:   As noted above     Physical Exam:   Vitals: BP (!) 141/74   Pulse 80   Ht 1.575 m (5' 2\")   Wt 80.7 kg (178 lb)   SpO2 96%   BMI 32.56 kg/m     CV: peripheral pulse appreciated  Lungs: breathing comfortably  Extremities: no edema    Neuro:   General Appearance: No apparent distress, well-nourished, well-groomed, pleasant     Mental Status: Alert and oriented to person, place, and time. Speech fluent and comprehension intact. No dysarthria.     Cranial Nerves:   II: Visual fields: normal  III: Pupils: 3 mm, equal, round, reactive to light   III,IV,VI: Extraocular Movements: intact   V: Facial sensation: slightly less on L than R to LT  VII: Facial strength: no clear asymmetry.   VIII: Hearing: intact grossly  IX: Palate: intact        Motor Exam:   Upper Extremities  Deltoid  Bicep  Tricep  Wrist Extensors  strength Intrinsic Muscles    Right  5  5  5  5 5 5    Left  5  5  4+/5- 5- Slightly less than R 5-      Lower Extremities  Hip Flexors  Knee Extensors  Knee   Flexors  Dorsi Flexion  Plantar   Flexion    Right  5  5  5  5  5    Left  5  5  5  5  5      Sensory: intact to light touch in extremities    Coordination: no dysmetria with finger-to-nose bilaterally    Reflexes: biceps, triceps, brachioradialis, patellar 2+, and ankle jerks 1+ and symmetric.     Gait: normal casual gait, normal stride length.  Some issues with tandem.          Data: Pertinent prior to visit   Imaging:  MRI Brain (9.26.21):  IMPRESSION:     1. Multiple small " infarcts scattered throughout the right middle  cerebral artery distribution.  2. No evidence of hemorrhage or significant mass effect.  3. Small old right cerebellar infarcts.  4. Volume loss and scattered white matter T2 hyperintensities which  likely represent chronic small vessel ischemic change.     MRA Head and Neck (9.26.21):  IMPRESSION:    1. Improved flow within the right internal carotid artery.  2. Severe stenosis of the right vertebral artery at the proximal V4  segment, better demonstrated on neck MRA.  3. Otherwise, no evidence of large vessel occlusion or high-grade  stenosis.       IMPRESSION:    1. Improved flow within the right internal carotid artery.  2. Severe stenosis of the proximal right internal carotid artery,  probably 70% or greater.  3. 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   4. Moderate stenosis of the dominant left vertebral artery at the V2  segment related to osseous cervical spine degenerative change,  unchanged.      CTA Head and Neck (9.28.21):  Impression:       1. There is a near complete occlusion of right internal carotid artery  at the bifurcation. The right internal carotid artery is patent distal  to the stenosis.  2. There is nonocclusive atherosclerosis involving the intracranial  segments of both internal carotid arteries.  3. Remaining major cervical arteries appear patent.     Carotid US (10.25.21):  IMPRESSION:     1. RIGHT ICA: No significant residual stenosis or plaque after carotid  endarterectomy. Normal.     2. LEFT ICA:  Less than 50% diameter narrowing by grayscale imaging  and sonographic velocity criteria.    MRI    I personally reviewed the above imaging and agree with the findings in the report.      IMPRESSION:     1. RIGHT ICA: No significant residual stenosis or plaque after carotid  endarterectomy. Normal.     2. LEFT ICA:  Less than 50% diameter narrowing by grayscale imaging  and  sonographic velocity criteria.     Consensus Panel Gray-Scale and Doppler US Criteria for Diagnosis of  ICA Stenosis (Radiology 11/2003) additionally modified by Arleen et  al. in Journal of Vascular Surgery 1/2011, [phone].        Normal         ICA PSV < 140 cm/sec       Plaque Estimate None       ICA/CCA  PSV Ratio < 2.0       ICA EDV < 40 cm/sec        < 50%          ICA PSV < 140 cm/sec       Plaque Estimate < 50%       ICA/CCA  PSV Ratio < 2.0       ICA EDV < 40 cm/sec        50- 69%       ICA -230 cm/sec       Plaque Estimate > or = 50%       ICA/CCA PSV Ratio 2.0-4.0       ICA EDV  cm/sec          > or = 70%, less than near occlusion       ICA PSV > 230 cm/sec       Plaque Estimate > or = 50%       ICA/CCA Ratio > 4.0       ICA EDV > 100 cm/sec                                            Additional criteria from vascular surgery     > 80%       EDV > 120 cm/sec      CLAUDIO TIERNEY MD     Follow up CUS looks great on the right    Procedures:  TTE 9/2021    The left ventricle is normal in size.  Left ventricular systolic function is normal.  The visual ejection fraction is 60-65%.  Normal left ventricular wall motion  Trivial pericardial effusion  Sinus rhythm was noted.  Compared to prior study, there is no significant change.    Laboratory:  LDL Cholesterol Calculated   Date Value Ref Range Status   09/27/2021 15 <=100 mg/dL Final   08/19/2020 27 <100 mg/dL Final     Comment:     Desirable:       <100 mg/dl              Assessment and Plan:   Mr. Ragsdale is a very pleasant 81 year old man who presents in follow up after a recent stroke.  As above the mechanism appears to be secondary to carotid stenosis on the right which has subsequently been operated upon and remains patent.  He has done well with his rehabilitation and only has slight LUE weakness and slight L facial numbness residually.  He is pleased with this outcome.  Discussed secondary stroke prevention.  Discussed need for continued  follow up with neurosurgery and imaging in this regard to ensure that his R carotid stays open given that he has some L carotid disease as well.  He understands the plan as below:     Stroke plan:  --MRI Brain--reviewed  --Mechanism of stroke: R carotid stenosis  --CTA H/N--as above, showing R carotid stenosis s/p endartectomy, plan for f/u imaging and f/u with neurosurgery (Follow-up with Neurosurgery/repeat carotid ultrasound at the 1 year point as recommended previously). Most recent follow up CUS reviewed with patient and R side appears patent, great news.  --Agree with previous recommendation to get updated sleep study, FANI can be a risk factor for strokes independently.   --Echo (TTE) with bubble: as above.  --Statin--Lipitor 10 mg  --Aspirin--325 mg  --Continue to work on blood pressure  --Smoking cessation: NA  --PCP follow up for risk factors as above  --We will check in next fall virtually and make sure things are still going well.  He can call or mychart in the mean time with any issues or questions.           Dung Etienne MD   of Neurology   [facility]      The total time of this encounter today amounted to 51 minutes. This time included time spent with the patient, prep work, ordering tests, and performing post visit documentation.

## 2021-12-28 NOTE — PATIENT INSTRUCTIONS
Continue aspirin 325 mg daily  Continue lipitor 10 mg   Your repeat Carotid ultrasound looks great   I would repeat that and see neurosurgery back in about a year as well.  Continue to work on blood pressure control and I would get sleep apnea study  Reach out on [employer] with any questions lets see you back virtually to check in in the fall and make sure you are doing great still if things pop up before then reach out.

## 2021-12-28 NOTE — LETTER
"    12/28/2021         RE: Liu Ragsdale  [address] [facility] [id number]        Dear Colleague,    Thank you for referring your patient, Liu Ragsdale, to the [facility] [place]. Please see a copy of my visit note below.    [facility]  Section of General Neurology  New Patient Visit      Liu Ragsdale MRN# [id number]   Age: 81 year old YOB: 1940     Requesting physician: Piyush Kumari     Reason for Consultation: Stroke        History of Presenting Symptoms:   Liu Ragsdale is a 81 year old male who presents today for evaluation of recent stroke.    He recently saw Dr. Albarado in neurology in this regard on 12/14 where the plan was:  \"   Plan:  -- Continue the daily aspirin and Lipitor.  -- Continue to work with Dr. Rogers with regards to managing your blood pressure/blood sugar.  Goal BP is <140/90.  -- I agree with your plan to do an updated sleep study.  As we discussed, untreated sleep apnea can be a risk factor for stroke and heart attacks.  -- Follow-up with Neurosurgery/repeat carotid ultrasound at the 1 year point as recommended.    -- Follow-up with Neurology as needed.\"    It was noted in this visit:\"The patient presented to the [facility] emergency room on 9.26.21 with a near syncopal event.  He was found to have left-sided weakness and slurred speech on exam.  He was transferred to the [facility] upon discovery of symptomatic right carotid stenosis with a right MCA strokes. He underwent carotid endarterectomy while hospitalized.  He did follow-up with neurosurgery about a month after his surgery.  He reported he was doing well.  Plan from the neurosurgery standpoint is to repeat the carotid ultrasound in 1 year and a phone visit thereafter.\"    He was on baby aspirin prior to this stroke but now is on 325.  The mechanism is 2/2 intracranial athero/R carotid stenosis.  He is on norvasc 5 mg " Coreg 6.25 mg BID    We discussed that I am a similar type of doctor as his 12/14 visit but he would welcome another opinion in this regard.   He notes his biggest issue remains L index finger strenght and L hand clusminess.  He has been graduated from OT in this regard.   He lives in [place].   He is a retired  for [employer].   He notes he eats a lot of vegetables.  He enjoys playing with great grand kids.    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    He gets strength from his [employer] group and bible study Wednesday mornings.    He states he usually runs 140s usually he didn't take his medications this AM given the drive and having to urinate.    He has a sleep referral in place, plans on being seen at [facility] given snoring.  Had a study 10 years ago that was borderline.    He only wakes up to go to the bathroom, wife says he snores.    He is a left handed person.        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     SOB (shortness of breath) on exertion     Arthritis of right glenohumeral joint- moderate     Arthritis of right acromioclavicular joint- advanced     Positive cardiac stress test     Status post coronary angiogram     Coronary artery disease involving native coronary artery of native heart without angina pectoris     Degenerative joint disease of left hip     Near syncope     Right carotid artery occlusion     Cerebrovascular accident (H)     Ischemic stroke (H)     Past Medical History:   Diagnosis Date     Angina at rest (H)      Arthritis      Cerebrovascular accident (H) 9/27/2021     Coronary artery disease  involving native coronary artery of native heart without angina pectoris      Hypertension         Past Surgical History:     Past Surgical History:   Procedure Laterality Date     ARTHROPLASTY HIP Left 02/03/2021    Procedure: Total Hip Arthroplasty;  Surgeon: Andrew Arriola MD;  Location: [facility] OR     COLONOSCOPY N/A 06/16/2016    Procedure: COLONOSCOPY;  Surgeon: Randy Avendano MD;  Location: [facility] GI     CV LEFT HEART CATH Left 03/10/2020    Procedure: Left Heart Cath;  Surgeon: Vicente Lopez MD;  Location:  HEART CARDIAC CATH LAB     ENDARTERECTOMY CAROTID Right 9/29/2021    Procedure: ENDARTERECTOMY, CAROTID;  Surgeon: Raymond Andersen MD;  Location:  OR     EYE SURGERY  2005    cataract surgery     VASECTOMY  1981        Social History:     Social History     Tobacco Use     Smoking status: Never Smoker     Smokeless tobacco: Never Used   Vaping Use     Vaping Use: Never used   Substance Use Topics     Alcohol use: Yes     Comment: Beth only     Drug use: No        Family History:     Family History   Problem Relation Age of Onset     Hypertension Sister      Kidney failure Sister      Chronic Obstructive Pulmonary Disease Sister      Thyroid Disease Sister      Seizure Disorder Paternal Grandmother      Mitral valve prolapse Daughter      No Known Problems Son      Cerebrovascular Disease No family hx of         Medications:     Current Outpatient Medications   Medication Sig     acetaminophen (TYLENOL) 325 MG tablet Take 3 tablets (975 mg) by mouth 3 times daily     amLODIPine (NORVASC) 5 MG tablet Take 1 tablet (5 mg) by mouth daily     aspirin (ASA) 325 MG tablet Take 1 tablet (325 mg) by mouth daily     atorvastatin (LIPITOR) 10 MG tablet Take 1 tablet (10 mg) by mouth daily     carvedilol (COREG) 6.25 MG tablet Take 1 tablet (6.25 mg) by mouth 2 times daily (with meals)     diclofenac (VOLTAREN) 1 % topical gel Place 4 g onto the skin 3 times daily as needed for moderate pain  "(Shoulder)     Multiple Vitamin (MULTI VITAMIN PO) Take 1 oz by mouth daily Eniva VIBE-liquid     nitroGLYcerin (NITROSTAT) 0.3 MG sublingual tablet For chest pain place 1 tablet under the tongue every 5 minutes for 3 doses. If symptoms persist 5 minutes after 1st dose call 911.     NONFORMULARY Take 1 oz by mouth daily Eniva Cell-Ready Multi Minerals is a concentrated, ionic liquid mineral dietary supplement blend to help support nutritional balance and wellbeing.  Combine with 8 ounces of water / juice     No current facility-administered medications for this visit.        Allergies:     Allergies   Allergen Reactions     Metoprolol Other (See Comments)     Side effects : dry mouth     Ampicillin Rash        Review of Systems:   As noted above     Physical Exam:   Vitals: BP (!) 141/74   Pulse 80   Ht 1.575 m (5' 2\")   Wt 80.7 kg (178 lb)   SpO2 96%   BMI 32.56 kg/m     CV: peripheral pulse appreciated  Lungs: breathing comfortably  Extremities: no edema    Neuro:   General Appearance: No apparent distress, well-nourished, well-groomed, pleasant     Mental Status: Alert and oriented to person, place, and time. Speech fluent and comprehension intact. No dysarthria.     Cranial Nerves:   II: Visual fields: normal  III: Pupils: 3 mm, equal, round, reactive to light   III,IV,VI: Extraocular Movements: intact   V: Facial sensation: slightly less on L than R to LT  VII: Facial strength: no clear asymmetry.   VIII: Hearing: intact grossly  IX: Palate: intact        Motor Exam:   Upper Extremities  Deltoid  Bicep  Tricep  Wrist Extensors  strength Intrinsic Muscles    Right  5  5  5  5 5 5    Left  5  5  4+/5- 5- Slightly less than R 5-      Lower Extremities  Hip Flexors  Knee Extensors  Knee   Flexors  Dorsi Flexion  Plantar   Flexion    Right  5  5  5  5  5    Left  5  5  5  5  5      Sensory: intact to light touch in extremities    Coordination: no dysmetria with finger-to-nose bilaterally    Reflexes: " biceps, triceps, brachioradialis, patellar 2+, and ankle jerks 1+ and symmetric.     Gait: normal casual gait, normal stride length.  Some issues with tandem.          Data: Pertinent prior to visit   Imaging:  MRI Brain (9.26.21):  IMPRESSION:     1. Multiple small infarcts scattered throughout the right middle  cerebral artery distribution.  2. No evidence of hemorrhage or significant mass effect.  3. Small old right cerebellar infarcts.  4. Volume loss and scattered white matter T2 hyperintensities which  likely represent chronic small vessel ischemic change.     MRA Head and Neck (9.26.21):  IMPRESSION:    1. Improved flow within the right internal carotid artery.  2. Severe stenosis of the right vertebral artery at the proximal V4  segment, better demonstrated on neck MRA.  3. Otherwise, no evidence of large vessel occlusion or high-grade  stenosis.       IMPRESSION:    1. Improved flow within the right internal carotid artery.  2. Severe stenosis of the proximal right internal carotid artery,  probably 70% or greater.  3. 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   4. Moderate stenosis of the dominant left vertebral artery at the V2  segment related to osseous cervical spine degenerative change,  unchanged.      CTA Head and Neck (9.28.21):  Impression:       1. There is a near complete occlusion of right internal carotid artery  at the bifurcation. The right internal carotid artery is patent distal  to the stenosis.  2. There is nonocclusive atherosclerosis involving the intracranial  segments of both internal carotid arteries.  3. Remaining major cervical arteries appear patent.     Carotid US (10.25.21):  IMPRESSION:     1. RIGHT ICA: No significant residual stenosis or plaque after carotid  endarterectomy. Normal.     2. LEFT ICA:  Less than 50% diameter narrowing by grayscale imaging  and sonographic velocity criteria.    MRI    I personally  reviewed the above imaging and agree with the findings in the report.      IMPRESSION:     1. RIGHT ICA: No significant residual stenosis or plaque after carotid  endarterectomy. Normal.     2. LEFT ICA:  Less than 50% diameter narrowing by grayscale imaging  and sonographic velocity criteria.     Consensus Panel Gray-Scale and Doppler US Criteria for Diagnosis of  ICA Stenosis (Radiology 11/2003) additionally modified by Arleen et  al. in Journal of Vascular Surgery 1/2011, [id number])87-51.        Normal         ICA PSV < 140 cm/sec       Plaque Estimate None       ICA/CCA  PSV Ratio < 2.0       ICA EDV < 40 cm/sec        < 50%          ICA PSV < 140 cm/sec       Plaque Estimate < 50%       ICA/CCA  PSV Ratio < 2.0       ICA EDV < 40 cm/sec        50- 69%       ICA -230 cm/sec       Plaque Estimate > or = 50%       ICA/CCA PSV Ratio 2.0-4.0       ICA EDV  cm/sec          > or = 70%, less than near occlusion       ICA PSV > 230 cm/sec       Plaque Estimate > or = 50%       ICA/CCA Ratio > 4.0       ICA EDV > 100 cm/sec                                            Additional criteria from vascular surgery     > 80%       EDV > 120 cm/sec      CLAUDIO TIERNEY MD     Follow up CUS looks great on the right    Procedures:  TTE 9/2021    The left ventricle is normal in size.  Left ventricular systolic function is normal.  The visual ejection fraction is 60-65%.  Normal left ventricular wall motion  Trivial pericardial effusion  Sinus rhythm was noted.  Compared to prior study, there is no significant change.    Laboratory:  LDL Cholesterol Calculated   Date Value Ref Range Status   09/27/2021 15 <=100 mg/dL Final   08/19/2020 27 <100 mg/dL Final     Comment:     Desirable:       <100 mg/dl              Assessment and Plan:   Mr. Ragsdale is a very pleasant 81 year old man who presents in follow up after a recent stroke.  As above the mechanism appears to be secondary to carotid stenosis on the right which has  subsequently been operated upon and remains patent.  He has done well with his rehabilitation and only has slight LUE weakness and slight L facial numbness residually.  He is pleased with this outcome.  Discussed secondary stroke prevention.  Discussed need for continued follow up with neurosurgery and imaging in this regard to ensure that his R carotid stays open given that he has some L carotid disease as well.  He understands the plan as below:     Stroke plan:  --MRI Brain--reviewed  --Mechanism of stroke: R carotid stenosis  --CTA H/N--as above, showing R carotid stenosis s/p endartectomy, plan for f/u imaging and f/u with neurosurgery (Follow-up with Neurosurgery/repeat carotid ultrasound at the 1 year point as recommended previously). Most recent follow up CUS reviewed with patient and R side appears patent, great news.  --Agree with previous recommendation to get updated sleep study, FANI can be a risk factor for strokes independently.   --Echo (TTE) with bubble: as above.  --Statin--Lipitor 10 mg  --Aspirin--325 mg  --Continue to work on blood pressure  --Smoking cessation: NA  --PCP follow up for risk factors as above  --We will check in next fall virtually and make sure things are still going well.  He can call or mychart in the mean time with any issues or questions.           Dung Etienne MD   of Neurology   [facility]      The total time of this encounter today amounted to 51 minutes. This time included time spent with the patient, prep work, ordering tests, and performing post visit documentation.        Again, thank you for allowing me to participate in the care of your patient.        Sincerely,        Vega Etienne MD

## 2022-01-03 ENCOUNTER — OFFICE VISIT (OUTPATIENT)
Dept: ORTHOPEDICS | Facility: CLINIC | Age: 82
End: 2022-01-03
Payer: COMMERCIAL

## 2022-01-03 VITALS
HEIGHT: 62 IN | DIASTOLIC BLOOD PRESSURE: 72 MMHG | SYSTOLIC BLOOD PRESSURE: 124 MMHG | WEIGHT: 178 LBS | BODY MASS INDEX: 32.76 KG/M2

## 2022-01-03 DIAGNOSIS — G56.13 MEDIAN NEUROPATHY OF BOTH UPPER EXTREMITIES: ICD-10-CM

## 2022-01-03 PROCEDURE — 20526 THER INJECTION CARP TUNNEL: CPT | Mod: 50 | Performed by: FAMILY MEDICINE

## 2022-01-03 RX ADMIN — BETAMETHASONE SODIUM PHOSPHATE AND BETAMETHASONE ACETATE 6 MG: 3; 3 INJECTION, SUSPENSION INTRA-ARTICULAR; INTRALESIONAL; INTRAMUSCULAR; SOFT TISSUE at 10:31

## 2022-01-03 RX ADMIN — LIDOCAINE HYDROCHLORIDE 1 ML: 10 INJECTION, SOLUTION INFILTRATION; PERINEURAL at 10:31

## 2022-01-03 ASSESSMENT — MIFFLIN-ST. JEOR: SCORE: 1396.65

## 2022-01-03 NOTE — PATIENT INSTRUCTIONS
# Bilateral Carpal Tunnel Syndrome: Symptoms noted over the past year with numbness and tingling persisting over the digits 1 through 4.  He has intact strength on examination with symptoms not worsening with carpal tunnel testing but does have persisting numbness and tingling.  He has had a stroke since visit with sports medicine with left second digit weakness that has improved with occupational therapy.  Counseled patient on treatment options including continued bracing, injections, referral for carpal tunnel release.  Given numbness and tingling persisting plan to treat as below and follow-up if not improving.  Image Findings: None today  Treatment: Activities as tolerated, night braces as needed  Medications/Injections: Limited tylenol/ibuprofen for pain for 1-2 weeks, bilateral carpal tunnel steroid injections  Follow-up: As needed if symptoms recur    Please call [phone]   Ask for my team if you have any questions or concerns    If you have not yet received the influenza vaccine but would like to get one, please call  [phone] or you can schedule via [employer]    It was great seeing you today!    Joshua Torres MD, CAQSM     [facility] Injection Discharge Instructions    Procedure: bilateral carpal tunnel steroid injections      You may shower, however avoid swimming, tub baths or hot tubs for 24 hours following your procedure    You may have a mild to moderate increase in pain for several days following the injection.    It may take up to 14 days for the steroid medication to start working although you may feel the effect as early as a few days after the procedure.    You may use ice packs for 10-15 minutes, 3 to 4 times a day at the injection site for comfort    You may use anti-inflammatory medications (such as Ibuprofen or Aleve or Advil) or Tylenol for pain control if necessary    If you were fasting, you may resume your normal diet and medications after the procedure    If you have diabetes,  check your blood sugar more frequently than usual as your blood sugar may be higher than normal for 10-14 days following a steroid injection. Contact your doctor who manages your diabetes if your blood sugar is higher than usual      If you experience any of the following, call [facility] @ [phone] or [phone]  -Fever over 100 degree F  -Swelling, bleeding, redness, drainage, warmth at the injection site  - New or worsening pain

## 2022-01-03 NOTE — PROGRESS NOTES
ASSESSMENT & PLAN    Liu was seen today for pain and pain.    Diagnoses and all orders for this visit:    Median neuropathy of both upper extremities  Comments:  recommend splinting starting on dominant wrist with a  carpal tunnel splint overnight and during the day and may advance  use to both arms as long as able to /grasp with splint on to prevent nighttime fall.  Orders:  -     Orthopedic  Referral      This issue is chronic and Worsening.    # Bilateral Carpal Tunnel Syndrome: Symptoms noted over the past year with numbness and tingling persisting over the digits 1 through 4.  He has intact strength on examination with symptoms not worsening with carpal tunnel testing but does have persisting numbness and tingling.  He has had a stroke since visit with sports medicine with left second digit weakness that has improved with occupational therapy.  Counseled patient on treatment options including continued bracing, injections, referral for carpal tunnel release.  Given numbness and tingling persisting plan to treat as below and follow-up if not improving.  Image Findings: None today  Treatment: Activities as tolerated, night braces as needed  Medications/Injections: Limited tylenol/ibuprofen for pain for 1-2 weeks, bilateral carpal tunnel steroid injections  Follow-up: As needed if symptoms recur    Joshua Torres MD  [facility] [place]    -----  Chief Complaint   Patient presents with     Right Wrist - Pain     Left Wrist - Pain       SUBJECTIVE  Liu Ragsdale is a/an 81 year old male who is seen as a self referral for evaluation of bilateral wrist pain.     The patient is seen by themselves.  The patient is Left handed    Onset: 1 years(s) ago. Reports insidious onset without acute precipitating event. Saw Dr. Lepe 9/24/21. Recent CVA 2 months ago, left side.   Location of Pain: bilateral wrist pain, left worse than right   Worsened by: wrist extension    Better with: night bracing   Treatments tried: bracing, OT that has helped numbness/tingling   Associated symptoms: numbness and tingling 2nd and 3rd digits     Orthopedic/Surgical history: NO  Social History/Occupation: Retired     No family history pertinent to patient's problem today.      REVIEW OF SYSTEMS:  Review of Systems  Constitutional, HEENT, cardiovascular, pulmonary, gi and gu systems are negative, except as otherwise noted.    OBJECTIVE:  There were no vitals taken for this visit.   General: healthy, alert and in no distress  HEENT: no scleral icterus or conjunctival erythema  Skin: no suspicious lesions or rash. No jaundice.  CV: distal perfusion intact    Resp: normal respiratory effort without conversational dyspnea   Psych: normal mood and affect  Gait: normal steady gait with appropriate coordination and balance    Neuro: bilateral numbness/tingling in digits 1-4     BILATERAL HAND  Inspection:    No swelling, bruising, discoloration, or obvious deformity or asymmetry  Palpation:   Carpals: normal   Metacarpals: normal   Thumb: normal   Fingers: normal  Range of Motion:    Full active flexion and extension at MCP, PIP, and DIP joints; normal finger cascade without malrotation.  Wrist pronation, supination, and ulnar/radial deviation normal.  Strength:     full, extension full, flexion full, abduction full, adduction full, opposition full  Special Tests:    Positive: none    Negative: Tinel's, Phalen's, flexor digitorum superficialis testing, flexor digitorum profundus testing    RADIOLOGY:         Review of external notes as documented elsewhere in note     Hand / Upper Extremity Injection/Arthrocentesis: bilateral carpal tunnel    Date/Time: 1/3/2022 10:31 AM  Performed by: Joshua Torres MD  Authorized by: Joshua Torres MD     Indications:  Therapeutic and pain  Needle Size:  25 G  Guidance: ultrasound    Approach:  Volar  Condition: carpal tunnel    Laterality:   Bilateral    Site:  Bilateral carpal tunnel  Medications (Right):  6 mg betamethasone acet & sod phos 6 (3-3) MG/ML; 1 mL lidocaine 1 %  Medications (Left):  6 mg betamethasone acet & sod phos 6 (3-3) MG/ML; 1 mL lidocaine 1 %  Outcome:  Tolerated well, no immediate complications  Procedure discussed: discussed risks, benefits, and alternatives    Consent Given by:  Patient  Timeout: timeout called immediately prior to procedure    Prep: patient was prepped and draped in usual sterile fashion     Patient reported increased numbness in the median distribution in the hands bilaterally.  Aftercare instructions given to patient.  Plan to follow-up as discussed above.     Joshua Torres MD [facility]

## 2022-01-03 NOTE — Clinical Note
1/3/2022         RE: Liu Ragsdale  [address] [facility] [address]        Dear Colleague,    Thank you for referring your patient, Liu Ragsdale, to the [facility]. Please see a copy of my visit note below.    ASSESSMENT & PLAN    Liu was seen today for pain and pain.    Diagnoses and all orders for this visit:    Median neuropathy of both upper extremities  Comments:  recommend splinting starting on dominant wrist with a  carpal tunnel splint overnight and during the day and may advance  use to both arms as long as able to /grasp with splint on to prevent nighttime fall.  Orders:  -     Orthopedic  Referral      This issue is {ACUTE/CHRONIC:343851} and {IMPROVING WORSENIN}.      {FOLLOW UP PLANS (Optional):814680}    Joshua Torres MD  [facility]    -----  Chief Complaint   Patient presents with     Right Wrist - Pain     Left Wrist - Pain       SUBJECTIVE  Liu Ragsdale is a/an 81 year old male who is seen as a self referral for evaluation of bilateral wrist pain.     The patient is seen by themselves.  The patient is Left handed    Onset: 1 years(s) ago. Reports insidious onset without acute precipitating event. Saw Dr. Lepe 21. Recent CVA 2 months ago, left side.   Location of Pain: bilateral wrist pain, left worse than right   Worsened by: wrist extension   Better with: night bracing   Treatments tried: bracing, OT that has helped numbness/tingling   Associated symptoms: numbness and tingling 2nd and 3rd digits     Orthopedic/Surgical history: NO  Social History/Occupation: Retired     No family history pertinent to patient's problem today.      REVIEW OF SYSTEMS:  Review of Systems  Constitutional, HEENT, cardiovascular, pulmonary, gi and gu systems are negative, except as otherwise noted.    OBJECTIVE:  There were no vitals taken for this visit.   General: healthy, alert  and in no distress  HEENT: no scleral icterus or conjunctival erythema  Skin: no suspicious lesions or rash. No jaundice.  CV: distal perfusion intact    Resp: normal respiratory effort without conversational dyspnea   Psych: normal mood and affect  Gait: normal steady gait with appropriate coordination and balance    Neuro: bilateral numbness/tingling in digits 1-4     BILATERAL HAND  Inspection:    No swelling, bruising, discoloration, or obvious deformity or asymmetry  Palpation:   Carpals: normal   Metacarpals: normal   Thumb: normal   Fingers: normal  Range of Motion:    Full active flexion and extension at MCP, PIP, and DIP joints; normal finger cascade without malrotation.  Wrist pronation, supination, and ulnar/radial deviation normal.  Strength:     full, extension full, flexion full, abduction full, adduction full, opposition full  Special Tests:    Positive: none    Negative: Tinel's, Phalen's, flexor digitorum superficialis testing, flexor digitorum profundus testing    RADIOLOGY:         Review of external notes as documented elsewhere in note     Hand / Upper Extremity Injection/Arthrocentesis: bilateral carpal tunnel    Date/Time: 1/3/2022 10:31 AM  Performed by: Joshua Torres MD  Authorized by: Joshua Torres MD     Indications:  Therapeutic and pain  Needle Size:  25 G  Guidance: ultrasound    Approach:  Volar  Condition: carpal tunnel    Laterality:  Bilateral    Site:  Bilateral carpal tunnel  Medications (Right):  6 mg betamethasone acet & sod phos 6 (3-3) MG/ML; 1 mL lidocaine 1 %  Medications (Left):  6 mg betamethasone acet & sod phos 6 (3-3) MG/ML; 1 mL lidocaine 1 %  Outcome:  Tolerated well, no immediate complications  Procedure discussed: discussed risks, benefits, and alternatives    Consent Given by:  Patient  Timeout: timeout called immediately prior to procedure    Prep: patient was prepped and draped in usual sterile fashion              Again, thank you for allowing me  to participate in the care of your patient.        Sincerely,        Joshua Torres MD

## 2022-01-04 RX ORDER — LIDOCAINE HYDROCHLORIDE 10 MG/ML
1 INJECTION, SOLUTION INFILTRATION; PERINEURAL
Status: DISCONTINUED | OUTPATIENT
Start: 2022-01-03 | End: 2022-06-02

## 2022-01-04 RX ORDER — BETAMETHASONE SODIUM PHOSPHATE AND BETAMETHASONE ACETATE 3; 3 MG/ML; MG/ML
6 INJECTION, SUSPENSION INTRA-ARTICULAR; INTRALESIONAL; INTRAMUSCULAR; SOFT TISSUE
Status: DISCONTINUED | OUTPATIENT
Start: 2022-01-03 | End: 2022-06-02

## 2022-01-15 ENCOUNTER — HEALTH MAINTENANCE LETTER (OUTPATIENT)
Age: 82
End: 2022-01-15

## 2022-02-17 ENCOUNTER — THERAPY VISIT (OUTPATIENT)
Dept: SLEEP MEDICINE | Facility: CLINIC | Age: 82
End: 2022-02-17
Attending: INTERNAL MEDICINE
Payer: COMMERCIAL

## 2022-02-17 ENCOUNTER — HOSPITAL ENCOUNTER (EMERGENCY)
Facility: CLINIC | Age: 82
End: 2022-02-17
Payer: MEDICARE

## 2022-02-17 DIAGNOSIS — R06.83 SNORING: ICD-10-CM

## 2022-02-17 DIAGNOSIS — G47.34 SLEEP RELATED HYPOXIA: ICD-10-CM

## 2022-02-17 DIAGNOSIS — Z86.69 HISTORY OF OBSTRUCTIVE SLEEP APNEA: ICD-10-CM

## 2022-02-17 DIAGNOSIS — I63.9 ISCHEMIC STROKE (H): ICD-10-CM

## 2022-02-17 PROCEDURE — 95810 POLYSOM 6/> YRS 4/> PARAM: CPT | Performed by: INTERNAL MEDICINE

## 2022-02-21 LAB — SLPCOMP: NORMAL

## 2022-02-22 ENCOUNTER — TELEPHONE (OUTPATIENT)
Dept: FAMILY MEDICINE | Facility: CLINIC | Age: 82
End: 2022-02-22
Payer: COMMERCIAL

## 2022-02-22 DIAGNOSIS — I65.21 RIGHT CAROTID ARTERY OCCLUSION: ICD-10-CM

## 2022-02-23 RX ORDER — AMLODIPINE BESYLATE 5 MG/1
5 TABLET ORAL DAILY
Qty: 30 TABLET | Refills: 0 | Status: SHIPPED | OUTPATIENT
Start: 2022-02-23 | End: 2022-05-10

## 2022-03-17 ENCOUNTER — VIRTUAL VISIT (OUTPATIENT)
Dept: SLEEP MEDICINE | Facility: CLINIC | Age: 82
End: 2022-03-17
Payer: COMMERCIAL

## 2022-03-17 VITALS
BODY MASS INDEX: 33.13 KG/M2 | DIASTOLIC BLOOD PRESSURE: 67 MMHG | HEIGHT: 62 IN | WEIGHT: 180 LBS | SYSTOLIC BLOOD PRESSURE: 136 MMHG

## 2022-03-17 DIAGNOSIS — G47.33 OBSTRUCTIVE SLEEP APNEA: Primary | ICD-10-CM

## 2022-03-17 PROCEDURE — 99213 OFFICE O/P EST LOW 20 MIN: CPT | Mod: TEL | Performed by: INTERNAL MEDICINE

## 2022-03-17 ASSESSMENT — SLEEP AND FATIGUE QUESTIONNAIRES
HOW LIKELY ARE YOU TO NOD OFF OR FALL ASLEEP WHILE SITTING INACTIVE IN A PUBLIC PLACE: WOULD NEVER DOZE
HOW LIKELY ARE YOU TO NOD OFF OR FALL ASLEEP IN A CAR, WHILE STOPPED FOR A FEW MINUTES IN TRAFFIC: WOULD NEVER DOZE
HOW LIKELY ARE YOU TO NOD OFF OR FALL ASLEEP WHEN YOU ARE A PASSENGER IN A CAR FOR AN HOUR WITHOUT A BREAK: WOULD NEVER DOZE
HOW LIKELY ARE YOU TO NOD OFF OR FALL ASLEEP WHILE LYING DOWN TO REST IN THE AFTERNOON WHEN CIRCUMSTANCES PERMIT: SLIGHT CHANCE OF DOZING
HOW LIKELY ARE YOU TO NOD OFF OR FALL ASLEEP WHILE SITTING QUIETLY AFTER LUNCH WITHOUT ALCOHOL: WOULD NEVER DOZE
HOW LIKELY ARE YOU TO NOD OFF OR FALL ASLEEP WHILE SITTING AND TALKING TO SOMEONE: WOULD NEVER DOZE
HOW LIKELY ARE YOU TO NOD OFF OR FALL ASLEEP WHILE SITTING AND READING: WOULD NEVER DOZE
HOW LIKELY ARE YOU TO NOD OFF OR FALL ASLEEP WHILE WATCHING TV: SLIGHT CHANCE OF DOZING

## 2022-03-17 ASSESSMENT — PAIN SCALES - GENERAL: PAINLEVEL: MILD PAIN (2)

## 2022-03-17 NOTE — PATIENT INSTRUCTIONS
"     What is sleep apnea?     Sleep apnea is a condition that makes you stop breathing for short periods while you are asleep. There are 2 types of sleep apnea. One is called \"obstructive sleep apnea,\" and the other is called \"central sleep apnea.\"  In obstructive sleep apnea, you stop breathing because your throat narrows or closes (figure 1). In central sleep apnea, you stop breathing because your brain does not send the right signals to your muscles to make you breathe. When people talk about sleep apnea, they are usually referring to obstructive sleep apnea, which is what this article is about.  People with sleep apnea do not know that they stop breathing when they are asleep. But they do sometimes wake up startled or gasping for breath. They also often hear from loved ones that they snore.  What are the symptoms of sleep apnea? -- The main symptoms of sleep apnea are loud snoring, tiredness, and daytime sleepiness. Other symptoms can include:  ?Restless sleep  ?Waking up choking or gasping  ?Morning headaches, dry mouth, or sore throat  ?Waking up often to urinate  ?Waking up feeling unrested or groggy  ?Trouble thinking clearly or remembering things  Some people with sleep apnea don't have symptoms, or they don't know they have them. They might figure that it's normal to be tired or to snore a lot.  Should I see a doctor or nurse? -- Yes. If you think you might have sleep apnea, see your doctor.  Is there a test for sleep apnea? -- Yes. If your doctor or nurse suspects you have sleep apnea, he or she might send you for a \"sleep study.\" Sleep studies can sometimes be done at home, but they are usually done in a sleep lab. For the study, you spend the night in the lab, and you are hooked up to different machines that monitor your heart rate, breathing, and other body functions. The results of the test will tell your doctor or nurse if you have the disorder.  Is there anything I can do on my own to help my sleep " "apnea? -- Yes. Here are some things that might help:  ?Stay off your back when sleeping. (This is not always practical, because people cannot control their position while asleep. Plus, it only helps some people.)  ?Lose weight, if you are overweight  ?Avoid alcohol, because it can make sleep apnea worse  How is sleep apnea treated? -- The most effective treatment for sleep apnea is a device that keeps your airway open while you sleep. Treatment with this device is called \"continuous positive airway pressure,\" or CPAP. People getting CPAP wear a face mask at night that keeps them breathing (figure 2).  If your doctor or nurse recommends a CPAP machine, try to be patient about using it. The mask might seem uncomfortable to wear at first, and the machine might seem noisy, but using the machine can really pay off. People with sleep apnea who use a CPAP machine feel more rested and generally feel better.  There is also another device that you wear in your mouth called an \"oral appliance\" or \"mandibular advancement device.\" It also helps keep your airway open while you sleep.  In rare cases, when nothing else helps, doctors recommend surgery to keep the airway open. Surgery to do this is not always effective, and even when it is, the problem can come back.  Is sleep apnea dangerous? -- It can be. People with sleep apnea do not get good-quality sleep, so they are often tired and not alert. This puts them at risk for car accidents and other types of accidents. Plus, studies show that people with sleep apnea are more likely than others to have high blood pressure, heart attacks, and other serious heart problems. In people with severe sleep apnea, getting treated (for example, with a CPAP machine) can help prevent some of these problems.     GRAPHICS  Airway in a person with sleep apnea    Normally when a person sleeps, the airway remains open, and air can pass from the nose and mouth to the lungs. In a person with sleep " apnea, parts of the throat and mouth drop into the airway and block off the flow of air. This can cause loud snoring and interrupt breathing for short periods.  Graphic 41283 Version 5.0      Continuous positive airway pressure (CPAP) for sleep apnea    The CPAP mask gently blows air into your nose while you sleep. It puts just enough pressure on your airway to keep it from closing. The mask in this picture fits over just the nose. Other CPAP devices have masks that fit over the nose and mouth.

## 2022-03-17 NOTE — PROGRESS NOTES
"Liu is a 81 year old who is being evaluated via a billable video visit.      Pt says he wakes up a couples up a couple times to go to the bathroom but can fall back asleep. Also wakes up due to shoulder pain.     How would you like to obtain your AVS? Mail a copy  If the video visit is dropped, the invitation should be resent by: Send to e-mail at: [email or web address]  Will anyone else be joining your video visit? Ligia Hutson    The patient has been notified of following:      \"This telephone visit will be conducted via a call between you and your physician/provider. We have found that certain health care needs can be provided without the need for a physical exam.  This service lets us provide the care you need with a short phone conversation.  If a prescription is necessary we can send it directly to your pharmacy.  If lab work is needed we can place an order for that and you can then stop by our lab to have the test done at a later time.     Telephone visits are billed at different rates depending on your insurance coverage. During this emergency period, for some insurers they may be billed the same as an in-person visit.  Please reach out to your insurance provider with any questions.     If during the course of the call the physician/provider feels a telephone visit is not appropriate, you will not be charged for this service.\"     Patient has given verbal consent to a Telephone visit? Yes     If the telephone visit is dropped, the invitation should be resent by: [phone]    Telephone Visit Details:     Telephone Visit Start Time: 9:24 AM    Telephone Visit End Time:9:34 AM    Video was not available for this visit.    Thank you for the opportunity to participate in the care of Liu Ragsdale.     He is a 81 year old  male patient who comes to the sleep medicine clinic for review of sleep study results. The study was completed on 02/17/22  which showed that the patient had moderate obstructive " sleep apnea with an apnea hypopnea index of 20.8 events per hour with the lowest O2 Sat of 76%    Assessment and Plan:  In summary Liu Ragsdale is a 81 year old year old male here for review of sleep study results.  1. Obstructive Sleep Apnea  We had an extensive conversation to review the results of his sleep study and to  him on the importance of treating sleep apnea. We discussed the options of treatment with Inspire Implant,  oral appliance and CPAP. Patient decided to proceed with Inspire Implant. I welcomed the patient to follow up with me on an as needed basis.  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     SOB (shortness of breath) on exertion     Arthritis of right glenohumeral joint- moderate     Arthritis of right acromioclavicular joint- advanced     Positive cardiac stress test     Status post coronary angiogram     Coronary artery disease involving native coronary artery of native heart without angina pectoris     Degenerative joint disease of left hip     Near syncope     Right carotid artery occlusion     Cerebrovascular accident (H)     Ischemic stroke (H)       Current Outpatient Medications   Medication Sig Dispense Refill     amLODIPine (NORVASC) 5 MG tablet Take 1 tablet (5 mg) by mouth daily 30 tablet 0     acetaminophen (TYLENOL) 325 MG tablet Take 3 tablets (975 mg) by mouth 3 times daily  0     aspirin (ASA) 325 MG tablet Take 1 tablet (325 mg) by mouth daily 100 tablet 0     atorvastatin (LIPITOR) 10 MG tablet Take 1 tablet (10 mg) by mouth daily 30 tablet 0      carvedilol (COREG) 6.25 MG tablet Take 1 tablet (6.25 mg) by mouth 2 times daily (with meals) 180 tablet 1     diclofenac (VOLTAREN) 1 % topical gel Place 4 g onto the skin 3 times daily as needed for moderate pain (Shoulder) 100 g 1     Multiple Vitamin (MULTI VITAMIN PO) Take 1 oz by mouth daily Eniva VIBE-liquid       nitroGLYcerin (NITROSTAT) 0.3 MG sublingual tablet For chest pain place 1 tablet under the tongue every 5 minutes for 3 doses. If symptoms persist 5 minutes after 1st dose call 911. 30 tablet 3     NONFORMULARY Take 1 oz by mouth daily Eniva Cell-Ready Multi Minerals is a concentrated, ionic liquid mineral dietary supplement blend to help support nutritional balance and wellbeing.  Combine with 8 ounces of water / juice         Allergies   Allergen Reactions     Metoprolol Other (See Comments)     Side effects : dry mouth     Ampicillin Rash        Labs/Studies:  - We reviewed the results of the overnight study as described on the HPI.     No results found for: PH, PHARTERIAL, PO2, [id number], SAT, PCO2, HCO3, BASEEXCESS, LUIZ, BEB  Lab Results   Component Value Date    TSH 1.41 05/30/2014     Lab Results   Component Value Date     (H) 10/06/2021    GLC 95 10/01/2021     Lab Results   Component Value Date    HGB 11.4 (L) 10/01/2021    HGB 11.7 (L) 09/30/2021     Lab Results   Component Value Date    BUN 18 10/06/2021    BUN 25 10/01/2021    CR 1.29 (H) 10/06/2021    CR 1.26 (H) 10/01/2021     Lab Results   Component Value Date    AST 17 06/03/2019    AST 19 01/22/2018    ALT 29 08/19/2020    ALT 24 07/17/2020    ALKPHOS 86 06/03/2019    ALKPHOS 113 01/22/2018    BILITOTAL 0.5 06/03/2019    BILITOTAL 1.0 01/22/2018    BILICONJ 0.0 03/31/2007     No results found for: UAMP, UBARB, BENZODIAZEUR, UCANN, UCOC, OPIT, UPCP    Recent Labs   Lab Test 10/06/21  1032 10/01/21  0529    141   POTASSIUM 4.5 4.2  4.2   CHLORIDE 108 112*   CO2 28 27   ANIONGAP 3 2*   * 95   BUN 18 25   CR  1.29* 1.26*   GWEN 9.0 8.3*       No results found for: WALLY      Patient verbalized understanding of these issues, agrees with the plan and all questions were answered today. Patient was given an opportuntity to voice any other symptoms or concerns not listed above. Patient did not have any other symptoms or concerns.      Jarod Hamm DO  Board Certified in Internal Medicine and Sleep Medicine      (Note created with Dragon voice recognition and unintended spelling errors and word substitutions may occur)

## 2022-05-02 ENCOUNTER — TELEPHONE (OUTPATIENT)
Dept: FAMILY MEDICINE | Facility: CLINIC | Age: 82
End: 2022-05-02
Payer: COMMERCIAL

## 2022-05-02 DIAGNOSIS — G56.03 BILATERAL CARPAL TUNNEL SYNDROME: Primary | ICD-10-CM

## 2022-05-02 NOTE — TELEPHONE ENCOUNTER
I talked with Liu.  He had bilateral injection in wrists on 1/3/22 (sports medicine).  He says injection helped up until a week ago.  He is asking to move forward with surgery.  Taryn Arndt RN

## 2022-05-02 NOTE — TELEPHONE ENCOUNTER
Reason for call:  Patient reporting a symptom    Symptom or request: Pt has recurring carpel tunnel pain. He has had injections in the past. He wants to talk to a nurse. He wants to talk about possibly getting another injection or having surgery to correct this.       Have you been treated for this before? Yes        Phone Number patient can be reached at:  Home number on file [phone] (home)    Best Time:  anytime    Can we leave a detailed message on this number:  YES    Call taken on 5/2/2022 at 8:55 AM by Teagan Gillis

## 2022-05-04 ENCOUNTER — TELEPHONE (OUTPATIENT)
Dept: FAMILY MEDICINE | Facility: CLINIC | Age: 82
End: 2022-05-04
Payer: COMMERCIAL

## 2022-05-08 DIAGNOSIS — I65.21 RIGHT CAROTID ARTERY OCCLUSION: ICD-10-CM

## 2022-05-10 RX ORDER — AMLODIPINE BESYLATE 5 MG/1
TABLET ORAL
Qty: 90 TABLET | Refills: 1 | Status: SHIPPED | OUTPATIENT
Start: 2022-05-10 | End: 2022-11-07

## 2022-05-16 DIAGNOSIS — I65.21 RIGHT CAROTID ARTERY OCCLUSION: ICD-10-CM

## 2022-05-16 RX ORDER — ATORVASTATIN CALCIUM 10 MG/1
10 TABLET, FILM COATED ORAL DAILY
Qty: 90 TABLET | Refills: 1 | Status: SHIPPED | OUTPATIENT
Start: 2022-05-16 | End: 2022-11-07

## 2022-05-23 ENCOUNTER — TRANSFERRED RECORDS (OUTPATIENT)
Dept: HEALTH INFORMATION MANAGEMENT | Facility: CLINIC | Age: 82
End: 2022-05-23
Payer: COMMERCIAL

## 2022-06-02 ENCOUNTER — OFFICE VISIT (OUTPATIENT)
Dept: FAMILY MEDICINE | Facility: CLINIC | Age: 82
End: 2022-06-02
Payer: COMMERCIAL

## 2022-06-02 VITALS
SYSTOLIC BLOOD PRESSURE: 134 MMHG | RESPIRATION RATE: 18 BRPM | TEMPERATURE: 97.8 F | BODY MASS INDEX: 32.76 KG/M2 | HEIGHT: 62 IN | OXYGEN SATURATION: 97 % | HEART RATE: 57 BPM | DIASTOLIC BLOOD PRESSURE: 60 MMHG | WEIGHT: 178 LBS

## 2022-06-02 DIAGNOSIS — I65.23 CAROTID STENOSIS, BILATERAL: ICD-10-CM

## 2022-06-02 DIAGNOSIS — G56.03 BILATERAL CARPAL TUNNEL SYNDROME: ICD-10-CM

## 2022-06-02 DIAGNOSIS — I10 BENIGN ESSENTIAL HYPERTENSION: ICD-10-CM

## 2022-06-02 DIAGNOSIS — Z86.73 H/O ISCHEMIC RIGHT MCA STROKE: ICD-10-CM

## 2022-06-02 DIAGNOSIS — I25.9 IHD (ISCHEMIC HEART DISEASE): ICD-10-CM

## 2022-06-02 DIAGNOSIS — Z01.818 PREOP GENERAL PHYSICAL EXAM: Primary | ICD-10-CM

## 2022-06-02 DIAGNOSIS — E78.2 MIXED HYPERLIPIDEMIA: ICD-10-CM

## 2022-06-02 PROCEDURE — 99214 OFFICE O/P EST MOD 30 MIN: CPT | Performed by: FAMILY MEDICINE

## 2022-06-02 ASSESSMENT — PAIN SCALES - GENERAL: PAINLEVEL: NO PAIN (0)

## 2022-06-02 NOTE — PROGRESS NOTES
[facility]  [address] [place] [id number]  Phone: [phone]  Fax: [phone]  Primary Provider: Piyush Rogers      PREOPERATIVE EVALUATION:  Today's date: 6/2/2022    Liu Ragsdale is a 82 year old male who presents for a preoperative evaluation.    Surgical Information:  Surgery/Procedure: Carpal Tunnel - left   Surgery Location: [facility] ortho - [facility]   Surgeon: Marti   Surgery Date: 6/8/22 & 6/22  Time of Surgery: ?  Where patient plans to recover: At home with family  Fax number for surgical facility:     Type of Anesthesia Anticipated: General    Assessment & Plan     The proposed surgical procedure is considered LOW risk.      ICD-10-CM    1. Preop general physical exam  Z01.818    2. Bilateral carpal tunnel syndrome  G56.03    3. Benign essential hypertension  I10    4. Mixed hyperlipidemia  E78.2    5. IHD (ischemic heart disease)  I25.9    6. Carotid stenosis, bilateral  I65.23    7. H/O ischemic right MCA stroke  Z86.73           Risks and Recommendations:  The patient has the following additional risks and recommendations for perioperative complications:   - No identified additional risk factors other than previously addressed    Medication Instructions:   - Bleeding risk is low for this procedure (e.g. dental, skin, cataract).    RECOMMENDATION:  APPROVAL GIVEN to proceed with proposed procedure, without further diagnostic evaluation.      Subjective     HPI related to upcoming procedure:   82-year-old male presents for a preop physical exam.  Patient is scheduled to have carpal tunnel surgery on June 8 and June 22 this month.  He requires evaluation and anesthesia risk assessment prior to undergoing surgery/procedure.  Patient denies any fever, chills, sore throat, cough, shortness of breath, chest pain, palpitation, diarrhea, constipation, abdominal pain, headache or other relevant systemic symptoms.      Preop Questions 6/2/2022   1. Have you  ever had a heart attack or stroke? YES - stroke, IHD, s/p angioplasty    2. Have you ever had surgery on your heart or blood vessels, such as a stent placement, a coronary artery bypass, or surgery on an artery in your head, neck, heart, or legs? YES - angioplasty    3. Do you have chest pain with activity? No   4. Do you have a history of  heart failure? No   5. Do you currently have a cold, bronchitis or symptoms of other infection? No   6. Do you have a cough, shortness of breath, or wheezing? No   7. Do you or anyone in your family have previous history of blood clots? No   8. Do you or does anyone in your family have a serious bleeding problem such as prolonged bleeding following surgeries or cuts? No   9. Have you ever had problems with anemia or been told to take iron pills? No   10. Have you had any abnormal blood loss such as black, tarry or bloody stools? No   11. Have you ever had a blood transfusion? No   12. Are you willing to have a blood transfusion if it is medically needed before, during, or after your surgery? Yes   13. Have you or any of your relatives ever had problems with anesthesia? No   14. Do you have sleep apnea, excessive snoring or daytime drowsiness? YES - FANI   14a. Do you have a CPAP machine? No   15. Do you have any artifical heart valves or other implanted medical devices like a pacemaker, defibrillator, or continuous glucose monitor? No   16. Do you have artificial joints? YES - left hip   17. Are you allergic to latex? YES:        Health Care Directive:  Patient has a Health Care Directive on file      Preoperative Review of :   reviewed - no record of controlled substances prescribed.        Review of Systems  Constitutional, neuro, ENT, endocrine, pulmonary, cardiac, gastrointestinal, genitourinary, musculoskeletal, integument and psychiatric systems are negative, except as otherwise noted.    Patient Active Problem List    Diagnosis Date Noted     Ischemic stroke (H)  09/28/2021     Priority: Medium     Cerebrovascular accident (H) 09/27/2021     Priority: Medium     Near syncope 09/26/2021     Priority: Medium     Right carotid artery occlusion 09/26/2021     Priority: Medium     Degenerative joint disease of left hip 02/03/2021     Priority: Medium     Coronary artery disease involving native coronary artery of native heart without angina pectoris 03/23/2020     Priority: Medium     coronary artery disease diagnosed in 03/2020 at which time he was noted to have a  of the proximal left circumflex into the OM, moderate to severe ramus disease and moderate to severe RCA/PLB disease.  He underwent drug-eluting stent placement from the proximal left circumflex into the OM1.  He has residual small vessel coronary artery disease as well as moderate to severe stenosis in the ramus and RCA/YAA for which medical management has been recommended.          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 11/06/2019     Priority: Medium     SOB (shortness of breath) on exertion 11/06/2019     Priority: Medium     Arthritis of right glenohumeral joint- moderate 11/06/2019     Priority: Medium     Arthritis of right acromioclavicular joint- advanced 11/06/2019     Priority: Medium     SNHL (sensory-neural hearing loss), asymmetrical 07/22/2019     Priority: Medium     FANI (obstructive sleep apnea) 03/15/2016     Priority: Medium     NOT using CPAP 3/15/16       BMI 31.0-31.9,adult 04/07/2015     Priority: Medium     Benign essential hypertension, BP goal <140/90 09/10/2014     Priority: Medium     Hyperlipidemia LDL goal <70 12/12/2013     Priority: Medium     Diagnosis updated by automated process. Provider to  review and confirm.       Carotid bruit 03/30/2010     Priority: Medium     right carotid bruit on exam- u/s 7/09 with minimal stenosis on left and not well seen on right         Past Medical History:   Diagnosis Date     Angina at rest (H)      Arthritis      Cerebrovascular accident (H) 9/27/2021     Coronary artery disease involving native coronary artery of native heart without angina pectoris      Hypertension      Past Surgical History:   Procedure Laterality Date     ARTHROPLASTY HIP Left 02/03/2021    Procedure: Total Hip Arthroplasty;  Surgeon: Andrew Arriola MD;  Location: [facility]     COLONOSCOPY N/A 06/16/2016    Procedure: COLONOSCOPY;  Surgeon: Randy Avendano MD;  Location: [facility]     CV LEFT HEART CATH Left 03/10/2020    Procedure: Left Heart Cath;  Surgeon: Vicente Lopez MD;  Location:  HEART CARDIAC CATH LAB     ENDARTERECTOMY CAROTID Right 9/29/2021    Procedure: ENDARTERECTOMY, CAROTID;  Surgeon: Raymond Andersen MD;  Location:  OR     EYE SURGERY  2005    cataract surgery     VASECTOMY  1981     Current Outpatient Medications   Medication Sig Dispense Refill     acetaminophen (TYLENOL) 325 MG tablet Take 3 tablets (975 mg) by mouth 3 times daily  0     amLODIPine (NORVASC) 5 MG tablet Take 1 tablet by mouth once daily 90 tablet 1     aspirin (ASA) 325 MG tablet Take 1 tablet (325 mg) by mouth daily 100 tablet 0     atorvastatin (LIPITOR) 10 MG tablet Take 1 tablet (10 mg) by mouth daily 90 tablet 1     carvedilol (COREG) 6.25 MG tablet Take 1 tablet (6.25 mg) by mouth 2 times daily (with meals) 180 tablet 1     diclofenac (VOLTAREN) 1 % topical gel Place 4 g onto the skin 3 times daily as needed for moderate pain (Shoulder) 100 g 1     Multiple Vitamin (MULTI VITAMIN PO) Take 1 oz by mouth daily Eniva VIBE-liquid       nitroGLYcerin (NITROSTAT) 0.3 MG sublingual tablet For chest pain place 1 tablet under the tongue every 5 minutes for 3 doses. If symptoms persist 5 minutes  "after 1st dose call 911. 30 tablet 3     NONFORMULARY Take 1 oz by mouth daily Eniva Cell-Ready Multi Minerals is a concentrated, ionic liquid mineral dietary supplement blend to help support nutritional balance and wellbeing.  Combine with 8 ounces of water / juice         Allergies   Allergen Reactions     Metoprolol Other (See Comments)     Side effects : dry mouth     Ampicillin Rash        Social History     Tobacco Use     Smoking status: Never Smoker     Smokeless tobacco: Never Used   Substance Use Topics     Alcohol use: Yes     Comment: Beth only     Family History   Problem Relation Age of Onset     Hypertension Sister      Kidney failure Sister      Chronic Obstructive Pulmonary Disease Sister      Thyroid Disease Sister      Seizure Disorder Paternal Grandmother      Mitral valve prolapse Daughter      No Known Problems Son      Cerebrovascular Disease No family hx of      History   Drug Use No         Objective     /60 (Cuff Size: Adult Regular)   Pulse 57   Temp 97.8  F (36.6  C) (Tympanic)   Resp 18   Ht 1.575 m (5' 2\")   Wt 80.7 kg (178 lb)   SpO2 97%   BMI 32.56 kg/m      Physical Exam    GENERAL APPEARANCE: healthy, alert and no distress     EYES: EOMI,  PERRL     HENT: ear canals and TM's normal and nose and mouth without ulcers or lesions     NECK: no adenopathy, no asymmetry, masses, or scars and thyroid normal to palpation     RESP: lungs clear to auscultation - no rales, rhonchi or wheezes     CV: regular rates and rhythm, normal S1 S2, no S3 or S4 and no murmur, click or rub     ABDOMEN:  soft, nontender, no HSM or masses and bowel sounds normal     MS: extremities normal- no gross deformities noted, no evidence of inflammation in joints, FROM in all extremities.     SKIN: no suspicious lesions or rashes     NEURO: Normal strength and tone, sensory exam grossly normal, mentation intact and speech normal     PSYCH: mentation appears normal. and affect normal/bright     " LYMPHATICS: No cervical adenopathy    Recent Labs   Lab Test 10/06/21  1032 10/01/21  0529 09/30/21  0406 09/29/21  0903 09/29/21  0754 09/27/21  0420 09/26/21  1113   HGB  --  11.4* 11.7*   < >  --    < > 13.1*   PLT  --  136* 151   < >  --    < > 151   INR  --   --   --   --  0.84*  --  1.03    141 141  --  140   < > 139   POTASSIUM 4.5 4.2  4.2 4.0  --  4.0   < > 4.0   CR 1.29* 1.26* 1.17  --  1.12   < > 1.32*   A1C  --   --   --   --   --   --  6.0*    < > = values in this interval not displayed.        Diagnostics:  No labs were ordered during this visit.   No EKG required for low risk surgery (cataract, skin procedure, breast biopsy, etc).    Revised Cardiac Risk Index (RCRI):  The patient has the following serious cardiovascular risks for perioperative complications:   - Coronary Artery Disease (MI, positive stress test, angina, Qs on EKG) = 1 point   - Cerebrovascular Disease (TIA or CVA) = 1 point     RCRI Interpretation: 2 points: Class III (moderate risk - 6.6% complication rate)     Estimated Functional Capacity: Performs 4 METS exercise without symptoms (e.g., light housework, stairs, 4 mph walk, 7 mph bike, slow step dance)           Signed Electronically by: Piyush Rogers MD  Copy of this evaluation report is provided to requesting physician.

## 2022-06-02 NOTE — NURSING NOTE
"Chief Complaint   Patient presents with     Pre-Op Exam     /60 (Cuff Size: Adult Regular)   Pulse 57   Temp 97.8  F (36.6  C) (Tympanic)   Resp 18   Ht 1.575 m (5' 2\")   Wt 80.7 kg (178 lb)   SpO2 97%   BMI 32.56 kg/m   Estimated body mass index is 32.56 kg/m  as calculated from the following:    Height as of this encounter: 1.575 m (5' 2\").    Weight as of this encounter: 80.7 kg (178 lb).  Patient presents to the clinic using No DME      Health Maintenance that is potentially due pending provider review:    Health Maintenance Due   Topic Date Due     ANNUAL REVIEW OF HM ORDERS  Never done     MEDICARE ANNUAL WELLNESS VISIT  09/25/2020                "

## 2022-07-06 ENCOUNTER — TRANSFERRED RECORDS (OUTPATIENT)
Dept: FAMILY MEDICINE | Facility: CLINIC | Age: 82
End: 2022-07-06

## 2022-07-12 DIAGNOSIS — I65.21 RIGHT CAROTID ARTERY OCCLUSION: ICD-10-CM

## 2022-07-12 RX ORDER — CARVEDILOL 6.25 MG/1
TABLET ORAL
Qty: 180 TABLET | Refills: 1 | Status: SHIPPED | OUTPATIENT
Start: 2022-07-12 | End: 2023-01-03

## 2022-07-18 ENCOUNTER — TRANSFERRED RECORDS (OUTPATIENT)
Dept: NEUROLOGY | Facility: CLINIC | Age: 82
End: 2022-07-18

## 2022-09-30 ENCOUNTER — TELEPHONE (OUTPATIENT)
Dept: NEUROSURGERY | Facility: CLINIC | Age: 82
End: 2022-09-30

## 2022-10-14 ENCOUNTER — TELEPHONE (OUTPATIENT)
Dept: NEUROLOGY | Facility: CLINIC | Age: 82
End: 2022-10-14

## 2022-10-14 NOTE — TELEPHONE ENCOUNTER
Pt called said he has an appointment with Dr Etienne on 10/28/22 for 10 month -Stroke follow up. He has an ultrasound for the US Carotid Bilateral then has another follow up appt to go over CT with Raymond Andersen MD #[phone]. -09/30/2022 KB/ 1 YR F/U/ Bilateral carotid artery stenosis/ U/S PRIOR @ [facility].    Pt wants to know why he is seeing two different Dr's for the same thing? Should he get rid of one appt or keep both of them?  Please let me know so I can call & let the pt know.  Thank you.

## 2022-10-17 NOTE — TELEPHONE ENCOUNTER
Dr. Escalera is with neurosurgery and follows pt for carotid stenosis. Dr. Etienne is the neurologist that follows for stroke.  Per Dr. Etienne's last note:  --We will check in next fall virtually and make sure things are still going well.  He can call or mychart in the mean time with any issues or questions.    Called pt back and discussed above information with him.  He verbalized understanding. Pt had no further questions at this time.     Ashleigh PÉREZ RN, BSN  [facility]

## 2022-10-28 ENCOUNTER — VIRTUAL VISIT (OUTPATIENT)
Dept: NEUROLOGY | Facility: CLINIC | Age: 82
End: 2022-10-28
Payer: COMMERCIAL

## 2022-10-28 VITALS — BODY MASS INDEX: 32.01 KG/M2 | WEIGHT: 175 LBS

## 2022-10-28 DIAGNOSIS — Z86.73 HISTORY OF STROKE: Primary | ICD-10-CM

## 2022-10-28 PROCEDURE — 99214 OFFICE O/P EST MOD 30 MIN: CPT | Mod: GT | Performed by: STUDENT IN AN ORGANIZED HEALTH CARE EDUCATION/TRAINING PROGRAM

## 2022-10-28 NOTE — PROGRESS NOTES
[facility]  Section of General Neurology  Return Patient  Virtual Visit    Liu Ragsdale MRN# [id number]   Age: 82 year old YOB: 1940     Brief history of symptoms: The patient was initially seen in neurologic consultation on 12/28/21  for evaluation of post stroke cares. Please see the comprehensive neurologic consultation note from that date in the Epic records for details.          Assessment and Plan:   Liu Ragsdale is a very pleasant 82 year old man seen in follow up for post stroke cares.  He continues to do well with subtle L hand issues from previous stroke which we discussed I think is an excellent recovery.  Hand symptoms in some degree confounded by carpal tunnel syndrome b/l which he has since has surgically corrected he notes.   We discussed his stroke mechanism of which he has a clear understanding and discussed what we are doing for future stroke prevention: aspirin, atorvastatin, continued follow up as directed by Dr. Andersen regarding post endartectomy cares, would recommend further exploring FANI treatment options but continue sleeping on side in interim.   Discussed further OT would be an option for hand strengthening as well.  Overall I am pleased with his progress and I feel he has a good understanding of what happened, what we are doing to prevent future strokes and what our plan is.  He can reach out with questions and follow up with neurology as needed.        Dung Etienne MD   of Neurology   [facility]      Interval history:     Things have been going well. Loss of sensation/movement in L index finger is improving little by little.  Has had CTS surgery on each wrist as well   Has some trouble holding onto objects.    Previously worked with OT in this regard. Discussed home OT exercises.     Does have FANI, previously was thinking about inspire implant, he is worried about cost  Is trying to sleep on side  more.    Encouraged to keep Dr. Andersen apppointment  Discussed aspirin, lipitor, rationale.   Is taking 325 mg     Stroke plan from last visit:  --MRI Brain--reviewed  --Mechanism of stroke: R carotid stenosis  --CTA H/N--as above, showing R carotid stenosis s/p endartectomy, plan for f/u imaging and f/u with neurosurgery (Follow-up with Neurosurgery/repeat carotid ultrasound at the 1 year point as recommended previously). Most recent follow up CUS reviewed with patient and R side appears patent, great news.  --Agree with previous recommendation to get updated sleep study, FANI can be a risk factor for strokes independently.   --Echo (TTE) with bubble: as above.  --Statin--Lipitor 10 mg  --Aspirin--325 mg  --Continue to work on blood pressure  --Smoking cessation: NA  --PCP follow up for risk factors as above  --We will check in next fall virtually and make sure things are still going well.  He can call or mychart in the mean time with any issues or questions.    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     SOB (shortness of breath) on exertion     Arthritis of right glenohumeral joint- moderate     Arthritis of right acromioclavicular joint- advanced     Positive cardiac stress test     Status post coronary angiogram     Coronary artery disease involving native coronary artery of native heart without angina pectoris     Degenerative joint disease of left hip     Near syncope     Right carotid artery occlusion     Cerebrovascular accident (H)     Ischemic stroke (H)     Past Medical History:   Diagnosis Date     Angina at rest (H)      Arthritis      Cerebrovascular  accident (H) 9/27/2021     Coronary artery disease involving native coronary artery of native heart without angina pectoris      Hypertension         Past Surgical History:     Past Surgical History:   Procedure Laterality Date     ARTHROPLASTY HIP Left 02/03/2021    Procedure: Total Hip Arthroplasty;  Surgeon: Andrew Arriola MD;  Location: [facility]     COLONOSCOPY N/A 06/16/2016    Procedure: COLONOSCOPY;  Surgeon: Randy Avendano MD;  Location: [facility]     CV LEFT HEART CATH Left 03/10/2020    Procedure: Left Heart Cath;  Surgeon: Vicente Lopez MD;  Location:  HEART CARDIAC CATH LAB     ENDARTERECTOMY CAROTID Right 9/29/2021    Procedure: ENDARTERECTOMY, CAROTID;  Surgeon: Raymond Andersen MD;  Location:  OR     EYE SURGERY  2005    cataract surgery     VASECTOMY  1981        Social History:     Social History     Tobacco Use     Smoking status: Never     Smokeless tobacco: Never   Vaping Use     Vaping Use: Never used   Substance Use Topics     Alcohol use: Yes     Comment: Beth only     Drug use: No        Family History:     Family History   Problem Relation Age of Onset     Hypertension Sister      Kidney failure Sister      Chronic Obstructive Pulmonary Disease Sister      Thyroid Disease Sister      Seizure Disorder Paternal Grandmother      Mitral valve prolapse Daughter      No Known Problems Son      Cerebrovascular Disease No family hx of         Medications:     Current Outpatient Medications   Medication Sig     acetaminophen (TYLENOL) 325 MG tablet Take 3 tablets (975 mg) by mouth 3 times daily     amLODIPine (NORVASC) 5 MG tablet Take 1 tablet by mouth once daily     aspirin (ASA) 325 MG tablet Take 1 tablet (325 mg) by mouth daily     atorvastatin (LIPITOR) 10 MG tablet Take 1 tablet (10 mg) by mouth daily     carvedilol (COREG) 6.25 MG tablet TAKE 1 TABLET BY MOUTH TWICE DAILY WITH MEALS     diclofenac (VOLTAREN) 1 % topical gel Place 4 g onto the skin 3 times daily as  needed for moderate pain (Shoulder)     Multiple Vitamin (MULTI VITAMIN PO) Take 1 oz by mouth daily Eniva VIBE-liquid     nitroGLYcerin (NITROSTAT) 0.3 MG sublingual tablet For chest pain place 1 tablet under the tongue every 5 minutes for 3 doses. If symptoms persist 5 minutes after 1st dose call 911.     NONFORMULARY Take 1 oz by mouth daily Eniva Cell-Ready Multi Minerals is a concentrated, ionic liquid mineral dietary supplement blend to help support nutritional balance and wellbeing.  Combine with 8 ounces of water / juice     No current facility-administered medications for this visit.        Allergies:     Allergies   Allergen Reactions     Metoprolol Other (See Comments)     Side effects : dry mouth     Ampicillin Rash        Review of Systems:   As noted above     Physical Exam:   General: Seated comfortably in no acute distress.  Neurologic:     Mental Status: Fully alert, attentive and oriented. Speech clear and fluent, no paraphasic errors.     Cranial Nerves: EOM intact. Facial movements symmetric to smile. Hearing not formally tested but intact to conversation.  No dysarthria.     Motor: No tremors or other abnormal movements observed.  No pronator drift on video              Data: Pertinent prior to visit   MRI Brain (9.26.21):  IMPRESSION:     1. Multiple small infarcts scattered throughout the right middle  cerebral artery distribution.  2. No evidence of hemorrhage or significant mass effect.  3. Small old right cerebellar infarcts.  4. Volume loss and scattered white matter T2 hyperintensities which  likely represent chronic small vessel ischemic change.     MRA Head and Neck (9.26.21):  IMPRESSION:    1. Improved flow within the right internal carotid artery.  2. Severe stenosis of the right vertebral artery at the proximal V4  segment, better demonstrated on neck MRA.  3. Otherwise, no evidence of large vessel occlusion or high-grade  stenosis.       IMPRESSION:    1. Improved flow within the  right internal carotid artery.  2. Severe stenosis of the proximal right internal carotid artery,  probably 70% or greater.  3. 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   4. Moderate stenosis of the dominant left vertebral artery at the V2  segment related to osseous cervical spine degenerative change,  unchanged.      CTA Head and Neck (9.28.21):  Impression:       1. There is a near complete occlusion of right internal carotid artery  at the bifurcation. The right internal carotid artery is patent distal  to the stenosis.  2. There is nonocclusive atherosclerosis involving the intracranial  segments of both internal carotid arteries.  3. Remaining major cervical arteries appear patent.     Carotid US (10.25.21):  IMPRESSION:     1. RIGHT ICA: No significant residual stenosis or plaque after carotid  endarterectomy. Normal.     2. LEFT ICA:  Less than 50% diameter narrowing by grayscale imaging  and sonographic velocity criteria.     MRI    I personally reviewed the above imaging and agree with the findings in the report.         Video visit data:  Patient location: Home  Provider location: On site  Video visit length: 900-920  Software used: [email or web address]         The total time of this encounter today amounted to 20 minutes of time on video visit and 30 minutes in total. This time included time spent with the patient, prep work, reviewing previous imaging, ordering tests, and performing post visit documentation.

## 2022-10-28 NOTE — NURSING NOTE
"Liu Ragsdale is a 82 year old male who presents for:  Chief Complaint   Patient presents with     Follow Up     Stroke        Initial Vitals:  Wt 79.4 kg (175 lb)   BMI 32.01 kg/m   Estimated body mass index is 32.01 kg/m  as calculated from the following:    Height as of 6/2/22: 1.575 m (5' 2\").    Weight as of this encounter: 79.4 kg (175 lb).. Body surface area is 1.86 meters squared. BP completed using cuff size: NA (Not Taken)    Nursing Comments:    Stacey Stinson    "

## 2022-10-28 NOTE — PATIENT INSTRUCTIONS
Continue aspirin, atorvastatin therapy for stroke prevention  Encouraged to keep Dr. Andersen visit for follow up on carotid  Encouraged to consider treating FANI, discussed stroke risk link  Could think about resuming OT locally after carpal tunnel surgery to work on hand strength  Reach out we can follow up as needed with any future questions

## 2022-10-28 NOTE — LETTER
10/28/2022         RE: Liu Ragsdale  [address] [facility] [address]        Dear Colleague,    Thank you for referring your patient, Liu Ragsdale, to the [facility] NEUROLOGY CLINICS [facility]. Please see a copy of my visit note below.    [facility]  Section of General Neurology  Return Patient  Virtual Visit    Liu Ragsdale MRN# [id number]   Age: 82 year old YOB: 1940     Brief history of symptoms: The patient was initially seen in neurologic consultation on 12/28/21  for evaluation of post stroke cares. Please see the comprehensive neurologic consultation note from that date in the Epic records for details.          Assessment and Plan:   Liu Ragsdale is a very pleasant 82 year old man seen in follow up for post stroke cares.  He continues to do well with subtle L hand issues from previous stroke which we discussed I think is an excellent recovery.  Hand symptoms in some degree confounded by carpal tunnel syndrome b/l which he has since has surgically corrected he notes.   We discussed his stroke mechanism of which he has a clear understanding and discussed what we are doing for future stroke prevention: aspirin, atorvastatin, continued follow up as directed by Dr. Andersen regarding post endartectomy cares, would recommend further exploring FANI treatment options but continue sleeping on side in interim.   Discussed further OT would be an option for hand strengthening as well.  Overall I am pleased with his progress and I feel he has a good understanding of what happened, what we are doing to prevent future strokes and what our plan is.  He can reach out with questions and follow up with neurology as needed.        Dung Etienne MD   of Neurology   [facility]      Interval history:     Things have been going well. Loss of sensation/movement in L index finger is improving little by little.  Has had CTS  surgery on each wrist as well   Has some trouble holding onto objects.    Previously worked with OT in this regard. Discussed home OT exercises.     Does have FANI, previously was thinking about inspire implant, he is worried about cost  Is trying to sleep on side more.    Encouraged to keep Dr. Andersen apppointment  Discussed aspirin, lipitor, rationale.   Is taking 325 mg     Stroke plan from last visit:  --MRI Brain--reviewed  --Mechanism of stroke: R carotid stenosis  --CTA H/N--as above, showing R carotid stenosis s/p endartectomy, plan for f/u imaging and f/u with neurosurgery (Follow-up with Neurosurgery/repeat carotid ultrasound at the 1 year point as recommended previously). Most recent follow up CUS reviewed with patient and R side appears patent, great news.  --Agree with previous recommendation to get updated sleep study, FANI can be a risk factor for strokes independently.   --Echo (TTE) with bubble: as above.  --Statin--Lipitor 10 mg  --Aspirin--325 mg  --Continue to work on blood pressure  --Smoking cessation: NA  --PCP follow up for risk factors as above  --We will check in next fall virtually and make sure things are still going well.  He can call or mychart in the mean time with any issues or questions.    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     SOB (shortness of breath) on exertion     Arthritis of right glenohumeral joint- moderate     Arthritis of right acromioclavicular joint- advanced     Positive cardiac stress test     Status post coronary angiogram     Coronary artery disease involving native coronary artery of native heart without angina  pectoris     Degenerative joint disease of left hip     Near syncope     Right carotid artery occlusion     Cerebrovascular accident (H)     Ischemic stroke (H)     Past Medical History:   Diagnosis Date     Angina at rest (H)      Arthritis      Cerebrovascular accident (H) 9/27/2021     Coronary artery disease involving native coronary artery of native heart without angina pectoris      Hypertension         Past Surgical History:     Past Surgical History:   Procedure Laterality Date     ARTHROPLASTY HIP Left 02/03/2021    Procedure: Total Hip Arthroplasty;  Surgeon: Andrew Arriola MD;  Location: [facility] OR     COLONOSCOPY N/A 06/16/2016    Procedure: COLONOSCOPY;  Surgeon: Randy Avendano MD;  Location: [facility] GI     CV LEFT HEART CATH Left 03/10/2020    Procedure: Left Heart Cath;  Surgeon: Vicente Lopez MD;  Location:  HEART CARDIAC CATH LAB     ENDARTERECTOMY CAROTID Right 9/29/2021    Procedure: ENDARTERECTOMY, CAROTID;  Surgeon: Raymond Andersen MD;  Location:  OR     EYE SURGERY  2005    cataract surgery     VASECTOMY  1981        Social History:     Social History     Tobacco Use     Smoking status: Never     Smokeless tobacco: Never   Vaping Use     Vaping Use: Never used   Substance Use Topics     Alcohol use: Yes     Comment: Beth only     Drug use: No        Family History:     Family History   Problem Relation Age of Onset     Hypertension Sister      Kidney failure Sister      Chronic Obstructive Pulmonary Disease Sister      Thyroid Disease Sister      Seizure Disorder Paternal Grandmother      Mitral valve prolapse Daughter      No Known Problems Son      Cerebrovascular Disease No family hx of         Medications:     Current Outpatient Medications   Medication Sig     acetaminophen (TYLENOL) 325 MG tablet Take 3 tablets (975 mg) by mouth 3 times daily     amLODIPine (NORVASC) 5 MG tablet Take 1 tablet by mouth once daily     aspirin (ASA) 325 MG tablet Take 1 tablet (325 mg)  by mouth daily     atorvastatin (LIPITOR) 10 MG tablet Take 1 tablet (10 mg) by mouth daily     carvedilol (COREG) 6.25 MG tablet TAKE 1 TABLET BY MOUTH TWICE DAILY WITH MEALS     diclofenac (VOLTAREN) 1 % topical gel Place 4 g onto the skin 3 times daily as needed for moderate pain (Shoulder)     Multiple Vitamin (MULTI VITAMIN PO) Take 1 oz by mouth daily Eniva VIBE-liquid     nitroGLYcerin (NITROSTAT) 0.3 MG sublingual tablet For chest pain place 1 tablet under the tongue every 5 minutes for 3 doses. If symptoms persist 5 minutes after 1st dose call 911.     NONFORMULARY Take 1 oz by mouth daily Eniva Cell-Ready Multi Minerals is a concentrated, ionic liquid mineral dietary supplement blend to help support nutritional balance and wellbeing.  Combine with 8 ounces of water / juice     No current facility-administered medications for this visit.        Allergies:     Allergies   Allergen Reactions     Metoprolol Other (See Comments)     Side effects : dry mouth     Ampicillin Rash        Review of Systems:   As noted above     Physical Exam:   General: Seated comfortably in no acute distress.  Neurologic:     Mental Status: Fully alert, attentive and oriented. Speech clear and fluent, no paraphasic errors.     Cranial Nerves: EOM intact. Facial movements symmetric to smile. Hearing not formally tested but intact to conversation.  No dysarthria.     Motor: No tremors or other abnormal movements observed.  No pronator drift on video              Data: Pertinent prior to visit   MRI Brain (9.26.21):  IMPRESSION:     1. Multiple small infarcts scattered throughout the right middle  cerebral artery distribution.  2. No evidence of hemorrhage or significant mass effect.  3. Small old right cerebellar infarcts.  4. Volume loss and scattered white matter T2 hyperintensities which  likely represent chronic small vessel ischemic change.     MRA Head and Neck (9.26.21):  IMPRESSION:    1. Improved flow within the right  internal carotid artery.  2. Severe stenosis of the right vertebral artery at the proximal V4  segment, better demonstrated on neck MRA.  3. Otherwise, no evidence of large vessel occlusion or high-grade  stenosis.       IMPRESSION:    1. Improved flow within the right internal carotid artery.  2. Severe stenosis of the proximal right internal carotid artery,  probably 70% or greater.  3. 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   4. Moderate stenosis of the dominant left vertebral artery at the V2  segment related to osseous cervical spine degenerative change,  unchanged.      CTA Head and Neck (9.28.21):  Impression:       1. There is a near complete occlusion of right internal carotid artery  at the bifurcation. The right internal carotid artery is patent distal  to the stenosis.  2. There is nonocclusive atherosclerosis involving the intracranial  segments of both internal carotid arteries.  3. Remaining major cervical arteries appear patent.     Carotid US (10.25.21):  IMPRESSION:     1. RIGHT ICA: No significant residual stenosis or plaque after carotid  endarterectomy. Normal.     2. LEFT ICA:  Less than 50% diameter narrowing by grayscale imaging  and sonographic velocity criteria.     MRI    I personally reviewed the above imaging and agree with the findings in the report.         Video visit data:  Patient location: Home  Provider location: On site  Video visit length: 900-920  Software used: [email or web address]         The total time of this encounter today amounted to 20 minutes of time on video visit and 30 minutes in total. This time included time spent with the patient, prep work, reviewing previous imaging, ordering tests, and performing post visit documentation.        Again, thank you for allowing me to participate in the care of your patient.        Sincerely,        Vega Etienne MD

## 2022-11-05 DIAGNOSIS — I65.21 RIGHT CAROTID ARTERY OCCLUSION: ICD-10-CM

## 2022-11-07 RX ORDER — ATORVASTATIN CALCIUM 10 MG/1
TABLET, FILM COATED ORAL
Qty: 90 TABLET | Refills: 0 | Status: SHIPPED | OUTPATIENT
Start: 2022-11-07 | End: 2023-01-31

## 2022-11-07 RX ORDER — AMLODIPINE BESYLATE 5 MG/1
TABLET ORAL
Qty: 90 TABLET | Refills: 0 | Status: SHIPPED | OUTPATIENT
Start: 2022-11-07 | End: 2023-01-31

## 2022-11-07 NOTE — TELEPHONE ENCOUNTER
Refill provided per 90 day refill protocol, reminder patient needs labs and appointment on pharmacy notes.      JUICE Ramirez

## 2022-11-21 ENCOUNTER — HOSPITAL ENCOUNTER (OUTPATIENT)
Dept: ULTRASOUND IMAGING | Facility: CLINIC | Age: 82
Discharge: HOME | End: 2022-11-21
Attending: NEUROLOGICAL SURGERY | Admitting: NEUROLOGICAL SURGERY
Payer: MEDICARE

## 2022-11-21 DIAGNOSIS — I65.23 BILATERAL CAROTID ARTERY STENOSIS: ICD-10-CM

## 2022-11-21 PROCEDURE — 93880 EXTRACRANIAL BILAT STUDY: CPT

## 2022-11-28 NOTE — TELEPHONE ENCOUNTER
Pt informed due for OV, fasting lab.  Refilled.  HEIDE Chavarria RN     Received request via: Patient    Was the patient seen in the last year in this department? Yes    Does the patient have an active prescription (recently filled or refills available) for medication(s) requested? No    Does the patient have long term Plus and need 100 day supply (blood pressure, diabetes and cholesterol meds only)? Medication is not for cholesterol, blood pressure or diabetes

## 2022-12-13 ENCOUNTER — VIRTUAL VISIT (OUTPATIENT)
Dept: NEUROSURGERY | Facility: CLINIC | Age: 82
End: 2022-12-13
Payer: COMMERCIAL

## 2022-12-13 DIAGNOSIS — I65.23 BILATERAL CAROTID ARTERY STENOSIS: Primary | ICD-10-CM

## 2022-12-13 PROCEDURE — 99213 OFFICE O/P EST LOW 20 MIN: CPT | Mod: 95 | Performed by: NEUROLOGICAL SURGERY

## 2022-12-13 NOTE — LETTER
2022       RE: Liu Ragsdale  [address] [facility] [address]     Dear Colleague,    Thank you for referring your patient, Liu Ragsdale, to the [facility] NEUROSURGERY CLINIC [place] at [facility]. Please see a copy of my visit note below.    See dictated note.  CORINE Andersen MD      Service Date: 2022    Piyush Rogers MD  [address]  [address]    RE:  Liu Ragsdale  MRN:  [id number]  :  1940    Dear Dr. Rogers:    We spoke to Mr. Ragsdale as part of a telephone followup in Cerebrovascular Clinic today.  He is over a year out from a right carotid endarterectomy for symptomatic severe carotid stenosis.  Much of his left upper extremity weakness has resolved.  He has experienced further improvement in the left index finger, though it remains slightly numb and weak compared to the rest of the hand.  He is independent.  He remains on a daily aspirin.  He checks his blood pressure at home and systolics have been mostly in the 130s and 140s.  He remains on atorvastatin 10 mg daily.    Over the phone, he sounded well.  His speech, language and phonation are normal.    REVIEW OF STUDIES:  We went over his carotid ultrasound from 2022.  In the right internal carotid artery, peak velocity is 179 cm per second with a ratio of 1.3.  On the left side, peak velocity is 109 cm per second with a ratio of 0.6.  The velocities on the right side are increased on the right side compared to last year (87 cm per second).    IMPRESSION AND PLAN:  The elevated velocities in the right internal carotid artery could be artifactual.  Of course, there may be some restenosis as well.  We will repeat a carotid ultrasound in 6 months in [place] and follow up with him afterwards.  His ultrasound in 10/2021 and the current ultrasound were done in 2 separate locations.  Therefore, there may be some  technical factors accounting for the differences.  Regardless, we will follow up with him relatively soon.  He should remain on a daily aspirin.  We will defer control of his other stroke risk factors to you.  We will repeat the carotid ultrasound in 6 months in [place] and follow up with him.  Please do not hesitate to contact us with questions.    Sincerely,    Raymodn Andersen MD        D: 2022   T: 2022   MT:     Name:     GISELE LOZANO  MRN:      [id number]        Account:      [id number]   :      1940           Service Date: 2022       Document: [id number]

## 2022-12-13 NOTE — PROGRESS NOTES
Service Date: 2022    Piyush Rogers MD  [address]  [address]    RE:  Liu Ragsdale  MRN:  [id number]  :  1940    Dear Dr. Rogers:    We spoke to Mr. Ragsdale as part of a telephone followup in Cerebrovascular Clinic today.  He is over a year out from a right carotid endarterectomy for symptomatic severe carotid stenosis.  Much of his left upper extremity weakness has resolved.  He has experienced further improvement in the left index finger, though it remains slightly numb and weak compared to the rest of the hand.  He is independent.  He remains on a daily aspirin.  He checks his blood pressure at home and systolics have been mostly in the 130s and 140s.  He remains on atorvastatin 10 mg daily.    Over the phone, he sounded well.  His speech, language and phonation are normal.    REVIEW OF STUDIES:  We went over his carotid ultrasound from 2022.  In the right internal carotid artery, peak velocity is 179 cm per second with a ratio of 1.3.  On the left side, peak velocity is 109 cm per second with a ratio of 0.6.  The velocities on the right side are increased on the right side compared to last year (87 cm per second).    IMPRESSION AND PLAN:  The elevated velocities in the right internal carotid artery could be artifactual.  Of course, there may be some restenosis as well.  We will repeat a carotid ultrasound in 6 months in [place] and follow up with him afterwards.  His ultrasound in 10/2021 and the current ultrasound were done in 2 separate locations.  Therefore, there may be some technical factors accounting for the differences.  Regardless, we will follow up with him relatively soon.  He should remain on a daily aspirin.  We will defer control of his other stroke risk factors to you.  We will repeat the carotid ultrasound in 6 months in [place] and follow up with him.  Please do not hesitate to contact us with  questions.    Sincerely,    Raymond Andersen MD        D: 2022   T: 2022   MT: fei    Name:     GISELE LOZANO  MRN:      [id number]        Account:      [id number]   :      1940           Service Date: 2022       Document: [id number]

## 2022-12-13 NOTE — LETTER
2022       RE: Liu Ragsdale  [address] [facility] [address]     Dear Colleague,    Thank you for referring your patient, Liu Ragsdale, to the [facility] NEUROSURGERY CLINIC [place] at [facility]. Please see a copy of my visit note below.    Phone call duration: 12 minutes    See dictated note.  CORINE Andersen MD      Service Date: 2022    Piyush Rogers MD  [address]  [address]    RE:  Liu Ragsdale  MRN:  [id number]  :  1940    Dear Dr. Rogers:    We spoke to Mr. Ragsdale as part of a telephone followup in Cerebrovascular Clinic today.  He is over a year out from a right carotid endarterectomy for symptomatic severe carotid stenosis.  Much of his left upper extremity weakness has resolved.  He has experienced further improvement in the left index finger, though it remains slightly numb and weak compared to the rest of the hand.  He is independent.  He remains on a daily aspirin.  He checks his blood pressure at home and systolics have been mostly in the 130s and 140s.  He remains on atorvastatin 10 mg daily.    Over the phone, he sounded well.  His speech, language and phonation are normal.    REVIEW OF STUDIES:  We went over his carotid ultrasound from 2022.  In the right internal carotid artery, peak velocity is 179 cm per second with a ratio of 1.3.  On the left side, peak velocity is 109 cm per second with a ratio of 0.6.  The velocities on the right side are increased on the right side compared to last year (87 cm per second).    IMPRESSION AND PLAN:  The elevated velocities in the right internal carotid artery could be artifactual.  Of course, there may be some restenosis as well.  We will repeat a carotid ultrasound in 6 months in [place] and follow up with him afterwards.  His ultrasound in 10/2021 and the current ultrasound were done in 2 separate locations.   Therefore, there may be some technical factors accounting for the differences.  Regardless, we will follow up with him relatively soon.  He should remain on a daily aspirin.  We will defer control of his other stroke risk factors to you.  We will repeat the carotid ultrasound in 6 months in [place] and follow up with him.  Please do not hesitate to contact us with questions.    Sincerely,    Raymond Andersen MD        D: 2022   T: 2022   MT:     Name:     GISELE LOZANOMicaela  MRN:      [id number]        Account:      [id number]   :      1940           Service Date: 2022       Document: [id number]

## 2022-12-13 NOTE — LETTER
December 13, 2022       TO: Liu Ragsdale  [address] [facility] [address]       DearMr.Melyssa,    We are writing to inform you of your test results.    {[facility] results letter list:[id number]}    No results found from the In Basket message.    ***

## 2022-12-13 NOTE — PROGRESS NOTES
Liu is a 82 year old who is being evaluated via a billable telephone visit.      What phone number would you like to be contacted at? [phone]  How would you like to obtain your AVS? MyChart    Distant Location (provider location):  On-site  Phone call duration: 12 minutes    See dictated note.  CORINE Andersen MD

## 2022-12-13 NOTE — PATIENT INSTRUCTIONS
Repeat carotid ultrasound in 6 months in [place] (ordered)    Stay on daily aspirin    Stroke & Endovascular RN Care Coordinators:     Karlo Potts, RN   Carlota Shea, RN, CNRN, SCRN     If you have any questions please contact the RN Care Coordinators at [phone], option 1.     After business hours call the  at [phone] and have the Neuro-Interventional Fellow paged.     Thank you for choosing [facility] for your health care needs.

## 2023-01-01 ENCOUNTER — HOSPITAL ENCOUNTER (OUTPATIENT)
Dept: PET IMAGING | Facility: CLINIC | Age: 83
Discharge: HOME | End: 2023-09-28
Attending: INTERNAL MEDICINE | Admitting: INTERNAL MEDICINE
Payer: MEDICARE

## 2023-01-01 ENCOUNTER — APPOINTMENT (OUTPATIENT)
Dept: RADIATION THERAPY | Facility: OUTPATIENT CENTER | Age: 83
End: 2023-01-01
Payer: COMMERCIAL

## 2023-01-01 ENCOUNTER — PATIENT OUTREACH (OUTPATIENT)
Dept: SURGERY | Facility: CLINIC | Age: 83
End: 2023-01-01
Payer: COMMERCIAL

## 2023-01-01 ENCOUNTER — PATIENT OUTREACH (OUTPATIENT)
Dept: ONCOLOGY | Facility: CLINIC | Age: 83
End: 2023-01-01
Payer: COMMERCIAL

## 2023-01-01 ENCOUNTER — TELEPHONE (OUTPATIENT)
Dept: GASTROENTEROLOGY | Facility: CLINIC | Age: 83
End: 2023-01-01
Payer: COMMERCIAL

## 2023-01-01 ENCOUNTER — LAB (OUTPATIENT)
Dept: LAB | Facility: CLINIC | Age: 83
End: 2023-01-01
Payer: MEDICARE

## 2023-01-01 ENCOUNTER — APPOINTMENT (OUTPATIENT)
Dept: OCCUPATIONAL THERAPY | Facility: CLINIC | Age: 83
DRG: 357 | End: 2023-01-01
Attending: STUDENT IN AN ORGANIZED HEALTH CARE EDUCATION/TRAINING PROGRAM
Payer: MEDICARE

## 2023-01-01 ENCOUNTER — INFUSION THERAPY VISIT (OUTPATIENT)
Dept: INFUSION THERAPY | Facility: CLINIC | Age: 83
End: 2023-01-01
Attending: INTERNAL MEDICINE
Payer: MEDICARE

## 2023-01-01 ENCOUNTER — ALLIED HEALTH/NURSE VISIT (OUTPATIENT)
Dept: FAMILY MEDICINE | Facility: CLINIC | Age: 83
End: 2023-01-01
Payer: COMMERCIAL

## 2023-01-01 ENCOUNTER — ONCOLOGY VISIT (OUTPATIENT)
Dept: ONCOLOGY | Facility: CLINIC | Age: 83
End: 2023-01-01
Attending: INTERNAL MEDICINE
Payer: MEDICARE

## 2023-01-01 ENCOUNTER — TELEPHONE (OUTPATIENT)
Dept: FAMILY MEDICINE | Facility: CLINIC | Age: 83
End: 2023-01-01

## 2023-01-01 ENCOUNTER — APPOINTMENT (OUTPATIENT)
Dept: EDUCATION SERVICES | Facility: CLINIC | Age: 83
DRG: 357 | End: 2023-01-01
Attending: STUDENT IN AN ORGANIZED HEALTH CARE EDUCATION/TRAINING PROGRAM
Payer: MEDICARE

## 2023-01-01 ENCOUNTER — TELEPHONE (OUTPATIENT)
Dept: FAMILY MEDICINE | Facility: CLINIC | Age: 83
End: 2023-01-01
Payer: COMMERCIAL

## 2023-01-01 ENCOUNTER — APPOINTMENT (OUTPATIENT)
Dept: GENERAL RADIOLOGY | Facility: CLINIC | Age: 83
End: 2023-01-01
Attending: EMERGENCY MEDICINE
Payer: MEDICARE

## 2023-01-01 ENCOUNTER — ANESTHESIA (OUTPATIENT)
Dept: SURGERY | Facility: AMBULATORY SURGERY CENTER | Age: 83
End: 2023-01-01
Payer: MEDICARE

## 2023-01-01 ENCOUNTER — HOSPITAL ENCOUNTER (OUTPATIENT)
Dept: RESPIRATORY THERAPY | Facility: CLINIC | Age: 83
Discharge: HOME | End: 2023-06-21
Attending: FAMILY MEDICINE | Admitting: FAMILY MEDICINE
Payer: MEDICARE

## 2023-01-01 ENCOUNTER — TELEPHONE (OUTPATIENT)
Dept: ONCOLOGY | Facility: CLINIC | Age: 83
End: 2023-01-01

## 2023-01-01 ENCOUNTER — APPOINTMENT (OUTPATIENT)
Dept: LAB | Facility: CLINIC | Age: 83
End: 2023-01-01
Payer: MEDICARE

## 2023-01-01 ENCOUNTER — HOSPITAL ENCOUNTER (INPATIENT)
Facility: CLINIC | Age: 83
LOS: 1 days | Discharge: HOME HEALTH | DRG: 374 | End: 2023-08-30
Attending: FAMILY MEDICINE | Admitting: INTERNAL MEDICINE
Payer: MEDICARE

## 2023-01-01 ENCOUNTER — ANESTHESIA (OUTPATIENT)
Dept: GASTROENTEROLOGY | Facility: CLINIC | Age: 83
End: 2023-01-01
Payer: MEDICARE

## 2023-01-01 ENCOUNTER — APPOINTMENT (OUTPATIENT)
Dept: CT IMAGING | Facility: CLINIC | Age: 83
DRG: 374 | End: 2023-01-01
Attending: FAMILY MEDICINE
Payer: MEDICARE

## 2023-01-01 ENCOUNTER — HOSPITAL ENCOUNTER (EMERGENCY)
Facility: CLINIC | Age: 83
Discharge: HOME | End: 2023-07-16
Attending: PHYSICIAN ASSISTANT | Admitting: PHYSICIAN ASSISTANT
Payer: MEDICARE

## 2023-01-01 ENCOUNTER — TELEPHONE (OUTPATIENT)
Dept: RADIATION THERAPY | Facility: OUTPATIENT CENTER | Age: 83
End: 2023-01-01

## 2023-01-01 ENCOUNTER — HOSPITAL ENCOUNTER (EMERGENCY)
Facility: CLINIC | Age: 83
Discharge: HOME | End: 2023-08-09
Attending: EMERGENCY MEDICINE | Admitting: EMERGENCY MEDICINE
Payer: MEDICARE

## 2023-01-01 ENCOUNTER — OFFICE VISIT (OUTPATIENT)
Dept: RADIATION THERAPY | Facility: OUTPATIENT CENTER | Age: 83
End: 2023-01-01
Payer: COMMERCIAL

## 2023-01-01 ENCOUNTER — NURSE TRIAGE (OUTPATIENT)
Dept: NURSING | Facility: CLINIC | Age: 83
End: 2023-01-01
Payer: COMMERCIAL

## 2023-01-01 ENCOUNTER — HOSPITAL ENCOUNTER (OUTPATIENT)
Facility: CLINIC | Age: 83
Discharge: HOME | End: 2023-06-05
Admitting: SURGERY
Payer: MEDICARE

## 2023-01-01 ENCOUNTER — VIRTUAL VISIT (OUTPATIENT)
Dept: FAMILY MEDICINE | Facility: CLINIC | Age: 83
End: 2023-01-01
Payer: COMMERCIAL

## 2023-01-01 ENCOUNTER — ONCOLOGY VISIT (OUTPATIENT)
Dept: ONCOLOGY | Facility: CLINIC | Age: 83
End: 2023-01-01
Attending: NURSE PRACTITIONER
Payer: MEDICARE

## 2023-01-01 ENCOUNTER — TELEPHONE (OUTPATIENT)
Dept: RADIATION ONCOLOGY | Facility: CLINIC | Age: 83
End: 2023-01-01

## 2023-01-01 ENCOUNTER — PRE VISIT (OUTPATIENT)
Dept: SURGERY | Facility: CLINIC | Age: 83
End: 2023-01-01
Payer: COMMERCIAL

## 2023-01-01 ENCOUNTER — HOME INFUSION (PRE-WILLOW HOME INFUSION) (OUTPATIENT)
Dept: PHARMACY | Facility: CLINIC | Age: 83
End: 2023-01-01
Payer: COMMERCIAL

## 2023-01-01 ENCOUNTER — DOCUMENTATION ONLY (OUTPATIENT)
Dept: RADIATION THERAPY | Facility: OUTPATIENT CENTER | Age: 83
End: 2023-01-01

## 2023-01-01 ENCOUNTER — HOSPITAL ENCOUNTER (OUTPATIENT)
Facility: AMBULATORY SURGERY CENTER | Age: 83
Discharge: HOME | End: 2023-12-14
Attending: INTERNAL MEDICINE | Admitting: INTERNAL MEDICINE
Payer: COMMERCIAL

## 2023-01-01 ENCOUNTER — VIRTUAL VISIT (OUTPATIENT)
Dept: PULMONOLOGY | Facility: CLINIC | Age: 83
End: 2023-01-01
Attending: CLINICAL NURSE SPECIALIST
Payer: COMMERCIAL

## 2023-01-01 ENCOUNTER — ANESTHESIA EVENT (OUTPATIENT)
Dept: SURGERY | Facility: CLINIC | Age: 83
DRG: 357 | End: 2023-01-01
Payer: MEDICARE

## 2023-01-01 ENCOUNTER — ANESTHESIA (OUTPATIENT)
Dept: SURGERY | Facility: CLINIC | Age: 83
DRG: 357 | End: 2023-01-01
Payer: MEDICARE

## 2023-01-01 ENCOUNTER — APPOINTMENT (OUTPATIENT)
Dept: PHYSICAL THERAPY | Facility: CLINIC | Age: 83
DRG: 374 | End: 2023-01-01
Payer: MEDICARE

## 2023-01-01 ENCOUNTER — PATIENT OUTREACH (OUTPATIENT)
Dept: ONCOLOGY | Facility: CLINIC | Age: 83
End: 2023-01-01

## 2023-01-01 ENCOUNTER — ONCOLOGY VISIT (OUTPATIENT)
Dept: ONCOLOGY | Facility: CLINIC | Age: 83
End: 2023-01-01
Attending: INTERNAL MEDICINE
Payer: COMMERCIAL

## 2023-01-01 ENCOUNTER — HOSPITAL ENCOUNTER (OUTPATIENT)
Facility: CLINIC | Age: 83
Discharge: HOME | DRG: 357 | End: 2023-07-03
Attending: INTERNAL MEDICINE | Admitting: INTERNAL MEDICINE
Payer: MEDICARE

## 2023-01-01 ENCOUNTER — ONCOLOGY VISIT (OUTPATIENT)
Dept: SURGERY | Facility: CLINIC | Age: 83
End: 2023-01-01
Attending: SURGERY
Payer: COMMERCIAL

## 2023-01-01 ENCOUNTER — TELEPHONE (OUTPATIENT)
Dept: SURGERY | Facility: CLINIC | Age: 83
End: 2023-01-01
Payer: COMMERCIAL

## 2023-01-01 ENCOUNTER — TELEPHONE (OUTPATIENT)
Dept: SURGERY | Facility: CLINIC | Age: 83
End: 2023-01-01

## 2023-01-01 ENCOUNTER — PREP FOR PROCEDURE (OUTPATIENT)
Dept: GASTROENTEROLOGY | Facility: CLINIC | Age: 83
End: 2023-01-01
Payer: COMMERCIAL

## 2023-01-01 ENCOUNTER — DOCUMENTATION ONLY (OUTPATIENT)
Dept: OTHER | Facility: CLINIC | Age: 83
End: 2023-01-01
Payer: COMMERCIAL

## 2023-01-01 ENCOUNTER — NURSE TRIAGE (OUTPATIENT)
Dept: NURSING | Facility: CLINIC | Age: 83
End: 2023-01-01

## 2023-01-01 ENCOUNTER — PATIENT OUTREACH (OUTPATIENT)
Dept: CARE COORDINATION | Facility: CLINIC | Age: 83
End: 2023-01-01
Payer: COMMERCIAL

## 2023-01-01 ENCOUNTER — HOSPITAL ENCOUNTER (INPATIENT)
Facility: CLINIC | Age: 83
LOS: 5 days | Discharge: HOME | DRG: 357 | End: 2023-07-10
Attending: STUDENT IN AN ORGANIZED HEALTH CARE EDUCATION/TRAINING PROGRAM | Admitting: STUDENT IN AN ORGANIZED HEALTH CARE EDUCATION/TRAINING PROGRAM
Payer: MEDICARE

## 2023-01-01 ENCOUNTER — MEDICAL CORRESPONDENCE (OUTPATIENT)
Dept: RADIATION THERAPY | Facility: OUTPATIENT CENTER | Age: 83
End: 2023-01-01

## 2023-01-01 ENCOUNTER — OFFICE VISIT (OUTPATIENT)
Dept: FAMILY MEDICINE | Facility: CLINIC | Age: 83
End: 2023-01-01
Payer: COMMERCIAL

## 2023-01-01 ENCOUNTER — PREP FOR PROCEDURE (OUTPATIENT)
Dept: SURGERY | Facility: CLINIC | Age: 83
End: 2023-01-01
Payer: COMMERCIAL

## 2023-01-01 ENCOUNTER — ANESTHESIA EVENT (OUTPATIENT)
Dept: SURGERY | Facility: AMBULATORY SURGERY CENTER | Age: 83
End: 2023-01-01
Payer: MEDICARE

## 2023-01-01 ENCOUNTER — HOSPITAL ENCOUNTER (OUTPATIENT)
Facility: AMBULATORY SURGERY CENTER | Age: 83
Discharge: HOME | End: 2023-11-14
Attending: INTERNAL MEDICINE | Admitting: INTERNAL MEDICINE
Payer: COMMERCIAL

## 2023-01-01 ENCOUNTER — TELEPHONE (OUTPATIENT)
Dept: NUTRITION | Facility: CLINIC | Age: 83
End: 2023-01-01
Payer: COMMERCIAL

## 2023-01-01 ENCOUNTER — VIRTUAL VISIT (OUTPATIENT)
Dept: RADIATION THERAPY | Facility: OUTPATIENT CENTER | Age: 83
End: 2023-01-01
Payer: COMMERCIAL

## 2023-01-01 ENCOUNTER — ONCOLOGY VISIT (OUTPATIENT)
Dept: SURGERY | Facility: CLINIC | Age: 83
End: 2023-01-01
Attending: THORACIC SURGERY (CARDIOTHORACIC VASCULAR SURGERY)
Payer: COMMERCIAL

## 2023-01-01 ENCOUNTER — PATIENT OUTREACH (OUTPATIENT)
Dept: CARE COORDINATION | Facility: CLINIC | Age: 83
End: 2023-01-01

## 2023-01-01 ENCOUNTER — HOSPITAL ENCOUNTER (OUTPATIENT)
Dept: PET IMAGING | Facility: CLINIC | Age: 83
Setting detail: NUCLEAR MEDICINE
Discharge: HOME | End: 2023-06-14
Attending: FAMILY MEDICINE | Admitting: FAMILY MEDICINE
Payer: MEDICARE

## 2023-01-01 ENCOUNTER — NURSE TRIAGE (OUTPATIENT)
Dept: ONCOLOGY | Facility: CLINIC | Age: 83
End: 2023-01-01
Payer: COMMERCIAL

## 2023-01-01 ENCOUNTER — APPOINTMENT (OUTPATIENT)
Dept: GENERAL RADIOLOGY | Facility: CLINIC | Age: 83
DRG: 357 | End: 2023-01-01
Attending: STUDENT IN AN ORGANIZED HEALTH CARE EDUCATION/TRAINING PROGRAM
Payer: MEDICARE

## 2023-01-01 ENCOUNTER — ANCILLARY ORDERS (OUTPATIENT)
Dept: ONCOLOGY | Facility: CLINIC | Age: 83
End: 2023-01-01

## 2023-01-01 ENCOUNTER — HOSPITAL ENCOUNTER (OUTPATIENT)
Facility: CLINIC | Age: 83
Discharge: HOME | End: 2023-10-18
Attending: INTERNAL MEDICINE | Admitting: INTERNAL MEDICINE
Payer: MEDICARE

## 2023-01-01 ENCOUNTER — PRE VISIT (OUTPATIENT)
Dept: ONCOLOGY | Facility: CLINIC | Age: 83
End: 2023-01-01
Payer: COMMERCIAL

## 2023-01-01 ENCOUNTER — ANCILLARY PROCEDURE (OUTPATIENT)
Dept: INTERVENTIONAL RADIOLOGY/VASCULAR | Facility: CLINIC | Age: 83
End: 2023-01-01
Attending: CLINICAL NURSE SPECIALIST
Payer: COMMERCIAL

## 2023-01-01 ENCOUNTER — APPOINTMENT (OUTPATIENT)
Dept: GENERAL RADIOLOGY | Facility: CLINIC | Age: 83
End: 2023-01-01
Attending: PHYSICIAN ASSISTANT
Payer: MEDICARE

## 2023-01-01 ENCOUNTER — ANESTHESIA EVENT (OUTPATIENT)
Dept: GASTROENTEROLOGY | Facility: CLINIC | Age: 83
End: 2023-01-01
Payer: MEDICARE

## 2023-01-01 VITALS
SYSTOLIC BLOOD PRESSURE: 148 MMHG | HEART RATE: 77 BPM | BODY MASS INDEX: 30.8 KG/M2 | RESPIRATION RATE: 18 BRPM | OXYGEN SATURATION: 97 % | TEMPERATURE: 97.6 F | DIASTOLIC BLOOD PRESSURE: 71 MMHG | WEIGHT: 163 LBS

## 2023-01-01 VITALS — DIASTOLIC BLOOD PRESSURE: 75 MMHG | SYSTOLIC BLOOD PRESSURE: 149 MMHG | HEART RATE: 78 BPM

## 2023-01-01 VITALS
DIASTOLIC BLOOD PRESSURE: 67 MMHG | HEIGHT: 61 IN | SYSTOLIC BLOOD PRESSURE: 168 MMHG | HEART RATE: 89 BPM | RESPIRATION RATE: 18 BRPM | TEMPERATURE: 98.3 F | WEIGHT: 163.6 LBS | OXYGEN SATURATION: 96 % | BODY MASS INDEX: 30.89 KG/M2

## 2023-01-01 VITALS
SYSTOLIC BLOOD PRESSURE: 166 MMHG | HEART RATE: 72 BPM | OXYGEN SATURATION: 97 % | TEMPERATURE: 97.1 F | RESPIRATION RATE: 18 BRPM | DIASTOLIC BLOOD PRESSURE: 77 MMHG

## 2023-01-01 VITALS
SYSTOLIC BLOOD PRESSURE: 102 MMHG | DIASTOLIC BLOOD PRESSURE: 58 MMHG | BODY MASS INDEX: 30.11 KG/M2 | RESPIRATION RATE: 18 BRPM | OXYGEN SATURATION: 96 % | HEART RATE: 76 BPM | WEIGHT: 162 LBS

## 2023-01-01 VITALS — OXYGEN SATURATION: 99 % | TEMPERATURE: 97.3 F | WEIGHT: 167 LBS | BODY MASS INDEX: 31.55 KG/M2

## 2023-01-01 VITALS
BODY MASS INDEX: 32.39 KG/M2 | HEIGHT: 60 IN | HEART RATE: 86 BPM | RESPIRATION RATE: 16 BRPM | OXYGEN SATURATION: 97 % | TEMPERATURE: 97 F | SYSTOLIC BLOOD PRESSURE: 150 MMHG | DIASTOLIC BLOOD PRESSURE: 84 MMHG | WEIGHT: 165 LBS

## 2023-01-01 VITALS
HEIGHT: 61 IN | DIASTOLIC BLOOD PRESSURE: 69 MMHG | TEMPERATURE: 98.1 F | HEART RATE: 68 BPM | WEIGHT: 163 LBS | BODY MASS INDEX: 30.78 KG/M2 | OXYGEN SATURATION: 97 % | SYSTOLIC BLOOD PRESSURE: 128 MMHG

## 2023-01-01 VITALS
OXYGEN SATURATION: 97 % | DIASTOLIC BLOOD PRESSURE: 59 MMHG | WEIGHT: 162 LBS | SYSTOLIC BLOOD PRESSURE: 110 MMHG | HEART RATE: 76 BPM | BODY MASS INDEX: 30.11 KG/M2 | RESPIRATION RATE: 18 BRPM

## 2023-01-01 VITALS
RESPIRATION RATE: 16 BRPM | TEMPERATURE: 97.9 F | HEIGHT: 61 IN | BODY MASS INDEX: 32.81 KG/M2 | SYSTOLIC BLOOD PRESSURE: 132 MMHG | OXYGEN SATURATION: 94 % | DIASTOLIC BLOOD PRESSURE: 60 MMHG | WEIGHT: 173.8 LBS | HEART RATE: 64 BPM

## 2023-01-01 VITALS
TEMPERATURE: 97 F | HEART RATE: 86 BPM | DIASTOLIC BLOOD PRESSURE: 75 MMHG | BODY MASS INDEX: 31.44 KG/M2 | WEIGHT: 166.4 LBS | SYSTOLIC BLOOD PRESSURE: 153 MMHG | RESPIRATION RATE: 18 BRPM

## 2023-01-01 VITALS
SYSTOLIC BLOOD PRESSURE: 151 MMHG | HEART RATE: 69 BPM | WEIGHT: 165 LBS | DIASTOLIC BLOOD PRESSURE: 72 MMHG | BODY MASS INDEX: 31.18 KG/M2

## 2023-01-01 VITALS
TEMPERATURE: 98.4 F | HEIGHT: 61 IN | OXYGEN SATURATION: 94 % | DIASTOLIC BLOOD PRESSURE: 64 MMHG | WEIGHT: 170 LBS | HEART RATE: 60 BPM | RESPIRATION RATE: 12 BRPM | SYSTOLIC BLOOD PRESSURE: 132 MMHG | BODY MASS INDEX: 32.1 KG/M2

## 2023-01-01 VITALS
RESPIRATION RATE: 16 BRPM | BODY MASS INDEX: 32.16 KG/M2 | SYSTOLIC BLOOD PRESSURE: 134 MMHG | OXYGEN SATURATION: 95 % | DIASTOLIC BLOOD PRESSURE: 64 MMHG | HEART RATE: 53 BPM | WEIGHT: 171.2 LBS

## 2023-01-01 VITALS
TEMPERATURE: 98 F | HEART RATE: 80 BPM | DIASTOLIC BLOOD PRESSURE: 56 MMHG | RESPIRATION RATE: 16 BRPM | BODY MASS INDEX: 30 KG/M2 | SYSTOLIC BLOOD PRESSURE: 129 MMHG | WEIGHT: 163 LBS | HEIGHT: 62 IN | OXYGEN SATURATION: 97 %

## 2023-01-01 VITALS
RESPIRATION RATE: 14 BRPM | TEMPERATURE: 97.7 F | WEIGHT: 164 LBS | SYSTOLIC BLOOD PRESSURE: 142 MMHG | HEIGHT: 61 IN | HEART RATE: 81 BPM | BODY MASS INDEX: 30.96 KG/M2 | DIASTOLIC BLOOD PRESSURE: 74 MMHG | OXYGEN SATURATION: 98 %

## 2023-01-01 VITALS
WEIGHT: 164 LBS | BODY MASS INDEX: 30.96 KG/M2 | OXYGEN SATURATION: 95 % | TEMPERATURE: 97.3 F | SYSTOLIC BLOOD PRESSURE: 134 MMHG | HEIGHT: 61 IN | HEART RATE: 72 BPM | DIASTOLIC BLOOD PRESSURE: 70 MMHG | RESPIRATION RATE: 18 BRPM

## 2023-01-01 VITALS
WEIGHT: 176.8 LBS | BODY MASS INDEX: 32.54 KG/M2 | SYSTOLIC BLOOD PRESSURE: 134 MMHG | TEMPERATURE: 97.5 F | OXYGEN SATURATION: 97 % | HEIGHT: 62 IN | DIASTOLIC BLOOD PRESSURE: 72 MMHG | RESPIRATION RATE: 20 BRPM | HEART RATE: 52 BPM

## 2023-01-01 VITALS — DIASTOLIC BLOOD PRESSURE: 58 MMHG | HEART RATE: 60 BPM | SYSTOLIC BLOOD PRESSURE: 128 MMHG

## 2023-01-01 VITALS — HEART RATE: 85 BPM

## 2023-01-01 VITALS
HEIGHT: 61 IN | HEART RATE: 94 BPM | TEMPERATURE: 97 F | OXYGEN SATURATION: 95 % | DIASTOLIC BLOOD PRESSURE: 60 MMHG | SYSTOLIC BLOOD PRESSURE: 116 MMHG | BODY MASS INDEX: 29.64 KG/M2 | WEIGHT: 157 LBS | RESPIRATION RATE: 20 BRPM

## 2023-01-01 VITALS
DIASTOLIC BLOOD PRESSURE: 71 MMHG | RESPIRATION RATE: 16 BRPM | HEART RATE: 65 BPM | BODY MASS INDEX: 32.01 KG/M2 | TEMPERATURE: 98.4 F | SYSTOLIC BLOOD PRESSURE: 161 MMHG | HEIGHT: 61 IN | WEIGHT: 169.53 LBS | OXYGEN SATURATION: 96 %

## 2023-01-01 VITALS
SYSTOLIC BLOOD PRESSURE: 166 MMHG | HEART RATE: 93 BPM | RESPIRATION RATE: 18 BRPM | OXYGEN SATURATION: 96 % | WEIGHT: 160.2 LBS | BODY MASS INDEX: 30.27 KG/M2 | DIASTOLIC BLOOD PRESSURE: 75 MMHG

## 2023-01-01 VITALS
RESPIRATION RATE: 16 BRPM | WEIGHT: 167 LBS | SYSTOLIC BLOOD PRESSURE: 145 MMHG | TEMPERATURE: 96.7 F | DIASTOLIC BLOOD PRESSURE: 70 MMHG | BODY MASS INDEX: 31.53 KG/M2 | HEART RATE: 68 BPM | HEIGHT: 61 IN | OXYGEN SATURATION: 95 %

## 2023-01-01 VITALS
DIASTOLIC BLOOD PRESSURE: 87 MMHG | WEIGHT: 183 LBS | HEART RATE: 71 BPM | SYSTOLIC BLOOD PRESSURE: 157 MMHG | TEMPERATURE: 98.3 F | OXYGEN SATURATION: 95 % | BODY MASS INDEX: 33.68 KG/M2 | RESPIRATION RATE: 16 BRPM | HEIGHT: 62 IN

## 2023-01-01 VITALS
BODY MASS INDEX: 30.11 KG/M2 | SYSTOLIC BLOOD PRESSURE: 157 MMHG | RESPIRATION RATE: 18 BRPM | TEMPERATURE: 98.6 F | DIASTOLIC BLOOD PRESSURE: 74 MMHG | WEIGHT: 162 LBS | HEART RATE: 96 BPM

## 2023-01-01 VITALS
DIASTOLIC BLOOD PRESSURE: 66 MMHG | SYSTOLIC BLOOD PRESSURE: 114 MMHG | WEIGHT: 166 LBS | BODY MASS INDEX: 31.37 KG/M2 | HEART RATE: 68 BPM

## 2023-01-01 VITALS
RESPIRATION RATE: 16 BRPM | OXYGEN SATURATION: 97 % | SYSTOLIC BLOOD PRESSURE: 126 MMHG | HEART RATE: 64 BPM | DIASTOLIC BLOOD PRESSURE: 66 MMHG

## 2023-01-01 VITALS
OXYGEN SATURATION: 96 % | SYSTOLIC BLOOD PRESSURE: 141 MMHG | BODY MASS INDEX: 31.91 KG/M2 | HEART RATE: 86 BPM | DIASTOLIC BLOOD PRESSURE: 69 MMHG | HEIGHT: 61 IN | WEIGHT: 169 LBS | TEMPERATURE: 98.3 F | RESPIRATION RATE: 12 BRPM

## 2023-01-01 VITALS
DIASTOLIC BLOOD PRESSURE: 63 MMHG | HEART RATE: 61 BPM | RESPIRATION RATE: 16 BRPM | SYSTOLIC BLOOD PRESSURE: 160 MMHG | BODY MASS INDEX: 32.94 KG/M2 | HEIGHT: 61 IN | OXYGEN SATURATION: 98 % | TEMPERATURE: 98.2 F | WEIGHT: 174.5 LBS

## 2023-01-01 VITALS
OXYGEN SATURATION: 97 % | RESPIRATION RATE: 16 BRPM | HEART RATE: 91 BPM | TEMPERATURE: 98.5 F | WEIGHT: 159.3 LBS | BODY MASS INDEX: 30.1 KG/M2 | SYSTOLIC BLOOD PRESSURE: 159 MMHG | DIASTOLIC BLOOD PRESSURE: 81 MMHG

## 2023-01-01 VITALS
SYSTOLIC BLOOD PRESSURE: 155 MMHG | BODY MASS INDEX: 31.21 KG/M2 | HEART RATE: 84 BPM | WEIGHT: 165.2 LBS | DIASTOLIC BLOOD PRESSURE: 77 MMHG | OXYGEN SATURATION: 97 %

## 2023-01-01 VITALS
OXYGEN SATURATION: 97 % | HEART RATE: 88 BPM | BODY MASS INDEX: 30.76 KG/M2 | DIASTOLIC BLOOD PRESSURE: 73 MMHG | RESPIRATION RATE: 16 BRPM | WEIGHT: 162.8 LBS | SYSTOLIC BLOOD PRESSURE: 159 MMHG

## 2023-01-01 VITALS
TEMPERATURE: 98.3 F | SYSTOLIC BLOOD PRESSURE: 162 MMHG | BODY MASS INDEX: 31.37 KG/M2 | WEIGHT: 166 LBS | HEART RATE: 62 BPM | DIASTOLIC BLOOD PRESSURE: 79 MMHG | RESPIRATION RATE: 81 BRPM

## 2023-01-01 VITALS
SYSTOLIC BLOOD PRESSURE: 143 MMHG | BODY MASS INDEX: 30.99 KG/M2 | RESPIRATION RATE: 18 BRPM | OXYGEN SATURATION: 97 % | WEIGHT: 164 LBS | DIASTOLIC BLOOD PRESSURE: 67 MMHG | HEART RATE: 76 BPM

## 2023-01-01 VITALS
DIASTOLIC BLOOD PRESSURE: 53 MMHG | OXYGEN SATURATION: 96 % | HEART RATE: 84 BPM | HEIGHT: 61 IN | SYSTOLIC BLOOD PRESSURE: 121 MMHG | TEMPERATURE: 97.9 F | BODY MASS INDEX: 29.64 KG/M2 | WEIGHT: 157 LBS | RESPIRATION RATE: 12 BRPM

## 2023-01-01 VITALS — SYSTOLIC BLOOD PRESSURE: 130 MMHG | DIASTOLIC BLOOD PRESSURE: 69 MMHG | HEART RATE: 89 BPM

## 2023-01-01 VITALS — HEART RATE: 78 BPM

## 2023-01-01 DIAGNOSIS — K29.70 GASTRITIS, HELICOBACTER PYLORI: Primary | ICD-10-CM

## 2023-01-01 DIAGNOSIS — C15.5 MALIGNANT NEOPLASM OF LOWER THIRD OF ESOPHAGUS (H): Primary | ICD-10-CM

## 2023-01-01 DIAGNOSIS — K22.2 RADIATION-INDUCED ESOPHAGEAL STRICTURE: Primary | ICD-10-CM

## 2023-01-01 DIAGNOSIS — C15.5 MALIGNANT NEOPLASM OF LOWER THIRD OF ESOPHAGUS (H): ICD-10-CM

## 2023-01-01 DIAGNOSIS — C15.9 ESOPHAGEAL ADENOCARCINOMA (H): Primary | ICD-10-CM

## 2023-01-01 DIAGNOSIS — C15.9 MALIGNANT NEOPLASM OF ESOPHAGUS, UNSPECIFIED LOCATION (H): Primary | ICD-10-CM

## 2023-01-01 DIAGNOSIS — Z93.4 JEJUNOSTOMY TUBE IN SITU (H): ICD-10-CM

## 2023-01-01 DIAGNOSIS — I10 BENIGN ESSENTIAL HYPERTENSION: ICD-10-CM

## 2023-01-01 DIAGNOSIS — C15.9 ESOPHAGEAL CANCER (H): Primary | ICD-10-CM

## 2023-01-01 DIAGNOSIS — C15.8 MALIGNANT NEOPLASM OF OVERLAPPING SITES OF ESOPHAGUS (H): ICD-10-CM

## 2023-01-01 DIAGNOSIS — R13.14 PHARYNGOESOPHAGEAL DYSPHAGIA: ICD-10-CM

## 2023-01-01 DIAGNOSIS — Z93.4 JEJUNOSTOMY TUBE IN SITU (H): Primary | ICD-10-CM

## 2023-01-01 DIAGNOSIS — C15.9 MALIGNANT NEOPLASM OF ESOPHAGUS, UNSPECIFIED LOCATION (H): ICD-10-CM

## 2023-01-01 DIAGNOSIS — K92.2 UPPER GI BLEED: ICD-10-CM

## 2023-01-01 DIAGNOSIS — K94.12: ICD-10-CM

## 2023-01-01 DIAGNOSIS — C15.9 PRIMARY ESOPHAGEAL ADENOCARCINOMA (H): Primary | ICD-10-CM

## 2023-01-01 DIAGNOSIS — C15.9 ESOPHAGEAL ADENOCARCINOMA (H): ICD-10-CM

## 2023-01-01 DIAGNOSIS — R11.0 NAUSEA: Primary | ICD-10-CM

## 2023-01-01 DIAGNOSIS — Z87.19 HISTORY OF UPPER GASTROINTESTINAL BLEEDING: ICD-10-CM

## 2023-01-01 DIAGNOSIS — R05.1 ACUTE COUGH: ICD-10-CM

## 2023-01-01 DIAGNOSIS — Z92.89 HISTORY OF RECENT HOSPITALIZATION: Primary | ICD-10-CM

## 2023-01-01 DIAGNOSIS — R13.14 PHARYNGOESOPHAGEAL DYSPHAGIA: Primary | ICD-10-CM

## 2023-01-01 DIAGNOSIS — U07.1 COVID-19 VIRUS INFECTION: Primary | ICD-10-CM

## 2023-01-01 DIAGNOSIS — I65.21 RIGHT CAROTID ARTERY OCCLUSION: ICD-10-CM

## 2023-01-01 DIAGNOSIS — Z01.30 BLOOD PRESSURE CHECK: Primary | ICD-10-CM

## 2023-01-01 DIAGNOSIS — U07.1 INFECTION DUE TO 2019 NOVEL CORONAVIRUS: ICD-10-CM

## 2023-01-01 DIAGNOSIS — Z53.9 DIAGNOSIS NOT YET DEFINED: Primary | ICD-10-CM

## 2023-01-01 DIAGNOSIS — R19.5 LOOSE STOOLS: ICD-10-CM

## 2023-01-01 DIAGNOSIS — B96.81 GASTRITIS, HELICOBACTER PYLORI: Primary | ICD-10-CM

## 2023-01-01 DIAGNOSIS — R11.2 NAUSEA AND VOMITING, UNSPECIFIED VOMITING TYPE: Primary | ICD-10-CM

## 2023-01-01 DIAGNOSIS — D64.9 ANEMIA, UNSPECIFIED TYPE: ICD-10-CM

## 2023-01-01 DIAGNOSIS — G89.3 CANCER RELATED PAIN: Primary | ICD-10-CM

## 2023-01-01 LAB
ABO/RH(D): NORMAL
ALBUMIN SERPL BCG-MCNC: 3.2 G/DL (ref 3.5–5.2)
ALBUMIN SERPL BCG-MCNC: 3.5 G/DL (ref 3.5–5.2)
ALBUMIN SERPL BCG-MCNC: 3.7 G/DL (ref 3.5–5.2)
ALBUMIN SERPL BCG-MCNC: 4.2 G/DL (ref 3.5–5.2)
ALBUMIN UR-MCNC: 30 MG/DL
ALP SERPL-CCNC: 113 U/L (ref 40–129)
ALP SERPL-CCNC: 82 U/L (ref 40–129)
ALP SERPL-CCNC: 91 U/L (ref 40–129)
ALP SERPL-CCNC: 98 U/L (ref 40–129)
ALT SERPL W P-5'-P-CCNC: 24 U/L (ref 10–50)
ALT SERPL W P-5'-P-CCNC: 25 U/L (ref 0–70)
ALT SERPL W P-5'-P-CCNC: 27 U/L (ref 0–70)
ALT SERPL W P-5'-P-CCNC: 47 U/L (ref 0–70)
ANION GAP SERPL CALCULATED.3IONS-SCNC: 10 MMOL/L (ref 7–15)
ANION GAP SERPL CALCULATED.3IONS-SCNC: 11 MMOL/L (ref 7–15)
ANION GAP SERPL CALCULATED.3IONS-SCNC: 11 MMOL/L (ref 7–15)
ANION GAP SERPL CALCULATED.3IONS-SCNC: 19 MMOL/L (ref 7–15)
ANION GAP SERPL CALCULATED.3IONS-SCNC: 6 MMOL/L (ref 7–15)
ANION GAP SERPL CALCULATED.3IONS-SCNC: 8 MMOL/L (ref 7–15)
ANION GAP SERPL CALCULATED.3IONS-SCNC: 9 MMOL/L (ref 7–15)
ANTIBODY SCREEN: NEGATIVE
APPEARANCE UR: CLEAR
AST SERPL W P-5'-P-CCNC: 19 U/L (ref 0–45)
AST SERPL W P-5'-P-CCNC: 22 U/L (ref 0–45)
AST SERPL W P-5'-P-CCNC: 28 U/L (ref 10–50)
AST SERPL W P-5'-P-CCNC: 35 U/L (ref 0–45)
BASOPHILS # BLD AUTO: 0 10E3/UL (ref 0–0.2)
BASOPHILS # BLD AUTO: 0.1 10E3/UL (ref 0–0.2)
BASOPHILS # BLD AUTO: 0.1 10E3/UL (ref 0–0.2)
BASOPHILS # BLD MANUAL: 0 10E3/UL (ref 0–0.2)
BASOPHILS NFR BLD AUTO: 0 %
BASOPHILS NFR BLD AUTO: 1 %
BASOPHILS NFR BLD MANUAL: 0 %
BILIRUB SERPL-MCNC: 0.2 MG/DL
BILIRUB SERPL-MCNC: 0.3 MG/DL
BILIRUB SERPL-MCNC: 0.3 MG/DL
BILIRUB SERPL-MCNC: 0.4 MG/DL
BILIRUB UR QL STRIP: NEGATIVE
BLD PROD TYP BPU: NORMAL
BLD PROD TYP BPU: NORMAL
BLOOD COMPONENT TYPE: NORMAL
BLOOD COMPONENT TYPE: NORMAL
BUN SERPL-MCNC: 10.4 MG/DL (ref 8–23)
BUN SERPL-MCNC: 14.4 MG/DL (ref 8–23)
BUN SERPL-MCNC: 16.6 MG/DL (ref 8–23)
BUN SERPL-MCNC: 16.6 MG/DL (ref 8–23)
BUN SERPL-MCNC: 17.5 MG/DL (ref 8–23)
BUN SERPL-MCNC: 18.6 MG/DL (ref 8–23)
BUN SERPL-MCNC: 7.6 MG/DL (ref 8–23)
CALCIUM SERPL-MCNC: 8.3 MG/DL (ref 8.8–10.2)
CALCIUM SERPL-MCNC: 8.4 MG/DL (ref 8.8–10.2)
CALCIUM SERPL-MCNC: 8.5 MG/DL (ref 8.8–10.2)
CALCIUM SERPL-MCNC: 8.9 MG/DL (ref 8.8–10.2)
CALCIUM SERPL-MCNC: 9 MG/DL (ref 8.8–10.2)
CALCIUM SERPL-MCNC: 9.7 MG/DL (ref 8.8–10.2)
CALCIUM SERPL-MCNC: 9.7 MG/DL (ref 8.8–10.2)
CHLORIDE SERPL-SCNC: 101 MMOL/L (ref 98–107)
CHLORIDE SERPL-SCNC: 102 MMOL/L (ref 98–107)
CHLORIDE SERPL-SCNC: 102 MMOL/L (ref 98–107)
CHLORIDE SERPL-SCNC: 104 MMOL/L (ref 98–107)
CHLORIDE SERPL-SCNC: 105 MMOL/L (ref 98–107)
CHLORIDE SERPL-SCNC: 97 MMOL/L (ref 98–107)
CHLORIDE SERPL-SCNC: 99 MMOL/L (ref 98–107)
CODING SYSTEM: NORMAL
CODING SYSTEM: NORMAL
COLOR UR AUTO: YELLOW
CREAT SERPL-MCNC: 0.71 MG/DL (ref 0.67–1.17)
CREAT SERPL-MCNC: 0.83 MG/DL (ref 0.67–1.17)
CREAT SERPL-MCNC: 0.83 MG/DL (ref 0.67–1.17)
CREAT SERPL-MCNC: 0.84 MG/DL (ref 0.67–1.17)
CREAT SERPL-MCNC: 0.87 MG/DL (ref 0.67–1.17)
CREAT SERPL-MCNC: 0.89 MG/DL (ref 0.67–1.17)
CREAT SERPL-MCNC: 0.96 MG/DL (ref 0.67–1.17)
CREAT SERPL-MCNC: 0.97 MG/DL (ref 0.67–1.17)
CREAT SERPL-MCNC: 1 MG/DL (ref 0.67–1.17)
CREAT SERPL-MCNC: 1.02 MG/DL (ref 0.67–1.17)
CREAT SERPL-MCNC: 1.17 MG/DL (ref 0.67–1.17)
CREAT SERPL-MCNC: 1.21 MG/DL (ref 0.67–1.17)
CREAT SERPL-MCNC: 1.31 MG/DL (ref 0.67–1.17)
CROSSMATCH: NORMAL
CROSSMATCH: NORMAL
DEPRECATED HCO3 PLAS-SCNC: 16 MMOL/L (ref 22–29)
DEPRECATED HCO3 PLAS-SCNC: 24 MMOL/L (ref 22–29)
DEPRECATED HCO3 PLAS-SCNC: 24 MMOL/L (ref 22–29)
DEPRECATED HCO3 PLAS-SCNC: 25 MMOL/L (ref 22–29)
DEPRECATED HCO3 PLAS-SCNC: 25 MMOL/L (ref 22–29)
DEPRECATED HCO3 PLAS-SCNC: 26 MMOL/L (ref 22–29)
DEPRECATED HCO3 PLAS-SCNC: 27 MMOL/L (ref 22–29)
DLCOCOR-%PRED-PRE: 104 %
DLCOCOR-PRE: 19.24 ML/MIN/MMHG
DLCOUNC-%PRED-PRE: 102 %
DLCOUNC-PRE: 18.9 ML/MIN/MMHG
DLCOUNC-PRED: 18.4 ML/MIN/MMHG
EGFRCR SERPLBLD CKD-EPI 2021: 75 ML/MIN/1.73M2
EOSINOPHIL # BLD AUTO: 0 10E3/UL (ref 0–0.7)
EOSINOPHIL # BLD AUTO: 0.1 10E3/UL (ref 0–0.7)
EOSINOPHIL # BLD AUTO: 0.2 10E3/UL (ref 0–0.7)
EOSINOPHIL # BLD AUTO: 0.2 10E3/UL (ref 0–0.7)
EOSINOPHIL # BLD AUTO: 0.3 10E3/UL (ref 0–0.7)
EOSINOPHIL # BLD AUTO: 0.4 10E3/UL (ref 0–0.7)
EOSINOPHIL # BLD MANUAL: 0 10E3/UL (ref 0–0.7)
EOSINOPHIL NFR BLD AUTO: 0 %
EOSINOPHIL NFR BLD AUTO: 2 %
EOSINOPHIL NFR BLD AUTO: 3 %
EOSINOPHIL NFR BLD AUTO: 4 %
EOSINOPHIL NFR BLD AUTO: 7 %
EOSINOPHIL NFR BLD AUTO: 8 %
EOSINOPHIL NFR BLD MANUAL: 0 %
ERV-%PRED-PRE: 48 %
ERV-PRE: 0.33 L
ERV-PRED: 0.68 L
ERYTHROCYTE [DISTWIDTH] IN BLOOD BY AUTOMATED COUNT: 12.1 % (ref 10–15)
ERYTHROCYTE [DISTWIDTH] IN BLOOD BY AUTOMATED COUNT: 12.6 % (ref 10–15)
ERYTHROCYTE [DISTWIDTH] IN BLOOD BY AUTOMATED COUNT: 12.6 % (ref 10–15)
ERYTHROCYTE [DISTWIDTH] IN BLOOD BY AUTOMATED COUNT: 13 % (ref 10–15)
ERYTHROCYTE [DISTWIDTH] IN BLOOD BY AUTOMATED COUNT: 13 % (ref 10–15)
ERYTHROCYTE [DISTWIDTH] IN BLOOD BY AUTOMATED COUNT: 13.2 % (ref 10–15)
ERYTHROCYTE [DISTWIDTH] IN BLOOD BY AUTOMATED COUNT: 13.7 % (ref 10–15)
ERYTHROCYTE [DISTWIDTH] IN BLOOD BY AUTOMATED COUNT: 14.2 % (ref 10–15)
ERYTHROCYTE [DISTWIDTH] IN BLOOD BY AUTOMATED COUNT: 15.5 % (ref 10–15)
ERYTHROCYTE [DISTWIDTH] IN BLOOD BY AUTOMATED COUNT: 15.9 % (ref 10–15)
ERYTHROCYTE [DISTWIDTH] IN BLOOD BY AUTOMATED COUNT: 16.8 % (ref 10–15)
ERYTHROCYTE [DISTWIDTH] IN BLOOD BY AUTOMATED COUNT: 16.9 % (ref 10–15)
ERYTHROCYTE [DISTWIDTH] IN BLOOD BY AUTOMATED COUNT: 17.2 % (ref 10–15)
EXPTIME-PRE: 7.29 SEC
FEF2575-%PRED-PRE: 153 %
FEF2575-PRE: 2.28 L/SEC
FEF2575-PRED: 1.48 L/SEC
FEFMAX-%PRED-PRE: 124 %
FEFMAX-PRE: 6.41 L/SEC
FEFMAX-PRED: 5.13 L/SEC
FEV1-%PRED-PRE: 120 %
FEV1-PRE: 2.34 L
FEV1FEV6-PRE: 80 %
FEV1FEV6-PRED: 76 %
FEV1FVC-PRE: 80 %
FEV1FVC-PRED: 77 %
FEV1SVC-PRE: 73 %
FEV1SVC-PRED: 71 %
FIFMAX-PRE: 2.94 L/SEC
FLUAV RNA SPEC QL NAA+PROBE: NEGATIVE
FLUBV RNA RESP QL NAA+PROBE: NEGATIVE
FRCPLETH-%PRED-PRE: 85 %
FRCPLETH-PRE: 2.82 L
FRCPLETH-PRED: 3.29 L
FVC-%PRED-PRE: 114 %
FVC-PRE: 2.92 L
FVC-PRED: 2.55 L
GFR SERPL CREATININE-BSD FRML MDRD: 54 ML/MIN/1.73M2
GFR SERPL CREATININE-BSD FRML MDRD: 60 ML/MIN/1.73M2
GFR SERPL CREATININE-BSD FRML MDRD: 62 ML/MIN/1.73M2
GFR SERPL CREATININE-BSD FRML MDRD: 73 ML/MIN/1.73M2
GFR SERPL CREATININE-BSD FRML MDRD: 78 ML/MIN/1.73M2
GFR SERPL CREATININE-BSD FRML MDRD: 79 ML/MIN/1.73M2
GFR SERPL CREATININE-BSD FRML MDRD: 86 ML/MIN/1.73M2
GFR SERPL CREATININE-BSD FRML MDRD: 86 ML/MIN/1.73M2
GFR SERPL CREATININE-BSD FRML MDRD: 87 ML/MIN/1.73M2
GFR SERPL CREATININE-BSD FRML MDRD: >90 ML/MIN/1.73M2
GLUCOSE BLDC GLUCOMTR-MCNC: 100 MG/DL (ref 70–99)
GLUCOSE BLDC GLUCOMTR-MCNC: 101 MG/DL (ref 70–99)
GLUCOSE BLDC GLUCOMTR-MCNC: 75 MG/DL (ref 70–99)
GLUCOSE BLDC GLUCOMTR-MCNC: 81 MG/DL (ref 70–99)
GLUCOSE SERPL-MCNC: 116 MG/DL (ref 70–99)
GLUCOSE SERPL-MCNC: 125 MG/DL (ref 70–99)
GLUCOSE SERPL-MCNC: 127 MG/DL (ref 70–99)
GLUCOSE SERPL-MCNC: 170 MG/DL (ref 70–99)
GLUCOSE SERPL-MCNC: 180 MG/DL (ref 70–99)
GLUCOSE SERPL-MCNC: 84 MG/DL (ref 70–99)
GLUCOSE SERPL-MCNC: 89 MG/DL (ref 70–99)
GLUCOSE UR STRIP-MCNC: 150 MG/DL
HCT VFR BLD AUTO: 22.7 % (ref 40–53)
HCT VFR BLD AUTO: 29.4 % (ref 40–53)
HCT VFR BLD AUTO: 30.2 % (ref 40–53)
HCT VFR BLD AUTO: 32.9 % (ref 40–53)
HCT VFR BLD AUTO: 35 % (ref 40–53)
HCT VFR BLD AUTO: 37.3 % (ref 40–53)
HCT VFR BLD AUTO: 37.6 % (ref 40–53)
HCT VFR BLD AUTO: 37.7 % (ref 40–53)
HCT VFR BLD AUTO: 39.2 % (ref 40–53)
HCT VFR BLD AUTO: 40.4 % (ref 40–53)
HCT VFR BLD AUTO: 40.7 % (ref 40–53)
HCT VFR BLD AUTO: 41.4 % (ref 40–53)
HCT VFR BLD AUTO: 41.7 % (ref 40–53)
HEMOCCULT STL QL: POSITIVE
HGB BLD-MCNC: 10.3 G/DL (ref 13.3–17.7)
HGB BLD-MCNC: 10.5 G/DL (ref 13.3–17.7)
HGB BLD-MCNC: 11.2 G/DL (ref 13.3–17.7)
HGB BLD-MCNC: 11.6 G/DL (ref 13.3–17.7)
HGB BLD-MCNC: 12.1 G/DL (ref 13.3–17.7)
HGB BLD-MCNC: 12.6 G/DL (ref 13.3–17.7)
HGB BLD-MCNC: 12.8 G/DL (ref 13.3–17.7)
HGB BLD-MCNC: 13 G/DL (ref 13.3–17.7)
HGB BLD-MCNC: 13.2 G/DL (ref 13.3–17.7)
HGB BLD-MCNC: 14 G/DL (ref 13.3–17.7)
HGB BLD-MCNC: 7.7 G/DL (ref 13.3–17.7)
HGB BLD-MCNC: 9.9 G/DL (ref 13.3–17.7)
HGB UR QL STRIP: NEGATIVE
HOLD SPECIMEN: NORMAL
HYALINE CASTS: 3 /LPF
IC-%PRED-PRE: 139 %
IC-PRE: 2.88 L
IC-PRED: 2.06 L
IMM GRANULOCYTES # BLD: 0 10E3/UL
IMM GRANULOCYTES # BLD: 0.1 10E3/UL
IMM GRANULOCYTES NFR BLD: 0 %
IMM GRANULOCYTES NFR BLD: 1 %
ISSUE DATE AND TIME: NORMAL
ISSUE DATE AND TIME: NORMAL
KETONES UR STRIP-MCNC: 5 MG/DL
LEUKOCYTE ESTERASE UR QL STRIP: NEGATIVE
LYMPHOCYTES # BLD AUTO: 0.1 10E3/UL (ref 0.8–5.3)
LYMPHOCYTES # BLD AUTO: 0.2 10E3/UL (ref 0.8–5.3)
LYMPHOCYTES # BLD AUTO: 0.2 10E3/UL (ref 0.8–5.3)
LYMPHOCYTES # BLD AUTO: 0.4 10E3/UL (ref 0.8–5.3)
LYMPHOCYTES # BLD AUTO: 0.7 10E3/UL (ref 0.8–5.3)
LYMPHOCYTES # BLD AUTO: 0.8 10E3/UL (ref 0.8–5.3)
LYMPHOCYTES # BLD AUTO: 1.4 10E3/UL (ref 0.8–5.3)
LYMPHOCYTES # BLD AUTO: 1.7 10E3/UL (ref 0.8–5.3)
LYMPHOCYTES # BLD MANUAL: 0.3 10E3/UL (ref 0.8–5.3)
LYMPHOCYTES NFR BLD AUTO: 13 %
LYMPHOCYTES NFR BLD AUTO: 16 %
LYMPHOCYTES NFR BLD AUTO: 20 %
LYMPHOCYTES NFR BLD AUTO: 25 %
LYMPHOCYTES NFR BLD AUTO: 4 %
LYMPHOCYTES NFR BLD AUTO: 5 %
LYMPHOCYTES NFR BLD AUTO: 6 %
LYMPHOCYTES NFR BLD AUTO: 9 %
LYMPHOCYTES NFR BLD MANUAL: 11 %
MAGNESIUM SERPL-MCNC: 1.9 MG/DL (ref 1.7–2.3)
MAGNESIUM SERPL-MCNC: 1.9 MG/DL (ref 1.7–2.3)
MAGNESIUM SERPL-MCNC: 2 MG/DL (ref 1.7–2.3)
MAGNESIUM SERPL-MCNC: 2 MG/DL (ref 1.7–2.3)
MCH RBC QN AUTO: 28.4 PG (ref 26.5–33)
MCH RBC QN AUTO: 29.2 PG (ref 26.5–33)
MCH RBC QN AUTO: 29.5 PG (ref 26.5–33)
MCH RBC QN AUTO: 29.5 PG (ref 26.5–33)
MCH RBC QN AUTO: 29.6 PG (ref 26.5–33)
MCH RBC QN AUTO: 29.7 PG (ref 26.5–33)
MCH RBC QN AUTO: 29.7 PG (ref 26.5–33)
MCH RBC QN AUTO: 29.9 PG (ref 26.5–33)
MCH RBC QN AUTO: 29.9 PG (ref 26.5–33)
MCH RBC QN AUTO: 30.1 PG (ref 26.5–33)
MCH RBC QN AUTO: 30.3 PG (ref 26.5–33)
MCH RBC QN AUTO: 30.4 PG (ref 26.5–33)
MCH RBC QN AUTO: 30.5 PG (ref 26.5–33)
MCHC RBC AUTO-ENTMCNC: 31.9 G/DL (ref 31.5–36.5)
MCHC RBC AUTO-ENTMCNC: 32.2 G/DL (ref 31.5–36.5)
MCHC RBC AUTO-ENTMCNC: 32.4 G/DL (ref 31.5–36.5)
MCHC RBC AUTO-ENTMCNC: 32.7 G/DL (ref 31.5–36.5)
MCHC RBC AUTO-ENTMCNC: 33.1 G/DL (ref 31.5–36.5)
MCHC RBC AUTO-ENTMCNC: 33.2 G/DL (ref 31.5–36.5)
MCHC RBC AUTO-ENTMCNC: 33.6 G/DL (ref 31.5–36.5)
MCHC RBC AUTO-ENTMCNC: 33.8 G/DL (ref 31.5–36.5)
MCHC RBC AUTO-ENTMCNC: 33.9 G/DL (ref 31.5–36.5)
MCHC RBC AUTO-ENTMCNC: 34 G/DL (ref 31.5–36.5)
MCHC RBC AUTO-ENTMCNC: 34 G/DL (ref 31.5–36.5)
MCHC RBC AUTO-ENTMCNC: 34.8 G/DL (ref 31.5–36.5)
MCHC RBC AUTO-ENTMCNC: 35 G/DL (ref 31.5–36.5)
MCV RBC AUTO: 86 FL (ref 78–100)
MCV RBC AUTO: 87 FL (ref 78–100)
MCV RBC AUTO: 88 FL (ref 78–100)
MCV RBC AUTO: 89 FL (ref 78–100)
MCV RBC AUTO: 89 FL (ref 78–100)
MCV RBC AUTO: 90 FL (ref 78–100)
MCV RBC AUTO: 91 FL (ref 78–100)
MCV RBC AUTO: 92 FL (ref 78–100)
MCV RBC AUTO: 93 FL (ref 78–100)
MONOCYTES # BLD AUTO: 0.2 10E3/UL (ref 0–1.3)
MONOCYTES # BLD AUTO: 0.3 10E3/UL (ref 0–1.3)
MONOCYTES # BLD AUTO: 0.4 10E3/UL (ref 0–1.3)
MONOCYTES # BLD AUTO: 0.5 10E3/UL (ref 0–1.3)
MONOCYTES # BLD AUTO: 0.6 10E3/UL (ref 0–1.3)
MONOCYTES # BLD AUTO: 1 10E3/UL (ref 0–1.3)
MONOCYTES # BLD MANUAL: 0.6 10E3/UL (ref 0–1.3)
MONOCYTES NFR BLD AUTO: 11 %
MONOCYTES NFR BLD AUTO: 14 %
MONOCYTES NFR BLD AUTO: 14 %
MONOCYTES NFR BLD AUTO: 6 %
MONOCYTES NFR BLD AUTO: 7 %
MONOCYTES NFR BLD AUTO: 8 %
MONOCYTES NFR BLD AUTO: 8 %
MONOCYTES NFR BLD AUTO: 9 %
MONOCYTES NFR BLD MANUAL: 23 %
MUCOUS THREADS #/AREA URNS LPF: PRESENT /LPF
NEUTROPHILS # BLD AUTO: 2.5 10E3/UL (ref 1.6–8.3)
NEUTROPHILS # BLD AUTO: 2.6 10E3/UL (ref 1.6–8.3)
NEUTROPHILS # BLD AUTO: 2.7 10E3/UL (ref 1.6–8.3)
NEUTROPHILS # BLD AUTO: 3 10E3/UL (ref 1.6–8.3)
NEUTROPHILS # BLD AUTO: 3 10E3/UL (ref 1.6–8.3)
NEUTROPHILS # BLD AUTO: 4.4 10E3/UL (ref 1.6–8.3)
NEUTROPHILS # BLD AUTO: 4.4 10E3/UL (ref 1.6–8.3)
NEUTROPHILS # BLD AUTO: 4.9 10E3/UL (ref 1.6–8.3)
NEUTROPHILS # BLD MANUAL: 1.8 10E3/UL (ref 1.6–8.3)
NEUTROPHILS NFR BLD AUTO: 62 %
NEUTROPHILS NFR BLD AUTO: 63 %
NEUTROPHILS NFR BLD AUTO: 64 %
NEUTROPHILS NFR BLD AUTO: 72 %
NEUTROPHILS NFR BLD AUTO: 76 %
NEUTROPHILS NFR BLD AUTO: 80 %
NEUTROPHILS NFR BLD AUTO: 85 %
NEUTROPHILS NFR BLD AUTO: 87 %
NEUTROPHILS NFR BLD MANUAL: 66 %
NITRATE UR QL: NEGATIVE
NRBC # BLD AUTO: 0 10E3/UL
NRBC BLD AUTO-RTO: 0 /100
NRBC BLD AUTO-RTO: 1 /100
PATH REPORT.ADDENDUM SPEC: ABNORMAL
PATH REPORT.ADDENDUM SPEC: ABNORMAL
PATH REPORT.ADDENDUM SPEC: NORMAL
PATH REPORT.COMMENTS IMP SPEC: ABNORMAL
PATH REPORT.COMMENTS IMP SPEC: NORMAL
PATH REPORT.COMMENTS IMP SPEC: YES
PATH REPORT.FINAL DX SPEC: ABNORMAL
PATH REPORT.FINAL DX SPEC: NORMAL
PATH REPORT.GROSS SPEC: ABNORMAL
PATH REPORT.GROSS SPEC: NORMAL
PATH REPORT.MICROSCOPIC SPEC OTHER STN: ABNORMAL
PATH REPORT.MICROSCOPIC SPEC OTHER STN: NORMAL
PATH REPORT.RELEVANT HX SPEC: ABNORMAL
PATH REPORT.RELEVANT HX SPEC: NORMAL
PATHOLOGY SYNOPTIC REPORT: ABNORMAL
PH UR STRIP: 7 [PH] (ref 5–7)
PHOSPHATE SERPL-MCNC: 1.9 MG/DL (ref 2.5–4.5)
PHOSPHATE SERPL-MCNC: 1.9 MG/DL (ref 2.5–4.5)
PHOSPHATE SERPL-MCNC: 2 MG/DL (ref 2.5–4.5)
PHOSPHATE SERPL-MCNC: 2.2 MG/DL (ref 2.5–4.5)
PHOSPHATE SERPL-MCNC: 3.2 MG/DL (ref 2.5–4.5)
PHOSPHATE SERPL-MCNC: 4.7 MG/DL (ref 2.5–4.5)
PHOTO IMAGE: ABNORMAL
PHOTO IMAGE: NORMAL
PLAT MORPH BLD: ABNORMAL
PLATELET # BLD AUTO: 105 10E3/UL (ref 150–450)
PLATELET # BLD AUTO: 121 10E3/UL (ref 150–450)
PLATELET # BLD AUTO: 137 10E3/UL (ref 150–450)
PLATELET # BLD AUTO: 145 10E3/UL (ref 150–450)
PLATELET # BLD AUTO: 147 10E3/UL (ref 150–450)
PLATELET # BLD AUTO: 152 10E3/UL (ref 150–450)
PLATELET # BLD AUTO: 153 10E3/UL (ref 150–450)
PLATELET # BLD AUTO: 157 10E3/UL (ref 150–450)
PLATELET # BLD AUTO: 161 10E3/UL (ref 150–450)
PLATELET # BLD AUTO: 164 10E3/UL (ref 150–450)
PLATELET # BLD AUTO: 200 10E3/UL (ref 150–450)
PLATELET # BLD AUTO: 210 10E3/UL (ref 150–450)
PLATELET # BLD AUTO: 234 10E3/UL (ref 150–450)
POTASSIUM SERPL-SCNC: 3.3 MMOL/L (ref 3.4–5.3)
POTASSIUM SERPL-SCNC: 3.4 MMOL/L (ref 3.4–5.3)
POTASSIUM SERPL-SCNC: 3.6 MMOL/L (ref 3.4–5.3)
POTASSIUM SERPL-SCNC: 3.9 MMOL/L (ref 3.4–5.3)
POTASSIUM SERPL-SCNC: 4 MMOL/L (ref 3.4–5.3)
POTASSIUM SERPL-SCNC: 4 MMOL/L (ref 3.4–5.3)
POTASSIUM SERPL-SCNC: 4.1 MMOL/L (ref 3.4–5.3)
POTASSIUM SERPL-SCNC: 4.1 MMOL/L (ref 3.4–5.3)
POTASSIUM SERPL-SCNC: 4.3 MMOL/L (ref 3.4–5.3)
POTASSIUM SERPL-SCNC: 4.5 MMOL/L (ref 3.4–5.3)
PROT SERPL-MCNC: 5.4 G/DL (ref 6.4–8.3)
PROT SERPL-MCNC: 6 G/DL (ref 6.4–8.3)
PROT SERPL-MCNC: 6.7 G/DL (ref 6.4–8.3)
PROT SERPL-MCNC: 7 G/DL (ref 6.4–8.3)
RBC # BLD AUTO: 2.53 10E6/UL (ref 4.4–5.9)
RBC # BLD AUTO: 3.4 10E6/UL (ref 4.4–5.9)
RBC # BLD AUTO: 3.51 10E6/UL (ref 4.4–5.9)
RBC # BLD AUTO: 3.77 10E6/UL (ref 4.4–5.9)
RBC # BLD AUTO: 3.85 10E6/UL (ref 4.4–5.9)
RBC # BLD AUTO: 4.08 10E6/UL (ref 4.4–5.9)
RBC # BLD AUTO: 4.2 10E6/UL (ref 4.4–5.9)
RBC # BLD AUTO: 4.21 10E6/UL (ref 4.4–5.9)
RBC # BLD AUTO: 4.39 10E6/UL (ref 4.4–5.9)
RBC # BLD AUTO: 4.47 10E6/UL (ref 4.4–5.9)
RBC # BLD AUTO: 4.48 10E6/UL (ref 4.4–5.9)
RBC # BLD AUTO: 4.64 10E6/UL (ref 4.4–5.9)
RBC # BLD AUTO: 4.8 10E6/UL (ref 4.4–5.9)
RBC MORPH BLD: ABNORMAL
RBC URINE: 1 /HPF
RSV RNA SPEC NAA+PROBE: NEGATIVE
RVPLETH-%PRED-PRE: 95 %
RVPLETH-PRE: 2.5 L
RVPLETH-PRED: 2.61 L
SARS-COV-2 RNA RESP QL NAA+PROBE: POSITIVE
SODIUM SERPL-SCNC: 132 MMOL/L (ref 136–145)
SODIUM SERPL-SCNC: 134 MMOL/L (ref 136–145)
SODIUM SERPL-SCNC: 137 MMOL/L (ref 136–145)
SODIUM SERPL-SCNC: 140 MMOL/L (ref 136–145)
SODIUM SERPL-SCNC: 140 MMOL/L (ref 136–145)
SP GR UR STRIP: 1.02 (ref 1–1.03)
SPECIMEN EXPIRATION DATE: NORMAL
TLCPLETH-%PRED-PRE: 106 %
TLCPLETH-PRE: 5.71 L
TLCPLETH-PRED: 5.35 L
TROPONIN T SERPL HS-MCNC: 12 NG/L
UNIT ABO/RH: NORMAL
UNIT ABO/RH: NORMAL
UNIT NUMBER: NORMAL
UNIT NUMBER: NORMAL
UNIT STATUS: NORMAL
UNIT STATUS: NORMAL
UNIT TYPE ISBT: 9500
UNIT TYPE ISBT: 9500
UPPER EUS: NORMAL
UPPER GI ENDOSCOPY: NORMAL
UROBILINOGEN UR STRIP-MCNC: NORMAL MG/DL
VA-%PRED-PRE: 103 %
VA-PRE: 4.69 L
VC-%PRED-PRE: 117 %
VC-PRE: 3.21 L
VC-PRED: 2.74 L
WBC # BLD AUTO: 2.8 10E3/UL (ref 4–11)
WBC # BLD AUTO: 3.1 10E3/UL (ref 4–11)
WBC # BLD AUTO: 3.1 10E3/UL (ref 4–11)
WBC # BLD AUTO: 3.4 10E3/UL (ref 4–11)
WBC # BLD AUTO: 3.5 10E3/UL (ref 4–11)
WBC # BLD AUTO: 3.9 10E3/UL (ref 4–11)
WBC # BLD AUTO: 4.2 10E3/UL (ref 4–11)
WBC # BLD AUTO: 6.5 10E3/UL (ref 4–11)
WBC # BLD AUTO: 6.6 10E3/UL (ref 4–11)
WBC # BLD AUTO: 7 10E3/UL (ref 4–11)
WBC # BLD AUTO: 7.1 10E3/UL (ref 4–11)
WBC # BLD AUTO: 8.8 10E3/UL (ref 4–11)
WBC # BLD AUTO: 9.6 10E3/UL (ref 4–11)
WBC URINE: 1 /HPF

## 2023-01-01 PROCEDURE — 97530 THERAPEUTIC ACTIVITIES: CPT | Mod: GO

## 2023-01-01 PROCEDURE — 99284 EMERGENCY DEPT VISIT MOD MDM: CPT | Performed by: EMERGENCY MEDICINE

## 2023-01-01 PROCEDURE — 88341 IMHCHEM/IMCYTCHM EA ADD ANTB: CPT | Mod: 26 | Performed by: PATHOLOGY

## 2023-01-01 PROCEDURE — 96417 CHEMO IV INFUS EACH ADDL SEQ: CPT

## 2023-01-01 PROCEDURE — 36415 COLL VENOUS BLD VENIPUNCTURE: CPT | Performed by: FAMILY MEDICINE

## 2023-01-01 PROCEDURE — 85025 COMPLETE CBC W/AUTO DIFF WBC: CPT | Performed by: INTERNAL MEDICINE

## 2023-01-01 PROCEDURE — G0463 HOSPITAL OUTPT CLINIC VISIT: HCPCS | Mod: 25 | Performed by: PHYSICIAN ASSISTANT

## 2023-01-01 PROCEDURE — 97110 THERAPEUTIC EXERCISES: CPT | Mod: GO

## 2023-01-01 PROCEDURE — 250N000009 HC RX 250: Performed by: NURSE ANESTHETIST, CERTIFIED REGISTERED

## 2023-01-01 PROCEDURE — 99285 EMERGENCY DEPT VISIT HI MDM: CPT | Performed by: FAMILY MEDICINE

## 2023-01-01 PROCEDURE — 85027 COMPLETE CBC AUTOMATED: CPT

## 2023-01-01 PROCEDURE — 99204 OFFICE O/P NEW MOD 45 MIN: CPT | Performed by: INTERNAL MEDICINE

## 2023-01-01 PROCEDURE — 84132 ASSAY OF SERUM POTASSIUM: CPT | Performed by: STUDENT IN AN ORGANIZED HEALTH CARE EDUCATION/TRAINING PROGRAM

## 2023-01-01 PROCEDURE — 250N000009 HC RX 250: Performed by: STUDENT IN AN ORGANIZED HEALTH CARE EDUCATION/TRAINING PROGRAM

## 2023-01-01 PROCEDURE — 83735 ASSAY OF MAGNESIUM: CPT

## 2023-01-01 PROCEDURE — 258N000003 HC RX IP 258 OP 636: Performed by: NURSE PRACTITIONER

## 2023-01-01 PROCEDURE — 86923 COMPATIBILITY TEST ELECTRIC: CPT | Performed by: FAMILY MEDICINE

## 2023-01-01 PROCEDURE — 250N000011 HC RX IP 250 OP 636: Mod: JZ | Performed by: STUDENT IN AN ORGANIZED HEALTH CARE EDUCATION/TRAINING PROGRAM

## 2023-01-01 PROCEDURE — 36415 COLL VENOUS BLD VENIPUNCTURE: CPT | Performed by: INTERNAL MEDICINE

## 2023-01-01 PROCEDURE — 82565 ASSAY OF CREATININE: CPT | Performed by: INTERNAL MEDICINE

## 2023-01-01 PROCEDURE — 710N000012 HC RECOVERY PHASE 2, PER MINUTE: Performed by: INTERNAL MEDICINE

## 2023-01-01 PROCEDURE — 74018 RADEX ABDOMEN 1 VIEW: CPT | Mod: 26 | Performed by: RADIOLOGY

## 2023-01-01 PROCEDURE — 88305 TISSUE EXAM BY PATHOLOGIST: CPT | Mod: 26 | Performed by: PATHOLOGY

## 2023-01-01 PROCEDURE — 74018 RADEX ABDOMEN 1 VIEW: CPT

## 2023-01-01 PROCEDURE — 250N000011 HC RX IP 250 OP 636: Performed by: FAMILY MEDICINE

## 2023-01-01 PROCEDURE — 36430 TRANSFUSION BLD/BLD COMPNT: CPT | Performed by: FAMILY MEDICINE

## 2023-01-01 PROCEDURE — 99213 OFFICE O/P EST LOW 20 MIN: CPT | Mod: Q6 | Performed by: INTERNAL MEDICINE

## 2023-01-01 PROCEDURE — 343N000001 HC RX 343: Performed by: FAMILY MEDICINE

## 2023-01-01 PROCEDURE — 250N000011 HC RX IP 250 OP 636: Mod: JZ | Performed by: FAMILY MEDICINE

## 2023-01-01 PROCEDURE — 250N000011 HC RX IP 250 OP 636: Performed by: NURSE ANESTHETIST, CERTIFIED REGISTERED

## 2023-01-01 PROCEDURE — 250N000011 HC RX IP 250 OP 636: Performed by: PHYSICIAN ASSISTANT

## 2023-01-01 PROCEDURE — 250N000013 HC RX MED GY IP 250 OP 250 PS 637: Performed by: PHYSICIAN ASSISTANT

## 2023-01-01 PROCEDURE — 258N000003 HC RX IP 258 OP 636: Performed by: PHYSICIAN ASSISTANT

## 2023-01-01 PROCEDURE — 250N000013 HC RX MED GY IP 250 OP 250 PS 637: Performed by: STUDENT IN AN ORGANIZED HEALTH CARE EDUCATION/TRAINING PROGRAM

## 2023-01-01 PROCEDURE — 370N000017 HC ANESTHESIA TECHNICAL FEE, PER MIN: Performed by: STUDENT IN AN ORGANIZED HEALTH CARE EDUCATION/TRAINING PROGRAM

## 2023-01-01 PROCEDURE — 80053 COMPREHEN METABOLIC PANEL: CPT

## 2023-01-01 PROCEDURE — 96374 THER/PROPH/DIAG INJ IV PUSH: CPT | Mod: 59 | Performed by: FAMILY MEDICINE

## 2023-01-01 PROCEDURE — 36415 COLL VENOUS BLD VENIPUNCTURE: CPT

## 2023-01-01 PROCEDURE — 86850 RBC ANTIBODY SCREEN: CPT | Performed by: FAMILY MEDICINE

## 2023-01-01 PROCEDURE — 999N000141 HC STATISTIC PRE-PROCEDURE NURSING ASSESSMENT: Performed by: INTERNAL MEDICINE

## 2023-01-01 PROCEDURE — 85025 COMPLETE CBC W/AUTO DIFF WBC: CPT

## 2023-01-01 PROCEDURE — 80053 COMPREHEN METABOLIC PANEL: CPT | Performed by: FAMILY MEDICINE

## 2023-01-01 PROCEDURE — G1010 CDSM STANSON: HCPCS | Mod: PS

## 2023-01-01 PROCEDURE — 96413 CHEMO IV INFUSION 1 HR: CPT

## 2023-01-01 PROCEDURE — G0463 HOSPITAL OUTPT CLINIC VISIT: HCPCS | Performed by: THORACIC SURGERY (CARDIOTHORACIC VASCULAR SURGERY)

## 2023-01-01 PROCEDURE — A9552 F18 FDG: HCPCS | Performed by: INTERNAL MEDICINE

## 2023-01-01 PROCEDURE — 36415 COLL VENOUS BLD VENIPUNCTURE: CPT | Performed by: PHYSICIAN ASSISTANT

## 2023-01-01 PROCEDURE — 343N000001 HC RX 343: Performed by: INTERNAL MEDICINE

## 2023-01-01 PROCEDURE — 94729 DIFFUSING CAPACITY: CPT

## 2023-01-01 PROCEDURE — G0180 MD CERTIFICATION HHA PATIENT: HCPCS | Performed by: FAMILY MEDICINE

## 2023-01-01 PROCEDURE — 710N000009 HC RECOVERY PHASE 1, LEVEL 1, PER MIN: Performed by: INTERNAL MEDICINE

## 2023-01-01 PROCEDURE — 258N000003 HC RX IP 258 OP 636: Performed by: SURGERY

## 2023-01-01 PROCEDURE — 250N000013 HC RX MED GY IP 250 OP 250 PS 637

## 2023-01-01 PROCEDURE — 278N000051 HC OR IMPLANT GENERAL: Performed by: STUDENT IN AN ORGANIZED HEALTH CARE EDUCATION/TRAINING PROGRAM

## 2023-01-01 PROCEDURE — 99207 PR NO CHARGE NURSE ONLY: CPT

## 2023-01-01 PROCEDURE — 120N000002 HC R&B MED SURG/OB UMMC

## 2023-01-01 PROCEDURE — 85007 BL SMEAR W/DIFF WBC COUNT: CPT

## 2023-01-01 PROCEDURE — 99213 OFFICE O/P EST LOW 20 MIN: CPT | Performed by: FAMILY MEDICINE

## 2023-01-01 PROCEDURE — 250N000011 HC RX IP 250 OP 636: Performed by: NURSE PRACTITIONER

## 2023-01-01 PROCEDURE — 85027 COMPLETE CBC AUTOMATED: CPT | Performed by: PHYSICIAN ASSISTANT

## 2023-01-01 PROCEDURE — 250N000011 HC RX IP 250 OP 636: Performed by: STUDENT IN AN ORGANIZED HEALTH CARE EDUCATION/TRAINING PROGRAM

## 2023-01-01 PROCEDURE — 94726 PLETHYSMOGRAPHY LUNG VOLUMES: CPT

## 2023-01-01 PROCEDURE — 710N000010 HC RECOVERY PHASE 1, LEVEL 2, PER MIN: Performed by: STUDENT IN AN ORGANIZED HEALTH CARE EDUCATION/TRAINING PROGRAM

## 2023-01-01 PROCEDURE — C1769 GUIDE WIRE: HCPCS | Performed by: STUDENT IN AN ORGANIZED HEALTH CARE EDUCATION/TRAINING PROGRAM

## 2023-01-01 PROCEDURE — 370N000017 HC ANESTHESIA TECHNICAL FEE, PER MIN: Performed by: SURGERY

## 2023-01-01 PROCEDURE — G1010 CDSM STANSON: HCPCS

## 2023-01-01 PROCEDURE — 96375 TX/PRO/DX INJ NEW DRUG ADDON: CPT

## 2023-01-01 PROCEDURE — 97165 OT EVAL LOW COMPLEX 30 MIN: CPT | Mod: GO

## 2023-01-01 PROCEDURE — 88342 IMHCHEM/IMCYTCHM 1ST ANTB: CPT | Mod: 26 | Performed by: PATHOLOGY

## 2023-01-01 PROCEDURE — 258N000003 HC RX IP 258 OP 636

## 2023-01-01 PROCEDURE — 272N000001 HC OR GENERAL SUPPLY STERILE: Performed by: INTERNAL MEDICINE

## 2023-01-01 PROCEDURE — 71046 X-RAY EXAM CHEST 2 VIEWS: CPT

## 2023-01-01 PROCEDURE — 250N000011 HC RX IP 250 OP 636: Mod: JZ

## 2023-01-01 PROCEDURE — 99213 OFFICE O/P EST LOW 20 MIN: CPT | Performed by: PHYSICIAN ASSISTANT

## 2023-01-01 PROCEDURE — 81001 URINALYSIS AUTO W/SCOPE: CPT | Performed by: FAMILY MEDICINE

## 2023-01-01 PROCEDURE — 85018 HEMOGLOBIN: CPT | Performed by: PHYSICIAN ASSISTANT

## 2023-01-01 PROCEDURE — 99214 OFFICE O/P EST MOD 30 MIN: CPT | Performed by: NURSE PRACTITIONER

## 2023-01-01 PROCEDURE — 250N000013 HC RX MED GY IP 250 OP 250 PS 637: Performed by: FAMILY MEDICINE

## 2023-01-01 PROCEDURE — 85014 HEMATOCRIT: CPT | Performed by: FAMILY MEDICINE

## 2023-01-01 PROCEDURE — 84100 ASSAY OF PHOSPHORUS: CPT

## 2023-01-01 PROCEDURE — 96367 TX/PROPH/DG ADDL SEQ IV INF: CPT

## 2023-01-01 PROCEDURE — 85027 COMPLETE CBC AUTOMATED: CPT | Performed by: FAMILY MEDICINE

## 2023-01-01 PROCEDURE — 99285 EMERGENCY DEPT VISIT HI MDM: CPT | Mod: 25 | Performed by: FAMILY MEDICINE

## 2023-01-01 PROCEDURE — 84100 ASSAY OF PHOSPHORUS: CPT | Performed by: STUDENT IN AN ORGANIZED HEALTH CARE EDUCATION/TRAINING PROGRAM

## 2023-01-01 PROCEDURE — 43249 ESOPH EGD DILATION <30 MM: CPT | Performed by: INTERNAL MEDICINE

## 2023-01-01 PROCEDURE — 97530 THERAPEUTIC ACTIVITIES: CPT | Mod: GP

## 2023-01-01 PROCEDURE — 999N000141 HC STATISTIC PRE-PROCEDURE NURSING ASSESSMENT: Performed by: STUDENT IN AN ORGANIZED HEALTH CARE EDUCATION/TRAINING PROGRAM

## 2023-01-01 PROCEDURE — 258N000003 HC RX IP 258 OP 636: Performed by: INTERNAL MEDICINE

## 2023-01-01 PROCEDURE — 360N000076 HC SURGERY LEVEL 3, PER MIN: Performed by: STUDENT IN AN ORGANIZED HEALTH CARE EDUCATION/TRAINING PROGRAM

## 2023-01-01 PROCEDURE — 250N000011 HC RX IP 250 OP 636: Performed by: INTERNAL MEDICINE

## 2023-01-01 PROCEDURE — G0463 HOSPITAL OUTPT CLINIC VISIT: HCPCS | Performed by: INTERNAL MEDICINE

## 2023-01-01 PROCEDURE — 82565 ASSAY OF CREATININE: CPT | Performed by: NURSE PRACTITIONER

## 2023-01-01 PROCEDURE — G1010 CDSM STANSON: HCPCS | Performed by: RADIOLOGY

## 2023-01-01 PROCEDURE — 36415 COLL VENOUS BLD VENIPUNCTURE: CPT | Performed by: STUDENT IN AN ORGANIZED HEALTH CARE EDUCATION/TRAINING PROGRAM

## 2023-01-01 PROCEDURE — 84484 ASSAY OF TROPONIN QUANT: CPT | Performed by: FAMILY MEDICINE

## 2023-01-01 PROCEDURE — 96375 TX/PRO/DX INJ NEW DRUG ADDON: CPT | Performed by: FAMILY MEDICINE

## 2023-01-01 PROCEDURE — 43239 EGD BIOPSY SINGLE/MULTIPLE: CPT | Performed by: INTERNAL MEDICINE

## 2023-01-01 PROCEDURE — 88305 TISSUE EXAM BY PATHOLOGIST: CPT | Mod: TC | Performed by: SURGERY

## 2023-01-01 PROCEDURE — 85025 COMPLETE CBC W/AUTO DIFF WBC: CPT | Performed by: PHYSICIAN ASSISTANT

## 2023-01-01 PROCEDURE — 96411 CHEMO IV PUSH ADDL DRUG: CPT

## 2023-01-01 PROCEDURE — 82565 ASSAY OF CREATININE: CPT

## 2023-01-01 PROCEDURE — 250N000011 HC RX IP 250 OP 636: Mod: JZ | Performed by: NURSE ANESTHETIST, CERTIFIED REGISTERED

## 2023-01-01 PROCEDURE — 250N000009 HC RX 250: Performed by: SURGERY

## 2023-01-01 PROCEDURE — 258N000003 HC RX IP 258 OP 636: Performed by: FAMILY MEDICINE

## 2023-01-01 PROCEDURE — 44186 LAP JEJUNOSTOMY: CPT | Performed by: STUDENT IN AN ORGANIZED HEALTH CARE EDUCATION/TRAINING PROGRAM

## 2023-01-01 PROCEDURE — 99214 OFFICE O/P EST MOD 30 MIN: CPT | Mod: Q6 | Performed by: INTERNAL MEDICINE

## 2023-01-01 PROCEDURE — 99215 OFFICE O/P EST HI 40 MIN: CPT | Mod: 24 | Performed by: THORACIC SURGERY (CARDIOTHORACIC VASCULAR SURGERY)

## 2023-01-01 PROCEDURE — C9113 INJ PANTOPRAZOLE SODIUM, VIA: HCPCS | Mod: JZ | Performed by: FAMILY MEDICINE

## 2023-01-01 PROCEDURE — 85004 AUTOMATED DIFF WBC COUNT: CPT | Performed by: INTERNAL MEDICINE

## 2023-01-01 PROCEDURE — 99205 OFFICE O/P NEW HI 60 MIN: CPT | Performed by: THORACIC SURGERY (CARDIOTHORACIC VASCULAR SURGERY)

## 2023-01-01 PROCEDURE — 49451 REPLACE DUOD/JEJ TUBE PERC: CPT | Performed by: RADIOLOGY

## 2023-01-01 PROCEDURE — P9016 RBC LEUKOCYTES REDUCED: HCPCS | Performed by: FAMILY MEDICINE

## 2023-01-01 PROCEDURE — 99223 1ST HOSP IP/OBS HIGH 75: CPT | Performed by: PHYSICIAN ASSISTANT

## 2023-01-01 PROCEDURE — 97116 GAIT TRAINING THERAPY: CPT | Mod: GP

## 2023-01-01 PROCEDURE — 250N000009 HC RX 250: Performed by: FAMILY MEDICINE

## 2023-01-01 PROCEDURE — 74177 CT ABD & PELVIS W/CONTRAST: CPT | Mod: MG

## 2023-01-01 PROCEDURE — 0DJ08ZZ INSPECTION OF UPPER INTESTINAL TRACT, VIA NATURAL OR ARTIFICIAL OPENING ENDOSCOPIC: ICD-10-PCS | Performed by: INTERNAL MEDICINE

## 2023-01-01 PROCEDURE — 97535 SELF CARE MNGMENT TRAINING: CPT | Mod: GO

## 2023-01-01 PROCEDURE — 80048 BASIC METABOLIC PNL TOTAL CA: CPT

## 2023-01-01 PROCEDURE — 43239 EGD BIOPSY SINGLE/MULTIPLE: CPT | Performed by: SURGERY

## 2023-01-01 PROCEDURE — 82310 ASSAY OF CALCIUM: CPT

## 2023-01-01 PROCEDURE — G0500 MOD SEDAT ENDO SERVICE >5YRS: HCPCS | Performed by: INTERNAL MEDICINE

## 2023-01-01 PROCEDURE — 99239 HOSP IP/OBS DSCHRG MGMT >30: CPT | Performed by: INTERNAL MEDICINE

## 2023-01-01 PROCEDURE — 87637 SARSCOV2&INF A&B&RSV AMP PRB: CPT | Performed by: PHYSICIAN ASSISTANT

## 2023-01-01 PROCEDURE — 85014 HEMATOCRIT: CPT | Performed by: NURSE PRACTITIONER

## 2023-01-01 PROCEDURE — 97161 PT EVAL LOW COMPLEX 20 MIN: CPT | Mod: GP

## 2023-01-01 PROCEDURE — 272N000001 HC OR GENERAL SUPPLY STERILE: Performed by: STUDENT IN AN ORGANIZED HEALTH CARE EDUCATION/TRAINING PROGRAM

## 2023-01-01 PROCEDURE — 120N000001 HC R&B MED SURG/OB

## 2023-01-01 PROCEDURE — 258N000003 HC RX IP 258 OP 636: Performed by: ANESTHESIOLOGY

## 2023-01-01 PROCEDURE — A9552 F18 FDG: HCPCS | Performed by: FAMILY MEDICINE

## 2023-01-01 PROCEDURE — 99213 OFFICE O/P EST LOW 20 MIN: CPT | Mod: VID | Performed by: FAMILY MEDICINE

## 2023-01-01 PROCEDURE — 88360 TUMOR IMMUNOHISTOCHEM/MANUAL: CPT | Mod: 26 | Performed by: PATHOLOGY

## 2023-01-01 PROCEDURE — 250N000025 HC SEVOFLURANE, PER MIN: Performed by: STUDENT IN AN ORGANIZED HEALTH CARE EDUCATION/TRAINING PROGRAM

## 2023-01-01 PROCEDURE — 0WJG4ZZ INSPECTION OF PERITONEAL CAVITY, PERCUTANEOUS ENDOSCOPIC APPROACH: ICD-10-PCS | Performed by: STUDENT IN AN ORGANIZED HEALTH CARE EDUCATION/TRAINING PROGRAM

## 2023-01-01 PROCEDURE — 36415 COLL VENOUS BLD VENIPUNCTURE: CPT | Performed by: NURSE PRACTITIONER

## 2023-01-01 PROCEDURE — 86901 BLOOD TYPING SEROLOGIC RH(D): CPT | Performed by: FAMILY MEDICINE

## 2023-01-01 PROCEDURE — 80048 BASIC METABOLIC PNL TOTAL CA: CPT | Performed by: PHYSICIAN ASSISTANT

## 2023-01-01 PROCEDURE — 99213 OFFICE O/P EST LOW 20 MIN: CPT | Performed by: INTERNAL MEDICINE

## 2023-01-01 PROCEDURE — 96361 HYDRATE IV INFUSION ADD-ON: CPT | Performed by: FAMILY MEDICINE

## 2023-01-01 PROCEDURE — 258N000003 HC RX IP 258 OP 636: Performed by: NURSE ANESTHETIST, CERTIFIED REGISTERED

## 2023-01-01 PROCEDURE — 250N000011 HC RX IP 250 OP 636: Mod: JZ | Performed by: NURSE PRACTITIONER

## 2023-01-01 PROCEDURE — 82272 OCCULT BLD FECES 1-3 TESTS: CPT | Performed by: FAMILY MEDICINE

## 2023-01-01 PROCEDURE — 250N000011 HC RX IP 250 OP 636: Performed by: ANESTHESIOLOGY

## 2023-01-01 PROCEDURE — 99024 POSTOP FOLLOW-UP VISIT: CPT | Mod: VID | Performed by: CLINICAL NURSE SPECIALIST

## 2023-01-01 PROCEDURE — 0DHA3UZ INSERTION OF FEEDING DEVICE INTO JEJUNUM, PERCUTANEOUS APPROACH: ICD-10-PCS | Performed by: STUDENT IN AN ORGANIZED HEALTH CARE EDUCATION/TRAINING PROGRAM

## 2023-01-01 PROCEDURE — 999N000147 HC STATISTIC PT IP EVAL DEFER

## 2023-01-01 PROCEDURE — 99214 OFFICE O/P EST MOD 30 MIN: CPT | Performed by: FAMILY MEDICINE

## 2023-01-01 PROCEDURE — C9113 INJ PANTOPRAZOLE SODIUM, VIA: HCPCS | Mod: JZ | Performed by: PHYSICIAN ASSISTANT

## 2023-01-01 PROCEDURE — 99214 OFFICE O/P EST MOD 30 MIN: CPT | Mod: VID | Performed by: STUDENT IN AN ORGANIZED HEALTH CARE EDUCATION/TRAINING PROGRAM

## 2023-01-01 PROCEDURE — 370N000017 HC ANESTHESIA TECHNICAL FEE, PER MIN: Performed by: INTERNAL MEDICINE

## 2023-01-01 PROCEDURE — 99283 EMERGENCY DEPT VISIT LOW MDM: CPT | Performed by: EMERGENCY MEDICINE

## 2023-01-01 PROCEDURE — 78816 PET IMAGE W/CT FULL BODY: CPT | Mod: 26 | Performed by: RADIOLOGY

## 2023-01-01 PROCEDURE — G0463 HOSPITAL OUTPT CLINIC VISIT: HCPCS | Performed by: NURSE PRACTITIONER

## 2023-01-01 PROCEDURE — G0463 HOSPITAL OUTPT CLINIC VISIT: HCPCS | Mod: 25 | Performed by: NURSE PRACTITIONER

## 2023-01-01 PROCEDURE — C1894 INTRO/SHEATH, NON-LASER: HCPCS | Performed by: STUDENT IN AN ORGANIZED HEALTH CARE EDUCATION/TRAINING PROGRAM

## 2023-01-01 PROCEDURE — 88342 IMHCHEM/IMCYTCHM 1ST ANTB: CPT | Mod: TC | Performed by: INTERNAL MEDICINE

## 2023-01-01 PROCEDURE — 360N000075 HC SURGERY LEVEL 2, PER MIN: Performed by: INTERNAL MEDICINE

## 2023-01-01 PROCEDURE — 94375 RESPIRATORY FLOW VOLUME LOOP: CPT

## 2023-01-01 DEVICE — IMPLANTABLE DEVICE: Type: IMPLANTABLE DEVICE | Site: JEJUNUM | Status: FUNCTIONAL

## 2023-01-01 RX ORDER — SODIUM CHLORIDE, SODIUM LACTATE, POTASSIUM CHLORIDE, CALCIUM CHLORIDE 600; 310; 30; 20 MG/100ML; MG/100ML; MG/100ML; MG/100ML
INJECTION, SOLUTION INTRAVENOUS CONTINUOUS
Status: DISCONTINUED | OUTPATIENT
Start: 2023-01-01 | End: 2023-01-01 | Stop reason: HOSPADM

## 2023-01-01 RX ORDER — PROPOFOL 10 MG/ML
INJECTION, EMULSION INTRAVENOUS CONTINUOUS PRN
Status: DISCONTINUED | OUTPATIENT
Start: 2023-01-01 | End: 2023-01-01

## 2023-01-01 RX ORDER — FENTANYL CITRATE 50 UG/ML
25 INJECTION, SOLUTION INTRAMUSCULAR; INTRAVENOUS EVERY 5 MIN PRN
Status: DISCONTINUED | OUTPATIENT
Start: 2023-01-01 | End: 2023-01-01 | Stop reason: HOSPADM

## 2023-01-01 RX ORDER — SODIUM CHLORIDE, SODIUM LACTATE, POTASSIUM CHLORIDE, CALCIUM CHLORIDE 600; 310; 30; 20 MG/100ML; MG/100ML; MG/100ML; MG/100ML
INJECTION, SOLUTION INTRAVENOUS CONTINUOUS
Status: DISCONTINUED | OUTPATIENT
Start: 2023-01-01 | End: 2023-01-01

## 2023-01-01 RX ORDER — METHYLPREDNISOLONE SODIUM SUCCINATE 125 MG/2ML
125 INJECTION, POWDER, LYOPHILIZED, FOR SOLUTION INTRAMUSCULAR; INTRAVENOUS
Status: CANCELLED
Start: 2023-01-01

## 2023-01-01 RX ORDER — ATORVASTATIN CALCIUM 10 MG/1
10 TABLET, FILM COATED ORAL DAILY
Qty: 90 TABLET | Refills: 1 | Status: SHIPPED | OUTPATIENT
Start: 2023-01-01

## 2023-01-01 RX ORDER — METRONIDAZOLE 500 MG/1
500 TABLET ORAL 3 TIMES DAILY
Status: DISCONTINUED | OUTPATIENT
Start: 2023-01-01 | End: 2023-01-01

## 2023-01-01 RX ORDER — ACETAMINOPHEN 325 MG/10.15ML
650 LIQUID ORAL EVERY 4 HOURS PRN
Qty: 500 ML | Refills: 1 | Status: SHIPPED | OUTPATIENT
Start: 2023-01-01 | End: 2023-01-01

## 2023-01-01 RX ORDER — HYDROMORPHONE HCL IN WATER/PF 6 MG/30 ML
0.2 PATIENT CONTROLLED ANALGESIA SYRINGE INTRAVENOUS
Status: DISCONTINUED | OUTPATIENT
Start: 2023-01-01 | End: 2023-01-01 | Stop reason: HOSPADM

## 2023-01-01 RX ORDER — MEPERIDINE HYDROCHLORIDE 25 MG/ML
25 INJECTION INTRAMUSCULAR; INTRAVENOUS; SUBCUTANEOUS EVERY 30 MIN PRN
Status: CANCELLED | OUTPATIENT
Start: 2023-01-01

## 2023-01-01 RX ORDER — ONDANSETRON 2 MG/ML
4 INJECTION INTRAMUSCULAR; INTRAVENOUS EVERY 30 MIN PRN
Status: DISCONTINUED | OUTPATIENT
Start: 2023-01-01 | End: 2023-01-01 | Stop reason: HOSPADM

## 2023-01-01 RX ORDER — HEPARIN SODIUM 10000 [USP'U]/ML
5000 INJECTION, SOLUTION INTRAVENOUS; SUBCUTANEOUS
Status: CANCELLED | OUTPATIENT
Start: 2023-01-01

## 2023-01-01 RX ORDER — BISACODYL 10 MG
10 SUPPOSITORY, RECTAL RECTAL DAILY PRN
Status: DISCONTINUED | OUTPATIENT
Start: 2023-01-01 | End: 2023-01-01 | Stop reason: HOSPADM

## 2023-01-01 RX ORDER — ACETAMINOPHEN 325 MG/10.15ML
650 LIQUID ORAL EVERY 4 HOURS PRN
Qty: 500 ML | Refills: 3 | Status: SHIPPED | OUTPATIENT
Start: 2023-01-01 | End: 2023-01-01

## 2023-01-01 RX ORDER — HYDROMORPHONE HCL IN WATER/PF 6 MG/30 ML
0.1 PATIENT CONTROLLED ANALGESIA SYRINGE INTRAVENOUS
Status: DISCONTINUED | OUTPATIENT
Start: 2023-01-01 | End: 2023-01-01 | Stop reason: HOSPADM

## 2023-01-01 RX ORDER — OXYCODONE HCL 5 MG/5 ML
5 SOLUTION, ORAL ORAL EVERY 6 HOURS PRN
Status: DISCONTINUED | OUTPATIENT
Start: 2023-01-01 | End: 2023-01-01 | Stop reason: HOSPADM

## 2023-01-01 RX ORDER — POTASSIUM CHLORIDE 20MEQ/15ML
40 LIQUID (ML) ORAL ONCE
Status: COMPLETED | OUTPATIENT
Start: 2023-01-01 | End: 2023-01-01

## 2023-01-01 RX ORDER — OXYCODONE HCL 5 MG/5 ML
2.5 SOLUTION, ORAL ORAL EVERY 4 HOURS PRN
Qty: 100 ML | Refills: 0 | Status: SHIPPED | OUTPATIENT
Start: 2023-01-01 | End: 2023-01-01

## 2023-01-01 RX ORDER — SIMETHICONE 40MG/0.6ML
40 SUSPENSION, DROPS(FINAL DOSAGE FORM)(ML) ORAL 2 TIMES DAILY PRN
Status: DISCONTINUED | OUTPATIENT
Start: 2023-01-01 | End: 2023-01-01 | Stop reason: HOSPADM

## 2023-01-01 RX ORDER — HEPARIN SODIUM,PORCINE 10 UNIT/ML
5-20 VIAL (ML) INTRAVENOUS DAILY PRN
Status: CANCELLED | OUTPATIENT
Start: 2023-01-01

## 2023-01-01 RX ORDER — LORAZEPAM 0.5 MG/1
0.5 TABLET ORAL EVERY 6 HOURS PRN
Qty: 30 TABLET | Refills: 0 | Status: SHIPPED | OUTPATIENT
Start: 2023-01-01 | End: 2023-01-01

## 2023-01-01 RX ORDER — ATORVASTATIN CALCIUM 10 MG/1
10 TABLET, FILM COATED ORAL DAILY
Status: DISCONTINUED | OUTPATIENT
Start: 2023-01-01 | End: 2023-01-01 | Stop reason: HOSPADM

## 2023-01-01 RX ORDER — DEXTROSE MONOHYDRATE 100 MG/ML
INJECTION, SOLUTION INTRAVENOUS CONTINUOUS PRN
Status: DISCONTINUED | OUTPATIENT
Start: 2023-01-01 | End: 2023-01-01 | Stop reason: HOSPADM

## 2023-01-01 RX ORDER — DIPHENHYDRAMINE HYDROCHLORIDE 50 MG/ML
50 INJECTION INTRAMUSCULAR; INTRAVENOUS
Status: CANCELLED
Start: 2023-01-01

## 2023-01-01 RX ORDER — ONDANSETRON 2 MG/ML
4 INJECTION INTRAMUSCULAR; INTRAVENOUS EVERY 6 HOURS PRN
Status: DISCONTINUED | OUTPATIENT
Start: 2023-01-01 | End: 2023-01-01

## 2023-01-01 RX ORDER — AMOXICILLIN 250 MG
1 CAPSULE ORAL 2 TIMES DAILY
Status: DISCONTINUED | OUTPATIENT
Start: 2023-01-01 | End: 2023-01-01

## 2023-01-01 RX ORDER — ATORVASTATIN CALCIUM 10 MG/1
TABLET, FILM COATED ORAL
Qty: 90 TABLET | Refills: 0 | Status: SHIPPED | OUTPATIENT
Start: 2023-01-01 | End: 2023-01-01

## 2023-01-01 RX ORDER — LORAZEPAM 2 MG/ML
0.5 INJECTION INTRAMUSCULAR EVERY 4 HOURS PRN
Status: CANCELLED | OUTPATIENT
Start: 2023-01-01

## 2023-01-01 RX ORDER — EPINEPHRINE 1 MG/ML
0.3 INJECTION, SOLUTION, CONCENTRATE INTRAVENOUS EVERY 5 MIN PRN
Status: CANCELLED | OUTPATIENT
Start: 2023-01-01

## 2023-01-01 RX ORDER — SIMETHICONE 40MG/0.6ML
40 SUSPENSION, DROPS(FINAL DOSAGE FORM)(ML) ORAL 3 TIMES DAILY
Qty: 54 ML | Refills: 0 | Status: SHIPPED | OUTPATIENT
Start: 2023-01-01 | End: 2023-01-01

## 2023-01-01 RX ORDER — HEPARIN SODIUM (PORCINE) LOCK FLUSH IV SOLN 100 UNIT/ML 100 UNIT/ML
5 SOLUTION INTRAVENOUS
Status: CANCELLED | OUTPATIENT
Start: 2023-01-01

## 2023-01-01 RX ORDER — LIDOCAINE 40 MG/G
CREAM TOPICAL
Status: DISCONTINUED | OUTPATIENT
Start: 2023-01-01 | End: 2023-01-01 | Stop reason: HOSPADM

## 2023-01-01 RX ORDER — FAMOTIDINE 40 MG/5ML
20 POWDER, FOR SUSPENSION ORAL DAILY PRN
Qty: 50 ML | Refills: 1 | Status: SHIPPED | OUTPATIENT
Start: 2023-01-01 | End: 2023-01-01

## 2023-01-01 RX ORDER — ALBUTEROL SULFATE 0.83 MG/ML
2.5 SOLUTION RESPIRATORY (INHALATION)
Status: CANCELLED | OUTPATIENT
Start: 2023-01-01

## 2023-01-01 RX ORDER — ONDANSETRON 2 MG/ML
4 INJECTION INTRAMUSCULAR; INTRAVENOUS
Status: DISCONTINUED | OUTPATIENT
Start: 2023-01-01 | End: 2023-01-01 | Stop reason: HOSPADM

## 2023-01-01 RX ORDER — OMEPRAZOLE 40 MG/1
40 CAPSULE, DELAYED RELEASE ORAL
Qty: 60 CAPSULE | Refills: 4 | Status: SHIPPED | OUTPATIENT
Start: 2023-01-01 | End: 2024-01-01

## 2023-01-01 RX ORDER — NALOXONE HYDROCHLORIDE 0.4 MG/ML
0.2 INJECTION, SOLUTION INTRAMUSCULAR; INTRAVENOUS; SUBCUTANEOUS
Status: DISCONTINUED | OUTPATIENT
Start: 2023-01-01 | End: 2023-01-01 | Stop reason: HOSPADM

## 2023-01-01 RX ORDER — METOCLOPRAMIDE HYDROCHLORIDE 5 MG/5ML
5-10 SOLUTION ORAL 3 TIMES DAILY PRN
Qty: 473 ML | Refills: 1 | Status: SHIPPED | OUTPATIENT
Start: 2023-01-01 | End: 2023-01-01

## 2023-01-01 RX ORDER — ACETAMINOPHEN 325 MG/1
975 TABLET ORAL EVERY 8 HOURS
Status: COMPLETED | OUTPATIENT
Start: 2023-01-01 | End: 2023-01-01

## 2023-01-01 RX ORDER — CLARITHROMYCIN 250 MG/5ML
500 FOR SUSPENSION ORAL 2 TIMES DAILY
Qty: 100 ML | Refills: 0 | Status: SHIPPED | OUTPATIENT
Start: 2023-01-01 | End: 2023-01-01

## 2023-01-01 RX ORDER — PROCHLORPERAZINE MALEATE 10 MG
10 TABLET ORAL EVERY 6 HOURS PRN
Qty: 30 TABLET | Refills: 2 | Status: SHIPPED | OUTPATIENT
Start: 2023-01-01 | End: 2023-01-01

## 2023-01-01 RX ORDER — NALOXONE HYDROCHLORIDE 0.4 MG/ML
0.2 INJECTION, SOLUTION INTRAMUSCULAR; INTRAVENOUS; SUBCUTANEOUS
Status: CANCELLED | OUTPATIENT
Start: 2023-01-01

## 2023-01-01 RX ORDER — CEPHALEXIN 250 MG/5ML
500 POWDER, FOR SUSPENSION ORAL 4 TIMES DAILY
Qty: 280 ML | Refills: 0 | Status: SHIPPED | OUTPATIENT
Start: 2023-01-01 | End: 2023-01-01

## 2023-01-01 RX ORDER — ENOXAPARIN SODIUM 100 MG/ML
40 INJECTION SUBCUTANEOUS EVERY 24 HOURS
Status: DISCONTINUED | OUTPATIENT
Start: 2023-01-01 | End: 2023-01-01

## 2023-01-01 RX ORDER — NALOXONE HYDROCHLORIDE 0.4 MG/ML
0.4 INJECTION, SOLUTION INTRAMUSCULAR; INTRAVENOUS; SUBCUTANEOUS
Status: DISCONTINUED | OUTPATIENT
Start: 2023-01-01 | End: 2023-01-01 | Stop reason: HOSPADM

## 2023-01-01 RX ORDER — METRONIDAZOLE 500 MG/1
500 TABLET ORAL 3 TIMES DAILY
Qty: 42 TABLET | Refills: 0 | Status: ON HOLD | OUTPATIENT
Start: 2023-01-01 | End: 2023-01-01

## 2023-01-01 RX ORDER — CLARITHROMYCIN 500 MG
500 TABLET ORAL 2 TIMES DAILY
Qty: 28 TABLET | Refills: 0 | Status: ON HOLD | OUTPATIENT
Start: 2023-01-01 | End: 2023-01-01

## 2023-01-01 RX ORDER — ACETAMINOPHEN 325 MG/1
975 TABLET ORAL ONCE
Status: DISCONTINUED | OUTPATIENT
Start: 2023-01-01 | End: 2023-01-01 | Stop reason: HOSPADM

## 2023-01-01 RX ORDER — METHOCARBAMOL 500 MG/1
500 TABLET, FILM COATED ORAL EVERY 6 HOURS PRN
Status: DISCONTINUED | OUTPATIENT
Start: 2023-01-01 | End: 2023-01-01 | Stop reason: HOSPADM

## 2023-01-01 RX ORDER — HEPARIN SODIUM 5000 [USP'U]/.5ML
5000 INJECTION, SOLUTION INTRAVENOUS; SUBCUTANEOUS
Status: COMPLETED | OUTPATIENT
Start: 2023-01-01 | End: 2023-01-01

## 2023-01-01 RX ORDER — GLYCOPYRROLATE 0.2 MG/ML
INJECTION, SOLUTION INTRAMUSCULAR; INTRAVENOUS PRN
Status: DISCONTINUED | OUTPATIENT
Start: 2023-01-01 | End: 2023-01-01

## 2023-01-01 RX ORDER — HYDROMORPHONE HCL IN WATER/PF 6 MG/30 ML
0.2 PATIENT CONTROLLED ANALGESIA SYRINGE INTRAVENOUS EVERY 5 MIN PRN
Status: DISCONTINUED | OUTPATIENT
Start: 2023-01-01 | End: 2023-01-01 | Stop reason: HOSPADM

## 2023-01-01 RX ORDER — ONDANSETRON 4 MG/1
4-8 TABLET, ORALLY DISINTEGRATING ORAL EVERY 6 HOURS PRN
Status: DISCONTINUED | OUTPATIENT
Start: 2023-01-01 | End: 2023-01-01 | Stop reason: HOSPADM

## 2023-01-01 RX ORDER — SIMETHICONE 40MG/0.6ML
40 SUSPENSION, DROPS(FINAL DOSAGE FORM)(ML) ORAL 2 TIMES DAILY PRN
COMMUNITY
End: 2023-01-01

## 2023-01-01 RX ORDER — ONDANSETRON 4 MG/1
4 TABLET, ORALLY DISINTEGRATING ORAL EVERY 30 MIN PRN
Status: DISCONTINUED | OUTPATIENT
Start: 2023-01-01 | End: 2023-01-01 | Stop reason: HOSPADM

## 2023-01-01 RX ORDER — LIDOCAINE HYDROCHLORIDE 20 MG/ML
INJECTION, SOLUTION INFILTRATION; PERINEURAL PRN
Status: DISCONTINUED | OUTPATIENT
Start: 2023-01-01 | End: 2023-01-01

## 2023-01-01 RX ORDER — FLUMAZENIL 0.1 MG/ML
0.2 INJECTION, SOLUTION INTRAVENOUS
Status: ACTIVE | OUTPATIENT
Start: 2023-01-01 | End: 2023-01-01

## 2023-01-01 RX ORDER — OXYCODONE HCL 5 MG/5 ML
2.5 SOLUTION, ORAL ORAL EVERY 4 HOURS PRN
Status: DISCONTINUED | OUTPATIENT
Start: 2023-01-01 | End: 2023-01-01 | Stop reason: HOSPADM

## 2023-01-01 RX ORDER — FAMOTIDINE 40 MG/5ML
20 POWDER, FOR SUSPENSION ORAL DAILY PRN
Qty: 50 ML | Refills: 3 | Status: SHIPPED | OUTPATIENT
Start: 2023-01-01 | End: 2024-01-01

## 2023-01-01 RX ORDER — DIPHENHYDRAMINE HCL 25 MG
50 CAPSULE ORAL ONCE
Status: CANCELLED
Start: 2023-01-01

## 2023-01-01 RX ORDER — PANTOPRAZOLE SODIUM 40 MG/1
TABLET, DELAYED RELEASE ORAL
Qty: 30 TABLET | Refills: 4 | Status: ON HOLD | OUTPATIENT
Start: 2023-01-01 | End: 2023-01-01

## 2023-01-01 RX ORDER — ASPIRIN 325 MG
325 TABLET ORAL DAILY
Status: DISCONTINUED | OUTPATIENT
Start: 2023-01-01 | End: 2023-01-01 | Stop reason: HOSPADM

## 2023-01-01 RX ORDER — ACETAMINOPHEN 500 MG
1000 TABLET ORAL ONCE
Status: DISCONTINUED | OUTPATIENT
Start: 2023-01-01 | End: 2023-01-01 | Stop reason: DRUGHIGH

## 2023-01-01 RX ORDER — MEPERIDINE HYDROCHLORIDE 50 MG/ML
25 INJECTION INTRAMUSCULAR; INTRAVENOUS; SUBCUTANEOUS EVERY 30 MIN PRN
Status: CANCELLED | OUTPATIENT
Start: 2023-01-01

## 2023-01-01 RX ORDER — DIPHENHYDRAMINE HCL 50 MG
50 CAPSULE ORAL ONCE
Status: CANCELLED
Start: 2023-01-01

## 2023-01-01 RX ORDER — LIDOCAINE HYDROCHLORIDE 20 MG/ML
JELLY TOPICAL ONCE
Status: COMPLETED | OUTPATIENT
Start: 2023-01-01 | End: 2023-01-01

## 2023-01-01 RX ORDER — ONDANSETRON 4 MG/1
4 TABLET, ORALLY DISINTEGRATING ORAL EVERY 6 HOURS PRN
Status: CANCELLED | OUTPATIENT
Start: 2023-01-01

## 2023-01-01 RX ORDER — NALOXONE HYDROCHLORIDE 0.4 MG/ML
0.4 INJECTION, SOLUTION INTRAMUSCULAR; INTRAVENOUS; SUBCUTANEOUS
Status: CANCELLED | OUTPATIENT
Start: 2023-01-01

## 2023-01-01 RX ORDER — ONDANSETRON HYDROCHLORIDE 4 MG/5ML
4 SOLUTION ORAL 2 TIMES DAILY PRN
Qty: 60 ML | Refills: 0 | Status: SHIPPED | OUTPATIENT
Start: 2023-01-01 | End: 2023-01-01

## 2023-01-01 RX ORDER — HYDROMORPHONE HCL IN WATER/PF 6 MG/30 ML
0.2 PATIENT CONTROLLED ANALGESIA SYRINGE INTRAVENOUS
Status: DISCONTINUED | OUTPATIENT
Start: 2023-01-01 | End: 2023-01-01

## 2023-01-01 RX ORDER — SIMETHICONE 40MG/0.6ML
40 SUSPENSION, DROPS(FINAL DOSAGE FORM)(ML) ORAL EVERY 6 HOURS PRN
Status: DISCONTINUED | OUTPATIENT
Start: 2023-01-01 | End: 2023-01-01 | Stop reason: HOSPADM

## 2023-01-01 RX ORDER — CARVEDILOL 6.25 MG/1
TABLET ORAL
Qty: 90 TABLET | Refills: 0 | Status: SHIPPED | OUTPATIENT
Start: 2023-01-01 | End: 2024-01-01

## 2023-01-01 RX ORDER — CARVEDILOL 6.25 MG/1
TABLET ORAL
Qty: 60 TABLET | Refills: 0 | Status: SHIPPED | OUTPATIENT
Start: 2023-01-01 | End: 2023-01-01

## 2023-01-01 RX ORDER — PROCHLORPERAZINE 25 MG
12.5 SUPPOSITORY, RECTAL RECTAL EVERY 12 HOURS PRN
Status: DISCONTINUED | OUTPATIENT
Start: 2023-01-01 | End: 2023-01-01 | Stop reason: HOSPADM

## 2023-01-01 RX ORDER — ASPIRIN 81 MG/1
81 TABLET ORAL DAILY
COMMUNITY

## 2023-01-01 RX ORDER — ACETAMINOPHEN 325 MG/10.15ML
650 LIQUID ORAL EVERY 4 HOURS PRN
Qty: 500 ML | Refills: 3 | OUTPATIENT
Start: 2023-01-01

## 2023-01-01 RX ORDER — OXYCODONE HCL 5 MG/5 ML
5 SOLUTION, ORAL ORAL EVERY 6 HOURS PRN
Qty: 300 ML | Refills: 0 | Status: SHIPPED | OUTPATIENT
Start: 2023-01-01 | End: 2023-01-01

## 2023-01-01 RX ORDER — OXYCODONE HYDROCHLORIDE 5 MG/1
10 TABLET ORAL
Status: DISCONTINUED | OUTPATIENT
Start: 2023-01-01 | End: 2023-01-01 | Stop reason: HOSPADM

## 2023-01-01 RX ORDER — ONDANSETRON 4 MG/1
4 TABLET, ORALLY DISINTEGRATING ORAL EVERY 6 HOURS PRN
Status: DISCONTINUED | OUTPATIENT
Start: 2023-01-01 | End: 2023-01-01

## 2023-01-01 RX ORDER — METRONIDAZOLE 500 MG/1
TABLET ORAL
COMMUNITY
Start: 2023-01-01 | End: 2023-01-01

## 2023-01-01 RX ORDER — IOPAMIDOL 755 MG/ML
79 INJECTION, SOLUTION INTRAVASCULAR ONCE
Status: COMPLETED | OUTPATIENT
Start: 2023-01-01 | End: 2023-01-01

## 2023-01-01 RX ORDER — DEXTROSE MONOHYDRATE 100 MG/ML
INJECTION, SOLUTION INTRAVENOUS CONTINUOUS PRN
Status: DISCONTINUED | OUTPATIENT
Start: 2023-01-01 | End: 2023-01-01

## 2023-01-01 RX ORDER — PROPOFOL 10 MG/ML
INJECTION, EMULSION INTRAVENOUS PRN
Status: DISCONTINUED | OUTPATIENT
Start: 2023-01-01 | End: 2023-01-01

## 2023-01-01 RX ORDER — SODIUM CHLORIDE, SODIUM LACTATE, POTASSIUM CHLORIDE, CALCIUM CHLORIDE 600; 310; 30; 20 MG/100ML; MG/100ML; MG/100ML; MG/100ML
INJECTION, SOLUTION INTRAVENOUS CONTINUOUS PRN
Status: DISCONTINUED | OUTPATIENT
Start: 2023-01-01 | End: 2023-01-01

## 2023-01-01 RX ORDER — HYDRALAZINE HYDROCHLORIDE 20 MG/ML
10 INJECTION INTRAMUSCULAR; INTRAVENOUS EVERY 30 MIN PRN
Status: DISCONTINUED | OUTPATIENT
Start: 2023-01-01 | End: 2023-01-01 | Stop reason: HOSPADM

## 2023-01-01 RX ORDER — HEPARIN SODIUM 5000 [USP'U]/.5ML
5000 INJECTION, SOLUTION INTRAVENOUS; SUBCUTANEOUS EVERY 8 HOURS
Status: DISCONTINUED | OUTPATIENT
Start: 2023-01-01 | End: 2023-01-01 | Stop reason: HOSPADM

## 2023-01-01 RX ORDER — AMLODIPINE BESYLATE 5 MG/1
5 TABLET ORAL DAILY
Status: DISCONTINUED | OUTPATIENT
Start: 2023-01-01 | End: 2023-01-01

## 2023-01-01 RX ORDER — HYDROMORPHONE HCL IN WATER/PF 6 MG/30 ML
0.5 PATIENT CONTROLLED ANALGESIA SYRINGE INTRAVENOUS ONCE
Status: COMPLETED | OUTPATIENT
Start: 2023-01-01 | End: 2023-01-01

## 2023-01-01 RX ORDER — FENTANYL CITRATE 50 UG/ML
INJECTION, SOLUTION INTRAMUSCULAR; INTRAVENOUS PRN
Status: DISCONTINUED | OUTPATIENT
Start: 2023-01-01 | End: 2023-01-01

## 2023-01-01 RX ORDER — BUPIVACAINE HYDROCHLORIDE AND EPINEPHRINE 2.5; 5 MG/ML; UG/ML
INJECTION, SOLUTION INFILTRATION; PERINEURAL PRN
Status: DISCONTINUED | OUTPATIENT
Start: 2023-01-01 | End: 2023-01-01 | Stop reason: HOSPADM

## 2023-01-01 RX ORDER — OXYCODONE HYDROCHLORIDE 5 MG/1
5 TABLET ORAL
Status: DISCONTINUED | OUTPATIENT
Start: 2023-01-01 | End: 2023-01-01 | Stop reason: HOSPADM

## 2023-01-01 RX ORDER — CLINDAMYCIN PHOSPHATE 900 MG/50ML
900 INJECTION, SOLUTION INTRAVENOUS SEE ADMIN INSTRUCTIONS
Status: DISCONTINUED | OUTPATIENT
Start: 2023-01-01 | End: 2023-01-01 | Stop reason: HOSPADM

## 2023-01-01 RX ORDER — DIPHENHYDRAMINE HYDROCHLORIDE AND LIDOCAINE HYDROCHLORIDE AND ALUMINUM HYDROXIDE AND MAGNESIUM HYDRO
10 KIT EVERY 6 HOURS PRN
Status: DISCONTINUED | OUTPATIENT
Start: 2023-01-01 | End: 2023-01-01 | Stop reason: HOSPADM

## 2023-01-01 RX ORDER — ONDANSETRON 2 MG/ML
4 INJECTION INTRAMUSCULAR; INTRAVENOUS EVERY 6 HOURS PRN
Status: DISCONTINUED | OUTPATIENT
Start: 2023-01-01 | End: 2023-01-01 | Stop reason: HOSPADM

## 2023-01-01 RX ORDER — METOCLOPRAMIDE HYDROCHLORIDE 5 MG/5ML
5-10 SOLUTION ORAL 3 TIMES DAILY PRN
Status: DISCONTINUED | OUTPATIENT
Start: 2023-01-01 | End: 2023-01-01 | Stop reason: HOSPADM

## 2023-01-01 RX ORDER — ACETAMINOPHEN 325 MG/10.15ML
650 LIQUID ORAL EVERY 4 HOURS PRN
Status: DISCONTINUED | OUTPATIENT
Start: 2023-01-01 | End: 2023-01-01 | Stop reason: HOSPADM

## 2023-01-01 RX ORDER — ALBUTEROL SULFATE 90 UG/1
1-2 AEROSOL, METERED RESPIRATORY (INHALATION)
Status: CANCELLED
Start: 2023-01-01

## 2023-01-01 RX ORDER — OXYCODONE HYDROCHLORIDE 5 MG/1
5 TABLET ORAL EVERY 4 HOURS PRN
Status: DISCONTINUED | OUTPATIENT
Start: 2023-01-01 | End: 2023-01-01

## 2023-01-01 RX ORDER — ONDANSETRON 2 MG/ML
INJECTION INTRAMUSCULAR; INTRAVENOUS PRN
Status: DISCONTINUED | OUTPATIENT
Start: 2023-01-01 | End: 2023-01-01

## 2023-01-01 RX ORDER — EPINEPHRINE 1 MG/ML
0.3 INJECTION, SOLUTION INTRAMUSCULAR; SUBCUTANEOUS EVERY 5 MIN PRN
Status: CANCELLED | OUTPATIENT
Start: 2023-01-01

## 2023-01-01 RX ORDER — AMINO AC/PROTEIN HYDR/WHEY PRO 10G-100/30
1 LIQUID (ML) ORAL 3 TIMES DAILY
Status: DISCONTINUED | OUTPATIENT
Start: 2023-01-01 | End: 2023-01-01 | Stop reason: HOSPADM

## 2023-01-01 RX ORDER — ACETAMINOPHEN 160 MG/5ML
500 LIQUID ORAL EVERY 6 HOURS PRN
Qty: 473 ML | Refills: 3 | Status: SHIPPED | OUTPATIENT
Start: 2023-01-01 | End: 2024-01-01

## 2023-01-01 RX ORDER — SODIUM CHLORIDE 9 MG/ML
INJECTION, SOLUTION INTRAVENOUS CONTINUOUS
Status: DISCONTINUED | OUTPATIENT
Start: 2023-01-01 | End: 2023-01-01 | Stop reason: HOSPADM

## 2023-01-01 RX ORDER — DEXTROSE MONOHYDRATE, SODIUM CHLORIDE, AND POTASSIUM CHLORIDE 50; 1.49; 4.5 G/1000ML; G/1000ML; G/1000ML
INJECTION, SOLUTION INTRAVENOUS CONTINUOUS
Status: DISCONTINUED | OUTPATIENT
Start: 2023-01-01 | End: 2023-01-01

## 2023-01-01 RX ORDER — CLARITHROMYCIN 500 MG
500 TABLET ORAL 2 TIMES DAILY
Status: DISCONTINUED | OUTPATIENT
Start: 2023-01-01 | End: 2023-01-01

## 2023-01-01 RX ORDER — AMLODIPINE BESYLATE 5 MG/1
TABLET ORAL
Qty: 90 TABLET | Refills: 0 | Status: SHIPPED | OUTPATIENT
Start: 2023-01-01 | End: 2023-01-01

## 2023-01-01 RX ORDER — CARVEDILOL 6.25 MG/1
6.25 TABLET ORAL 2 TIMES DAILY WITH MEALS
Status: DISCONTINUED | OUTPATIENT
Start: 2023-01-01 | End: 2023-01-01

## 2023-01-01 RX ORDER — ONDANSETRON 2 MG/ML
4 INJECTION INTRAMUSCULAR; INTRAVENOUS EVERY 6 HOURS PRN
Status: CANCELLED | OUTPATIENT
Start: 2023-01-01

## 2023-01-01 RX ORDER — POLYETHYLENE GLYCOL 3350 17 G/17G
17 POWDER, FOR SOLUTION ORAL DAILY
Status: DISCONTINUED | OUTPATIENT
Start: 2023-01-01 | End: 2023-01-01

## 2023-01-01 RX ORDER — ACETAMINOPHEN 325 MG/1
975 TABLET ORAL ONCE
Status: COMPLETED | OUTPATIENT
Start: 2023-01-01 | End: 2023-01-01

## 2023-01-01 RX ORDER — DEXTROMETHORPHAN HBR AND GUAIFENESIN 5; 100 MG/5ML; MG/5ML
10 LIQUID ORAL EVERY 6 HOURS PRN
Qty: 237 ML | Refills: 0 | Status: SHIPPED | OUTPATIENT
Start: 2023-01-01 | End: 2023-01-01

## 2023-01-01 RX ORDER — ONDANSETRON HYDROCHLORIDE 4 MG/5ML
4 SOLUTION ORAL EVERY 8 HOURS PRN
Qty: 50 ML | Refills: 3 | Status: SHIPPED | OUTPATIENT
Start: 2023-01-01 | End: 2024-01-01

## 2023-01-01 RX ORDER — PROCHLORPERAZINE MALEATE 5 MG
5 TABLET ORAL EVERY 6 HOURS PRN
Status: DISCONTINUED | OUTPATIENT
Start: 2023-01-01 | End: 2023-01-01

## 2023-01-01 RX ORDER — HYDROMORPHONE HCL IN WATER/PF 6 MG/30 ML
0.4 PATIENT CONTROLLED ANALGESIA SYRINGE INTRAVENOUS EVERY 5 MIN PRN
Status: DISCONTINUED | OUTPATIENT
Start: 2023-01-01 | End: 2023-01-01 | Stop reason: HOSPADM

## 2023-01-01 RX ORDER — POLYETHYLENE GLYCOL 3350 17 G/17G
17 POWDER, FOR SOLUTION ORAL DAILY
Status: DISCONTINUED | OUTPATIENT
Start: 2023-01-01 | End: 2023-01-01 | Stop reason: HOSPADM

## 2023-01-01 RX ORDER — CLINDAMYCIN PHOSPHATE 900 MG/50ML
900 INJECTION, SOLUTION INTRAVENOUS
Status: COMPLETED | OUTPATIENT
Start: 2023-01-01 | End: 2023-01-01

## 2023-01-01 RX ORDER — PROCHLORPERAZINE MALEATE 5 MG
5 TABLET ORAL EVERY 6 HOURS PRN
Status: CANCELLED | OUTPATIENT
Start: 2023-01-01

## 2023-01-01 RX ORDER — ONDANSETRON 4 MG/1
4 TABLET, ORALLY DISINTEGRATING ORAL EVERY 6 HOURS PRN
Status: DISCONTINUED | OUTPATIENT
Start: 2023-01-01 | End: 2023-01-01 | Stop reason: HOSPADM

## 2023-01-01 RX ORDER — ONDANSETRON 2 MG/ML
4 INJECTION INTRAMUSCULAR; INTRAVENOUS ONCE
Status: COMPLETED | OUTPATIENT
Start: 2023-01-01 | End: 2023-01-01

## 2023-01-01 RX ORDER — PROCHLORPERAZINE MALEATE 5 MG
5 TABLET ORAL EVERY 6 HOURS PRN
Status: DISCONTINUED | OUTPATIENT
Start: 2023-01-01 | End: 2023-01-01 | Stop reason: HOSPADM

## 2023-01-01 RX ORDER — PANTOPRAZOLE SODIUM 40 MG/1
40 TABLET, DELAYED RELEASE ORAL 2 TIMES DAILY
Qty: 60 TABLET | Refills: 0 | Status: SHIPPED | OUTPATIENT
Start: 2023-01-01 | End: 2023-01-01

## 2023-01-01 RX ORDER — CLARITHROMYCIN 250 MG/5ML
500 FOR SUSPENSION ORAL 2 TIMES DAILY
Status: DISCONTINUED | OUTPATIENT
Start: 2023-01-01 | End: 2023-01-01 | Stop reason: HOSPADM

## 2023-01-01 RX ORDER — CARVEDILOL 6.25 MG/1
6.25 TABLET ORAL 2 TIMES DAILY WITH MEALS
Status: DISCONTINUED | OUTPATIENT
Start: 2023-01-01 | End: 2023-01-01 | Stop reason: HOSPADM

## 2023-01-01 RX ORDER — METHOCARBAMOL 500 MG/1
500 TABLET, FILM COATED ORAL EVERY 6 HOURS PRN
Status: DISCONTINUED | OUTPATIENT
Start: 2023-01-01 | End: 2023-01-01

## 2023-01-01 RX ORDER — FENTANYL CITRATE 50 UG/ML
50 INJECTION, SOLUTION INTRAMUSCULAR; INTRAVENOUS EVERY 5 MIN PRN
Status: DISCONTINUED | OUTPATIENT
Start: 2023-01-01 | End: 2023-01-01 | Stop reason: HOSPADM

## 2023-01-01 RX ORDER — ACETAMINOPHEN 325 MG/1
650 TABLET ORAL ONCE
Status: DISCONTINUED | OUTPATIENT
Start: 2023-01-01 | End: 2023-01-01 | Stop reason: DRUGHIGH

## 2023-01-01 RX ORDER — ONDANSETRON 2 MG/ML
4-8 INJECTION INTRAMUSCULAR; INTRAVENOUS EVERY 6 HOURS PRN
Status: DISCONTINUED | OUTPATIENT
Start: 2023-01-01 | End: 2023-01-01 | Stop reason: HOSPADM

## 2023-01-01 RX ORDER — FLUMAZENIL 0.1 MG/ML
0.2 INJECTION, SOLUTION INTRAVENOUS
Status: CANCELLED | OUTPATIENT
Start: 2023-01-01 | End: 2023-01-01

## 2023-01-01 RX ORDER — ACETAMINOPHEN 325 MG/1
650 TABLET ORAL EVERY 4 HOURS PRN
Status: DISCONTINUED | OUTPATIENT
Start: 2023-01-01 | End: 2023-01-01

## 2023-01-01 RX ORDER — ACETAMINOPHEN 325 MG/10.15ML
650 LIQUID ORAL EVERY 4 HOURS PRN
Qty: 500 ML | Refills: 0 | Status: SHIPPED | OUTPATIENT
Start: 2023-01-01 | End: 2023-01-01

## 2023-01-01 RX ORDER — PANTOPRAZOLE SODIUM 40 MG/1
40 TABLET, DELAYED RELEASE ORAL DAILY
Status: DISCONTINUED | OUTPATIENT
Start: 2023-01-01 | End: 2023-01-01

## 2023-01-01 RX ORDER — FENTANYL CITRATE 50 UG/ML
INJECTION, SOLUTION INTRAMUSCULAR; INTRAVENOUS PRN
Status: DISCONTINUED | OUTPATIENT
Start: 2023-01-01 | End: 2023-01-01 | Stop reason: HOSPADM

## 2023-01-01 RX ORDER — ONDANSETRON 8 MG/1
8 TABLET, ORALLY DISINTEGRATING ORAL EVERY 8 HOURS PRN
Qty: 30 TABLET | Refills: 3 | Status: SHIPPED | OUTPATIENT
Start: 2023-01-01 | End: 2023-01-01

## 2023-01-01 RX ORDER — ATORVASTATIN CALCIUM 10 MG/1
10 TABLET, FILM COATED ORAL DAILY
Status: DISCONTINUED | OUTPATIENT
Start: 2023-01-01 | End: 2023-01-01

## 2023-01-01 RX ADMIN — OXYCODONE HYDROCHLORIDE 5 MG: 5 TABLET ORAL at 20:18

## 2023-01-01 RX ADMIN — GLYCOPYRROLATE 0.2 MG: 0.2 INJECTION, SOLUTION INTRAMUSCULAR; INTRAVENOUS at 14:30

## 2023-01-01 RX ADMIN — SENNOSIDES AND DOCUSATE SODIUM 1 TABLET: 50; 8.6 TABLET ORAL at 20:01

## 2023-01-01 RX ADMIN — SODIUM CHLORIDE, POTASSIUM CHLORIDE, SODIUM LACTATE AND CALCIUM CHLORIDE: 600; 310; 30; 20 INJECTION, SOLUTION INTRAVENOUS at 10:12

## 2023-01-01 RX ADMIN — OXYCODONE HYDROCHLORIDE 2.5 MG: 5 SOLUTION ORAL at 13:05

## 2023-01-01 RX ADMIN — ACETAMINOPHEN 975 MG: 325 TABLET, FILM COATED ORAL at 20:18

## 2023-01-01 RX ADMIN — ACETAMINOPHEN 975 MG: 325 TABLET, FILM COATED ORAL at 19:47

## 2023-01-01 RX ADMIN — METRONIDAZOLE 500 MG: 500 TABLET ORAL at 09:29

## 2023-01-01 RX ADMIN — OXYCODONE HYDROCHLORIDE 5 MG: 5 TABLET ORAL at 04:51

## 2023-01-01 RX ADMIN — HEPARIN SODIUM 5000 UNITS: 5000 INJECTION, SOLUTION INTRAVENOUS; SUBCUTANEOUS at 05:07

## 2023-01-01 RX ADMIN — Medication 2.5 MG: at 05:07

## 2023-01-01 RX ADMIN — PANTOPRAZOLE SODIUM 40 MG: 40 INJECTION, POWDER, LYOPHILIZED, FOR SOLUTION INTRAVENOUS at 17:05

## 2023-01-01 RX ADMIN — PACLITAXEL 91 MG: 6 INJECTION, SOLUTION INTRAVENOUS at 11:02

## 2023-01-01 RX ADMIN — ACETAMINOPHEN 975 MG: 325 TABLET, FILM COATED ORAL at 11:46

## 2023-01-01 RX ADMIN — DICLOFENAC SODIUM 2 G: 10 GEL TOPICAL at 09:27

## 2023-01-01 RX ADMIN — OXYCODONE HYDROCHLORIDE 2.5 MG: 5 SOLUTION ORAL at 14:36

## 2023-01-01 RX ADMIN — OXYCODONE HYDROCHLORIDE 2.5 MG: 5 SOLUTION ORAL at 02:55

## 2023-01-01 RX ADMIN — PANTOPRAZOLE SODIUM 40 MG: 40 INJECTION, POWDER, FOR SOLUTION INTRAVENOUS at 16:05

## 2023-01-01 RX ADMIN — DICLOFENAC SODIUM 2 G: 10 GEL TOPICAL at 23:47

## 2023-01-01 RX ADMIN — SODIUM PHOSPHATE 1 ENEMA: 7; 19 ENEMA RECTAL at 09:31

## 2023-01-01 RX ADMIN — LIDOCAINE HYDROCHLORIDE 80 MG: 20 INJECTION, SOLUTION INFILTRATION; PERINEURAL at 09:11

## 2023-01-01 RX ADMIN — GLYCOPYRROLATE 0.2 MG: 0.2 INJECTION, SOLUTION INTRAMUSCULAR; INTRAVENOUS at 13:57

## 2023-01-01 RX ADMIN — CARVEDILOL 6.25 MG: 6.25 TABLET, FILM COATED ORAL at 09:29

## 2023-01-01 RX ADMIN — FAMOTIDINE 20 MG: 10 INJECTION INTRAVENOUS at 12:40

## 2023-01-01 RX ADMIN — LIDOCAINE HYDROCHLORIDE 50 MG: 10 INJECTION, SOLUTION EPIDURAL; INFILTRATION; INTRACAUDAL; PERINEURAL at 11:03

## 2023-01-01 RX ADMIN — DEXAMETHASONE SODIUM PHOSPHATE: 10 INJECTION, SOLUTION INTRAMUSCULAR; INTRAVENOUS at 10:57

## 2023-01-01 RX ADMIN — LIDOCAINE HYDROCHLORIDE 40 MG: 20 INJECTION, SOLUTION INFILTRATION; PERINEURAL at 10:33

## 2023-01-01 RX ADMIN — IOPAMIDOL 79 ML: 755 INJECTION, SOLUTION INTRAVENOUS at 11:29

## 2023-01-01 RX ADMIN — SODIUM CHLORIDE, POTASSIUM CHLORIDE, SODIUM LACTATE AND CALCIUM CHLORIDE: 600; 310; 30; 20 INJECTION, SOLUTION INTRAVENOUS at 19:25

## 2023-01-01 RX ADMIN — SODIUM CHLORIDE, SODIUM LACTATE, POTASSIUM CHLORIDE, CALCIUM CHLORIDE: 600; 310; 30; 20 INJECTION, SOLUTION INTRAVENOUS at 08:57

## 2023-01-01 RX ADMIN — POLYETHYLENE GLYCOL 3350 17 G: 17 POWDER, FOR SOLUTION ORAL at 08:33

## 2023-01-01 RX ADMIN — LIDOCAINE HYDROCHLORIDE 0.1 ML: 10 INJECTION, SOLUTION EPIDURAL; INFILTRATION; INTRACAUDAL; PERINEURAL at 10:21

## 2023-01-01 RX ADMIN — Medication 2.5 MG: at 02:06

## 2023-01-01 RX ADMIN — Medication 20 MG: at 13:55

## 2023-01-01 RX ADMIN — CLINDAMYCIN PHOSPHATE 900 MG: 900 INJECTION, SOLUTION INTRAVENOUS at 13:30

## 2023-01-01 RX ADMIN — SODIUM CHLORIDE, SODIUM LACTATE, POTASSIUM CHLORIDE, CALCIUM CHLORIDE: 600; 310; 30; 20 INJECTION, SOLUTION INTRAVENOUS at 09:06

## 2023-01-01 RX ADMIN — Medication 2.5 MG: at 00:52

## 2023-01-01 RX ADMIN — FAMOTIDINE 20 MG: 10 INJECTION INTRAVENOUS at 11:09

## 2023-01-01 RX ADMIN — PHENYLEPHRINE HYDROCHLORIDE 100 MCG: 10 INJECTION INTRAVENOUS at 15:34

## 2023-01-01 RX ADMIN — ATORVASTATIN CALCIUM 10 MG: 10 TABLET, FILM COATED ORAL at 08:33

## 2023-01-01 RX ADMIN — CLARITHROMYCIN 500 MG: 500 TABLET ORAL at 08:53

## 2023-01-01 RX ADMIN — DEXAMETHASONE SODIUM PHOSPHATE: 10 INJECTION, SOLUTION INTRAMUSCULAR; INTRAVENOUS at 10:58

## 2023-01-01 RX ADMIN — PACLITAXEL 91 MG: 6 INJECTION, SOLUTION INTRAVENOUS at 13:35

## 2023-01-01 RX ADMIN — ONDANSETRON 4 MG: 4 TABLET, ORALLY DISINTEGRATING ORAL at 02:45

## 2023-01-01 RX ADMIN — OXYCODONE HYDROCHLORIDE 5 MG: 5 SOLUTION ORAL at 17:24

## 2023-01-01 RX ADMIN — ONDANSETRON 4 MG: 4 TABLET, ORALLY DISINTEGRATING ORAL at 09:52

## 2023-01-01 RX ADMIN — PANTOPRAZOLE SODIUM 40 MG: 40 TABLET, DELAYED RELEASE ORAL at 09:29

## 2023-01-01 RX ADMIN — PROPOFOL 20 MG: 10 INJECTION, EMULSION INTRAVENOUS at 10:35

## 2023-01-01 RX ADMIN — Medication 10 MG: at 14:31

## 2023-01-01 RX ADMIN — HEPARIN SODIUM 5000 UNITS: 5000 INJECTION, SOLUTION INTRAVENOUS; SUBCUTANEOUS at 09:30

## 2023-01-01 RX ADMIN — Medication 500 MG: at 14:36

## 2023-01-01 RX ADMIN — ONDANSETRON 4 MG: 2 INJECTION INTRAMUSCULAR; INTRAVENOUS at 16:38

## 2023-01-01 RX ADMIN — SODIUM CHLORIDE, POTASSIUM CHLORIDE, SODIUM LACTATE AND CALCIUM CHLORIDE: 600; 310; 30; 20 INJECTION, SOLUTION INTRAVENOUS at 04:57

## 2023-01-01 RX ADMIN — CARVEDILOL 6.25 MG: 6.25 TABLET, FILM COATED ORAL at 17:05

## 2023-01-01 RX ADMIN — CARBOPLATIN 135 MG: 10 INJECTION, SOLUTION INTRAVENOUS at 14:55

## 2023-01-01 RX ADMIN — CARVEDILOL 6.25 MG: 6.25 TABLET, FILM COATED ORAL at 09:34

## 2023-01-01 RX ADMIN — OXYCODONE HYDROCHLORIDE 5 MG: 5 TABLET ORAL at 04:55

## 2023-01-01 RX ADMIN — SODIUM CHLORIDE 1000 ML: 9 INJECTION, SOLUTION INTRAVENOUS at 11:06

## 2023-01-01 RX ADMIN — SENNOSIDES AND DOCUSATE SODIUM 1 TABLET: 50; 8.6 TABLET ORAL at 08:33

## 2023-01-01 RX ADMIN — CARVEDILOL 6.25 MG: 6.25 TABLET, FILM COATED ORAL at 16:24

## 2023-01-01 RX ADMIN — METRONIDAZOLE 500 MG: 500 TABLET ORAL at 13:24

## 2023-01-01 RX ADMIN — FAMOTIDINE 20 MG: 10 INJECTION INTRAVENOUS at 10:40

## 2023-01-01 RX ADMIN — SODIUM CHLORIDE 250 ML: 9 INJECTION, SOLUTION INTRAVENOUS at 10:40

## 2023-01-01 RX ADMIN — CLARITHROMYCIN 500 MG: 500 TABLET ORAL at 20:18

## 2023-01-01 RX ADMIN — Medication 40 MG: at 10:57

## 2023-01-01 RX ADMIN — FLUDEOXYGLUCOSE F-18 10.36 MILLICURIE: 500 INJECTION, SOLUTION INTRAVENOUS at 11:57

## 2023-01-01 RX ADMIN — HEPARIN SODIUM 5000 UNITS: 5000 INJECTION, SOLUTION INTRAVENOUS; SUBCUTANEOUS at 11:00

## 2023-01-01 RX ADMIN — DEXAMETHASONE SODIUM PHOSPHATE: 10 INJECTION, SOLUTION INTRAMUSCULAR; INTRAVENOUS at 13:06

## 2023-01-01 RX ADMIN — ATORVASTATIN CALCIUM 10 MG: 10 TABLET, FILM COATED ORAL at 09:29

## 2023-01-01 RX ADMIN — POTASSIUM & SODIUM PHOSPHATES POWDER PACK 280-160-250 MG 1 PACKET: 280-160-250 PACK at 04:32

## 2023-01-01 RX ADMIN — HEPARIN SODIUM 5000 UNITS: 5000 INJECTION, SOLUTION INTRAVENOUS; SUBCUTANEOUS at 04:27

## 2023-01-01 RX ADMIN — Medication 2.5 MG: at 20:39

## 2023-01-01 RX ADMIN — ONDANSETRON 4 MG: 2 INJECTION INTRAMUSCULAR; INTRAVENOUS at 13:38

## 2023-01-01 RX ADMIN — POTASSIUM & SODIUM PHOSPHATES POWDER PACK 280-160-250 MG 2 PACKET: 280-160-250 PACK at 15:48

## 2023-01-01 RX ADMIN — OXYCODONE HYDROCHLORIDE 5 MG: 5 TABLET ORAL at 20:00

## 2023-01-01 RX ADMIN — METRONIDAZOLE 500 MG: 500 TABLET ORAL at 22:00

## 2023-01-01 RX ADMIN — FAMOTIDINE 20 MG: 10 INJECTION INTRAVENOUS at 10:42

## 2023-01-01 RX ADMIN — PROPOFOL 150 MCG/KG/MIN: 10 INJECTION, EMULSION INTRAVENOUS at 10:33

## 2023-01-01 RX ADMIN — PROPOFOL 150 MCG/KG/MIN: 10 INJECTION, EMULSION INTRAVENOUS at 09:11

## 2023-01-01 RX ADMIN — PANTOPRAZOLE SODIUM 40 MG: 40 INJECTION, POWDER, LYOPHILIZED, FOR SOLUTION INTRAVENOUS at 04:40

## 2023-01-01 RX ADMIN — HEPARIN SODIUM 5000 UNITS: 5000 INJECTION, SOLUTION INTRAVENOUS; SUBCUTANEOUS at 19:03

## 2023-01-01 RX ADMIN — ACETAMINOPHEN 975 MG: 325 TABLET, FILM COATED ORAL at 11:07

## 2023-01-01 RX ADMIN — CARVEDILOL 6.25 MG: 6.25 TABLET, FILM COATED ORAL at 18:17

## 2023-01-01 RX ADMIN — Medication 2.5 MG: at 21:58

## 2023-01-01 RX ADMIN — Medication 2.5 MG: at 16:06

## 2023-01-01 RX ADMIN — CLARITHROMYCIN 500 MG: 250 FOR SUSPENSION ORAL at 11:02

## 2023-01-01 RX ADMIN — SUGAMMADEX 200 MG: 100 INJECTION, SOLUTION INTRAVENOUS at 15:35

## 2023-01-01 RX ADMIN — ACETAMINOPHEN 650 MG: 325 TABLET, FILM COATED ORAL at 20:39

## 2023-01-01 RX ADMIN — HEPARIN SODIUM 5000 UNITS: 5000 INJECTION, SOLUTION INTRAVENOUS; SUBCUTANEOUS at 14:36

## 2023-01-01 RX ADMIN — ACETAMINOPHEN 975 MG: 325 TABLET, FILM COATED ORAL at 11:12

## 2023-01-01 RX ADMIN — SODIUM CHLORIDE, POTASSIUM CHLORIDE, SODIUM LACTATE AND CALCIUM CHLORIDE: 600; 310; 30; 20 INJECTION, SOLUTION INTRAVENOUS at 13:26

## 2023-01-01 RX ADMIN — ACETAMINOPHEN 975 MG: 325 TABLET, FILM COATED ORAL at 04:50

## 2023-01-01 RX ADMIN — ACETAMINOPHEN 975 MG: 325 TABLET, FILM COATED ORAL at 17:23

## 2023-01-01 RX ADMIN — HEPARIN SODIUM 5000 UNITS: 5000 INJECTION, SOLUTION INTRAVENOUS; SUBCUTANEOUS at 13:36

## 2023-01-01 RX ADMIN — AMLODIPINE BESYLATE 5 MG: 5 TABLET ORAL at 13:24

## 2023-01-01 RX ADMIN — FENTANYL CITRATE 25 MCG: 50 INJECTION, SOLUTION INTRAMUSCULAR; INTRAVENOUS at 15:59

## 2023-01-01 RX ADMIN — METRONIDAZOLE 500 MG: 500 TABLET ORAL at 20:11

## 2023-01-01 RX ADMIN — HYDROMORPHONE HYDROCHLORIDE 0.5 MG: 0.2 INJECTION, SOLUTION INTRAMUSCULAR; INTRAVENOUS; SUBCUTANEOUS at 17:26

## 2023-01-01 RX ADMIN — CLARITHROMYCIN 500 MG: 500 TABLET ORAL at 09:30

## 2023-01-01 RX ADMIN — OXYCODONE HYDROCHLORIDE 5 MG: 5 TABLET ORAL at 13:21

## 2023-01-01 RX ADMIN — FENTANYL CITRATE 25 MCG: 50 INJECTION, SOLUTION INTRAMUSCULAR; INTRAVENOUS at 13:35

## 2023-01-01 RX ADMIN — SODIUM CHLORIDE 60 ML: 9 INJECTION, SOLUTION INTRAVENOUS at 11:29

## 2023-01-01 RX ADMIN — ATORVASTATIN CALCIUM 10 MG: 10 TABLET, FILM COATED ORAL at 08:46

## 2023-01-01 RX ADMIN — ATORVASTATIN CALCIUM 10 MG: 10 TABLET, FILM COATED ORAL at 09:34

## 2023-01-01 RX ADMIN — OXYCODONE HYDROCHLORIDE 5 MG: 5 TABLET ORAL at 16:51

## 2023-01-01 RX ADMIN — HEPARIN SODIUM 5000 UNITS: 5000 INJECTION, SOLUTION INTRAVENOUS; SUBCUTANEOUS at 20:10

## 2023-01-01 RX ADMIN — CLARITHROMYCIN 500 MG: 500 TABLET ORAL at 20:10

## 2023-01-01 RX ADMIN — LIDOCAINE HYDROCHLORIDE: 20 JELLY TOPICAL at 11:58

## 2023-01-01 RX ADMIN — ACETAMINOPHEN 975 MG: 325 TABLET, FILM COATED ORAL at 04:54

## 2023-01-01 RX ADMIN — OXYCODONE HYDROCHLORIDE 5 MG: 5 TABLET ORAL at 14:43

## 2023-01-01 RX ADMIN — POLYETHYLENE GLYCOL 3350 17 G: 17 POWDER, FOR SOLUTION ORAL at 09:30

## 2023-01-01 RX ADMIN — SENNOSIDES AND DOCUSATE SODIUM 1 TABLET: 50; 8.6 TABLET ORAL at 08:46

## 2023-01-01 RX ADMIN — OXYCODONE HYDROCHLORIDE 5 MG: 5 TABLET ORAL at 00:51

## 2023-01-01 RX ADMIN — METRONIDAZOLE 500 MG: 500 TABLET ORAL at 08:48

## 2023-01-01 RX ADMIN — FENTANYL CITRATE 75 MCG: 50 INJECTION, SOLUTION INTRAMUSCULAR; INTRAVENOUS at 13:58

## 2023-01-01 RX ADMIN — OXYCODONE HYDROCHLORIDE 5 MG: 5 TABLET ORAL at 09:53

## 2023-01-01 RX ADMIN — METHOCARBAMOL 500 MG: 500 TABLET ORAL at 11:13

## 2023-01-01 RX ADMIN — LIDOCAINE HYDROCHLORIDE 100 MG: 20 INJECTION, SOLUTION INFILTRATION; PERINEURAL at 14:35

## 2023-01-01 RX ADMIN — DIPHENHYDRAMINE HYDROCHLORIDE 50 MG: 50 INJECTION, SOLUTION INTRAMUSCULAR; INTRAVENOUS at 10:43

## 2023-01-01 RX ADMIN — HEPARIN SODIUM 5000 UNITS: 5000 INJECTION, SOLUTION INTRAVENOUS; SUBCUTANEOUS at 20:36

## 2023-01-01 RX ADMIN — POTASSIUM & SODIUM PHOSPHATES POWDER PACK 280-160-250 MG 1 PACKET: 280-160-250 PACK at 01:48

## 2023-01-01 RX ADMIN — FENTANYL CITRATE 25 MCG: 50 INJECTION, SOLUTION INTRAMUSCULAR; INTRAVENOUS at 16:23

## 2023-01-01 RX ADMIN — PACLITAXEL 91 MG: 6 INJECTION, SOLUTION INTRAVENOUS at 10:45

## 2023-01-01 RX ADMIN — DEXAMETHASONE SODIUM PHOSPHATE: 10 INJECTION, SOLUTION INTRAMUSCULAR; INTRAVENOUS at 10:48

## 2023-01-01 RX ADMIN — ACETAMINOPHEN 480 MG: 160 SUSPENSION ORAL at 06:26

## 2023-01-01 RX ADMIN — CARBOPLATIN 140 MG: 10 INJECTION INTRAVENOUS at 12:47

## 2023-01-01 RX ADMIN — METRONIDAZOLE 500 MG: 500 TABLET ORAL at 15:49

## 2023-01-01 RX ADMIN — OXYCODONE HYDROCHLORIDE 5 MG: 5 TABLET ORAL at 20:10

## 2023-01-01 RX ADMIN — POTASSIUM & SODIUM PHOSPHATES POWDER PACK 280-160-250 MG 2 PACKET: 280-160-250 PACK at 16:24

## 2023-01-01 RX ADMIN — Medication 1 PACKET: at 10:06

## 2023-01-01 RX ADMIN — DIPHENHYDRAMINE HYDROCHLORIDE 50 MG: 50 INJECTION, SOLUTION INTRAMUSCULAR; INTRAVENOUS at 10:29

## 2023-01-01 RX ADMIN — ACETAMINOPHEN 1000 MG: 160 SOLUTION ORAL at 16:12

## 2023-01-01 RX ADMIN — CARVEDILOL 6.25 MG: 6.25 TABLET, FILM COATED ORAL at 08:33

## 2023-01-01 RX ADMIN — CARVEDILOL 6.25 MG: 6.25 TABLET, FILM COATED ORAL at 17:07

## 2023-01-01 RX ADMIN — POLYETHYLENE GLYCOL 3350 17 G: 17 POWDER, FOR SOLUTION ORAL at 08:47

## 2023-01-01 RX ADMIN — POTASSIUM & SODIUM PHOSPHATES POWDER PACK 280-160-250 MG 1 PACKET: 280-160-250 PACK at 08:53

## 2023-01-01 RX ADMIN — PHENYLEPHRINE HYDROCHLORIDE 100 MCG: 10 INJECTION INTRAVENOUS at 13:47

## 2023-01-01 RX ADMIN — ATORVASTATIN CALCIUM 10 MG: 10 TABLET, FILM COATED ORAL at 08:53

## 2023-01-01 RX ADMIN — Medication 500 MG: at 10:57

## 2023-01-01 RX ADMIN — METRONIDAZOLE 500 MG: 500 TABLET ORAL at 20:18

## 2023-01-01 RX ADMIN — Medication 10 MG: at 14:54

## 2023-01-01 RX ADMIN — SODIUM CHLORIDE, SODIUM LACTATE, POTASSIUM CHLORIDE, CALCIUM CHLORIDE: 600; 310; 30; 20 INJECTION, SOLUTION INTRAVENOUS at 13:57

## 2023-01-01 RX ADMIN — OXYCODONE HYDROCHLORIDE 2.5 MG: 5 SOLUTION ORAL at 10:57

## 2023-01-01 RX ADMIN — DIPHENHYDRAMINE HYDROCHLORIDE 50 MG: 50 INJECTION, SOLUTION INTRAMUSCULAR; INTRAVENOUS at 10:07

## 2023-01-01 RX ADMIN — PANTOPRAZOLE SODIUM 40 MG: 40 TABLET, DELAYED RELEASE ORAL at 08:33

## 2023-01-01 RX ADMIN — CARBOPLATIN 140 MG: 10 INJECTION INTRAVENOUS at 12:09

## 2023-01-01 RX ADMIN — DIPHENHYDRAMINE HYDROCHLORIDE 50 MG: 50 INJECTION, SOLUTION INTRAMUSCULAR; INTRAVENOUS at 10:41

## 2023-01-01 RX ADMIN — CARBOPLATIN 145 MG: 10 INJECTION, SOLUTION INTRAVENOUS at 12:42

## 2023-01-01 RX ADMIN — HYDROMORPHONE HYDROCHLORIDE 0.2 MG: 0.2 INJECTION, SOLUTION INTRAMUSCULAR; INTRAVENOUS; SUBCUTANEOUS at 16:49

## 2023-01-01 RX ADMIN — POTASSIUM & SODIUM PHOSPHATES POWDER PACK 280-160-250 MG 2 PACKET: 280-160-250 PACK at 09:53

## 2023-01-01 RX ADMIN — CARVEDILOL 6.25 MG: 6.25 TABLET, FILM COATED ORAL at 08:46

## 2023-01-01 RX ADMIN — SODIUM CHLORIDE 250 ML: 9 INJECTION, SOLUTION INTRAVENOUS at 12:51

## 2023-01-01 RX ADMIN — SODIUM CHLORIDE, POTASSIUM CHLORIDE, SODIUM LACTATE AND CALCIUM CHLORIDE: 600; 310; 30; 20 INJECTION, SOLUTION INTRAVENOUS at 19:58

## 2023-01-01 RX ADMIN — CARVEDILOL 6.25 MG: 25 TABLET, FILM COATED ORAL at 10:57

## 2023-01-01 RX ADMIN — GLYCOPYRROLATE 0.2 MG: 0.2 INJECTION, SOLUTION INTRAMUSCULAR; INTRAVENOUS at 09:11

## 2023-01-01 RX ADMIN — ACETAMINOPHEN 975 MG: 325 TABLET, FILM COATED ORAL at 19:59

## 2023-01-01 RX ADMIN — METRONIDAZOLE 500 MG: 500 TABLET ORAL at 20:00

## 2023-01-01 RX ADMIN — CARVEDILOL 6.25 MG: 6.25 TABLET, FILM COATED ORAL at 21:59

## 2023-01-01 RX ADMIN — PACLITAXEL 91 MG: 6 INJECTION, SOLUTION INTRAVENOUS at 11:10

## 2023-01-01 RX ADMIN — ACETAMINOPHEN 650 MG: 325 TABLET, FILM COATED ORAL at 00:52

## 2023-01-01 RX ADMIN — METRONIDAZOLE 500 MG: 500 TABLET ORAL at 08:33

## 2023-01-01 RX ADMIN — ACETAMINOPHEN 650 MG: 325 TABLET, FILM COATED ORAL at 13:19

## 2023-01-01 RX ADMIN — DEXAMETHASONE SODIUM PHOSPHATE: 10 INJECTION, SOLUTION INTRAMUSCULAR; INTRAVENOUS at 10:25

## 2023-01-01 RX ADMIN — METRONIDAZOLE 500 MG: 500 TABLET ORAL at 16:24

## 2023-01-01 RX ADMIN — POTASSIUM CHLORIDE 40 MEQ: 40 SOLUTION ORAL at 16:25

## 2023-01-01 RX ADMIN — CLARITHROMYCIN 500 MG: 500 TABLET ORAL at 08:47

## 2023-01-01 RX ADMIN — SODIUM PHOSPHATE 1 ENEMA: 7; 19 ENEMA RECTAL at 11:10

## 2023-01-01 RX ADMIN — DIPHENHYDRAMINE HYDROCHLORIDE 50 MG: 50 INJECTION, SOLUTION INTRAMUSCULAR; INTRAVENOUS at 12:50

## 2023-01-01 RX ADMIN — ACETAMINOPHEN 650 MG: 325 TABLET, FILM COATED ORAL at 05:07

## 2023-01-01 RX ADMIN — SENNOSIDES AND DOCUSATE SODIUM 1 TABLET: 50; 8.6 TABLET ORAL at 20:39

## 2023-01-01 RX ADMIN — OXYCODONE HYDROCHLORIDE 5 MG: 5 TABLET ORAL at 04:45

## 2023-01-01 RX ADMIN — PROCHLORPERAZINE MALEATE 5 MG: 5 TABLET ORAL at 13:06

## 2023-01-01 RX ADMIN — FENTANYL CITRATE 25 MCG: 50 INJECTION, SOLUTION INTRAMUSCULAR; INTRAVENOUS at 16:15

## 2023-01-01 RX ADMIN — CLARITHROMYCIN 500 MG: 500 TABLET ORAL at 21:59

## 2023-01-01 RX ADMIN — METRONIDAZOLE 500 MG: 500 TABLET ORAL at 08:46

## 2023-01-01 RX ADMIN — PROPOFOL 150 MCG/KG/MIN: 10 INJECTION, EMULSION INTRAVENOUS at 14:35

## 2023-01-01 RX ADMIN — PROPOFOL 90 MG: 10 INJECTION, EMULSION INTRAVENOUS at 13:35

## 2023-01-01 RX ADMIN — ENOXAPARIN SODIUM 40 MG: 40 INJECTION SUBCUTANEOUS at 11:46

## 2023-01-01 RX ADMIN — ONDANSETRON 4 MG: 2 INJECTION INTRAMUSCULAR; INTRAVENOUS at 11:06

## 2023-01-01 RX ADMIN — SODIUM CHLORIDE, POTASSIUM CHLORIDE, SODIUM LACTATE AND CALCIUM CHLORIDE 1000 ML: 600; 310; 30; 20 INJECTION, SOLUTION INTRAVENOUS at 10:21

## 2023-01-01 RX ADMIN — CARBOPLATIN 155 MG: 10 INJECTION INTRAVENOUS at 12:31

## 2023-01-01 RX ADMIN — FAMOTIDINE 20 MG: 10 INJECTION INTRAVENOUS at 11:02

## 2023-01-01 RX ADMIN — METHOCARBAMOL 500 MG: 500 TABLET ORAL at 08:34

## 2023-01-01 RX ADMIN — PROPOFOL 60 MG: 10 INJECTION, EMULSION INTRAVENOUS at 09:13

## 2023-01-01 RX ADMIN — Medication 1 PACKET: at 14:35

## 2023-01-01 RX ADMIN — PACLITAXEL 91 MG: 6 INJECTION, SOLUTION INTRAVENOUS at 11:17

## 2023-01-01 RX ADMIN — CLARITHROMYCIN 500 MG: 500 TABLET ORAL at 08:33

## 2023-01-01 RX ADMIN — METRONIDAZOLE 500 MG: 500 TABLET ORAL at 14:29

## 2023-01-01 RX ADMIN — OXYCODONE HYDROCHLORIDE 5 MG: 5 TABLET ORAL at 09:24

## 2023-01-01 RX ADMIN — CLARITHROMYCIN 500 MG: 500 TABLET ORAL at 20:39

## 2023-01-01 RX ADMIN — PROPOFOL 150 MCG/KG/MIN: 10 INJECTION, EMULSION INTRAVENOUS at 11:03

## 2023-01-01 RX ADMIN — ONDANSETRON 8 MG: 2 INJECTION INTRAMUSCULAR; INTRAVENOUS at 17:31

## 2023-01-01 RX ADMIN — SENNOSIDES AND DOCUSATE SODIUM 1 TABLET: 50; 8.6 TABLET ORAL at 09:30

## 2023-01-01 RX ADMIN — SENNOSIDES AND DOCUSATE SODIUM 1 TABLET: 50; 8.6 TABLET ORAL at 20:18

## 2023-01-01 RX ADMIN — AMLODIPINE 5 MG: 1 SUSPENSION ORAL at 10:57

## 2023-01-01 RX ADMIN — HEPARIN SODIUM 5000 UNITS: 5000 INJECTION, SOLUTION INTRAVENOUS; SUBCUTANEOUS at 04:54

## 2023-01-01 RX ADMIN — SODIUM CHLORIDE 250 ML: 9 INJECTION, SOLUTION INTRAVENOUS at 10:06

## 2023-01-01 RX ADMIN — CARVEDILOL 6.25 MG: 6.25 TABLET, FILM COATED ORAL at 19:03

## 2023-01-01 RX ADMIN — HYDROMORPHONE HYDROCHLORIDE 0.2 MG: 0.2 INJECTION, SOLUTION INTRAMUSCULAR; INTRAVENOUS; SUBCUTANEOUS at 16:37

## 2023-01-01 RX ADMIN — PHENYLEPHRINE HYDROCHLORIDE 100 MCG: 10 INJECTION INTRAVENOUS at 15:08

## 2023-01-01 RX ADMIN — POTASSIUM & SODIUM PHOSPHATES POWDER PACK 280-160-250 MG 2 PACKET: 280-160-250 PACK at 09:30

## 2023-01-01 RX ADMIN — CLARITHROMYCIN 500 MG: 500 TABLET ORAL at 20:00

## 2023-01-01 RX ADMIN — SENNOSIDES AND DOCUSATE SODIUM 1 TABLET: 50; 8.6 TABLET ORAL at 20:10

## 2023-01-01 RX ADMIN — FLUDEOXYGLUCOSE F-18 12.04 MILLICURIE: 500 INJECTION, SOLUTION INTRAVENOUS at 10:17

## 2023-01-01 RX ADMIN — PANTOPRAZOLE SODIUM 40 MG: 40 TABLET, DELAYED RELEASE ORAL at 08:46

## 2023-01-01 RX ADMIN — SODIUM CHLORIDE, POTASSIUM CHLORIDE, SODIUM LACTATE AND CALCIUM CHLORIDE: 600; 310; 30; 20 INJECTION, SOLUTION INTRAVENOUS at 16:20

## 2023-01-01 RX ADMIN — POTASSIUM & SODIUM PHOSPHATES POWDER PACK 280-160-250 MG 2 PACKET: 280-160-250 PACK at 19:03

## 2023-01-01 RX ADMIN — ACETAMINOPHEN 480 MG: 160 SUSPENSION ORAL at 22:51

## 2023-01-01 RX ADMIN — HEPARIN SODIUM 5000 UNITS: 5000 INJECTION, SOLUTION INTRAVENOUS; SUBCUTANEOUS at 11:22

## 2023-01-01 RX ADMIN — SODIUM CHLORIDE 250 ML: 9 INJECTION, SOLUTION INTRAVENOUS at 10:41

## 2023-01-01 RX ADMIN — FENTANYL CITRATE 25 MCG: 50 INJECTION, SOLUTION INTRAMUSCULAR; INTRAVENOUS at 16:05

## 2023-01-01 RX ADMIN — PANTOPRAZOLE SODIUM 40 MG: 40 TABLET, DELAYED RELEASE ORAL at 08:53

## 2023-01-01 RX ADMIN — CARVEDILOL 6.25 MG: 6.25 TABLET, FILM COATED ORAL at 08:51

## 2023-01-01 RX ADMIN — CARVEDILOL 6.25 MG: 6.25 TABLET, FILM COATED ORAL at 20:00

## 2023-01-01 RX ADMIN — OXYCODONE HYDROCHLORIDE 5 MG: 5 TABLET ORAL at 06:30

## 2023-01-01 RX ADMIN — ACETAMINOPHEN 975 MG: 325 TABLET, FILM COATED ORAL at 04:26

## 2023-01-01 RX ADMIN — POLYETHYLENE GLYCOL 3350 17 G: 17 POWDER, FOR SOLUTION ORAL at 08:54

## 2023-01-01 RX ADMIN — LIDOCAINE HYDROCHLORIDE 100 MG: 20 INJECTION, SOLUTION INFILTRATION; PERINEURAL at 13:35

## 2023-01-01 RX ADMIN — Medication 50 MG: at 13:35

## 2023-01-01 RX ADMIN — PHENYLEPHRINE HYDROCHLORIDE 100 MCG: 10 INJECTION INTRAVENOUS at 14:36

## 2023-01-01 RX ADMIN — ONDANSETRON 4 MG: 2 INJECTION INTRAMUSCULAR; INTRAVENOUS at 10:36

## 2023-01-01 RX ADMIN — SODIUM CHLORIDE, POTASSIUM CHLORIDE, SODIUM LACTATE AND CALCIUM CHLORIDE: 600; 310; 30; 20 INJECTION, SOLUTION INTRAVENOUS at 06:01

## 2023-01-01 RX ADMIN — SENNOSIDES AND DOCUSATE SODIUM 1 TABLET: 50; 8.6 TABLET ORAL at 08:54

## 2023-01-01 RX ADMIN — PROCHLORPERAZINE EDISYLATE 5 MG: 5 INJECTION INTRAMUSCULAR; INTRAVENOUS at 06:24

## 2023-01-01 RX ADMIN — PROPOFOL 70 MG: 10 INJECTION, EMULSION INTRAVENOUS at 14:35

## 2023-01-01 RX ADMIN — METRONIDAZOLE 500 MG: 500 TABLET ORAL at 20:39

## 2023-01-01 ASSESSMENT — ACTIVITIES OF DAILY LIVING (ADL)
ADLS_ACUITY_SCORE: 24
DOING_ERRANDS_INDEPENDENTLY_DIFFICULTY: NO
CHANGE_IN_FUNCTIONAL_STATUS_SINCE_ONSET_OF_CURRENT_ILLNESS/INJURY: YES
ADLS_ACUITY_SCORE: 24
ADLS_ACUITY_SCORE: 35
ADLS_ACUITY_SCORE: 24
ADLS_ACUITY_SCORE: 28
DEPENDENT_IADLS:: INDEPENDENT
ADLS_ACUITY_SCORE: 24
ADLS_ACUITY_SCORE: 24
SWALLOWING: 2-->DIFFICULTY SWALLOWING LIQUIDS/FOODS
ADLS_ACUITY_SCORE: 35
ADLS_ACUITY_SCORE: 24
ADLS_ACUITY_SCORE: 24
ADLS_ACUITY_SCORE: 27
ADLS_ACUITY_SCORE: 24
PREVIOUS_RESPONSIBILITIES: MEAL PREP;HOUSEKEEPING;LAUNDRY;SHOPPING;YARDWORK;MEDICATION MANAGEMENT;FINANCES;DRIVING
ADLS_ACUITY_SCORE: 35
ADLS_ACUITY_SCORE: 24
CONCENTRATING,_REMEMBERING_OR_MAKING_DECISIONS_DIFFICULTY: NO
ADLS_ACUITY_SCORE: 24
ADLS_ACUITY_SCORE: 24
ADLS_ACUITY_SCORE: 28
ADLS_ACUITY_SCORE: 24
FALL_HISTORY_WITHIN_LAST_SIX_MONTHS: NO
ADLS_ACUITY_SCORE: 24
ADLS_ACUITY_SCORE: 27
ADLS_ACUITY_SCORE: 24
EATING: 1-->ASSISTANCE (EQUIPMENT/PERSON) NEEDED (NOT DEVELOPMENTALLY APPROPRIATE)
ADLS_ACUITY_SCORE: 24
EATING/SWALLOWING: EATING;SWALLOWING LIQUIDS;SWALLOWING SOLID FOOD
ADLS_ACUITY_SCORE: 24
ADLS_ACUITY_SCORE: 35
ADLS_ACUITY_SCORE: 24
DRESSING/BATHING_DIFFICULTY: NO
ADLS_ACUITY_SCORE: 33
ADLS_ACUITY_SCORE: 24
ADLS_ACUITY_SCORE: 24
ADLS_ACUITY_SCORE: 35
WALKING_OR_CLIMBING_STAIRS_DIFFICULTY: NO
ADLS_ACUITY_SCORE: 24
ADLS_ACUITY_SCORE: 28
ADLS_ACUITY_SCORE: 24
SWALLOWING: 2-->DIFFICULTY SWALLOWING LIQUIDS/FOODS
DEPENDENT_IADLS:: INDEPENDENT
ADLS_ACUITY_SCORE: 24
ADLS_ACUITY_SCORE: 35
ADLS_ACUITY_SCORE: 24
ADLS_ACUITY_SCORE: 35
ADLS_ACUITY_SCORE: 24
WEAR_GLASSES_OR_BLIND: NO
DIFFICULTY_EATING/SWALLOWING: YES
ADLS_ACUITY_SCORE: 24
ADLS_ACUITY_SCORE: 24
ADLS_ACUITY_SCORE: 30
ADLS_ACUITY_SCORE: 24
EATING: 1-->ASSISTANCE (EQUIPMENT/PERSON) NEEDED
ADLS_ACUITY_SCORE: 24
TOILETING_ISSUES: NO
ADLS_ACUITY_SCORE: 35
ADLS_ACUITY_SCORE: 24
ADLS_ACUITY_SCORE: 24
ADLS_ACUITY_SCORE: 27
ADLS_ACUITY_SCORE: 24
ADLS_ACUITY_SCORE: 24
ADLS_ACUITY_SCORE: 35

## 2023-01-01 ASSESSMENT — PAIN SCALES - GENERAL
PAINLEVEL: NO PAIN (0)
PAINLEVEL: MILD PAIN (2)
PAINLEVEL: NO PAIN (0)
PAINLEVEL: NO PAIN (0)
PAINLEVEL: MILD PAIN (2)
PAINLEVEL: NO PAIN (0)
PAINLEVEL: NO PAIN (1)
PAINLEVEL: MODERATE PAIN (4)
PAINLEVEL: NO PAIN (0)
PAINLEVEL: MILD PAIN (2)
PAINLEVEL: MILD PAIN (3)
PAINLEVEL: NO PAIN (0)
PAINLEVEL: MILD PAIN (3)
PAINLEVEL: MILD PAIN (3)

## 2023-01-01 ASSESSMENT — ENCOUNTER SYMPTOMS
RHINORRHEA: 1
CHEST TIGHTNESS: 0
CHOKING: 0
SHORTNESS OF BREATH: 0
APNEA: 0
STRIDOR: 0
CARDIOVASCULAR NEGATIVE: 1
WHEEZING: 0
CONSTITUTIONAL NEGATIVE: 1
GASTROINTESTINAL NEGATIVE: 1
COUGH: 1

## 2023-01-03 DIAGNOSIS — I65.21 RIGHT CAROTID ARTERY OCCLUSION: ICD-10-CM

## 2023-01-03 RX ORDER — CARVEDILOL 6.25 MG/1
TABLET ORAL
Qty: 60 TABLET | Refills: 1 | Status: SHIPPED | OUTPATIENT
Start: 2023-01-03 | End: 2023-04-03

## 2023-01-31 DIAGNOSIS — I65.21 RIGHT CAROTID ARTERY OCCLUSION: ICD-10-CM

## 2023-01-31 RX ORDER — AMLODIPINE BESYLATE 5 MG/1
TABLET ORAL
Qty: 90 TABLET | Refills: 0 | Status: SHIPPED | OUTPATIENT
Start: 2023-01-31 | End: 2023-05-26

## 2023-01-31 RX ORDER — ATORVASTATIN CALCIUM 10 MG/1
TABLET, FILM COATED ORAL
Qty: 90 TABLET | Refills: 0 | Status: SHIPPED | OUTPATIENT
Start: 2023-01-31 | End: 2023-05-26

## 2023-04-01 DIAGNOSIS — I65.21 RIGHT CAROTID ARTERY OCCLUSION: ICD-10-CM

## 2023-04-03 RX ORDER — CARVEDILOL 6.25 MG/1
TABLET ORAL
Qty: 60 TABLET | Refills: 3 | Status: ON HOLD | OUTPATIENT
Start: 2023-04-03 | End: 2023-01-01

## 2023-04-13 ENCOUNTER — TELEPHONE (OUTPATIENT)
Dept: NEUROSURGERY | Facility: CLINIC | Age: 83
End: 2023-04-13
Payer: COMMERCIAL

## 2023-04-14 ENCOUNTER — TELEPHONE (OUTPATIENT)
Dept: NEUROSURGERY | Facility: CLINIC | Age: 83
End: 2023-04-14
Payer: COMMERCIAL

## 2023-04-17 ENCOUNTER — TELEPHONE (OUTPATIENT)
Dept: NEUROSURGERY | Facility: CLINIC | Age: 83
End: 2023-04-17
Payer: COMMERCIAL

## 2023-04-22 ENCOUNTER — HEALTH MAINTENANCE LETTER (OUTPATIENT)
Age: 83
End: 2023-04-22

## 2023-04-26 DIAGNOSIS — I65.21 RIGHT CAROTID ARTERY OCCLUSION: ICD-10-CM

## 2023-04-26 RX ORDER — AMLODIPINE BESYLATE 5 MG/1
TABLET ORAL
Qty: 90 TABLET | Refills: 0 | OUTPATIENT
Start: 2023-04-26

## 2023-04-26 RX ORDER — ATORVASTATIN CALCIUM 10 MG/1
TABLET, FILM COATED ORAL
Qty: 90 TABLET | Refills: 0 | OUTPATIENT
Start: 2023-04-26

## 2023-04-26 NOTE — TELEPHONE ENCOUNTER
Patient was given 90 day refill on 1/31/23 and notified needs in clinic appointment with blood work for further refills. No appointment scheduled.

## 2023-05-08 ENCOUNTER — TELEPHONE (OUTPATIENT)
Dept: FAMILY MEDICINE | Facility: CLINIC | Age: 83
End: 2023-05-08
Payer: COMMERCIAL

## 2023-05-08 NOTE — TELEPHONE ENCOUNTER
Reason for Call:  Appointment Request    Patient requesting this type of appt:  swollowing issue lately    Requested provider: Piyush Rogers    Reason patient unable to be scheduled: Not within requested timeframe    When does patient want to be seen/preferred time: Same day    Comments: Patient has a physical scheduled on 5/22 with PCP but would like to get an appt regarding swollowing issue asap       Could we send this information to you in [facility] or would you prefer to receive a phone call?:   Patient would prefer a phone call   Okay to leave a detailed message?: Yes at Cell number on file:    Telephone Information:   Mobile [phone]       Call taken on 5/8/2023 at 9:14 AM by Shell Woodson

## 2023-05-11 ENCOUNTER — OFFICE VISIT (OUTPATIENT)
Dept: FAMILY MEDICINE | Facility: CLINIC | Age: 83
End: 2023-05-11
Payer: COMMERCIAL

## 2023-05-11 VITALS
HEIGHT: 62 IN | BODY MASS INDEX: 33.68 KG/M2 | HEART RATE: 57 BPM | OXYGEN SATURATION: 97 % | WEIGHT: 183 LBS | TEMPERATURE: 97.8 F | SYSTOLIC BLOOD PRESSURE: 150 MMHG | RESPIRATION RATE: 18 BRPM | DIASTOLIC BLOOD PRESSURE: 82 MMHG

## 2023-05-11 DIAGNOSIS — R13.14 PHARYNGOESOPHAGEAL DYSPHAGIA: Primary | ICD-10-CM

## 2023-05-11 PROCEDURE — 99213 OFFICE O/P EST LOW 20 MIN: CPT | Performed by: FAMILY MEDICINE

## 2023-05-11 RX ORDER — PANTOPRAZOLE SODIUM 40 MG/1
40 TABLET, DELAYED RELEASE ORAL DAILY
Qty: 30 TABLET | Refills: 1 | Status: SHIPPED | OUTPATIENT
Start: 2023-05-11 | End: 2023-01-01

## 2023-05-11 ASSESSMENT — PAIN SCALES - GENERAL: PAINLEVEL: NO PAIN (0)

## 2023-05-11 NOTE — NURSING NOTE
"Chief Complaint   Patient presents with     Problems Swallowing     BP (!) 150/82 (Cuff Size: Adult Regular)   Pulse 57   Temp 97.8  F (36.6  C) (Tympanic)   Resp 18   Ht 1.575 m (5' 2\")   Wt 83 kg (183 lb)   SpO2 97%   BMI 33.47 kg/m   Estimated body mass index is 33.47 kg/m  as calculated from the following:    Height as of this encounter: 1.575 m (5' 2\").    Weight as of this encounter: 83 kg (183 lb).  Patient presents to the clinic using No DME      Health Maintenance that is potentially due pending provider review:    Health Maintenance Due   Topic Date Due     ANNUAL REVIEW OF HM ORDERS  Never done     MEDICARE ANNUAL WELLNESS VISIT  09/25/2020     LIPID  09/27/2022                "

## 2023-05-11 NOTE — PROGRESS NOTES
Assessment & Plan     Pharyngoesophageal dysphagia  -Patient reports 2-3 weeks of substernal burning only at night and only when lying flat. States it feels like an acid sensation in his throat. Also with 2-3 weeks of mild difficulty swallowing meat solids, but no difficulty with softer foods. Everything passes with water. Discussed with patient this is like GERD and recommended starting protonix today, referral for EGD and close follow-up. Advised patient to seek medical attention if the burning sensation ever radiates to the neck, shoulder or extremities.       LUCILLE Khan    I have reviewed today's vital signs, notes, medications, labs and imaging. I have also seen and examined the patient and agree with the findings and plan as outlined above.    Piyush Rogers MD  [facility]    Eder Hatfield is a 82 year old, presenting for the following health issues:  Problems Swallowing      History of Present Illness       Reason for visit:  Troubles swallowing  Symptom onset:  1-2 weeks ago  Symptoms include:  Cant swallow  Symptom intensity:  Moderate  Symptom progression:  Worsening  Had these symptoms before:  No  What makes it worse:  Trying to swallow dry coarse food  What makes it better:  Swallowing liquids    He eats 2-3 servings of fruits and vegetables daily.He consumes 1 sweetened beverage(s) daily.He exercises with enough effort to increase his heart rate 10 to 19 minutes per day.  He exercises with enough effort to increase his heart rate 5 days per week.   He is taking medications regularly.       Difficulty swallowing. No globus sensation. Feels food getting stuck but can pass the food with water. Two weeks of this difficulty swallowing, soups and soft foods pass normally but meats feel like they get stuck sometimes. Food passes with water. Substernal burning sensation is only present at night and only when lying flat. Pain is localized, does not radiate to the  "arms, neck or shoulder. No associated nausea or vomiting. .  New acid reflux two to three weeks, burning sensation when sleeping.   No shortness of breath. No hemoptysis. No history of thyroid issues. Sister with hypothyroidism.  Right carotid artery procedure two years ago.   CAD, one stent four years ago.       Review of Systems   Constitutional, HEENT, cardiovascular, pulmonary, GI, , musculoskeletal, neuro, skin, endocrine and psych systems are negative, except as otherwise noted.      Objective    BP (!) 150/82 (Cuff Size: Adult Regular)   Pulse 57   Temp 97.8  F (36.6  C) (Tympanic)   Resp 18   Ht 1.575 m (5' 2\")   Wt 83 kg (183 lb)   SpO2 97%   BMI 33.47 kg/m    Body mass index is 33.47 kg/m .  Physical Exam   GENERAL: healthy, alert and no distress  ENT: No cobblestoning of the posterior oropharynx  NECK: no adenopathy, no asymmetry, masses, or scars and thyroid normal to palpation  RESP: lungs clear to auscultation - no rales, rhonchi or wheezes  CV: regular rate and rhythm, normal S1 S2, no S3 or S4, no murmur, click or rub, no peripheral edema and peripheral pulses strong  ABDOMEN: soft, nontender, no hepatosplenomegaly, no masses and bowel sounds normal  MS: no gross musculoskeletal defects noted, no edema              "

## 2023-05-25 DIAGNOSIS — I65.21 RIGHT CAROTID ARTERY OCCLUSION: ICD-10-CM

## 2023-05-25 RX ORDER — ATORVASTATIN CALCIUM 10 MG/1
TABLET, FILM COATED ORAL
Qty: 90 TABLET | Refills: 0 | OUTPATIENT
Start: 2023-05-25

## 2023-05-25 NOTE — TELEPHONE ENCOUNTER
Isadora refill given x 90 days on 1/31/23, LDL last done 9/27/2021. Patient needs to be seen in clinic for bloodwork.

## 2023-05-26 DIAGNOSIS — I65.21 RIGHT CAROTID ARTERY OCCLUSION: ICD-10-CM

## 2023-05-26 RX ORDER — AMLODIPINE BESYLATE 5 MG/1
TABLET ORAL
Qty: 90 TABLET | Refills: 0 | Status: ON HOLD | OUTPATIENT
Start: 2023-05-26 | End: 2023-01-01

## 2023-05-26 RX ORDER — ATORVASTATIN CALCIUM 10 MG/1
TABLET, FILM COATED ORAL
Qty: 90 TABLET | Refills: 0 | Status: SHIPPED | OUTPATIENT
Start: 2023-05-26 | End: 2023-01-01

## 2023-05-26 NOTE — TELEPHONE ENCOUNTER
Routing refill request to provider for review/approval because:  Labs out of range:    Creatinine   Date Value Ref Range Status   10/06/2021 1.29 (H) 0.66 - 1.25 mg/dL Final   04/15/2021 1.72 (H) 0.66 - 1.25 mg/dL Final     BP Readings from Last 3 Encounters:   05/11/23 (!) 150/82   06/02/22 134/60   03/17/22 136/67      Labs not current:   LDL Cholesterol Calculated   Date Value Ref Range Status   09/27/2021 15 <=100 mg/dL Final   08/19/2020 27 <100 mg/dL Final     Comment:     Desirable:       <100 mg/dl     Date of last office visit with Dr. Rogers 5/13/23   Julie Behrendt RN

## 2023-06-02 NOTE — ANESTHESIA PREPROCEDURE EVALUATION
Anesthesia Pre-Procedure Evaluation    Patient: Liu Ragsdale   MRN: [id number] : 1940        Preoperative Diagnosis: Stenosis of right carotid artery [I65.21]   Procedure : Procedure(s):  ENDARTERECTOMY, CAROTID     Past Medical History:   Diagnosis Date     Angina at rest (H)      Arthritis      Cerebrovascular accident (H) 2021     Coronary artery disease involving native coronary artery of native heart without angina pectoris      Hypertension       Past Surgical History:   Procedure Laterality Date     ARTHROPLASTY HIP Left 2021    Procedure: Total Hip Arthroplasty;  Surgeon: Andrew Arriola MD;  Location: [facility]     COLONOSCOPY N/A 2016    Procedure: COLONOSCOPY;  Surgeon: Randy Avendano MD;  Location: [facility]     CV LEFT HEART CATH Left 03/10/2020    Procedure: Left Heart Cath;  Surgeon: Vicente Lopez MD;  Location:  HEART CARDIAC CATH LAB     ENDARTERECTOMY CAROTID Right 2021    Procedure: ENDARTERECTOMY, CAROTID;  Surgeon: Raymond Andersen MD;  Location:  OR     EYE SURGERY      cataract surgery     VASECTOMY        Allergies   Allergen Reactions     Metoprolol Other (See Comments)     Side effects : dry mouth     Ampicillin Rash      Social History     Tobacco Use     Smoking status: Never     Passive exposure: Never     Smokeless tobacco: Never   Vaping Use     Vaping status: Never Used   Substance Use Topics     Alcohol use: Yes     Comment: [place] only      Wt Readings from Last 1 Encounters:   23 83 kg (183 lb)        Anesthesia Evaluation   Pt has had prior anesthetic. Type: MAC and General.        ROS/MED HX  ENT/Pulmonary:     (+) sleep apnea, allergic rhinitis,     Neurologic:     (+) CVA, date: 21, with deficits, TIA,     Cardiovascular:     (+) Dyslipidemia hypertension--CAD angina--stent-Taking blood thinners fainting (syncope). Previous cardiac testing   Echo: Date: 21 Results:  Interpretation Summary     The left  ventricle is normal in size.  Left ventricular systolic function is normal.  The visual ejection fraction is 60-65%.  Normal left ventricular wall motion  Trivial pericardial effusion  Sinus rhythm was noted.  Compared to prior study, there is no significant change  Stress Test: Date: Results:    ECG Reviewed: Date: Results:    Cath: Date: Results:      METS/Exercise Tolerance: >4 METS    Hematologic:     (+) anemia,     Musculoskeletal:  - neg musculoskeletal ROS     GI/Hepatic:    (-) GERD   Renal/Genitourinary:     (+) renal disease,     Endo:     (+) Obesity,     Psychiatric/Substance Use:  - neg psychiatric ROS     Infectious Disease:  - neg infectious disease ROS     Malignancy:  - neg malignancy ROS     Other:  - neg other ROS          Physical Exam    Airway        Mallampati: IV   TM distance: > 3 FB   Neck ROM: full   Mouth opening: > 3 cm    Respiratory Devices and Support         Dental       (+) Minor Abnormalities - some fillings, tiny chips      Cardiovascular          Rhythm and rate: regular and normal     Pulmonary           breath sounds clear to auscultation       Other findings: Right carotid bruit; left hand  strength 4/5    OUTSIDE LABS:  CBC:   Lab Results   Component Value Date    WBC 8.6 10/01/2021    WBC 12.3 (H) 09/30/2021    HGB 11.4 (L) 10/01/2021    HGB 11.7 (L) 09/30/2021    HCT 34.8 (L) 10/01/2021    HCT 34.8 (L) 09/30/2021     (L) 10/01/2021     09/30/2021     BMP:   Lab Results   Component Value Date     10/06/2021     10/01/2021    POTASSIUM 4.5 10/06/2021    POTASSIUM 4.2 10/01/2021    POTASSIUM 4.2 10/01/2021    CHLORIDE 108 10/06/2021    CHLORIDE 112 (H) 10/01/2021    CO2 28 10/06/2021    CO2 27 10/01/2021    BUN 18 10/06/2021    BUN 25 10/01/2021    CR 1.29 (H) 10/06/2021    CR 1.26 (H) 10/01/2021     (H) 10/06/2021    GLC 95 10/01/2021     COAGS:   Lab Results   Component Value Date    PTT 24 09/29/2021    INR 0.84 (L) 09/29/2021     POC:  No results found for: BGM, HCG, HCGS  HEPATIC:   Lab Results   Component Value Date    ALBUMIN 3.0 (L) 10/01/2021    PROTTOTAL 7.3 06/03/2019    ALT 29 08/19/2020    AST 17 06/03/2019    ALKPHOS 86 06/03/2019    BILITOTAL 0.5 06/03/2019     OTHER:   Lab Results   Component Value Date    A1C 6.0 (H) 09/26/2021    GWEN 9.0 10/06/2021    PHOS 2.3 (L) 10/01/2021    PHOS 2.3 (L) 10/01/2021    MAG 2.3 10/01/2021    LIPASE 57 03/31/2007    AMYLASE 72 03/31/2007    TSH 1.41 05/30/2014       Anesthesia Plan    ASA Status:  3   NPO Status:  NPO Appropriate    Anesthesia Type: General.     - Airway: Native airway   Induction: Propofol, Intravenous.   Maintenance: TIVA.        Consents    Anesthesia Plan(s) and associated risks, benefits, and realistic alternatives discussed. Questions answered and patient/representative(s) expressed understanding.     - Discussed: Risks, Benefits and Alternatives for BOTH SEDATION and the PROCEDURE were discussed     - Discussed with:  Patient      - Extended Intubation/Ventilatory Support Discussed: No.      - Patient is DNR/DNI Status: No    Use of blood products discussed: No .     Postoperative Care            Comments:                    GIO Schmidt CRNA

## 2023-06-05 NOTE — ANESTHESIA POSTPROCEDURE EVALUATION
Patient: Liu Ragsdale    Procedure: Procedure(s):  Esophagoscopy, Gastroscopy, Duodenoscopy (EGD), With Biopsy       Anesthesia Type:  General    Note:  Disposition: Outpatient   Postop Pain Control: Uneventful            Sign Out: Well controlled pain   PONV: No   Neuro/Psych: Uneventful            Sign Out: Acceptable/Baseline neuro status   Airway/Respiratory: Uneventful            Sign Out: Acceptable/Baseline resp. status   CV/Hemodynamics: Uneventful            Sign Out: Acceptable CV status; No obvious hypovolemia; No obvious fluid overload   Other NRE: NONE   DID A NON-ROUTINE EVENT OCCUR? No           Last vitals:  Vitals Value Taken Time   /78 06/05/23 1130   Temp     Pulse 60 06/05/23 1130   Resp 16 06/05/23 1130   SpO2 93 % 06/05/23 1145   Vitals shown include unvalidated device data.    Electronically Signed By: GIO Chou CRNA  June 5, 2023  11:46 AM

## 2023-06-05 NOTE — H&P
General Surgery H&P  Liu Ragsdale MRN# [id number]   Age/Sex: 83 year old male YOB: 1940     Reason for visit: EGD       Referring physician: Piyush Rogers MD                   Assessment and Plan:   Assessment:  1.  Dysphagia   2.  Hx of Stroke    Plan:  - To the OR for EGD  - The risks and benefits of the procedure were explained detail to the patient. The risks include infection, bleeding, damage to the surrounding structures. Patient verbalized understanding provided consent to undergo the procedure above.            Chief Complaint:   Here for surgery     History is obtained from the patient    HPI:   Liu Ragsdale is a 83 year old male who presents with hx of dysphagia.  Patient states that symptoms started a couple weeks ago.  More difficult to swallow harder foods.  Okay with liquids.  Patient does state that he had a stroke 2 years ago and his swallowing has been slightly more difficult since then.  No further complaints.           Past Medical History:     Past Medical History:   Diagnosis Date     Angina at rest (H)      Arthritis      Cerebrovascular accident (H) 9/27/2021     Coronary artery disease involving native coronary artery of native heart without angina pectoris      Hypertension               Past Surgical History:     Past Surgical History:   Procedure Laterality Date     ARTHROPLASTY HIP Left 02/03/2021    Procedure: Total Hip Arthroplasty;  Surgeon: Andrew Arriola MD;  Location: [facility]     COLONOSCOPY N/A 06/16/2016    Procedure: COLONOSCOPY;  Surgeon: Randy Avendano MD;  Location: [facility]     CV LEFT HEART CATH Left 03/10/2020    Procedure: Left Heart Cath;  Surgeon: Vicente Lopez MD;  Location: [facility] CARDIAC CATH LAB     ENDARTERECTOMY CAROTID Right 9/29/2021    Procedure: ENDARTERECTOMY, CAROTID;  Surgeon: Raymond Andersen MD;  Location:  OR     EYE SURGERY  2005    cataract surgery     VASECTOMY  1981             Social History:    reports that  he has never smoked. He has never been exposed to tobacco smoke. He has never used smokeless tobacco. He reports current alcohol use. He reports that he does not use drugs.           Family History:     Family History   Problem Relation Age of Onset     Hypertension Sister      Kidney failure Sister      Chronic Obstructive Pulmonary Disease Sister      Thyroid Disease Sister      Seizure Disorder Paternal Grandmother      Mitral valve prolapse Daughter      No Known Problems Son      Cerebrovascular Disease No family hx of               Allergies:     Allergies   Allergen Reactions     Metoprolol Other (See Comments)     Side effects : dry mouth     Ampicillin Rash              Medications:     Prior to Admission medications    Medication Sig Start Date End Date Taking? Authorizing Provider   acetaminophen (TYLENOL) 325 MG tablet Take 3 tablets (975 mg) by mouth 3 times daily 2/3/21  Yes Andrew Arriola MD   amLODIPine (NORVASC) 5 MG tablet TAKE 1 TABLET BY MOUTH ONCE DAILY (NEED  APPOINTMENT  AND  FASTING  LABS  FOR  FURTHER  REFILLS) 5/26/23  Yes Piyush Rogers MD   aspirin (ASA) 325 MG tablet Take 1 tablet (325 mg) by mouth daily 9/29/21  Yes Conor Tabor MD   atorvastatin (LIPITOR) 10 MG tablet TAKE 1 TABLET BY MOUTH ONCE DAILY (APPOINTMENT  REQUIRED  FOR  FUTURE  REFILLS) 5/26/23  Yes Piyush Rogers MD   carvedilol (COREG) 6.25 MG tablet TAKE 1 TABLET BY MOUTH TWICE DAILY WITH MEALS 4/3/23  Yes Piyush Rogers MD   diclofenac (VOLTAREN) 1 % topical gel Place 4 g onto the skin 3 times daily as needed for moderate pain (Shoulder) 8/12/20  Yes Piyush Rogers MD   Multiple Vitamin (MULTI VITAMIN PO) Take 1 oz by mouth daily Eniva VIBE-liquid   Yes Reported, Patient   nitroGLYcerin (NITROSTAT) 0.3 MG sublingual tablet For chest pain place 1 tablet under the tongue every 5 minutes for 3 doses. If symptoms persist 5 minutes after 1st dose call 911. 2/27/20  Yes Audrey Díaz MD    pantoprazole (PROTONIX) 40 MG EC tablet Take 1 tablet (40 mg) by mouth daily 5/11/23  Yes Piyush Rogers MD   NONFORMULARY Take 1 oz by mouth daily Eniva Cell-Ready Multi Minerals is a concentrated, ionic liquid mineral dietary supplement blend to help support nutritional balance and wellbeing.  Combine with 8 ounces of water / juice    Reported, Patient              Review of Systems:   A 12 point Review of Systems is negative other than noted in the HPI            Physical Exam:   No data found.     No intake or output data in the 24 hours ending 06/05/23 0654   Constitutional:   awake, alert, cooperative, no apparent distress, and appears stated age       Eyes:   PERRL, conjunctiva/corneas clear, EOM's intact; no scleral edema or icterus noted        ENT:   Normocephalic, without obvious abnormality, atraumatic, Lips, mucosa, and tongue normal        Hematologic / Lymphatic:   No lymphadenopathy       Lungs:   Normal respiratory effort, no accessory muscle use       Cardiovascular:   Regular rate and rhythm       Abdomen:   Soft, nondistended, nontender to palpation       Musculoskeletal:   No obvious swelling, bruising or deformity       Skin:   Skin color and texture normal for patient, no rashes or lesions              Data:            Joseph Landers DO  General Surgeon  [facility]  Surgery [facility] - [address]  [place] [address]?  Office: [phone]  Employed by - [employer]  Pager: [phone]

## 2023-06-05 NOTE — ANESTHESIA CARE TRANSFER NOTE
Patient: Liu Ragsdale    Procedure: Procedure(s):  Esophagoscopy, Gastroscopy, Duodenoscopy (EGD), With Biopsy       Diagnosis: Pharyngoesophageal dysphagia [R13.14]  Diagnosis Additional Information: No value filed.    Anesthesia Type:   General     Note:    Oropharynx: oropharynx clear of all foreign objects  Level of Consciousness: awake  Oxygen Supplementation: room air    Independent Airway: airway patency satisfactory and stable  Dentition: dentition unchanged  Vital Signs Stable: post-procedure vital signs reviewed and stable  Report to RN Given: handoff report given  Patient transferred to: Phase II    Handoff Report: Identifed the Patient, Identified the Reponsible Provider, Reviewed the pertinent medical history, Discussed the surgical course, Reviewed Intra-OP anesthesia mangement and issues during anesthesia, Set expectations for post-procedure period and Allowed opportunity for questions and acknowledgement of understanding      Vitals:  Vitals Value Taken Time   /69 06/05/23 1117   Temp     Pulse 65 06/05/23 1117   Resp 14 06/05/23 1117   SpO2 96 % 06/05/23 1120   Vitals shown include unvalidated device data.    Electronically Signed By: GIO Chou CRNA  June 5, 2023  11:20 AM

## 2023-06-08 NOTE — PROGRESS NOTES
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  Patient will be scheduling appointment with gastric surgeon, contact information provided.  Recommended well hydration, nutrition supplement/Ensure.       Benign essential hypertension  Blood pressure within target goal of less than 140/90.  Recommended to continue amlodipine, carvedilol.  CBC, CMP ordered to monitor cell counts, hemoglobin, electrolytes, liver and renal function.  - CBC with platelets; Future  - Comprehensive metabolic panel (BMP + Alb, Alk Phos, ALT, AST, Total. Bili, TP); Future  - Comprehensive metabolic panel (BMP + Alb, Alk Phos, ALT, AST, Total. Bili, TP)  - CBC with platelets      Piyush Rogers MD  [facility]      Eder Hatfield is a 83 year old, presenting for the following health issues:  Results        6/8/2023     1:41 PM   Additional Questions   Roomed by Janny PACHECO CMA   Accompanied by Wife, Kelsi and daughter, Brenda     History of Present Illness       Reason for visit:  Esophogeal cancer-discuss results and steps moving forward    He eats 0-1 servings of fruits and vegetables daily.He consumes 1 sweetened beverage(s) daily.He exercises with enough effort to increase his heart rate 10 to 19 minutes per day.  He exercises with enough effort to increase his heart rate 6 days per week.   He is taking medications regularly.         Review of Systems   Constitutional, HEENT, cardiovascular, pulmonary, GI, , musculoskeletal, neuro, skin, endocrine and psych systems are negative, except as otherwise noted.        Objective    /72 (BP Location: Right arm, Patient Position: Sitting, Cuff Size: Adult Regular)   Pulse 52   Temp 97.5  F (36.4  C) (Tympanic)   Resp 20   Ht 1.575 m (5' 2\")   Wt 80.2 kg (176 lb 12.8 oz)   SpO2 97%   BMI " 32.34 kg/m    Body mass index is 32.34 kg/m .  Physical Exam   GENERAL: alert and no distress  EYES: Eyes grossly normal to inspection, PERRL and conjunctivae and sclerae normal  HENT: normal cephalic/atraumatic, nose and mouth without ulcers or lesions, oropharynx clear and oral mucous membranes moist  RESP: no rales   MS: no gross musculoskeletal defects noted, no edema  SKIN: no suspicious lesions or rashes  NEURO: Normal strength and tone, mentation intact and speech normal  PSYCH: mentation appears normal, affect normal/bright

## 2023-06-08 NOTE — RESULT ENCOUNTER NOTE
Called patient with results.  Unfortunately, the patient was found to have invasive adenocarcinoma of the biopsy sent for the esophageal findings.  I have spoken to his primary care physician.  Primary care physician is going to see the patient this afternoon.      Plan  -Patient will require imaging to further characterize potential metastasis  -I placed a consultation to the [facility] surgical oncology team so the patient can see Dr. Dane Casanova.  -Patient can follow-up with me as needed.    Joseph Landers DO  General Surgeon  [facility]  Surgery [facility] - [address] [address]?  Office: [phone]  Employed by - [facility] [employer]  Pager: [phone]

## 2023-06-09 NOTE — PROGRESS NOTES
New Patient Oncology Nurse Navigator Note     Referring provider: Dr. Joseph Landers    Referring Clinic/Organization: [facility]  Referred to: Medical Oncology  Requested provider (if applicable): Note:    Reason for Referral:  Cancer Surgery  Additional Information:  Please refer to Dr. Dane Casanova    Referral Received: 06/08/23      Evaluation for :   Diagnosis   C15.9 (ICD-10-CM) - Esophageal adenocarcinoma (H)      Clinical History (per Nurse review of records provided):      05/11/2023 Pt seen by Dr. Rogers for 2-3 week history of substernal burning at night when lying flat and mild difficulty swallowing solids. PCP discussed GERD and ordered upper GI.     06/05/2023 Upper GI Endoscopy  Impression:            - Gastritis.                          - Normal examined duodenum and second portion of the                          duodenum.                          - Partially obstructing, likely malignant esophageal                          tumor was found in the lower third of the esophagus.                          Biopsied.   06/05/2023 Surgical Pathology (bookmarked) showed:   Final Diagnosis   A.  Esophagus, 38 cm, mass: Biopsy:  - Invasive moderately to poorly differentiated adenocarcinoma  - Gastric oxyntic type mucosa with mild chronic active gastritis     B.  Gastroesophageal junction: Biopsy:  - Invasive moderately to poorly differentiated adenocarcinoma, see comment   Electronically signed by Ezio Tenorio MD on 6/6/2023 at  3:30 PM     Comment     Both biopsies show an invasive adenocarcinoma with surface ulceration.  Definitive Moya type mucosa is not present.  Per reflex testing algorithms, immunohistochemistry for HER2 and PD-L1 are in process with the results to be reported in an addendum.      RESULT FOR IMMUNOHISTOCHEMICAL VENTANA CLONE  PD-L1 ASSAY  COMBINED POSITIVE SCORE (CPS): 20  TUMOR PROPORTION SCORE (TPS): 0 %  INTERPRETATION: NEGATIVE PD-L1 EXPRESSION (TPS <1%)    Clinical  Assessment / Barriers to Care (Per Nurse):   Never smoker    Records Location: [facility]   Records Needed:   Additional testing needed prior to consult: PET scan, PFTs  Referral updates and Plan:   After nurse navigation review of records, pt should see thoracic surgery rather than Dr. Casanova for his newly diagnosed esophageal cancer.     Message sent to Dr. Rogers to see if he is able to order PET scan to be done prior to thoracic surgery consult.     Writer called Liu and discussed the referral with him. He confirmed he is ready to schedule his surgery consult. Writer explained it would be preferred to have a PET scan prior to the consult and we are waiting to hear back from Dr. Rogers if he can place an order for PET. Writer gave Liu the NPS phone number and will keep Liu updated.     6/12: Dr. Rogers has placed order for PET. Writer called Liu to assist with scheduling the PET. Per imaging schedulers, soonest available at the [place] is 6/29. Pt willing to go to another location. Pt's PET moved up to 6/22 at the [place] and unfortunately that is the soonest they would be able to schedule due to insurance.     Liu reports he first started having difficulty swallowing solids over a month ago. Just in the last couple days he is having discomfort with swallowing liquids. He reports continuous pressure in the epiglottis area that is not painful but he feels as though the mass is growing.     Epic message sent to Dr. Rogers updating him on pt's symptoms and requesting STAT order for PET. Dr. Rogers placed stat order. Writer called imaging with Liu and were able to move up his PET to 6/14. He then agreed to see Dr. Levi 6/20 which was the soonest available at the [facility]. NPS will call pt to finalize the consult appt and schedule PFTs. All questions answered. Clinic address and phone number given to pt.     ZACK PereraN, RN, OCN  [facility] Oncology Nurse Navigator  [phone] /  [phone]

## 2023-06-12 NOTE — TELEPHONE ENCOUNTER
General Call      Reason for Call:  PET Scan Order    What are your questions or concerns:  Aggie calling from Oncology and they are needing the PET scan that Dr. Rogers put in to be updated to a stat order. Pt has this scan scheduled for 6/22 but they are wanting to get him in sooner because he is having a change of symptoms. Wondering if this can be done as soon as possible.    Any questions please call Aggie at her direct number: [phone]        Laina Varela Patient

## 2023-06-13 NOTE — TELEPHONE ENCOUNTER
[facility], scheduled 06-27-23  Pt will contact [facility] P[facility] for pre and post procedure ASA holding directions.    Advised to contact Dr. Rogers's care team if any further questions/ needs.  HEIDE Chavarria RN

## 2023-06-13 NOTE — TELEPHONE ENCOUNTER
Medication Question or Refill    Contacts       Type Contact Phone/Fax    06/13/2023 08:15 AM CDT Phone (Incoming) MelyssaLiu devlin (Self) [phone] (M)          What medication are you calling about (include dose and sig)?: ASA     Do you have any questions or concerns?  Yes: [facility] Pt was told to stop ASA. Pt is wondering when to stop the ASA and When to restart the ASA.  Please Advise.       Preferred Pharmacy:   [facility] [address] [place] [id number]  Phone: [phone] Fax: [phone]    Could we send this information to you in [employer] [facility] or would you prefer to receive a phone call?:   Patient would prefer a phone call   Okay to leave a detailed message?: Yes at Home number on file [phone] (home)    [facility]

## 2023-06-13 NOTE — PROGRESS NOTES
Review of Oncology referral from  PCP for pt with new esophageal cancer.       6/5/23 EGD [facility] + [facility] & esophageal adenocarcinoma, no imaging, PET planned 6/14, then [facility] 6/20, needs med/rad onc for multi d care. Dr Myers has availability 6/26/23; will need Rad Onc same week if possible.     Ravindra Joseph RN  Oncology Nurse Navigator  [facility]  [phone]

## 2023-06-14 NOTE — TELEPHONE ENCOUNTER
RECORDS STATUS - ALL OTHER DIAGNOSIS      RECORDS RECEIVED FROM: EPic   DATE RECEIVED:    NOTES STATUS DETAILS   OFFICE NOTE from referring provider Epic 06/08/23: Dr. Piyush Rogers   DISCHARGE SUMMARY from hospital [facility] 06/05/23: [facility]   OPERATIVE REPORT Epic 06/05/23: EGD w/bx   MEDICATION LIST [facility]    LABS     PATHOLOGY REPORTS Report in EPIC 06/05/23: [id number]   ANYTHING RELATED TO DIAGNOSIS Epic Most recent 06/08/23   IMAGING (NEED IMAGES & REPORT)     CT SCANS PACS 09/29/21-11/29/19: CTA Head Neck   PET PACS 06/14/23: PET Onc Whole Body   MRI PACS 09/27/21: MRA Neck

## 2023-06-19 NOTE — PROGRESS NOTES
THORACIC SURGERY - NEW PATIENT OFFICE VISIT     Dear Dr. Rogers,    I saw Liu Ragsdale at Dr. 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 He began to notice trouble swallowing food over the past 6 weeks. He has had acid reflux for a short time and is on pantoprazole. He has lost 9 pounds over the 6 weeks. He is eating a regular diet and eats smaller bites and washes the food down with water. He has no regurgitation.     He does some work around the house. He can walk two blocks without stopping or climb a flight of stairs.    Previsit Tests   EGD(06/5/2023): Large, fungating mass with bleeding at 38 cm from the incisors.   Pathology: Esophagus, 38 cm: Invasive moderately to poorly differentiated adenocarcinoma.   Gastroesophageal junction biopsy with invasive moderately to poorly differentiated adenocarcinoma.    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     Coronary artery disease involving native coronary artery of native heart without angina pectoris      Hypertension         PSH  Reviewed, as below    Past Surgical History:   Procedure Laterality Date     ARTHROPLASTY HIP Left 02/03/2021    Procedure: Total Hip Arthroplasty;  Surgeon: Andrew Arriola MD;  Location: [facility]     COLONOSCOPY N/A 06/16/2016    Procedure: COLONOSCOPY;  Surgeon: Randy Avendano MD;  Location: [facility]     CV LEFT HEART CATH WITH PCI Left 03/10/2020    Procedure: Left Heart Cath;  Surgeon: Vicente Lopez  "MD BIBI;  Location:  HEART CARDIAC CATH LAB     ENDARTERECTOMY CAROTID Right 9/29/2021    Procedure: ENDARTERECTOMY, CAROTID;  Surgeon: Raymond Andersen MD;  Location: [facility]     ESOPHAGOSCOPY, GASTROSCOPY, DUODENOSCOPY (EGD), COMBINED N/A 6/5/2023    Procedure: Esophagoscopy, Gastroscopy, Duodenoscopy, With Biopsy;  Surgeon: Joseph Landers DO;  Location: [facility]     EYE SURGERY  2005    cataract surgery     VASECTOMY  1981        ETOH: Rare  TOB: never smoker    Physical examination  BP (!) 160/63 (BP Location: Right arm, Patient Position: Chair, Cuff Size: Adult Large)   Pulse 61   Temp 98.2  F (36.8  C) (Oral)   Resp 16   Ht 1.554 m (5' 1.18\")   Wt 79.2 kg (174 lb 8 oz)   SpO2 98%   BMI 32.78 kg/m     Physical Exam  Constitutional:       Appearance: Normal appearance. He is obese.   Eyes:      Conjunctiva/sclera: Conjunctivae normal.   Cardiovascular:      Rate and Rhythm: Normal rate.   Pulmonary:      Effort: Pulmonary effort is normal.   Skin:     General: Skin is warm and dry.   Neurological:      Mental Status: He is alert and oriented to person, place, and time.   Psychiatric:         Mood and Affect: Mood normal.         Behavior: Behavior normal.         Thought Content: Thought content normal.         Judgment: Judgment normal.          From a personal perspective, he is with his wife and youngest daughter, Brenda. He has 4 step children and 2 children total.  There are 10 grandchildren and 8 greatgrandchildren.  He is retired. He was in the [employer] as an  for 4 years. 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 Then he was a .     IMPRESSION (C15.9) Esophageal adenocarcinoma (H)     This person is a 83 year old male with a distal esophageal adenocarcinoma. He may be a surgical candidate, but will reevaluate after chemoradiation.     PLAN  I spent 60 min on the date of the encounter " in chart review, patient visit, review of tests, documentation and/or discussion with other providers about the issues documented above. I reviewed the plan as follows:    Necessary Preop Tests & Appointments: Medical oncology, radiation oncology, endoscopic ultrasound    Depending on the response to chemoradiation, I would consider a two-stage procedure.    First, laparoscopic staging and jejunostomy.    Two weeks later, minimally invasive Kurtis-Reyes esophagectomy    I discussed with the patient the conduct of the operation of an Kurtis-Reyes transthoracic esophagectomy. I explained that the hospital stay would likely be 5-10 days if there are no complications. I explained that approximately 50% of patients have some complication, ranging from minor complications to the very serious ones. The recovery takes up to 8 weeks.  I explained to the patient the risks and benefits of the procedure. The benefit of the procedure is to remove the esophageal cancer. The risks include anastomotic leak, gastric necrosis, pneumonia, DVT and pulmonary embolism, arrhythmia, jejunostomy tube complications including intestinal obstruction, urinary tract infection and death.      However, I am concerned about his recovery and will have to reassess his surgical candidacy after chemoradiation.      I appreciate the opportunity to participate in the care of your patient and will keep you updated.    Sincerely,    Ferny Levi MD

## 2023-06-20 NOTE — NURSING NOTE
"Oncology Rooming Note    June 20, 2023 4:18 PM   Liu Ragsdale is a 83 year old male who presents for:    Chief Complaint   Patient presents with     Oncology Clinic Visit     [facility] RETURN - ESOPHAGEAL ADENOCARCINOMA      Initial Vitals: BP (!) 160/63 (BP Location: Right arm, Patient Position: Chair, Cuff Size: Adult Large)   Pulse 61   Temp 98.2  F (36.8  C) (Oral)   Resp 16   Ht 1.554 m (5' 1.18\")   Wt 79.2 kg (174 lb 8 oz)   SpO2 98%   BMI 32.78 kg/m   Estimated body mass index is 32.78 kg/m  as calculated from the following:    Height as of this encounter: 1.554 m (5' 1.18\").    Weight as of this encounter: 79.2 kg (174 lb 8 oz). Body surface area is 1.85 meters squared.  No Pain (0) Comment: Data Unavailable   No LMP for male patient.  Allergies reviewed: Yes  Medications reviewed: Yes    Medications: Medication refills not needed today.  Pharmacy name entered into [facility]:    [facility] - [place], [place] - [address] [place] [address] [facility] - [place], [place] - [address]  [facility] - [place] - [address] [facility] AT PORTAL TO [address] Wilkins LPN            "

## 2023-06-20 NOTE — LETTER
6/20/2023         RE: Liu Ragsdale  [address] [facility] [address] [id number]        Dear Colleague,    Thank you for referring your patient, Liu Ragsdale, to the [facility]. Please see a copy of my visit note below.    THORACIC SURGERY - NEW PATIENT OFFICE VISIT     Dear Dr. Rogers,    I saw Liu Ragsdale at Dr. 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 He began to notice trouble swallowing food over the past 6 weeks. He has had acid reflux for a short time and is on pantoprazole. He has lost 9 pounds over the 6 weeks. He is eating a regular diet and eats smaller bites and washes the food down with water. He has no regurgitation.     He does some work around the house. He can walk two blocks without stopping or climb a flight of stairs.    Previsit Tests   EGD(06/5/2023): Large, fungating mass with bleeding at 38 cm from the incisors.   Pathology: Esophagus, 38 cm: Invasive moderately to poorly differentiated adenocarcinoma.   Gastroesophageal junction biopsy with invasive moderately to poorly differentiated adenocarcinoma.    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    Coronary artery disease involving native coronary artery of native heart without angina pectoris     Hypertension         PSH  Reviewed, as below    Past Surgical History:   Procedure Laterality Date    ARTHROPLASTY HIP Left 02/03/2021    Procedure: Total Hip Arthroplasty;   "Surgeon: Andrew Arriola MD;  Location: [facility]    COLONOSCOPY N/A 06/16/2016    Procedure: COLONOSCOPY;  Surgeon: Randy Avendano MD;  [facility]: [facility]    CV LEFT HEART CATH WITH PCI Left 03/10/2020    Procedure: Left Heart Cath;  Surgeon: Vicente Lopez MD;  Location:  HEART CARDIAC CATH LAB    ENDARTERECTOMY CAROTID Right 9/29/2021    Procedure: ENDARTERECTOMY, CAROTID;  Surgeon: Raymond Andersen MD;  [facility]    ESOPHAGOSCOPY, GASTROSCOPY, DUODENOSCOPY (EGD), COMBINED N/A 6/5/2023    Procedure: Esophagoscopy, Gastroscopy, Duodenoscopy, With Biopsy;  Surgeon: Joseph Landers DO;  Location: [facility]    EYE SURGERY  2005    cataract surgery    VASECTOMY  1981        ETOH: Rare  TOB: never smoker    Physical examination  BP (!) 160/63 (BP Location: Right arm, Patient Position: Chair, Cuff Size: Adult Large)   Pulse 61   Temp 98.2  F (36.8  C) (Oral)   Resp 16   Ht 1.554 m (5' 1.18\")   Wt 79.2 kg (174 lb 8 oz)   SpO2 98%   BMI 32.78 kg/m     Physical Exam  Constitutional:       Appearance: Normal appearance. He is obese.   Eyes:      Conjunctiva/sclera: Conjunctivae normal.   Cardiovascular:      Rate and Rhythm: Normal rate.   Pulmonary:      Effort: Pulmonary effort is normal.   Skin:     General: Skin is warm and dry.   Neurological:      Mental Status: He is alert and oriented to person, place, and time.   Psychiatric:         Mood and Affect: Mood normal.         Behavior: Behavior normal.         Thought Content: Thought content normal.         Judgment: Judgment normal.          From a personal perspective, he is with his wife and youngest daughter, Brenda. He has 4 step children and 2 children total.  There are 10 grandchildren and 8 greatgrandchildren.  He is retired. He was in the [employer] as an  for 4 years. 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 Then he was a . "     IMPRESSION (C15.9) Esophageal adenocarcinoma (H)     This person is a 83 year old male with a distal esophageal adenocarcinoma. He may be a surgical candidate, but will reevaluate after chemoradiation.     PLAN  I spent 60 min on the date of the encounter in chart review, patient visit, review of tests, documentation and/or discussion with other providers about the issues documented above. I reviewed the plan as follows:    Necessary Preop Tests & Appointments: Medical oncology, radiation oncology, endoscopic ultrasound    Depending on the response to chemoradiation, I would consider a two-stage procedure.    First, laparoscopic staging and jejunostomy.    Two weeks later, minimally invasive Kurtis-Reyes esophagectomy    I discussed with the patient the conduct of the operation of an Kurtis-Reyes transthoracic esophagectomy. I explained that the hospital stay would likely be 5-10 days if there are no complications. I explained that approximately 50% of patients have some complication, ranging from minor complications to the very serious ones. The recovery takes up to 8 weeks.  I explained to the patient the risks and benefits of the procedure. The benefit of the procedure is to remove the esophageal cancer. The risks include anastomotic leak, gastric necrosis, pneumonia, DVT and pulmonary embolism, arrhythmia, jejunostomy tube complications including intestinal obstruction, urinary tract infection and death.      However, I am concerned about his recovery and will have to reassess his surgical candidacy after chemoradiation.      I appreciate the opportunity to participate in the care of your patient and will keep you updated.    Sincerely,    Ferny Levi MD

## 2023-06-21 NOTE — PROGRESS NOTES
Thank you for completing pulmonary function testing today.  All results will be scanned into your epic results for your doctor to review.  Please resume taking all your current prescribed medications and diet as directed by your provider.   If you have not heard from your provider about your testing within two weeks and do not have a follow-up appointment scheduled with them please contact your provider about any questions you have concerning your testing.   Thank you  [facility]

## 2023-06-21 NOTE — TELEPHONE ENCOUNTER
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 I reviewed the plan as follows:     Necessary Preop Tests & Appointments: Medical oncology, radiation oncology, endoscopic ultrasound     Depending on the response to chemoradiation, I would consider a two-stage procedure.     First, laparoscopic staging and jejunostomy.     Two weeks later, minimally invasive [place]-Reyes esophagectomy     I discussed with the patient the conduct of the operation of an Kurtis-Reyes transthoracic esophagectomy. I explained that the hospital stay would likely be 5-10 days if there are no complications. I explained that approximately 50% of patients have some complication, ranging from minor complications to the very serious ones. The recovery takes up to 8 weeks.  I explained to the patient the risks and benefits of the procedure. The benefit of the procedure is to remove the esophageal cancer. The risks include anastomotic leak, gastric necrosis, pneumonia, DVT and pulmonary embolism, arrhythmia, jejunostomy tube complications including intestinal obstruction, urinary tract infection and death.       However, I am concerned about his recovery and will have to reassess his surgical candidacy after chemoradiation.    PET scan 6/14/23                                                            IMPRESSION: In this patient with invasive adenocarcinoma of the distal  esophagus:     1. Markedly hypermetabolic distal esophageal mass, compatible with  biopsy-proven invasive  adenocarcinoma.     2. Mildly hypermetabolic mediastinal and left hilar lymph nodes, some  of which contain calcification and may suggest  granulomatous/inflammatory process. However, omar metastases cannot  be excluded.     I have personally reviewed the examination and initial interpretation  and I agree with the findings.    MD review date: 06/21/23    MD Decision for clinic consultation/Orders:            Referral updates/Patient contacted:

## 2023-06-22 NOTE — TELEPHONE ENCOUNTER
RECORDS STATUS - ALL OTHER DIAGNOSIS      RECORDS RECEIVED FROM: Epic   DATE RECEIVED:    NOTES STATUS DETAILS   OFFICE NOTE from referring provider Epic 06/08/23: Dr. Piyush Rogers   OFFICE NOTE from other specialist Epic 06/20/23: Dr. Ferny Levi   DISCHARGE SUMMARY from hospital [facility] 06/05/23: [facility]   OPERATIVE REPORT [facility] 06/21/23: PFT    06/05/23: EGD w/bx   MEDICATION LIST [facility]    LABS     PATHOLOGY REPORTS Report in EPIC 06/05/23: [id number]   ANYTHING RELATED TO DIAGNOSIS Epic Most recent 06/08/23   IMAGING (NEED IMAGES & REPORT)     CT SCANS PACS 09/29/21-11/29/19: CTA Head Neck   MRI PACS 09/27/21: MRA Neck   PET PACS 06/14/23: PET Onc Whole Body

## 2023-06-22 NOTE — TELEPHONE ENCOUNTER
Per Dr. Graves  EGD and EUS for staging, can be done at either site with deep sedation     Called to discuss with patient    Please assist in scheduling:     Procedure/Imaging/Clinic: EDG and EUS  Physician: Dr. Graves  Timin/3/23  Scope time needed:provider average  Anesthesia:MAC  Dx: esophageal adenocarcinoma  Tier:3  Location: d  Header of letter for pt communication:EDG and EUS, Endoscopic Ultrasound      Called to discuss with patient.     Explained they can expect a call from  for date and time of procedure, will need a , someone to stay with them for 24 hours and should stay in town for 24 hours (within 45 min of Hospital) post procedure    Patient needs to get pre-op physical completed. If outside [facility] system will need physical faxed to number [phone]   If you do not get a preop physical, your procedure could be cancelled, patient voiced understanding*    Preop Plan:can use PCP note from 23 as preop    Does patient have any history of gastric bypass/gastric surgery/altered panc/bili anatomy?no    Does patient have [employer] insurance?:no    Med Review    Blood thinner -  no  ASA - no  Diabetic - no  Any meds by injection or mouth for weight loss or diabetes-no    Patient Education r/t procedure:done    A pre-op nurse will call 1-2 days prior to the procedure.    NPO/Prep:   Adults and Children of all ages may consume solids up to 8 hours prior to arrival time - may consume clear liquids up to 1 hour prior to arrival time.    Other specific details/comments:none     Verbalized understanding of all instructions. All questions answered.     Procedure order placed, message routed to OR / Endo

## 2023-06-26 NOTE — PROGRESS NOTES
Department of Radiation Oncology  Radiation Therapy Center  [facility]  [address], [address]  [place] [address]  [phone]       Consultation Note    Name: Liu Ragsdale MRN: [id number]   : 1940   Date of Service: 2023  Referring: Dr. Levi     Reason for consultation: Esophageal adenocarcinoma of the distal esophagus.  Evaluate potential role of radiation therapy.    History of Present Illness   Mr. Ragsdale is a 83 year old male a diagnosis of esophageal adenocarcinoma of the distal esophagus.  Evaluate potential role of radiation therapy.    The patient began to notice dysphagia of food over the past 6 weeks.  He also described acid reflux for short interval of time.  The patient reports approximately 9 to 10 pound weight loss over the 6-week..  Currently eating regular diet with smaller bites p.o.  The patient underwent EGD on 2023.  Scope demonstrated a large fungating mass in the lower third of the esophagus, 38 cm from the incisors.  The mass was noted to be partially obstructing and circumferential.  Biopsy of the mass demonstrated  adenocarcinoma.  Gastroesophageal junction biopsy also demonstrated adenocarcinoma.  PET scan obtained on 2023 demonstrated hypermetabolic activity in the distal esophageal mass compatible with esophageal cancer.  There were  low moderate activity in hilar and mediastinal adenopathy, indeterminate, possibly inflammatory in nature.  No clear sites of distant disease were noted.  Patient was seen by Dr. Levi of thoracic surgery team.  He discussed consideration of surgical resection after chemoradiation therapy.  The patient presents to our clinic to discuss next steps in management.  He will also meet Dr. Locke of medical oncology on 2023.    Today, the patient states he is doing okay but has noticed slowly progressive worsening of swallowing particularly with solid foods.sStill able to tolerate softer  foods p.o. and smaller bites.  No dysphagia to liquids. No prior radiation.  No pacemaker.      Past Medical History:   Past Medical History:   Diagnosis Date     Angina at rest (H)      Arthritis      Cerebrovascular accident (H) 9/27/2021     Coronary artery disease involving native coronary artery of native heart without angina pectoris      Hypertension        Past Surgical History:   Past Surgical History:   Procedure Laterality Date     ARTHROPLASTY HIP Left 02/03/2021    Procedure: Total Hip Arthroplasty;  Surgeon: Andrew Arriola MD;  Location: [facility]     COLONOSCOPY N/A 06/16/2016    Procedure: COLONOSCOPY;  Surgeon: Randy Avendano MD;  Location: [facility]     CV LEFT HEART CATH Left 03/10/2020    Procedure: Left Heart Cath;  Surgeon: Vicente Lopez MD;  Location:  HEART CARDIAC CATH LAB     ENDARTERECTOMY CAROTID Right 9/29/2021    Procedure: ENDARTERECTOMY, CAROTID;  Surgeon: Raymond Andersen MD;  [facility]     ESOPHAGOSCOPY, GASTROSCOPY, DUODENOSCOPY (EGD), COMBINED N/A 6/5/2023    Procedure: Esophagoscopy, Gastroscopy, Duodenoscopy, With Biopsy;  Surgeon: Joseph Landers DO;  Location: [facility]     EYE SURGERY  2005    cataract surgery     VASECTOMY  1981       Chemotherapy History:  None    Radiation History:  None    Pregnant: Not Applicable  Implanted Cardiac Devices: No    Medications:  Current Outpatient Medications   Medication     acetaminophen (TYLENOL) 325 MG tablet     amLODIPine (NORVASC) 5 MG tablet     aspirin (ASA) 325 MG tablet     atorvastatin (LIPITOR) 10 MG tablet     carvedilol (COREG) 6.25 MG tablet     diclofenac (VOLTAREN) 1 % topical gel     Multiple Vitamin (MULTI VITAMIN PO)     nitroGLYcerin (NITROSTAT) 0.3 MG sublingual tablet     NONFORMULARY     pantoprazole (PROTONIX) 40 MG EC tablet     No current facility-administered medications for this visit.         Allergies:     Allergies   Allergen Reactions     Metoprolol Other (See Comments)     Side effects : dry  mouth     Ampicillin Rash       Family History:  Family History   Problem Relation Age of Onset     Hypertension Sister      Kidney failure Sister      Chronic Obstructive Pulmonary Disease Sister      Thyroid Disease Sister      Seizure Disorder Paternal Grandmother      Mitral valve prolapse Daughter      No Known Problems Son      Cerebrovascular Disease No family hx of        Review of Systems   A 10-point review of systems was performed. Pertinent findings are noted in the HPI.    Physical Exam   ECOG Status: 1    Vitals:  /64 (BP Location: Left arm, Patient Position: Chair, Cuff Size: Adult Regular)   Pulse 53   Resp 16   Wt 77.7 kg (171 lb 3.2 oz)   SpO2 95%   BMI 32.16 kg/m      Gen: Alert, in NAD  Head: NC/AT  Eyes: PERRL, EOMI, sclera anicteric  Ears: No external auricular lesions  Nose/sinus: No rhinorrhea or epistaxis  Oral cavity/oropharynx: MMM, no visible oral cavity lesions, FOM and BOT are soft to palpation  Neck: Full ROM, supple, no palpable adenopathy  Pulm: No wheezing, stridor or respiratory distress  CV: Extremities are warm and well-perfused, no cyanosis, no pedal edema  Abdominal: Normal bowel sounds, soft, nontender, no masses  Musculoskeletal: Normal bulk and tone  Skin: Normal color and turgor  Neuro: A/Ox3, CN II-XII intact, normal gait    Imaging/Path/Labs   Imagin23  PET    FINDINGS:      HEAD/NECK:  No suspicious uptake in the neck.      Paranasal sinuses and mastoid air cells are clear.     CHEST:  Intense uptake in the distal esophagus with eccentric wall  thickening/mass with SUV max 26.3.     Multiple mildly hypermetabolic mediastinal and hilar lymph nodes,  including:   - 1.3 cm left hilar lymph node (series 4, image 129) with SUV max 5.1   - 0.4 cm right paratracheal node (series 4, image 112) with SUV max  3.6.     Multiple calcified and partially calcified mediastinal and right hilar  lymph nodes.    IMPRESSION: In this patient with invasive adenocarcinoma  of the distal  esophagus:     1. Markedly hypermetabolic distal esophageal mass, compatible with  biopsy-proven invasive adenocarcinoma.     2. Mildly hypermetabolic mediastinal and left hilar lymph nodes, some  of which contain calcification and may suggest  granulomatous/inflammatory process. However, omar metastases cannot  be excluded.        Path:   6/5/23  A.  Esophagus, 38 cm, mass: Biopsy:  - Invasive moderately to poorly differentiated adenocarcinoma  - Gastric oxyntic type mucosa with mild chronic active gastritis     B.  Gastroesophageal junction: Biopsy:  - Invasive moderately to poorly differentiated adenocarcinoma, see comment    Endoscopy:  6/5/23  Findings:        Localized mild inflammation characterized by erythema was found in the        gastric antrum.        The examined duodenum and second portion of the duodenum were normal.        A large, fungating mass with bleeding and stigmata of recent bleeding        was found in the lower third of the esophagus, 38 cm from the incisors.        The mass was partially obstructing and circumferential. This was        biopsied with a cold forceps for histology. Estimated blood loss was        minimal. As I passed the scope beyound the mass, a large piece of tissue        had passed into the stomach. A walsh net was used to grab this mass and        it was sent off to pathology.                                                                                     Impression:            - Gastritis.                          - Normal examined duodenum and second portion of the                          duodenum.                          - Partially obstructing, likely malignant esophageal                          tumor was found in the lower third of the esophagus.                          Biopsied.   Recommendation:        - Soft diet and full liquids to allow easy passage of                          food into the stomach.                          - Await the  final pathology                          - once the pathology has finalized, patient will                          likely require referral to oncology and to surgical                          oncology for further recommendations.     Assessment    Mr. Ragsdale is a 83 year old male with a diagnosis of esophageal adenocarcinoma of the distal esophagus.  Evaluate potential role of radiation therapy.    Plan     1. I discussed the natural history of what appears to be locally advanced esophageal adenocarcinoma. Staging work up does not demonstrate any clear evidence of DM. He is pending EUS on 7/3/23 to complete staging work up.    2. I generally discussed standard of care including trimodality treatment. The patient has met with Dr. Levi who discussed consideration of re-evaluation of surgery after completion of chemoradiation therapy.      3.  We discussed the role of neoadjuvant chemoradiation and improving R0 resection rate and overall survival in patients compared to surgery alone (Cross trial, Betseyiro et al. Lancet 2015).      4.  We discussed side effects of treatment in detail including but not limited to fatigue, esophagitis, pneumonitis, and esophageal stricture requiring dilation.     5  We would tentatively plan to treat to a total dose of 50 Gy in 25 fractions with concurrent chemotherapy. We plan to treat gross disease +/- regional omar basins.     6. He will meet Dr. oLcke on 6/29/23 to discuss the potential role of concurrent chemotherapy.      7. Tentatively, we will will plan to schedule for CT simulation next week after meeting with medical oncology team and completion of staging EUS.       Jarred Alcantar MD  Department of Radiation Oncology  [facility]

## 2023-06-26 NOTE — NURSING NOTE
"REASON FOR APPOINTMENT   Type:  Esophageal cancer  Location:  Distal esophagus   Date of Symptom Onset: difficulty swallowing x6 weeks - then sought medical care    TREATMENT TO-DATE FOR THIS CANCER  Surgery ? Dr Levi - has discussed possible surgery s/p chemoradiation   Chemotherapy ? Will meet with Dr. Locke on 6/29/23 to discuss chemo   Other Treatments for this Cancer ? Discussion today for radiation treatment plan in setting of esophageal cancer    PERSONAL HISTORY OF CANCER   Previous Cancer ? no   Prior Radiation ? no   Prior Chemotherapy ? no   Prior Hormonal Therapy ? no     RECENT IMAGING STUDIES  PET in [facility]    REFERRALS NEEDED  None at this time - will review with pt family options of SW, psychology, , dietitian.    VITALS  /64 (BP Location: Left arm, Patient Position: Chair, Cuff Size: Adult Regular)   Pulse 53   Resp 16   Wt 77.7 kg (171 lb 3.2 oz)   SpO2 95%   BMI 32.16 kg/m      PACEMAKER/IMPLANTED CARDIAC DEVICE no    PAIN  Current history of pain associated with this visit:   Intensity: intermittent with swallowing - 7/10  Current: \"feels like a strong ache when I swallow water, or if I don't chew something enough\"  Location: points to lower epigastric area  Treatment:     PSYCHOSOCIAL  Marital Status:   Patient lives in [place] with  Spouse Kelsi.  Number of children: 6  Working status: retired  Do you feel safe in your home? Yes    REVIEW OF SYSTEMS  Skin: negative  Eyes: glasses  Ears/Nose/Throat: hearing loss, tinnitus, dysphagia, odynophagia/epigastric area  Respiratory: No shortness of breath, dyspnea on exertion, cough, or hemoptysis  Cardiovascular: Hx of CVA  Gastrointestinal: constipation  Genitourinary: nocturia, frequency and urgency  Musculoskeletal:  joint pain, L hip replaced, and muscular weakness  Neurologic: negative  Psychiatric: negative  Hematologic/Lymphatic/Immunologic: weight loss, appetite change, and fatigue  Endocrine: " "negative      Radiation Oncology Patient Teaching    Current Concern: \" I have had trouble swallowing and I feel like it is getting worse\"    Person involved with teaching: Patient and Wife  Patient asked Questions: Yes  Patient was cooperative: Yes  Patient was receptive (willing to accept information given): Yes    Education Assessment  Comprehension ability: Medium  Knowledge level: Low  Factors affecting teaching: new cancer diagnosis - many questions    Education Materials Given  Radiation Therapy and You  Radiation to the Chest  Eating Hints    Educational Topics Discussed  Side effects, Activity, Nutrition, Adjustment to illness and When to call MD/RN    Response To Teaching  More review necessary    Do you have an advanced directive or living will? Yes  Are you DNR/DNI?no        "

## 2023-06-26 NOTE — LETTER
2023         RE: Liu Ragsdale  [address] [id number]        Dear Colleague,    Thank you for referring your patient, Liu Ragsdale, to the [facility] RADIATION THERAPY CLINIC. Please see a copy of my visit note below.       Department of Radiation Oncology  Radiation Therapy Center  [facility]  [address], [address]  [place] [address]  [phone]       Consultation Note    Name: Liu Ragsdale MRN: [id number]   : 1940   Date of Service: 2023  Referring: Dr. Levi     Reason for consultation: Esophageal adenocarcinoma of the distal esophagus.  Evaluate potential role of radiation therapy.    History of Present Illness   Mr. Ragsdale is a 83 year old male a diagnosis of esophageal adenocarcinoma of the distal esophagus.  Evaluate potential role of radiation therapy.    The patient began to notice dysphagia of food over the past 6 weeks.  He also described acid reflux for short interval of time.  The patient reports approximately 9 to 10 pound weight loss over the 6-week..  Currently eating regular diet with smaller bites p.o.  The patient underwent EGD on 2023.  Scope demonstrated a large fungating mass in the lower third of the esophagus, 38 cm from the incisors.  The mass was noted to be partially obstructing and circumferential.  Biopsy of the mass demonstrated  adenocarcinoma.  Gastroesophageal junction biopsy also demonstrated adenocarcinoma.  PET scan obtained on 2023 demonstrated hypermetabolic activity in the distal esophageal mass compatible with esophageal cancer.  There were  low moderate activity in hilar and mediastinal adenopathy, indeterminate, possibly inflammatory in nature.  No clear sites of distant disease were noted.  Patient was seen by Dr. Levi of thoracic surgery team.  He discussed consideration of surgical resection after chemoradiation therapy.  The patient presents to our clinic to discuss  next steps in management.  He will also meet Dr. Locke of medical oncology on 6/29/2023.    Today, the patient states he is doing okay but has noticed slowly progressive worsening of swallowing particularly with solid foods.sStill able to tolerate softer foods p.o. and smaller bites.  No dysphagia to liquids. No prior radiation.  No pacemaker.      Past Medical History:   Past Medical History:   Diagnosis Date    Angina at rest (H)     Arthritis     Cerebrovascular accident (H) 9/27/2021    Coronary artery disease involving native coronary artery of native heart without angina pectoris     Hypertension        Past Surgical History:   Past Surgical History:   Procedure Laterality Date    ARTHROPLASTY HIP Left 02/03/2021    Procedure: Total Hip Arthroplasty;  Surgeon: Andrew Arriola MD;  Location: [facility]    COLONOSCOPY N/A 06/16/2016    Procedure: COLONOSCOPY;  Surgeon: Randy Avendano MD;  Location: [facility]    CV LEFT HEART CATH Left 03/10/2020    Procedure: Left Heart Cath;  Surgeon: Vicente Lopez MD;  Location: [facility] CARDIAC CATH LAB    ENDARTERECTOMY CAROTID Right 9/29/2021    Procedure: ENDARTERECTOMY, CAROTID;  Surgeon: Raymond Andersen MD;  Location:  OR    ESOPHAGOSCOPY, GASTROSCOPY, DUODENOSCOPY (EGD), COMBINED N/A 6/5/2023    Procedure: Esophagoscopy, Gastroscopy, Duodenoscopy, With Biopsy;  Surgeon: Joseph Landers DO;  Location: [facility]    EYE SURGERY  2005    cataract surgery    VASECTOMY  1981       Chemotherapy History:  None    Radiation History:  None    Pregnant: Not Applicable  Implanted Cardiac Devices: No    Medications:  Current Outpatient Medications   Medication    acetaminophen (TYLENOL) 325 MG tablet    amLODIPine (NORVASC) 5 MG tablet    aspirin (ASA) 325 MG tablet    atorvastatin (LIPITOR) 10 MG tablet    carvedilol (COREG) 6.25 MG tablet    diclofenac (VOLTAREN) 1 % topical gel    Multiple Vitamin (MULTI VITAMIN PO)    nitroGLYcerin (NITROSTAT) 0.3 MG sublingual tablet     NONFORMULARY    pantoprazole (PROTONIX) 40 MG EC tablet     No current facility-administered medications for this visit.         Allergies:     Allergies   Allergen Reactions    Metoprolol Other (See Comments)     Side effects : dry mouth    Ampicillin Rash       Family History:  Family History   Problem Relation Age of Onset    Hypertension Sister     Kidney failure Sister     Chronic Obstructive Pulmonary Disease Sister     Thyroid Disease Sister     Seizure Disorder Paternal Grandmother     Mitral valve prolapse Daughter     No Known Problems Son     Cerebrovascular Disease No family hx of        Review of Systems   A 10-point review of systems was performed. Pertinent findings are noted in the HPI.    Physical Exam   ECOG Status: 1    Vitals:  /64 (BP Location: Left arm, Patient Position: Chair, Cuff Size: Adult Regular)   Pulse 53   Resp 16   Wt 77.7 kg (171 lb 3.2 oz)   SpO2 95%   BMI 32.16 kg/m      Gen: Alert, in NAD  Head: NC/AT  Eyes: PERRL, EOMI, sclera anicteric  Ears: No external auricular lesions  Nose/sinus: No rhinorrhea or epistaxis  Oral cavity/oropharynx: MMM, no visible oral cavity lesions, FOM and BOT are soft to palpation  Neck: Full ROM, supple, no palpable adenopathy  Pulm: No wheezing, stridor or respiratory distress  CV: Extremities are warm and well-perfused, no cyanosis, no pedal edema  Abdominal: Normal bowel sounds, soft, nontender, no masses  Musculoskeletal: Normal bulk and tone  Skin: Normal color and turgor  Neuro: A/Ox3, CN II-XII intact, normal gait    Imaging/Path/Labs   Imagin23  PET    FINDINGS:      HEAD/NECK:  No suspicious uptake in the neck.      Paranasal sinuses and mastoid air cells are clear.     CHEST:  Intense uptake in the distal esophagus with eccentric wall  thickening/mass with SUV max 26.3.     Multiple mildly hypermetabolic mediastinal and hilar lymph nodes,  including:   - 1.3 cm left hilar lymph node (series 4, image 129) with SUV max 5.1    - 0.4 cm right paratracheal node (series 4, image 112) with SUV max  3.6.     Multiple calcified and partially calcified mediastinal and right hilar  lymph nodes.    IMPRESSION: In this patient with invasive adenocarcinoma of the distal  esophagus:     1. Markedly hypermetabolic distal esophageal mass, compatible with  biopsy-proven invasive adenocarcinoma.     2. Mildly hypermetabolic mediastinal and left hilar lymph nodes, some  of which contain calcification and may suggest  granulomatous/inflammatory process. However, omar metastases cannot  be excluded.        Path:   6/5/23  A.  Esophagus, 38 cm, mass: Biopsy:  - Invasive moderately to poorly differentiated adenocarcinoma  - Gastric oxyntic type mucosa with mild chronic active gastritis     B.  Gastroesophageal junction: Biopsy:  - Invasive moderately to poorly differentiated adenocarcinoma, see comment    Endoscopy:  6/5/23  Findings:        Localized mild inflammation characterized by erythema was found in the        gastric antrum.        The examined duodenum and second portion of the duodenum were normal.        A large, fungating mass with bleeding and stigmata of recent bleeding        was found in the lower third of the esophagus, 38 cm from the incisors.        The mass was partially obstructing and circumferential. This was        biopsied with a cold forceps for histology. Estimated blood loss was        minimal. As I passed the scope beyound the mass, a large piece of tissue        had passed into the stomach. A walsh net was used to grab this mass and        it was sent off to pathology.                                                                                     Impression:            - Gastritis.                          - Normal examined duodenum and second portion of the                          duodenum.                          - Partially obstructing, likely malignant esophageal                          tumor was found in the lower  third of the esophagus.                          Biopsied.   Recommendation:        - Soft diet and full liquids to allow easy passage of                          food into the stomach.                          - Await the final pathology                          - once the pathology has finalized, patient will                          likely require referral to oncology and to surgical                          oncology for further recommendations.     Assessment    Mr. Ragsdale is a 83 year old male with a diagnosis of esophageal adenocarcinoma of the distal esophagus.  Evaluate potential role of radiation therapy.    Plan     1. I discussed the natural history of what appears to be locally advanced esophageal adenocarcinoma. Staging work up does not demonstrate any clear evidence of DM. He is pending EUS on 7/3/23 to complete staging work up.    2. I generally discussed standard of care including trimodality treatment. The patient has met with Dr. Levi who discussed consideration of re-evaluation of surgery after completion of chemoradiation therapy.      3.  We discussed the role of neoadjuvant chemoradiation and improving R0 resection rate and overall survival in patients compared to surgery alone (Cross trial, Betseyiro et al. Lancet 2015).      4.  We discussed side effects of treatment in detail including but not limited to fatigue, esophagitis, pneumonitis, and esophageal stricture requiring dilation.     5  We would tentatively plan to treat to a total dose of 50 Gy in 25 fractions with concurrent chemotherapy. We plan to treat gross disease +/- regional omar basins.     6. He will meet Dr. Locke on 6/29/23 to discuss the potential role of concurrent chemotherapy.      7. Tentatively, we will will plan to schedule for CT simulation next week after meeting with medical oncology team and completion of staging EUS.       Jarred Alcantar MD  Department of Radiation Oncology  [facility]

## 2023-06-29 NOTE — PROGRESS NOTES
Oncology Consultation:      Reason for Visit:    Lower third esophageal adenocarcinoma    History Of Present Illness:  Mr. Hatfield is a 83 year old male who presents for evaluation of lower third of esophageal adenocarcinoma..  He noticed difficulty swallowing of food over the past 8 weeks.  He also had acid reflux for short interval of time.  He has lost  10 pound weight loss over the 8-week..       EGD on 6/5/2023.   demonstrated a large fungating mass in the lower third of the esophagus, 38 cm from the incisors.  The mass was noted to be partially obstructing and circumferential.      Biopsy of the mass demonstrated  adenocarcinoma.    Gastroesophageal junction biopsy also demonstrated adenocarcinoma.      PET scan obtained on 6/14/2023 demonstrated hypermetabolic activity in the distal esophageal mass compatible with esophageal cancer.  There were  low moderate activity in hilar and mediastinal adenopathy, indeterminate, possibly inflammatory in nature.  No clear sites of distant disease were noted.      Patient was seen by Dr. Levi of thoracic surgery team.  He discussed consideration of surgical resection after preop chemoradiation therapy.       Complains of progressive worsening of swallowing particularly with solid foods.  He is complaining of difficulty swallowing to liquids also and also sometimes complain of discomfort while swallowing.        Past medical history:  Past Medical History:   Diagnosis Date     Angina at rest (H)      Arthritis      Cerebrovascular accident (H) 9/27/2021     Coronary artery disease involving native coronary artery of native heart without angina pectoris      Hypertension        Past Surgical History:   Procedure Laterality Date     ARTHROPLASTY HIP Left 02/03/2021    Procedure: Total Hip Arthroplasty;  Surgeon: Andrew Arriola MD;  Location: [facility]     COLONOSCOPY N/A 06/16/2016    Procedure: COLONOSCOPY;  Surgeon: Randy Avendano MD;  Location: [facility]     CV LEFT HEART  CATH Left 03/10/2020    Procedure: Left Heart Cath;  Surgeon: Vicente Lopez MD;  Location:  [facility] CARDIAC CATH LAB     ENDARTERECTOMY CAROTID Right 9/29/2021    Procedure: ENDARTERECTOMY, CAROTID;  Surgeon: Raymond Andersen MD;  [facility]     ESOPHAGOSCOPY, GASTROSCOPY, DUODENOSCOPY (EGD), COMBINED N/A 6/5/2023    Procedure: Esophagoscopy, Gastroscopy, Duodenoscopy, With Biopsy;  Surgeon: Joseph Landers DO;  Location: [facility]     EYE SURGERY  2005    cataract surgery     VASECTOMY  1981       Medications:  acetaminophen (TYLENOL) 325 MG tablet, Take 3 tablets (975 mg) by mouth 3 times daily  amLODIPine (NORVASC) 5 MG tablet, TAKE 1 TABLET BY MOUTH ONCE DAILY (NEED  APPOINTMENT  AND  FASTING  LABS  FOR  FURTHER  REFILLS)  aspirin (ASA) 325 MG tablet, Take 1 tablet (325 mg) by mouth daily  atorvastatin (LIPITOR) 10 MG tablet, TAKE 1 TABLET BY MOUTH ONCE DAILY (APPOINTMENT  REQUIRED  FOR  FUTURE  REFILLS)  carvedilol (COREG) 6.25 MG tablet, TAKE 1 TABLET BY MOUTH TWICE DAILY WITH MEALS  clarithromycin (BIAXIN) 500 MG tablet, Take 1 tablet (500 mg) by mouth 2 times daily for 14 days  diclofenac (VOLTAREN) 1 % topical gel, Place 4 g onto the skin 3 times daily as needed for moderate pain (Shoulder)  metroNIDAZOLE (FLAGYL) 500 MG tablet, Take 1 tablet (500 mg) by mouth 3 times daily for 14 days  Multiple Vitamin (MULTI VITAMIN PO), Take 1 oz by mouth daily Eniva VIBE-liquid  nitroGLYcerin (NITROSTAT) 0.3 MG sublingual tablet, For chest pain place 1 tablet under the tongue every 5 minutes for 3 doses. If symptoms persist 5 minutes after 1st dose call 911. (Patient not taking: Reported on 6/8/2023)  NONFORMULARY, Take 1 oz by mouth daily Eniva Cell-Ready Multi Minerals is a concentrated, ionic liquid mineral dietary supplement blend to help support nutritional balance and wellbeing.  Combine with 8 ounces of water / juice  pantoprazole (PROTONIX) 40 MG EC tablet, Take 1 tablet by mouth once daily    No  current facility-administered medications on file prior to visit.      Allergy:     Allergies   Allergen Reactions     Metoprolol Other (See Comments)     Side effects : dry mouth     Ampicillin Rash       Family History:  Family History   Problem Relation Age of Onset     Hypertension Sister      Kidney failure Sister      Chronic Obstructive Pulmonary Disease Sister      Thyroid Disease Sister      Seizure Disorder Paternal Grandmother      Mitral valve prolapse Daughter      No Known Problems Son      Cerebrovascular Disease No family hx of        Social History     Tobacco Use     Smoking status: Never     Passive exposure: Never     Smokeless tobacco: Never   Substance Use Topics     Alcohol use: Yes     Comment: [place] only       Review Of Systems:  14 point review of systems is otherwise negative except as mentioned above        PHYSICAL EXAM:  There were no vitals taken for this visit.  GENERAL: no acute distress. Cooperative in conversation. Here with   HEENT: pupils are equal, round and reactive. Oromucosa is clean and intact. No ulcerations or mucositis noted. No bleeding noted.  RESP: lungs are clear bilaterally per auscultation. Regular respiratory rate. No wheezes or rhonchi.  CV: Regular, rate and rhythm. No murmurs.  ABD: soft, nontender. Positive bowel sounds. No organomegaly.   MUSCULOSKELETAL: No lower extremity swelling.   NEURO: non focal. Alert and oriented x3.   PSYCH: within normal limits. No depression or anxiety.  SKIN: warm dry intact   LYMPH: no cervical, supraclavicular or axillary lymphadenopathy      Laboratory/Imaging Studies  Please see attached reports  Hospital Outpatient Visit on 06/21/2023   Component Date Value Ref Range Status     FVC-Pred 06/21/2023 2.55  L Final     FVC-Pre 06/21/2023 2.92  L Final     FVC-%Pred-Pre 06/21/2023 114  % Final     FEV1-Pre 06/21/2023 2.34  L Final     FEV1-%Pred-Pre 06/21/2023 120  % Final     FEV1FVC-Pred 06/21/2023 77  % Final      FEV1FVC-Pre 06/21/2023 80  % Final     FEFMax-Pred 06/21/2023 5.13  L/sec Final     FEFMax-Pre 06/21/2023 6.41  L/sec Final     FEFMax-%Pred-Pre 06/21/2023 124  % Final     FEF2575-Pred 06/21/2023 1.48  L/sec Final     FEF2575-Pre 06/21/2023 2.28  L/sec Final     AHB9660-%Pred-Pre 06/21/2023 153  % Final     ExpTime-Pre 06/21/2023 7.29  sec Final     FIFMax-Pre 06/21/2023 2.94  L/sec Final     VC-Pred 06/21/2023 2.74  L Final     VC-Pre 06/21/2023 3.21  L Final     VC-%Pred-Pre 06/21/2023 117  % Final     IC-Pred 06/21/2023 2.06  L Final     IC-Pre 06/21/2023 2.88  L Final     IC-%Pred-Pre 06/21/2023 139  % Final     ERV-Pred 06/21/2023 0.68  L Final     ERV-Pre 06/21/2023 0.33  L Final     ERV-%Pred-Pre 06/21/2023 48  % Final     FEV1FEV6-Pred 06/21/2023 76  % Final     FEV1FEV6-Pre 06/21/2023 80  % Final     FRCPleth-Pred 06/21/2023 3.29  L Final     FRCPleth-Pre 06/21/2023 2.82  L Final     FRCPleth-%Pred-Pre 06/21/2023 85  % Final     RVPleth-Pred 06/21/2023 2.61  L Final     RVPleth-Pre 06/21/2023 2.50  L Final     RVPleth-%Pred-Pre 06/21/2023 95  % Final     TLCPleth-Pred 06/21/2023 5.35  L Final     TLCPleth-Pre 06/21/2023 5.71  L Final     TLCPleth-%Pred-Pre 06/21/2023 106  % Final     DLCOunc-Pred 06/21/2023 18.40  ml/min/mmHg Final     DLCOunc-Pre 06/21/2023 18.90  ml/min/mmHg Final     DLCOunc-%Pred-Pre 06/21/2023 102  % Final     DLCOcor-Pre 06/21/2023 19.24  ml/min/mmHg Final     DLCOcor-%Pred-Pre 06/21/2023 104  % Final     VA-Pre 06/21/2023 4.69  L Final     VA-%Pred-Pre 06/21/2023 103  % Final     FEV1SVC-Pred 06/21/2023 71  % Final     FEV1SVC-Pre 06/21/2023 73  % Final       ASSESSMENT/PLAN:    Esophageal adenocarcinoma of the distal esophagus, staging underway, NEGATIVE PD-L1 EXPRESSION (TPS <1%)    Discussed in detail Nature history, diagnosis, treatment options and prognosis according to NCCN guidelines.  He is scheduled for endoscopic ultrasound next week.  Once we have completion of staging  work-up most likely would recommend concurrent chemoradiation with carboplatin and Taxol weekly followed by definitive surgery.    Discussed in detail side effects associated with chemotherapy that include but not limited to nausea vomiting, peripheral sensorimotor neuropathy, mucositis. abnormal liver kidney functions, neutropenia, neutropenic fever, sepsis, death, allergic reactions and others.    Dysphagia: We will plan feeding tube, J-tube is is needed for nutrition as it will be his sole source for nutrition and anticipated length of need is 90 days or greater    Follow-up plan: 1 week to review EUS and make definitive recommendations for treatment    Patient completely understands and agrees with the plan      Total time spent was over 50 minutes.  This includes chart prep time, review of clinical data including notes from outside physicians, labs, review of medical imaging, discussion about  plan of care, documentation and .      This note has been dictated using voice recognition software. Any grammatical or context distortions are unintentional and inherent to the software                        (M6) obeys commands

## 2023-06-29 NOTE — LETTER
6/29/2023         RE: Liu Ragsdale  [id number] [facility] [id number]        Dear Colleague,    Thank you for referring your patient, Liu Ragsdale, to the [facility]. Please see a copy of my visit note below.    Oncology Consultation:      Reason for Visit:    Lower third esophageal adenocarcinoma    History Of Present Illness:  Mr. Hatfield is a 83 year old male who presents for evaluation of lower third of esophageal adenocarcinoma..  He noticed difficulty swallowing of food over the past 8 weeks.  He also had acid reflux for short interval of time.  He has lost  10 pound weight loss over the 8-week..       EGD on 6/5/2023.   demonstrated a large fungating mass in the lower third of the esophagus, 38 cm from the incisors.  The mass was noted to be partially obstructing and circumferential.      Biopsy of the mass demonstrated  adenocarcinoma.    Gastroesophageal junction biopsy also demonstrated adenocarcinoma.      PET scan obtained on 6/14/2023 demonstrated hypermetabolic activity in the distal esophageal mass compatible with esophageal cancer.  There were  low moderate activity in hilar and mediastinal adenopathy, indeterminate, possibly inflammatory in nature.  No clear sites of distant disease were noted.      Patient was seen by Dr. Levi of thoracic surgery team.  He discussed consideration of surgical resection after preop chemoradiation therapy.       Complains of progressive worsening of swallowing particularly with solid foods.  He is complaining of difficulty swallowing to liquids also and also sometimes complain of discomfort while swallowing.        Past medical history:  Past Medical History:   Diagnosis Date     Angina at rest (H)      Arthritis      Cerebrovascular accident (H) 9/27/2021     Coronary artery disease involving native coronary artery of native heart without angina pectoris      Hypertension        Past Surgical History:   Procedure  Laterality Date     ARTHROPLASTY HIP Left 02/03/2021    Procedure: Total Hip Arthroplasty;  Surgeon: Andrew Arriola MD;  [facility]     COLONOSCOPY N/A 06/16/2016    Procedure: COLONOSCOPY;  Surgeon: Randy Avendano MD;  [facility]: [facility]     CV LEFT HEART CATH Left 03/10/2020    Procedure: Left Heart Cath;  Surgeon: Vicente Lopez MD;  Location:  [facility] LAB     ENDARTERECTOMY CAROTID Right 9/29/2021    Procedure: ENDARTERECTOMY, CAROTID;  Surgeon: Raymond Andersen MD;  [facility]     ESOPHAGOSCOPY, GASTROSCOPY, DUODENOSCOPY (EGD), COMBINED N/A 6/5/2023    Procedure: Esophagoscopy, Gastroscopy, Duodenoscopy, With Biopsy;  Surgeon: Joseph Landers DO;  Location: [facility]     EYE SURGERY  2005    cataract surgery     VASECTOMY  1981       Medications:  acetaminophen (TYLENOL) 325 MG tablet, Take 3 tablets (975 mg) by mouth 3 times daily  amLODIPine (NORVASC) 5 MG tablet, TAKE 1 TABLET BY MOUTH ONCE DAILY (NEED  APPOINTMENT  AND  FASTING  LABS  FOR  FURTHER  REFILLS)  aspirin (ASA) 325 MG tablet, Take 1 tablet (325 mg) by mouth daily  atorvastatin (LIPITOR) 10 MG tablet, TAKE 1 TABLET BY MOUTH ONCE DAILY (APPOINTMENT  REQUIRED  FOR  FUTURE  REFILLS)  carvedilol (COREG) 6.25 MG tablet, TAKE 1 TABLET BY MOUTH TWICE DAILY WITH MEALS  clarithromycin (BIAXIN) 500 MG tablet, Take 1 tablet (500 mg) by mouth 2 times daily for 14 days  diclofenac (VOLTAREN) 1 % topical gel, Place 4 g onto the skin 3 times daily as needed for moderate pain (Shoulder)  metroNIDAZOLE (FLAGYL) 500 MG tablet, Take 1 tablet (500 mg) by mouth 3 times daily for 14 days  Multiple Vitamin (MULTI VITAMIN PO), Take 1 oz by mouth daily Eniva VIBE-liquid  nitroGLYcerin (NITROSTAT) 0.3 MG sublingual tablet, For chest pain place 1 tablet under the tongue every 5 minutes for 3 doses. If symptoms persist 5 minutes after 1st dose call 911. (Patient not taking: Reported on 6/8/2023)  NONFORMULARY, Take 1 oz by mouth daily Eniva  Cell-Ready Multi Minerals is a concentrated, ionic liquid mineral dietary supplement blend to help support nutritional balance and wellbeing.  Combine with 8 ounces of water / juice  pantoprazole (PROTONIX) 40 MG EC tablet, Take 1 tablet by mouth once daily    No current facility-administered medications on file prior to visit.      Allergy:     Allergies   Allergen Reactions     Metoprolol Other (See Comments)     Side effects : dry mouth     Ampicillin Rash       Family History:  Family History   Problem Relation Age of Onset     Hypertension Sister      Kidney failure Sister      Chronic Obstructive Pulmonary Disease Sister      Thyroid Disease Sister      Seizure Disorder Paternal Grandmother      Mitral valve prolapse Daughter      No Known Problems Son      Cerebrovascular Disease No family hx of        Social History     Tobacco Use     Smoking status: Never     Passive exposure: Never     Smokeless tobacco: Never   Substance Use Topics     Alcohol use: Yes     Comment: [place] only       Review Of Systems:  14 point review of systems is otherwise negative except as mentioned above        PHYSICAL EXAM:  There were no vitals taken for this visit.  GENERAL: no acute distress. Cooperative in conversation. Here with   HEENT: pupils are equal, round and reactive. Oromucosa is clean and intact. No ulcerations or mucositis noted. No bleeding noted.  RESP: lungs are clear bilaterally per auscultation. Regular respiratory rate. No wheezes or rhonchi.  CV: Regular, rate and rhythm. No murmurs.  ABD: soft, nontender. Positive bowel sounds. No organomegaly.   MUSCULOSKELETAL: No lower extremity swelling.   NEURO: non focal. Alert and oriented x3.   PSYCH: within normal limits. No depression or anxiety.  SKIN: warm dry intact   LYMPH: no cervical, supraclavicular or axillary lymphadenopathy      Laboratory/Imaging Studies  Please see attached reports  Hospital Outpatient Visit on 06/21/2023   Component Date Value Ref  Range Status     FVC-Pred 06/21/2023 2.55  L Final     FVC-Pre 06/21/2023 2.92  L Final     FVC-%Pred-Pre 06/21/2023 114  % Final     FEV1-Pre 06/21/2023 2.34  L Final     FEV1-%Pred-Pre 06/21/2023 120  % Final     FEV1FVC-Pred 06/21/2023 77  % Final     FEV1FVC-Pre 06/21/2023 80  % Final     FEFMax-Pred 06/21/2023 5.13  L/sec Final     FEFMax-Pre 06/21/2023 6.41  L/sec Final     FEFMax-%Pred-Pre 06/21/2023 124  % Final     FEF2575-Pred 06/21/2023 1.48  L/sec Final     FEF2575-Pre 06/21/2023 2.28  L/sec Final     CPP6560-%Pred-Pre 06/21/2023 153  % Final     ExpTime-Pre 06/21/2023 7.29  sec Final     FIFMax-Pre 06/21/2023 2.94  L/sec Final     VC-Pred 06/21/2023 2.74  L Final     VC-Pre 06/21/2023 3.21  L Final     VC-%Pred-Pre 06/21/2023 117  % Final     IC-Pred 06/21/2023 2.06  L Final     IC-Pre 06/21/2023 2.88  L Final     IC-%Pred-Pre 06/21/2023 139  % Final     ERV-Pred 06/21/2023 0.68  L Final     ERV-Pre 06/21/2023 0.33  L Final     ERV-%Pred-Pre 06/21/2023 48  % Final     FEV1FEV6-Pred 06/21/2023 76  % Final     FEV1FEV6-Pre 06/21/2023 80  % Final     FRCPleth-Pred 06/21/2023 3.29  L Final     FRCPleth-Pre 06/21/2023 2.82  L Final     FRCPleth-%Pred-Pre 06/21/2023 85  % Final     RVPleth-Pred 06/21/2023 2.61  L Final     RVPleth-Pre 06/21/2023 2.50  L Final     RVPleth-%Pred-Pre 06/21/2023 95  % Final     TLCPleth-Pred 06/21/2023 5.35  L Final     TLCPleth-Pre 06/21/2023 5.71  L Final     TLCPleth-%Pred-Pre 06/21/2023 106  % Final     DLCOunc-Pred 06/21/2023 18.40  ml/min/mmHg Final     DLCOunc-Pre 06/21/2023 18.90  ml/min/mmHg Final     DLCOunc-%Pred-Pre 06/21/2023 102  % Final     DLCOcor-Pre 06/21/2023 19.24  ml/min/mmHg Final     DLCOcor-%Pred-Pre 06/21/2023 104  % Final     VA-Pre 06/21/2023 4.69  L Final     VA-%Pred-Pre 06/21/2023 103  % Final     FEV1SVC-Pred 06/21/2023 71  % Final     FEV1SVC-Pre 06/21/2023 73  % Final       ASSESSMENT/PLAN:    Esophageal adenocarcinoma of the distal esophagus,  "staging underway, NEGATIVE PD-L1 EXPRESSION (TPS <1%)    Discussed in detail Nature history, diagnosis, treatment options and prognosis according to NCCN guidelines.  He is scheduled for endoscopic ultrasound next week.  Once we have completion of staging work-up most likely would recommend concurrent chemoradiation with carboplatin and Taxol weekly followed by definitive surgery.    Discussed in detail side effects associated with chemotherapy that include but not limited to nausea vomiting, peripheral sensorimotor neuropathy, mucositis. abnormal liver kidney functions, neutropenia, neutropenic fever, sepsis, death, allergic reactions and others.    Dysphagia: We will plan feeding tube, J-tube    Follow-up plan: 1 week to review EUS and make definitive recommendations for treatment    Patient completely understands and agrees with the plan      Total time spent was over 50 minutes.  This includes chart prep time, review of clinical data including notes from outside physicians, labs, review of medical imaging, discussion about  plan of care, documentation and .      This note has been dictated using voice recognition software. Any grammatical or context distortions are unintentional and inherent to the software                         Oncology Rooming Note    June 29, 2023 3:11 PM   Liu Ragsdale is a 83 year old male who presents for:    Chief Complaint   Patient presents with     Oncology Clinic Visit     Primary esophageal adenocarcinoma - New consult     Initial Vitals: /64 (BP Location: Right arm, Patient Position: Sitting, Cuff Size: Adult Regular)   Pulse 60   Temp 98.4  F (36.9  C) (Tympanic)   Resp 12   Ht 1.56 m (5' 1.42\")   Wt 77.1 kg (170 lb)   SpO2 94%   BMI 31.69 kg/m   Estimated body mass index is 31.69 kg/m  as calculated from the following:    Height as of this encounter: 1.56 m (5' 1.42\").    Weight as of this encounter: 77.1 kg (170 lb). Body surface area is 1.83 meters " squared.  No Pain (0) Comment: Data Unavailable   No LMP for male patient.  Allergies reviewed: Yes  Medications reviewed: Yes    Medications: Medication refills not needed today.  Pharmacy name entered into [facility]:    [id number] - [place], [place] - [address] - [place], [place] - [address]  [facility] - [address] - ONE [facility] AT PORTAL TO [facility] SITES    Clinical concerns:  New consult      Natty Ribeiro CMA                Again, thank you for allowing me to participate in the care of your patient.        Sincerely,        Steve Locke MD

## 2023-06-29 NOTE — PATIENT INSTRUCTIONS
Discussed in detail Nature history, diagnosis, treatment options and prognosis according to NCCN guidelines.  He is scheduled for endoscopic ultrasound next week.  Once we have completion of staging work-up most likely would recommend concurrent chemoradiation with carboplatin and Taxol weekly followed by definitive surgery.     Discussed in detail side effects associated with chemotherapy that include but not limited to nausea vomiting, peripheral sensorimotor neuropathy, mucositis. abnormal liver kidney functions, neutropenia, neutropenic fever, sepsis, death, allergic reactions and others.     Dysphagia: We will plan feeding tube, J-tube     Follow-up plan: 1 week to review EUS and make definitive recommendations for treatment

## 2023-06-29 NOTE — PROGRESS NOTES
"Oncology Rooming Note    June 29, 2023 3:11 PM   Liu Ragsdale is a 83 year old male who presents for:    Chief Complaint   Patient presents with     Oncology Clinic Visit     Primary esophageal adenocarcinoma - New consult     Initial Vitals: /64 (BP Location: Right arm, Patient Position: Sitting, Cuff Size: Adult Regular)   Pulse 60   Temp 98.4  F (36.9  C) (Tympanic)   Resp 12   Ht 1.56 m (5' 1.42\")   Wt 77.1 kg (170 lb)   SpO2 94%   BMI 31.69 kg/m   Estimated body mass index is 31.69 kg/m  as calculated from the following:    Height as of this encounter: 1.56 m (5' 1.42\").    Weight as of this encounter: 77.1 kg (170 lb). Body surface area is 1.83 meters squared.  No Pain (0) Comment: Data Unavailable   No LMP for male patient.  Allergies reviewed: Yes  Medications reviewed: Yes    Medications: Medication refills not needed today.  Pharmacy name entered into [employer] [facility]:    [facility] - [place], [place] - [address] [place] [address] PHARMACY MAIL DELIVERY - [place], [place] - [address]  [facility] - [address] - ONE [facility] AT PORTAL TO REGISTERED [facility] SITES    Clinical concerns:  New consult      Natty Ribeiro CMA            "

## 2023-07-03 PROBLEM — C15.5 MALIGNANT NEOPLASM OF LOWER THIRD OF ESOPHAGUS (H): Status: ACTIVE | Noted: 2023-01-01

## 2023-07-03 NOTE — OP NOTE
Upper EUS 07/03/2023 10:25 AM [facility] [address] [place]  [address]   _______________________________________________________________________________   Patient Name: Liu Johnson           Procedure Date: 7/3/2023 10:25 AM   MRN: [id number]                       Account Number: [id number]   YOB: 1940              Admit Type: Outpatient   Age: 83                               Room: [place] [address]   Note Status: Finalized                Attending MD: RICHY CARD MD,   Instrument Name: 530 [id number] Linear,509 GIF  Gastroscope   _______________________________________________________________________________       Procedure:                Upper EUS   Indications:              Staging of gastroesophageal carcinoma   Providers:                RICHY CARD MD, Brenda Garces, RN,                             Negin Martinez RN   Patient Profile:          Mr Johnson is an 82yo gentleman 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   Referring MD:             GABBI KING MD   Medicines:                Deep sedation was administered   Complications:            No immediate complications.   _______________________________________________________________________________   Procedure:                Pre-Anesthesia Assessment:                             - Prior to the procedure, a History and Physical                             was performed, and patient medications and                             allergies were reviewed. The patient is competent.                             The risks and benefits of the procedure and the                             sedation options and risks were discussed with the                             patient. All questions were answered and informed                             consent was obtained. Patient identification and                              proposed procedure were verified by the nurse in                             the pre-procedure area. Mental Status Examination:                             alert and oriented. Airway Examination: Mallampati                             Class II (the uvula but not tonsillar pillars                             visualized). Respiratory Examination: clear to                             auscultation. CV Examination: normal. ASA Grade                             Assessment: III - A patient with severe systemic                             disease. After reviewing the risks and benefits,                             the patient was deemed in satisfactory condition to                             undergo the procedure. The anesthesia plan was to                             use deep sedation / analgesia. Immediately prior to                             administration of medications, the patient was                             re-assessed for adequacy to receive sedatives. The                             heart rate, respiratory rate, oxygen saturations,                             blood pressure, adequacy of pulmonary ventilation,                             and response to care were monitored throughout the                             procedure. The physical status of the patient was                             re-assessed after the procedure. After obtaining                             informed consent, the endoscope was passed under                             direct vision. Throughout the procedure, the                             patient's blood pressure, pulse, and oxygen                             saturations were monitored continuously. The                             echoendoscope was introduced through the mouth, and                             advanced to the second part of duodenum. The                             endoscope 509 was introduced through the mouth, and                              advanced to the second part of duodenum. The upper                             EUS was accomplished without difficulty. The                             patient tolerated the procedure well.                                                                                     Findings:        White light and endosonographic findings :        The patient was left lateral under deep sedation and both a gastroscope        and a linear were utilized. We began with a gastroscope for white light        evaluation. The proximal and mid esophagus were unremarkable. At 36cm a        partially circumferential mass was found. This became circumferential at        38cm where the lumen narrowed to about 12mm and the mass continued        through the gastroesophageal junction at 41cm. The mass did not appear        to involve the cardia. The stomach as grossly unremarkable as was the        proximal duodenum. We then exchanged for a linear and was able to        manuever this endoscope across the mass to the stomach. In terms of        endosonography, a hypoechoic mass was identified corresponding to the        lesion seen on white light and this appeared to have well dfined        borders. The mass measured 9mm in thickness and invovled the muscularis        propria but did not appear to extend beyon into the adventita or        adjacent structures. There was a malignant appearing node (well defined        borders and hypoechoic) inferior to the mass measuring 10.4x5.4mm at        39cm. Four other indeterminate to benign appearing paraesophageal lymph        ndoes were seen, all less than 6mm in widest diameter, isoehcoic and        with fuzzy borders (36, 34 <x2>, 30cm). The pancreatic parenchyma was        diffusely mildly heterogeneous with foci and trands without lobularity,        solid lesion or liquid lesion. There was no overt atrophy. The main duct        was traced from tail to head and found to be regular in course and         without dilation, measuring just above 1mm in the head and tapering to        less than 1mm towards the tail. The gallbladder is in situ without wall        thickening or solid component such as stone. The common duct was traced        from bifurcation to ampulla and was decompressed (just above 4mm) and        without wall thickening or internal solid component. Limited views of        the left lobe,Â right lobe and caudate were without suspicious lesion.        There was no evidence of periportal, perigastric, peripancreatic or        celiac region lymphadenopathy. The major vasculature of the abdomen        including the splenics, superior mesenterics, portal, gastrodudoenal and        celiac were traditional and unremarkable.                                                                                     Impression:               - A partially circumferential progressing to                             circumferential distal esophageal mass was found                             spanning 36-41cm with the junction estimated at                             41cm; luminal patency was estimated at 12mm given                             passage of the linear; the lesion involved layer 4                             though there was no evidence of layer 5 involvement                             and therefore no other structures were involved                             - 5 regional paraesophageal lymph nodes were                             demonstrated either at or above the primary (as                             proximal as 30cm); only one of these, inferior to                             the mass, had malignant features, no distant nodes                             were appreciated                             - Grossly unremarkable pancreaticobiliary system                             with intact gallbladder and no synchronous lesions                             of the pancreas or liver                              - [id number] (III/Lamine)   Recommendation:           - Deep sedation recovery with probable discharge                             home this afternoon                             - All medications, diet and activity may resume                             without delay                             - Follow up with established phycisians as scheduled                             - The findings and recommendations were discussed                             with the patient and their family                                                                                     Procedure Code(s):        --- Professional ---        21998, Esophagogastroduodenoscopy, flexible, transoral; with endoscopic        ultrasound examination limited to the esophagus, stomach or duodenum,        and adjacent structures   Diagnosis Code(s):        --- Professional ---        K22.89, Other specified disease of esophagus     CPT copyright 2021 [employer]. All rights reserved.     The codes documented in this report are preliminary and upon  review may   be revised to meet current compliance requirements.     electronically signed by ANGELINA Card   ________________________   RICHY CARD MD

## 2023-07-03 NOTE — ANESTHESIA CARE TRANSFER NOTE
Patient: Liu Ragsdale    Procedure: Procedure(s):  ESOPHAGOGASTRODUODENOSCOPY  ENDOSCOPIC ULTRASOUND, ESOPHAGOSCOPY / UPPER GASTROINTESTINAL TRACT (GI)       Diagnosis: Esophageal adenocarcinoma (H) [C15.9]  Diagnosis Additional Information: No value filed.    Anesthesia Type:   MAC     Note:    Oropharynx: oropharynx clear of all foreign objects and spontaneously breathing  Level of Consciousness: awake  Oxygen Supplementation: nasal cannula  Level of Supplemental Oxygen (L/min / FiO2): 2  Independent Airway: airway patency satisfactory and stable  Dentition: dentition unchanged  Vital Signs Stable: post-procedure vital signs reviewed and stable  Report to RN Given: handoff report given  Patient transferred to: PACU  Comments: Pt to PACU with O2 via nasal cannula, airway patent, VSS. Report to RN.  Handoff Report: Identifed the Patient, Identified the Reponsible Provider, Reviewed the pertinent medical history, Discussed the surgical course, Reviewed Intra-OP anesthesia mangement and issues during anesthesia, Set expectations for post-procedure period and Allowed opportunity for questions and acknowledgement of understanding      Vitals:  Vitals Value Taken Time   /67 07/03/23 1104   Temp     Pulse 70 07/03/23 1104   Resp 35 07/03/23 1106   SpO2 99 % 07/03/23 1106   Vitals shown include unvalidated device data.    Electronically Signed By: GIO Penaloza CRNA  July 3, 2023  11:07 AM

## 2023-07-03 NOTE — ANESTHESIA PREPROCEDURE EVALUATION
Prior to Admission medications    Medication Sig Start Date End Date Taking? Authorizing Provider   acetaminophen (TYLENOL) 325 MG tablet Take 3 tablets (975 mg) by mouth 3 times daily 2/3/21  Yes Andrew Arriola MD   amLODIPine (NORVASC) 5 MG tablet TAKE 1 TABLET BY MOUTH ONCE DAILY (NEED  APPOINTMENT  AND  FASTING  LABS  FOR  FURTHER  REFILLS) 5/26/23  Yes Piyush Rogers MD   aspirin (ASA) 325 MG tablet Take 1 tablet (325 mg) by mouth daily 9/29/21  Yes Conor Tabor MD   atorvastatin (LIPITOR) 10 MG tablet TAKE 1 TABLET BY MOUTH ONCE DAILY (APPOINTMENT  REQUIRED  FOR  FUTURE  REFILLS) 5/26/23  Yes Piyush Rogers MD   carvedilol (COREG) 6.25 MG tablet TAKE 1 TABLET BY MOUTH TWICE DAILY WITH MEALS 4/3/23  Yes Piyush Rogers MD   clarithromycin (BIAXIN) 500 MG tablet Take 1 tablet (500 mg) by mouth 2 times daily for 14 days 6/28/23 7/12/23 Yes Piyush Rogers MD   metroNIDAZOLE (FLAGYL) 500 MG tablet Take 1 tablet (500 mg) by mouth 3 times daily for 14 days 6/28/23 7/12/23 Yes Piyush Rogers MD   Multiple Vitamin (MULTI VITAMIN PO) Take 1 oz by mouth daily Eniva VIBE-liquid   Yes Reported, Patient   nitroGLYcerin (NITROSTAT) 0.3 MG sublingual tablet For chest pain place 1 tablet under the tongue every 5 minutes for 3 doses. If symptoms persist 5 minutes after 1st dose call 911. 2/27/20  Yes Audrey Díaz MD   pantoprazole (PROTONIX) 40 MG EC tablet Take 1 tablet by mouth once daily 6/26/23  Yes Piyush Rogers MD   diclofenac (VOLTAREN) 1 % topical gel Place 4 g onto the skin 3 times daily as needed for moderate pain (Shoulder) 8/12/20   Piyush Rogers MD   NONFORMULARY Take 1 oz by mouth daily Eniva Cell-Ready Multi Minerals is a concentrated, ionic liquid mineral dietary supplement blend to help support nutritional balance and wellbeing.  Combine with 8 ounces of water / juice    Reported, Patient     Current Outpatient Medications Ordered in Epic   Medication     acetaminophen  (TYLENOL) 325 MG tablet     amLODIPine (NORVASC) 5 MG tablet     aspirin (ASA) 325 MG tablet     atorvastatin (LIPITOR) 10 MG tablet     carvedilol (COREG) 6.25 MG tablet     clarithromycin (BIAXIN) 500 MG tablet     diclofenac (VOLTAREN) 1 % topical gel     metroNIDAZOLE (FLAGYL) 500 MG tablet     Multiple Vitamin (MULTI VITAMIN PO)     nitroGLYcerin (NITROSTAT) 0.3 MG sublingual tablet     NONFORMULARY     pantoprazole (PROTONIX) 40 MG EC tablet     No current Epic-ordered facility-administered medications on file.       No results for input(s): ABO, RH in the last 64408 hours.  No results for input(s): HCG in the last 93717 hours.  Recent Results (from the past 744 hour(s))   PET Oncology Whole Body    Narrative    Combined Report of:    PET and CT on  6/14/2023 1:32 PM :    1. PET of the neck, chest, abdomen, and pelvis.  2. PET CT Fusion for Attenuation Correction and Anatomical  Localization:    3. 3D MIP and PET-CT fused images were processed on an independent  workstation and archived to PACS and reviewed by a radiologist.    Technique:     1. PET: The patient received 10.36 mCi of F-18-FDG; the serum glucose  was 98 mg/dL prior to administration, body weight was 80.2 kg. Images  were evaluated in the axial, sagittal, and coronal planes as well as  the rotational whole body MIP. Images were acquired from the Vertex to  the Feet.    UPTAKE WAS MEASURED AT 63 MINUTES.     BACKGROUND:  Liver SUV max= 3.8,   Aorta Blood SUV Max: 2.6.     2. CT: CT only obtained for attenuation correction and not diagnostic  purposes.    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    COMPARISON: Head CT 9/29/2021, CT abdomen/pelvis 3/31/2007.    FINDINGS:     HEAD/NECK:  No suspicious uptake in the neck.     Paranasal  sinuses and mastoid air cells are clear.    CHEST:  Intense uptake in the distal esophagus with eccentric wall  thickening/mass with SUV max 26.3.    Multiple mildly hypermetabolic mediastinal and hilar lymph nodes,  including:   - 1.3 cm left hilar lymph node (series 4, image 129) with SUV max 5.1   - 0.4 cm right paratracheal node (series 4, image 112) with SUV max  3.6.    Multiple calcified and partially calcified mediastinal and right hilar  lymph nodes.    Dilated esophagus with air-fluid level proximal to the distal  esophageal mass. Prominent heart size with small pericardial effusion.  Heavy coronary artery calcification and aortic valvular calcification.  Normal caliber ascending aorta pulmonary artery. Atherosclerotic  calcifications of the thoracic aorta and origins of the great vessels.    Trachea and central airways are patent. Mild bilateral dependent  atelectasis. No pleural effusion or pneumothorax. No focal  consolidation.    ABDOMEN AND PELVIS:  No suspicious uptake in the abdomen or pelvis.    Liver, gallbladder, spleen, adrenal glands, and kidneys appear normal.  Atrophic pancreatic parenchyma. Urinary bladder is decompressed.  Normal caliber small and large bowel. Colonic diverticulosis without  diverticulitis. Enlarged prostate. No free air or free fluid. Normal  caliber abdominal aorta with atherosclerotic calcifications. Tiny  fat-containing umbilical hernia and small right greater than left  inguinal hernias.    LOWER EXTREMITIES:   No abnormal masses or hypermetabolic lesions.    BONES:   No suspicious lytic or blastic osseous lesions.  No abnormal uptake in  the skeleton.     Degenerative changes of bilateral glenohumeral joints. Left hip total  arthroplasty. Multilevel degenerative changes of the spine. T8  vertebral body hemangioma.      Impression    IMPRESSION: In this patient with invasive adenocarcinoma of the distal  esophagus:    1. Markedly hypermetabolic distal esophageal mass,  compatible with  biopsy-proven invasive adenocarcinoma.    2. Mildly hypermetabolic mediastinal and left hilar lymph nodes, some  of which contain calcification and may suggest  granulomatous/inflammatory process. However, omar metastases cannot  be excluded.    I have personally reviewed the examination and initial interpretation  and I agree with the findings.    STEPH BLACK MD         SYSTEM ID:  [id number]   Anesthesia Pre-Procedure Evaluation    Patient: Liu Ragsdale   MRN: [id number] : 1940        Procedure : Procedure(s):  INSERTION, JEJUNOSTOMY TUBE, LAPAROSCOPY-ASSISTED         Past Medical History:   Diagnosis Date     Angina at rest (H)      Arthritis      Cerebrovascular accident (H) 2021     Coronary artery disease involving native coronary artery of native heart without angina pectoris      Dysphagia      Esophageal adenocarcinoma (H)     DISTAL ESOPHAGUS     Hypertension      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) 7/3/2023      Past Surgical History:   Procedure Laterality Date     ARTHROPLASTY HIP Left 2021    Procedure: Total Hip Arthroplasty;  Surgeon: Andrew Arriola MD;  Location: [facility]     COLONOSCOPY N/A 2016    Procedure: COLONOSCOPY;  Surgeon: Randy Avendano MD;  Location: [facility]     CV LEFT HEART CATH Left 03/10/2020    Procedure: Left Heart Cath;  Surgeon: Vicente Lopez MD;  Location:  HEART CARDIAC CATH LAB     ENDARTERECTOMY CAROTID Right 2021    Procedure: ENDARTERECTOMY, CAROTID;  Surgeon: Raymond Andersen MD;  Location:  OR     ESOPHAGOSCOPY, GASTROSCOPY, DUODENOSCOPY (EGD), COMBINED N/A 2023    Procedure: Esophagoscopy, Gastroscopy, Duodenoscopy, With Biopsy;  Surgeon: Joseph Landers DO;  Location: [facility]     EYE SURGERY      cataract surgery     VASECTOMY        Allergies   Allergen Reactions     Metoprolol Other (See Comments)     Side effects : dry mouth     Ampicillin Rash      Social History  "    Tobacco Use     Smoking status: Never     Passive exposure: Never     Smokeless tobacco: Never   Substance Use Topics     Alcohol use: Yes     Comment: Beth only      Wt Readings from Last 1 Encounters:   07/03/23 75.8 kg (167 lb)        Anesthesia Evaluation   Pt has had prior anesthetic. Type: General and MAC.    No history of anesthetic complications       ROS/MED HX  ENT/Pulmonary:     (+) sleep apnea,     Neurologic: Comment: Neurology note: 10/28/22  \"Mechanism of stroke: R carotid stenosis  --CTA H/N-- showing R carotid stenosis s/p endartectomy, plan for f/u imaging and f/u with neurosurgery (Follow-up with Neurosurgery/repeat carotid ultrasound at the 1 year point as recommended previously). Most recent follow up CUS reviewed with patient and R side appears patent..\"    Neurosurgery note 12/22;  \"REVIEW OF STUDIES:  We went over his carotid ultrasound from 11/21/2022.  In the right internal carotid artery, peak velocity is 179 cm per second with a ratio of 1.3.  On the left side, peak velocity is 109 cm per second with a ratio of 0.6.  The velocities on the right side are increased on the right side compared to last year (87 cm per second).     IMPRESSION AND PLAN:  The elevated velocities in the right internal carotid artery could be artifactual.  Of course, there may be some restenosis as well.  We will repeat a carotid ultrasound in 6 months in [place] and follow up with him afterwards.  His ultrasound in 10/2021 and the current ultrasound were done in 2 separate locations.  Therefore, there may be some technical factors accounting for the differences.  Regardless, we will follow up with him relatively soon.  He should remain on a daily aspirin.  We will defer control of his other stroke risk factors to you.  We will repeat the carotid ultrasound in 6 months in [place] and follow up with him. \"    (+) CVA, TIA,     Cardiovascular:     (+) hypertension-Peripheral Vascular Disease-- Carotid " Stenosis. CAD angina--stent-2017. Previous cardiac testing   Echo: Date: 9/21 Results:  Interpretation Summary     The left ventricle is normal in size.  Left ventricular systolic function is normal.  The visual ejection fraction is 60-65%.  Normal left ventricular wall motion  Trivial pericardial effusion  Sinus rhythm was noted.  Compared to prior study, there is no significant change.  Stress Test: Date: Results:    ECG Reviewed: Date: Results:    Cath: Date: Results:      METS/Exercise Tolerance:     Hematologic:  - neg hematologic  ROS     Musculoskeletal:  - neg musculoskeletal ROS     GI/Hepatic:  - neg GI/hepatic ROS     Renal/Genitourinary:  - neg Renal ROS     Endo:  - neg endo ROS     Psychiatric/Substance Use:  - neg psychiatric ROS     Infectious Disease:  - neg infectious disease ROS     Malignancy:   (+) Malignancy, History of GI.GI CA Active status post.        Other:          Mr. Hatfield is a 83 year old male who presents for evaluation of lower third of esophageal adenocarcinoma..  He noticed difficulty swallowing of food over the past 8 weeks.  He also had acid reflux for short interval of time.  He has lost  10 pound weight loss over the 8-week..  EGD on 6/5/2023.   demonstrated a large fungating mass in the lower third of the esophagus, 38 cm from the incisors.  The mass was noted to be partially obstructing and circumferential.       Biopsy of the mass demonstrated  adenocarcinoma.    Gastroesophageal junction biopsy also demonstrated adenocarcinoma.       PET scan obtained on 6/14/2023 demonstrated hypermetabolic activity in the distal esophageal mass compatible with esophageal cancer.  There were  low moderate activity in hilar and mediastinal adenopathy, indeterminate, possibly inflammatory in nature.  No clear sites of distant disease were noted.       Patient was seen by Dr. Levi of thoracic surgery team.  He discussed consideration of surgical resection after preop chemoradiation  therapy.       Complains of progressive worsening of swallowing particularly with solid foods.  He is complaining of difficulty swallowing to liquids also and also sometimes complain of discomfort while swallowing.       Physical Exam    Airway        Mallampati: II    Neck ROM: full     Respiratory Devices and Support         Dental       (+) Modest Abnormalities - crowns, retainers, 1 or 2 missing teeth      Cardiovascular   cardiovascular exam normal          Pulmonary   pulmonary exam normal                OUTSIDE LABS:  CBC:   Lab Results   Component Value Date    WBC 6.5 06/08/2023    WBC 8.6 10/01/2021    HGB 14.0 06/08/2023    HGB 11.4 (L) 10/01/2021    HCT 41.7 06/08/2023    HCT 34.8 (L) 10/01/2021     06/08/2023     (L) 10/01/2021     BMP:   Lab Results   Component Value Date     06/08/2023     10/06/2021    POTASSIUM 4.5 06/08/2023    POTASSIUM 4.5 10/06/2021    CHLORIDE 105 06/08/2023    CHLORIDE 108 10/06/2021    CO2 25 06/08/2023    CO2 28 10/06/2021    BUN 10.4 06/08/2023    BUN 18 10/06/2021    CR 1.17 06/08/2023    CR 1.29 (H) 10/06/2021    GLC 89 06/08/2023     (H) 10/06/2021     COAGS:   Lab Results   Component Value Date    PTT 24 09/29/2021    INR 0.84 (L) 09/29/2021     POC: No results found for: BGM, HCG, HCGS  HEPATIC:   Lab Results   Component Value Date    ALBUMIN 4.2 06/08/2023    PROTTOTAL 7.0 06/08/2023    ALT 24 06/08/2023    AST 28 06/08/2023    ALKPHOS 98 06/08/2023    BILITOTAL 0.4 06/08/2023     OTHER:   Lab Results   Component Value Date    A1C 6.0 (H) 09/26/2021    GWEN 9.7 06/08/2023    PHOS 2.3 (L) 10/01/2021    PHOS 2.3 (L) 10/01/2021    MAG 2.3 10/01/2021    LIPASE 57 03/31/2007    AMYLASE 72 03/31/2007    TSH 1.41 05/30/2014       Anesthesia Plan    ASA Status:  3   NPO Status:  NPO Appropriate    Anesthesia Type: General.     - Airway: ETT   Induction: Intravenous, Propofol.   Maintenance: Balanced.   Techniques and Equipment:     -  Lines/Monitors: 2nd IV     - Blood: T&S     Consents    Anesthesia Plan(s) and associated risks, benefits, and realistic alternatives discussed. Questions answered and patient/representative(s) expressed understanding.     - Discussed: Risks, Benefits and Alternatives for the PROCEDURE were discussed     - Discussed with:  Patient      - Extended Intubation/Ventilatory Support Discussed: Yes.      - Patient is DNR/DNI Status: No    Use of blood products discussed: Yes.     - Discussed with: Patient.     - Consented: consented to blood products            Reason for refusal: other.     Postoperative Care    Pain management: IV analgesics, Multi-modal analgesia.   PONV prophylaxis: Ondansetron (or other 5HT-3), Dexamethasone or Solumedrol, Background Propofol Infusion     Comments:                    Chon Orta Junior, MD

## 2023-07-03 NOTE — ANESTHESIA PREPROCEDURE EVALUATION
Prior to Admission medications    Medication Sig Start Date End Date Taking? Authorizing Provider   acetaminophen (TYLENOL) 325 MG tablet Take 3 tablets (975 mg) by mouth 3 times daily 2/3/21  Yes Andrew Arriola MD   amLODIPine (NORVASC) 5 MG tablet TAKE 1 TABLET BY MOUTH ONCE DAILY (NEED  APPOINTMENT  AND  FASTING  LABS  FOR  FURTHER  REFILLS) 5/26/23  Yes Piyush Rogers MD   aspirin (ASA) 325 MG tablet Take 1 tablet (325 mg) by mouth daily 9/29/21  Yes Conor Tabor MD   atorvastatin (LIPITOR) 10 MG tablet TAKE 1 TABLET BY MOUTH ONCE DAILY (APPOINTMENT  REQUIRED  FOR  FUTURE  REFILLS) 5/26/23  Yes Piyush Rogers MD   carvedilol (COREG) 6.25 MG tablet TAKE 1 TABLET BY MOUTH TWICE DAILY WITH MEALS 4/3/23  Yes Piyush Rogers MD   clarithromycin (BIAXIN) 500 MG tablet Take 1 tablet (500 mg) by mouth 2 times daily for 14 days 6/28/23 7/12/23 Yes Piyush Rogers MD   metroNIDAZOLE (FLAGYL) 500 MG tablet Take 1 tablet (500 mg) by mouth 3 times daily for 14 days 6/28/23 7/12/23 Yes Piyush Rogers MD   Multiple Vitamin (MULTI VITAMIN PO) Take 1 oz by mouth daily Eniva VIBE-liquid   Yes Reported, Patient   nitroGLYcerin (NITROSTAT) 0.3 MG sublingual tablet For chest pain place 1 tablet under the tongue every 5 minutes for 3 doses. If symptoms persist 5 minutes after 1st dose call 911. 2/27/20  Yes Audrey Díaz MD   pantoprazole (PROTONIX) 40 MG EC tablet Take 1 tablet by mouth once daily 6/26/23  Yes Piyush Rogers MD   diclofenac (VOLTAREN) 1 % topical gel Place 4 g onto the skin 3 times daily as needed for moderate pain (Shoulder) 8/12/20   Piyush oRgers MD   NONFORMULARY Take 1 oz by mouth daily Eniva Cell-Ready Multi Minerals is a concentrated, ionic liquid mineral dietary supplement blend to help support nutritional balance and wellbeing.  Combine with 8 ounces of water / juice    Reported, Patient     Current Facility-Administered Medications Ordered in Epic   Medication Dose  Route Frequency Last Rate Last Admin     lactated ringers infusion   Intravenous Continuous 10 mL/hr at 07/03/23 1012 New Bag at 07/03/23 1012     No current Epic-ordered outpatient medications on file.       lactated ringers 10 mL/hr at 07/03/23 1012     No results for input(s): ABO, RH in the last 43210 hours.  No results for input(s): HCG in the last 24252 hours.  Recent Results (from the past 744 hour(s))   PET Oncology Whole Body    Narrative    Combined Report of:    PET and CT on  6/14/2023 1:32 PM :    1. PET of the neck, chest, abdomen, and pelvis.  2. PET CT Fusion for Attenuation Correction and Anatomical  Localization:    3. 3D MIP and PET-CT fused images were processed on an independent  workstation and archived to PACS and reviewed by a radiologist.    Technique:     1. PET: The patient received 10.36 mCi of F-18-FDG; the serum glucose  was 98 mg/dL prior to administration, body weight was 80.2 kg. Images  were evaluated in the axial, sagittal, and coronal planes as well as  the rotational whole body MIP. Images were acquired from the Vertex to  the Feet.    UPTAKE WAS MEASURED AT 63 MINUTES.     BACKGROUND:  Liver SUV max= 3.8,   Aorta Blood SUV Max: 2.6.     2. CT: CT only obtained for attenuation correction and not diagnostic  purposes.    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    COMPARISON: Head CT 9/29/2021, CT abdomen/pelvis 3/31/2007.    FINDINGS:     HEAD/NECK:  No suspicious uptake in the neck.     Paranasal sinuses and mastoid air cells are clear.    CHEST:  Intense uptake in the distal esophagus with eccentric wall  thickening/mass with SUV max 26.3.    Multiple mildly hypermetabolic mediastinal and hilar lymph nodes,  including:   - 1.3 cm left hilar lymph node (series 4,  image 129) with SUV max 5.1   - 0.4 cm right paratracheal node (series 4, image 112) with SUV max  3.6.    Multiple calcified and partially calcified mediastinal and right hilar  lymph nodes.    Dilated esophagus with air-fluid level proximal to the distal  esophageal mass. Prominent heart size with small pericardial effusion.  Heavy coronary artery calcification and aortic valvular calcification.  Normal caliber ascending aorta pulmonary artery. Atherosclerotic  calcifications of the thoracic aorta and origins of the great vessels.    Trachea and central airways are patent. Mild bilateral dependent  atelectasis. No pleural effusion or pneumothorax. No focal  consolidation.    ABDOMEN AND PELVIS:  No suspicious uptake in the abdomen or pelvis.    Liver, gallbladder, spleen, adrenal glands, and kidneys appear normal.  Atrophic pancreatic parenchyma. Urinary bladder is decompressed.  Normal caliber small and large bowel. Colonic diverticulosis without  diverticulitis. Enlarged prostate. No free air or free fluid. Normal  caliber abdominal aorta with atherosclerotic calcifications. Tiny  fat-containing umbilical hernia and small right greater than left  inguinal hernias.    LOWER EXTREMITIES:   No abnormal masses or hypermetabolic lesions.    BONES:   No suspicious lytic or blastic osseous lesions.  No abnormal uptake in  the skeleton.     Degenerative changes of bilateral glenohumeral joints. Left hip total  arthroplasty. Multilevel degenerative changes of the spine. T8  vertebral body hemangioma.      Impression    IMPRESSION: In this patient with invasive adenocarcinoma of the distal  esophagus:    1. Markedly hypermetabolic distal esophageal mass, compatible with  biopsy-proven invasive adenocarcinoma.    2. Mildly hypermetabolic mediastinal and left hilar lymph nodes, some  of which contain calcification and may suggest  granulomatous/inflammatory process. However, omar metastases cannot  be excluded.    I have  personally reviewed the examination and initial interpretation  and I agree with the findings.    STEPH BLACK MD         SYSTEM ID:  [id number]   Anesthesia Pre-Procedure Evaluation    Patient: Liu Ragsdale   MRN: [id number] : 1940        Procedure : Procedure(s):  ESOPHAGOGASTRODUODENOSCOPY  ENDOSCOPIC ULTRASOUND, ESOPHAGOSCOPY / UPPER GASTROINTESTINAL TRACT (GI)          Past Medical History:   Diagnosis Date     Angina at rest (H)      Arthritis      Cerebrovascular accident (H) 2021     Coronary artery disease involving native coronary artery of native heart without angina pectoris      Dysphagia      Esophageal adenocarcinoma (H)     DISTAL ESOPHAGUS     Hypertension       Past Surgical History:   Procedure Laterality Date     ARTHROPLASTY HIP Left 2021    Procedure: Total Hip Arthroplasty;  Surgeon: Andrew Arriola MD;  Location: [facility] OR     COLONOSCOPY N/A 2016    Procedure: COLONOSCOPY;  Surgeon: Randy Avendano MD;  Location: [facility] GI     CV LEFT HEART CATH Left 03/10/2020    Procedure: Left Heart Cath;  Surgeon: Vicente Lopez MD;  Location: [facility] CARDIAC CATH LAB     ENDARTERECTOMY CAROTID Right 2021    Procedure: ENDARTERECTOMY, CAROTID;  Surgeon: Raymond Andersen MD;  Location: [facility]     ESOPHAGOSCOPY, GASTROSCOPY, DUODENOSCOPY (EGD), COMBINED N/A 2023    Procedure: Esophagoscopy, Gastroscopy, Duodenoscopy, With Biopsy;  Surgeon: Joseph Landers DO;  Location: [facility] GI     EYE SURGERY      cataract surgery     VASECTOMY        Allergies   Allergen Reactions     Metoprolol Other (See Comments)     Side effects : dry mouth     Ampicillin Rash      Social History     Tobacco Use     Smoking status: Never     Passive exposure: Never     Smokeless tobacco: Never   Substance Use Topics     Alcohol use: Yes     Comment: [place] only      Wt Readings from Last 1 Encounters:   23 75.8 kg (167 lb)        Anesthesia Evaluation   Pt has had prior  anesthetic.     No history of anesthetic complications       ROS/MED HX  ENT/Pulmonary:     (+) sleep apnea,     Neurologic:     (+) CVA,     Cardiovascular:     (+) hypertension--CAD angina--stent-2017.     METS/Exercise Tolerance:     Hematologic:       Musculoskeletal:       GI/Hepatic:       Renal/Genitourinary:       Endo:       Psychiatric/Substance Use:       Infectious Disease:       Malignancy:       Other:            Physical Exam    Airway        Mallampati: II    Neck ROM: full     Respiratory Devices and Support         Dental       (+) Modest Abnormalities - crowns, retainers, 1 or 2 missing teeth      Cardiovascular   cardiovascular exam normal          Pulmonary   pulmonary exam normal                OUTSIDE LABS:  CBC:   Lab Results   Component Value Date    WBC 6.5 06/08/2023    WBC 8.6 10/01/2021    HGB 14.0 06/08/2023    HGB 11.4 (L) 10/01/2021    HCT 41.7 06/08/2023    HCT 34.8 (L) 10/01/2021     06/08/2023     (L) 10/01/2021     BMP:   Lab Results   Component Value Date     06/08/2023     10/06/2021    POTASSIUM 4.5 06/08/2023    POTASSIUM 4.5 10/06/2021    CHLORIDE 105 06/08/2023    CHLORIDE 108 10/06/2021    CO2 25 06/08/2023    CO2 28 10/06/2021    BUN 10.4 06/08/2023    BUN 18 10/06/2021    CR 1.17 06/08/2023    CR 1.29 (H) 10/06/2021    GLC 89 06/08/2023     (H) 10/06/2021     COAGS:   Lab Results   Component Value Date    PTT 24 09/29/2021    INR 0.84 (L) 09/29/2021     POC: No results found for: BGM, HCG, HCGS  HEPATIC:   Lab Results   Component Value Date    ALBUMIN 4.2 06/08/2023    PROTTOTAL 7.0 06/08/2023    ALT 24 06/08/2023    AST 28 06/08/2023    ALKPHOS 98 06/08/2023    BILITOTAL 0.4 06/08/2023     OTHER:   Lab Results   Component Value Date    A1C 6.0 (H) 09/26/2021    GWEN 9.7 06/08/2023    PHOS 2.3 (L) 10/01/2021    PHOS 2.3 (L) 10/01/2021    MAG 2.3 10/01/2021    LIPASE 57 03/31/2007    AMYLASE 72 03/31/2007    TSH 1.41 05/30/2014        Anesthesia Plan    ASA Status:  3   NPO Status:  NPO Appropriate    Anesthesia Type: MAC.     - Reason for MAC: immobility needed, straight local not clinically adequate              Consents    Anesthesia Plan(s) and associated risks, benefits, and realistic alternatives discussed. Questions answered and patient/representative(s) expressed understanding.    - Discussed:     - Discussed with:  Patient         Postoperative Care    Pain management: IV analgesics.   PONV prophylaxis: Ondansetron (or other 5HT-3)     Comments:                Catracho Enrique MD

## 2023-07-03 NOTE — ANESTHESIA POSTPROCEDURE EVALUATION
Patient: Liu Ragsdale    Procedure: Procedure(s):  ESOPHAGOGASTRODUODENOSCOPY  ENDOSCOPIC ULTRASOUND, ESOPHAGOSCOPY / UPPER GASTROINTESTINAL TRACT (GI)       Anesthesia Type:  MAC    Note:  Disposition: Outpatient   Postop Pain Control: Uneventful            Sign Out: Well controlled pain   PONV: No   Neuro/Psych: Uneventful            Sign Out: Acceptable/Baseline neuro status   Airway/Respiratory: Uneventful            Sign Out: Acceptable/Baseline resp. status   CV/Hemodynamics: Uneventful            Sign Out: Acceptable CV status; No obvious hypovolemia; No obvious fluid overload   Other NRE: NONE   DID A NON-ROUTINE EVENT OCCUR? No           Last vitals:  Vitals Value Taken Time   /71 07/03/23 1130   Temp     Pulse 71 07/03/23 1130   Resp 29 07/03/23 1143   SpO2 97 % 07/03/23 1143   Vitals shown include unvalidated device data.    Electronically Signed By: Catracho Enrique MD  July 3, 2023  3:51 PM

## 2023-07-03 NOTE — OR NURSING
Message sent to Dr Holguin and María Blackwood, RNCC that pt usually takes  mg, He held it today for his procedure and I advised him to hold it tomorrow and on DOS.

## 2023-07-03 NOTE — TELEPHONE ENCOUNTER
Spoke with pt and discussed that per Dr. Holguin, since we were unable to get a PAC or PCP visit for H & P, anesthesia will meet with him the morning of 7/5 surgery. A referral was also placed to [facility] for J tube teaching.    Pt stated he had no questions or concerns regarding surgery, expressed appreciation for writer's call and stated he is feeling fine after having EGD this morning.    María Blackwood, RN BSN  Thoracic Surgery RN Care Coordinator  [phone]

## 2023-07-03 NOTE — PROGRESS NOTES
Therapy: Enteral tube feeds  Insurance: [employer]    Patient meets Medicare criteria for enteral tube feeds once tube is placed, and will have coverage under their [employer] plan. Anticipated FAUSTO of 90 days or more must be documented by the ordering provider in patient's medical chart and/or discharge summary in order for Medicare to cover. If this is not documented, then there is no coverage.    Co-Insurance: 80/20  Max Out of Pocket: $3400  Met: $55    [facility] in reference to 06/30/2023 Enteral tube feeds benefits check.    Please contact Intake with any questions, [phone] or In Basket pool, [facility] ([phone]).

## 2023-07-03 NOTE — DISCHARGE INSTRUCTIONS
*** See Endoscopy Report for additional Information and instructions. ***        Same Day Surgery Discharge Instructions for  Sedation and General Anesthesia     It's not unusual to feel dizzy, light-headed or faint for up to 24 hours after surgery or while taking pain medication.  If you have these symptoms: sit for a few minutes before standing and have someone assist you when you get up to walk or use the bathroom.    You should rest and relax for the next 24 hours. We recommend you make arrangements to have an adult stay with you for at least 24 hours after your discharge.  Avoid hazardous and strenuous activity.    DO NOT DRIVE any vehicle or operate mechanical equipment for 24 hours following the end of your surgery.  Even though you may feel normal, your reactions may be affected by the medication you have received.    Do not drink alcoholic beverages for 24 hours following surgery.     Slowly progress to your regular diet as you feel able. It's not unusual to feel nauseated and/or vomit after receiving anesthesia.  If you develop these symptoms, drink clear liquids (apple juice, ginger ale, broth, 7-up, etc. ) until you feel better.  If your nausea and vomiting persists for 24 hours, please notify your surgeon.      All narcotic pain medications, along with inactivity and anesthesia, can cause constipation. Drinking plenty of liquids and increasing fiber intake will help.    For any questions of a medical nature, call your surgeon.    Do not make important decisions for 24 hours.    If you had general anesthesia, you may have a sore throat for a couple of days related to the breathing tube used during surgery.  You may use Cepacol lozenges to help with this discomfort.  If it worsens or if you develop a fever, contact your surgeon.     If you feel your pain is not well managed with the pain medications prescribed by your surgeon, please contact your surgeon's office to let them know so they can address  your concerns.         ** If you have questions or concerns about your procedure,   call Dr. Graves at [phone] **

## 2023-07-05 PROBLEM — C15.9 ESOPHAGEAL CANCER (H): Status: ACTIVE | Noted: 2023-01-01

## 2023-07-05 NOTE — ANESTHESIA PROCEDURE NOTES
Airway       Patient location during procedure: OR       Procedure Start/Stop Times: 7/5/2023 1:37 PM  Staff -        CRNA: Diana Stokes APRN CRNA       Performed By: CRNA  Consent for Airway        Urgency: elective  Indications and Patient Condition       Indications for airway management: brenda-procedural       Induction type:intravenous       Mask difficulty assessment: 1 - vent by mask    Final Airway Details       Final airway type: endotracheal airway       Successful airway: ETT - single and Oral  Endotracheal Airway Details        ETT size (mm): 7.5       Cuffed: yes       Successful intubation technique: direct laryngoscopy       DL Blade Type: MAC 3       Grade View of Cords: 2       Adjucts: stylet       Position: Right       Measured from: lips       Secured at (cm): 23    Post intubation assessment        Placement verified by: capnometry, equal breath sounds and chest rise        Number of attempts at approach: 1       Secured with: pink tape       Ease of procedure: easy       Dentition: Unchanged and Intact    Medication(s) Administered   Medication Administration Time: 7/5/2023 1:37 PM

## 2023-07-05 NOTE — ANESTHESIA CARE TRANSFER NOTE
Patient: Liu Ragsdale    Procedure: Procedure(s):  INSERTION, JEJUNOSTOMY TUBE, LAPAROSCOPY-ASSISTED       Diagnosis: Malignant neoplasm of esophagus, unspecified location (H) [C15.9]  Diagnosis Additional Information: No value filed.    Anesthesia Type:   MAC     Note:    Oropharynx: oropharynx clear of all foreign objects and spontaneously breathing  Level of Consciousness: awake  Oxygen Supplementation: face mask  Level of Supplemental Oxygen (L/min / FiO2): 6  Independent Airway: airway patency satisfactory and stable  Dentition: dentition unchanged  Vital Signs Stable: post-procedure vital signs reviewed and stable  Report to RN Given: handoff report given  Patient transferred to: PACU    Handoff Report: Identifed the Patient, Identified the Reponsible Provider, Reviewed the pertinent medical history, Discussed the surgical course, Reviewed Intra-OP anesthesia mangement and issues during anesthesia, Set expectations for post-procedure period and Allowed opportunity for questions and acknowledgement of understanding      Vitals:  Vitals Value Taken Time   BP      Temp     Pulse 71    Resp     SpO2 100%        Electronically Signed By: GIO Crowell CRNA  July 5, 2023  3:43 PM

## 2023-07-05 NOTE — BRIEF OP NOTE
[facility]    Brief Operative Note    Pre-operative diagnosis: Malignant neoplasm of esophagus, unspecified location (H) [C15.9]  Post-operative diagnosis Same as pre-operative diagnosis    Procedure: Procedure(s):  INSERTION, JEJUNOSTOMY TUBE, LAPAROSCOPY-ASSISTED  Surgeon: Surgeon(s) and Role:     * Judy Holguin MD - Primary     * Vicente Nolasco MD - Assisting  Anesthesia: General   Estimated Blood Loss: 5 mL from 7/5/2023  1:30 PM to 7/5/2023  3:39 PM    Drains:  jejunostomy tube  Specimens: * No specimens in log *  Findings:   None.  Complications: None.  Implants:   Implant Name Type Inv. Item Serial No.  Lot No. LRB No. Used Action   ANNELIESE JEJUNAL FEEDING TUBE 16FR     \[facility]\"" [id number] N/A 1 Implanted     Plan:  -Admit to surgical floor  -PRN pain and nausea control  -J-tube to gravity, start tube feeds tomorrow   -encourage incentive spirometry and ambulation as tolerated  -ok for CLD as tolerated    Note cowritten by Justo Vo, MS4.    Vicente Nolasco MD PGY3  General Surgery Resident

## 2023-07-05 NOTE — CONSULTS
07/05/23 8708 Yessenia Morton, JUICE     Patient, wife and granddaughter were seen in 3C prior to J-tube placement. Pt. RD on a model with flushing, medication administration and use of Enteralite feeding pump. Pt was not sure at this time which home care they were going to use. Discussed anchoring tube, care of tube and when to call  his care team. States he was glad to have teaching before tube was placed but may need a review before discharge.  Granddaughter stated she lives nearby and is available for support if needed. Pt. Asked relevant questions. Answered all teach back questions appropriately. Literature given: Handwashing and Skin Care, Using the Enteralite Infinity Pump for Tube Feeding and Caring for Your Tube at Home. No feeding tube supplies given to pt at this time.

## 2023-07-05 NOTE — TELEPHONE ENCOUNTER
Received call from María BOSE with OP Thoracic surgery at [facility].    States patient just had J tube inserted, they are wondering if patient is stable should he be discharged home or admitted to initiate tube feedings.     Reviewed with María BOSE at [facility], Dr. Steve Locke ordered the procedure to initiate tube feedings due to weight loss and dysphagia related to esophageal adenocarcinoma. Patient needs initiation of feedings and teaching. María BOSE will update provider.    Update sent to PCP.    Julie Behrendt RN

## 2023-07-05 NOTE — TELEPHONE ENCOUNTER
Requesting an order for the continued/modified treatment of your patient on home infusion services. If you agree, please enter an electronically signed order in [facility] for enteral tube feedings.    Marjorie Fung RN on 7/5/2023 at 11:44 AM

## 2023-07-05 NOTE — ANESTHESIA POSTPROCEDURE EVALUATION
Patient: Liu Ragsdale    Procedure: Procedure(s):  INSERTION, JEJUNOSTOMY TUBE, LAPAROSCOPY-ASSISTED       Anesthesia Type:  MAC    Note:  Disposition: Inpatient   Postop Pain Control: Uneventful            Sign Out: Well controlled pain   PONV: No   Neuro/Psych: Uneventful            Sign Out: Acceptable/Baseline neuro status   Airway/Respiratory: Uneventful            Sign Out: Acceptable/Baseline resp. status   CV/Hemodynamics: Uneventful            Sign Out: Acceptable CV status; No obvious hypovolemia; No obvious fluid overload   Other NRE: NONE   DID A NON-ROUTINE EVENT OCCUR? No           Last vitals:  Vitals Value Taken Time   /66 07/05/23 1645   Temp 36.9  C (98.4  F) 07/05/23 1543   Pulse 73 07/05/23 1659   Resp 15 07/05/23 1659   SpO2 98 % 07/05/23 1659   Vitals shown include unvalidated device data.    Electronically Signed By: Jeannette Correa MD  July 5, 2023  5:00 PM

## 2023-07-05 NOTE — OP NOTE
Procedure Date: 7/5/2023     PREOPERATIVE DIAGNOSIS:  Esophageal cancer      POSTOPERATIVE DIAGNOSIS:  Esophageal cancer     PROCEDURE PERFORMED:  Diagnostic laparoscopy,   jejunostomy insertion,      SURGEON:  Ezio Kim MD       OPERATIVE FINDINGS:   No metatstaic disease     DESCRIPTION OF PROCEDURE:  After obtaining informed consent, the patient was brought to the operating room and laid supine on the operating table.  After induction of general anesthesia and introduction of an endotracheal tube, the patient was positioned supine with the arms out and a foot board.  The abdomen and chest were prepped and draped in a sterile manner.  We then proceeded to use a 4-port laparoscopic approach with the first port being made using the open Felipa technique. Doagnostic laparoscopy showed no evidence of mets.   After this, we identified the ligament of Treitz.  We identified a loop of bowel about 30 cm distal to it.  This was tacked up to the abdominal wall using zero silks on the Conrado-Kristi device.  A pursestring suture was then inserted between these two, and using the Seldinger technique, a 16-Ukrainian jejunostomy feeding tube was inserted into the bowel loop going distally.  Additional anterior tacking stitch and an anti-torsion stitch were also inserted.  Position of the jejunostomy tube was confirmed with flushing.   Hemostasis was ensured and the port sites were closed in layers.  After this, we performed an upper endoscopy with the above-noted findings.  The patient tolerated the procedure well.

## 2023-07-06 NOTE — PROGRESS NOTES
"CLINICAL NUTRITION SERVICES - ASSESSMENT NOTE     Nutrition Prescription    RECOMMENDATIONS FOR MDs/PROVIDERS TO ORDER:  Monitor electrolytes d/t moderate refeeding risk.  If lytes low, please provide replacement.     Malnutrition Status:    Moderate malnutrition in the context of acute illness    Recommendations already ordered by Registered Dietitian (RD):  Start Jevity 1.5 @ 15 ml/hr with advancement by 10 ml/hr q 8 hours to goal rate of 45 ml/hr.    -Monitor/check electrolytes daily x 2 days (at least) d/t moderate refeeding risk  -Free water: requires at least 800 ml daily (PO/flushes).  Start with 60 ml q 4 hour free water flushes and monitor for need to increase pending PO intake.     TF provides: 1620 kcals (28 kcal/kg), 68 g PRO (1.2g/kg), 820 ml free H20, 232 g CHO, and 22 g fiber daily.    Future/Additional Recommendations:  Monitor hydration status, need for increased free water flushes.     Monitor weight status with treatment, need for adjustment in TF provisions.      REASON FOR ASSESSMENT  Liu Ragsdale is a/an 83 year old male assessed by the dietitian for Provider Order - Registered Dietitian to Assess and Order TF per Medical Nutrition Therapy Protocol    NUTRITION HISTORY  Patient has had trouble swallowing over the last 6 weeks.  In the last 3 weeks, he reports eating ~25% of his usual intake.  His intake at this point is mostly softer foods like scrambled eggs or liquids like Ensure (which he recently started doing).  He notes he has been constipated lately d/t lack of fiber and not staying hydrated.    16 Fr ANNELIESE Jejunal feeding tube placed in OR 7/5.  Pt, wife and granddaughter received teaching on TF yesterday prior to OR.  Plan for esophagectomy in 2 weeks.     CURRENT NUTRITION ORDERS  Diet: Clear Liquid  Intake/Tolerance: Water at bedside.     LABS  Na 134 (low)  Phos 4.7 (elevated)    MEDICATIONS  Protonix  Miralax  Senokot BID    ANTHROPOMETRICS  Height: 154.9 cm (5' 1\")  Most Recent " Weight: 76.2 kg (167 lb 15.9 oz)    IBW: 50.9 kg  BMI: Obesity Grade I BMI 30-34.9  Weight History: Wt loss of 4 kg (5%) in the last 1 month.  He reports UBW of 175#.   Wt Readings from Last 15 Encounters:   07/05/23 76.2 kg (167 lb 15.9 oz)   07/03/23 75.8 kg (167 lb)   06/29/23 77.1 kg (170 lb)   06/26/23 77.7 kg (171 lb 3.2 oz)   06/20/23 79.2 kg (174 lb 8 oz)   06/08/23 80.2 kg (176 lb 12.8 oz)   06/05/23 83 kg (183 lb)   05/11/23 83 kg (183 lb)   10/28/22 79.4 kg (175 lb)   06/02/22 80.7 kg (178 lb)   03/17/22 81.6 kg (180 lb)   01/03/22 80.7 kg (178 lb)   12/28/21 80.7 kg (178 lb)   12/14/21 81 kg (178 lb 9.6 oz)   12/02/21 79.4 kg (175 lb)   Dosing Weight: 57 kg (adj wt based on IBW of 50.9 kg and actual wt of 76.2 kg)  ASSESSED NUTRITION NEEDS  Estimated Energy Needs: 9628-3785 kcals/day (25 - 30 kcals/kg)  Justification: Maintenance  Estimated Protein Needs: 68-86 grams protein/day (1.2 - 1.5 grams of pro/kg)  Justification: Increased needs  Estimated Fluid Needs: 1 mL/kcal  Justification: Maintenance    MALNUTRITION  % Intake: </= 50% for >/= 5 days (severe)  % Weight Loss: 5% in 1 month (moderate)  Subcutaneous Fat Loss: None observed  Muscle Loss: None observed  Fluid Accumulation/Edema: None noted  Malnutrition Diagnosis: Moderate malnutrition in the context of acute illness    NUTRITION DIAGNOSIS  Inadequate oral intake related to difficulty swallowing as evidenced by patient eating ~25% of usual intake and weight loss of 5% within the last month.      INTERVENTIONS  Implementation  Nutrition Education: Provided education on TF.  Patient asked many appropriate questions.   Enteral Nutrition - Initiate (see above)    Goals  Pt to maintain weight at 76.2 kg    Total avg nutritional intake to meet a minimum of 25 kcal/kg and 1.2 g PRO/kg daily (per dosing wt 57 kg).     Monitoring/Evaluation  Progress toward goals will be monitored and evaluated per protocol.    Arianna Pedro, MS, RD, LD, CCTD, CNSC  7A  and 7B beds [phone], pager [phone]  Weekend pager [phone]

## 2023-07-06 NOTE — PHARMACY-ADMISSION MEDICATION HISTORY
Pharmacist Admission Medication History    Admission medication history is complete. The information provided in this note is only as accurate as the sources available at the time of the update.    Medication reconciliation/reorder completed by provider prior to medication history? Yes    Information Source(s): Patient and CareEverywhere/SureScripts via phone    Pertinent Information: Patient reports starting H.pylori treatment on 23. Recommend sending patient home with new prescription of nitroglycerin sublingual tablets as his home supply has likely .     Changes made to PTA medication list:    Added: None    Deleted: None    Changed: None    Allergies reviewed with patient and updates made in EHR: yes. Added latex allergy as patient experiences a rash when exposed.     Medication History Completed By: Matthew Potter PharmD 2023 4:12 PM    Prior to Admission medications    Medication Sig Last Dose Taking? Auth Provider Long Term End Date   acetaminophen (TYLENOL) 325 MG tablet Take 3 tablets (975 mg) by mouth 3 times daily Unknown Yes Andrew Arriola MD     amLODIPine (NORVASC) 5 MG tablet TAKE 1 TABLET BY MOUTH ONCE DAILY (NEED  APPOINTMENT  AND  FASTING  LABS  FOR  FURTHER  REFILLS) 2023 Yes Piyush Rogers MD Yes    atorvastatin (LIPITOR) 10 MG tablet TAKE 1 TABLET BY MOUTH ONCE DAILY (APPOINTMENT  REQUIRED  FOR  FUTURE  REFILLS) 2023 Yes Piyush Rogers MD Yes    carvedilol (COREG) 6.25 MG tablet TAKE 1 TABLET BY MOUTH TWICE DAILY WITH MEALS 2023 Yes Piyush Rogers MD Yes    clarithromycin (BIAXIN) 500 MG tablet Take 1 tablet (500 mg) by mouth 2 times daily for 14 days 2023 Yes Piyush Rogers MD  23   diclofenac (VOLTAREN) 1 % topical gel Place 4 g onto the skin 3 times daily as needed for moderate pain (Shoulder) Unknown Yes Piyush Rogers MD     metroNIDAZOLE (FLAGYL) 500 MG tablet Take 1 tablet (500 mg) by mouth 3 times daily for 14 days 2023 Yes  Piyush Rogers MD  7/12/23   Multiple Vitamin (MULTI VITAMIN PO) Take 1 oz by mouth daily Eniva VIBE-liquid Past Week Yes Reported, Patient     NONFORMULARY Take 1 oz by mouth daily Eniva Cell-Ready Multi Minerals is a concentrated, ionic liquid mineral dietary supplement blend to help support nutritional balance and wellbeing.  Combine with 8 ounces of water / juice  Yes Reported, Patient     pantoprazole (PROTONIX) 40 MG EC tablet Take 1 tablet by mouth once daily 7/4/2023 Yes Piyush Rogers MD     aspirin (ASA) 325 MG tablet Take 1 tablet (325 mg) by mouth daily 7/1/2023  Conor Tabor MD     nitroGLYcerin (NITROSTAT) 0.3 MG sublingual tablet For chest pain place 1 tablet under the tongue every 5 minutes for 3 doses. If symptoms persist 5 minutes after 1st dose call 911.   Audrey Díaz MD Yes

## 2023-07-06 NOTE — PROGRESS NOTES
07/06/23 0845   Appointment Info   Signing Clinician's Name / Credentials (OT) Susana Damian OTR/L   Rehab Comments (OT) OT only   Living Environment   People in Home spouse   Current Living Arrangements house   Home Accessibility stairs to enter home;stairs within home   Number of Stairs, Main Entrance 4   Number of Stairs, Within Home, Primary greater than 10 stairs   Transportation Anticipated car, drives self;family or friend will provide   [place] wife at baseline. 4-5 stairs to enter home. All needs met on main level (tub/shower on main level), but walk in shower located in lower level down ~12 stairs. Pt reports wife is physically able to assist if needed, but has had more recent issues with short term memory.   Self-Care   Usual Activity Tolerance moderate   Current Activity Tolerance moderate   Regular Exercise No   Equipment Currently Used at Home none   Fall history within last six months no   Activity/Exercise/Self-Care Comment Pt reports IND with mobility at baseline. Reports no formal exercise and a gradual decline in activity, spending more time watching TV. Does walk around home and in yard without issue. Has access to shower chair if needed, but doesn't typically use.   Instrumental Activities of Daily Living (IADL)   Previous Responsibilities meal prep;housekeeping;laundry;shopping;yardwork;medication management;finances;driving   IADL Comments Pt and wife share majority of IADL at baseline.   General Information   Onset of Illness/Injury or Date of Surgery 07/05/23   Referring Physician Vicente Nolasco MD   Patient/Family Therapy Goal Statement (OT) return home when able   Additional Occupational Profile Info/Pertinent History of Current Problem INSERTION, JEJUNOSTOMY TUBE, LAPAROSCOPY-ASSISTED   Existing Precautions/Restrictions abdominal   General Observations and Info Pt supine in bed upon arrival, agreeable to  therapy.   Cognitive Status Examination   Orientation Status orientation to person, place and time   Cognitive Status Comments Pt declines cognitive concerns or changes.   Visual Perception   Visual Impairment/Limitations WFL   Pain Assessment   Patient Currently in Pain No   Range of Motion Comprehensive   General Range of Motion no range of motion deficits identified   Strength Comprehensive (MMT)   Comment, General Manual Muscle Testing (MMT) Assessment generalized weakness post op   Coordination   Upper Extremity Coordination No deficits were identified   Bed Mobility   Comment (Bed Mobility) education required for log roll technique   Transfers   Transfer Comments stood EOB with SBA per chart review   Balance   Balance Assessment no deficits were identified   Clinical Impression   Criteria for Skilled Therapeutic Interventions Met (OT) Yes, treatment indicated   OT Diagnosis OT order for thoracic surgery   Influenced by the following impairments activity tolerance, strength, post op precautions   OT Problem List-Impairments impacting ADL problems related to;activity tolerance impaired;strength;post-surgical precautions   Assessment of Occupational Performance 1-3 Performance Deficits   Identified Performance Deficits bathing, dressing, toileting   Planned Therapy Interventions (OT) ADL retraining;ROM;strengthening;transfer training;home program guidelines;progressive activity/exercise;risk factor education   Clinical Decision Making Complexity (OT) low complexity   Anticipated Equipment Needs Upon Discharge (OT)   (TBD with further therapy)   Risk & Benefits of therapy have been explained evaluation/treatment results reviewed;patient   OT Total Evaluation Time   OT Eval, Low Complexity Minutes (03206) 6   OT Goals   Therapy Frequency (OT) 6 times/wk   OT Predicted Duration/Target Date for Goal Attainment 07/14/23   OT Goals Toilet Transfer/Toileting;OT Goal 1;OT Goal 2;Upper Body Dressing   OT: Upper Body  Dressing within precautions;Modified independent   OT: Toilet Transfer/Toileting Supervision/stand-by assist;toilet transfer;cleaning and garment management   OT: Goal 1 Pt will safely ascend/descend 12 stairs with unilateral handrail and SBA.   OT: Goal 2 Pt will demo tub/shower transfer with SBA.   OT Discharge Planning   OT Discharge Recommendation (DC Rec) home with assist   OT Rationale for DC Rec Anticipate pt will be safe to return home when medically appropriate with assist as needed from wife and family. Pt is ambulating in the hallway with IV pole and SBA.   OT Brief overview of current status IV pole and CGA-SBA for hallway walking; encourage 4 walks daily

## 2023-07-06 NOTE — PLAN OF CARE
Temp: 98.4  F (36.9  C) Temp src: Oral BP: 118/49 Pulse: 67   Resp: 12 SpO2: 95 % O2 Device: Nasal cannula Oxygen Delivery: 1 LPM    Neuro: A&O x4. [place]. Able to make needs known.   Respiratory: sats >92% on 1L NC. Denies SOB.   Cardiac: WDL, denies cardiac chest pain/symptoms.   GI/: Voiding sobeida urine in small amounts. -flatus, HypoBS.   Diet: clears.   Pain/Nausea: oxycodone and tylenol given for abdominal pain. Denies nausea.   Incisions/Drains: J tube to gravity, flushed per orders. Lap sites x4 dermabonded, amita.   IV Access: R PIV infusing LR @ 50  Labs: reviewed.   Activity: stood at bedside x3 SBA.   Plan: Continue POC. Start TF today.

## 2023-07-06 NOTE — CONSULTS
Care Management Initial Consult    General Information  Assessment completed with: Patient,    Type of CM/SW Visit: Initial Assessment    Primary Care Provider verified and updated as needed: Yes   Readmission within the last 30 days:        Reason for Consult: discharge planning  Advance Care Planning: Advance Care Planning Reviewed: present on chart          Communication Assessment  Patient's communication style: spoken language (English or Bilingual)    Hearing Difficulty or Deaf: no   Wear Glasses or Blind: yes    Cognitive  Cognitive/Neuro/Behavioral: WDL  Level of Consciousness: alert  Arousal Level: opens eyes spontaneously  Orientation: oriented x 4        Speech: clear    Living Environment:   People in home: spouse     Current living Arrangements: house      Able to return to prior arrangements: yes       Family/Social Support:  Care provided by: self  Provides care for: spouse  Marital Status:   Wife, Children          Description of Support System: Supportive, Involved    Support Assessment: Adequate family and caregiver support, Adequate social supports    Current Resources:   Patient receiving home care services: No     Community Resources: None  Equipment currently used at home: none  Supplies currently used at home: None    Employment/Financial:  Employment Status: retired        Financial Concerns: No concerns identified        Lifestyle & Psychosocial Needs:  Social Determinants of Health     Tobacco Use: Low Risk  (7/5/2023)    Patient History      Smoking Tobacco Use: Never      Smokeless Tobacco Use: Never      Passive Exposure: Never   Alcohol Use: Not on file   Financial Resource Strain: Not on file   Food Insecurity: Not on file   Transportation Needs: Not on file   Physical Activity: Not on file   Stress: Not on file   Social Connections: Not on file   Intimate Partner Violence: Not on file   Depression: Not at risk (5/11/2023)    PHQ-2      PHQ-2 Score: 0   Housing Stability: Not on  file       Functional Status:  Prior to admission patient needed assistance:   Dependent ADLs:: Independent  Dependent IADLs:: Independent  Assesssment of Functional Status: At functional baseline    Mental Health Status:  Mental Health Status: No Current Concerns       Chemical Dependency Status:  Chemical Dependency Status: No Current Concerns             Values/Beliefs:  Spiritual, Cultural Beliefs, Congregation Practices, Values that affect care: no               Additional Information:  Patient is an 83 year old with esophageal cancer who is s/p J tube placement for tube feedings.  Per chart review clinic made a referral to [facility] to check benefits for coverage.  See Beckie Wayne's, [facility] liaison, note for details.  PLC education completed.  Met with pt at bedside to introduce RNCC role and discuss discharge planning.  Pt would like ongoing nursing visits at discharge.  Referral sent to [facility]. [facility] is able to accept. Orders placed.  Plan for discharge in 1-2 days on TF's.  Pt's family will provide transportation to home.  RNCC will remain available if further needs arise.      [facility](TF)  Phone: [phone]  Fax: [phone]    [facility](RN)  Phone: [phone]  Fax: [phone]    Natalia Sanchez RNCC  Phone: [phone]  Pager: [phone]    SEARCHABLE in [facility] - search CARE COORDINATOR     [place] & [place] ([id number]) Saturday & Sunday; (2032-3298) [facility] Recognized Holidays     Units: 4A, 4C, 4E, 5A & 5B   Pager: [phone]    Units: 6A & 6B    Pager: [phone]    Units: 6C & 6D   Pager: [phone]    Units: 7A, 7B, 7C, 7D & 5C    Pager: [phone]    Units: [facility], 5 Ortho, 5 Med/Surg, 6 Med/Surg, 8A, 10 ICU, & [facility]    Pager: [phone]

## 2023-07-06 NOTE — PROGRESS NOTES
THORACIC & FOREGUT SURGERY    S:  Pt seen at bedside resting comfortably.       O:  Vitals:    07/06/23 0425 07/06/23 0736 07/06/23 0842 07/06/23 1046   BP: 118/49 115/49 134/58    BP Location: Right arm Right arm Right arm    Patient Position: Semi-Wolf's  Supine    Cuff Size:   Adult Regular    Pulse: 67 65 60    Resp: 12 11 16    Temp: 98.4  F (36.9  C) 97.8  F (36.6  C) 97.6  F (36.4  C)    TempSrc: Oral Oral Oral    SpO2: 95% 95% 94%    Weight:    77.5 kg (170 lb 14.4 oz)   Height:           A&O, NAD  Breathing non-labored  Soft, NDNT  Distal extremities warm    J tube site healthy appearing    A/P: Liu Ragsdale is a 83 year old male POD#1 s/p Diagnostic laparoscopy, jejunostomy insertion.  -Advance TFs to goal  -CLD  -Likely dispo in 1-3 days depending on tube feed tolerance  -Lovenox     SOPHIE WalkerC

## 2023-07-06 NOTE — PLAN OF CARE
Vitals:    23 0425 23 0736 23 0842 23 1046   BP: 118/49 115/49 134/58    BP Location: Right arm Right arm Right arm    Patient Position: Semi-Wolf's  Supine    Cuff Size:   Adult Regular    Pulse: 67 65 60    Resp: 12 11 16    Temp: 98.4  F (36.9  C) 97.8  F (36.6  C) 97.6  F (36.4  C)    TempSrc: Oral Oral Oral    SpO2: 95% 95% 94%    Weight:    77.5 kg (170 lb 14.4 oz)   Height:           Neuro: alert oriented     VS:afebrile vitally stable, sating 94% on room air non labor breathing     B - 101    Cardiac: 60    Pain/Nausea: better pain control with schedule tylenol, denies any nausea taking clears     Lines/Drains: J- tube feed started at 1200 at 15 ml, tolerating well, PIV LR at 50 cc    Incision/Wound: lap site derma bond intact open to air     GI/: hypo BS no BM, voiding small amount sobeida color     Diet/Appetite: tube feed and clears     Activity:up with stand by assist ambulated down the liu     Plan: to advance tube feed with the goal 45 ml, continue with plan of care.

## 2023-07-06 NOTE — DISCHARGE INSTRUCTIONS
Tube Feedings:  Jevity 1.5 @ 45 ml/hr continuous through J-tube  -Free water: Start with 60 ml q 4 hour free water flushes and monitor for need to increase pending PO intake.  Should aim for at least 800 ml free water daily (either by mouth or through feeding tube/water flushes).     Recommendations for cycling feedings overnight:  1) Jevity 1.5 @ 60 ml/hr x 18 hours (4pm-10am)  2) Jevity 1.5 @ 75 ml/hr x 14 hours (6pm-8am)  3) Jevity 1.5 @ 90 ml/hr x 12 hours (8pm-8am)  -Stay at each one until you are sure it is well tolerated (may be 1 day or 1 week), then advance to the next number.

## 2023-07-06 NOTE — PLAN OF CARE
Physical Therapy: Orders received. Chart reviewed and discussed with care team.? Physical Therapy not indicated due to pt mobilizing well post op. OT following post op and able to meet all IP therapy needs.? Defer discharge recommendations to OT.? Will complete orders.

## 2023-07-06 NOTE — PLAN OF CARE
Plan of Care Reviewed With: patient          Outcome Evaluation: Patient given scheduled tylenol and PRN oxycodone. Denies nausea. J tube to gravity, blood around J tube sites. 4 lap sites intact. LR infusing at 50/hour. Continue to monitor.

## 2023-07-06 NOTE — PROGRESS NOTES
Home Infusion  Nidia is expected to discharge in a day or two and will be going home on continuous enteral feeds.  He has never done home enteral therapy before however he, his wife and his granddaughter have had some initial teaching by [facility].  Benefits have been checked and pt will have 80% coverage through his [employer]  Policy until his deductible of $3400 is met and then will have 100% coverage for formula, supplies and home nursing.  Met with Nidia at bedside to relay benefit information and offer choice of agency for the enteral supplies.  Nidia would like to keep his serviced within /[facility] so use [facility].  Provided him with information about enteral therapy with [facility] services including administration method via the Infinity pump with back pack for mobility, delivery of supplies, storage of formula, f/u by [facility] dietitian,  plan for SNV and 24/7 support of [facility] staff while on enteral therapy.   Assessed for supply needs and informed him about plan for delivery of formula/supplies to the hospital on day of discharge along with assistance with the hook up vs disconnect from the feed for transport home (if ok with team) and have a nurse visit at home to assist with set up and restarting the feed.   Pt would prefer the assist at home if ok to be off the TF for a short while until he gets home.   Nidia verbalized understanding of all information given.  He is willing and able to learn and manage home enteral therapy and has family that can help.   Questions answered.  Will continue to follow and update pt with more details as appropriate.    Beckie GOLDBERG  Nurse Liaison  [facility] I [email or web address]  [address] [place] [address]  [email or web address]  [phone] M-F I [facility] main: [phone]

## 2023-07-07 NOTE — PROGRESS NOTES
Vitals: Temp: 97.9  F (36.6  C) Temp src: Oral BP: (!) 152/55 Pulse: 77   Resp: 18 SpO2: 93 % O2 Device: None (Room air)    Neuro: A&O x4, calls appropriately.   Cardiac: WDL, denies cardiac chest pain.  Respiratory: on RA, sating adequetly denies SOB.  GI/: Voiding spontaneously. +BS, no BM. Enema given this morning;.  Activity: SBA  Diet/Nausea: CLD. Denies nausea.   Pain: abdominal pain managed with Oxy, Robaxin, and Tylenol.  Skin/Drains: J tube with continuous feeds, dressing changed.  Lines: PIV infusing LR @50mL/hr.   Labs: phos 1.9, replaced.   New changes this shift: Pt started feeling more pressure abdominal pain with TF at goal rate. TF stopped and MD notified.   Plan: TF now infusing at 25 ml/hr at 1900 advance it by 10mL at 3am. Continue with POC.

## 2023-07-07 NOTE — PROGRESS NOTES
THORACIC & FOREGUT SURGERY    S:  Pt seen at bedside resting comfortably. Feels a little bloated today. Tolerating TF and diet    O:  Vitals:    07/06/23 1700 07/06/23 2358 07/07/23 0014 07/07/23 0809   BP: 122/58 132/53  (!) 141/59   BP Location:  Right arm  Right arm   Patient Position:       Cuff Size:       Pulse: 66 68  65   Resp:  18  16   Temp:  97.6  F (36.4  C)  97.8  F (36.6  C)   TempSrc:  Oral  Oral   SpO2:  95%  92%   Weight:   77.7 kg (171 lb 3.2 oz)    Height:           A&O, NAD  Breathing non-labored  Soft, mildly distended, nontender  Distal extremities warm  J tube site healthy appearing    A/P: Liu MCCONNELL Melyssa is a 83 year old male POD#2 s/p Diagnostic laparoscopy, jejunostomy insertion.    -Advance TFs to goal,  -CLD  -Lovenox   -Enema today  -if doing well this afternoon can discharge    Seen with fellow, will dw with staff    Vicente Nolasco MD PGY3  General Surgery Resident

## 2023-07-07 NOTE — PLAN OF CARE
Goal Outcome Evaluation:      Plan of Care Reviewed With: patient    Overall Patient Progress: improving    Admitting Diagnosis: Esophageal cancer  Status: POD#2 s/p insertion jejunostomy tube.   Neuro: A&Ox4, calls appropriately. Endorses numbness in fingertips of BLUE, baseline per pt.   GI/: Voiding spontaneously. +flatus, -BM   Resp: Sating adequately on RA  VS: VSS  Incisions/LDA: Lap sites open to air, clean and dry. J tube with continuous tube feeds.    Skin: No new deficits  IV Access: PIV infusing MIVF  Labs: Reviewed  Pain: Abdominal pain managed with prn oxy and scheduled tylenol.   Diet: Clears, TF's increased to 35 ml/hr at 0400. Next increase at 1200.   Activity: SBA  Plan: Continue to monitor and follow plan of care.     fEMALE ANNUAL EXAM note      History    Ro Stone is a 29 year old female who presents for an annual exam. Periods are regular. Last PAP was 2015, denies history of abnormal PAPs. Denies any new sexual partners. Denies any vaginal itching, discharge or odor. See nurses notes. Currently taking oral birth control but wondering about alternatives. She is wondering if her oral birth control is a controlled reading factor to headaches. Patient reports that she gets headaches maybe once or twice a month. Has been on same birth control pills since 17 years of age. Was primarily started because of ovarian cysts. Patient reports good control of anxiety with Lexapro and buspirone. Continues to have some issues with left lower quadrant abdominal pain. Nausea is no longer a problem. She will at times still noticing the swallowing issue, but for the most part is tolerable. These overall have become better with Lexapro and BuSpar. She is seeing a new neurologist with regard to her seizures. She is being reevaluated. Patient reports may be being able to come off the Keppra. She continues to not drive. She also continues to see the allergist.    medical history    Past Medical History:   Diagnosis Date   • Allergic rhinitis    • Anxiety    • Bronchitis 2017    acute    • Chronic abdominal pain    • Chronic nausea    • Extrinsic asthma    • Gastroesophageal reflux disease    • Globus pharyngeus    • Left ACL tear 05/29/2018   • Localization-related epilepsy (CMS/HCC)    • Migraines    • Seizures (CMS/HCC)        SURGICAL history    Past Surgical History:   Procedure Laterality Date   • Breast surgery Right 08/08/2017    excision of breast nodule- Dr Alexy Moody -benign    • Colonoscopy  2012   • Esophagogastroduodenoscopy  2012   • Shoulder arthroscopy w/ labral repair Left 10/17/2016    Dr Zavala -extensive debridement    • Tumor excision Right     right upper chest   • Wrist surgery Left 01/30/2018    arthroscopy  w/debridement and FIRST DORSAL COMPARTMENT RELEASE AND FLEXOR CARPI RADIALIS INJECTION        social history    Social History     Social History   • Marital status:      Spouse name: N/A   • Number of children: N/A   • Years of education: N/A     Occupational History   •       [employer]     Social History Main Topics   • Smoking status: Never Smoker   • Smokeless tobacco: Never Used   • Alcohol use Yes      Comment: 1 drink per month - if that    • Drug use: No   • Sexual activity: No     Other Topics Concern   • Not on file     Social History Narrative   • No narrative on file       family history    Family History   Problem Relation Age of Onset   • Patient is unaware of any medical problems Mother    • Asthma Father    • Hypertension Father    • Asthma Sister    • Myocardial Infarction Paternal Grandmother    • Cancer Paternal Grandfather         eye cancer       mEDICATIONS    Current Outpatient Prescriptions   Medication Sig   • busPIRone (BUSPAR) 10 MG tablet Take 1 tablet by mouth 2 times daily.   • pantoprazole (PROTONIX) 40 MG tablet TAKE 1 TABLET BY MOUTH DAILY   • BREO ELLIPTA 200-25 MCG/INH inhaler USE 1 INHALATION DAILY   • escitalopram (LEXAPRO) 10 MG tablet Take 1 tablet by mouth 2 times daily.   • cetirizine (ZYRTEC) 10 MG tablet Take 10 mg by mouth daily.   • azelastine (ASTELIN) 0.1 % nasal spray Spray 1 spray in each nostril 2 times daily. Use in each nostril as directed   • LORazepam (ATIVAN) 1 MG tablet Take 1 tablet by mouth daily as needed (Seizure activity).   • levetiracetam (KEPPRA XR) 500 MG 24 hr tablet Take 5 tablets by mouth nightly.   • tiZANidine (ZANAFLEX) 2 MG tablet Take 1-2 tablets by mouth three times daily as needed for muscle spasm.   • ALPRAZolam (XANAX) 0.25 MG tablet Take 1 tablet by mouth 2 times daily as needed for anxiety.   • rizatriptan (MAXALT) 5 MG tablet Take 1 tablet by mouth at onset of migraine. May repeat after 2 hours if  needed.   • triamcinolone (NASACORT ALLERGY 24HR) 55 MCG/ACT nasal inhaler Spray 2 sprays in each nostril daily.   • albuterol 108 (90 BASE) MCG/ACT inhaler Inhale 2 puffs into the lungs every 4 hours as needed for Shortness of Breath or Wheezing.   • etonogestrel-ethinyl estradiol (NUVARING) 0.12-0.015 MG/24HR vaginal ring Place one ring vaginally and leave in place for 3 weeks. Take out and replace after one week.     No current facility-administered medications for this visit.        aLLERGIES    ALLERGIES:   Allergen Reactions   • Amoxicillin HIVES       Review of systems       Constitutional:  Denies fevers, chills, weakness, fatigue, loss of appetite, abnormal weight gain or abnormal weight loss.   Eyes:  Denies blindness, blurred vision, double vision, pain, itching or burning.    HENT:  Denies facial pain, ear pain, hearing loss, tinnitus, nasal congestion, rhinorrhea, epistaxis, sinus pain, mouth lesions or sore throat.    Respiratory:  Denies shortness of breath, cough, sputum production, hemoptysis or wheezing.    Cardiovascular:  Denies chest pains, palpitations, tachycardia, edema, cyanosis or vertigo.    Gastrointestinal:  Denies heartburn, nausea, vomiting, diarrhea, constipation or blood in stool.  See history of present illness.   Musculoskeletal:  Denies back pain, joint pain, joint swelling or tenderness, muscle pains or spasms. Denies neck pain, stiffness or swelling.    Neurologic:  Denies numbness, tingling, other sensory changes, sudden weakness in arms or legs. Denies confusion, headache, dizziness, memory loss or tremors.    Genitourinary:  Denies urinary frequency, nocturia, urgency, incontinence, dysuria or hematuria.    Hematologic/Lymphatic:  Denies easy bruising or bleeding, swollen lymph glands.    Endocrine:  Denies heat or cold intolerance, polydipsia or polyduria.  Denies changes in hair or skin texture.    Integument:  Denies new rashes or lesions, pruritus or dryness of skin.     Psychiatric:  Denies anxiety, depression, hallucinations, irritability or sleeping problems.   Allergic/Immunologic:  Denies recurrent infections, hypersensitivity.      All other Review of Systems negative.      HEALTH MAINTENANCE ISSUES: Updated and reviewed.    Physical ExamINATION    Vital Signs:    Vitals:    08/02/18 1013   BP: 108/72   Pulse: 90   Weight: 108 kg   Height: 5' 4\" (1.626 m)     General:  Well-developed, well-nourished. In no apparent distress.    Eyes:  PERRL, EOMI(Pupils equal, round, reactive to light, extraocular movements intact). Conjunctivae pink. Sclerae anicteric.    HENT:  Normocephalic, atraumatic. Bilateral external ears are normal. Mucosal membranes moist. External nose is normal. Oropharynx is clear.    Neck:  Supple. Nontender. Normal range of motion. No masses. No thyromegaly.  Trachea midline.  Respiratory:  Normal respiratory effort. No chest wall tenderness. Lungs clear to auscultation bilaterally. Symmetrical chest expansion.    Cardiovascular:  Regular rate and rhythm. No murmurs, rubs, or gallops. Normal S1 and S2. No S3 or S4. No JVD(jugular venous distension). No carotid bruits. Good dorsalis pedis pulses bilaterally. No peripheral edema.  Breasts: Symmetric. No retraction, lesions or nipple discharge. No masses or tenderness.   Gastrointestinal:  Soft. Nontender. Non-distended. Normal bowel sounds. No pulsatile or other abdominal masses. No hepatosplenomegaly or splenomegaly.   Genitalia: External genitalia with no lesions, swelling, or discharge. Internal genitalia: pink vaginal walls, no bulging or lesions. Cervix pink; scant clear mucoid discharge; no lesions. Pap obtained, small amount of sanguinous drainage. Patient to start menses. Adnexa: Ovaries not enlarged or tender. No cervical motion tenderness.      Musculoskeletal:  No clubbing or cyanosis. Full range of motion in all 4 extremities proximal and distal. No joint swelling or tenderness in right and left  shoulders, elbows, wrists and fingers. No joint swelling or tenderness in left and right knees, ankles and toes.    Neurologic:  Alert and oriented times 3. Gait is normal. Normal sensory function. No motor deficits in all 4 extremities. Cranial nerves II-XII intact.   Integumentary:  Warm. Dry. Pink. No rashes or lesions. No wounds.    Lymphatic:  No lymphadenopathy in submental, submandibular or cervical chain. No supraclavicular or infraclavicular lymphadenopathy. No axillary or inguinal/groin lymphadenopathy.    Psychiatric: Cooperative. Appropriate mood and affect. Normal judgment.        Assessment/PLAN    1. Screening for malignant neoplasm of cervix  Orders as noted below. Will notify patient of results once obtained.  - THINPREP PAP TEST WITH HPV REFLEX    2. Laboratory examination ordered as part of a routine general medical examination  Orders as noted below. Will notify patient of results once obtained.  - LIPID PANEL WITH REFLEX; Future  - CBC NO DIFFERENTIAL; Future  - THYROID STIMULATING HORMONE REFLEX; Future    3. Anxiety  Stable with BuSpar and Lexapro; will continue current medications and treatment.  - busPIRone (BUSPAR) 10 MG tablet; Take 1 tablet by mouth 2 times daily.  Dispense: 60 tablet; Refill: 5    4. Routine general medical examination at health care facility  Gen. health maintenance discussed. Recommend exercise most days of the week, some type of cardio and resistance training for at least 30 minutes of both. Calcium and vitamin D3 recommendations reviewed. Monthly self breast exam review. See numbers 1 and 2. Patient also interested in changing oral birth control. Had been started on it at 17 years of age for ovarian cyst. She was wondering if her birth control could have any contributing factor to her headaches. Discussed options such as NuvaRing or IUD. Patient interested in trying NuvaRing. Therefore, will order as noted in #11. Should she have problems with insurance coverage she  will let us know and we will consider other options for her. Discussed with her that should she want to do IUD that I would refer her to OB/GYN.    5. Chronic abdominal pain  Stable-continue current medications and treatment. She has seen multiple gastroenterologists. Lastly being at [facility].    6. Allergic rhinitis, unspecified seasonality, unspecified trigger  Continue follow-up with allergist as recommended.    7. 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    8. Localization-related epilepsy (CMS/[facility])  Currently seeing a neurologist out of [facility] care. She will follow-up with them as recommended.    9. Chronic nausea  Improved secondary to #3.    10. Globus pharyngeus  Improved secondary to #3.    11. Family planning  See #4.  - etonogestrel-ethinyl estradiol (NUVARING) 0.12-0.015 MG/24HR vaginal ring; Place one ring vaginally and leave in place for 3 weeks. Take out and replace after one week.  Dispense: 3 each; Refill: 12    Follow-up in 3 months; sooner if needed. Patient verbalized an understanding.    Deanna Chaudhari NP

## 2023-07-07 NOTE — PROVIDER NOTIFICATION
"Paged MD Quigley, \"7B Liu Ragsdale. RM 22  Pt TF increased to goal rate. Pt is feeling increased pressure pain on the abdomen so I stopped TF. Please address. Also FYI the enema did not help.   Thanks, Lanny [phone]\"    "

## 2023-07-08 NOTE — PLAN OF CARE
"[id number]    BP (!) 151/63 (BP Location: Left arm)   Pulse 86   Temp 98.1  F (36.7  C) (Oral)   Resp 18   Ht 1.549 m (5' 1\")   Wt 77.4 kg (170 lb 10.2 oz)   SpO2 99%   BMI 32.24 kg/m      Activity: up with stand by assist   Neuros: alert and orientated x4, calm and cooperative   Cardiac: WNL  Respiratory: O2 sats high 90's on RA, unlabored    GI/: abdomen rounded, tender.  Passing small amounts gas.  Given tap water enema with small results   Diet: sips of clears, ice chips.  TF at 35cc/hr (goal 45)   Lines: PIV  Incisions/Drains: abdominal lap sites, J-tube for meds and TF's    Labs: reviewed   Pain/nausea: pt c/o pain/pressure \"upper esophagus area\" and abdominal pain partially relieved with scheduled tylenol, prn oxycodone.  No nausea   New changes this shift: none    Plan: continue POC           "

## 2023-07-08 NOTE — PROGRESS NOTES
THORACIC & FOREGUT SURGERY    S:  Pt seen and examined this AM. No events overnight. Pt reports significant pain relief today, but continues to feel distended. Had small bowel movement following enema yesterday, and he is passing gas today. Tube feeds paused and restarted yesterday evening. His feedings were 35mL/hr and are now 45mL/hr    O:  Vitals:    07/07/23 2302 07/07/23 2306 07/08/23 0000 07/08/23 0719   BP: (!) 160/68 (!) 151/63  (!) 153/73   BP Location: Right arm Left arm  Right arm   Pulse: 86   68   Resp: 18   17   Temp: 98.1  F (36.7  C)   98.3  F (36.8  C)   TempSrc: Oral   Oral   SpO2: 93% 99%  93%   Weight:   77.4 kg (170 lb 10.2 oz)    Height:           Constitutional: A&Ox3, NAD  Pulm: Breathing non-labored, symmetrical chest rise.  Cardiac: Regular rate and rhythm.   Abd: Soft, mildly distended, tenderness at incision sites. No discoloration.   Ext: Distal extremities warm and well perfused. Good pulses  Devices: J tube site clean and intact. No drainage from insertion site.    A/P: Liu Ragsdale is a 83 year old male POD#3 s/p Diagnostic laparoscopy, jejunostomy insertion. He has been having ongoing abdominal pain over the course of his inpatient stay. He is improving with pain everyday and is passing gas more. He reports a bowel movement this afternoon. We will continue to monitor his progress.     -Tube feedings are to goal at 45mL/hr  -He is cleared for a Clear liquid diet  -DVT: Heparin 5000U every eight hours  -Monitor patient's pain.   -Monitor for acute abdominal changes.  -Will continue to monitor for pain  -Repeated enema today. Bowel movement occurred in the afternoon.    Pt seen and discussed with Thoracic Team, Fellow and Dr. Sinha.    Conor Quigley MD  Thoracic Surgery    Note cowritten by Justo Vo, MS4.

## 2023-07-08 NOTE — PROVIDER NOTIFICATION
"Paged Dann, \"7B Liu Ragsdale. RM 22  FYI: pt K+ is at 3.3, would you like to place a replacement order?  thanks, Lanny. [phone]\"  "

## 2023-07-08 NOTE — PROGRESS NOTES
Patient identified as high risk for readmission.  Patient meets criteria for care coordination outreach.    Marcie Pastrana RN  Care Coordination  Phone:  [phone]  Email: [email or web address] and [facility]       Vitals: Temp: 97.9  F (36.6  C) Temp src: Oral BP: (!) 168/78 Pulse: 74   Resp: 16 SpO2: 96 % O2 Device: None (Room air)    Neuro: A&O x4, calls appropriately.   Cardiac: WDL, denies cardiac chest pain.  Respiratory: on RA, sating adequetly denies SOB.  GI/: Voiding spontaneously. +BS, no BM. Enema given this morning; BM x2.  Activity: SBA  Diet/Nausea: CLD. Denies nausea.   Pain: abdominal pain managed with Oxy, Robaxin, and Tylenol.  Skin/Drains: J tube with continuous feeds, dressing changed.  Lines: PIV infusing LR @50mL/hr.   Labs: phos and potassium, replaced.   New changes this shift: Pt started feeling more pressure abdominal pain with TF at goal rate. TF stopped and MD notified.   Plan: TF now infusing at 25 ml/hr at 1900 advance it by 10mL at 3am. Continue with POC.

## 2023-07-09 NOTE — PLAN OF CARE
"BP (!) 165/73   Pulse 73   Temp 97.7  F (36.5  C) (Oral)   Resp 16   Ht 1.549 m (5' 1\")   Wt 78.5 kg (173 lb)   SpO2 95%   BMI 32.69 kg/m      Care from: 0700 - 1500      VS & Pain: VSS except hypertensive (within parameters). On RA. C/o abdominal pain, managed with PRN oxycodone x1 & PRN Tylenol x1- effective     Neuro: AxO x4     Respiratory: no shortness of breath    Cardiac: no acute distress noted     Peripheral neurovascular: numbness present- bilateral fingertips- baseline    GI/: + bowel sounds. BM x1 this shift. Voids spontaneously, measured     Nutrition: Continuous TF at goal rate of 45 ml/hr. Denies nausea & vomiting. Clear liquid diet     Skin:  J tube site- dressing CDI. Lap sites- BOY    Musculoskeletal: wdl     Lines: R. PIV- SL     Activity:  SBA. Pt uses IV pole- steady gait     Events this shift: pt received 3rd dose of phosphorous packet. Lab redraw is scheduled for AM. Family at bedside at end of the shift visiting pt     Plan: placed order for PLC regarding TF teaching. Continue plan of care     Goal Outcome Evaluation:  Plan of Care Reviewed With: patient  Overall Patient Progress: improving           "

## 2023-07-09 NOTE — PROGRESS NOTES
THORACIC & FOREGUT SURGERY    S:  Pt seen and examined this AM. No events overnight. Pt reports continued distention and abdominal discomfort, but he is having loose bowel movements. He has minor incontinence on his way to toilet but has difficulty stooling when on the toilet. Pt reports strong concerns about competency handling tube feeds and medication administration at home. He lives with his wife, but her declining memory presents additional stressor. Pt adamant that he does not want to leave the hospital until bowel control is re-established and he is comfortable with feeding procedures.    O:  Vitals:    07/09/23 0426 07/09/23 0741 07/09/23 0851 07/09/23 0900   BP:  (!) 168/78 (!) 182/81 (!) 166/89   BP Location:  Right arm     Pulse:  73 73    Resp:  16     Temp:  97.7  F (36.5  C)     TempSrc:  Oral     SpO2:  95%     Weight: 78.5 kg (173 lb)      Height:           Constitutional: A&Ox3, NAD  Pulm: Breathing non-labored, symmetrical chest rise.  Cardiac: Regular rate and rhythm.   Abd: Soft, mildly distended, tenderness at incision sites. No discoloration.   Ext: Distal extremities warm and well perfused. Good pulses  Devices: J tube site clean and intact. No drainage from insertion site.    A/P: Liu Ragsdale is a 83 year old male POD#3 s/p Diagnostic laparoscopy, jejunostomy insertion. He has been having ongoing abdominal pain over the course of his inpatient stay. He is improving with pain everyday and is passing gas more. He reports loose bowel movements with some incontinence.     -Tube feedings are to goal at 45mL/hr  -He is cleared for a full liquid diet, advance as tolerated  -He is not to eat meals in bed: all meals are to be eaten in chair  -Discontinue IV fluids  -Discharge pending nutrition education  -DVT: Heparin 5000U every eight hours  -Monitor patient's pain.   -Will reassure patient about managing is health needs at home.  -Care Coordinator visited with patient about at home  options/assistance.    Pt seen and discussed with Thoracic Team, Fellow and Dr. Sinha.    Conor Quigley MD  Thoracic Surgery    Note cowritten by Justo Vo, MS4.

## 2023-07-09 NOTE — PLAN OF CARE
Goal Outcome Evaluation:      Plan of Care Reviewed With: patient    Overall Patient Progress: improvingOverall Patient Progress: improving    Cardiac: hypertensive within parameters, denies cardiac chest pain   Resp: RA, sating >93%, denies SOB  Neuro: A&Ox4, calm & cooperative   GI/: voiding spontaneously, passing gas, audible BS, LBM 7/9  Diet: Full liquids-low intake, TF running @ 45 ml/hr through J tube   Skin/Incisions/Drains: 4x abdominal port sites, J tube  IV access: R PIV SL   Labs: Reviewed  Nausea: denies   Activity: SBA   Pain: pain managed w/ PRN oxycodone & scheduled meds     Plan: Patient Teaching on J tube tomorrow @ 1pm w/ family   New changes this shift: no acute changes this shift

## 2023-07-09 NOTE — PROGRESS NOTES
[id number]    Dx:  Esophageal CA    Cognitive:  A&Ox4, calm and pleasant towards writer and other staff. Receptive to all cares.     Tele/cardiac:  Apical pulse regular. VSS. Denied and offered no c/o CP.     Respiratory:  LS: CTA, absent of any adventitious sounds. SpO2: 94% on RA. Absent of SOB    Activity: SBA, Steady gait noted.     Pain: C/o moderate abdominal discomfort/ soreness. Requested and received PRN Oxycodone, which was effective.       IV:  PIV x 1, maintenance IV fluids: LR at 50ml/hr.     GI/:  BS: normoactive x4Q, rounded/ obese/ some tenderness. Last BM on 7/9, overnight. Passing flatus. Voids spontaneously without difficulty.     Skin:  Jejunostomy site, dressing CDI.     Diet:  Continuous TF at goal rate of 45 ml/hr. Tolerating well at this time. Taking in sips of water and some ice chips. Absent of any nausea    Medication:   Medication crushed and given through J tube.      Other:  Mag and K+ protocols, replaced on evening shift.

## 2023-07-10 NOTE — PLAN OF CARE
Goal Outcome Evaluation: met      Plan of Care Reviewed With: patient    Overall Patient Progress: improvingOverall Patient Progress: improving    Outcome Evaluation: Patient and family completed J tube education at bedside with learning center.

## 2023-07-10 NOTE — PHARMACY-CONSULT NOTE
Pharmacy Tube Feeding Consult    Medication reviewed for administration by feeding tube and for potential food/drug interactions.    Pharmacy was asked by RN to change route of patient's meds to allow for feeding tube administration (J-tube).    Recommendations:  -Recommend changing the following medications to a liquid dosage form: amlodipine, carvedilol, clarithromycin, metronidazole, oxycodone, pantoprazole, tylenol, and senna.  This has been done for you.   -Recommend modifying orders that do not have oral suspensions or liquids available to include oral or feeding tube as route. This has been done for you. Please contact pharmacy if c/f J-tube interaction.    Pharmacy will continue to follow as new medications are ordered.

## 2023-07-10 NOTE — PLAN OF CARE
"[id number]    BP (!) 142/67 (BP Location: Right arm)   Pulse 64   Temp 98.1  F (36.7  C) (Oral)   Resp 16   Ht 1.549 m (5' 1\")   Wt 76.9 kg (169 lb 8.5 oz)   SpO2 95%   BMI 32.03 kg/m        Activity: up ad with minimal assist   Neuros: alert and orientated x 4, calm and cooperative   Cardiac: WNL   Respiratory: O2 sats mid 90's on RA, unlabored    GI/: abdomen soft/tender.  Last BM 7/9.  Voiding spont   Diet: full liquid diet, TF at 45cc/hr (goal)   Lines: PIV   Incisions/Drains: abdominal lap sites CDI.  J-tube for TF's    Labs: reviewed    Pain/nausea: \"adequate\" pain control taking prn oxycodone and tylenol.  No nausea    New changes this shift: none   Plan:  patient learning center at 1300 and then likely discharge to home        "

## 2023-07-10 NOTE — PROGRESS NOTES
Care Management Discharge Note    Discharge Date: 07/10/2023       Discharge Disposition: Home with services    Discharge Services: Home Care, Home Infusion     Discharge DME: None    Discharge Transportation: family or friend will provide    Private pay costs discussed: Not applicable    PAS Confirmation Code: NA   Patient/family educated on Medicare website which has current facility and service quality ratings: yes     Education Provided on the Discharge Plan: yes   Persons Notified of Discharge Plans: pt  Patient/Family in Agreement with the Plan: yes     Handoff Referral Completed: Yes    Additional Information:  Per MD team pt is medically ready for discharge to home today.  Patient will discharge on tube feedings provided by [facility].  [facility] liaison, yovana Arias.  They will plan for delivery of supplies to the pt's home.  Pt completed PLC this afternoon and [facility] will provide a nurse visit this evening to provide further education on tube feeding administration.  Family to provide a ride to home.  RNCC will remain available if further needs arise.      [facility](TF)  Phone: [phone]  Fax: [phone]    [facility](RN)  Phone: [phone]  Fax: [phone]    Natalia Sanchez RNCC  Phone: [phone]  Pager: [phone]    SEARCHABLE in [facility] - search CARE COORDINATOR     [place] ([phone]) Saturday & Sunday; ([phone]) [facility] Recognized Holidays     Units: 4A, 4C, 4E, 5A & 5B   Pager: [phone]    Units: 6A & 6B    Pager: [phone]    Units: 6C & 6D   Pager: [phone]    Units: 7A, 7B, 7C, 7D & 5C    Pager: [phone]    Units: [facility] ED, 5 Ortho, 5 Med/Surg, 6 Med/Surg, 8A, 10 ICU, & [facility]    Pager: [phone]

## 2023-07-10 NOTE — PROGRESS NOTES
CLINICAL NUTRITION SERVICES - BRIEF NOTE     Nutrition Prescription    Recommendations already ordered by Registered Dietitian (RD):  Jevity 1.5 @ 45 ml/hr continuous through J-tube    -Free water: Start with 60 ml q 4 hour free water flushes and monitor for need to increase pending PO intake.  Should aim for at least 800 ml free water daily (either by mouth or through feeding tube/water flushes).     Future/Additional Recommendations:  Recommendations for cycling feedings overnight:  1) Jevity 1.5 @ 60 ml/hr x 18 hours (4pm-10am)  2) Jevity 1.5 @ 75 ml/hr x 14 hours (6pm-8am)  3) Jevity 1.5 @ 90 ml/hr x 12 hours (8pm-8am)  -Stay at each one until you are sure it is well tolerated (may be 1 day or 1 week), then advance to the next number. If at any point you feel uncomfortable, okay to go back to whatever was previously tolerated.     INTERVENTIONS  Entered the above information in patient's discharge instructions.  Reviewed the cycling process/recommendations with patient as well.     Monitoring/Evaluation  Progress toward goals will be monitored and evaluated per protocol.     Arianna Pedro, MS, RD, LD, CCTD, CNSC  7A and 7B beds [phone], pager [phone]  Weekend pager [phone]

## 2023-07-10 NOTE — PROGRESS NOTES
Patient and family verbalized understanding of After Visit Summary. Personal belongings packed and patient transported to discharge pharmacy.

## 2023-07-10 NOTE — CONSULTS
KAPIL teaching at bedside with patient, wife, 2 daughters and 1 grand daughter. Review of J tube site care, water flushes, medication administration and feeds via enteralite infinity pump.    Patient did practice a water flush and medication administration with a pain medication brought in. He also loaded the tubing into the pump, programed the rate/dose and understands how to run the pump.    Writer demonstrated the use of backpack for tube feed bag and pump.    Multiple copies of instructions given and many good questions were asked throughout the teaching.    [facility] nurse visit requested for tonight or tomorrow.    Literature given: Handwashing and Skin Care, Caring for Your Tube at Home.  Literature given: Using the Enteralite Infinity Pump for Tube Feeding

## 2023-07-10 NOTE — DISCHARGE SUMMARY
NAME: Liu Ragsdale   MRN: [id number]   : 1940     DATE OF ADMISSION: 2023     PRE/POSTOPERATIVE DIAGNOSES: Esophageal cancer    CODING ADDENDUM:  Moderate malnutrition in the context of acute illness      PROCEDURES PERFORMED: Diagnostic laparoscopy,   jejunostomy insertion    PATHOLOGY RESULTS: Final pathology pending at time of discharge.      CULTURE RESULTS: None     INTRAOPERATIVE COMPLICATIONS: None     POSTOPERATIVE MEDICAL ISSUES: None     DRAINS/TUBES PRESENT AT DISCHARGE: J tube    DATE OF DISCHARGE:  July 10, 2023     HOSPITAL COURSE: Liu Ragsdale is a 83 year old male who on 2023 underwent the above-named procedures.  He tolerated the operation well and postoperatively was transferred to the general post-surgical unit.  The remainder of his course was essentially uncomplicated.  Prior to discharge, his pain was controlled well, he was able to perform ADLs and ambulate independently without difficulty, and had full return of bowel and bladder function.  On July 10, 2023, he was discharged to home in stable condition with J tube securely in place.    DISCHARGE EXAM:   A&O, NAD  Resp non-labored  Distal extremities warm    Incisions and J tube site healthy appearing     DISCHARGE INSTRUCTIONS:  Discharge Procedure Orders   Home Care Referral   Referral Priority: Routine: Next available opening Referral Type: Home Health Therapies & Aides   Number of Visits Requested: 1     Home Infusion Referral   Referral Priority: Routine: Next available opening Referral Type: Consultation   Number of Visits Requested: 1     Reason for your hospital stay   Order Comments: surgery     Activity   Order Comments: As tolerated     Order Specific Question Answer Comments   Is discharge order? Yes      Tubes and drains   Order Comments: J TUBE INSTRUCTIONS:    Flush your feeding tube with 30cc of water;  1. After medication administration through the feeding tube  2. Before and after tube feeds  3. Every 8  "hours while awake if you have not used the tube during that time      -If possible take medications by mouth or as solution via j tube (Do not put crushed pills through the tube if possible)     -Change the gauze around your tube daily or more often if soiled    -KEEP A FLEXI-TRACK (or other tube retention device) on your tube at all times to prevent incidental removal.     Discharge Instructions   Order Comments: THORACIC SURGERY DISCHARGE INSTRUCTIONS    If your plans upon discharge include prolonged periods of sitting (i.e a lengthy car or plane ride), it is highly beneficial to get up and walk at least once per hour to help prevent swelling and blood clots.     You may get incision wet 2 days after operation. Do not submerge, soak, or scrub incision or swim until seen in follow-up.    Take incentive spirometer home for continued frequent use    Activity as tolerated, no strenous activity until seen in follow-up    Stay hydrated. Take over the counter fiber (metamucil or benefiber) and stool softeners (Miralax, docusate or senna) if becoming constipated.     Call for fever greater than 101.5, chills, increased size of incision, red skin around incision, vision changes, muscle strength changes, sensation changes, shortness of breath, or other concerns.    No driving while taking narcotic pain medication.    Transition to ibuprofen or tylenol/acetaminophen for pain control. Do not take tylenol/acetaminophen and acetaminophen containing narcotic (e.g., percocet or vicodin) at the same time. If you have known ulcer problems, or kidney trouble (elevated creatinine) do not take the ibuprofen.    In emergencies, call 911    For other Questions or Concerns;   A.) During weekday working hours (Monday through Friday 8am to 4:30pm)   call [phone] and ask to speak to a thoracic surgery nurse (RN or LPN).     B.) At nights (after 4:30pm), on weekends, or if urgent call [phone] and   tell the  \"I would " "like to page job code [phone], the thoracic surgery   fellow on call, please.\"     Follow Up ([facility])   Order Comments: Follow up with Natalia Shafer, Clinical Nurse Specialist, in Thoracic Surgery clinic in 1 week for J tube site check    Appointments on [place] and/or [facility] (with [facility] or [facility] provider or service). Call [phone] if you haven't heard regarding these appointments within 7 days of discharge.     Diet   Order Comments: Follow this diet upon discharge: Orders Placed This Encounter      Adult Formula Drip Feeding: Continuous Jevity 1.5; Jejunostomy; Goal Rate: 45; mL/hr        Full Liquid Diet     Order Specific Question Answer Comments   Is discharge order? 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)      carvedilol (COREG) 1 mg/mL SUSP 6.25 mLs (6.25 mg) by Oral or Feeding Tube route 2 times daily for 30 days  Qty: 375 mL, Refills: 0    Associated Diagnoses: 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)      clarithromycin (BIAXIN) 250 MG/5ML suspension 10 mLs (500 mg) by Oral or Feeding Tube route 2 times daily for 5 days  Qty: 100 mL, Refills: 0    Associated Diagnoses: 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)      metronidazole (FIRST-METRONIDAZOLE) 50 MG/ML SUSR 10 mLs (500 mg) by Oral or Feeding Tube route 3 times daily for 5 days  Qty: 150 mL, Refills: 0    Associated Diagnoses: 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)      oxyCODONE " (ROXICODONE) 5 MG/5ML solution 2.5 mLs (2.5 mg) by Oral or Feeding Tube route every 4 hours as needed for moderate pain  Qty: 100 mL, Refills: 0    Associated Diagnoses: 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)         CONTINUE these medications which have NOT CHANGED    Details   atorvastatin (LIPITOR) 10 MG tablet TAKE 1 TABLET BY MOUTH ONCE DAILY (APPOINTMENT  REQUIRED  FOR  FUTURE  REFILLS)  Qty: 90 tablet, Refills: 0    Associated Diagnoses: Right carotid artery occlusion      diclofenac (VOLTAREN) 1 % topical gel Place 4 g onto the skin 3 times daily as needed for moderate pain (Shoulder)  Qty: 100 g, Refills: 1    Associated Diagnoses: Primary osteoarthritis of right shoulder      Multiple Vitamin (MULTI VITAMIN PO) Take 1 oz by mouth daily Eniva VIBE-liquid      NONFORMULARY Take 1 oz by mouth daily Eniva Cell-Ready Multi Minerals is a concentrated, ionic liquid mineral dietary supplement blend to help support nutritional balance and wellbeing.  Combine with 8 ounces of water / juice      aspirin (ASA) 325 MG tablet Take 1 tablet (325 mg) by mouth daily  Qty: 100 tablet, Refills: 0    Associated Diagnoses: Cerebrovascular accident (CVA) due to occlusion of right middle cerebral artery (H)       nitroGLYcerin (NITROSTAT) 0.3 MG sublingual tablet For chest pain place 1 tablet under the tongue every 5 minutes for 3 doses. If symptoms persist 5 minutes after 1st dose call 911.  Qty: 30 tablet, Refills: 3    Associated Diagnoses: Positive cardiac stress test; Angina at rest (H)         STOP taking these medications       acetaminophen (TYLENOL) 325 MG tablet Comments:   Reason for Stopping:         amLODIPine (NORVASC) 5 MG tablet Comments:   Reason for Stopping:         carvedilol (COREG) 6.25 MG tablet Comments:   Reason for Stopping:         clarithromycin (BIAXIN) 500 MG tablet Comments:   Reason for Stopping:         metroNIDAZOLE (FLAGYL) 500 MG tablet Comments:   Reason for Stopping:         pantoprazole (PROTONIX) 40 MG EC tablet Comments:   Reason for Stopping:

## 2023-07-11 NOTE — LETTER
[facility] CARE COORDINATION  [address] [facility] [address]     July 13, 2023    [place] Ragsdale  [address] [address]      Dear Liu,        I am a  clinic care coordinator who works with Piyush Rogers MD with the [facility]. I have been trying to reach you recently to introduce Clinic Care Coordination. Below is a description of clinic care coordination and how I can further assist you.       The clinic care coordination team is made up of a registered nurse, , financial resource worker and community health worker who understand the health care system. The goal of clinic care coordination is to help you manage your health and improve access to the health care system. Our team works alongside your provider to assist you in determining your health and social needs. We can help you obtain health care and community resources, providing you with necessary information and education. We can work with you through any barriers and develop a care plan that helps coordinate and strengthen the communication between you and your care team.  Our services are voluntary and are offered without charge to you personally.    Please feel free to contact me with any questions or concerns regarding care coordination and what we can offer.      We are focused on providing you with the highest-quality healthcare experience possible.    Sincerely,     [facility]   Kimberly Hopper RN, Care Coordinator   [facility]'s   E-mail [email or web address]   [phone]     Enclosed: I have enclosed a copy of a 24 Hour Access Plan. This has helpful phone numbers for you to call when needed. Please keep this in an easy to access place to use as needed.

## 2023-07-11 NOTE — TELEPHONE ENCOUNTER
Home Health Care    Reason for call:    1 x week for 8 weeks Nursing &   Start of Care Cancer    Pt Provider: Sue     Phone Number Homecare Nurse can be reached at: Ericka Baum  [phone]    Can we leave a detailed message on this number? YES    [facility]

## 2023-07-11 NOTE — PLAN OF CARE
Occupational Therapy Discharge Summary    Reason for therapy discharge:    Discharged to home.    Progress towards therapy goal(s). See goals on Care Plan in [facility] electronic health record for goal details.  Goals met    Therapy recommendation(s):    No further therapy recommended.

## 2023-07-11 NOTE — PROGRESS NOTES
Clinic Care Coordination Contact  [facility]/Voicemail    Referral Source: IP Handoff  Clinical Data: Care Coordinator Outreach  Outreach attempted x 2.  Left message on patient's voicemail with call back information and requested return call.  Plan: Care Coordinator will await a return call from the patient   [facility]   Kimberly Hopper RN, Care Coordinator   [facility]'s   E-mail [email or web address]   [phone]

## 2023-07-11 NOTE — PROGRESS NOTES
Clinic Care Coordination Contact  [facility]       Clinical Data:   7/5/2023 - 7/10/2023 (5 days)  [facility]  PROCEDURES PERFORMED: Diagnostic laparoscopy,   jejunostomy insertion  Care Coordinator Outreach  Outreach attempted x 1.  Spoke to patients wife and she reports the patient is having a home care visit right now and to call back in 30 minutes   Plan: Care Coordinator call patient back this afternoon   [facility]   Kimberly Hopper RN, Care Coordinator   [facility]'s   E-mail [email or web address]   [phone]

## 2023-07-11 NOTE — LETTER
Health Care Home - Access Care Plan    About Me:    Patient Name:  Liu Lozano    YOB: 1940  Age:                      83 year old   [facility] MRN:     [id number] Telephone Information:   Home Phone [phone]   Mobile [phone]       Address:  [address] [facility] [address] Email address:  [email or web address]      Emergency Contact(s)   Name Relationship Lgl Grd Work Phone Home Phone Mobile Phone   1. JOHN LEMUS Grandchild    [phone]   2. INIDA LOZANO Spouse No  [phone]    3. WEI NORIEGA Daughter No [phone]   4. VICTORINA LOZANO Son No   [phone]             Health Maintenance:      My Access Plan  Medical Emergency 915   Questions or concerns during clinic hours Primary Clinic Line, I will call the clinic directly: [facility] - [phone]   24 Hour Appointment Line [phone] or  [phone] [facility] [phone]) (toll free)   24 Hour Nurse Line [phone] (toll free)   Questions or concerns outside clinic hours 24 Hour Appointment Line, I will call the after-hours on-call line:   [facility] [phone] or [phone] ([phone]) (toll-free)   Preferred Urgent Care [facility], [phone]   Preferred Hospital [facility]  [phone]   Preferred Pharmacy [facility] [address] [place] [address] - [address] Behavioral Health Crisis Line The National Suicide Prevention Lifeline at 1-419.504.7959 or 912                     My Care Team Members  Patient Care Team         Relationship Specialty Notifications Start End    Piyush Rogers MD PCP - General Family Medicine All results, Admissions 2/3/21     Phone: [phone] Fax: [phone] [address] [address]    Piyush Rogers MD Assigned PCP   2/7/21     Phone: [phone] Fax: [phone] [address] [place] [address]  [place] [address]    Jarod Hamm DO Assigned Sleep Provider   12/12/21     Phone: [phone] Fax: [phone] [address]  [facility] [address]    Joshua Torres MD Assigned Musculoskeletal Provider   7/9/22     Phone: [phone] Fax: [phone]         [address] [place] [address]    Vega Etienne MD Assigned Neuroscience Provider   4/29/23     Phone: [phone] Fax: [phone]         [address] [facility] [address]    Walter Myers MD MD   6/15/23     Phone: [phone] Fax: [phone]         [address] [facility] [address]    Kimberly Hopper, RN Clinic Care Coordinator Primary Care - CC Admissions 7/11/23     Phone: [phone]                  My Medical and Care Information  Problem List   Patient Active Problem List   Diagnosis    Carotid bruit    Hyperlipidemia LDL goal <70    Benign essential hypertension, BP goal <140/90    BMI 31.0-31.9,adult    FANI (obstructive sleep apnea)    SNHL (sensory-neural hearing loss), asymmetrical    Right shoulder pain, unspecified chronicity    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    SOB (shortness of breath) on exertion    Arthritis of right glenohumeral joint- moderate    Arthritis of right acromioclavicular joint- advanced    Positive cardiac stress test    Status post coronary angiogram    Coronary artery disease involving native coronary artery of native heart without angina pectoris    Degenerative joint disease of left hip    Near syncope    Right carotid artery occlusion    Cerebrovascular accident (H)    Ischemic stroke (H)    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)    Esophageal cancer (H)      Current Medications and Allergies:    Current Outpatient Medications   Medication    acetaminophen (TYLENOL) 325 MG/10.15ML solution    amLODIPine benzoate (KATERZIA) 1 MG/ML SUSP    aspirin (ASA) 325 MG tablet    atorvastatin (LIPITOR) 10 MG tablet    carvedilol  (COREG) 1 mg/mL SUSP    clarithromycin (BIAXIN) 250 MG/5ML suspension    diclofenac (VOLTAREN) 1 % topical gel    metronidazole (FIRST-METRONIDAZOLE) 50 MG/ML SUSR    Multiple Vitamin (MULTI VITAMIN PO)    nitroGLYcerin (NITROSTAT) 0.3 MG sublingual tablet    NONFORMULARY    oxyCODONE (ROXICODONE) 5 MG/5ML solution    pantoprazole (PROTONIX) 2 mg/mL SUSP suspension    prochlorperazine (COMPAZINE) 10 MG tablet    sennosides (SENOKOT) 8.8 MG/5ML syrup    simethicone (MYLICON) 40 MG/0.6ML suspension     No current facility-administered medications for this visit.

## 2023-07-12 NOTE — TELEPHONE ENCOUNTER
Writer spoke with Dr Rogers, verbal orders ok as below. Left detailed message for Ericka at [facility].  Rasheeda TRINH RN

## 2023-07-13 NOTE — PATIENT INSTRUCTIONS
recommend concurrent chemoradiation with carboplatin and Taxol weekly followed by definitive surgery.     Discussed in detail side effects associated with chemotherapy that include but not limited to nausea vomiting, peripheral sensorimotor neuropathy, mucositis. abnormal liver kidney functions, neutropenia, neutropenic fever, sepsis, death, allergic reactions and others.     Dysphagia: , J-tube is in place, he is using J-tube mostly and has been able to maintain weight now.     Follow-up plan: Proceed with concurrent chemoradiation, follow-up with MD in 3 weeks

## 2023-07-13 NOTE — PROGRESS NOTES
No return call from the patient . [facility] letter sent via My Chart     [facility]   Kimberly Hopper RN, Care Coordinator   [facility]'s   E-mail [email or web address]   [phone]

## 2023-07-13 NOTE — CONFIDENTIAL NOTE
"Chemotherapy Education    Patient is a 83year old male here today for chemotherapy education, accompanied by wife/family.  Pt has a cancer diagnosis of Esophageal cancer..  Their Oncologist is Dr. Locke..    Reviewed the following with the patient and their support person:    Treatment Goal: Curative    Regimen: Carboplatin/Paclitaxel and Radiation    Duration: x 1 cycle Cycles  (5 treatments) and rationale for strict adherence, specific supportive medication Compazine and side effects (including long-term and short-term) and management; skin changes/hand-foot syndrome, anemia, neutropenia, thrombocytopenia, diarrhea/constipation, hair loss syndrome, memory changes/ \"chemobrain\", mouth sores, taste changes, neuropathy, fatigue, infertility, myelosuppression, and risk of extravasation or infiltration.    Infection prevention and monitoring of lab values, what lab tests and what changes of these values meant, along with the possibility of hydration or blood product transfusion, or the need to defer or hold treatment.      General Chemotherapy Information, including ways it is excreted from the body and cleaning and containment of vomitus or other bodily fluid, use of the bathroom, sexual health and intimacy, what to do if needing to miss a treatment, when to call a provider and the need for staff to wear protective equipment.    Oral Chemotherapy: No      Importance of Central line care (port) or IV site care.    Written Information:   \"My Cancer Guidebook\" Binder   \"Getting Ready for Chemotherapy: What to Expect, Before, During, and After  your Treatment\",   Via Oncology medication information sheets,   Information on when to contact the provider,   Various programs offered at [facility], and   [employer] card with contact information given; Oncology Clinic, RN Case  Manager, and the after-hours Nurse Advise Line.  No barriers to learning identified. Patient and family verbalized understanding of all written and " verbal information. All questions answered to patients satisfaction.   General Orientation to the Medical Oncology department, Infusion Services department, Huc/scheduling, bathrooms and usual flow of the treatment day provided as well as introduction to the Infusion nurses.    Bottle Pump Infuser: N/A Patient education sheet given to patient regarding how to use, care for, and monitor the infuser pump.  Patient also instructed to check to ensure balloon is deflating throughout the day. Model of bottle infuser shown to patient to allow for questions and for patient to become familiar with infuser pump.    Other Concerns: Chemo teach done and all side effects reviewed and questions answered.   Comments:No concerns noted at this time.     Pt instructed to call with further questions or concerns.  Patient states understanding and is in agreement with this plan.  Copy of appointments, and after visit summary (AVS) given to patient. Patient discharged 7/13/2023.    Nyla Morrell RN  Oncology Hematology   [facility]  Phone [phone]

## 2023-07-13 NOTE — PROGRESS NOTES
"Oncology Rooming Note    July 13, 2023 9:27 AM   Liu Ragsdale is a 83 year old male who presents for:    Chief Complaint   Patient presents with    Oncology Clinic Visit     Malignant neoplasm of lower third of esophagus - Provider visit     Initial Vitals: /60 (BP Location: Right arm, Patient Position: Sitting, Cuff Size: Adult Regular)   Pulse 64   Temp 97.9  F (36.6  C) (Tympanic)   Resp 16   Ht 1.549 m (5' 1\")   Wt 78.8 kg (173 lb 12.8 oz)   SpO2 94%   BMI 32.84 kg/m   Estimated body mass index is 32.84 kg/m  as calculated from the following:    Height as of this encounter: 1.549 m (5' 1\").    Weight as of this encounter: 78.8 kg (173 lb 12.8 oz). Body surface area is 1.84 meters squared.  Mild Pain (3) Comment: Data Unavailable   No LMP for male patient.  Allergies reviewed: Yes, patient completed e-check in  Medications reviewed: Yes, patient completed e-check in    Medications: Medication refills not needed today.  Pharmacy name entered into [employer]:    [facility] - [place], [place] - [address] [place] [address] PHARMACY MAIL DELIVERY - [place], [place] - [address]  [facility] - [address] - ONE [facility] AT PORTAL TO REGISTERED [facility] SITES    Clinical concerns:  None      Julee Hernández CMA              "

## 2023-07-13 NOTE — LETTER
"    7/13/2023         RE: Liu Ragsdale  [address] [address] [facility] [address] [facility] [address]        Dear Colleague,    Thank you for referring your patient, Liu Ragsdale, to the [facility]. Please see a copy of my visit note below.          Reason for Visit:    Lower third esophageal adenocarcinoma    History Of Present Illness:  Mr. Hatfield is a 83 year old male who presents for evaluation of lower third of esophageal adenocarcinoma..  He noticed difficulty swallowing of food over the past 8 weeks.  He also had acid reflux for short interval of time.  He has lost  10 pound weight loss over the 8-week..       EGD on 6/5/2023.   demonstrated a large fungating mass in the lower third of the esophagus, 38 cm from the incisors.  The mass was noted to be partially obstructing and circumferential.      Biopsy of the mass demonstrated  adenocarcinoma.    Gastroesophageal junction biopsy also demonstrated adenocarcinoma.      PET scan obtained on 6/14/2023 demonstrated hypermetabolic activity in the distal esophageal mass compatible with esophageal cancer.  There were  low moderate activity in hilar and mediastinal adenopathy, indeterminate, possibly inflammatory in nature.  No clear sites of distant disease were noted.      7/2/2023: EUS\"   - A partially circumferential progressing to                             circumferential distal esophageal mass was found                             spanning 36-41cm with the junction estimated at                             41cm; luminal patency was estimated at 12mm given                             passage of the linear; the lesion involved layer 4                             though there was no evidence of layer 5 involvement                             and therefore no other structures were involved                             - 5 regional paraesophageal lymph nodes were                             demonstrated either at or above the primary (as           "                   proximal as 30cm); only one of these, inferior to                             the mass, had malignant features, no distant nodes                             were appreciated                             - Grossly unremarkable pancreaticobiliary system                             with intact gallbladder and no synchronous lesions                             of the pancreas or liver                             - bI1F0Cc (III/Lamine        Diagnostic laparoscopy, without mets. jejunostomy insertion    Patient was seen by Dr. Levi of thoracic surgery team.  He discussed consideration of surgical resection after preop chemoradiation therapy.       Complains of progressive worsening of swallowing particularly with solid foods.  He is complaining of difficulty swallowing to liquids also and also sometimes complain of discomfort while swallowing.  Since placement of J-tube is not using his mouth at all.  He only takes ice through.  His food has been through J-tube.  He has been able to maintain weight.        Past medical history:  Past Medical History:   Diagnosis Date     Angina at rest (H)      Arthritis      Cerebrovascular accident (H) 09/27/2021     Coronary artery disease involving native coronary artery of native heart without angina pectoris      Dysphagia      Esophageal adenocarcinoma (H)     DISTAL ESOPHAGUS     Hypertension      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) 7/3/2023       Past Surgical History:   Procedure Laterality Date     ARTHROPLASTY HIP Left 02/03/2021    Procedure: Total Hip Arthroplasty;  Surgeon: Adnrew Arriola MD;  Location: [facility] OR     COLONOSCOPY N/A 06/16/2016    Procedure: COLONOSCOPY;  Surgeon: Randy Avendano MD;  Location: [facility] GI     CV LEFT HEART CATH Left 03/10/2020    Procedure: Left Heart Cath;  Surgeon: Vicente Lopez MD;  Location: [facility] CARDIAC CATH LAB     ENDARTERECTOMY CAROTID Right 9/29/2021    Procedure: ENDARTERECTOMY, CAROTID;   Surgeon: Raymond Andersen MD;  Location: [facility] OR     ENDOSCOPIC ULTRASOUND UPPER GASTROINTESTINAL TRACT (GI) N/A 7/3/2023    Procedure: ENDOSCOPIC ULTRASOUND, ESOPHAGOSCOPY / UPPER GASTROINTESTINAL TRACT (GI);  Surgeon: Yunier Graves MD;  Location:  OR     ESOPHAGOSCOPY, GASTROSCOPY, DUODENOSCOPY (EGD), COMBINED N/A 6/5/2023    Procedure: Esophagoscopy, Gastroscopy, Duodenoscopy, With Biopsy;  Surgeon: Joseph Landers DO;  Location: [facility]     ESOPHAGOSCOPY, GASTROSCOPY, DUODENOSCOPY (EGD), COMBINED N/A 7/3/2023    Procedure: ESOPHAGOGASTRODUODENOSCOPY;  Surgeon: Yunier Graves MD;  Location:  OR     EYE SURGERY  2005    cataract surgery     LAPAROSCOPIC ASSISTED INSERTION TUBE JEJUNOSTOMY N/A 7/5/2023    Procedure: INSERTION, JEJUNOSTOMY TUBE, LAPAROSCOPY-ASSISTED;  Surgeon: Judy Holguin MD;  Location: [facility] OR     VASECTOMY  1981       Medications:  acetaminophen (TYLENOL) 325 MG/10.15ML solution, 20.3 mLs (650 mg) by Oral or Feeding Tube route every 4 hours as needed for mild pain or fever  amLODIPine benzoate (KATERZIA) 1 MG/ML SUSP, 5 mLs (5 mg) by Oral or Feeding Tube route daily for 30 days  aspirin (ASA) 325 MG tablet, Take 1 tablet (325 mg) by mouth daily  atorvastatin (LIPITOR) 10 MG tablet, TAKE 1 TABLET BY MOUTH ONCE DAILY (APPOINTMENT  REQUIRED  FOR  FUTURE  REFILLS)  carvedilol (COREG) 1 mg/mL SUSP, 6.25 mLs (6.25 mg) by Oral or Feeding Tube route 2 times daily for 30 days  clarithromycin (BIAXIN) 250 MG/5ML suspension, 10 mLs (500 mg) by Oral or Feeding Tube route 2 times daily for 5 days  diclofenac (VOLTAREN) 1 % topical gel, Place 4 g onto the skin 3 times daily as needed for moderate pain (Shoulder)  metronidazole (FIRST-METRONIDAZOLE) 50 MG/ML SUSR, 10 mLs (500 mg) by Oral or Feeding Tube route 3 times daily for 5 days  Multiple Vitamin (MULTI VITAMIN PO), Take 1 oz by mouth daily Eniva VIBE-liquid  nitroGLYcerin (NITROSTAT) 0.3 MG sublingual tablet, For chest pain  place 1 tablet under the tongue every 5 minutes for 3 doses. If symptoms persist 5 minutes after 1st dose call 911.  NONFORMULARY, Take 1 oz by mouth daily Eniva Cell-Ready Multi Minerals is a concentrated, ionic liquid mineral dietary supplement blend to help support nutritional balance and wellbeing.  Combine with 8 ounces of water / juice  oxyCODONE (ROXICODONE) 5 MG/5ML solution, 2.5 mLs (2.5 mg) by Oral or Feeding Tube route every 4 hours as needed for moderate pain  pantoprazole (PROTONIX) 2 mg/mL SUSP suspension, 20 mLs (40 mg) by Oral or Feeding Tube route every morning (before breakfast) for 30 days  sennosides (SENOKOT) 8.8 MG/5ML syrup, 5 mLs by Oral or Feeding Tube route 2 times daily for 30 days Reduce dose or temporarily discontinue if having loose stools.  simethicone (MYLICON) 40 MG/0.6ML suspension, Take 0.6 mLs (40 mg) by mouth 3 times daily for 30 days    No current facility-administered medications on file prior to visit.      Allergy:     Allergies   Allergen Reactions     Metoprolol Other (See Comments)     Side effects : dry mouth     Ampicillin Rash     Latex Rash       Family History:  Family History   Problem Relation Age of Onset     Hypertension Sister      Kidney failure Sister      Chronic Obstructive Pulmonary Disease Sister      Thyroid Disease Sister      Seizure Disorder Paternal Grandmother      Mitral valve prolapse Daughter      No Known Problems Son      Cerebrovascular Disease No family hx of        Social History     Tobacco Use     Smoking status: Never     Passive exposure: Never     Smokeless tobacco: Never   Substance Use Topics     Alcohol use: Yes     Comment: Beth only       Review Of Systems:  14 point review of systems is otherwise negative except as mentioned above        PHYSICAL EXAM:  There were no vitals taken for this visit.  GENERAL: no acute distress. Cooperative in conversation. Here with   RESP: lungs are clear bilaterally per auscultation. Regular  respiratory rate. No wheezes or rhonchi.  CV: Regular, rate and rhythm. No murmurs.  ABD: soft, nontender. Positive bowel sounds. No organomegaly.  J-tube in place  MUSCULOSKELETAL: No lower extremity swelling.   NEURO: non focal. Alert and oriented x3.   PSYCH: within normal limits. No depression or anxiety.        Laboratory/Imaging Studies  Please see attached reports  Hospital Outpatient Visit on 06/21/2023   Component Date Value Ref Range Status     FVC-Pred 06/21/2023 2.55  L Final     FVC-Pre 06/21/2023 2.92  L Final     FVC-%Pred-Pre 06/21/2023 114  % Final     FEV1-Pre 06/21/2023 2.34  L Final     FEV1-%Pred-Pre 06/21/2023 120  % Final     FEV1FVC-Pred 06/21/2023 77  % Final     FEV1FVC-Pre 06/21/2023 80  % Final     FEFMax-Pred 06/21/2023 5.13  L/sec Final     FEFMax-Pre 06/21/2023 6.41  L/sec Final     FEFMax-%Pred-Pre 06/21/2023 124  % Final     FEF2575-Pred 06/21/2023 1.48  L/sec Final     FEF2575-Pre 06/21/2023 2.28  L/sec Final     DVF4476-%Pred-Pre 06/21/2023 153  % Final     ExpTime-Pre 06/21/2023 7.29  sec Final     FIFMax-Pre 06/21/2023 2.94  L/sec Final     VC-Pred 06/21/2023 2.74  L Final     VC-Pre 06/21/2023 3.21  L Final     VC-%Pred-Pre 06/21/2023 117  % Final     IC-Pred 06/21/2023 2.06  L Final     IC-Pre 06/21/2023 2.88  L Final     IC-%Pred-Pre 06/21/2023 139  % Final     ERV-Pred 06/21/2023 0.68  L Final     ERV-Pre 06/21/2023 0.33  L Final     ERV-%Pred-Pre 06/21/2023 48  % Final     FEV1FEV6-Pred 06/21/2023 76  % Final     FEV1FEV6-Pre 06/21/2023 80  % Final     FRCPleth-Pred 06/21/2023 3.29  L Final     FRCPleth-Pre 06/21/2023 2.82  L Final     FRCPleth-%Pred-Pre 06/21/2023 85  % Final     RVPleth-Pred 06/21/2023 2.61  L Final     RVPleth-Pre 06/21/2023 2.50  L Final     RVPleth-%Pred-Pre 06/21/2023 95  % Final     TLCPleth-Pred 06/21/2023 5.35  L Final     TLCPleth-Pre 06/21/2023 5.71  L Final     TLCPleth-%Pred-Pre 06/21/2023 106  % Final     DLCOunc-Pred 06/21/2023 18.40   ml/min/mmHg Final     DLCOunc-Pre 06/21/2023 18.90  ml/min/mmHg Final     DLCOunc-%Pred-Pre 06/21/2023 102  % Final     DLCOcor-Pre 06/21/2023 19.24  ml/min/mmHg Final     DLCOcor-%Pred-Pre 06/21/2023 104  % Final     VA-Pre 06/21/2023 4.69  L Final     VA-%Pred-Pre 06/21/2023 103  % Final     FEV1SVC-Pred 06/21/2023 71  % Final     FEV1SVC-Pre 06/21/2023 73  % Final       ASSESSMENT/PLAN:    Esophageal adenocarcinoma of the distal esophagus, T2N2M0, NEGATIVE PD-L1 EXPRESSION (TPS <1%)    recommend concurrent chemoradiation with carboplatin and Taxol weekly followed by definitive surgery.    Discussed in detail side effects associated with chemotherapy that include but not limited to nausea vomiting, peripheral sensorimotor neuropathy, mucositis. abnormal liver kidney functions, neutropenia, neutropenic fever, sepsis, death, allergic reactions and others.    Dysphagia: , J-tube is in place, he is using J-tube mostly and has been able to maintain weight now.    Follow-up plan: Proceed with concurrent chemoradiation, follow-up with MD in 3 weeks    Patient completely understands and agrees with the plan      Total time spent was over 20 minutes.  This includes chart prep time, review of clinical data including notes from outside physicians, labs, review of medical imaging, discussion about  plan of care, documentation and .      This note has been dictated using voice recognition software. Any grammatical or context distortions are unintentional and inherent to the software                         Oncology Rooming Note    July 13, 2023 9:27 AM   Liu Ragsdale is a 83 year old male who presents for:    Chief Complaint   Patient presents with     Oncology Clinic Visit     Malignant neoplasm of lower third of esophagus - Provider visit     Initial Vitals: /60 (BP Location: Right arm, Patient Position: Sitting, Cuff Size: Adult Regular)   Pulse 64   Temp 97.9  F (36.6  C) (Tympanic)   Resp 16   Ht  "1.549 m (5' 1\")   Wt 78.8 kg (173 lb 12.8 oz)   SpO2 94%   BMI 32.84 kg/m   Estimated body mass index is 32.84 kg/m  as calculated from the following:    Height as of this encounter: 1.549 m (5' 1\").    Weight as of this encounter: 78.8 kg (173 lb 12.8 oz). Body surface area is 1.84 meters squared.  Mild Pain (3) Comment: Data Unavailable   No LMP for male patient.  Allergies reviewed: Yes, patient completed e-check in  Medications reviewed: Yes, patient completed e-check in    Medications: Medication refills not needed today.  Pharmacy name entered into [employer]:    [facility] - [place], [place] - [address] [place] [address] PHARMACY MAIL DELIVERY - [place], [place] - [address] [facility] - [address] - ONE [facility] AT PORTAL TO REGISTERED [facility] SITES    Clinical concerns:  None      Julee Hernández [facility]                  Again, thank you for allowing me to participate in the care of your patient.        Sincerely,        Steve Locke MD    "

## 2023-07-13 NOTE — PROGRESS NOTES
"      Reason for Visit:    Lower third esophageal adenocarcinoma    History Of Present Illness:  Mr. Hatfield is a 83 year old male who presents for evaluation of lower third of esophageal adenocarcinoma..  He noticed difficulty swallowing of food over the past 8 weeks.  He also had acid reflux for short interval of time.  He has lost  10 pound weight loss over the 8-week..       EGD on 6/5/2023.   demonstrated a large fungating mass in the lower third of the esophagus, 38 cm from the incisors.  The mass was noted to be partially obstructing and circumferential.      Biopsy of the mass demonstrated  adenocarcinoma.    Gastroesophageal junction biopsy also demonstrated adenocarcinoma.      PET scan obtained on 6/14/2023 demonstrated hypermetabolic activity in the distal esophageal mass compatible with esophageal cancer.  There were  low moderate activity in hilar and mediastinal adenopathy, indeterminate, possibly inflammatory in nature.  No clear sites of distant disease were noted.      7/2/2023: EUS\"   - A partially circumferential progressing to                             circumferential distal esophageal mass was found                             spanning 36-41cm with the junction estimated at                             41cm; luminal patency was estimated at 12mm given                             passage of the linear; the lesion involved layer 4                             though there was no evidence of layer 5 involvement                             and therefore no other structures were involved                             - 5 regional paraesophageal lymph nodes were                             demonstrated either at or above the primary (as                             proximal as 30cm); only one of these, inferior to                             the mass, had malignant features, no distant nodes                             were appreciated                             - Grossly unremarkable pancreaticobiliary " system                             with intact gallbladder and no synchronous lesions                             of the pancreas or liver                             - [id number] (III/Lamine        Diagnostic laparoscopy, without mets. jejunostomy insertion    Patient was seen by Dr. Levi of thoracic surgery team.  He discussed consideration of surgical resection after preop chemoradiation therapy.       Complains of progressive worsening of swallowing particularly with solid foods.  He is complaining of difficulty swallowing to liquids also and also sometimes complain of discomfort while swallowing.  Since placement of J-tube is not using his mouth at all.  He only takes ice through.  His food has been through J-tube.  He has been able to maintain weight.        Past medical history:  Past Medical History:   Diagnosis Date     Angina at rest (H)      Arthritis      Cerebrovascular accident (H) 09/27/2021     Coronary artery disease involving native coronary artery of native heart without angina pectoris      Dysphagia      Esophageal adenocarcinoma (H)     DISTAL ESOPHAGUS     Hypertension      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) 7/3/2023       Past Surgical History:   Procedure Laterality Date     ARTHROPLASTY HIP Left 02/03/2021    Procedure: Total Hip Arthroplasty;  Surgeon: Andrew Arriola MD;  Location: [facility]     COLONOSCOPY N/A 06/16/2016    Procedure: COLONOSCOPY;  Surgeon: Randy Avendano MD;  Location: [facility]     CV LEFT HEART CATH Left 03/10/2020    Procedure: Left Heart Cath;  Surgeon: Vicente Lopez MD;  Location: [facility] CARDIAC CATH LAB     ENDARTERECTOMY CAROTID Right 9/29/2021    Procedure: ENDARTERECTOMY, CAROTID;  Surgeon: Raymond Andersen MD;  Location: [facility] OR     ENDOSCOPIC ULTRASOUND UPPER GASTROINTESTINAL TRACT (GI) N/A 7/3/2023    Procedure: ENDOSCOPIC ULTRASOUND, ESOPHAGOSCOPY / UPPER GASTROINTESTINAL TRACT (GI);  Surgeon: Yunier Graves MD;  Location:   OR     ESOPHAGOSCOPY, GASTROSCOPY, DUODENOSCOPY (EGD), COMBINED N/A 6/5/2023    Procedure: Esophagoscopy, Gastroscopy, Duodenoscopy, With Biopsy;  Surgeon: Joseph Landers DO;  Location: [facility]     ESOPHAGOSCOPY, GASTROSCOPY, DUODENOSCOPY (EGD), COMBINED N/A 7/3/2023    Procedure: ESOPHAGOGASTRODUODENOSCOPY;  Surgeon: Yunier Graves MD;  Location:  OR     EYE SURGERY  2005    cataract surgery     LAPAROSCOPIC ASSISTED INSERTION TUBE JEJUNOSTOMY N/A 7/5/2023    Procedure: INSERTION, JEJUNOSTOMY TUBE, LAPAROSCOPY-ASSISTED;  Surgeon: Judy Holguin MD;  Location: [facility] OR     VASECTOMY  1981       Medications:  acetaminophen (TYLENOL) 325 MG/10.15ML solution, 20.3 mLs (650 mg) by Oral or Feeding Tube route every 4 hours as needed for mild pain or fever  amLODIPine benzoate (KATERZIA) 1 MG/ML SUSP, 5 mLs (5 mg) by Oral or Feeding Tube route daily for 30 days  aspirin (ASA) 325 MG tablet, Take 1 tablet (325 mg) by mouth daily  atorvastatin (LIPITOR) 10 MG tablet, TAKE 1 TABLET BY MOUTH ONCE DAILY (APPOINTMENT  REQUIRED  FOR  FUTURE  REFILLS)  carvedilol (COREG) 1 mg/mL SUSP, 6.25 mLs (6.25 mg) by Oral or Feeding Tube route 2 times daily for 30 days  clarithromycin (BIAXIN) 250 MG/5ML suspension, 10 mLs (500 mg) by Oral or Feeding Tube route 2 times daily for 5 days  diclofenac (VOLTAREN) 1 % topical gel, Place 4 g onto the skin 3 times daily as needed for moderate pain (Shoulder)  metronidazole (FIRST-METRONIDAZOLE) 50 MG/ML SUSR, 10 mLs (500 mg) by Oral or Feeding Tube route 3 times daily for 5 days  Multiple Vitamin (MULTI VITAMIN PO), Take 1 oz by mouth daily Eniva VIBE-liquid  nitroGLYcerin (NITROSTAT) 0.3 MG sublingual tablet, For chest pain place 1 tablet under the tongue every 5 minutes for 3 doses. If symptoms persist 5 minutes after 1st dose call 911.  NONFORMULARY, Take 1 oz by mouth daily Eniva Cell-Ready Multi Minerals is a concentrated, ionic liquid mineral dietary supplement blend to help support  nutritional balance and wellbeing.  Combine with 8 ounces of water / juice  oxyCODONE (ROXICODONE) 5 MG/5ML solution, 2.5 mLs (2.5 mg) by Oral or Feeding Tube route every 4 hours as needed for moderate pain  pantoprazole (PROTONIX) 2 mg/mL SUSP suspension, 20 mLs (40 mg) by Oral or Feeding Tube route every morning (before breakfast) for 30 days  sennosides (SENOKOT) 8.8 MG/5ML syrup, 5 mLs by Oral or Feeding Tube route 2 times daily for 30 days Reduce dose or temporarily discontinue if having loose stools.  simethicone (MYLICON) 40 MG/0.6ML suspension, Take 0.6 mLs (40 mg) by mouth 3 times daily for 30 days    No current facility-administered medications on file prior to visit.      Allergy:     Allergies   Allergen Reactions     Metoprolol Other (See Comments)     Side effects : dry mouth     Ampicillin Rash     Latex Rash       Family History:  Family History   Problem Relation Age of Onset     Hypertension Sister      Kidney failure Sister      Chronic Obstructive Pulmonary Disease Sister      Thyroid Disease Sister      Seizure Disorder Paternal Grandmother      Mitral valve prolapse Daughter      No Known Problems Son      Cerebrovascular Disease No family hx of        Social History     Tobacco Use     Smoking status: Never     Passive exposure: Never     Smokeless tobacco: Never   Substance Use Topics     Alcohol use: Yes     Comment: Beth only       Review Of Systems:  14 point review of systems is otherwise negative except as mentioned above        PHYSICAL EXAM:  There were no vitals taken for this visit.  GENERAL: no acute distress. Cooperative in conversation. Here with   RESP: lungs are clear bilaterally per auscultation. Regular respiratory rate. No wheezes or rhonchi.  CV: Regular, rate and rhythm. No murmurs.  ABD: soft, nontender. Positive bowel sounds. No organomegaly.  J-tube in place  MUSCULOSKELETAL: No lower extremity swelling.   NEURO: non focal. Alert and oriented x3.   PSYCH: within  normal limits. No depression or anxiety.        Laboratory/Imaging Studies  Please see [facility] Outpatient Visit on 06/21/2023   Component Date Value Ref Range Status     FVC-Pred 06/21/2023 2.55  L Final     FVC-Pre 06/21/2023 2.92  L Final     FVC-%Pred-Pre 06/21/2023 114  % Final     FEV1-Pre 06/21/2023 2.34  L Final     FEV1-%Pred-Pre 06/21/2023 120  % Final     FEV1FVC-Pred 06/21/2023 77  % Final     FEV1FVC-Pre 06/21/2023 80  % Final     FEFMax-Pred 06/21/2023 5.13  L/sec Final     FEFMax-Pre 06/21/2023 6.41  L/sec Final     FEFMax-%Pred-Pre 06/21/2023 124  % Final     FEF2575-Pred 06/21/2023 1.48  L/sec Final     FEF2575-Pre 06/21/2023 2.28  L/sec Final     DTQ7434-%Pred-Pre 06/21/2023 153  % Final     ExpTime-Pre 06/21/2023 7.29  sec Final     FIFMax-Pre 06/21/2023 2.94  L/sec Final     VC-Pred 06/21/2023 2.74  L Final     VC-Pre 06/21/2023 3.21  L Final     VC-%Pred-Pre 06/21/2023 117  % Final     IC-Pred 06/21/2023 2.06  L Final     IC-Pre 06/21/2023 2.88  L Final     IC-%Pred-Pre 06/21/2023 139  % Final     ERV-Pred 06/21/2023 0.68  L Final     ERV-Pre 06/21/2023 0.33  L Final     ERV-%Pred-Pre 06/21/2023 48  % Final     FEV1FEV6-Pred 06/21/2023 76  % Final     FEV1FEV6-Pre 06/21/2023 80  % Final     FRCPleth-Pred 06/21/2023 3.29  L Final     FRCPleth-Pre 06/21/2023 2.82  L Final     FRCPleth-%Pred-Pre 06/21/2023 85  % Final     RVPleth-Pred 06/21/2023 2.61  L Final     RVPleth-Pre 06/21/2023 2.50  L Final     RVPleth-%Pred-Pre 06/21/2023 95  % Final     TLCPleth-Pred 06/21/2023 5.35  L Final     TLCPleth-Pre 06/21/2023 5.71  L Final     TLCPleth-%Pred-Pre 06/21/2023 106  % Final     DLCOunc-Pred 06/21/2023 18.40  ml/min/mmHg Final     DLCOunc-Pre 06/21/2023 18.90  ml/min/mmHg Final     DLCOunc-%Pred-Pre 06/21/2023 102  % Final     DLCOcor-Pre 06/21/2023 19.24  ml/min/mmHg Final     DLCOcor-%Pred-Pre 06/21/2023 104  % Final     VA-Pre 06/21/2023 4.69  L Final     VA-%Pred-Pre 06/21/2023  103  % Final     FEV1SVC-Pred 06/21/2023 71  % Final     FEV1SVC-Pre 06/21/2023 73  % Final       ASSESSMENT/PLAN:    Esophageal adenocarcinoma of the distal esophagus, T2N2M0, NEGATIVE PD-L1 EXPRESSION (TPS <1%)    recommend concurrent chemoradiation with carboplatin and Taxol weekly followed by definitive surgery.    Discussed in detail side effects associated with chemotherapy that include but not limited to nausea vomiting, peripheral sensorimotor neuropathy, mucositis. abnormal liver kidney functions, neutropenia, neutropenic fever, sepsis, death, allergic reactions and others.    Dysphagia: , J-tube is in place, he is using J-tube mostly and has been able to maintain weight now.    Follow-up plan: Proceed with concurrent chemoradiation, follow-up with MD in 3 weeks    Patient completely understands and agrees with the plan      Total time spent was over 20 minutes.  This includes chart prep time, review of clinical data including notes from outside physicians, labs, review of medical imaging, discussion about  plan of care, documentation and .      This note has been dictated using voice recognition software. Any grammatical or context distortions are unintentional and inherent to the software

## 2023-07-13 NOTE — PROGRESS NOTES
The patient presents for CT simulation.    In the interval, the patient underwent EBUS which demonstrated T2N2 disease.  Staging scans have not demonstrated concern for distant spread.    Plan is to proceed with chemoradiation therapy with surgical reevaluation thereafter.    Side effects discussed. Consent obtained.  CT simulation will be performed today.  We will plan to coordinate start of radiation therapy with chemotherapy start.    Jarred Alcantar M.D.  Department of Radiation Oncology  [facility]

## 2023-07-13 NOTE — LETTER
7/13/2023         RE: Liu Ragsdale  [address] [address] [facility] [id number]        Dear Colleague,    Thank you for referring your patient, Liu Ragsdale, to the [facility]. Please see a copy of my visit note below.    The patient presents for CT simulation.    In the interval, the patient underwent EBUS which demonstrated T2N2 disease.  Staging scans have not demonstrated concern for distant spread.    Plan is to proceed with chemoradiation therapy with surgical reevaluation thereafter.    Side effects discussed. Consent obtained.  CT simulation will be performed today.  We will plan to coordinate start of radiation therapy with chemotherapy start.    Jarred Alcantar M.D.  Department of Radiation Oncology  [facility]

## 2023-07-14 NOTE — PROGRESS NOTES
Spoke with patient over the phone just now and patient is noting a little more chest congestion and cough. Denies fevers. Let him know that if it worsens and or he develops fevers over the weekend, he will need to go to the urgent care.  Patient verbalized understanding and agreement to plan.Nyla Morrell RN on 7/14/2023 at 3:07 PM

## 2023-07-16 NOTE — ED PROVIDER NOTES
History     Chief Complaint   Patient presents with    Cough    Nasal Congestion     HPI  Liu Ragsdale is a 83 year old male with a past medical history of hyperlipidemia, hypertension, esophageal cancer, cerebrovascular accident, coronary artery disease, status post coronary angiogram and FANI who presents for evaluation of a cough which has developed over the past 3 days.,  Has had an occasional cough, initially productive of clear phlegm, now phlegm looks yellowish.  He is undergoing infusion therapy for the malignant neoplasm in his esophagus, has an appointment this next week.  Is coming to clinic today to make sure that there is no pneumonia or concern that would warrant antibiotic today prior to undergoing treatment next week.  Of note, just had a J-tube placed about 2 weeks ago, has been taking Biaxin and metronidazole for the past 2 weeks just finished courses of these antibiotics.  He has not been taking any over-the-counter cough medications.  Additionally, he does have nasal congestion.  No fevers, no headaches or acute vision changes, no chest pain or shortness of breath, wheezing, no concerns with patient swallowing.  No abdominal pain, no nausea or vomiting.  J-tube is functioning normally, normal bowel and bladder function.  As advised by his home health care nurse to present to urgent care if his cough became productive of colored phlegm.    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) 07/03/2023     Priority: Medium    Ischemic stroke (H) 09/28/2021     Priority: Medium    Cerebrovascular accident (H) 09/27/2021     Priority: Medium    Near syncope 09/26/2021     Priority: Medium    Right carotid artery occlusion  09/26/2021     Priority: Medium    Degenerative joint disease of left hip 02/03/2021     Priority: Medium    Coronary artery disease involving native coronary artery of native heart without angina pectoris 03/23/2020     Priority: Medium     coronary artery disease diagnosed in 03/2020 at which time he was noted to have a  of the proximal left circumflex into the OM, moderate to severe ramus disease and moderate to severe RCA/PLB disease.  He underwent drug-eluting stent placement from the proximal left circumflex into the OM1.  He has residual small vessel coronary artery disease as well as moderate to severe stenosis in the ramus and RCA/YAA for which medical management has been recommended.         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 11/06/2019     Priority: Medium    SOB (shortness of breath) on exertion 11/06/2019     Priority: Medium    Arthritis of right glenohumeral joint- moderate 11/06/2019     Priority: Medium    Arthritis of right acromioclavicular joint- advanced 11/06/2019     Priority: Medium    SNHL (sensory-neural hearing loss), asymmetrical 07/22/2019     Priority: Medium    FANI (obstructive sleep apnea) 03/15/2016     Priority: Medium     NOT using CPAP 3/15/16      BMI 31.0-31.9,adult 04/07/2015     Priority: Medium    Benign essential hypertension, BP goal <140/90 09/10/2014     Priority: Medium    Hyperlipidemia LDL goal <70 12/12/2013     Priority: Medium     Diagnosis updated by automated process. Provider to review and confirm.      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) 7/3/2023       Past Surgical History:    Past Surgical History:   Procedure Laterality Date    ARTHROPLASTY HIP Left 02/03/2021    Procedure: Total Hip Arthroplasty;  Surgeon: Andrew Arriola MD;  Location: [facility]    COLONOSCOPY N/A 06/16/2016    Procedure: COLONOSCOPY;  Surgeon: Randy Avendano MD;  Location: [facility]    CV LEFT HEART CATH Left 03/10/2020    Procedure: Left Heart Cath;  Surgeon: Vicente Lopez MD;  Location:  HEART CARDIAC CATH LAB    ENDARTERECTOMY CAROTID Right 9/29/2021    Procedure: ENDARTERECTOMY, CAROTID;  Surgeon: Raymond Andersen MD;  [facility]    ENDOSCOPIC ULTRASOUND UPPER GASTROINTESTINAL TRACT (GI) N/A 7/3/2023    Procedure: ENDOSCOPIC ULTRASOUND, ESOPHAGOSCOPY / UPPER GASTROINTESTINAL TRACT (GI);  Surgeon: Yunier Graves MD;  Location:  OR    ESOPHAGOSCOPY, GASTROSCOPY, DUODENOSCOPY (EGD), COMBINED N/A 6/5/2023    Procedure: Esophagoscopy, Gastroscopy, Duodenoscopy, With Biopsy;  Surgeon: Joseph Landers DO;  [facility]    ESOPHAGOSCOPY, GASTROSCOPY, DUODENOSCOPY (EGD), COMBINED N/A 7/3/2023    Procedure: ESOPHAGOGASTRODUODENOSCOPY;  Surgeon: Yunier Graves MD;  Location:  OR    EYE SURGERY  2005    cataract surgery    LAPAROSCOPIC ASSISTED INSERTION TUBE JEJUNOSTOMY N/A 7/5/2023    Procedure: INSERTION, JEJUNOSTOMY TUBE, LAPAROSCOPY-ASSISTED;  Surgeon: Judy Holguin MD;  [facility]    VASECTOMY  1981       Family History:    Family History   Problem Relation Age of Onset    Hypertension Sister     Kidney failure Sister     Chronic Obstructive Pulmonary Disease Sister     Thyroid Disease  Sister     Seizure Disorder Paternal Grandmother     Mitral valve prolapse Daughter     No Known Problems Son     Cerebrovascular Disease No family hx of        Social History:  Marital Status:   [2]  Social History     Tobacco Use    Smoking status: Never     Passive exposure: Never    Smokeless tobacco: Never   Vaping Use    Vaping Use: Never used   Substance Use Topics    Alcohol use: Yes     Comment: Beth only    Drug use: No        Medications:    Dextromethorphan-guaiFENesin 5-100 MG/5ML LIQD  metroNIDAZOLE (FLAGYL) 500 MG tablet  nirmatrelvir and ritonavir (PAXLOVID) 300 mg/100 mg therapy pack  acetaminophen (TYLENOL) 325 MG/10.15ML solution  amLODIPine benzoate (KATERZIA) 1 MG/ML SUSP  aspirin (ASA) 325 MG tablet  atorvastatin (LIPITOR) 10 MG tablet  carvedilol (COREG) 1 mg/mL SUSP  diclofenac (VOLTAREN) 1 % topical gel  Multiple Vitamin (MULTI VITAMIN PO)  nitroGLYcerin (NITROSTAT) 0.3 MG sublingual tablet  NONFORMULARY  ondansetron (ZOFRAN) 4 MG/5ML solution  oxyCODONE (ROXICODONE) 5 MG/5ML solution  pantoprazole (PROTONIX) 2 mg/mL SUSP suspension  prochlorperazine (COMPAZINE) 10 MG tablet  sennosides (SENOKOT) 8.8 MG/5ML syrup  simethicone (MYLICON) 40 MG/0.6ML suspension          Review of Systems   Constitutional: Negative.    HENT:  Positive for congestion and rhinorrhea.    Respiratory:  Positive for cough. Negative for apnea, choking, chest tightness, shortness of breath, wheezing and stridor.    Cardiovascular: Negative.    Gastrointestinal: Negative.    Genitourinary: Negative.        Physical Exam   BP: (!) 166/77  Pulse: 72  Temp: 97.1  F (36.2  C)  Resp: 18  SpO2: 97 %      Physical Exam  Vitals reviewed.   Constitutional:       General: He is not in acute distress.     Appearance: Normal appearance. He is not ill-appearing, toxic-appearing or diaphoretic.   HENT:      Head: Normocephalic and atraumatic.      Nose: Congestion and rhinorrhea present.      Mouth/Throat:      Mouth:  Mucous membranes are moist.      Pharynx: Oropharynx is clear. No oropharyngeal exudate or posterior oropharyngeal erythema.   Cardiovascular:      Rate and Rhythm: Normal rate and regular rhythm.      Pulses: Normal pulses.      Heart sounds: Normal heart sounds. No murmur heard.  Pulmonary:      Effort: Pulmonary effort is normal. No respiratory distress.      Breath sounds: Normal breath sounds. No stridor. No wheezing, rhonchi or rales.   Chest:      Chest wall: No tenderness.   Musculoskeletal:         General: Normal range of motion.      Cervical back: Normal range of motion and neck supple. No rigidity or tenderness. No muscular tenderness.   Lymphadenopathy:      Cervical: No cervical adenopathy.   Skin:     General: Skin is warm and dry.   Neurological:      Mental Status: He is alert and oriented to person, place, and time.         ED Course                 Procedures            Results for orders placed or performed during the hospital encounter of 07/16/23 (from the past 24 hour(s))   Symptomatic Influenza A/B, RSV, & SARS-CoV2 PCR (COVID-19) Nasopharyngeal    Specimen: Nasopharyngeal; Swab   Result Value Ref Range    Influenza A PCR Negative Negative    Influenza B PCR Negative Negative    RSV PCR Negative Negative    SARS CoV2 PCR Positive (A) Negative    Narrative    Testing was performed using the Xpert Xpress CoV2/Flu/RSV Assay on the [employer] GeneXpert Instrument. This test should be ordered for the detection of SARS-CoV-2, influenza, and RSV viruses in individuals who meet clinical and/or epidemiological criteria. Test performance is unknown in asymptomatic patients. This test is for in vitro diagnostic use under the FDA EUA for laboratories certified under CLIA to perform high or moderate complexity testing. This test has not been FDA cleared or approved. A negative result does not rule out the presence of PCR inhibitors in the specimen or target RNA in concentration below the limit of detection  for the assay. If only one viral target is positive but coinfection with multiple targets is suspected, the sample should be re-tested with another FDA cleared, approved, or authorized test, if coinfection would change clinical management. This test was validated by the [facility]. These laboratories are certified under the Clinical Laboratory Improvement Amendments of 1988 (CLIA-88) as qualified to perform high complexity laboratory testing.   XR Chest 2 Views    Narrative    EXAM: XR CHEST 2 VIEWS  LOCATION: [facility]  DATE: 7/16/2023    INDICATION: cough, concern for pneumonia  COMPARISON: None.      Impression    IMPRESSION: No focal consolidation or effusion. Mild bronchial thickening. Heart size is normal. No acute osseous findings.       Medications - No data to display    Assessments & Plan (with Medical Decision Making)     Patient is an 83-year-old male with a past medical history of esophageal neoplasm who presents for evaluation of acute cough which is progressively worsened over the past 2 days, productive of yellow mucus currently.  No wheezing on exam, normal oxygen saturation.  No acute cardiopulmonary concerns identified on chest x-ray today.    Prescribed liquid dextromethorphan and guaifenesin.  The patient just completed a course of Biaxin, which does cover for pneumonia.  Given no findings consistent with pneumonia on x-ray, antibiotics not indicated at this time.      The patient tested positive for COVID-19 today. I provided him with a referral to the COVID-19 Get Well Loop.  Starting Paxlovid today, given reduced kidney function, prescribed reduced dosing, appropriate for kidney function with GFR less than 60.   Return to clinic if oxygen saturation is persistently 90% or less with or without activity.      Medication list reviewed with patient, hold atorvastatin and Biaxin.  Considering the patient is almost completed Biaxin, no need to take  further.  May start atorvastatin after completing full course of Paxlovid.    Recommend isolating for 5 days, day 0 was the start of symptoms.  May wear an N95 mask if needing to go out in public during the 5-day period of isolation.  May discontinue isolation if all symptoms resolved after day 5.    Return to clinic for further evaluation if you develop fever of 101 degrees F or greater, chest pain, difficulty breathing, palpitations, shortness of breath, difficulty swallowing, severe headache, worsening numbness or tingling or weakness, dizziness or lightheadedness, acute vision changes, acutely worsening rash, severe abdominal pain, or uncontrolled nausea/vomiting.    All questions answered, the patient verbalizes understanding and agrees with the above plan.  Patient was discharged in stable condition.    I have reviewed the nursing notes.    I have reviewed the findings, diagnosis, plan and need for follow up with the patient.      Discharge Medication List as of 7/16/2023  4:36 PM        START taking these medications    Details   Dextromethorphan-guaiFENesin 5-100 MG/5ML LIQD Take 10 mLs by mouth every 6 hours as needed (for acute cough), Disp-237 mL, R-0, E-Prescribe             Final diagnoses:   Acute cough   Infection due to 2019 novel coronavirus       7/16/2023   [facility] EMERGENCY DEPT       Conor Owens PA-C  07/16/23 0805

## 2023-07-16 NOTE — DISCHARGE INSTRUCTIONS
Return to clinic for further evaluation if you develop fever of 101 degrees F or greater, chest pain, difficulty breathing, palpitations, shortness of breath, difficulty swallowing, severe headache, worsening numbness or tingling or weakness, dizziness or lightheadedness, acute vision changes, acutely worsening rash, severe abdominal pain, or uncontrolled nausea/vomiting.

## 2023-07-17 NOTE — PROGRESS NOTES
Clinic Care Coordination Contact    Referral Type: Virtual Home Monitoring - Epic Care Companion    Patient referred for Virtual Home Monitoring Program for either COVID-19 or Community Acquired Pneumonia diagnosis following recent [facility] ED visit.  Epic Care Companion referral was also placed by provider.    Criteria for Virtual Home Monitoring telephone outreach is not met after review of ED encounter/ED provider note because:    1) ED provider note indicates assessment was negative for respiratory distress. O2 sats were stable throughout course of ED visit per chart review.      2) Patient was not discharged with new supplemental oxygen.    Per notes, ED provider and/or ED care team discussed appropriate follow up guidelines with patient prior to discharge or reflected these instructions on AVS.       Care Companion team remains available to support patient via [employer] account once activated by patient.     Nyla Canada RN  [facility]  - RN Care Coordinator

## 2023-07-18 NOTE — TELEPHONE ENCOUNTER
General Call      Reason for Call:  Patient called stating that he tested + for covid on 7/16--given antiviral however, unable to take medication due to swallowing problems.       What are your questions or concerns:  He has upcoming appts unsure if he should keep or not. 07/20 is eval on j tube.     Date of last appointment with provider: 07/16/203    Could we send this information to you in [place] or would you prefer to receive a phone call?:   Patient would prefer a phone call   Okay to leave a detailed message?: Yes at Home number on file [phone] (home)

## 2023-07-18 NOTE — PROGRESS NOTES
Virtual Visit Details    Type of service:  Video Visit   Video Start Time: 1:41 PM  Video End Time:2:07 PM    Originating Location (pt. Location): Home    Distant Location (provider location):  On-site  Platform used for Video Visit: [facility]        THORACIC SURGERY FOLLOW UP VISIT    I saw Mr. Liu Ragsdale in follow-up today. 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) 7/3/2023      Past Surgical History:   Procedure Laterality Date     ARTHROPLASTY HIP Left 02/03/2021    Procedure: Total Hip Arthroplasty;  Surgeon: Andrew Arriola MD;  Location: [facility]     COLONOSCOPY N/A 06/16/2016    Procedure: COLONOSCOPY;  Surgeon: Randy Avendano MD;  Location: [facility]     CV LEFT HEART CATH Left 03/10/2020    Procedure: Left Heart Cath;  Surgeon: Vicente Lopez MD;  Location:  HEART CARDIAC CATH LAB     ENDARTERECTOMY CAROTID Right 9/29/2021    Procedure: ENDARTERECTOMY, CAROTID;  Surgeon: Raymond Andersen MD;  [facility]     ENDOSCOPIC ULTRASOUND UPPER GASTROINTESTINAL TRACT (GI) N/A 7/3/2023    Procedure: ENDOSCOPIC ULTRASOUND, ESOPHAGOSCOPY / UPPER GASTROINTESTINAL TRACT (GI);  Surgeon: Yunier Graves MD;  Location:  OR     ESOPHAGOSCOPY, GASTROSCOPY, DUODENOSCOPY (EGD), COMBINED N/A 6/5/2023    Procedure: Esophagoscopy, Gastroscopy, Duodenoscopy, With Biopsy;  Surgeon: Joseph Landers DO;  Location: [facility]     ESOPHAGOSCOPY, GASTROSCOPY, DUODENOSCOPY (EGD),  COMBINED N/A 7/3/2023    Procedure: ESOPHAGOGASTRODUODENOSCOPY;  Surgeon: Yunier Graves MD;  Location: [facility]     EYE SURGERY  2005    cataract surgery     LAPAROSCOPIC ASSISTED INSERTION TUBE JEJUNOSTOMY N/A 7/5/2023    Procedure: INSERTION, JEJUNOSTOMY TUBE, LAPAROSCOPY-ASSISTED;  Surgeon: Judy Holguin MD;  [facility]     VASECTOMY  1981     Social History     Socioeconomic History     Marital status:      Spouse name: Not on file     Number of children: Not on file     Years of education: Not on file     Highest education level: Not on file   Occupational History     Occupation:    Tobacco Use     Smoking status: Never     Passive exposure: Never     Smokeless tobacco: Never   Vaping Use     Vaping Use: Never used   Substance and Sexual Activity     Alcohol use: Yes     Comment: Korbel only     Drug use: No     Sexual activity: Not Currently     Partners: Female     Birth control/protection: Male Surgical     Comment: [id number]   Other Topics Concern     Parent/sibling w/ CABG, MI or angioplasty before 65F 55M? No   Social History Narrative     Not on file     Social Determinants of Health     Financial Resource Strain: Not on file   Food Insecurity: Not on file   Transportation Needs: Not on file   Physical Activity: Not on file   Stress: Not on file   Social Connections: Not on file   Intimate Partner Violence: Not on file   Housing Stability: Not on file       SUBJECTIVE  Liu is doing as well as he can expect. He recently tested positive for Covid but is not having much in the way of symptoms. He is tolerating his tube feeds at a rate of 45 mL/hr over 24 hours. He is having some pasty stools and feels like his abdomen is swollen. He has had a lot of gas today. He is taking oxycodone every 6 hours or so. He stopped taking senokot because his stools were pasty.    From a personal perspective, his wife, Kelsi, is on the video visit with him.    IMPRESSION  83 year-old  male status post diagnostic laparoscopy and jejunostomy tube placement ahead of his planned treatment for esophageal cancer. He is here for post operative follow up.    We talked at length about the need to remain on senokot while he is taking oxycodone. I explained that his looser stools do not necessarily mean he has diarrhea. The stool could be moving around stopped formed stool in the bowel. When he was in the hospital he was very constipated and required several enemas. His gas and abdominal distention could actually be from being constipated again. I advised him to remain on senokot while on oxycodone and to use Miralax or Dulcolax as well to help move stool through. He can also increase free water via the feeding tube to help stay hydrated which will aid in bowel regularity. He does need a refill of acetaminophen.    We discussed the importance of maintaining nutrition throughout treatment so that his body can respond and recover from chemo and radiation. The goal rate given to him from the RD was 90 mL/hr however he has not changed the rate yet. I told him he could try to increaser the rate gradually to reduce the amount of time he has to be connected to the tube feeding pump each day. As long as he is getting his caloric and nutritional needs each day-the rate can fluctuate I advised him to try increasing the rate slowly to avoid bloat and or nausea. He should not go lower than 45 mL/hour.    PLAN  I spent 40 min on the date of the encounter in chart review, patient visit, review of tests, documentation and/or discussion with other providers about the issues documented above. I reviewed the plan as follows:  Chemotherapy and Radiation therapy per Medical Oncology and Radiation Oncology.  Follow up with Dr. Ferny Levi with a restaging PET/CT 4-6 weeks after completion of neoadjuvant chemoradiation.  Refill acetaminophen  All questions were answered and the patient and present family were in agreement with  the plan.  I appreciate the opportunity to participate in the care of your patient and will keep you updated.  Sincerely,

## 2023-07-18 NOTE — CONFIDENTIAL NOTE
Called patient to check on him since diagnosed with COVID over the weekend. He is not able to do the paxlovid due to unable to swallow and cannot crush it. He states his symptoms are not severe, mainly the cough is bothersome. He will call his PCP or go to the ED if symptoms worsen. Patient scheduled to start chemo/radiation 7/24. Will call him a few days prior to see how he is doing. Nyla Morrell RN on 7/18/2023 at 11:50 AM

## 2023-07-19 NOTE — TELEPHONE ENCOUNTER
Day 0 was 7/14/23.  Feels pretty good he says.  Told him OK to seep appointment.   Taryn Arndt RN

## 2023-07-20 NOTE — TELEPHONE ENCOUNTER
Pt called to say he is having productive cough, he was seen in ER 7/16/2023 and diagnosed with covid, he says cough started on 7/9/2023. He was unable to take the paxlovid due to  swallowing difficulties, he recently had a J-tube put in for fluids and nutrition problems related to esophageal cancer. He is having productive cough, the sputum is darker yellow now. No fevers. He is doing okay with sipping fluids, he was advised to sip warm fluids.O2 sats 97-99%, he denies increased work of breathing. Virtual appt made tomorrow with Dr Díaz as Dr Rogers does not have any appts available. Pt will go to ER if he becomes SOA. He also asked about his prescriptions for liquid amlodipine and carvedilol, Medications were ordered by Sven JONAS and sent to [facility], pt was not aware they were sent there and he would like to have those filled at [facility]. [facility] was called, they do not have those medications available for ordering. [facility] was called, liquid amlodipine is not covered by insurance. An option for the pt is to have order sent [facility] to make liquid form. Glendy Mccord RN

## 2023-07-20 NOTE — NURSING NOTE
Is the patient currently in the state of [place]? YES    Visit mode:VIDEO    If the visit is dropped, the patient can be reconnected by: VIDEO VISIT: Text to cell phone: [phone]    Will anyone else be joining the visit? YES: How would they like to receive their invitation? Text to cell phone: [phone]      How would you like to obtain your AVS? MyChart    Are changes needed to the allergy or medication list? YES: PT not taking Flagyl or Paxlovid.  Compazine and Zofran will be starting when chemotherapy begins.       Reason for visit: RECHECK    Eliza Romano

## 2023-07-21 NOTE — PROGRESS NOTES
"Liu is a 83 year old who is being evaluated via a billable video visit.      How would you like to obtain your AVS? MyChart  If the video visit is dropped, the invitation should be resent by: Text to cell phone: [phone]  Will anyone else be joining your video visit? No      Assessment & Plan     COVID-19 virus infection  symptoms started while n hospital, with lgiht cough on 7/8/23, then worsened on day of discharge 7/10 with phlegm. Continued to worsen until ED visit on 7/16/23 when diagnosed with COVID. Had been feeling \"blah\" as he describes it, flu like symptoms. Now, feeling closer to normal. Has not had fevers this while time, still with some phlegm production today, but spacing out and overall less than previously. No breathing symptoms this whole time. Feeling more normal now. He has been doing incentive spirometry, continue to walk as much as possible, goes at least 1/4 mile walk twice daily outside as well. He has not been able to take paxlovid - can't swallow pills and they can't be crushed. At this time, told him not to take it as he is improving.     Malignant neoplasm of esophagus, unspecified location (H)  Is supposed to start chemotherapy and radiation on Monday (3 days from now). I called infusion center to discuss this with them and they will reach out to patient later today and talk with on call onc to come up with plan.     Loose stools  Patient has been having looser stools since starting liquid diet. Is on oxycodone, so was taking senna (last dose this morning) and told to get miralax, but they didn't have any in the store, so they picked up MOM instead. He used that twice  Yesterday. We discussed that all of these agents will move things through faster. He did have ileus in the hospital which is more than likely resolved now. Still with some abdominal distention. We discussed holding meds and monitoring stool output and bulk, then add back as needed, ideally starting with miralax. More " "ideal would be a fiber supplement, but that would be difficult to take orally for him and could cause clog of the enteral tube.    I spent a total of 38 minutes on the day of the visit.   Time spent by me doing chart review, history and exam, documentation and further activities per the note    MED REC REQUIRED  Post Medication Reconciliation Status:  Discharge medications reconciled and changed, see notes/orders    BMI:   Estimated body mass index is 31.55 kg/m  as calculated from the following:    Height as of 7/13/23: 1.549 m (5' 1\").    Weight as of 7/20/23: 75.8 kg (167 lb).     Zelda Díaz MD  [facility]    Eder Hatfield is a 83 year old, presenting for the following health issues:  Covid Concern      History of Present Illness       Reason for visit:  Covid treatment    He eats 2-3 servings of fruits and vegetables daily.He consumes 0 sweetened beverage(s) daily.He exercises with enough effort to increase his heart rate 10 to 19 minutes per day.  He exercises with enough effort to increase his heart rate 5 days per week.   He is taking medications regularly.       COVID-19 Symptom Review  How many days ago did these symptoms start? 07/09/2023    Are any of the following symptoms significant for you?    New or worsening difficulty breathing? No    Worsening cough? No    Fever or chills? No    Headache: No    Sore throat: No    Chest pain: No    Diarrhea: loose stools    Body aches? No      Feeling more himself.   Went about 12 hours without coughing phlegm, but did cough some more up just before our visit started, yellow madhavi green. No blood    Symptoms started 7/8/23 with \"hack\"  Productive on 7/10.   Was feeling run down, \"blah\"  No respiratory symptoms otherwise during this time. No fevers.     Feeling more normal,  More himself now.     Doing incentive dale, trying to still do 10 times per hour, but forgets sometimes.     Looser stools since starting liquid diet. "     Takes it slow when walking, but is walkign at least once daily.   1/4 mile each time, both up and down hill during that stretch.       Review of Systems   Constitutional, HEENT, cardiovascular, pulmonary, GI, , musculoskeletal, neuro, skin, endocrine and psych systems are negative, except as otherwise noted.      Objective    Vitals - Patient Reported  Systolic (Patient Reported): 138  Diastolic (Patient Reported): 70  Pain Score: No Pain (0)        Physical Exam   GENERAL: alert and no distress, well appearing  EYES: Eyes grossly normal to inspection.  No discharge or erythema, or obvious scleral/conjunctival abnormalities.  RESP: No audible wheeze, cough, or visible cyanosis.  No visible retractions or increased work of breathing.    SKIN: Visible skin clear. No significant rash, abnormal pigmentation or lesions.  NEURO: Cranial nerves grossly intact.  Mentation and speech appropriate for age.  PSYCH: Mentation appears normal, affect normal/bright, judgement and insight intact, normal speech and appearance well-groomed.            Video-Visit Details    Type of service:  Video Visit   Video Start Time: 10:27 AM  Video End Time:10:59 AM  Total time 32 min 45 sec    Originating Location (pt. Location): Home  Distant Location (provider location):  On-site  Platform used for Video Visit: [employer]

## 2023-07-21 NOTE — CONFIDENTIAL NOTE
"Patient due to start concurrent chemo/radiation on Monday and is noted to have recent COVID infection and had follow up with PCP today. Per PCP notes:     \"COVID-19 virus infection  symptoms started while n hospital, with lgiht cough on 7/8/23, then worsened on day of discharge 7/10 with phlegm. Continued to worsen until ED visit on 7/16/23 when diagnosed with COVID. Had been feeling \"blah\" as he describes it, flu like symptoms. Now, feeling closer to normal. Has not had fevers this while time, still with some phlegm production today, but spacing out and overall less than previously. No breathing symptoms this whole time. Feeling more normal now. He has been doing incentive spirometry, continue to walk as much as possible, goes at least 1/4 mile walk twice daily outside as well. He has not been able to take paxlovid - can't swallow pills and they can't be crushed. At this time, told him not to take it as he is improving.\"    Also spoke with patient over the phone who denies fevers at this time, has an occasional cough with greenish sputum but not severe and feels it is getting less and less. Discussed with Dr. Atkins who is ok with patient starting treatment on Monday after doing labs and having infusion nurse assess him prior to start and notify him with any concerns.  Infusion nurse notified and also patient aware of plan. Nyla Morrell RN on 7/21/2023 at 12:36 PM         "

## 2023-07-21 NOTE — PATIENT INSTRUCTIONS
Miralax = polyethylene glycol    Avoid Milk of Mag - it will move things through quickly    Hold all bowel meds right now, monitor stool output. If you are slowing down, or having to push hard to get stool out, then start miralax back up first. Then add senna if needed. Only use milk of mag if it has been a few days since you last went or you feel more distended/backed up.

## 2023-07-24 NOTE — PROGRESS NOTES
Infusion Nursing Note:  Liu Ragsdale presents today for Taxol/ Carboplatin C1D1.    Patient seen by provider today: No   present during visit today: Not Applicable.    Note: N/A.    Intravenous Access:  Peripheral IV placed.    Treatment Conditions:  Lab Results   Component Value Date    HGB 13.2 (L) 07/24/2023    WBC 7.0 07/24/2023    ANEU 13.2 (H) 01/22/2018    ANEUTAUTO 4.4 07/24/2023     07/24/2023      Results reviewed, labs MET treatment parameters, ok to proceed with treatment.    Post Infusion Assessment:  Patient tolerated infusion without incident.  Blood return noted pre and post infusion.  Site patent and intact, free from redness, edema or discomfort.  No evidence of extravasations.  Access discontinued per protocol.     Discharge Plan:   Patient discharged in stable condition accompanied by: self.  Departure Mode: Ambulatory.    Torsten Navarro RN

## 2023-07-25 NOTE — TELEPHONE ENCOUNTER
Symptoms    Describe your symptoms: Pt's home care RN calling.  Pt is on feeding tube and has lost 10 lbs in the past 2 weeks.  7/11 weight was 173 lbs and today 7/25 weight is 163.    Pt is on constant feeding tube - Pt eats very little by mouth.  RN recommended to pt to eat fish or chicken and try ensure.  Pt will also increase water intake   No need to call RN or pt back, unless there are questions or problems.      Any pain: No    How long have you been having symptoms: Ongoing    Have you been seen for this:  No    Appointment offered?: No    Triage offered?: Yes:     Home remedies tried:     Preferred Pharmacy:   [facility] [address] [place] [id number]  Phone: [phone] Fax: [phone]    RN Phone #:  July @ [phone]

## 2023-07-26 NOTE — LETTER
2023         RE: Liu Ragsdale  [address] [facility] [address]        Dear Colleague,    Thank you for referring your patient, Liu Ragsdale, to the  [facility]. Please see a copy of my visit note below.    [facility]  SPECIALIZING IN BREAKTHROUGHS  Radiation Oncology    On Treatment Visit Note      Liu Ragsdale      Date: 2023   MRN: [id number]   : 1940  Diagnosis: esphageal cancer      Reason for Visit:  On Radiation Treatment Visit     Treatment Summary to Date  Treatment Site: chest/abdomen Current Dose: 600/5000 cGy Fractions: 3/25      Chemotherapy  Chemo concurrent with radx?: Yes  Oncologist: Dr. Locke  Drug Name/Frequency 1: carbo/taxol    Subjective:   Well overall.  No acute complaints.  No nausea.  No diarrhea.  Continues to undergo and advance tube feeds via J-tube.  Denies any significant pain.     Was diagnosed with COVID-19 on 23.  Had cough, currently improving. No respiratory distress. Remains on appropriate precaution.     Nursing ROS:   Nutrition Alteration  Diet Type: Gastrostomy Tube Feedings  Nutrition Note: only ice chips/very small bites of soft foods  Skin  Skin Reaction: 0 - No changes        Cardiovascular  Respiratory effort: 2 - Mild dyspnea on exertion  Cardio/Resp Note: lingering cough due to covid +, but improving  Gastrointestinal  GI Note: looser bowels due to tube feeds        Pain Assessment  Pain Note: discomfort at the peg site, only using liquid tylenol      Objective:   BP (!) 151/72   Pulse 69   Wt 74.8 kg (165 lb)   BMI 31.18 kg/m    NAD  No respiratory distress   S/p J-tube placement      Labs:  CBC RESULTS: Recent Labs   Lab Test 23  1114   WBC 7.0   RBC 4.48   HGB 13.2*   HCT 40.4   MCV 90   MCH 29.5   MCHC 32.7   RDW 13.0        ELECTROLYTES:  Recent Labs   Lab Test 23  1114 07/10/23  0645 23  1556 23  0657   NA  --   --   --  140   POTASSIUM  --  4.1   < >  3.3*   CHLORIDE  --   --   --  104   GWEN  --   --   --  8.5*   CO2  --   --   --  25   BUN  --   --   --  7.6*   CR 1.02  --   --  0.89   GLC  --   --   --  180*    < > = values in this interval not displayed.       Assessment:  Mr. 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  He is undergoing chemoradiation therapy followed by re-evaluation for surgical resection.    Tolerating radiation therapy well.  All questions and concerns addressed.    Plan:   Continue current therapy.    Nutrition.  Continue tube feeds via J-tube  Covid positive.  Continue appropriate precaution.       Mosaiq chart and setup information reviewed  Ports checked    Medication Review  Med list reviewed with patient?: Yes           Jarred Alcantar MD

## 2023-07-26 NOTE — PROGRESS NOTES
[facility]  SPECIALIZING IN BREAKTHROUGHS  Radiation Oncology    On Treatment Visit Note      Liu Ragsdale      Date: 2023   MRN: [id number]   : 1940  Diagnosis: esphageal cancer      Reason for Visit:  On Radiation Treatment Visit     Treatment Summary to Date  Treatment Site: chest/abdomen Current Dose: 600/5000 cGy Fractions: 3/25      Chemotherapy  Chemo concurrent with radx?: Yes  Oncologist: Dr. Lokce  Drug Name/Frequency 1: carbo/taxol    Subjective:   Well overall.  No acute complaints.  No nausea.  No diarrhea.  Continues to undergo and advance tube feeds via J-tube.  Denies any significant pain.     Was diagnosed with COVID-19 on 23.  Had cough, currently improving. No respiratory distress. Remains on appropriate precaution.     Nursing ROS:   Nutrition Alteration  Diet Type: Gastrostomy Tube Feedings  Nutrition Note: only ice chips/very small bites of soft foods  Skin  Skin Reaction: 0 - No changes        Cardiovascular  Respiratory effort: 2 - Mild dyspnea on exertion  Cardio/Resp Note: lingering cough due to covid +, but improving  Gastrointestinal  GI Note: looser bowels due to tube feeds        Pain Assessment  Pain Note: discomfort at the peg site, only using liquid tylenol      Objective:   BP (!) 151/72   Pulse 69   Wt 74.8 kg (165 lb)   BMI 31.18 kg/m    NAD  No respiratory distress   S/p J-tube placement      Labs:  CBC RESULTS: Recent Labs   Lab Test 23  1114   WBC 7.0   RBC 4.48   HGB 13.2*   HCT 40.4   MCV 90   MCH 29.5   MCHC 32.7   RDW 13.0        ELECTROLYTES:  Recent Labs   Lab Test 23  1114 07/10/23  0645 23  1556 23  0657   NA  --   --   --  140   POTASSIUM  --  4.1   < > 3.3*   CHLORIDE  --   --   --  104   GWEN  --   --   --  8.5*   CO2  --   --   --  25   BUN  --   --   --  7.6*   CR 1.02  --   --  0.89   GLC  --   --   --  180*    < > = values in this interval not displayed.       Assessment:  Mr. 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  He is undergoing chemoradiation therapy followed by re-evaluation for surgical resection.    Tolerating radiation therapy well.  All questions and concerns addressed.    Plan:   1. Continue current therapy.    2. Nutrition.  Continue tube feeds via J-tube  3. Covid positive.  Continue appropriate precaution.       Mosaiq chart and setup information reviewed  Ports checked    Medication Review  Med list reviewed with patient?: Yes           Jarred Alcantar MD

## 2023-07-31 PROBLEM — C15.9 ESOPHAGEAL CANCER (H): Status: RESOLVED | Noted: 2023-01-01 | Resolved: 2023-01-01

## 2023-07-31 NOTE — PROGRESS NOTES
[facility]ology [facility] Oncology Outpatient Progress Note    Patient: Liu Ragsdale  MRN: [id number]  Date of Service: Jul 31, 2023          Reason for Visit    Esophageal cancer    Primary Oncologist: Dr. Locke      Assessment/Plan  cT2cN2 lower esophageal adenocarcinoma  Liu met with thoracic surgery who recommended neoadjuvant chemoradiation with reconsideration of resection pending response.     His chemoradiation was delayed a few weeks for him to recover from COVID.  Once recovered, he initiated this with weekly carboplatin and Taxol a week ago.  He has tolerated his first week very well.    Labwork: CBC and creatinine adequate for treatment    Plan:  -Proceed with day 8 (week 2) carboplatin and Taxol  -Continue weekly carboplatin and Taxol through his radiation  -See myself/Dr. Locke every 2 weeks for treatment  -We will plan to restage following chemoradiation and visit back with  in Thoracic Surgery    Nutrition/hydration  He is using his PEJ tube for all nutrition and hydration.  He is working his way up to goal with his tube feedings.  Weight is down, but stable over the past week.     Plan:  -Continue feedings and hydration through tube  -IV fluids as needed with his treatments (not needed currently)    3.   Recent COVID  He was officially diagnosed 7/16 after having symptoms for approximately 1 week.  He had a mild course and did not require hospitalization.  He has recovered fully.    4.  Abdominal discomfort, post-op  This seems to be post laparoscopic/feeding tube placement.  No evidence for infection or other acute concerns.  Expect his symptoms should improve over the next 1 to 2 weeks.  Tylenol as needed.  ______________________________________________________________________________    History of Present Illness/ Interval History    Mr. Liu Ragsdale  is a 83 year old recently diagnosed with lower esophageal adenocarcinoma.  He initiated chemoradiation with weekly  carboplatin and Taxol a week ago and presents ahead of day 8 (week 2).    Just ahead of starting chemoradiation, he developed COVID (diagnosed 7/16). He had a mild course and did not require hospitalization.  He was unable to take Paxlovid since he could not swallow pills and the pills cannot be crushed.  His treatment was delayed until 7/24/2023 to allow recovery.  He has recovered fully.     Tolerated his first week of treatment very well. No nausea, fatigue, fevers.   Baseline mild tingling in bilateral fingers unchanged.     Due to dysphagia, he's not able to get any meaningful calories/hydration by mouth, so is dependent on his feeding tube. He's slowly working towards his goal.     No esophageal pain.   Mild abd discomfort since his laparoscopy and feeding tube placement.      ECOG Performance    0        Oncology History/Treatment  Diagnosis/Stage:   6/2023: [id number] lower third esophageal adenocarcinoma  -Presented with dysphagia x2 months, increased acid reflux and 10 pound weight loss  -6/5/2023 EGD: Large fungating mass in the lower third esophagus, 38 cm from incisors.  Mass partially obstructing and circumferential.  Biopsy of mass: Adenocarcinoma.  GE junction biopsy also positive for adenocarcinoma.  -PET scan: Hypermetabolic activity distal esophageal mass.  Low-moderate activity in hilar and mediastinal adenopathy, indeterminant and possibly inflammatory in nature.  No evident distant metastases.  -Negative PD-L1 expression  -7/2/2023 EUS: Partially circumferential progressing to circumferential distal esophageal mass spanning 36-41 cm with the junction estimated at 41 cm.  Lesion involves layer 4 but no evidence of layer 5 involvement no other structures involved.  5 regional periesophageal lymph nodes were noted, only 1 of these had malignant; no distant nodes noted.  Unremarkable pancreaticobiliary system and liver.  -7/5/2023: Diagnostic lap pleuroscopy without metastasis.  PEJ  placed    Treatment:  Met with Dr. Levi in Thoracic Surgery.  Discussed consideration of surgical resection pending response to preoperative chemoradiation.    7/5/2023: PEJ tube feeding placement    7/24/2023 -present: Definitive/neoadjuvant chemoradiation with weekly carboplatin and Taxol  -Delayed start due to mild COVID infection      Physical Exam    GENERAL: Alert and oriented to time place and person. Seated comfortably. In no distress.  Accompanied by his wife.  HEAD: Atraumatic and normocephalic. No alopecia.  EYES: ANTONIA, EOMI. No erythema. No icterus.  ORAL CAVITY: Moist. No mucosal lesion or tonsillar enlargement.  LYMPH NODES: No palpable supraclavicular, cervical lymphadenopathy.  CHEST: clear to auscultation bilaterally. Resonant to percussion throughout bilaterally. Symmetrical breath movements bilaterally.  CVS: RRR  ABDOMEN: Soft. Not tender. Not distended.  Laparoscopic sites well-healed.  Feeding tube intact, very mild pinkness right around site without tenderness or induration.  No significant drainage.  EXTREMITIES: Warm. No peripheral edema.  SKIN: no rash, or bruising or purpura.   NEURO: No gross deficit noted. Non-antalgic gait.      Lab Results    Recent Results (from the past 168 hour(s))   Creatinine   Result Value Ref Range    Creatinine 1.02 0.67 - 1.17 mg/dL    GFR Estimate 73 >60 mL/min/1.73m2   CBC with platelets and differential   Result Value Ref Range    WBC Count 7.0 4.0 - 11.0 10e3/uL    RBC Count 4.48 4.40 - 5.90 10e6/uL    Hemoglobin 13.2 (L) 13.3 - 17.7 g/dL    Hematocrit 40.4 40.0 - 53.0 %    MCV 90 78 - 100 fL    MCH 29.5 26.5 - 33.0 pg    MCHC 32.7 31.5 - 36.5 g/dL    RDW 13.0 10.0 - 15.0 %    Platelet Count 234 150 - 450 10e3/uL    % Neutrophils 63 %    % Lymphocytes 25 %    % Monocytes 8 %    % Eosinophils 3 %    % Basophils 1 %    % Immature Granulocytes 0 %    NRBCs per 100 WBC 0 <1 /100    Absolute Neutrophils 4.4 1.6 - 8.3 10e3/uL    Absolute Lymphocytes 1.7 0.8  - 5.3 10e3/uL    Absolute Monocytes 0.6 0.0 - 1.3 10e3/uL    Absolute Eosinophils 0.2 0.0 - 0.7 10e3/uL    Absolute Basophils 0.1 0.0 - 0.2 10e3/uL    Absolute Immature Granulocytes 0.0 <=0.4 10e3/uL    Absolute NRBCs 0.0 10e3/uL   Creatinine   Result Value Ref Range    Creatinine 0.87 0.67 - 1.17 mg/dL    GFR Estimate 86 >60 mL/min/1.73m2   CBC with platelets and differential   Result Value Ref Range    WBC Count 6.6 4.0 - 11.0 10e3/uL    RBC Count 4.39 (L) 4.40 - 5.90 10e6/uL    Hemoglobin 13.0 (L) 13.3 - 17.7 g/dL    Hematocrit 39.2 (L) 40.0 - 53.0 %    MCV 89 78 - 100 fL    MCH 29.6 26.5 - 33.0 pg    MCHC 33.2 31.5 - 36.5 g/dL    RDW 12.6 10.0 - 15.0 %    Platelet Count 200 150 - 450 10e3/uL    % Neutrophils 72 %    % Lymphocytes 13 %    % Monocytes 6 %    % Eosinophils 7 %    % Basophils 1 %    % Immature Granulocytes 1 %    NRBCs per 100 WBC 0 <1 /100    Absolute Neutrophils 4.9 1.6 - 8.3 10e3/uL    Absolute Lymphocytes 0.8 0.8 - 5.3 10e3/uL    Absolute Monocytes 0.4 0.0 - 1.3 10e3/uL    Absolute Eosinophils 0.4 0.0 - 0.7 10e3/uL    Absolute Basophils 0.0 0.0 - 0.2 10e3/uL    Absolute Immature Granulocytes 0.1 <=0.4 10e3/uL    Absolute NRBCs 0.0 10e3/uL       Imaging    XR Chest 2 Views    Result Date: 7/16/2023  EXAM: XR CHEST 2 VIEWS LOCATION: [facility] DATE: 7/16/2023 INDICATION: cough, concern for pneumonia COMPARISON: None.     IMPRESSION: No focal consolidation or effusion. Mild bronchial thickening. Heart size is normal. No acute osseous findings.    XR Abdomen Port 1 View    Result Date: 7/7/2023  EXAMINATION:  XR ABDOMEN PORT 1 VIEW 7/7/2023 COMPARISON: CT PET 6/14/2023. HISTORY: abdomen pain. Distension. Possible Illeus.. Postoperative day 2 from diagnostic laparoscopy, jejunostomy insertion. TECHNIQUE: Frontal supine view of the abdomen. FINDINGS: Inferior approach abdominal drain with no evidence of kinking. Partially visualized left total hip replacement. Mildly  dilated large bowel, measuring up to 6.5 cm. No evidence of pneumatosis. No portal venous gas.  The lung bases are unremarkable.     IMPRESSION: Mildly dilated large bowel, in the immediate postoperative period this would be suggestive of an ileus. I have personally reviewed the examination and initial interpretation and I agree with the findings. ELAYNE UNDERWOOD MD   SYSTEM ID:  [id number]     Billing  Total time 35 minutes, to include face to face visit, review of EMR, ordering, documentation and coordination of care on date of service    Signed by: Patricia Chapman NP

## 2023-07-31 NOTE — PROGRESS NOTES
Infusion Nursing Note:  Liu Ragsdale presents today for Taxol/Carbo C1D8.    Patient seen by provider today: Yes: Patricia Chapman NP; therefore, assessment deferred   present during visit today: Not Applicable.    Note: N/A.    Intravenous Access:  Peripheral IV placed.    Treatment Conditions:  Lab Results   Component Value Date    HGB 13.0 (L) 07/31/2023    WBC 6.6 07/31/2023    ANEU 13.2 (H) 01/22/2018    ANEUTAUTO 4.9 07/31/2023     07/31/2023   Results reviewed, labs MET treatment parameters, ok to proceed with treatment.    Post Infusion Assessment:  Patient tolerated infusion without incident.  Blood return noted pre and post infusion.  Site patent and intact, free from redness, edema or discomfort.  No evidence of extravasations.  Access discontinued per protocol.     Discharge Plan:   Discharge instructions reviewed with: Patient.  Patient and/or family verbalized understanding of discharge instructions and all questions answered.  AVS to patient via [employer].  Patient will return 8/7/2023 for next appointment.   Patient discharged in stable condition accompanied by: self.  Departure Mode: Ambulatory.    Scarlet Whitley RN

## 2023-07-31 NOTE — PROGRESS NOTES
Rx changed to [facility] due to [facility] not being able to get this. Pt aware.     Dilcia Wheeler RN on 7/31/2023 at 10:31 AM

## 2023-07-31 NOTE — LETTER
7/31/2023         RE: Liu Ragsdale  [address] [facility] [address]        Dear Colleague,    Thank you for referring your patient, Liu Ragsdale, to the [facility] [place]. Please see a copy of my visit note below.    [facility] Hematology and Oncology Outpatient Progress Note    Patient: Liu Ragsdale  MRN: [id number]  Date of Service: Jul 31, 2023          Reason for Visit    Esophageal cancer    Primary Oncologist: Dr. Locke      Assessment/Plan  cT2cN2 lower esophageal adenocarcinoma  Liu met with thoracic surgery who recommended neoadjuvant chemoradiation with reconsideration of resection pending response.     His chemoradiation was delayed a few weeks for him to recover from COVID.  Once recovered, he initiated this with weekly carboplatin and Taxol a week ago.  He has tolerated his first week very well.    Labwork: CBC and creatinine adequate for treatment    Plan:  -Proceed with day 8 (week 2) carboplatin and Taxol  -Continue weekly carboplatin and Taxol through his radiation  -See myself/Dr. Locke every 2 weeks for treatment  -We will plan to restage following chemoradiation and visit back with  in Thoracic Surgery    Nutrition/hydration  He is using his PEJ tube for all nutrition and hydration.  He is working his way up to goal with his tube feedings.  Weight is down, but stable over the past week.     Plan:  -Continue feedings and hydration through tube  -IV fluids as needed with his treatments (not needed currently)    3.   Recent COVID  He was officially diagnosed 7/16 after having symptoms for approximately 1 week.  He had a mild course and did not require hospitalization.  He has recovered fully.    4.  Abdominal discomfort, post-op  This seems to be post laparoscopic/feeding tube placement.  No evidence for infection or other acute concerns.  Expect his symptoms should improve over the next 1 to 2 weeks.  Tylenol as  needed.  ______________________________________________________________________________    History of Present Illness/ Interval History    Mr. Liu Ragsdale  is a 83 year old recently diagnosed with lower esophageal adenocarcinoma.  He initiated chemoradiation with weekly carboplatin and Taxol a week ago and presents ahead of day 8 (week 2).    Just ahead of starting chemoradiation, he developed COVID (diagnosed 7/16). He had a mild course and did not require hospitalization.  He was unable to take Paxlovid since he could not swallow pills and the pills cannot be crushed.  His treatment was delayed until 7/24/2023 to allow recovery.  He has recovered fully.     Tolerated his first week of treatment very well. No nausea, fatigue, fevers.   Baseline mild tingling in bilateral fingers unchanged.     Due to dysphagia, he's not able to get any meaningful calories/hydration by mouth, so is dependent on his feeding tube. He's slowly working towards his goal.     No esophageal pain.   Mild abd discomfort since his laparoscopy and feeding tube placement.      ECOG Performance    0        Oncology History/Treatment  Diagnosis/Stage:   6/2023: [id number] lower third esophageal adenocarcinoma  -Presented with dysphagia x2 months, increased acid reflux and 10 pound weight loss  -6/5/2023 EGD: Large fungating mass in the lower third esophagus, 38 cm from incisors.  Mass partially obstructing and circumferential.  Biopsy of mass: Adenocarcinoma.  GE junction biopsy also positive for adenocarcinoma.  -PET scan: Hypermetabolic activity distal esophageal mass.  Low-moderate activity in hilar and mediastinal adenopathy, indeterminant and possibly inflammatory in nature.  No evident distant metastases.  -Negative PD-L1 expression  -7/2/2023 EUS: Partially circumferential progressing to circumferential distal esophageal mass spanning 36-41 cm with the junction estimated at 41 cm.  Lesion involves layer 4 but no evidence of layer 5  involvement no other structures involved.  5 regional periesophageal lymph nodes were noted, only 1 of these had malignant; no distant nodes noted.  Unremarkable pancreaticobiliary system and liver.  -7/5/2023: Diagnostic lap pleuroscopy without metastasis.  PEJ placed    Treatment:  Met with Dr. Levi in Thoracic Surgery.  Discussed consideration of surgical resection pending response to preoperative chemoradiation.    7/5/2023: PEJ tube feeding placement    7/24/2023 -present: Definitive/neoadjuvant chemoradiation with weekly carboplatin and Taxol  -Delayed start due to mild COVID infection      Physical Exam    GENERAL: Alert and oriented to time place and person. Seated comfortably. In no distress.  Accompanied by his wife.  HEAD: Atraumatic and normocephalic. No alopecia.  EYES: ANTONIA, EOMI. No erythema. No icterus.  ORAL CAVITY: Moist. No mucosal lesion or tonsillar enlargement.  LYMPH NODES: No palpable supraclavicular, cervical lymphadenopathy.  CHEST: clear to auscultation bilaterally. Resonant to percussion throughout bilaterally. Symmetrical breath movements bilaterally.  CVS: RRR  ABDOMEN: Soft. Not tender. Not distended.  Laparoscopic sites well-healed.  Feeding tube intact, very mild pinkness right around site without tenderness or induration.  No significant drainage.  EXTREMITIES: Warm. No peripheral edema.  SKIN: no rash, or bruising or purpura.   NEURO: No gross deficit noted. Non-antalgic gait.      Lab Results    Recent Results (from the past 168 hour(s))   Creatinine   Result Value Ref Range    Creatinine 1.02 0.67 - 1.17 mg/dL    GFR Estimate 73 >60 mL/min/1.73m2   CBC with platelets and differential   Result Value Ref Range    WBC Count 7.0 4.0 - 11.0 10e3/uL    RBC Count 4.48 4.40 - 5.90 10e6/uL    Hemoglobin 13.2 (L) 13.3 - 17.7 g/dL    Hematocrit 40.4 40.0 - 53.0 %    MCV 90 78 - 100 fL    MCH 29.5 26.5 - 33.0 pg    MCHC 32.7 31.5 - 36.5 g/dL    RDW 13.0 10.0 - 15.0 %    Platelet Count  234 150 - 450 10e3/uL    % Neutrophils 63 %    % Lymphocytes 25 %    % Monocytes 8 %    % Eosinophils 3 %    % Basophils 1 %    % Immature Granulocytes 0 %    NRBCs per 100 WBC 0 <1 /100    Absolute Neutrophils 4.4 1.6 - 8.3 10e3/uL    Absolute Lymphocytes 1.7 0.8 - 5.3 10e3/uL    Absolute Monocytes 0.6 0.0 - 1.3 10e3/uL    Absolute Eosinophils 0.2 0.0 - 0.7 10e3/uL    Absolute Basophils 0.1 0.0 - 0.2 10e3/uL    Absolute Immature Granulocytes 0.0 <=0.4 10e3/uL    Absolute NRBCs 0.0 10e3/uL   Creatinine   Result Value Ref Range    Creatinine 0.87 0.67 - 1.17 mg/dL    GFR Estimate 86 >60 mL/min/1.73m2   CBC with platelets and differential   Result Value Ref Range    WBC Count 6.6 4.0 - 11.0 10e3/uL    RBC Count 4.39 (L) 4.40 - 5.90 10e6/uL    Hemoglobin 13.0 (L) 13.3 - 17.7 g/dL    Hematocrit 39.2 (L) 40.0 - 53.0 %    MCV 89 78 - 100 fL    MCH 29.6 26.5 - 33.0 pg    MCHC 33.2 31.5 - 36.5 g/dL    RDW 12.6 10.0 - 15.0 %    Platelet Count 200 150 - 450 10e3/uL    % Neutrophils 72 %    % Lymphocytes 13 %    % Monocytes 6 %    % Eosinophils 7 %    % Basophils 1 %    % Immature Granulocytes 1 %    NRBCs per 100 WBC 0 <1 /100    Absolute Neutrophils 4.9 1.6 - 8.3 10e3/uL    Absolute Lymphocytes 0.8 0.8 - 5.3 10e3/uL    Absolute Monocytes 0.4 0.0 - 1.3 10e3/uL    Absolute Eosinophils 0.4 0.0 - 0.7 10e3/uL    Absolute Basophils 0.0 0.0 - 0.2 10e3/uL    Absolute Immature Granulocytes 0.1 <=0.4 10e3/uL    Absolute NRBCs 0.0 10e3/uL       Imaging    XR Chest 2 Views    Result Date: 7/16/2023  EXAM: XR CHEST 2 VIEWS LOCATION: [facility] DATE: 7/16/2023 INDICATION: cough, concern for pneumonia COMPARISON: None.     IMPRESSION: No focal consolidation or effusion. Mild bronchial thickening. Heart size is normal. No acute osseous findings.    XR Abdomen Port 1 View    Result Date: 7/7/2023  EXAMINATION:  XR ABDOMEN PORT 1 VIEW 7/7/2023 COMPARISON: CT PET 6/14/2023. HISTORY: abdomen pain. Distension.  "Possible Illeus.. Postoperative day 2 from diagnostic laparoscopy, jejunostomy insertion. TECHNIQUE: Frontal supine view of the abdomen. FINDINGS: Inferior approach abdominal drain with no evidence of kinking. Partially visualized left total hip replacement. Mildly dilated large bowel, measuring up to 6.5 cm. No evidence of pneumatosis. No portal venous gas.  The lung bases are unremarkable.     IMPRESSION: Mildly dilated large bowel, in the immediate postoperative period this would be suggestive of an ileus. I have personally reviewed the examination and initial interpretation and I agree with the findings. ELAYNE UNDERWOOD MD   SYSTEM ID:  [id number]     Billing  Total time 35 minutes, to include face to face visit, review of EMR, ordering, documentation and coordination of care on date of service    Signed by: Patricia Chapman NP      Oncology Rooming Note    July 31, 2023 9:45 AM   Liu Ragsdale is a 83 year old male who presents for:    Chief Complaint   Patient presents with     Oncology Clinic Visit     Esophageal cancer - Labs provider and infusion     Initial Vitals: /69 (BP Location: Right arm, Patient Position: Sitting, Cuff Size: Adult Regular)   Pulse 68   Temp 98.1  F (36.7  C) (Tympanic)   Ht 1.549 m (5' 1\")   Wt 73.9 kg (163 lb)   SpO2 97%   BMI 30.80 kg/m   Estimated body mass index is 30.8 kg/m  as calculated from the following:    Height as of this encounter: 1.549 m (5' 1\").    Weight as of this encounter: 73.9 kg (163 lb). Body surface area is 1.78 meters squared.  Mild Pain (3) Comment: Data Unavailable   No LMP for male patient.  Allergies reviewed: Yes  Medications reviewed: Yes    Medications: Medication refills not needed today.  Pharmacy name entered into PLAXD:    [id number] - [place], [place] - [address] [facility] - [place], [place] - [address]  [facility] - [address] - [address] [facility] AT PORTAL TO " REGISTERED [facility] SITES    Clinical concerns:  Patient is still having pain in abdomin area.       Natty Ribeiro, ROD                Again, thank you for allowing me to participate in the care of your patient.        Sincerely,        Patricia Chapman, NP

## 2023-08-02 NOTE — LETTER
2023         RE: Liu Ragsdale  [address] [facility] [address]        Dear Colleague,    Thank you for referring your patient, Liu Ragsdale, to the  [facility]. Please see a copy of my visit note below.    [facility]  SPECIALIZING IN BREAKTHROUGHS  Radiation Oncology    On Treatment Visit Note      Liu Ragsdale      Date: 2023   MRN: [id number]   : 1940  Diagnosis: esphageal cancer      Reason for Visit:  On Radiation Treatment Visit     Treatment Summary to Date  Treatment Site: chest/abdomen Current Dose: 600/5000 cGy Fractions: 3/25      Chemotherapy  Chemo concurrent with radx?: Yes  Oncologist: Dr. Locke  Drug Name/Frequency 1: carbo/taxol    Subjective:   Doing okay overall.  No acute complaints.  No nausea.  No diarrhea.  Continues to undergo and advance tube feeds via J-tube.  Denies any significant pain. Having some irritation near J tube site, will reach out to thoracic surgery team.  Taking Tylenol as needed for pain.      Nursing ROS:   Nutrition Alteration  Diet Type: Gastrostomy Tube Feedings  Nutrition Note: only ice chips/very small bites of soft foods  Skin  Skin Reaction: 0 - No changes        Cardiovascular  Respiratory effort: 2 - Mild dyspnea on exertion  Cardio/Resp Note: lingering cough due to covid +, but improving  Gastrointestinal  GI Note: looser bowels due to tube feeds        Pain Assessment  Pain Note: discomfort at the peg site, only using liquid tylenol      Objective:   BP (!) 143/67   Pulse 76   Resp 18   Wt 74.4 kg (164 lb)   SpO2 97%   BMI 30.99 kg/m    NAD  No respiratory distress   S/p J-tube placement      Labs:  CBC RESULTS: Recent Labs   Lab Test 23  1114   WBC 7.0   RBC 4.48   HGB 13.2*   HCT 40.4   MCV 90   MCH 29.5   MCHC 32.7   RDW 13.0        ELECTROLYTES:  Recent Labs   Lab Test 23  1114 07/10/23  0645 23  1556 23  0657   NA  --   --   --  140   POTASSIUM  --   4.1   < > 3.3*   CHLORIDE  --   --   --  104   GWEN  --   --   --  8.5*   CO2  --   --   --  25   BUN  --   --   --  7.6*   CR 1.02  --   --  0.89   GLC  --   --   --  180*    < > = values in this interval not displayed.       Assessment:  Mr. 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  He is undergoing chemoradiation therapy followed by re-evaluation for surgical resection.    Tolerating radiation therapy well.  All questions and concerns addressed.    Plan:   Continue current therapy.    Nutrition.  Continue tube feeds via J-tube  3.  Cancer related pain.  Tylenol as needed.      Mosaiq chart and setup information reviewed  Ports checked    Medication Review  Med list reviewed with patient?: Yes           Jarred Alcantar MD

## 2023-08-02 NOTE — TELEPHONE ENCOUNTER
General Call      Reason for Call:  Pt is going through Radiation and Chemo. He needs to have his meds changed to a liquid form. His specialist told him he should through Dr Rogers for this.  He will need the Amlodipine, Carvedilol and Atorvastatin changed.  Please also send the Acetaminophen solution that was ordered to [facility] (It had been sent to the [facility] pharmacy)

## 2023-08-02 NOTE — PROGRESS NOTES
[facility]  SPECIALIZING IN BREAKTHROUGHS  Radiation Oncology    On Treatment Visit Note      Liu Ragsdale      Date: 2023   MRN: [id number]   : 1940  Diagnosis: esphageal cancer      Reason for Visit:  On Radiation Treatment Visit     Treatment Summary to Date  Treatment Site: chest/abdomen Current Dose: 600/5000 cGy Fractions: 3/25      Chemotherapy  Chemo concurrent with radx?: Yes  Oncologist: Dr. Locke  Drug Name/Frequency 1: carbo/taxol    Subjective:   Doing okay overall.  No acute complaints.  No nausea.  No diarrhea.  Continues to undergo and advance tube feeds via J-tube.  Denies any significant pain. Having some irritation near J tube site, will reach out to thoracic surgery team.  Taking Tylenol as needed for pain.      Nursing ROS:   Nutrition Alteration  Diet Type: Gastrostomy Tube Feedings  Nutrition Note: only ice chips/very small bites of soft foods  Skin  Skin Reaction: 0 - No changes        Cardiovascular  Respiratory effort: 2 - Mild dyspnea on exertion  Cardio/Resp Note: lingering cough due to covid +, but improving  Gastrointestinal  GI Note: looser bowels due to tube feeds        Pain Assessment  Pain Note: discomfort at the peg site, only using liquid tylenol      Objective:   BP (!) 143/67   Pulse 76   Resp 18   Wt 74.4 kg (164 lb)   SpO2 97%   BMI 30.99 kg/m    NAD  No respiratory distress   S/p J-tube placement      Labs:  CBC RESULTS: Recent Labs   Lab Test 23  1114   WBC 7.0   RBC 4.48   HGB 13.2*   HCT 40.4   MCV 90   MCH 29.5   MCHC 32.7   RDW 13.0        ELECTROLYTES:  Recent Labs   Lab Test 23  1114 07/10/23  0645 23  1556 23  0657   NA  --   --   --  140   POTASSIUM  --  4.1   < > 3.3*   CHLORIDE  --   --   --  104   GWEN  --   --   --  8.5*   CO2  --   --   --  25   BUN  --   --   --  7.6*   CR 1.02  --   --  0.89   GLC  --   --   --  180*    < > = values in this interval not displayed.       Assessment:  Mr. Ragsdale is a  83-year-old male with a diagnosis of esophageal adenocarcinoma of the distal esophagus/GE junction, clinical T2N2.  He is undergoing chemoradiation therapy followed by re-evaluation for surgical resection.    Tolerating radiation therapy well.  All questions and concerns addressed.    Plan:   Continue current therapy.    Nutrition.  Continue tube feeds via J-tube  3.  Cancer related pain.  Tylenol as needed.      Mosaiq chart and setup information reviewed  Ports checked    Medication Review  Med list reviewed with patient?: Yes           Jarred Alcantar MD

## 2023-08-02 NOTE — TELEPHONE ENCOUNTER
Nurse Triage SBAR    Situation: Drainage from j-tube site    Background:    DX: Esophageal cancer   Provider:   Surgery: 07/05/23 INSERTION, JEJUNOSTOMY TUBE, LAPAROSCOPY-ASSISTED     Assessment:   Yesterday and today has gotten some drainage from J-tube site. On the surface of the skin where the tube goes into his abd. Pt describes drainage as brown/yellowish on the gauze. Having to change dressing more often in the last 24hrs. Usually changes it daily but had to do it two times.    Pt reports red spot about an inch or two from the entrance on the tube. He noticed this spot on Monday. Red spot has now gotten smaller.     Pain has improved the last two weeks but it never goes away.    J-tube is still functioning, having not issues.    Pt states  recommended he reach out to the thoracic team to see if they would be concerned about an infection.    Denies F/C, N/V, SOB, Chest pain    Recommendation:   1239 Secure message sent to María RNCC    1245 María responding: Does it have any odor? Thick? Not uncommon for some tube feed or gastric contents to seep out of opening-does it sound like this or wound drainage? He had tube placed on 7/5, so not new. I think if this is the first day he's noticing it, fine to continue to monitor and call if worse, sounds like redness is improving and no fever. 7/31 note mentions it looked good. When does home nurse come next?     1245 Pt reporting no odor or thickness to drainage. He states home health nurse is scheduled to see him next Monday but he is able to request a visit any day. Informed pt to RNCC response that it is not uncommon for some tube feed or gastric contents to seep out of the opening. Recommended pt continue to monitor and call if he notices a foul odor, thick drainage, redness/inflammation, increased pain or problems with function of j-tube. Pt verbalized understanding.

## 2023-08-03 NOTE — TELEPHONE ENCOUNTER
Routing refill request to provider for review/approval because:  Drug not active on patient's medication list  Labs out of range:    BP Readings from Last 3 Encounters:   08/02/23 (!) 143/67   07/31/23 (!) 149/75   07/31/23 128/69   Labs not current:    LDL Cholesterol Calculated   Date Value Ref Range Status   09/27/2021 15 <=100 mg/dL Final   08/19/2020 27 <100 mg/dL Final     Comment:     Desirable:       <100 mg/dl   07/17/2020 50 <100 mg/dL Final     Comment:     Desirable:       <100 mg/dl     Patient was discharged from the hospital on 7/10/23 carvedilol and amlodipine were restarted:  DISCHARGE MEDICATIONS:         Current Discharge Medication List             START taking these medications     Details   acetaminophen (TYLENOL) 325 MG/10.15ML solution 20.3 mLs (650 mg) by Oral or Feeding Tube route every 4 hours as needed for mild pain or fever  Qty: 500 mL, Refills: 0     Associated Diagnoses: 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)       amLODIPine benzoate (KATERZIA) 1 MG/ML SUSP 5 mLs (5 mg) by Oral or Feeding Tube route daily for 30 days  Qty: 150 mL, Refills: 0     Associated Diagnoses: 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)       carvedilol (COREG) 1 mg/mL SUSP 6.25 mLs (6.25 mg) by Oral or Feeding Tube route 2 times daily for 30 days  Qty: 375 mL, Refills: 0     Associated Diagnoses: 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)   Date of last OV with Dr. Rogers 6/8/23, VV 77/21/23.    Julie Behrendt RN

## 2023-08-04 NOTE — TELEPHONE ENCOUNTER
Spoke with pt to check in on g tube pain/drainage. He states that pain has lessened and there is not as much drainage. He feels things are slowly improving. We discussed signs of infection and what to monitor for. He will call back should things worsen.    María Blackwood, RN BSN  Thoracic Surgery RN Care Coordinator  [phone]

## 2023-08-07 NOTE — PROGRESS NOTES
Infusion Nursing Note:  Liu Ragsdale presents today for Carbo/Taxol C1D15.    Patient seen by provider today: No   present during visit today: Not Applicable.    Note: more irritation in his esophagus; NPO, strictly using tube feeds. new back pain on left side- tylenol and topical gel help.    Intravenous Access:  Peripheral IV placed.    Treatment Conditions:  Lab Results   Component Value Date    HGB 12.6 (L) 08/07/2023    WBC 4.2 08/07/2023    ANEU 13.2 (H) 01/22/2018    ANEUTAUTO 2.7 08/07/2023     (L) 08/07/2023   Results reviewed, labs MET treatment parameters, ok to proceed with treatment.    Post Infusion Assessment:  Patient tolerated infusion without incident.  Blood return noted pre and post infusion.  Site patent and intact, free from redness, edema or discomfort.  No evidence of extravasations.  Access discontinued per protocol.     Discharge Plan:   Discharge instructions reviewed with: Patient.  Patient and/or family verbalized understanding of discharge instructions and all questions answered.  AVS to patient via [employer].  Patient will return 8/14/2023 for next appointment.   Patient discharged in stable condition accompanied by: self.  Departure Mode: Ambulatory.    Scarlet Whitley RN

## 2023-08-09 NOTE — ED NOTES
Report taken from [facility] home care RN. Pt sent by private car from home for leaking G-tube.

## 2023-08-09 NOTE — LETTER
2023         RE: Liu Ragsdale  [address] [facility] [address]        Dear Colleague,    Thank you for referring your patient, Liu Ragsdale, to the  [facility]. Please see a copy of my visit note below.    [facility]  SPECIALIZING IN BREAKTHROUGHS  Radiation Oncology    On Treatment Visit Note      Liu Ragsdale      Date: 2023   MRN: [id number]   : 1940  Diagnosis: esphageal cancer      Reason for Visit:  On Radiation Treatment Visit     Treatment Summary to Date  Treatment Site: chest/abdomen Current Dose: 2600/5000 cGy Fractions:       Chemotherapy  Chemo concurrent with radx?: Yes  Oncologist: Dr. Locke  Drug Name/Frequency 1: carbo/taxol    Subjective:   Doing okay overall.  No acute complaints.  No nausea.  No diarrhea.  Continues to undergo and advance tube feeds via J-tube.  Has also noticed some improvement with oral intake conservative treatment.   Denies any significant pain. Having some irritation near J tube site, will reach out to thoracic surgery team. Was seen in ED last night who started the patient on Keflex.  Taking Tylenol as needed for pain.      Nursing ROS:   Nutrition Alteration  Diet Type: Gastrostomy Tube Feedings  Nutrition Note: only ice chips/very small bites of soft foods  Skin  Skin Reaction: 0 - No changes        Cardiovascular  Respiratory effort: 2 - Mild dyspnea on exertion  Cardio/Resp Note: lingering cough due to covid +, but improving  Gastrointestinal  GI Note: looser bowels due to tube feeds        Pain Assessment  Pain Note: discomfort at the peg site, only using liquid tylenol      Objective:   /66   Pulse 68   Wt 75.3 kg (166 lb)   BMI 31.37 kg/m    NAD  No respiratory distress   S/p J-tube placement, noted surrounding erythema and drainage     Labs:  CBC RESULTS: Recent Labs   Lab Test 23  1114   WBC 7.0   RBC 4.48   HGB 13.2*   HCT 40.4   MCV 90   MCH 29.5   MCHC 32.7   RDW 13.0         ELECTROLYTES:  Recent Labs   Lab Test 07/24/23  1114 07/10/23  0645 07/08/23  1556 07/08/23  0657   NA  --   --   --  140   POTASSIUM  --  4.1   < > 3.3*   CHLORIDE  --   --   --  104   GWEN  --   --   --  8.5*   CO2  --   --   --  25   BUN  --   --   --  7.6*   CR 1.02  --   --  0.89   GLC  --   --   --  180*    < > = values in this interval not displayed.       Assessment:  Mr. 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  He is undergoing chemoradiation therapy followed by re-evaluation for surgical resection.    Tolerating radiation therapy well.  All questions and concerns addressed.    Plan:   Continue current therapy.    Nutrition.  Continue tube feeds via J-tube.  3.   Cancer related pain.  Tylenol as needed.  4.  J-tube site drainage.  Has been started on antibiotic.  Patient will reach out to thoracic surgery team as well.      [employer] chart and setup information reviewed  Ports checked    Medication Review  Med list reviewed with patient?: Yes           Jarred Alcantar MD

## 2023-08-09 NOTE — DISCHARGE INSTRUCTIONS
Follow-up by phone with your surgeons clinic tomorrow to have them review your visit and to get their recommendations on further treatment and follow-up.    Keflex as directed.  This prescription was sent to our pharmacy and can be picked up in the morning.    Return to the emergency department for any problems.

## 2023-08-09 NOTE — PROGRESS NOTES
[facility]  SPECIALIZING IN BREAKTHROUGHS  Radiation Oncology    On Treatment Visit Note      Liu Ragsdale      Date: 2023   MRN: [id number]   : 1940  Diagnosis: esphageal cancer      Reason for Visit:  On Radiation Treatment Visit     Treatment Summary to Date  Treatment Site: chest/abdomen Current Dose: 2600/5000 cGy Fractions:       Chemotherapy  Chemo concurrent with radx?: Yes  Oncologist: Dr. Locke  Drug Name/Frequency 1: carbo/taxol    Subjective:   Doing okay overall.  No acute complaints.  No nausea.  No diarrhea.  Continues to undergo and advance tube feeds via J-tube.  Has also noticed some improvement with oral intake conservative treatment.   Denies any significant pain. Having some irritation near J tube site, will reach out to thoracic surgery team. Was seen in ED last night who started the patient on Keflex.  Taking Tylenol as needed for pain.      Nursing ROS:   Nutrition Alteration  Diet Type: Gastrostomy Tube Feedings  Nutrition Note: only ice chips/very small bites of soft foods  Skin  Skin Reaction: 0 - No changes        Cardiovascular  Respiratory effort: 2 - Mild dyspnea on exertion  Cardio/Resp Note: lingering cough due to covid +, but improving  Gastrointestinal  GI Note: looser bowels due to tube feeds        Pain Assessment  Pain Note: discomfort at the peg site, only using liquid tylenol      Objective:   /66   Pulse 68   Wt 75.3 kg (166 lb)   BMI 31.37 kg/m    NAD  No respiratory distress   S/p J-tube placement, noted surrounding erythema and drainage     Labs:  CBC RESULTS: Recent Labs   Lab Test 23  1114   WBC 7.0   RBC 4.48   HGB 13.2*   HCT 40.4   MCV 90   MCH 29.5   MCHC 32.7   RDW 13.0        ELECTROLYTES:  Recent Labs   Lab Test 23  1114 07/10/23  0645 23  1556 23  0657   NA  --   --   --  140   POTASSIUM  --  4.1   < > 3.3*   CHLORIDE  --   --   --  104   GWEN  --   --   --  8.5*   CO2  --   --   --  25   BUN  --    --   --  7.6*   CR 1.02  --   --  0.89   GLC  --   --   --  180*    < > = values in this interval not displayed.       Assessment:  Mr. 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  He is undergoing chemoradiation therapy followed by re-evaluation for surgical resection.    Tolerating radiation therapy well.  All questions and concerns addressed.    Plan:   1. Continue current therapy.    2. Nutrition.  Continue tube feeds via J-tube.  3.   Cancer related pain.  Tylenol as needed.  4.  J-tube site drainage.  Has been started on antibiotic.  Patient will reach out to thoracic surgery team as well.      Mosaiq chart and setup information reviewed  Ports checked    Medication Review  Med list reviewed with patient?: Yes           Jarred Alcantar MD

## 2023-08-09 NOTE — ED PROVIDER NOTES
History     Chief Complaint   Patient presents with    Gtube Problem     HPI  Liu Ragsdale is a 83 year old male who presents to the emergency department with leakage from his J-tube site.  J-tube was placed surgically by his surgeon a little over a month ago.  He reports the tube seems to be functioning without issue for feeds and medications but states that he has noticed some drainage from the site on his dressings lately.  He denies fevers, pain, and has no other complaints.    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) 07/03/2023     Priority: Medium    Ischemic stroke (H) 09/28/2021     Priority: Medium    Cerebrovascular accident (H) 09/27/2021     Priority: Medium    Near syncope 09/26/2021     Priority: Medium    Right carotid artery occlusion 09/26/2021     Priority: Medium    Degenerative joint disease of left hip 02/03/2021     Priority: Medium    Coronary artery disease involving native coronary artery of native heart without angina pectoris 03/23/2020     Priority: Medium     coronary artery disease diagnosed in 03/2020 at which time he was noted to have a  of the proximal left circumflex into the OM, moderate to severe ramus disease and moderate to severe RCA/PLB disease.  He underwent drug-eluting stent placement from the proximal left circumflex into the OM1.  He has residual small vessel coronary artery disease as well as moderate to severe stenosis in the ramus and RCA/YAA for which medical management has been recommended.         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 11/06/2019     Priority: Medium    SOB (shortness of breath) on exertion 11/06/2019     Priority: Medium    Arthritis of right glenohumeral joint- moderate 11/06/2019     Priority: Medium    Arthritis of right acromioclavicular joint- advanced 11/06/2019     Priority: Medium    SNHL (sensory-neural hearing loss), asymmetrical 07/22/2019     Priority: Medium    FANI (obstructive sleep apnea) 03/15/2016     Priority: Medium     NOT using CPAP 3/15/16      BMI 31.0-31.9,adult 04/07/2015     Priority: Medium    Benign essential hypertension, BP goal <140/90 09/10/2014     Priority: Medium    Hyperlipidemia LDL goal <70 12/12/2013     Priority: Medium     Diagnosis updated by automated process. Provider to review and confirm.      Carotid bruit 03/30/2010     Priority: Medium     right carotid bruit on exam- u/s 7/09 with minimal stenosis on left and not well seen on right           Past Medical History:    Past Medical History:   Diagnosis Date    Angina at rest (H)     Arthritis     Cerebrovascular accident (H) 09/27/2021    Coronary artery disease involving native coronary artery of native heart without angina pectoris     Dysphagia     Esophageal adenocarcinoma (H)     Hypertension     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) 7/3/2023       Past Surgical History:    Past Surgical History:   Procedure Laterality Date    ARTHROPLASTY HIP Left 02/03/2021    Procedure: Total Hip Arthroplasty;  Surgeon: Andrew Arriola MD;  Location: [facility] OR    COLONOSCOPY N/A 06/16/2016    Procedure: COLONOSCOPY;  Surgeon: Randy Avendano MD;  Location: [facility]    CV LEFT HEART CATH Left 03/10/2020    Procedure: Left Heart Cath;  Surgeon: Vicente Lopez MD;  Location: [facility] CARDIAC CATH LAB    ENDARTERECTOMY CAROTID Right 9/29/2021    Procedure: ENDARTERECTOMY, CAROTID;  Surgeon: Raymond Andersen MD;  Location: [facility]     ENDOSCOPIC ULTRASOUND UPPER GASTROINTESTINAL TRACT (GI) N/A 7/3/2023    Procedure: ENDOSCOPIC ULTRASOUND, ESOPHAGOSCOPY / UPPER GASTROINTESTINAL TRACT (GI);  Surgeon: Yunier Graves MD;  Location:  OR    ESOPHAGOSCOPY, GASTROSCOPY, DUODENOSCOPY (EGD), COMBINED N/A 6/5/2023    Procedure: Esophagoscopy, Gastroscopy, Duodenoscopy, With Biopsy;  Surgeon: Joseph Landers DO;  Location: [facility]    ESOPHAGOSCOPY, GASTROSCOPY, DUODENOSCOPY (EGD), COMBINED N/A 7/3/2023    Procedure: ESOPHAGOGASTRODUODENOSCOPY;  Surgeon: Yunier Graves MD;  Location:  OR    EYE SURGERY  2005    cataract surgery    LAPAROSCOPIC ASSISTED INSERTION TUBE JEJUNOSTOMY N/A 7/5/2023    Procedure: INSERTION, JEJUNOSTOMY TUBE, LAPAROSCOPY-ASSISTED;  Surgeon: Judy Holguin MD;  Location: [facility] OR    VASECTOMY  1981       Family History:    Family History   Problem Relation Age of Onset    Hypertension Sister     Kidney failure Sister     Chronic Obstructive Pulmonary Disease Sister     Thyroid Disease Sister     Seizure Disorder Paternal Grandmother     Mitral valve prolapse Daughter     No Known Problems Son     Cerebrovascular Disease No family hx of        Social History:  Marital Status:   [2]  Social History     Tobacco Use    Smoking status: Never     Passive exposure: Never    Smokeless tobacco: Never   Vaping Use    Vaping Use: Never used   Substance Use Topics    Alcohol use: Yes     Comment: Beth only    Drug use: No        Medications:    cephALEXin (KEFLEX) 250 MG/5ML suspension  acetaminophen (TYLENOL) 160 MG/5ML solution  acetaminophen (TYLENOL) 325 MG/10.15ML solution  amLODIPine (NORVASC) 5 MG tablet  amLODIPine benzoate (KATERZIA) 1 MG/ML SUSP  aspirin (ASA) 325 MG tablet  atorvastatin (LIPITOR) 10 MG tablet  carvedilol (COREG) 1 mg/mL SUSP  carvedilol (COREG) 6.25 MG tablet  Dextromethorphan-guaiFENesin 5-100 MG/5ML LIQD  diclofenac (VOLTAREN) 1 % topical gel  Multiple Vitamin (MULTI VITAMIN  PO)  nitroGLYcerin (NITROSTAT) 0.3 MG sublingual tablet  NONFORMULARY  ondansetron (ZOFRAN) 4 MG/5ML solution  oxyCODONE (ROXICODONE) 5 MG/5ML solution  pantoprazole (PROTONIX) 2 mg/mL SUSP suspension  sennosides (SENOKOT) 8.8 MG/5ML syrup  simethicone (MYLICON) 40 MG/0.6ML suspension          Review of Systems   All other systems reviewed and are negative.      Physical Exam   BP: (!) 147/78  Pulse: 86  Temp: 97.6  F (36.4  C)  Resp: 16  Weight: 73.9 kg (163 lb)  SpO2: 96 %      Physical Exam  Vitals and nursing note reviewed.   Constitutional:       General: He is not in acute distress.     Appearance: He is well-developed. He is not ill-appearing or toxic-appearing.   HENT:      Head: Normocephalic and atraumatic.   Eyes:      General: No scleral icterus.     Pupils: Pupils are equal, round, and reactive to light.   Cardiovascular:      Rate and Rhythm: Normal rate.   Pulmonary:      Effort: Pulmonary effort is normal. No respiratory distress.   Abdominal:      General: Abdomen is flat. Bowel sounds are normal.      Palpations: Abdomen is soft.      Tenderness: There is no abdominal tenderness.      Comments: Small area of erythema surrounding one of the anchoring suture sites with a small amount of yellowish drainage noted.  Difficult to ascertain whether this is enteric fluid or if he has a small suture infection.  No fluctuance or tenderness to palpation.   Musculoskeletal:      Cervical back: Normal range of motion and neck supple.   Skin:     General: Skin is warm and dry.      Coloration: Skin is not pale.      Findings: No rash.   Neurological:      Mental Status: He is alert and oriented to person, place, and time.      Sensory: No sensory deficit.   Psychiatric:         Behavior: Behavior normal.         ED Course                 Procedures                  Results for orders placed or performed during the hospital encounter of 08/08/23 (from the past 24 hour(s))   XR Abdomen 1 View    Narrative    EXAM:  XR ABDOMEN 1 VIEW  LOCATION: [facility]  DATE: 8/8/2023    INDICATION: Evaluate J tube placement  COMPARISON: 07/07/2023      Impression    IMPRESSION: Enteric tube overlies the abdominal midline which may reflect placement within the jejunum, though given variability of small bowel course, instillation of water-soluble contrast would be beneficial if definitive confirmation is necessary.   Visualized bowel gas pattern is nonobstructive. Left hip arthroplasty.       Medications - No data to display    Assessments & Plan (with Medical Decision Making)     I have reviewed the nursing notes.    I have reviewed the findings, diagnosis, plan and need for follow up with the patient.  This patient presented emergency department complaining of discharge from his jejunostomy site.  This may be a small area of suture infection or could be enteric drainage.  Abdominal x-ray demonstrates no disruption of the tube.  It has been functioning normally.  He was provided with some barrier paste for the J-tube site and I will also start him on Keflex suspension.  This prescription was sent to our pharmacy.  Patient will pick this up in the morning and will also contact his surgeons clinic to see if they have further recommendations on treatment and follow-up.  He was discharged in good condition.        New Prescriptions    CEPHALEXIN (KEFLEX) 250 MG/5ML SUSPENSION    Take 10 mLs (500 mg) by mouth 4 times daily for 7 days       Final diagnoses:   Jejunostomy site infection (H)       8/8/2023   [facility] EMERGENCY DEPT       Tunde Galaviz MD  08/09/23 0048

## 2023-08-09 NOTE — ED TRIAGE NOTES
Pt reports G-tube site leaking. G-Tube leaking for the past 45 minutes. Pt states gtube was placed on July 10th. Pt called pt home care nurse regarding leaking site, nurse directed pt to the ER. Site is actively draining. Writer placed dressing around site.      Triage Assessment       Row Name 08/08/23 1678       Triage Assessment (Adult)    Airway WDL WDL       Respiratory WDL    Respiratory WDL WDL       Skin Circulation/Temperature WDL    Skin Circulation/Temperature WDL WDL       Cardiac WDL    Cardiac WDL WDL       Peripheral/Neurovascular WDL    Peripheral Neurovascular WDL WDL       Cognitive/Neuro/Behavioral WDL    Cognitive/Neuro/Behavioral WDL WDL

## 2023-08-09 NOTE — TELEPHONE ENCOUNTER
Received voicemail from pt regarding last nights ED visit for j tube concerns.     RNCC reviewed photo and note with KRYSTAL. RNCC advised pt to continue antibiotics as ordered and discussed wound care with barrier cream to decrease redness. Discussed that we are not concerned that it is badly infected but he should continue treatments. Pt voiced understanding and had no further questions.    María Blackwood, RN BSN  Thoracic Surgery RN Care Coordinator  [phone]

## 2023-08-14 NOTE — PROGRESS NOTES
Infusion Nursing Note:  Liu Ragsdale presents today for Taxol & Carboplatin C1D22  Patient seen by provider today: Yes: Patricia Chapman   present during visit today: Not Applicable.    Note: N/A.    Intravenous Access:  Peripheral IV placed.    Treatment Conditions:  Lab Results   Component Value Date    HGB 12.8 (L) 08/14/2023    WBC 3.9 (L) 08/14/2023    ANEU 13.2 (H) 01/22/2018    ANEUTAUTO 3.0 08/14/2023     (L) 08/14/2023        Results reviewed, labs MET treatment parameters, ok to proceed with treatment.    Post Infusion Assessment:  Patient tolerated infusion without incident.  Blood return noted pre and post infusion.  Site patent and intact, free from redness, edema or discomfort.  No evidence of extravasations.  Access discontinued per protocol.     Discharge Plan:   Patient discharged in stable condition accompanied by: self.  Departure Mode: Ambulatory.    Torsten Navarro RN

## 2023-08-14 NOTE — PROGRESS NOTES
"Oncology Rooming Note    August 14, 2023 9:53 AM   Liu Ragsdale is a 83 year old male who presents for:    Chief Complaint   Patient presents with    Oncology Clinic Visit     Malignant neoplasm of lower third of esophagus - Labs provider and infusion     Initial Vitals: /56 (BP Location: Right arm, Patient Position: Sitting, Cuff Size: Adult Regular)   Pulse 80   Temp 98  F (36.7  C) (Oral)   Resp 16   Ht 1.562 m (5' 1.5\")   Wt 73.9 kg (163 lb)   SpO2 97%   BMI 30.30 kg/m   Estimated body mass index is 30.3 kg/m  as calculated from the following:    Height as of this encounter: 1.562 m (5' 1.5\").    Weight as of this encounter: 73.9 kg (163 lb). Body surface area is 1.79 meters squared.  No Pain (1) Comment: Data Unavailable   No LMP for male patient.  Allergies reviewed: Yes  Medications reviewed: Yes    Medications: Medication refills not needed today.  Pharmacy name entered into EPIC:    [facility] - [place], [place] - [address] AT PORTAL TO REGISTERED [facility] SITES  [facility] - [place], [place] - [address] [facility] [place] - [place], [place] - [address] [facility]    Clinical concerns:  None      Natty Ribeiro CMA              "

## 2023-08-14 NOTE — PROGRESS NOTES
[facility] Hematology and Oncology Outpatient Progress Note    Patient: Liu Ragsdale  MRN: [id number]  Date of Service: Aug 14, 2023          Reason for Visit    Locally advanced esophageal cancer    Primary Oncologist: Formerly, Dr. Locke      Assessment/Plan  cT2cN2 lower esophageal adenocarcinoma  Liu met with Thoracic Surgery who recommended neoadjuvant chemoradiation with reconsideration of resection pending response.     His chemoradiation was delayed a few weeks for him to recover from COVID.  Once recovered, he initiated this with weekly carboplatin and Taxol 3 weeks ago.  He is about [facility] through and has tolerated this well, so far.    Labwork: CBC and creatinine adequate for treatment    Plan:  -Proceed with day 22 (week 4) carboplatin and Taxol, requires no dose reductions  -Continue weekly carboplatin and Taxol through his radiation  -He's starting to note very minimal esophageal discomfort, not yet requiring medications.  He does have Magic mouthwash and oxycodone available if needed   -Return in 2 weeks, as he's completing treatment. He will meet Dr. Haines to establish care at that time, since Dr. Locke is no longer working in our practice.  -We will plan to restage following chemoradiation and visit back with Dr. Levi in Thoracic Surgery, we will coordinate the timing and imaging they would like as he is completing his treatment    Nutrition/hydration  He is using his PEJ tube for all nutrition and hydration.  He is eating/drinking minimally by mouth. Weight is stable.    Plan:  -Continue feedings and hydration through tube  -Advance oral diet as able    3.  PEJ drainage  He is using a barrier cream and was put on Keflex last week for possible early cellulitis.  Symptoms are improved. Per x-ray the tube was in proper placement and had been functioning well.    Plan:  -Follow-up with wound RN or team that placed the feeding tube if  needed  ______________________________________________________________________________    History of Present Illness/ Interval History    Mr. Liu Ragsdale  is a 83 year old with lower esophageal adenocarcinoma.  He initiated chemoradiation with weekly carboplatin and Taxol 3 weeks ago and presents ahead of day 22 (week 4).    So far, tolerating his chemo and radiation well. Mild occasional nausea, using antiemetics. No diarrhea, fevers. Baseline mild tingling in bilateral fingers (related to carpal tunnel) unchanged.     Due to dysphagia and lack of taste, he's not able to get any meaningful calories/hydration by mouth, so is dependent on his feeding tube.  He is up to his feeding goal and keeping weight stable.  He is taking an oral as he is able to.    Starting to note minimal esophageal pressure-like discomfort, but no significant pain yet. He is getting Magic Mouthwash today.    He was evaluated in the emergency room last week for increased jejunostomy site drainage.  It was uncertain if this was enteric drainage versus a small suture infection.  X-ray showed tube to be in proper placement and it was functioning normally.  He was provided barrier paste for the site and treated with Keflex for possible mild cellulitis.  This is improving. No pain/redness at site      ECOG Performance    0        Oncology History/Treatment  Diagnosis/Stage:   6/2023: [id number] lower third esophageal adenocarcinoma  -Presented with dysphagia x2 months, increased acid reflux and 10 pound weight loss  -6/5/2023 EGD: Large fungating mass in the lower third esophagus, 38 cm from incisors.  Mass partially obstructing and circumferential.  Biopsy of mass: Adenocarcinoma.  GE junction biopsy also positive for adenocarcinoma.  -PET scan: Hypermetabolic activity distal esophageal mass.  Low-moderate activity in hilar and mediastinal adenopathy, indeterminant and possibly inflammatory in nature.  No evident distant metastases.  -Negative  PD-L1 expression  -7/2/2023 EUS: Partially circumferential progressing to circumferential distal esophageal mass spanning 36-41 cm with the junction estimated at 41 cm.  Lesion involves layer 4 but no evidence of layer 5 involvement no other structures involved.  5 regional periesophageal lymph nodes were noted, only 1 of these had malignant; no distant nodes noted.  Unremarkable pancreaticobiliary system and liver.  -7/5/2023: Diagnostic lap pleuroscopy without metastasis.  PEJ placed    Treatment:  Met with Dr. Levi in Thoracic Surgery.  Discussed consideration of surgical resection pending response to preoperative chemoradiation.    7/5/2023: PEJ tube feeding placement    7/24/2023 -present: Definitive/neoadjuvant chemoradiation with weekly carboplatin and Taxol  -Delayed start due to mild COVID infection      Physical Exam    GENERAL: Alert and oriented to time place and person. Seated comfortably. In no distress.  Alone.  HEAD: Atraumatic and normocephalic. No alopecia.  EYES: ANTONIA, EOMI. No erythema. No icterus.  ORAL CAVITY: Moist. No mucosal lesion or tonsillar enlargement.  LYMPH NODES: No palpable supraclavicular, cervical lymphadenopathy.  CHEST: clear to auscultation bilaterally. Resonant to percussion throughout bilaterally. Symmetrical breath movements bilaterally.  CVS: RRR  ABDOMEN: Soft. Not tender. Not distended.  Laparoscopic sites well-healed.  Feeding tube intact. No significant drainage.  No erythema nor pain.  EXTREMITIES: Warm. No peripheral edema.  SKIN: no rash, or bruising or purpura.   NEURO: No gross deficit noted. Non-antalgic gait.      Lab Results    Recent Results (from the past 168 hour(s))   Creatinine   Result Value Ref Range    Creatinine 0.96 0.67 - 1.17 mg/dL    GFR Estimate 79 >60 mL/min/1.73m2   CBC with platelets and differential   Result Value Ref Range    WBC Count 3.9 (L) 4.0 - 11.0 10e3/uL    RBC Count 4.20 (L) 4.40 - 5.90 10e6/uL    Hemoglobin 12.8 (L) 13.3 - 17.7  g/dL    Hematocrit 37.7 (L) 40.0 - 53.0 %    MCV 90 78 - 100 fL    MCH 30.5 26.5 - 33.0 pg    MCHC 34.0 31.5 - 36.5 g/dL    RDW 13.7 10.0 - 15.0 %    Platelet Count 121 (L) 150 - 450 10e3/uL    % Neutrophils 76 %    % Lymphocytes 9 %    % Monocytes 9 %    % Eosinophils 4 %    % Basophils 1 %    % Immature Granulocytes 1 %    NRBCs per 100 WBC 0 <1 /100    Absolute Neutrophils 3.0 1.6 - 8.3 10e3/uL    Absolute Lymphocytes 0.4 (L) 0.8 - 5.3 10e3/uL    Absolute Monocytes 0.4 0.0 - 1.3 10e3/uL    Absolute Eosinophils 0.2 0.0 - 0.7 10e3/uL    Absolute Basophils 0.1 0.0 - 0.2 10e3/uL    Absolute Immature Granulocytes 0.0 <=0.4 10e3/uL    Absolute NRBCs 0.0 10e3/uL       Imaging    XR Abdomen 1 View    Result Date: 8/9/2023  EXAM: XR ABDOMEN 1 VIEW LOCATION: [facility] DATE: 8/8/2023 INDICATION: Evaluate J tube placement COMPARISON: 07/07/2023     IMPRESSION: Enteric tube overlies the abdominal midline which may reflect placement within the jejunum, though given variability of small bowel course, instillation of water-soluble contrast would be beneficial if definitive confirmation is necessary. Visualized bowel gas pattern is nonobstructive. Left hip arthroplasty.    XR Chest 2 Views    Result Date: 7/16/2023  EXAM: XR CHEST 2 VIEWS LOCATION: [facility] DATE: 7/16/2023 INDICATION: cough, concern for pneumonia COMPARISON: None.     IMPRESSION: No focal consolidation or effusion. Mild bronchial thickening. Heart size is normal. No acute osseous findings.     Billing  Total time 30 minutes, to include face to face visit, review of EMR, ordering, documentation and coordination of care on date of service    Signed by: Patricia Chapman NP

## 2023-08-14 NOTE — LETTER
8/14/2023         RE: Liu Ragsdale  [address] [facility] [address]        Dear Colleague,    Thank you for referring your patient, Liu Ragsdale, to the [facility] [place]. Please see a copy of my visit note below.    [facility] Hematology and Oncology Outpatient Progress Note    Patient: Liu Ragsdale  MRN: [id number]  Date of Service: Aug 14, 2023          Reason for Visit    Locally advanced esophageal cancer    Primary Oncologist: Formerly, Dr. Locke      Assessment/Plan  cT2cN2 lower esophageal adenocarcinoma  Liu met with Thoracic Surgery who recommended neoadjuvant chemoradiation with reconsideration of resection pending response.     His chemoradiation was delayed a few weeks for him to recover from COVID.  Once recovered, he initiated this with weekly carboplatin and Taxol 3 weeks ago.  He is about detention through and has tolerated this well, so far.    Labwork: CBC and creatinine adequate for treatment    Plan:  -Proceed with day 22 (week 4) carboplatin and Taxol, requires no dose reductions  -Continue weekly carboplatin and Taxol through his radiation  -He's starting to note very minimal esophageal discomfort, not yet requiring medications.  He does have Magic mouthwash and oxycodone available if needed   -Return in 2 weeks, as he's completing treatment. He will meet Dr. Haines to establish care at that time, since Dr. Locke is no longer working in our practice.  -We will plan to restage following chemoradiation and visit back with Dr. Levi in Thoracic Surgery, we will coordinate the timing and imaging they would like as he is completing his treatment    Nutrition/hydration  He is using his PEJ tube for all nutrition and hydration.  He is eating/drinking minimally by mouth. Weight is stable.    Plan:  -Continue feedings and hydration through tube  -Advance oral diet as able    3.  PEJ drainage  He is using a barrier cream and was put on Keflex last  week for possible early cellulitis.  Symptoms are improved. Per x-ray the tube was in proper placement and had been functioning well.    Plan:  -Follow-up with wound RN or team that placed the feeding tube if needed  ______________________________________________________________________________    History of Present Illness/ Interval History    Mr. Liu Ragsdale  is a 83 year old with lower esophageal adenocarcinoma.  He initiated chemoradiation with weekly carboplatin and Taxol 3 weeks ago and presents ahead of day 22 (week 4).    So far, tolerating his chemo and radiation well. Mild occasional nausea, using antiemetics. No diarrhea, fevers. Baseline mild tingling in bilateral fingers (related to carpal tunnel) unchanged.     Due to dysphagia and lack of taste, he's not able to get any meaningful calories/hydration by mouth, so is dependent on his feeding tube.  He is up to his feeding goal and keeping weight stable.  He is taking an oral as he is able to.    Starting to note minimal esophageal pressure-like discomfort, but no significant pain yet. He is getting Magic Mouthwash today.    He was evaluated in the emergency room last week for increased jejunostomy site drainage.  It was uncertain if this was enteric drainage versus a small suture infection.  X-ray showed tube to be in proper placement and it was functioning normally.  He was provided barrier paste for the site and treated with Keflex for possible mild cellulitis.  This is improving. No pain/redness at site      ECOG Performance    0        Oncology History/Treatment  Diagnosis/Stage:   6/2023: [id number] lower third esophageal adenocarcinoma  -Presented with dysphagia x2 months, increased acid reflux and 10 pound weight loss  -6/5/2023 EGD: Large fungating mass in the lower third esophagus, 38 cm from incisors.  Mass partially obstructing and circumferential.  Biopsy of mass: Adenocarcinoma.  GE junction biopsy also positive for adenocarcinoma.  -PET  scan: Hypermetabolic activity distal esophageal mass.  Low-moderate activity in hilar and mediastinal adenopathy, indeterminant and possibly inflammatory in nature.  No evident distant metastases.  -Negative PD-L1 expression  -7/2/2023 EUS: Partially circumferential progressing to circumferential distal esophageal mass spanning 36-41 cm with the junction estimated at 41 cm.  Lesion involves layer 4 but no evidence of layer 5 involvement no other structures involved.  5 regional periesophageal lymph nodes were noted, only 1 of these had malignant; no distant nodes noted.  Unremarkable pancreaticobiliary system and liver.  -7/5/2023: Diagnostic lap pleuroscopy without metastasis.  PEJ placed    Treatment:  Met with Dr. Levi in Thoracic Surgery.  Discussed consideration of surgical resection pending response to preoperative chemoradiation.    7/5/2023: PEJ tube feeding placement    7/24/2023 -present: Definitive/neoadjuvant chemoradiation with weekly carboplatin and Taxol  -Delayed start due to mild COVID infection      Physical Exam    GENERAL: Alert and oriented to time place and person. Seated comfortably. In no distress.  Alone.  HEAD: Atraumatic and normocephalic. No alopecia.  EYES: ANTONIA, EOMI. No erythema. No icterus.  ORAL CAVITY: Moist. No mucosal lesion or tonsillar enlargement.  LYMPH NODES: No palpable supraclavicular, cervical lymphadenopathy.  CHEST: clear to auscultation bilaterally. Resonant to percussion throughout bilaterally. Symmetrical breath movements bilaterally.  CVS: RRR  ABDOMEN: Soft. Not tender. Not distended.  Laparoscopic sites well-healed.  Feeding tube intact. No significant drainage.  No erythema nor pain.  EXTREMITIES: Warm. No peripheral edema.  SKIN: no rash, or bruising or purpura.   NEURO: No gross deficit noted. Non-antalgic gait.      Lab Results    Recent Results (from the past 168 hour(s))   Creatinine   Result Value Ref Range    Creatinine 0.96 0.67 - 1.17 mg/dL    GFR  Estimate 79 >60 mL/min/1.73m2   CBC with platelets and differential   Result Value Ref Range    WBC Count 3.9 (L) 4.0 - 11.0 10e3/uL    RBC Count 4.20 (L) 4.40 - 5.90 10e6/uL    Hemoglobin 12.8 (L) 13.3 - 17.7 g/dL    Hematocrit 37.7 (L) 40.0 - 53.0 %    MCV 90 78 - 100 fL    MCH 30.5 26.5 - 33.0 pg    MCHC 34.0 31.5 - 36.5 g/dL    RDW 13.7 10.0 - 15.0 %    Platelet Count 121 (L) 150 - 450 10e3/uL    % Neutrophils 76 %    % Lymphocytes 9 %    % Monocytes 9 %    % Eosinophils 4 %    % Basophils 1 %    % Immature Granulocytes 1 %    NRBCs per 100 WBC 0 <1 /100    Absolute Neutrophils 3.0 1.6 - 8.3 10e3/uL    Absolute Lymphocytes 0.4 (L) 0.8 - 5.3 10e3/uL    Absolute Monocytes 0.4 0.0 - 1.3 10e3/uL    Absolute Eosinophils 0.2 0.0 - 0.7 10e3/uL    Absolute Basophils 0.1 0.0 - 0.2 10e3/uL    Absolute Immature Granulocytes 0.0 <=0.4 10e3/uL    Absolute NRBCs 0.0 10e3/uL       Imaging    XR Abdomen 1 View    Result Date: 8/9/2023  EXAM: XR ABDOMEN 1 VIEW LOCATION: [facility] DATE: 8/8/2023 INDICATION: Evaluate J tube placement COMPARISON: 07/07/2023     IMPRESSION: Enteric tube overlies the abdominal midline which may reflect placement within the jejunum, though given variability of small bowel course, instillation of water-soluble contrast would be beneficial if definitive confirmation is necessary. Visualized bowel gas pattern is nonobstructive. Left hip arthroplasty.    XR Chest 2 Views    Result Date: 7/16/2023  EXAM: XR CHEST 2 VIEWS LOCATION: [facility] DATE: 7/16/2023 INDICATION: cough, concern for pneumonia COMPARISON: None.     IMPRESSION: No focal consolidation or effusion. Mild bronchial thickening. Heart size is normal. No acute osseous findings.     Billing  Total time 30 minutes, to include face to face visit, review of EMR, ordering, documentation and coordination of care on date of service    Signed by: Patricia Chapman NP    Oncology Rooming  "Note    August 14, 2023 9:53 AM   Liu Ragsdale is a 83 year old male who presents for:    Chief Complaint   Patient presents with     Oncology Clinic Visit     Malignant neoplasm of lower third of esophagus - Labs provider and infusion     Initial Vitals: /56 (BP Location: Right arm, Patient Position: Sitting, Cuff Size: Adult Regular)   Pulse 80   Temp 98  F (36.7  C) (Oral)   Resp 16   Ht 1.562 m (5' 1.5\")   Wt 73.9 kg (163 lb)   SpO2 97%   BMI 30.30 kg/m   Estimated body mass index is 30.3 kg/m  as calculated from the following:    Height as of this encounter: 1.562 m (5' 1.5\").    Weight as of this encounter: 73.9 kg (163 lb). Body surface area is 1.79 meters squared.  No Pain (1) Comment: Data Unavailable   No LMP for male patient.  Allergies reviewed: Yes  Medications reviewed: Yes    Medications: Medication refills not needed today.  Pharmacy name entered into [employer]:    [facility] - [place], [place] - [address] [facility] - [place] - [address] [facility] AT PORTAL TO REGISTERED [facility] SITES  [facility] - [place], [place] - [address] [facility] [place] - [place], [place] - [address] [facility]    Clinical concerns:  None      Natty Ribeiro CMA                Again, thank you for allowing me to participate in the care of your patient.        Sincerely,        Patricia Chapman NP  "

## 2023-08-15 NOTE — PROGRESS NOTES
CLINICAL NUTRITION SERVICES - BRIEF NOTE    Patient was contacted by phone due to reporting concerns about request to be contacted by RD on the Oncology Distress Screening tool. Spoke with patient for 15 minutes regarding TF questions; patient reports he is at goal rate an tolerating well. Registered Dietitian contact information provided to patient if further questions arise.    Brenda Santos RDN, LD  Clinical Dietitian  Office: [phone]  Weekend pager: [phone]

## 2023-08-16 NOTE — PROGRESS NOTES
Hematology/Medical Oncology Follow-up Note      August 31, 2023    Reason for visit:  Liu Ragsdale is a 83 year old retired  from [employer] accompanied by his wife Kelsi who presents for oncologic re-evaluation s/p 8/25/2023 completion concurrent chemoradiation with weekly paclitaxel/carboplatin for treatment of stage III-B distal esophageal adenocarcinoma.    Impression:  S/p 8/25/2023 completion of potential neoadjuvant chemoradiation for a stage III-B, uT2, uN2 distal esophageal adenocarcinoma, PD-L1 TPS 0, HER2 negative  History of carotid artery disease, coronary artery disease, hypertension  Jejunostomy tube enteral alimentation  Inadequate free water supplementation per jejunostomy tube    Recommendation, plan, instructions:  Increase free water flushes to 300 cc before and after tube feedings  Follow-up with me in 2 weeks with CBCs, CMP before appointment  PET scan around 9/29/2023 which would be 5 weeks after completion of chemoradiation  Dietitian to see regarding adequate free water through tube feedings and other management.  I do not believe the patient should need a implanted port for hydration purposes  Follow-up Dr. Richie Levi at [facility] for surgery follow-up week of 10/2/2023    Time with patient including review, documentation, history, examination, coordination of care and counseling was 30 minutes.    History of present illness:  In April, 2023, the patient noted the onset of solid food dysphagia, acid reflux and 10 pound weight loss.    Subsequent 6/5/2023 EGD revealed a distal esophageal large fungating mass that was partly obstructing with biopsy confirming a moderately to poorly differentiated adenocarcinoma that was PD-L1 TPS 0, HER2 negative.  6/14/2023 PET scan confirmed a hypermetabolic distal esophageal mass with low to moderate hilar and mediastinal adenopathy possibly inflammatory in nature without clear evidence of distant metastases.  7/2/2023 EUS demonstrated a lesion  to involve layer 4 with five regional paraesophageal lymph nodes identified either at or above the primary with one that was inferior with malignant features.  The remainder of the study was unremarkable including gallbladder, pancreas and liver. (Clinical stage III-B, uT2, uN2, cM0)    On 6/20/2023, the patient was seen by Dr. Ferny Levi who felt the patient's performance status was that he may be a surgical candidate and will be reevaluated after chemoradiation.  On 7/5/2023, a diagnostic laparoscopy was performed with insertion of a jejunostomy feeding tube.    On 7/24/2023, chemoradiation commenced after a one week delay because of a mild COVID-19 infection.    He does not use tobacco or alcohol.  He has 4 to 5 cans of tube feedings per dietary but has not been seen by dietitian recently.     [employer]  from [id number].    Past medical history:  Past Medical History:   Diagnosis Date    Angina at rest (H)     Arthritis     Cerebrovascular accident (H) 09/27/2021    Coronary artery disease involving native coronary artery of native heart without angina pectoris     Dysphagia     Esophageal adenocarcinoma (H)     DISTAL ESOPHAGUS    Hypertension     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) 7/3/2023        Family history:  I have reviewed this patient's family history and updated it with pertinent information if needed.  Family History   Problem Relation Age of Onset    Hypertension Sister     Kidney failure Sister     Chronic Obstructive Pulmonary Disease Sister     Thyroid Disease Sister     Seizure Disorder Paternal Grandmother     Mitral valve prolapse Daughter     No Known Problems Son     Cerebrovascular Disease No family hx of          Medications:  Current Outpatient Medications   Medication    acetaminophen (TYLENOL) 160 MG/5ML solution    acetaminophen (TYLENOL) 325 MG/10.15ML solution    atorvastatin (LIPITOR) 10 MG tablet    carvedilol (COREG) 6.25 MG tablet    COLLAGEN PO     "Dextromethorphan-guaiFENesin 5-100 MG/5ML LIQD    diclofenac (VOLTAREN) 1 % topical gel    magic mouthwash (ENTER INGREDIENTS IN COMMENTS) suspension    metoclopramide (REGLAN) 5 MG/5ML solution    Multiple Vitamin (MULTI VITAMIN PO)    nitroGLYcerin (NITROSTAT) 0.3 MG sublingual tablet    NONFORMULARY    ondansetron (ZOFRAN) 4 MG/5ML solution    oxyCODONE (ROXICODONE) 5 MG/5ML solution    oxyCODONE (ROXICODONE) 5 MG/5ML solution    simethicone (MYLICON) 40 MG/0.6ML suspension    ondansetron (ZOFRAN ODT) 8 MG ODT tab    pantoprazole (PROTONIX) 40 MG EC tablet     No current facility-administered medications for this visit.       Allergies:  Allergies   Allergen Reactions    Ampicillin Rash    Latex Rash    Metoprolol Other (See Comments)     Side effects : dry mouth       Review of systems:  Odynophagia persists and he is unable to take any food or liquids at this time.  Except as noted in the note above, the patient denies headaches, diplopia, hearing loss or dizziness; dyspnea, cough, hemoptysis, pleurisy; chest pain/pressure, palpitations, lightheadedness; anorexia, nausea, vomiting, abdominal pain, diarrhea, constipation, melena or rectal bleeding; dysuria, frequency, nocturia, blood in the urine; fever, chills, sweats; tingling, numbness, loss of balance; insomnia, depression, anxiety.    Physical examination:  BP (!) 141/69 (BP Location: Right arm, Patient Position: Sitting, Cuff Size: Adult Large)   Pulse 86   Temp 98.3  F (36.8  C) (Tympanic)   Resp 12   Ht 1.549 m (5' 1\")   Wt 76.7 kg (169 lb)   SpO2 96%   BMI 31.93 kg/m      The patient is alert and oriented. Throat and oral mucosa are normal-appearing. Thyroid gland is not enlarged. Adenopathy is absent in the neck, axilla, groin and supraclavicular fossa; Lungs are clear to auscultation and percussion without rales, wheezes or rubs; heart rhythm is regular, heart sounds are without murmurs or gallops; the abdomen is soft and flat without " hepatosplenomegaly, masses, ascites or tenderness.; extremities and skin reveal no unusual skin lesions, joint swelling or edema;    Tucker Haines MD

## 2023-08-16 NOTE — TELEPHONE ENCOUNTER
General Call      Reason for Call:      What are your questions or concerns:  Pt calling because he had an appt this morning at Radiation Oncology in [place] and stated he wanted Dr. Rogers to look at his BP readings from that appt and see if any of his medications need to be changed or updated according to readings.      Could we send this information to you in [place] or would you prefer to receive a phone call?:   Patient would prefer a phone call   Okay to leave a detailed message?: Yes at Home number on file [phone] (home)

## 2023-08-16 NOTE — TELEPHONE ENCOUNTER
Attempted to call, unable to leave a message and will attempt to call patient back later re: see message below from Dr. Rogers.    Julie Behrendt RN

## 2023-08-16 NOTE — TELEPHONE ENCOUNTER
Spoke with patient, relayed Dr. Rogers's detailed message below and patient verbalized good understanding. Patient will call back to schedule RN only visit, needs to check his schedule first.     Julie Behrendt RN

## 2023-08-16 NOTE — PROGRESS NOTES
[facility]  SPECIALIZING IN BREAKTHROUGHS  Radiation Oncology    On Treatment Visit Note      Liu Ragsdale      Date: 2023   MRN: [id number]   : 1940  Diagnosis: esphageal cancer      Reason for Visit:  On Radiation Treatment Visit     Treatment Summary to Date  Treatment Site: chest/abdomen Current Dose: 3600/5000 cGy Fractions:       Chemotherapy  Chemo concurrent with radx?: Yes  Oncologist: Dr. Locke  Drug Name/Frequency 1: carbo/taxol    Subjective:   Doing okay. More nausea.  Taking anti-emetic with some alleviation. No diarrhea.  Continues to undergo and advance tube feeds via J-tube.  Has also noticed some improvement with oral intake conservative treatment.   Denies any significant pain. Taking Tylenol as needed for pain. Has oxycodone oral solution PRN.  Having some irritation near J tube site, will reach out to thoracic surgery team. Was seen in ED last week who started the patient on Keflex.        Nursing ROS:   Nutrition Alteration  Diet Type: Gastrostomy Tube Feedings  Nutrition Note: only ice chips/very small bites of soft foods  Skin  Skin Reaction: 0 - No changes        Cardiovascular  Respiratory effort: 2 - Mild dyspnea on exertion  Cardio/Resp Note: lingering cough due to covid +, but improving  Gastrointestinal  GI Note: looser bowels due to tube feeds        Pain Assessment  Pain Note: discomfort at the peg site, only using liquid tylenol      Objective:   /58   Pulse 76   Resp 18   Wt 73.5 kg (162 lb)   SpO2 96%   BMI 30.11 kg/m    NAD  No respiratory distress   S/p J-tube placement, noted surrounding erythema and drainage     Labs:  CBC RESULTS: Recent Labs   Lab Test 23  1114   WBC 7.0   RBC 4.48   HGB 13.2*   HCT 40.4   MCV 90   MCH 29.5   MCHC 32.7   RDW 13.0        ELECTROLYTES:  Recent Labs   Lab Test 23  1114 07/10/23  0645 23  1556 23  0657   NA  --   --   --  140   POTASSIUM  --  4.1   < > 3.3*   CHLORIDE  --   --    --  104   GWEN  --   --   --  8.5*   CO2  --   --   --  25   BUN  --   --   --  7.6*   CR 1.02  --   --  0.89   GLC  --   --   --  180*    < > = values in this interval not displayed.       Assessment:  Mr. 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  He is undergoing chemoradiation therapy followed by re-evaluation for surgical resection.    Tolerating radiation therapy well.  All questions and concerns addressed.    Plan:   Continue current therapy.    Nutrition.  Continue tube feeds via J-tube.  3.   Cancer related pain.  Tylenol as needed.  4.  J-tube site drainage.  Has been started on antibiotic.  Patient will reach out to thoracic surgery team as well.  5.  Cancer related pain.  Oxycodone oral solution as needed.  6.  Nausea.  We will send Zofran ODT to pharmacy.      Mosaiq chart and setup information reviewed  Ports checked    Medication Review  Med list reviewed with patient?: Yes           Jarred Alcantar MD

## 2023-08-16 NOTE — TELEPHONE ENCOUNTER
Pt was called back, he has felt slightly lightheaded the last 2 days when changing position. His BP at radiology this am was 102/58.  He has been checking his BP at home, it has been in the 122/60 range. He says he hasn't been drinking enough water and will drink more. He has no fever. His BP has been trending down the last 3 weeks and he is wondering if his BP med needs to be adjusted. Message forwarded to PCP per pt request.   Glendy Mccord RN

## 2023-08-16 NOTE — TELEPHONE ENCOUNTER
Will recommend to hold off amlodipine and schedule RN visit for blood pressure check.    Dr Rogers

## 2023-08-16 NOTE — LETTER
2023         RE: Liu Ragsdale  [address] [address]        Dear Colleague,    Thank you for referring your patient, Liu Ragsdale, to the  [facility]. Please see a copy of my visit note below.    [facility]  SPECIALIZING IN BREAKTHROUGHS  Radiation Oncology    On Treatment Visit Note      Liu Ragsdale      Date: 2023   MRN: [id number]   : 1940  Diagnosis: esphageal cancer      Reason for Visit:  On Radiation Treatment Visit     Treatment Summary to Date  Treatment Site: chest/abdomen Current Dose: 3600/5000 cGy Fractions:       Chemotherapy  Chemo concurrent with radx?: Yes  Oncologist: Dr. Locke  Drug Name/Frequency 1: carbo/taxol    Subjective:   Doing okay. More nausea.  Taking anti-emetic with some alleviation. No diarrhea.  Continues to undergo and advance tube feeds via J-tube.  Has also noticed some improvement with oral intake conservative treatment.   Denies any significant pain. Taking Tylenol as needed for pain. Has oxycodone oral solution PRN.  Having some irritation near J tube site, will reach out to thoracic surgery team. Was seen in ED last week who started the patient on Keflex.        Nursing ROS:   Nutrition Alteration  Diet Type: Gastrostomy Tube Feedings  Nutrition Note: only ice chips/very small bites of soft foods  Skin  Skin Reaction: 0 - No changes        Cardiovascular  Respiratory effort: 2 - Mild dyspnea on exertion  Cardio/Resp Note: lingering cough due to covid +, but improving  Gastrointestinal  GI Note: looser bowels due to tube feeds        Pain Assessment  Pain Note: discomfort at the peg site, only using liquid tylenol      Objective:   /58   Pulse 76   Resp 18   Wt 73.5 kg (162 lb)   SpO2 96%   BMI 30.11 kg/m    NAD  No respiratory distress   S/p J-tube placement, noted surrounding erythema and drainage     Labs:  CBC RESULTS: Recent Labs   Lab Test 23  1114   WBC 7.0   RBC  4.48   HGB 13.2*   HCT 40.4   MCV 90   MCH 29.5   MCHC 32.7   RDW 13.0        ELECTROLYTES:  Recent Labs   Lab Test 07/24/23  1114 07/10/23  0645 07/08/23  1556 07/08/23  0657   NA  --   --   --  140   POTASSIUM  --  4.1   < > 3.3*   CHLORIDE  --   --   --  104   GWEN  --   --   --  8.5*   CO2  --   --   --  25   BUN  --   --   --  7.6*   CR 1.02  --   --  0.89   GLC  --   --   --  180*    < > = values in this interval not displayed.       Assessment:  Mr. 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  He is undergoing chemoradiation therapy followed by re-evaluation for surgical resection.    Tolerating radiation therapy well.  All questions and concerns addressed.    Plan:   Continue current therapy.    Nutrition.  Continue tube feeds via J-tube.  3.   Cancer related pain.  Tylenol as needed.  4.  J-tube site drainage.  Has been started on antibiotic.  Patient will reach out to thoracic surgery team as well.  5.  Cancer related pain.  Oxycodone oral solution as needed.  6.  Nausea.  We will send Zofran ODT to pharmacy.      Mosaiq chart and setup information reviewed  Ports checked    Medication Review  Med list reviewed with patient?: Yes           Jarred Alcantar MD      Again, thank you for allowing me to participate in the care of your patient.        Sincerely,        Jarred Alcantar MD

## 2023-08-17 NOTE — TELEPHONE ENCOUNTER
Spoke with July HAYES at [facility] who states patient has lost 5 lbs since 8/8/23. Currently is on Jevity 1.5 rate of 90 ml/hr per J-tube only does feedings during daytime hours and now has added a protein shake for supplement.      Also wanted to update you regarding patient will be receiving IV fluid hydration 3 x/wk per Oncology.    Update sent to PCP.    Julie Behrendt RN

## 2023-08-21 NOTE — PROGRESS NOTES
Infusion Nursing Note:  Liu Ragsdale presents today for Carbo/Taxol C1D29    Patient seen by provider today: No   present during visit today: Not Applicable.    Note: increased N/V.    Intravenous Access:  Peripheral IV placed.    Treatment Conditions:  Lab Results   Component Value Date    HGB 11.6 (L) 08/21/2023    WBC 3.1 (L) 08/21/2023    ANEU 13.2 (H) 01/22/2018    ANEUTAUTO 2.6 08/21/2023     (L) 08/21/2023        Lab Results   Component Value Date     07/08/2023    POTASSIUM 4.1 07/10/2023    MAG 1.9 07/08/2023    CR 0.97 08/21/2023    GWEN 8.5 (L) 07/08/2023    BILITOTAL 0.4 06/08/2023    ALBUMIN 4.2 06/08/2023    ALT 24 06/08/2023    AST 28 06/08/2023   Results reviewed, labs MET treatment parameters, ok to proceed with treatment.    Post Infusion Assessment:  Patient tolerated infusion without incident.  Blood return noted pre and post infusion.  Site patent and intact, free from redness, edema or discomfort.  No evidence of extravasations.  Access discontinued per protocol.     Discharge Plan:   Discharge instructions reviewed with: Patient.  Patient and/or family verbalized understanding of discharge instructions and all questions answered.  AVS to patient via [employer].  Patient will return 8/31/2023 for visit with Dr. Atkins for next appointment.   Patient discharged in stable condition accompanied by: self.  Departure Mode: Ambulatory.    Scarlet Whitley RN

## 2023-08-21 NOTE — TELEPHONE ENCOUNTER
Pt is in his final week of cancer treatment and pt stating that now after starting radiation treatment BP running 100's systolic.  Pt talked with PCP and stopped Amlodipine.  Pt stopped Amlodipine on 8/17 and today /74.  Pt calling to schedule appt.  Per OV note, Dr Rogers asked for appt to be scheduled for appt this week.  All appts per scheduling are booked.    Please advise pt.  He can be reached at [phone].    Thank you  Savannah Davenport RN  [facility] Nurse Advisor

## 2023-08-21 NOTE — TELEPHONE ENCOUNTER
Spoke with patient, last BP a short time ago was 141/74. Dr Rogers is gone for the day. Will schedule an appt with PCP this week after talking with Dr Rogers's CMA tomorrow. Patient available after 11 or 12 Wednesday and Thursday.   Rasheeda TRINH RN

## 2023-08-21 NOTE — TELEPHONE ENCOUNTER
Copied form staff note: Please let patient know to schedule appointment this week, can be video visit.    Dr Rogers   No appointments open this week. Will discuss with Dr Rogers's CMA tomorrow and notify patient. Liu is available after 11 or 12 this Wednesday or Thursday to discuss his BP and medications. He is able to do a virtual visit. He completed chemo treatments today.   Last BP 15 minutes ago 141/74.   Ok to leave message on patient's mobile #: [phone]   Rasheeda TRINH RN

## 2023-08-22 NOTE — PROGRESS NOTES
Liu Ragsdale is a 83 year old year old patient who comes in today for a Blood Pressure check because of medication change. Amlodipine 5 mg on hold for recent low BP.  Vital Signs as repeated by /58 p60, 128/58  Patient is taking medication as prescribed  Patient is tolerating medications well.  Patient is monitoring Blood Pressure at home.  BP this morning before taking medications was 151/74   Pt has another radiation treatment today, he said he is feeling okay, not great.   Glendy Mccord RN

## 2023-08-23 NOTE — TELEPHONE ENCOUNTER
Received message that pt was having issues with leaking JT. Pt reported he was attempting to remove tape with scissors and accidentally nicked the tube itself, creating a small hole which he then wrapped tape around. He states it does not leak and he is able to take all feeds and meds without issue. Discussed that he will need to have tube replaced. It will need to be done by IR or by surgeon in OR. [facility] will work on scheduling and call pt back. Pt in agreement.    María Blackwood, RN BSN  Thoracic Surgery RN Care Coordinator  [phone]

## 2023-08-23 NOTE — TELEPHONE ENCOUNTER
Message back from IR that they have received referral and will be calling pt to schedule J tube change.    María Blackwood, RN BSN  Thoracic Surgery RN Care Coordinator  [phone]

## 2023-08-23 NOTE — PROGRESS NOTES
[facility]  SPECIALIZING IN BREAKTHROUGHS  Radiation Oncology    On Treatment Visit Note      Liu Ragsdale      Date: 23  MRN: [id number]   : 1940  Diagnosis: esphageal cancer      Reason for Visit:  On Radiation Treatment Visit     Treatment Summary to Date  Treatment Site: chest/abdomen Current Dose: 4600/5000 cGy Fractions:       Chemotherapy  Chemo concurrent with radx?: Yes  Oncologist: Dr. Locke  Drug Name/Frequency 1: carbo/taxol    Subjective:   Doing okay. More nausea.  Taking anti-emetic with some alleviation. No diarrhea.  Continues to undergo and advance tube feeds via J-tube.  Has also noticed some improvement with oral intake conservative treatment.   Denies any significant pain. Taking Tylenol as needed for pain. Has oxycodone oral solution PRN.    Having some irritation near J tube site and possible small area on tube opening, will reach out to thoracic surgery team. Has previously been treated with Abx.    Nursing ROS:   Nutrition Alteration  Diet Type: Gastrostomy Tube Feedings  Nutrition Note: only ice chips/very small bites of soft foods  Skin  Skin Reaction: 0 - No changes        Cardiovascular  Respiratory effort: 2 - Mild dyspnea on exertion  Cardio/Resp Note: lingering cough due to covid +, but improving  Gastrointestinal  GI Note: looser bowels due to tube feeds        Pain Assessment  Pain Note: discomfort at the peg site, only using liquid tylenol      Objective:   /59   Pulse 76   Resp 18   Wt 73.5 kg (162 lb)   SpO2 97%   BMI 30.11 kg/m    NAD  No respiratory distress   S/p J-tube placement, noted surrounding erythema and drainage     Labs:  CBC RESULTS: Recent Labs   Lab Test 23  1114   WBC 7.0   RBC 4.48   HGB 13.2*   HCT 40.4   MCV 90   MCH 29.5   MCHC 32.7   RDW 13.0        ELECTROLYTES:  Recent Labs   Lab Test 23  1114 07/10/23  0645 23  1556 23  0657   NA  --   --   --  140   POTASSIUM  --  4.1   < > 3.3*    CHLORIDE  --   --   --  104   GWEN  --   --   --  8.5*   CO2  --   --   --  25   BUN  --   --   --  7.6*   CR 1.02  --   --  0.89   GLC  --   --   --  180*    < > = values in this interval not displayed.       Assessment:  Mr. 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  He is undergoing chemoradiation therapy followed by re-evaluation for surgical resection.    Tolerating radiation therapy well.  All questions and concerns addressed.    Plan:   Continue current therapy.  EOT on Friday. RTC in 1 week.  Nutrition.  Continue tube feeds via J-tube.  3.   Cancer related pain.  Tylenol as needed.  4.  J-tube site drainage/small opening in tube.  Was started on antibiotic.  Patient will reach out to thoracic surgery team as well.  5.  Cancer related pain.  Oxycodone oral solution as needed.  6.  Nausea.  Continue Zofran ODT q8 hrs.      Mosaiq chart and setup information reviewed  Ports checked    Medication Review  Med list reviewed with patient?: Yes           Jarred Alcantar MD

## 2023-08-23 NOTE — LETTER
2023         RE: Liu Ragsdale  [address] [id number]        Dear Colleague,    Thank you for referring your patient, Liu Ragsdale, to the  [facility]. Please see a copy of my visit note below.    [facility]  SPECIALIZING IN BREAKTHROUGHS  Radiation Oncology    On Treatment Visit Note      Liu Ragsdale      Date: 23  MRN: [id number]   : 1940  Diagnosis: esphageal cancer      Reason for Visit:  On Radiation Treatment Visit     Treatment Summary to Date  Treatment Site: chest/abdomen Current Dose: 4600/5000 cGy Fractions:       Chemotherapy  Chemo concurrent with radx?: Yes  Oncologist: Dr. Locke  Drug Name/Frequency 1: carbo/taxol    Subjective:   Doing okay. More nausea.  Taking anti-emetic with some alleviation. No diarrhea.  Continues to undergo and advance tube feeds via J-tube.  Has also noticed some improvement with oral intake conservative treatment.   Denies any significant pain. Taking Tylenol as needed for pain. Has oxycodone oral solution PRN.    Having some irritation near J tube site and possible small area on tube opening, will reach out to thoracic surgery team. Has previously been treated with Abx.    Nursing ROS:   Nutrition Alteration  Diet Type: Gastrostomy Tube Feedings  Nutrition Note: only ice chips/very small bites of soft foods  Skin  Skin Reaction: 0 - No changes        Cardiovascular  Respiratory effort: 2 - Mild dyspnea on exertion  Cardio/Resp Note: lingering cough due to covid +, but improving  Gastrointestinal  GI Note: looser bowels due to tube feeds        Pain Assessment  Pain Note: discomfort at the peg site, only using liquid tylenol      Objective:   /59   Pulse 76   Resp 18   Wt 73.5 kg (162 lb)   SpO2 97%   BMI 30.11 kg/m    NAD  No respiratory distress   S/p J-tube placement, noted surrounding erythema and drainage     Labs:  CBC RESULTS: Recent Labs   Lab Test 23  1114    WBC 7.0   RBC 4.48   HGB 13.2*   HCT 40.4   MCV 90   MCH 29.5   MCHC 32.7   RDW 13.0        ELECTROLYTES:  Recent Labs   Lab Test 07/24/23  1114 07/10/23  0645 07/08/23  1556 07/08/23  0657   NA  --   --   --  140   POTASSIUM  --  4.1   < > 3.3*   CHLORIDE  --   --   --  104   GWEN  --   --   --  8.5*   CO2  --   --   --  25   BUN  --   --   --  7.6*   CR 1.02  --   --  0.89   GLC  --   --   --  180*    < > = values in this interval not displayed.       Assessment:  Mr. 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  He is undergoing chemoradiation therapy followed by re-evaluation for surgical resection.    Tolerating radiation therapy well.  All questions and concerns addressed.    Plan:   Continue current therapy.  EOT on Friday. RTC in 1 week.  Nutrition.  Continue tube feeds via J-tube.  3.   Cancer related pain.  Tylenol as needed.  4.  J-tube site drainage/small opening in tube.  Was started on antibiotic.  Patient will reach out to thoracic surgery team as well.  5.  Cancer related pain.  Oxycodone oral solution as needed.  6.  Nausea.  Continue Zofran ODT q8 hrs.      Mosaiq chart and setup information reviewed  Ports checked    Medication Review  Med list reviewed with patient?: Yes           Jarrde Alcantar MD      Again, thank you for allowing me to participate in the care of your patient.        Sincerely,        Jarred Alcantar MD

## 2023-08-24 NOTE — TELEPHONE ENCOUNTER
"Telephone call:    Patient reports getting prescribed zofran & reports \"being in the last stages of chemo & radiation.\"  Patient is requesting Dr. Cardozo prescribe him something \"stronger\" for his increased nausea.    Writer transferred patient to radiation office.    Linn Lau RN on 8/24/2023 at 10:49 AM   Reason for Disposition   General information question, no triage required and triager able to answer question    Protocols used: Information Only Call - No Triage-A-AH    "

## 2023-08-24 NOTE — PROGRESS NOTES
Pt notes some black/deep green drainage at jtube site.  He is scheduled for change tomorrow with IR at [facility].  Reports slight bloating/slight abd tenderness. No acute pain.  Notes stools have a black tinge.  Asked patient to call team who is assiting with Jtube change tomorrow. He and wife will call.    Vitals stable  Temp 97.54 oral  HR 96  /61  O2 100%

## 2023-08-24 NOTE — PROGRESS NOTES
Liu is a 83 year old who is being evaluated via a billable video visit.      How would you like to obtain your AVS? MyChart  If the video visit is dropped, the invitation should be resent by: Text to cell phone: [phone]  Will anyone else be joining your video visit? No          Assessment & Plan     Nausea and vomiting, unspecified vomiting type  Complains of ongoing nausea, will be finishing chemo and radiation in coming days.  Reglan prescribed and suggested to continue Zofran as needed.  - metoclopramide (REGLAN) 5 MG/5ML solution; Take 5-10 mLs (5-10 mg) by mouth 3 times daily as needed for nausea    Benign essential hypertension  Blood pressure within target goal.  Amlodipine was discontinued recently.      Piyush Rogers MD  [facility]    Subjective   Liu is a 83 year old, presenting for the following health issues:  Hypertension      History of Present Illness       Hypertension: He presents for follow up of hypertension.  He does check blood pressure  regularly outside of the clinic. Outpatient blood pressures have not been over 140/90. He follows a low salt diet.     Reason for visit:  Small amount of blood when I blow my nose  Symptom onset:  1-3 days ago  Symptoms include:  Darker colored stool- no red, just darker brown. nausea is still pretty bad.  Symptom intensity:  Mild  Symptom progression:  Staying the same  Had these symptoms before:  No  What makes it better:  Has been doing the water drip for a few days. Should he keep doing these or do syringes of water.    He eats 0-1 servings of fruits and vegetables daily.He consumes 0 sweetened beverage(s) daily.He exercises with enough effort to increase his heart rate 10 to 19 minutes per day.  He exercises with enough effort to increase his heart rate 4 days per week.   He is taking medications regularly.       Review of Systems   Constitutional, HEENT, cardiovascular, pulmonary, GI, , musculoskeletal, neuro,  skin, endocrine and psych systems are negative, except as otherwise noted.      Objective           Vitals:  No vitals were obtained today due to virtual visit.    Physical Exam   GENERAL: alert and in distress due to nausea  EYES: Eyes grossly normal to inspection.  No discharge or erythema, or obvious scleral/conjunctival abnormalities.  RESP: No audible wheeze, cough, or visible cyanosis.  No visible retractions or increased work of breathing.    SKIN: Visible skin clear. No significant rash, abnormal pigmentation or lesions.  NEURO: Cranial nerves grossly intact.  Mentation and speech appropriate for age.  PSYCH: Mentation appears normal, affect normal/bright, judgement and insight intact, normal speech and appearance well-groomed.            Video-Visit Details    Type of service:  Video Visit   Video Start Time: 1:50 PM  Video End Time:2:05 PM    Originating Location (pt. Location): Home    Distant Location (provider location):  On-site  Platform used for Video Visit: Brooks

## 2023-08-25 NOTE — PROGRESS NOTES
Interventional Radiology Brief Post Procedure Note    Procedure: Jejunostomy exchange    Proceduralist: Dr. Len Pimentel, Massiel Manjarrez PA-C    Time Out: Prior to the start of the procedure and with procedural staff participation, I verbally confirmed the patient s identity using two indicators, relevant allergies, that the procedure was appropriate and matched the consent or emergent situation, and that the correct equipment/implants were available. Immediately prior to starting the procedure I conducted the Time Out with the procedural staff and re-confirmed the patient s name, procedure, and site/side. (The Joint Commission universal protocol was followed.)  Yes    Medications   Medication Event Details Admin User Admin Time       Sedation: None. Local lidocaine gel used.    Findings: Successful and uneventful fluoroscopic-guided exchange of 16 Fr, 15 cm jejunostomy tube. Retention balloon filled with 5 ml saline.     Estimated Blood Loss: None    Fluoroscopy Time: < 3  minute(s)    SPECIMENS: None    Complications: 1. None     Condition: Stable    Plan: Follow-up per primary team.     Comments: See dictated procedure note for full details.    Loni Manjarrez PA-C

## 2023-08-26 NOTE — TELEPHONE ENCOUNTER
Triage call  Patient calling to report he has been taking his blood pressure before he takes his morning meds he is concerned about the pulse today it is 84 but over the past week it has ranged in the .  He states when it is beating faster he can tell.  He is struggling with nausea and has difficulty getting fluids in he is consciously trying to get more fluids he says his skin when he pinches it is good right now.  He has  liquid zofran and he did not know if  the zofran will help him  so called the pharmacy and they stated it would be worth a try it may help settle things down. He also had a breaif incidence of dizziness but that was  not today.    Per [facility] home care   He will call back if his heart rate increases but he feels ok right now.Care advice given.  Verbalizes understanding and agrees with plan.    Ellie Matos RN   [facility] Nurse Advisor  9:25 AM 8/26/2023    Additional Information   Negative: Passed out (i.e., lost consciousness, collapsed and was not responding)   Negative: Shock suspected (e.g., cold/pale/clammy skin, too weak to stand, low BP, rapid pulse)   Negative: Difficult to awaken or acting confused (e.g., disoriented, slurred speech)   Negative: Visible sweat on face or sweat dripping down face   Negative: Unable to walk, or can only walk with assistance (e.g., requires support)   Negative: [1] Received SHOCK from implantable cardiac defibrillator AND [2] persisting symptoms (i.e., palpitations, lightheadedness)   Negative: [1] Dizziness, lightheadedness, or weakness AND [2] heart beating very rapidly (e.g., > 140 / minute)   Negative: [1] Dizziness, lightheadedness, or weakness AND [2] heart beating very slowly (e.g., < 50 / minute)   Negative: Sounds like a life-threatening emergency to the triager   Negative: Chest pain   Negative: Implantable Cardiac Defibrillator (ICD) or a pacemaker symptoms or questions   Negative: Difficulty breathing   Negative: Dizziness,  "lightheadedness, or weakness   Negative: [1] Heart beating very rapidly (e.g., > 140 / minute) AND [2] present now  (Exception: During exercise.)   Negative: Heart beating very slowly (e.g., < 50 / minute)  (Exception: Athlete and heart rate normal for caller.)   Negative: New or worsened shortness of breath with activity (dyspnea on exertion)   Negative: Patient sounds very sick or weak to the triager   Negative: [1] Heart beating very rapidly (e.g., > 140 / minute) AND [2] not present now  (Exception: During exercise.)   Negative: [1] Skipped or extra beat(s) AND [2] increases with exercise or exertion   Negative: [1] Skipped or extra beat(s) AND [2] occurs 4 or more times per minute   Negative: New or worsened ankle swelling   Negative: History of heart disease (i.e., heart attack, bypass surgery, angina, angioplasty, CHF)  (Exception: Brief heartbeat symptoms that went away and now feels well.)   Negative: Age > 60 years  (Exception: Brief heartbeat symptoms that went away and now feels well.)   Negative: Taking water pill (i.e., diuretic) or heart medication (e.g., digoxin)   Negative: Wearing a \"Holter monitor\" or \"cardiac event monitor\"   Negative: [1] Received SHOCK from implantable cardiac defibrillator AND [2] now feels well   Negative: Heart rhythm alert (e.g., \"you have irregular heartbeat\") from personal wearable device (e.g., Apple Watch)   Negative: History of hyperthyroidism or taking thyroid medication   Negative: Substance use (drug use) or misuse, known or suspected   Negative: [1] ADHD AND [2] taking stimulant medication   Negative: [1] Palpitations AND [2] no improvement after using Care Advice   Negative: Problems with anxiety or stress   Negative: Palpitations are a chronic symptom (recurrent or ongoing AND present > 4 weeks)   Negative: [1] Skipped or extra beat(s) AND [2] occurs < 4 times / minute   Negative: Palpitations    Protocols used: Heart Rate and Heartbeat [id number]    "

## 2023-08-29 PROBLEM — D64.9 ANEMIA, UNSPECIFIED TYPE: Status: ACTIVE | Noted: 2023-01-01

## 2023-08-29 PROBLEM — Z86.73 HISTORY OF ISCHEMIC STROKE: Status: ACTIVE | Noted: 2021-09-28

## 2023-08-29 PROBLEM — D61.810 ANTINEOPLASTIC CHEMOTHERAPY INDUCED PANCYTOPENIA (H): Status: ACTIVE | Noted: 2023-01-01

## 2023-08-29 PROBLEM — Z93.4 JEJUNOSTOMY TUBE IN SITU (H): Status: ACTIVE | Noted: 2023-01-01

## 2023-08-29 PROBLEM — R13.10 DYSPHAGIA: Status: ACTIVE | Noted: 2023-01-01

## 2023-08-29 PROBLEM — T45.1X5A ANTINEOPLASTIC CHEMOTHERAPY INDUCED PANCYTOPENIA (H): Status: ACTIVE | Noted: 2023-01-01

## 2023-08-29 PROBLEM — K92.2 UPPER GI BLEED: Status: ACTIVE | Noted: 2023-01-01

## 2023-08-29 NOTE — PROGRESS NOTES
Reviewing chart due to high probability of admit to Med/Surg unit.   Pt has been accepted by [facility].  German Lim RN on 8/29/2023 at 5:25 PM

## 2023-08-29 NOTE — TELEPHONE ENCOUNTER
JUICE Barnett, states at pt home now for SN visit, sending hosp due to persistent wretching with nausea. States pt does not think he can manage at home any longer and may need to be admitted to TCU following ED/ hosp admission.  EMS have arrived for transport.   Forwarded to Dr. Rogers as GIDEON Chavarria RN

## 2023-08-29 NOTE — H&P
[facility]    History and Physical - Hospitalist Service       Date of Admission:  8/29/2023    Assessment & Plan      Liu Ragsdale is a 83 year old male admitted on 8/29/2023. He presented to the emergency department for evaluation of dizziness and unsteadiness in the setting of recent melena and was found to have acute anemia secondary to presumed upper GI bleed related to his known esophageal cancer for which he is being admitted for further evaluation and treatment.     Upper GI bleed  Acute anemia due to blood loss  Presented with dizziness and lightheadedness. Initial hemoglobin 7.7, baseline 11-12. Hemodynamically stable at time of admission. Is on aspirin daily but no other anticoagulation. Guaiac positive in the emergency department. Has known esophageal mass and recently completed a 5 week radiation course. CT chest, abdomen, & pelvis shows some evidence of possible esophagitis but cannot exclude increasing neoplasm, also with some possible evidence of mild gastritis.  Case was discussed between emergency department and on-call radiation oncology, who suspects that patient has a slow upper GI bleed at the cancer radiation site; presentation and CT findings are consistent with radiation bleeding.  Blood transfusion with 2U PRBC was initiated in the emergency department.   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 Locke / Dr. Haines and rad oncology Dr. Alcantar. Recently completed a course of both chemo and radiation x 5 weeks (last treatments on 8/25) with plans for restaging  in the near future.  Has mild pancytopenia at time of admission (WBC 3.4 / hemoglobin 7.7 / platelets 147), likely due to chemo (although a component of hemoglobin drop is also from blood loss).  - Continue with outpatient management  - CBC in am     Dysphagia  Jejunostomy tube in situ  Has a PEG tube in place, recently replaced on 8/25. Is dependent on J tube for all feeds and meds. Was having IV hydration as outpatient 3 times weekly as well.   Takes Jevita on continuous feeds at 100 ml/hr, a total of 4 cans of 337 ml daily, plus an Ensure high protein shake daily as well.   - Strict NPO (sublingual meds OK)  - Continue tube feeds, nutrition consult to help with orders     Generalized weakness  Patient with progressive generalized weakness at home, has home health RN but family has been considering possible TCU stay due to progressive weakness.  - PT/OT evaluations  - Care Transitions consult    Hyponatremia   Admit Na = 132. Minimally low, low concern for ODS.   - No intervention     Coronary artery disease involving native coronary artery of native heart without angina pectoris  History of ischemic stroke  Benign essential hypertension, BP goal <140/90  Hyperlipidemia LDL goal <70  Diagnosed 2020, s/p PCI of left circumflex into OM1, with recommended medical management of RCA stenosis. Ischemic stroke diagnosed in 2021, with multiple small right MCA infarcts, and stenosis of right carotic and vertebral arteries. Managed prior to admission with aspirin 325 mg daily, atorvastatin 10 mg daily, carvedilol 6.25 mg bid, and prn nitroglycerine.   - Aspirin as noted above  - Continue statin, beta blocker    FANI (obstructive sleep apnea)  Previously diagnosed, patient does not use CPAP due to claustrophobia.   - Prn supplemental O2 if needed         Diet: NPO for Medical/Clinical Reasons Except for: NPO but receiving Tube Feeding  DVT Prophylaxis: Pneumatic Compression Devices  Vásquez Catheter: Not present  Lines: None    "  Cardiac Monitoring: None  Code Status: No CPR- Do NOT Intubate - discussed at time of admission, POLST reviewed    Clinically Significant Risk Factors Present on Admission                  # Drug Induced Platelet Defect: home medication list includes an antiplatelet medication     # Hypertension: Noted on problem list      # Obesity: Estimated body mass index is 30.34 kg/m  as calculated from the following:    Height as of this encounter: 1.549 m (5' 0.98\").    Weight as of this encounter: 72.8 kg (160 lb 7.9 oz).              Disposition Plan      Expected Discharge Date: 08/30/2023                The patient's care was discussed with the Attending Physician, Dr. Jaxon Rosenthal, Patient, and Patient's Family.    Carlota Pryor PA-C  Hospitalist Service  [facility]  Securely message with [employer] (more info)  Text page via [facility] Paging/Directory     ______________________________________________________________________    Chief Complaint   \"I was dizzy and lightheaded yesterday and today. I've had dark stools for a few weeks.\"    History is obtained from the patient and multiple family members, review of EMR, and emergency department sign out from Dr. Tevin Banegas.    History of Present Illness   Liu Ragsdale is a 83 year old male who presented to the emergency department for evaluation of dizziness and dark stool.    Patient has known esophageal cancer and just completed a 5 week course of chemo and radiation on 8/25.   For the past few weeks he has noticed very dark stool, and on one occasion noted what looked like a possible maroon blood clot, but has never noticed any bright red blood.   He has some occasional reflux and nausea and retching, but no hematemesis.  Yesterday he started feeling very dizzy, lightheaded, and unsteady.  Symptoms persisted today and he was brought to the emergency department where work-up revealed a GI bleed, likely from his esophageal cancer site " "related to his recent radiation.    Patient has a PEG tube and is dependent on it for nutrition and medication administration.   He accidentally cut it last week and it was replaced on 8/25 and has since been functioning normally.    Review of systems   Patient had COVID about 1 month ago, has since returned to his baseline status.  He has poor sleep and known FANI, but does not use a CPAP; his sleep has been worse lately due to his discomfort related to cancer.  He has been getting a lot of reflux symptoms and has been sleeping in a recliner to help with the reflux.  No cough, wheeze, shortness of breath.   He has some recent abdominal pain and tenderness specifically near the J tube site, and also has some skin breakdown in this area, but this is gradually improving.   He has some loose stool recently, but had been constipated prior to that.  He has some concentrated urine recently, but no dysuria.  Occasional headaches, but not new or different.   Has a history of carpal tunnel, possibly some worsening numbness / tingling with chemo but not significantly new or different.  He has been feeling slightly \"foggy\" recently, but no overt confusion or sudden changes in cognition.   The remainder review of systems is negative.       Past Medical History    Past Medical History:   Diagnosis Date    Angina at rest (H)     Arthritis     Cerebrovascular accident (H) 09/27/2021    Coronary artery disease involving native coronary artery of native heart without angina pectoris     Dysphagia     Esophageal adenocarcinoma (H)     DISTAL ESOPHAGUS    Hypertension     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) 7/3/2023       Past Surgical History   Past Surgical History:   Procedure Laterality Date    ARTHROPLASTY HIP Left 02/03/2021    Procedure: Total Hip Arthroplasty;  Surgeon: Andrew Arriola MD;  Location: [facility] OR    COLONOSCOPY N/A 06/16/2016    Procedure: COLONOSCOPY;  Surgeon: Randy Avendano MD;  Location: [facility]    " CV LEFT HEART CATH Left 03/10/2020    Procedure: Left Heart Cath;  Surgeon: Vicente Lopez MD;  Location:  HEART CARDIAC CATH LAB    ENDARTERECTOMY CAROTID Right 2021    Procedure: ENDARTERECTOMY, CAROTID;  Surgeon: Raymond Andersen MD;  Location: [facility] OR    ENDOSCOPIC ULTRASOUND UPPER GASTROINTESTINAL TRACT (GI) N/A 7/3/2023    Procedure: ENDOSCOPIC ULTRASOUND, ESOPHAGOSCOPY / UPPER GASTROINTESTINAL TRACT (GI);  Surgeon: Yunier Graves MD;  Location:  OR    ESOPHAGOSCOPY, GASTROSCOPY, DUODENOSCOPY (EGD), COMBINED N/A 2023    Procedure: Esophagoscopy, Gastroscopy, Duodenoscopy, With Biopsy;  Surgeon: Joseph Landers DO;  Location: [facility]    ESOPHAGOSCOPY, GASTROSCOPY, DUODENOSCOPY (EGD), COMBINED N/A 7/3/2023    Procedure: ESOPHAGOGASTRODUODENOSCOPY;  Surgeon: Yunier Graves MD;  Location:  OR    EYE SURGERY      cataract surgery    IR JEJUNOSTOMY TUBE CHANGE  2023    LAPAROSCOPIC ASSISTED INSERTION TUBE JEJUNOSTOMY N/A 2023    Procedure: INSERTION, JEJUNOSTOMY TUBE, LAPAROSCOPY-ASSISTED;  Surgeon: Judy Holguin MD;  Location: [facility] OR    VASECTOMY         Prior to Admission Medications   Prior to Admission Medications   Prescriptions Last Dose Informant Patient Reported? Taking?   COLLAGEN PO 2023 at 1800 Self Yes Yes   Si mLs by Per J Tube route every evening Collagen Extra Strength with 5 ml of Eniva VIBE-liquid and 5 ml of Eniva Cell Ready and qs with water to 55 ml   Dextromethorphan-guaiFENesin 5-100 MG/5ML LIQD Past Month at unknown Self No Yes   Sig: Take 10 mLs by mouth every 6 hours as needed (for acute cough)   Patient taking differently: 10 mLs by Per J Tube route every 6 hours as needed (for acute cough)   Multiple Vitamin (MULTI VITAMIN PO) 2023 at 1800 Self Yes Yes   Si mLs by Per J Tube route daily Eniva VIBE-liquid with 5 ml of Eniva Cell Ready and 5 ml of Collagen qs with water to 55 ml   NONFORMULARY 2023 at 1800 Self  Yes Yes   Si mLs by Per J Tube route daily Eniva Cell-Ready Multi Minerals is a concentrated, ionic liquid mineral dietary supplement blend to help support nutritional balance and wellbeing. With Eniva VIBE-liquid with 5 ml and 5 ml of Collagen qs with water to 55 ml   acetaminophen (TYLENOL) 160 MG/5ML solution on hand at not using this one Self No Yes   Sig: Take 15.625 mLs (500 mg) by mouth every 6 hours as needed for mild pain   acetaminophen (TYLENOL) 325 MG/10.15ML solution 2023 at 0700 Self No Yes   Si.3 mLs (650 mg) by Oral or Feeding Tube route every 4 hours as needed for mild pain or fever   Patient taking differently: 650 mg by Per J Tube route every 4 hours as needed for mild pain or fever   aspirin (ASA) 325 MG tablet 2023 at 0700 Self No Yes   Sig: Take 1 tablet (325 mg) by mouth daily   Patient taking differently: 325 mg by Per J Tube route daily   atorvastatin (LIPITOR) 10 MG tablet 2023 at 0700 Self No Yes   Sig: TAKE 1 TABLET BY MOUTH ONCE DAILY (APPOINTMENT  REQUIRED  FOR  FUTURE  REFILLS)   Patient taking differently: 10 mg by Per J Tube route daily   carvedilol (COREG) 6.25 MG tablet 2023 at 0700 Self No Yes   Sig: TAKE 1 TABLET BY MOUTH TWICE DAILY WITH MEALS   Patient taking differently: 6.25 mg by Per J Tube route 2 times daily (with meals)   diclofenac (VOLTAREN) 1 % topical gel Past Month at Unknown Self No Yes   Sig: Place 4 g onto the skin 3 times daily as needed for moderate pain (Shoulder)   magic mouthwash (ENTER INGREDIENTS IN COMMENTS) suspension Past Week at brings on nausea Self No Yes   Sig: Swallow one to two teaspoonfuls every six hours as needed.   metoclopramide (REGLAN) 5 MG/5ML solution not tried yet at new med Self No Yes   Sig: Take 5-10 mLs (5-10 mg) by mouth 3 times daily as needed for nausea   Patient taking differently: 5-10 mg by Per J Tube route 3 times daily as needed for nausea   nitroGLYcerin (NITROSTAT) 0.3 MG sublingual tablet More  than a month at script  Self No Yes   Sig: For chest pain place 1 tablet under the tongue every 5 minutes for 3 doses. If symptoms persist 5 minutes after 1st dose call 911.   ondansetron (ZOFRAN ODT) 8 MG ODT tab 2023 at am Self No Yes   Sig: Take 1 tablet (8 mg) by mouth every 8 hours as needed for nausea   Patient taking differently: 8 mg by Per J Tube route 2 times daily as needed for nausea   ondansetron (ZOFRAN) 4 MG/5ML solution on hand at not using Self No Yes   Sig: Take 5 mLs (4 mg) by mouth every 8 hours as needed for nausea or vomiting   Patient taking differently: 4 mg by Per J Tube route every 8 hours as needed for nausea or vomiting   oxyCODONE (ROXICODONE) 5 MG/5ML solution on hand at not using now Self No Yes   Si.5 mLs (2.5 mg) by Oral or Feeding Tube route every 4 hours as needed for moderate pain   Patient taking differently: 2.5 mg by Per J Tube route every 4 hours as needed for moderate pain   oxyCODONE (ROXICODONE) 5 MG/5ML solution Past Week at Unknown Self No Yes   Sig: Take 5 mLs (5 mg) by mouth every 6 hours as needed for severe pain   Patient taking differently: 5 mg by Per J Tube route every 6 hours as needed for severe pain   simethicone (MYLICON) 40 MG/0.6ML suspension 2023 at 0700 Self Yes Yes   Si mg by Per J Tube route 2 times daily as needed for flatulence      Facility-Administered Medications: None        Review of Systems    The 10 point Review of Systems is negative other than noted in the HPI or here.      Physical Exam   Vital Signs: Temp: 98.1  F (36.7  C) Temp src: Oral BP: (!) 171/77 Pulse: 89   Resp: 18 SpO2: 95 % O2 Device: None (Room air)    Weight: 160 lbs 7.92 oz    Constitutional: Alert, oriented, cooperative. No apparent distress, appears nontoxic. Speaking in full coherent sentences.     Eyes: Eyes are clear, pupils are reactive. No scleral icterus.     HEENT: Oropharynx is clear and moist, no lesions. Normocephalic, no evidence of cranial  trauma.      Cardiovascular: Regular rhythm and rate, normal S1 and S2. No murmur, rubs, or gallops. Peripheral pulses intact bilaterally. No lower extremity edema.    Respiratory: Non-labored breathing on room air. Lung sounds are clear to auscultation bilaterally without wheezes, rhonchi, or crackles.    GI: Soft, non-distended. Non-tender, no rebound or guarding. No hepatosplenomegaly or masses appreciated. Normal bowel sounds. PEG tube in place.    Musculoskeletal: Without obvious deformity, normal range of motion. Normal muscle bulk and tone. Distal CMS intact.      Skin: Warm and dry, no rashes or ecchymoses. No mottling of skin. Pale appearing skin. Slight skin breakdown and erythema around PEG site, but no bleeding or exudate.      Neurologic: Patient moves all extremities. Gross strength and sensation are equal bilaterally.    Genitourinary: Deferred      Medical Decision Making       80 MINUTES SPENT BY ME on the date of service doing chart review, history, exam, documentation & further activities per the note.  MANAGEMENT DISCUSSED with the following over the past 24 hours: Dr. Jaxon Rosenthal   NOTE(S)/MEDICAL RECORDS REVIEWED over the past 24 hours: Oncology notes 8/14   Tests ORDERED & REVIEWED in the past 24 hours:  - CMP  - CBC  - Troponin  - UA  SUPPLEMENTAL HISTORY, in addition to the patient's history, over the past 24 hours obtained from:   - Spouse, daughter, and granddaughter       Data     I have personally reviewed the following data over the past 24 hrs:    3.4 (L)  \   7.7 (L)   / 147 (L)     132 (L) 97 (L) 17.5 /  170 (H)   4.0 24 0.84 \     ALT: 27 AST: 22 AP: 82 TBILI: 0.3   ALB: 3.5 TOT PROTEIN: 6.0 (L) LIPASE: N/A     Trop: 12 BNP: N/A     Imaging results reviewed over the past 24 hrs:   Recent Results (from the past 24 hour(s))   CT Chest/Abdomen/Pelvis w Contrast    Narrative    CT CHEST/ABDOMEN/PELVIS WITH CONTRAST 8/29/2023 11:41 AM    CLINICAL HISTORY: Weakness, nausea and vomiting,  status post  chemotherapy and radiation distal one third esophageal for esophageal  cancer.    TECHNIQUE: CT scan of the chest, abdomen, and pelvis was performed  following injection of IV contrast. Multiplanar reformats were  obtained. Dose reduction techniques were used.     CONTRAST: 79mL Isovue-370    COMPARISON: PET/CT 6/14/2023.    FINDINGS:   LUNGS AND PLEURA: No effusions. No acute airspace disease. Minor right  base atelectasis. No new worrisome focal airspace disease. Calcified  granulomas at the left upper lobe.    MEDIASTINUM/AXILLAE: Wall thickening of the distal esophagus appears  increased in the interval series 7 image 120. Obscuration of the  previously noted distal esophageal mass. The proximal esophagus  continues to be mildly dilated, stable. Mediastinal lymph nodes  remains stable and are small in size. Some were hypermetabolic at the  prior exam. Faint calcification associated with a few of the hilar  lymph nodes. No areas of new adenopathy.    CORONARY ARTERY CALCIFICATION: Severe.    HEPATOBILIARY: No new focal liver observation. Fatty liver suggested.  Faint cholelithiasis on image 155 suggested.    PANCREAS: Normal.    SPLEEN: Normal.    ADRENAL GLANDS: Normal.    KIDNEYS/BLADDER: No significant mass, stones, or hydronephrosis. There  are simple or benign cysts. No follow up is needed.    BOWEL: No obstruction. Normal appendix. Colonic diverticula distally  without convincing diverticulitis. There is some mild wall prominence  of the stomach but this could just relate to nondistention.    PELVIC ORGANS: Trace pelvic fluid.    ADDITIONAL FINDINGS: No new adenopathy. Vascular calcifications.    MUSCULOSKELETAL: Diffuse spine degenerative changes. Left hip  arthroplasty. No aggressive process identified. Right shoulder DJD.      Impression    IMPRESSION:  1.  Some increased wall thickening of the distal esophagus could be  esophagitis. Increasing neoplasm is not excluded. There is  mildly  dilated proximal esophagus that appears similar to prior.  2.  Some wall thickening of the stomach is noted that could relate to  a mild gastritis.  3.  No other acute abnormality.    AP OLIVO MD         SYSTEM ID:  [id number]

## 2023-08-29 NOTE — ED PROVIDER NOTES
History     Chief Complaint   Patient presents with    Dizziness   Dizziness  HPI  Liu Ragsdale is a 83 year old male, past medical history is significant for distal one third esophageal malignancy, ischemic stroke, ASCVD, OA right shoulder, shortness of breath on exertion, sensorineural hearing loss, FANI, obesity, hypertension, hyperlipidemia, presents to the emergency department by EMS with concerns of dizziness over the last several days.  History is obtained from the patient who states that he completed his last radiation and chemotherapy last week for his esophageal cancer.  Over the last 10 days he has been being weak, unsteady with ambulation, nauseated and retching if not frankly vomiting, slightly darker than usual stools although not black.  No coffee-ground emesis or vinod hematemesis.  The patient is fed exclusively by a jejunostomy tube.  This is patent and appears to be functioning by his description.  Notes vague comfort in the epigastric area over the last 2 weeks.  He states that he is seen weekly by radiation oncology Dr. Alcantar and saw him last week Wednesday.      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) 07/03/2023     Priority: Medium     EOTD 10.23.23 - [place]       Ischemic stroke (H) 09/28/2021     Priority: Medium    Cerebrovascular accident (H) 09/27/2021     Priority: Medium    Near syncope 09/26/2021     Priority: Medium    Right carotid artery occlusion 09/26/2021     Priority: Medium    Degenerative joint disease of left hip 02/03/2021     Priority: Medium    Coronary artery disease involving native coronary artery of native heart without angina pectoris 03/23/2020     Priority: Medium     coronary artery disease diagnosed in 03/2020 at which time he was noted  to have a  of the proximal left circumflex into the OM, moderate to severe ramus disease and moderate to severe RCA/PLB disease.  He underwent drug-eluting stent placement from the proximal left circumflex into the OM1.  He has residual small vessel coronary artery disease as well as moderate to severe stenosis in the ramus and RCA/YAA for which medical management has been recommended.         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 11/06/2019     Priority: Medium    SOB (shortness of breath) on exertion 11/06/2019     Priority: Medium    Arthritis of right glenohumeral joint- moderate 11/06/2019     Priority: Medium    Arthritis of right acromioclavicular joint- advanced 11/06/2019     Priority: Medium    SNHL (sensory-neural hearing loss), asymmetrical 07/22/2019     Priority: Medium    FANI (obstructive sleep apnea) 03/15/2016     Priority: Medium     NOT using CPAP 3/15/16      BMI 31.0-31.9,adult 04/07/2015     Priority: Medium    Benign essential hypertension, BP goal <140/90 09/10/2014     Priority: Medium    Hyperlipidemia LDL goal <70 12/12/2013     Priority: Medium     Diagnosis updated by automated process. Provider to review and confirm.      Carotid bruit 03/30/2010     Priority: Medium     right carotid bruit on exam- u/s 7/09 with minimal stenosis on left and not well seen on right           Past Medical History:    Past Medical History:   Diagnosis Date    Angina at rest (H)     Arthritis     Cerebrovascular accident (H) 09/27/2021    Coronary artery disease involving native coronary artery of native heart without angina pectoris     Dysphagia     Esophageal adenocarcinoma (H)     Hypertension     Malignant neoplasm  of lower third of esophagus (H) 7/3/2023       Past Surgical History:    Past Surgical History:   Procedure Laterality Date    ARTHROPLASTY HIP Left 02/03/2021    Procedure: Total Hip Arthroplasty;  Surgeon: Andrew Arriola MD;  Location: [facility]    COLONOSCOPY N/A 06/16/2016    Procedure: COLONOSCOPY;  Surgeon: Randy Avendano MD;  Location: [facility]    CV LEFT HEART CATH Left 03/10/2020    Procedure: Left Heart Cath;  Surgeon: Vicente Lopez MD;  Location:  HEART CARDIAC CATH LAB    ENDARTERECTOMY CAROTID Right 9/29/2021    Procedure: ENDARTERECTOMY, CAROTID;  Surgeon: Raymond Andersen MD;  Location: [facility]    ENDOSCOPIC ULTRASOUND UPPER GASTROINTESTINAL TRACT (GI) N/A 7/3/2023    Procedure: ENDOSCOPIC ULTRASOUND, ESOPHAGOSCOPY / UPPER GASTROINTESTINAL TRACT (GI);  Surgeon: Yunier Graves MD;  Location:  OR    ESOPHAGOSCOPY, GASTROSCOPY, DUODENOSCOPY (EGD), COMBINED N/A 6/5/2023    Procedure: Esophagoscopy, Gastroscopy, Duodenoscopy, With Biopsy;  Surgeon: Joseph Landers DO;  Location: [facility]    ESOPHAGOSCOPY, GASTROSCOPY, DUODENOSCOPY (EGD), COMBINED N/A 7/3/2023    Procedure: ESOPHAGOGASTRODUODENOSCOPY;  Surgeon: Yunier Graves MD;  Location:  OR    EYE SURGERY  2005    cataract surgery    IR JEJUNOSTOMY TUBE CHANGE  8/25/2023    LAPAROSCOPIC ASSISTED INSERTION TUBE JEJUNOSTOMY N/A 7/5/2023    Procedure: INSERTION, JEJUNOSTOMY TUBE, LAPAROSCOPY-ASSISTED;  Surgeon: Judy Holguin MD;  Location:  OR    VASECTOMY  1981       Family History:    Family History   Problem Relation Age of Onset    Hypertension Sister     Kidney failure Sister     Chronic Obstructive Pulmonary Disease Sister     Thyroid Disease Sister     Seizure Disorder Paternal Grandmother     Mitral valve prolapse Daughter     No Known Problems Son     Cerebrovascular Disease No family hx of        Social History:  Marital Status:   [2]  Social History     Tobacco Use    Smoking status: Never     Passive  "exposure: Never    Smokeless tobacco: Never   Vaping Use    Vaping Use: Never used   Substance Use Topics    Alcohol use: Yes     Comment: Beth only    Drug use: No        Medications:    acetaminophen (TYLENOL) 160 MG/5ML solution  acetaminophen (TYLENOL) 325 MG/10.15ML solution  aspirin (ASA) 325 MG tablet  atorvastatin (LIPITOR) 10 MG tablet  carvedilol (COREG) 6.25 MG tablet  COLLAGEN PO  Dextromethorphan-guaiFENesin 5-100 MG/5ML LIQD  diclofenac (VOLTAREN) 1 % topical gel  magic mouthwash (ENTER INGREDIENTS IN COMMENTS) suspension  metoclopramide (REGLAN) 5 MG/5ML solution  Multiple Vitamin (MULTI VITAMIN PO)  nitroGLYcerin (NITROSTAT) 0.3 MG sublingual tablet  NONFORMULARY  ondansetron (ZOFRAN ODT) 8 MG ODT tab  ondansetron (ZOFRAN) 4 MG/5ML solution  oxyCODONE (ROXICODONE) 5 MG/5ML solution  oxyCODONE (ROXICODONE) 5 MG/5ML solution  simethicone (MYLICON) 40 MG/0.6ML suspension  carvedilol (COREG) 1 mg/mL SUSP          Review of Systems   All other systems reviewed and are negative.      Physical Exam   BP: (!) 162/78  Pulse: 83  Temp: 97.7  F (36.5  C)  Resp: 18  Height: 154.9 cm (5' 1\")  Weight: 73.5 kg (162 lb)  SpO2: 97 %      Physical Exam  Vitals and nursing note reviewed.   Constitutional:       General: He is not in acute distress.     Appearance: Normal appearance. He is normal weight. He is ill-appearing.   HENT:      Head: Normocephalic and atraumatic.      Right Ear: Tympanic membrane, ear canal and external ear normal.      Left Ear: Tympanic membrane, ear canal and external ear normal.      Nose: Nose normal.      Mouth/Throat:      Mouth: Mucous membranes are dry.      Pharynx: Oropharynx is clear.   Eyes:      Extraocular Movements: Extraocular movements intact.      Conjunctiva/sclera: Conjunctivae normal.      Pupils: Pupils are equal, round, and reactive to light.   Cardiovascular:      Rate and Rhythm: Normal rate and regular rhythm.      Pulses: Normal pulses.      Heart sounds: " Normal heart sounds.   Pulmonary:      Effort: Pulmonary effort is normal.      Breath sounds: Normal breath sounds.   Abdominal:      Comments: Soft bowel sounds present, tender epigastrium without rebound or guarding.  No CVA tenderness   Musculoskeletal:         General: Normal range of motion.      Cervical back: Normal range of motion and neck supple.   Skin:     General: Skin is warm and dry.      Capillary Refill: Capillary refill takes less than 2 seconds.   Neurological:      General: No focal deficit present.      Mental Status: He is alert and oriented to person, place, and time.         ED Course                 Procedures           Results for orders placed or performed during the hospital encounter of 08/29/23 (from the past 24 hour(s))   CBC with platelets, differential    Narrative    The following orders were created for panel order CBC with platelets, differential.  Procedure                               Abnormality         Status                     ---------                               -----------         ------                     CBC with platelets and d...[[id number]]  Abnormal            Final result                 Please view results for these tests on the individual orders.   Comprehensive metabolic panel   Result Value Ref Range    Sodium 132 (L) 136 - 145 mmol/L    Potassium 4.0 3.4 - 5.3 mmol/L    Chloride 97 (L) 98 - 107 mmol/L    Carbon Dioxide (CO2) 24 22 - 29 mmol/L    Anion Gap 11 7 - 15 mmol/L    Urea Nitrogen 17.5 8.0 - 23.0 mg/dL    Creatinine 0.84 0.67 - 1.17 mg/dL    Calcium 9.0 8.8 - 10.2 mg/dL    Glucose 170 (H) 70 - 99 mg/dL    Alkaline Phosphatase 82 40 - 129 U/L    AST 22 0 - 45 U/L    ALT 27 0 - 70 U/L    Protein Total 6.0 (L) 6.4 - 8.3 g/dL    Albumin 3.5 3.5 - 5.2 g/dL    Bilirubin Total 0.3 <=1.2 mg/dL    GFR Estimate 87 >60 mL/min/1.73m2   Troponin T, High Sensitivity   Result Value Ref Range    Troponin T, High Sensitivity 12 <=22 ng/L   CBC with platelets and  differential   Result Value Ref Range    WBC Count 3.4 (L) 4.0 - 11.0 10e3/uL    RBC Count 2.53 (L) 4.40 - 5.90 10e6/uL    Hemoglobin 7.7 (L) 13.3 - 17.7 g/dL    Hematocrit 22.7 (L) 40.0 - 53.0 %    MCV 90 78 - 100 fL    MCH 30.4 26.5 - 33.0 pg    MCHC 33.9 31.5 - 36.5 g/dL    RDW 15.5 (H) 10.0 - 15.0 %    Platelet Count 147 (L) 150 - 450 10e3/uL    % Neutrophils 87 %    % Lymphocytes 4 %    % Monocytes 8 %    % Eosinophils 0 %    % Basophils 0 %    % Immature Granulocytes 1 %    NRBCs per 100 WBC 0 <1 /100    Absolute Neutrophils 3.0 1.6 - 8.3 10e3/uL    Absolute Lymphocytes 0.1 (L) 0.8 - 5.3 10e3/uL    Absolute Monocytes 0.3 0.0 - 1.3 10e3/uL    Absolute Eosinophils 0.0 0.0 - 0.7 10e3/uL    Absolute Basophils 0.0 0.0 - 0.2 10e3/uL    Absolute Immature Granulocytes 0.0 <=0.4 10e3/uL    Absolute NRBCs 0.0 10e3/uL   ABO/Rh type and screen    Narrative    The following orders were created for panel order ABO/Rh type and screen.  Procedure                               Abnormality         Status                     ---------                               -----------         ------                     Adult Type and Screen[[id number]]                            Edited Result - FINAL        Please view results for these tests on the individual orders.   Adult Type and Screen   Result Value Ref Range    ABO/RH(D) O NEG     Antibody Screen Negative Negative    SPECIMEN EXPIRATION DATE [id number]    CT Chest/Abdomen/Pelvis w Contrast    Narrative    CT CHEST/ABDOMEN/PELVIS WITH CONTRAST 8/29/2023 11:41 AM    CLINICAL HISTORY: Weakness, nausea and vomiting, status post  chemotherapy and radiation distal one third esophageal for esophageal  cancer.    TECHNIQUE: CT scan of the chest, abdomen, and pelvis was performed  following injection of IV contrast. Multiplanar reformats were  obtained. Dose reduction techniques were used.     CONTRAST: 79mL Isovue-370    COMPARISON: PET/CT 6/14/2023.    FINDINGS:   LUNGS AND PLEURA:  No effusions. No acute airspace disease. Minor right  base atelectasis. No new worrisome focal airspace disease. Calcified  granulomas at the left upper lobe.    MEDIASTINUM/AXILLAE: Wall thickening of the distal esophagus appears  increased in the interval series 7 image 120. Obscuration of the  previously noted distal esophageal mass. The proximal esophagus  continues to be mildly dilated, stable. Mediastinal lymph nodes  remains stable and are small in size. Some were hypermetabolic at the  prior exam. Faint calcification associated with a few of the hilar  lymph nodes. No areas of new adenopathy.    CORONARY ARTERY CALCIFICATION: Severe.    HEPATOBILIARY: No new focal liver observation. Fatty liver suggested.  Faint cholelithiasis on image 155 suggested.    PANCREAS: Normal.    SPLEEN: Normal.    ADRENAL GLANDS: Normal.    KIDNEYS/BLADDER: No significant mass, stones, or hydronephrosis. There  are simple or benign cysts. No follow up is needed.    BOWEL: No obstruction. Normal appendix. Colonic diverticula distally  without convincing diverticulitis. There is some mild wall prominence  of the stomach but this could just relate to nondistention.    PELVIC ORGANS: Trace pelvic fluid.    ADDITIONAL FINDINGS: No new adenopathy. Vascular calcifications.    MUSCULOSKELETAL: Diffuse spine degenerative changes. Left hip  arthroplasty. No aggressive process identified. Right shoulder DJD.      Impression    IMPRESSION:  1.  Some increased wall thickening of the distal esophagus could be  esophagitis. Increasing neoplasm is not excluded. There is mildly  dilated proximal esophagus that appears similar to prior.  2.  Some wall thickening of the stomach is noted that could relate to  a mild gastritis.  3.  No other acute abnormality.    AP OLIVO MD         SYSTEM ID:  [id number]   UA Macroscopic with reflex to Microscopic and Culture    Specimen: Urine, Midstream   Result Value Ref Range    Color Urine Yellow Colorless,  Straw, Light Yellow, Yellow    Appearance Urine Clear Clear    Glucose Urine 150 (A) Negative mg/dL    Bilirubin Urine Negative Negative    Ketones Urine 5 (A) Negative mg/dL    Specific Gravity Urine 1.020 1.003 - 1.035    Blood Urine Negative Negative    pH Urine 7.0 5.0 - 7.0    Protein Albumin Urine 30 (A) Negative mg/dL    Urobilinogen Urine Normal Normal, 2.0 mg/dL    Nitrite Urine Negative Negative    Leukocyte Esterase Urine Negative Negative    Mucus Urine Present (A) None Seen /LPF    RBC Urine 1 <=2 /HPF    WBC Urine 1 <=5 /HPF    Hyaline Casts Urine 3 (H) <=2 /LPF    Narrative    Urine Culture not indicated   Prepare red blood cells (unit)   Result Value Ref Range    Blood Component Type Red Blood Cells     Product Code E7175S69     Unit Status Ready for issue     Unit Number [id number]     CROSSMATCH Compatible     CODING SYSTEM QXSE727    Prepare red blood cells (unit)   Result Value Ref Range    Blood Component Type Red Blood Cells     Product Code [id number]     Unit Status Issued     Unit Number [id number]     CROSSMATCH Compatible     CODING SYSTEM [id number]     ISSUE DATE AND TIME [id number]     UNIT ABO/RH O-     UNIT TYPE ISBT 9500    Occult blood stool   Result Value Ref Range    Occult Blood Positive (A) Negative     4:03 PM  I discussed this patient with his radiation oncologist Dr. Alcantar who knows him well.  He reviewed lab diagnostics and imaging.  He felt that giving the patient blood was appropriate and admitting him to the hospitalist service for ongoing cares and to ensure stability would be reasonable.  I spoke with [facility] for the hospitalist service who agreed to accept the care of the patient upon admission.  Full admission.  Transition was placed.  I suspect that the patient does have some GI bleeding related to the radiation therapy to his distal one third esophagus.  He is occult blood positive and historically describes black appearing stool recently.  His  hemoglobin has dropped significantly in a relatively short period of time from 11.67 days ago down to 7.7 today.  An alternate etiology for his GI bleeding is not suspected based upon his evaluation here today which includes in addition to the lab diagnostics done CT chest PE abdomen pelvis with adequate visualization of the area of malignancy and radiation therapy.        Medications   acetaminophen (TYLENOL) solution 1,000 mg (has no administration in time range)   pantoprazole (PROTONIX) IV push injection 40 mg (has no administration in time range)   0.9% sodium chloride BOLUS (0 mLs Intravenous Stopped 8/29/23 1230)   ondansetron (ZOFRAN) injection 4 mg (4 mg Intravenous $Given 8/29/23 1106)   iopamidol (ISOVUE-370) solution 79 mL (79 mLs Intravenous $Given 8/29/23 1129)   sodium chloride 0.9 % bag 500mL for CT scan flush use (60 mLs As instructed $Given 8/29/23 1129)       Assessments & Plan (with Medical Decision Making)   Assessments and plan with medical decision making at the time stamp above.      Disclaimer: This note consists of symbols derived from keyboarding, dictation and/or voice recognition software. As a result, there may be errors in the script that have gone undetected. Please consider this when interpreting information found in this chart.      I have reviewed the nursing notes.    I have reviewed the findings, diagnosis, plan and need for follow up with the patient.        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

## 2023-08-29 NOTE — ED NOTES
Bed: ED07  Expected date: 8/29/23  Expected time: 9:45 AM  Means of arrival: Ambulance  Comments:  EMS

## 2023-08-29 NOTE — PROGRESS NOTES
Radiotherapy Treatment Summary              PATIENT: Liu Ragsdale  MEDICAL RECORD NO: [id number]   : 1940    DIAGNOSIS: Esophageal cancer  PATHOLOGY:  Adenocarcinoma of the distal esophagus                               STAGE: T2N2Mx   CONCURRENT SYSTEMIC THERAPY: Yes, Carbo/Taxol      ONCOLOGIC HISTORY:          Mr Ragsdale is a 83 year old male who began to notice dysphagia of food in May 2023. He also described acid reflux for short interval of time. He noted approximately 9 to 10 pound weight loss at the same time.  The patient underwent EGD on 2023.  Scope demonstrated a large fungating mass in the lower third of the esophagus, 38 cm from the incisors. The mass was noted to be partially obstructing and circumferential.  Biopsy of the mass demonstrated  adenocarcinoma.  Gastroesophageal junction biopsy also demonstrated adenocarcinoma.  PET scan obtained on 2023 demonstrated hypermetabolic activity in the distal esophageal mass compatible with esophageal cancer.  There were  low moderate activity in hilar and mediastinal adenopathy, indeterminate, possibly inflammatory in nature.  No clear sites of distant disease were noted.  Patient was seen by Dr. Levi of thoracic surgery team.  He discussed consideration of surgical resection after chemoradiation therapy.  The patient presents to our clinic to discuss next steps in management.  He met with Dr. Locke of medical oncology on 2023. Was advised that once we have completion of staging most likely would recommend concurrent chemoradiation with carboplatin and Taxol weekly followed by definitive surgery. 23 diagnostic laparoscopy and jejunostomy insertion by Ezio Kim MD. Met with Dr Locke in medical oncology again on 23, recommended concurrent chemoradiation with carbo and taxol. He completed radiation therapy on 23    SITE OF TREATMENT: Chest/abdomen    DATES  OF TREATMENT:23 to 23    TOTAL DOSE OF TREATMENT:  5,000 cGy    DOSE PER FRACTION OF TREATMENT: 200 cGy x 25 fractions       COMMENT/TOXICITY:  Tube feeds via J tube, Nausea on zofran OTC every 8 hours. J-tube site drainage/small opening in tube.  Was started on antibiotic and underwent jejunostomy exchange on 8/25/23              PAIN MANAGEMENT: Oxycodone oral solution as needed                      FOLLOW UP PLAN: One week  CC  Patient Care Team:  Piyush Rogers MD as PCP - General (Family Medicine)  Piyush Rogers MD as Assigned PCP  Jarod Hamm DO as Assigned Sleep Provider  Walter Myers MD as MD  Raymond Andersen MD as Assigned Neuroscience Provider  Ferny Levi MD as Assigned Heart and Vascular Provider  Piyush Rogers MD as Assigned Pain Medication Provider  Nyla Morrell RN as Specialty Care Coordinator (Hematology & Oncology)  Jarred Alcantar MD as Assigned Cancer Care Provider     SUMI Maxwell  Department of Radiation Oncology  [facility]

## 2023-08-29 NOTE — ED TRIAGE NOTES
Pt presents by ambulance from home with severe dizziness that began this morning causing unsteadiness with ambulation. Also reports nausea for 2 weeks and darker than normal stools. Hx of esophageal caner with last radiation treatment last week.      Triage Assessment       Row Name 08/29/23 1004       Triage Assessment (Adult)    Airway WDL WDL       Respiratory WDL    Respiratory WDL WDL       Skin Circulation/Temperature WDL    Skin Circulation/Temperature WDL WDL       Cardiac WDL    Cardiac WDL WDL       Peripheral/Neurovascular WDL    Peripheral Neurovascular WDL WDL       Cognitive/Neuro/Behavioral WDL    Cognitive/Neuro/Behavioral WDL X  dizziness

## 2023-08-29 NOTE — ED PROVIDER NOTES
Emergency Department Patient Sign-out       Brief HPI:  This is a 83 year old male signed out to me by Dr. Banegas .  See initial ED Provider note for details of the presentation.            Significant Events prior to my assuming care: Patient has esophageal cancer who presents to the emergency department for dizziness.  He was found to have acute blood loss anemia as a four-point drop in his hemoglobin.  He has been having melena and had a positive occult stool.  CTA did not reveal source of bleeding.  Patient currently getting transfused with 2 units of packed red blood cells.  His vitals are stable on my reassessment he is feeling better.  Patient has been admitted to the medicine service.      Exam:   Patient Vitals for the past 24 hrs:   BP Temp Temp src Pulse Resp SpO2 Height Weight   08/29/23 1700 (!) 156/99 -- -- 79 15 97 % -- --   08/29/23 1645 (!) 156/73 -- -- 86 17 96 % -- --   08/29/23 1640 (!) 156/73 98.3  F (36.8  C) Oral 85 15 96 % -- --   08/29/23 1630 (!) 159/71 -- -- 89 15 95 % -- --   08/29/23 1628 (!) 150/66 98.5  F (36.9  C) -- 91 14 -- -- --   08/29/23 1625 -- 98.5  F (36.9  C) -- 90 10 97 % -- --   08/29/23 1600 (!) 148/72 98.3  F (36.8  C) -- 85 11 90 % -- --   08/29/23 1530 (!) 150/74 -- -- 84 25 96 % -- --   08/29/23 1512 -- -- -- 89 14 97 % -- --   08/29/23 1502 (!) 147/79 -- -- 84 10 94 % -- --   08/29/23 1452 -- -- -- 90 11 97 % -- --   08/29/23 1447 -- -- -- 111 25 92 % -- --   08/29/23 1437 (!) 159/73 -- -- 84 -- -- -- --   08/29/23 1430 (!) 157/74 98.6  F (37  C) Oral 84 15 98 % -- --   08/29/23 1415 (!) 141/71 -- -- 89 11 95 % -- --   08/29/23 1412 (!) 141/71 98.5  F (36.9  C) Oral 91 18 95 % -- --   08/29/23 1402 (!) 153/106 -- -- 90 13 96 % -- --   08/29/23 1330 (!) 171/93 -- -- 81 15 95 % -- --   08/29/23 1315 -- -- -- 87 19 96 % -- --   08/29/23 1305 -- -- -- 85 -- -- -- --   08/29/23 1230 (!) 141/66 -- -- 89 13 94 % -- --   08/29/23 1200 (!) 168/80 -- -- -- -- -- -- --  "  08/29/23 1100 (!) 158/80 -- -- 86 11 99 % -- --   08/29/23 1030 (!) 156/73 -- -- 79 15 96 % -- --   08/29/23 1006 (!) 162/78 -- -- 80 (!) 9 94 % -- --   08/29/23 1001 -- 97.7  F (36.5  C) Oral 83 18 97 % 1.549 m (5' 1\") 73.5 kg (162 lb)           ED RESULTS:   Results for orders placed or performed during the hospital encounter of 08/29/23 (from the past 24 hour(s))   CBC with platelets, differential     Status: Abnormal    Collection Time: 08/29/23 10:27 AM    Narrative    The following orders were created for panel order CBC with platelets, differential.  Procedure                               Abnormality         Status                     ---------                               -----------         ------                     CBC with platelets and d...[[id number]]  Abnormal            Final result                 Please view results for these tests on the individual orders.   Comprehensive metabolic panel     Status: Abnormal    Collection Time: 08/29/23 10:27 AM   Result Value Ref Range    Sodium 132 (L) 136 - 145 mmol/L    Potassium 4.0 3.4 - 5.3 mmol/L    Chloride 97 (L) 98 - 107 mmol/L    Carbon Dioxide (CO2) 24 22 - 29 mmol/L    Anion Gap 11 7 - 15 mmol/L    Urea Nitrogen 17.5 8.0 - 23.0 mg/dL    Creatinine 0.84 0.67 - 1.17 mg/dL    Calcium 9.0 8.8 - 10.2 mg/dL    Glucose 170 (H) 70 - 99 mg/dL    Alkaline Phosphatase 82 40 - 129 U/L    AST 22 0 - 45 U/L    ALT 27 0 - 70 U/L    Protein Total 6.0 (L) 6.4 - 8.3 g/dL    Albumin 3.5 3.5 - 5.2 g/dL    Bilirubin Total 0.3 <=1.2 mg/dL    GFR Estimate 87 >60 mL/min/1.73m2   Troponin T, High Sensitivity     Status: Normal    Collection Time: 08/29/23 10:27 AM   Result Value Ref Range    Troponin T, High Sensitivity 12 <=22 ng/L   CBC with platelets and differential     Status: Abnormal    Collection Time: 08/29/23 10:27 AM   Result Value Ref Range    WBC Count 3.4 (L) 4.0 - 11.0 10e3/uL    RBC Count 2.53 (L) 4.40 - 5.90 10e6/uL    Hemoglobin 7.7 (L) 13.3 - 17.7 g/dL "    Hematocrit 22.7 (L) 40.0 - 53.0 %    MCV 90 78 - 100 fL    MCH 30.4 26.5 - 33.0 pg    MCHC 33.9 31.5 - 36.5 g/dL    RDW 15.5 (H) 10.0 - 15.0 %    Platelet Count 147 (L) 150 - 450 10e3/uL    % Neutrophils 87 %    % Lymphocytes 4 %    % Monocytes 8 %    % Eosinophils 0 %    % Basophils 0 %    % Immature Granulocytes 1 %    NRBCs per 100 WBC 0 <1 /100    Absolute Neutrophils 3.0 1.6 - 8.3 10e3/uL    Absolute Lymphocytes 0.1 (L) 0.8 - 5.3 10e3/uL    Absolute Monocytes 0.3 0.0 - 1.3 10e3/uL    Absolute Eosinophils 0.0 0.0 - 0.7 10e3/uL    Absolute Basophils 0.0 0.0 - 0.2 10e3/uL    Absolute Immature Granulocytes 0.0 <=0.4 10e3/uL    Absolute NRBCs 0.0 10e3/uL   ABO/Rh type and screen     Status: None    Collection Time: 08/29/23 10:27 AM    Narrative    The following orders were created for panel order ABO/Rh type and screen.  Procedure                               Abnormality         Status                     ---------                               -----------         ------                     Adult Type and Screen[[id number]]                            Edited Result - FINAL        Please view results for these tests on the individual orders.   Adult Type and Screen     Status: None    Collection Time: 08/29/23 10:27 AM   Result Value Ref Range    ABO/RH(D) O NEG     Antibody Screen Negative Negative    SPECIMEN EXPIRATION DATE [id number]    CT Chest/Abdomen/Pelvis w Contrast     Status: None    Collection Time: 08/29/23 11:41 AM    Narrative    CT CHEST/ABDOMEN/PELVIS WITH CONTRAST 8/29/2023 11:41 AM    CLINICAL HISTORY: Weakness, nausea and vomiting, status post  chemotherapy and radiation distal one third esophageal for esophageal  cancer.    TECHNIQUE: CT scan of the chest, abdomen, and pelvis was performed  following injection of IV contrast. Multiplanar reformats were  obtained. Dose reduction techniques were used.     CONTRAST: 79mL Isovue-370    COMPARISON: PET/CT 6/14/2023.    FINDINGS:   LUNGS AND  PLEURA: No effusions. No acute airspace disease. Minor right  base atelectasis. No new worrisome focal airspace disease. Calcified  granulomas at the left upper lobe.    MEDIASTINUM/AXILLAE: Wall thickening of the distal esophagus appears  increased in the interval series 7 image 120. Obscuration of the  previously noted distal esophageal mass. The proximal esophagus  continues to be mildly dilated, stable. Mediastinal lymph nodes  remains stable and are small in size. Some were hypermetabolic at the  prior exam. Faint calcification associated with a few of the hilar  lymph nodes. No areas of new adenopathy.    CORONARY ARTERY CALCIFICATION: Severe.    HEPATOBILIARY: No new focal liver observation. Fatty liver suggested.  Faint cholelithiasis on image 155 suggested.    PANCREAS: Normal.    SPLEEN: Normal.    ADRENAL GLANDS: Normal.    KIDNEYS/BLADDER: No significant mass, stones, or hydronephrosis. There  are simple or benign cysts. No follow up is needed.    BOWEL: No obstruction. Normal appendix. Colonic diverticula distally  without convincing diverticulitis. There is some mild wall prominence  of the stomach but this could just relate to nondistention.    PELVIC ORGANS: Trace pelvic fluid.    ADDITIONAL FINDINGS: No new adenopathy. Vascular calcifications.    MUSCULOSKELETAL: Diffuse spine degenerative changes. Left hip  arthroplasty. No aggressive process identified. Right shoulder DJD.      Impression    IMPRESSION:  1.  Some increased wall thickening of the distal esophagus could be  esophagitis. Increasing neoplasm is not excluded. There is mildly  dilated proximal esophagus that appears similar to prior.  2.  Some wall thickening of the stomach is noted that could relate to  a mild gastritis.  3.  No other acute abnormality.    AP OLIVO MD         SYSTEM ID:  [id number]   UA Macroscopic with reflex to Microscopic and Culture     Status: Abnormal    Collection Time: 08/29/23 12:02 PM    Specimen: Urine,  Midstream   Result Value Ref Range    Color Urine Yellow Colorless, Straw, Light Yellow, Yellow    Appearance Urine Clear Clear    Glucose Urine 150 (A) Negative mg/dL    Bilirubin Urine Negative Negative    Ketones Urine 5 (A) Negative mg/dL    Specific Gravity Urine 1.020 1.003 - 1.035    Blood Urine Negative Negative    pH Urine 7.0 5.0 - 7.0    Protein Albumin Urine 30 (A) Negative mg/dL    Urobilinogen Urine Normal Normal, 2.0 mg/dL    Nitrite Urine Negative Negative    Leukocyte Esterase Urine Negative Negative    Mucus Urine Present (A) None Seen /LPF    RBC Urine 1 <=2 /HPF    WBC Urine 1 <=5 /HPF    Hyaline Casts Urine 3 (H) <=2 /LPF    Narrative    Urine Culture not indicated   Prepare red blood cells (unit)     Status: None (Preliminary result)    Collection Time: 08/29/23 12:45 PM   Result Value Ref Range    Blood Component Type Red Blood Cells     Product Code [id number]     Unit Status Issued     Unit Number [id number]     CROSSMATCH Compatible     CODING SYSTEM [id number]     ISSUE DATE AND TIME [id number]     UNIT ABO/RH O-     UNIT TYPE ISBT 9500    Prepare red blood cells (unit)     Status: None (Preliminary result)    Collection Time: 08/29/23 12:45 PM   Result Value Ref Range    Blood Component Type Red Blood Cells     Product Code [id number]     Unit Status Issued     Unit Number [id number]     CROSSMATCH Compatible     CODING SYSTEM [id number]     ISSUE DATE AND TIME [id number]     UNIT ABO/RH O-     UNIT TYPE ISBT 9500    Occult blood stool     Status: Abnormal    Collection Time: 08/29/23 12:58 PM   Result Value Ref Range    Occult Blood Positive (A) Negative       ED MEDICATIONS:   Medications   0.9% sodium chloride BOLUS (0 mLs Intravenous Stopped 8/29/23 1230)   ondansetron (ZOFRAN) injection 4 mg (4 mg Intravenous $Given 8/29/23 1106)   iopamidol (ISOVUE-370) solution 79 mL (79 mLs Intravenous $Given 8/29/23 1129)   sodium chloride 0.9 % bag 500mL for CT scan flush use (60 mLs As  instructed $Given 8/29/23 1129)   acetaminophen (TYLENOL) solution 1,000 mg (1,000 mg Per G Tube $Given 8/29/23 1612)   pantoprazole (PROTONIX) IV push injection 40 mg (40 mg Intravenous $Given 8/29/23 1605)         Impression:    ICD-10-CM    1. Anemia, unspecified type  D64.9       2. 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)  C15.5       3. Upper GI bleed  K92.2           Plan:    Admit to hospitalist service for presumed GI bleed and acute blood loss anemia.  Transfusion ongoing.      MD Porfirio Sims Sean, MD  08/29/23 2907

## 2023-08-29 NOTE — MEDICATION SCRIBE - ADMISSION MEDICATION HISTORY
Medication Scribe Admission Medication History    Admission medication history is complete. The information provided in this note is only as accurate as the sources available at the time of the update.    Medication reconciliation/reorder completed by provider prior to medication history? No    Information Source(s): Patient, Clinic records, and CareEverywhere/SureScripts via  in room with med list with patient.    Pertinent Information: The Zofran and Magic Mouthwash tend to make his nausea worse.  Has not tried the Metoclopramide yet.  All medicines except ODT Zofran are in J-Tube.  Nitroglycerin tabs are out of date.  Script  in .  Did not fill the Carvedilol liquid as it would've cost him $400.00.  Using Magic Mouthwash about every other day now.    Changes made to PTA medication list:  Added: Simethicone, Collagen(Using 5 ml extra strength liquid at home mixed with 5 ml of his other 2 supplements qs'd to 55 ml with water.  Deleted: None  Changed: None    Medication Affordability:  Not including over the counter (OTC) medications, was there a time in the past 3 months when you did not take your medications as prescribed because of cost?: Yes (Carvedilol liquid form $400.00)    Allergies reviewed with patient and updates made in EHR: yes, no change.    Medication History Completed By: Alessandra Mulligan 2023 3:13 PM    Prior to Admission medications    Medication Sig Last Dose Taking? Auth Provider Long Term End Date   acetaminophen (TYLENOL) 160 MG/5ML solution Take 15.625 mLs (500 mg) by mouth every 6 hours as needed for mild pain on hand at not using this one Yes Jarred Alcantar MD No    acetaminophen (TYLENOL) 325 MG/10.15ML solution 20.3 mLs (650 mg) by Oral or Feeding Tube route every 4 hours as needed for mild pain or fever  Patient taking differently: 650 mg by Per J Tube route every 4 hours as needed for mild pain or fever 2023 at 0700 Yes aJrred Alcantar MD No    aspirin (ASA) 325 MG  tablet Take 1 tablet (325 mg) by mouth daily  Patient taking differently: 325 mg by Per J Tube route daily 8/29/2023 at 0700 Yes Conor Tabor MD     atorvastatin (LIPITOR) 10 MG tablet TAKE 1 TABLET BY MOUTH ONCE DAILY (APPOINTMENT  REQUIRED  FOR  FUTURE  REFILLS)  Patient taking differently: 10 mg by Per J Tube route daily 8/29/2023 at 0700 Yes Piyush Rogers MD Yes    carvedilol (COREG) 6.25 MG tablet TAKE 1 TABLET BY MOUTH TWICE DAILY WITH MEALS  Patient taking differently: 6.25 mg by Per J Tube route 2 times daily (with meals) 8/29/2023 at 0700 Yes Piyush Rogers MD Yes    COLLAGEN PO 5 mLs by Per J Tube route every evening Collagen Extra Strength with 5 ml of Eniva VIBE-liquid and 5 ml of Eniva Cell Ready and qs with water to 55 ml 8/28/2023 at 1800 Yes Reported, Patient     Dextromethorphan-guaiFENesin 5-100 MG/5ML LIQD Take 10 mLs by mouth every 6 hours as needed (for acute cough)  Patient taking differently: 10 mLs by Per J Tube route every 6 hours as needed (for acute cough) Past Month at unknown Yes Conor Owens PA-C     diclofenac (VOLTAREN) 1 % topical gel Place 4 g onto the skin 3 times daily as needed for moderate pain (Shoulder) Past Month at Unknown Yes Piyush Rogers MD     magic mouthwash (ENTER INGREDIENTS IN COMMENTS) suspension Swallow one to two teaspoonfuls every six hours as needed.  Patient taking differently: Take 5-10 mLs by mouth every other day Swallow one to two teaspoonfuls every six hours as needed. Past Week at brings on nausea Yes Jarred Alcantar MD     metoclopramide (REGLAN) 5 MG/5ML solution Take 5-10 mLs (5-10 mg) by mouth 3 times daily as needed for nausea  Patient taking differently: 5-10 mg by Per J Tube route 3 times daily as needed for nausea not tried yet at [facility] med Yes Piyush Rogers MD     Multiple Vitamin (MULTI VITAMIN PO) 5 mLs by Per J Tube route daily Eniva VIBE-liquid with 5 ml of Eniva Cell Ready and 5 ml of Collagen qs with water  to 55 ml 2023 at 1800 Yes Reported, Patient     nitroGLYcerin (NITROSTAT) 0.3 MG sublingual tablet For chest pain place 1 tablet under the tongue every 5 minutes for 3 doses. If symptoms persist 5 minutes after 1st dose call 911. More than a month at script  Yes Audrey Díaz MD Yes    NONFORMULARY 5 mLs by Per J Tube route daily Eniva Cell-Ready Multi Minerals is a concentrated, ionic liquid mineral dietary supplement blend to help support nutritional balance and wellbeing. With Eniva VIBE-liquid with 5 ml and 5 ml of Collagen qs with water to 55 ml 2023 at 1800 Yes Reported, Patient     ondansetron (ZOFRAN ODT) 8 MG ODT tab Take 1 tablet (8 mg) by mouth every 8 hours as needed for nausea  Patient taking differently: 8 mg by Per J Tube route 2 times daily as needed for nausea 2023 at am Yes Jarred Alcantar MD     ondansetron (ZOFRAN) 4 MG/5ML solution Take 5 mLs (4 mg) by mouth every 8 hours as needed for nausea or vomiting  Patient taking differently: 4 mg by Per J Tube route every 8 hours as needed for nausea or vomiting on hand at not using Yes Jarred Alcantar MD     oxyCODONE (ROXICODONE) 5 MG/5ML solution Take 5 mLs (5 mg) by mouth every 6 hours as needed for severe pain  Patient taking differently: 5 mg by Per J Tube route every 6 hours as needed for severe pain Past Week at Unknown Yes Jarred Alcantar MD No 23   oxyCODONE (ROXICODONE) 5 MG/5ML solution 2.5 mLs (2.5 mg) by Oral or Feeding Tube route every 4 hours as needed for moderate pain  Patient taking differently: 2.5 mg by Per J Tube route every 4 hours as needed for moderate pain on hand at not using now Yes Cassandra Herrera No    simethicone (MYLICON) 40 MG/0.6ML suspension 40 mg by Per J Tube route 2 times daily as needed for flatulence 2023 at 0700 Yes Reported, Patient     carvedilol (COREG) 1 mg/mL SUSP 6.25 mLs (6.25 mg) by Oral or Feeding Tube route 2 times daily   Patricia Chapman, NP Yes

## 2023-08-29 NOTE — ED NOTES
"[facility]   Admission Handoff    The patient is Liu Ragsdale, 83 year old who arrived in the ED by AMBULANCE from home with a complaint of Dizziness  . The patient's current symptoms are new and during this time the symptoms have remained the same. 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)   Upper GI bleed         Needed?: No    Allergies:    Allergies   Allergen Reactions    Metoprolol Other (See Comments)     Side effects : dry mouth    Ampicillin Rash    Latex Rash       Past Medical Hx:   Past Medical History:   Diagnosis Date    Angina at rest (H)     Arthritis     Cerebrovascular accident (H) 09/27/2021    Coronary artery disease involving native coronary artery of native heart without angina pectoris     Dysphagia     Esophageal adenocarcinoma (H)     DISTAL ESOPHAGUS    Hypertension     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) 7/3/2023       Initial vitals were: BP: (!) 162/78  Pulse: 83  Temp: 97.7  F (36.5  C)  Resp: 18  Height: 154.9 cm (5' 1\")  Weight: 73.5 kg (162 lb)  SpO2: 97 %   Recent vital Signs: BP (!) 150/66   Pulse 91   Temp 98.5  F (36.9  C)   Resp 14   Ht 1.549 m (5' 1\")   Wt 73.5 kg (162 lb)   SpO2 97%   BMI 30.61 kg/m      Elimination Status: Continent: Yes     Activity Level: 2 assist    Fall Status: Reason for falls risk:  Mobility  arm band in place, patient and family education, and activity supervised    Baseline Mental status: WDL  Current Mental Status changes: at basesline    Infection present or suspected this encounter: no  Sepsis suspected: No    Isolation type: none    Bariatric equipment needed?: No    In the ED these meds were given:   Medications   0.9% sodium chloride BOLUS (0 mLs Intravenous Stopped 8/29/23 1230)   ondansetron (ZOFRAN) injection 4 mg (4 mg Intravenous $Given 8/29/23 1106)   iopamidol (ISOVUE-370) solution " 79 mL (79 mLs Intravenous $Given 8/29/23 1129)   sodium chloride 0.9 % bag 500mL for CT scan flush use (60 mLs As instructed $Given 8/29/23 1129)   acetaminophen (TYLENOL) solution 1,000 mg (1,000 mg Per G Tube $Given 8/29/23 1612)   pantoprazole (PROTONIX) IV push injection 40 mg (40 mg Intravenous $Given 8/29/23 1605)       Drips running?  Yes    Home pump  No    Current LDAs: Peripheral IV: Site left hand; Gauge 20  normal saline     Results:   Labs/Imaging  Ordered and Resulted from Time of ED Arrival Up to the Time of Departure from the ED  Results for orders placed or performed during the hospital encounter of 08/29/23 (from the past 24 hour(s))   CBC with platelets, differential    Narrative    The following orders were created for panel order CBC with platelets, differential.  Procedure                               Abnormality         Status                     ---------                               -----------         ------                     CBC with platelets and d...[[id number]]  Abnormal            Final result                 Please view results for these tests on the individual orders.   Comprehensive metabolic panel   Result Value Ref Range    Sodium 132 (L) 136 - 145 mmol/L    Potassium 4.0 3.4 - 5.3 mmol/L    Chloride 97 (L) 98 - 107 mmol/L    Carbon Dioxide (CO2) 24 22 - 29 mmol/L    Anion Gap 11 7 - 15 mmol/L    Urea Nitrogen 17.5 8.0 - 23.0 mg/dL    Creatinine 0.84 0.67 - 1.17 mg/dL    Calcium 9.0 8.8 - 10.2 mg/dL    Glucose 170 (H) 70 - 99 mg/dL    Alkaline Phosphatase 82 40 - 129 U/L    AST 22 0 - 45 U/L    ALT 27 0 - 70 U/L    Protein Total 6.0 (L) 6.4 - 8.3 g/dL    Albumin 3.5 3.5 - 5.2 g/dL    Bilirubin Total 0.3 <=1.2 mg/dL    GFR Estimate 87 >60 mL/min/1.73m2   Troponin T, High Sensitivity   Result Value Ref Range    Troponin T, High Sensitivity 12 <=22 ng/L   CBC with platelets and differential   Result Value Ref Range    WBC Count 3.4 (L) 4.0 - 11.0 10e3/uL    RBC Count 2.53 (L) 4.40  - 5.90 10e6/uL    Hemoglobin 7.7 (L) 13.3 - 17.7 g/dL    Hematocrit 22.7 (L) 40.0 - 53.0 %    MCV 90 78 - 100 fL    MCH 30.4 26.5 - 33.0 pg    MCHC 33.9 31.5 - 36.5 g/dL    RDW 15.5 (H) 10.0 - 15.0 %    Platelet Count 147 (L) 150 - 450 10e3/uL    % Neutrophils 87 %    % Lymphocytes 4 %    % Monocytes 8 %    % Eosinophils 0 %    % Basophils 0 %    % Immature Granulocytes 1 %    NRBCs per 100 WBC 0 <1 /100    Absolute Neutrophils 3.0 1.6 - 8.3 10e3/uL    Absolute Lymphocytes 0.1 (L) 0.8 - 5.3 10e3/uL    Absolute Monocytes 0.3 0.0 - 1.3 10e3/uL    Absolute Eosinophils 0.0 0.0 - 0.7 10e3/uL    Absolute Basophils 0.0 0.0 - 0.2 10e3/uL    Absolute Immature Granulocytes 0.0 <=0.4 10e3/uL    Absolute NRBCs 0.0 10e3/uL   ABO/Rh type and screen    Narrative    The following orders were created for panel order ABO/Rh type and screen.  Procedure                               Abnormality         Status                     ---------                               -----------         ------                     Adult Type and Screen[[id number]]                            Edited Result - FINAL        Please view results for these tests on the individual orders.   Adult Type and Screen   Result Value Ref Range    ABO/RH(D) O NEG     Antibody Screen Negative Negative    SPECIMEN EXPIRATION DATE [id number]    CT Chest/Abdomen/Pelvis w Contrast    Narrative    CT CHEST/ABDOMEN/PELVIS WITH CONTRAST 8/29/2023 11:41 AM    CLINICAL HISTORY: Weakness, nausea and vomiting, status post  chemotherapy and radiation distal one third esophageal for esophageal  cancer.    TECHNIQUE: CT scan of the chest, abdomen, and pelvis was performed  following injection of IV contrast. Multiplanar reformats were  obtained. Dose reduction techniques were used.     CONTRAST: 79mL Isovue-370    COMPARISON: PET/CT 6/14/2023.    FINDINGS:   LUNGS AND PLEURA: No effusions. No acute airspace disease. Minor right  base atelectasis. No new worrisome focal airspace  disease. Calcified  granulomas at the left upper lobe.    MEDIASTINUM/AXILLAE: Wall thickening of the distal esophagus appears  increased in the interval series 7 image 120. Obscuration of the  previously noted distal esophageal mass. The proximal esophagus  continues to be mildly dilated, stable. Mediastinal lymph nodes  remains stable and are small in size. Some were hypermetabolic at the  prior exam. Faint calcification associated with a few of the hilar  lymph nodes. No areas of new adenopathy.    CORONARY ARTERY CALCIFICATION: Severe.    HEPATOBILIARY: No new focal liver observation. Fatty liver suggested.  Faint cholelithiasis on image 155 suggested.    PANCREAS: Normal.    SPLEEN: Normal.    ADRENAL GLANDS: Normal.    KIDNEYS/BLADDER: No significant mass, stones, or hydronephrosis. There  are simple or benign cysts. No follow up is needed.    BOWEL: No obstruction. Normal appendix. Colonic diverticula distally  without convincing diverticulitis. There is some mild wall prominence  of the stomach but this could just relate to nondistention.    PELVIC ORGANS: Trace pelvic fluid.    ADDITIONAL FINDINGS: No new adenopathy. Vascular calcifications.    MUSCULOSKELETAL: Diffuse spine degenerative changes. Left hip  arthroplasty. No aggressive process identified. Right shoulder DJD.      Impression    IMPRESSION:  1.  Some increased wall thickening of the distal esophagus could be  esophagitis. Increasing neoplasm is not excluded. There is mildly  dilated proximal esophagus that appears similar to prior.  2.  Some wall thickening of the stomach is noted that could relate to  a mild gastritis.  3.  No other acute abnormality.    AP OLIVO MD         SYSTEM ID:  [id number]   UA Macroscopic with reflex to Microscopic and Culture    Specimen: Urine, Midstream   Result Value Ref Range    Color Urine Yellow Colorless, Straw, Light Yellow, Yellow    Appearance Urine Clear Clear    Glucose Urine 150 (A) Negative mg/dL     Bilirubin Urine Negative Negative    Ketones Urine 5 (A) Negative mg/dL    Specific Gravity Urine 1.020 1.003 - 1.035    Blood Urine Negative Negative    pH Urine 7.0 5.0 - 7.0    Protein Albumin Urine 30 (A) Negative mg/dL    Urobilinogen Urine Normal Normal, 2.0 mg/dL    Nitrite Urine Negative Negative    Leukocyte Esterase Urine Negative Negative    Mucus Urine Present (A) None Seen /LPF    RBC Urine 1 <=2 /HPF    WBC Urine 1 <=5 /HPF    Hyaline Casts Urine 3 (H) <=2 /LPF    Narrative    Urine Culture not indicated   Prepare red blood cells (unit)   Result Value Ref Range    Blood Component Type Red Blood Cells     Product Code C0899Q51     Unit Status Issued     Unit Number [id number]     CROSSMATCH Compatible     CODING SYSTEM [id number]     ISSUE DATE AND TIME [id number]     UNIT ABO/RH O-     UNIT TYPE ISBT 9500    Prepare red blood cells (unit)   Result Value Ref Range    Blood Component Type Red Blood Cells     Product Code [id number]     Unit Status Issued     Unit Number [id number]     CROSSMATCH Compatible     CODING SYSTEM [id number]     ISSUE DATE AND TIME [id number]     UNIT ABO/RH O-     UNIT TYPE ISBT 9500    Occult blood stool   Result Value Ref Range    Occult Blood Positive (A) Negative       For the majority of the shift this patient's behavior was Green     Cardiac Rhythm: Normal Sinus  Pt needs tele? Yes  Skin/wound Issues:  fragile with some bruising     Code Status: DNR / DNI    Pain control: fair    Nausea control: fair    Abnormal labs/tests/findings requiring intervention: gi bleed    Patient tested for COVID 19 prior to admission: NO     OBS brochure/video discussed/provided to patient/family: N/A     Family present during ED course? Yes     Family Comments/Social Situation comments: Pt lives with wife, family at bedside     Tasks needing completion: None    Aggie Quick, RN

## 2023-08-30 NOTE — CONSULTS
"CLINICAL NUTRITION SERVICES - ASSESSMENT NOTE     Nutrition Prescription    RECOMMENDATIONS FOR MDs/PROVIDERS TO ORDER:      Malnutrition Status:    Does not meet criteria    Recommendations already ordered by Registered Dietitian (RD):  Jevity 1.5 per j-tube at 100 mL/hr, as tolerated, from  8 AM - 8 PM. Flush with 180 mL every 4 hours or 45 mL/hr x 12 hours. + 3 packets of Prosource TF for hospital stay.    Home TF orders placed - Jevity 1.5 (4 cartons) and 1 carton Ensure Max at 100 mL/hr (12 hours) ~8 A - 8 P,  Flush with 90 mL water x 12 hours daily preferably or pt will need 1080 mL water daily - divide during the day including flushes before and after TF.     Ideally pt would have a pump at home for formula and water administration better success.    Future/Additional Recommendations:  Will monitor TF tolerance if pt remains in the hospital - plan is discharge per conversation with MD.     REASON FOR ASSESSMENT  Liu Ragsdale is a/an 83 year old male assessed by the dietitian for Provider Order - Registered Dietitian to Assess and Order TF per Medical Nutrition Therapy Protocol    Jejunostomy tube replaced 8/25/23, original placement 7/5/23 (pt accidentally cut hole in tube with scissors and tube fell out)    Per chart, pt with dysphagia, malignant neoplasm lower third of esophagus.    NUTRITION HISTORY  Pt is taking nothing by mouth  He is on tube feeding per J-tube at home.  He prefers day time feeding because he does not like getting up at night to use the bathroom while tube feeding is running.  He does report a loss of confidence with this episode which he believes may be related to dehydration, and \"hitting the wall\" post cancer treatment.  His loss of confidence is in his ability remembering medications and tube feeding set up.  He does have a chart at home to help keep track of everything.  He reports administering 4 cartons of Jevity 1.5 + 1 carton of Ensure Max for additional protein and using the " "syringe administering up to 720 mL water on his best day.   He only missed his TF yesterday.  He did receive TF here overnight.    Pt reports he was experiencing nausea, retching, esophageal pain, bloody stools when he came to the ED, no blood in stool since yesterday.    ID Team Rounds - reports from home care/home infusion - it is taking the patient quite awhile to set up his tube feeding at home.   MD suggested additional nocturnal tube feeding.  Writer suggests a pump that both formula and water can run during the day (currently using this type of pump in the hospital) - freeing the pt from burden of many syringe flushes and trying to get in enough water.  [facility] is being contacted by CM/Social work dept to see if they have the pump that will allow pt to infuse water flushes and formula during the day.      CURRENT NUTRITION ORDERS  Diet: NPO  TF per j-tube Jevity 1.5 continuously    LABS  Labs reviewed  Na 134 L  Glucose 127 H  Hgb 9.9 L    MEDICATIONS  Medications reviewed  Lipitor  Pantoprazole  Cont, IV LR at 100 mL/hr    ANTHROPOMETRICS  Height: 154.9 cm (5' .984\")  Most Recent Weight: 74.2 kg (163 lb 9.6 oz)    IBW: 50.9 kg  BMI: Obesity Grade I BMI 30-34.9  Weight History:   Wt Readings from Last 3 Encounters:   08/30/23 74.2 kg (163 lb 9.6 oz)   08/23/23 73.5 kg (162 lb)   08/21/23 73.5 kg (162 lb)       Dosing Weight: 57 kg, adjusted    ASSESSED NUTRITION NEEDS  Estimated Energy Needs: 1600 - 1710+ kcals/day (28-30+kcal/mL)  Justification: maintenance, Increased needs  Estimated Protein Needs: 68 - 86 grams protein/day (1.2 - 1.5 grams of pro/kg)  Justification: Increased needs  Estimated Fluid Needs: 1710 -2000 mL/day (30 - 35 mL/kg)   Justification: Increased needs    PHYSICAL FINDINGS  Per chart,  Last BM 8/30/23    MALNUTRITION:  % Weight Loss:  None noted  % Intake:  No decreased intake noted  Subcutaneous Fat Loss:  None observed  Muscle Loss:  None observed  Fluid Retention:  None " noted    Malnutrition Diagnosis: Patient does not meet two of the above criteria necessary for diagnosing malnutrition    NUTRITION DIAGNOSIS  Swallowing difficulty related to dysphagia as evidenced by esophageal cancer      INTERVENTIONS  Implementation   Enteral Nutrition - Modify   Flushes - initiate    Goals  Tolerate TF  Meet estimated needs     Monitoring/Evaluation  Progress toward goals will be monitored and evaluated per protocol.

## 2023-08-30 NOTE — PROGRESS NOTES
[facility]  Patient is currently open to home care services with [facility]. The patient is currently receiving RN  services.  [facility]  and team have been notified of patient admission.  [facility] liaison will continue to follow patient during stay.  Home  care team  feels pt  is needing a higher level of care as  cares at home  are overwhelming  for him.

## 2023-08-30 NOTE — PROGRESS NOTES
08/30/23 0873   Appointment Info   Signing Clinician's Name / Credentials (PT) Loco Marion, PT, DPT   Rehab Comments (PT) Patient left sitting up in bedside recliner with RN present, BLE elevated.   Living Environment   People in Home spouse   Current Living Arrangements house   Home Accessibility stairs to enter home;stairs within home   Number of Stairs, Main Entrance 4   Stair Railings, Main Entrance railings on both sides of stairs   Number of Stairs, Within Home, Primary greater than 10 stairs   Stair Railings, Within Home, Primary   (one rail)   Living Environment Comments Bathroom: walk in shower (tub shower on upstairs level), no grab bars, HHSH, no seat available. Lower level toilet is higher. Has been primarily been using bathroom upstairs except for maybe 2 showers a week downstairs.   Self-Care   Equipment Currently Used at Home none  (has a FWW, SPC)   Fall history within last six months no   Activity/Exercise/Self-Care Comment Patient is typically indep with mobility without AD, indep with bADL (spouse does assist with LB dressing), IADL including driving. Chemoradiation x 5 weeks, spouse has been experiencing short term memory loss. Patient notes some forgetfulness recently. Spouse drives as well, patient notes he has to assist with her medication management. Patient is primary finance management now that spouse is having some cognitive decline. Patient and spouse have discussed future transition to [facility] when they are not able to care for themselves at home, not impending at this time.   General Information   Onset of Illness/Injury or Date of Surgery 08/29/23   Referring Physician Carlota Pryor PA-C   Pertinent History of Current Problem (include personal factors and/or comorbidities that impact the POC) Upper GI bleed  Acute anemia due to blood loss, Generalized weakness; PMH includes: Malignant neoplasm of lower third of esophagus / Ct2cN2 lower esophageal adenocarcinoma, Antineoplastic  chemotherapy induced pancytopenia, Dysphagia  Jejunostomy tube in situ   Existing Precautions/Restrictions fall;NPO   Pain Assessment   Patient Currently in Pain No   Range of Motion (ROM)   Range of Motion ROM deficits secondary to weakness   Strength (Manual Muscle Testing)   Strength (Manual Muscle Testing) Deficits observed during functional mobility   Bed Mobility   Bed Mobility supine-sit   Supine-Sit [place] (Bed Mobility) supervision   Assistive Device (Bed Mobility)   (HOB elevated due to tube feeding, sleeps in recliner at baseline)   Transfers   Transfers sit-stand transfer   Sit-Stand Transfer   Sit-Stand [place] (Transfers) supervision;verbal cues   Assistive Device (Sit-Stand Transfers)   (none)   Gait/Stairs (Locomotion)   [place] Level (Gait) supervision   Assistive Device (Gait)   (IV pole)   Distance in Feet initial 6ft in room   Distance in Feet (Gait) additional 100ft - sit break - 100ft   Negotiation (Stairs) stairs independence;handrail location;number of steps;ascending technique;descending technique   [place] Level (Stairs) contact guard;verbal cues   Handrail Location (Stairs) right side (ascending);left side (descending)   Number of Steps (Stairs) 2   Ascending Technique (Stairs) step-over-step   Descending Technique (Stairs) step-over-step   Sensory Examination   Sensory Perception Comments reports mild numbness in fingertips from prior carpal tunnel syndrome B   Clinical Impression   Criteria for Skilled Therapeutic Intervention Yes, treatment indicated   PT Diagnosis (PT) impaired functional mobility   Influenced by the following impairments impaired balance, impaired strength, impaired activity tolerance   Functional limitations due to impairments impaired bed mobility, impaired transfers, impaired gait, impaired stairs   Clinical Presentation (PT Evaluation Complexity) Stable/Uncomplicated   Clinical Presentation Rationale clinical judgement   Clinical Decision  Making (Complexity) low complexity   Planned Therapy Interventions (PT) balance training;bed mobility training;gait training;home exercise program;neuromuscular re-education;patient/family education;stair training;strengthening;transfer training;progressive activity/exercise   Risk & Benefits of therapy have been explained evaluation/treatment results reviewed;care plan/treatment goals reviewed;participants voiced agreement with care plan;participants included;patient   PT Total Evaluation Time   PT Eval, Low Complexity Minutes (35603) 10   Physical Therapy Goals   PT Frequency 5x/week   PT Predicted Duration/Target Date for Goal Attainment 09/06/23   PT Goals Bed Mobility;Transfers;Gait;Stairs   PT: Bed Mobility Modified independent;Supine to/from sit  (HOB elevated due to tube feeds and recliner at home)   PT: Transfers Modified independent;Sit to/from stand;Bed to/from chair;Assistive device   PT: Gait Modified independent;Assistive device;100 feet   PT: Stairs Modified independent;6 stairs;Rail on right   Interventions   Interventions Quick Adds Gait Training;Therapeutic Activity   Therapeutic Activity   Therapeutic Activities: dynamic activities to improve functional performance Minutes (54884) 27   Treatment Detail/Skilled Intervention Patient able to perform transfers throughout the session with SBA, using IV pole, grab bar in the bathroom, or arm chair when resting following stair training. Patient required assist for donning clean brief and pants, states spouse assists at home. Patient able to perform toileting without assistance. Patient discussed his concerns over his wife's cognitive decline, PT and patient discussed having Yarsani friends come once a week to assist with socialization for both patient and spouse and to assist with spouse emotional status. Patient encouraged to have others assist patient with cleaning the house if she is no longer able to clean adequately, as he is at high risk for  infection with the chemoradiation and tube feeding. Patient provided with handout for exercises he can perform to assist with recovery of function. Patient provided encouragement for frequency of mobility.   Gait Training   Gait Training Minutes (23733) 10   Symptoms Noted During/After Treatment (Gait Training) fatigue   Treatment Detail/Skilled Intervention Patient able to demonstrate good stability when using IV pole to ambulate, states he sometimes uses his IV pole at home to ambulate and does have a cane and FWW if needed at discharge. Patient requested to continue using hte IV pole as an AD due to his continued weakness. Patient able to demonstrate ambulation greater than household distance and did not take a seated break until following stairs. Patient able to ascend/descend 5 stairs a second trial today, improved balance and stability with step over step reciprocal pattern on second trial of stairs.   [place] Level (Gait Training) stand-by assist   Physical Assistance Level (Gait Training) verbal cues   Assistive Device (Gait Training)   (IV pole)   Stair Railings present on right side   Physical Assist/Nonphysical Assist (Stairs) verbal cues;supervision;1 person assist   Level of [place] (Stairs) contact guard   PT Discharge Planning   PT Plan Wed 1/5 - progress independence with OOB mobility (IV pole vs SPC vs FWW - has all at home), activity tolerance, balance, safety   PT Discharge Recommendation (DC Rec) (S)  home with assist;home with home care physical therapy   PT Rationale for DC Rec Anticipate patient will be safe to transition home with spouse and home PT tomorrow following continued medical management and progressive mobility with nsg and PT. Patient will benefit from home PT to continue progression of strength, balance, and activity tolerance.   PT Brief overview of current status SBA supine>sit HOB elevated, SBA transfers IV pole as AD, SBA amb 2 x 100ft IV pole as AD, CGA 5 stairs x 2  with single rail - minimal self corrected LOB when approaching to descend stairs   Total Session Time   Timed Code Treatment Minutes 37   Total Session Time (sum of timed and untimed services) 47

## 2023-08-30 NOTE — CONSULTS
"Care Management Initial Consult    General Information  Assessment completed with: Patient--Liu  Type of CM/SW Visit: Offer D/C Planning    Primary Care Provider verified and updated as needed:     Readmission within the last 30 days:  Yes  7/5/2023 - 7/10/2023 (5 days)  [facility]       Reason for Consult: discharge planning  Advance Care Planning: Advance Care Planning Reviewed: present on chart        Communication Assessment  Patient's communication style: spoken language (English or Bilingual)    Hearing Difficulty or Deaf: yes   Wear Glasses or Blind: no    Cognitive  Cognitive/Neuro/Behavioral: .WDL except (dizziness)    Level of Consciousness: alert  Arousal Level: opens eyes spontaneously  Orientation: oriented x 4  Mood/Behavior: calm, cooperative     Speech: clear, spontaneous, logical    Living Environment:   People in home: Kelsi-Wife  Current living Arrangements: house      Able to return to prior arrangements: yes     Family/Social Support:  Care provided by: homecare agency--[facility] + [facility] -enteral feeding supplies   Provides care for: velyn \"Estela\" Has short term memory impairment)  Marital Status:   Adult Children, Grandchildren, Wife-Kelsi       Description of Support System: Supportive, Involved    Support Assessment: Adequate family and caregiver support, Adequate social supports    Current Resources:   Patient receiving home care services: Yes  Skilled Home Care Services: Skilled Nursing  Community Resources: Home Care, Home Infusion  Equipment currently used at home: walker, rolling  Supplies currently used at home: Enteral Nutrition & Supplies    Employment/Financial:  Employment Status: retired        Financial Concerns: No concerns identified      Does the patient's insurance plan have a 3 day qualifying hospital stay waiver?  No    Lifestyle & Psychosocial Needs:  Social Determinants of Health     Tobacco Use: " "Low Risk  (8/25/2023)    Patient History     Smoking Tobacco Use: Never     Smokeless Tobacco Use: Never     Passive Exposure: Never   Alcohol Use: Not on file   Financial Resource Strain: Not on file   Food Insecurity: Not on file   Transportation Needs: Not on file   Physical Activity: Not on file   Stress: Not on file   Social Connections: Not on file   Intimate Partner Violence: Not on file   Depression: Not at risk (7/20/2023)    PHQ-2     PHQ-2 Score: 1   Housing Stability: Not on file       Functional Status:  Prior to admission patient needed assistance:   Dependent ADLs:: Independent  Dependent IADLs:: Independent    Mental Health Status:  Mental Health Status: No Current Concerns       Chemical Dependency Status:  Chemical Dependency Status: No Current Concerns             Values/Beliefs:  Spiritual, Cultural Beliefs, Protestant Practices, Values that affect care: no               Additional Information:  Referral received to assist with discharge planning and services.     Pt had a recent hospitalization within 30 days:  07/05/23--07/10/23: [facility]      Received notification that he is currently open with [facility] for RN visits and [facility] for all of his Enteral Feeding needs.  Home care messaged possible concerns for the Pt's ability to manage his tube feedings/cares in the home       SW met with the Patient to introduce self/role, complete assessment and to have further discussion about increased services and possible TCU placement.   [place] wife [place]. 4-5 stairs to enter home. All needs met on main level     Wife \"Estela\"  is physically able to assist the patientn and in the home if needed, but has had more recent issues with short term memory. States his wife's cognition deteriorating over the past year. Patient has noticed her personal hygiene, daily household chores-that she used to enjoy doing and hobbies fall away. " "  The patient has not recognized this to be true-which has made it very difficult for the family to step in to assist as the wife can be offended that help is being offered.     The Patient states the issue has caused more concern for him, than his tube feeding needs. SW discussed community based resources and additional support, which the patient was well aware of. He stated he needs to find a good time to have further conversations with their adult children to coordinate a plan. He recognizes the patient would not allow housekeeping or additional assistance in the home-while she is there. Patient states he may take her for a drive, to give family time to go into the home to clean, etc.     Pt states they also have a strong support system through their Ale[employer] in addition to their children and grandchildren.     Patient and his wife have discuss options for Assisted Living, but acknowledges the financial barrier and would like stay in their home for as long as its safe to do so. However they are having conversations and considering the options.       SW inquired if the Pt has struggled with managing his tube feeding at home.   Pt expressed having no concerns managing his tube feeding at home up until recently when he started experiencing \"wrenching\".     Discussed possibly increasing the home care services. Currently receives 1 RN visit a week.   Requested to add PT/OT and a HHA.      sent a secured message to Anay Bowling-RN Liaison with  Home Infusion to provide update.   [facility] will continue to follow.       PLAN: Discharge back to home    Resume [facility] at [phone]/Fax: [phone] for pt's Enteral Feedings    [facility] (Phone: [phone]) --RN  Requesting MD to add: PT/OT and HHA    Pt to arrange transportation with Family      IsisMARTIN Anderson  [facility] [phone]   [facility] [place]  [phone]      "

## 2023-08-30 NOTE — DISCHARGE SUMMARY
[facility]  Hospitalist Discharge Summary       Date of Admission:  8/29/2023  Date of Discharge:  8/30/2023  Discharging Provider: Jaxon Rosenthal MD      Discharge Diagnoses     Upper GI bleed  Acute anemia due to blood loss  Malignant neoplasm of lower third of esophagus  Ct2cN2 lower esophageal adenocarcinoma  Antineoplastic chemotherapy induced pancytopenia  Dysphagia  Jejunostomy tube in situ  Generalized weakness  Hyponatremia   Coronary artery disease involving native coronary artery of native heart without angina pectoris  History of ischemic stroke  Benign essential hypertension, BP goal <140/90  Hyperlipidemia LDL goal <70  FANI (obstructive sleep apnea)    Follow-ups Needed After Discharge   Follow-up Appointments     Follow-up and recommended labs and tests       Follow up with primary care provider, Piyush Rogers, within 7 days for   hospital follow- up.  The following labs/tests are recommended: CBC and   CMP.            Discharge Disposition   Discharged to home  Condition at discharge: Stable    Hospital Course   Liu Lima is an 83-year-old male admitted on 8/29/2023. He presented to the emergency department for evaluation of dizziness and unsteadiness in the setting of recent melena and was found to have acute anemia secondary to presumed upper GI bleed related to his known esophageal cancer for which he is being admitted for further evaluation and treatment.      Upper GI bleed  Acute anemia due to blood loss  Presented with dizziness and lightheadedness. Initial hemoglobin 7.7, baseline 11-12. Hemodynamically stable at time of admission. Is on aspirin daily but no other anticoagulation. Guaiac positive in the emergency department. Has known esophageal mass and recently completed a 5 week radiation course. CT chest, abdomen, & pelvis shows some evidence of possible esophagitis but cannot exclude increasing neoplasm, also with some possible evidence of mild  gastritis.  - Case was discussed between emergency department and on-call radiation oncology, who suspects that patient has a slow upper GI bleed at the cancer radiation site; presentation and CT findings are consistent with radiation bleeding.  Blood transfusion with 2U PRBC was initiated in the emergency department.   - Hemoglobin stable overnight at 10.5  Plan:  - IV Protonix 40 mg bid  - Defer EGD at this time, as risk does not outweigh benefit and location of bleed site is already suspected  - Hold aspirin      Malignant neoplasm of lower third of esophagus  Ct2cN2 lower esophageal adenocarcinoma  Antineoplastic chemotherapy induced pancytopenia  Previously diagnosed, follows with oncology Dr. Locke / Dr. Haines and rad oncology Dr. Alcantar. Recently completed a course of both chemo and radiation x 5 weeks (last treatments on 8/25) with plans for restaging in the near future.  - Has mild pancytopenia at time of admission (WBC 3.4 / hemoglobin 7.7 / platelets 147), likely due to chemo (although a component of hemoglobin drop is also from blood loss).  - Continue with outpatient management per oncology  Plan:  - CBC at follow up     Dysphagia  Jejunostomy tube in situ  Has a PEG tube in place, recently replaced on 8/25. Is dependent on J tube for all feeds and meds. Was having IV hydration as outpatient 3 times weekly as well.   Takes Jevita on continuous feeds at 100 ml/hr, a total of 4 cans of 337 ml daily, plus an Ensure high protein shake daily as well.   Plan:  - Patient states he has difficulty getting the volume of his tube feeds in per day.  Patient to follow-up with home infusion nurse.     Generalized weakness  Patient with progressive generalized weakness at home, has home health RN but family has been considering possible TCU stay due to progressive weakness.  Plan:  - Home PT/OT/HHA     Hyponatremia   Admit Na = 132. Minimally low, low concern for ODS.   - No intervention      Coronary artery disease  involving native coronary artery of native heart without angina pectoris  History of ischemic stroke  Benign essential hypertension, BP goal <140/90  Hyperlipidemia LDL goal <70  Diagnosed 2020, s/p PCI of left circumflex into OM1, with recommended medical management of RCA stenosis. Ischemic stroke diagnosed in 2021, with multiple small right MCA infarcts, and stenosis of right carotic and vertebral arteries. Managed prior to admission with aspirin 325 mg daily, atorvastatin 10 mg daily, carvedilol 6.25 mg bid, and prn nitroglycerine.   Plan:  - Hold aspirin as noted above  - Continue statin, beta blocker     FANI (obstructive sleep apnea)  Previously diagnosed, patient does not use CPAP due to claustrophobia.   - Prn supplemental O2 if needed    Consultations This Hospital Stay   CARE MANAGEMENT / SOCIAL WORK IP CONSULT  NUTRITION SERVICES ADULT IP CONSULT  PHYSICAL THERAPY ADULT IP CONSULT  OCCUPATIONAL THERAPY ADULT IP CONSULT  PHARMACY IP CONSULT  SPIRITUAL HEALTH SERVICES IP CONSULT    Code Status   Full Code    Time Spent on this Encounter   I, Jaxon Rosenthal MD, personally saw the patient today and spent greater than 30 minutes discharging this patient.       Jaxon Rosenthal MD  [facility]  ______________________________________________________________________    Physical Exam   Vital Signs: Temp: 98.3  F (36.8  C) Temp src: Oral BP: (!) 168/67 Pulse: 89   Resp: 18 SpO2: 96 % O2 Device: None (Room air)    Weight: 163 lbs 9.6 oz       Gen: Well nourished, well developed, alert and oriented x 3, no acute distressed  HEENT: Atraumatic, normocephalic; sclera non-injected, anicterric; oral mucosa moist, no lesion, no exudate  Lungs: Clear to ausculation, no wheezes, no rhonchi, no rales  Heart: Regular rate, regular rhythm, no gallops, no rubs, no murmurs  GI: Bowel sound normal, no hepatosplenomegaly, no masses, non-tender, non-distended, no guarding, no rebound  tenderness  Lymph: No lymphadenopathy, no edema  Skin: No rashes, no chronic venous stasis     Primary Care Physician   Piyush Rogers    Discharge Orders      Primary Care - Care Coordination Referral      Home Care Referral      Home Infusion Referral      Reason for your hospital stay    This is a 83-year-old male with esophageal cancer admitted with upper GI bleed.     Follow-up and recommended labs and tests     Follow up with primary care provider, Piyush Rogers, within 7 days for hospital follow- up.  The following labs/tests are recommended: CBC and CMP.     Activity    Your activity upon discharge: activity as tolerated     Full Code     Diet    Follow this diet upon discharge: Orders Placed This Encounter      Adult Formula Drip Feeding: Continuous Jevity 1.5; Jejunostomy; Goal Rate: 100; mL/hr; From: 8:00 AM; To: 8:00 PM      NPO for Medical/Clinical Reasons Except for: NPO but receiving Tube Feeding       Diet    TUBE FEEDING     Route: Jejunostomy replaced 8/25/23. DO NOT check residuals.    Formula: May substitute equivalent formula for Jevity 1.5 (4 cartons) and 1 carton Ensure Max    Cyclic Tube Feeding  Start Time: 8:00 AM    Stop Time: 8:00 PM  (12 hours)  Rate (mL/hr): 100 (as tolerated)    Water Flush: 90 mL/hr x 12 hours (1080 mL/day) or divided into bolus flushes during the day including 1 flush before and 1 after TF.     Diet    Sit up or elevate head of bed >30 degrees during feeding.    ORAL DIET: NPO    RD Contact: Praveena Krishnan [phone]    Pump justification for homecare: jejunostomy tube    Per MD:   MD recommendations: Anticipate patient will require TF 90+ days  Structural impairment preventing food to reach the small bowel       Significant Results and Procedures   Most Recent 3 CBC's:  Recent Labs   Lab Test 08/30/23  1404 08/30/23  0437 08/29/23  2028 08/29/23  1027   WBC  --  3.5* 3.1* 3.4*   HGB 9.9* 10.5* 10.3* 7.7*   MCV  --  86 87 90   PLT  --  153 137* 147*     Most  Recent 3 BMP's:  Recent Labs   Lab Test 08/30/23  0437 08/29/23  1027 08/21/23  0917 07/24/23  1114 07/10/23  0645 07/08/23  1556 07/08/23  0657   * 132*  --   --   --   --  140   POTASSIUM 3.9 4.0  --   --  4.1   < > 3.3*   CHLORIDE 102 97*  --   --   --   --  104   CO2 24 24  --   --   --   --  25   BUN 16.6 17.5  --   --   --   --  7.6*   CR 0.71 0.84 0.97   < >  --   --  0.89   ANIONGAP 8 11  --   --   --   --  11   GWEN 8.3* 9.0  --   --   --   --  8.5*   * 170*  --   --   --   --  180*    < > = values in this interval not displayed.     Most Recent 2 LFT's:  Recent Labs   Lab Test 08/29/23  1027 06/08/23  1418   AST 22 28   ALT 27 24   ALKPHOS 82 98   BILITOTAL 0.3 0.4   ,   Results for orders placed or performed during the hospital encounter of 08/29/23   CT Chest/Abdomen/Pelvis w Contrast    Narrative    CT CHEST/ABDOMEN/PELVIS WITH CONTRAST 8/29/2023 11:41 AM    CLINICAL HISTORY: Weakness, nausea and vomiting, status post  chemotherapy and radiation distal one third esophageal for esophageal  cancer.    TECHNIQUE: CT scan of the chest, abdomen, and pelvis was performed  following injection of IV contrast. Multiplanar reformats were  obtained. Dose reduction techniques were used.     CONTRAST: 79mL Isovue-370    COMPARISON: PET/CT 6/14/2023.    FINDINGS:   LUNGS AND PLEURA: No effusions. No acute airspace disease. Minor right  base atelectasis. No new worrisome focal airspace disease. Calcified  granulomas at the left upper lobe.    MEDIASTINUM/AXILLAE: Wall thickening of the distal esophagus appears  increased in the interval series 7 image 120. Obscuration of the  previously noted distal esophageal mass. The proximal esophagus  continues to be mildly dilated, stable. Mediastinal lymph nodes  remains stable and are small in size. Some were hypermetabolic at the  prior exam. Faint calcification associated with a few of the hilar  lymph nodes. No areas of new adenopathy.    CORONARY ARTERY  CALCIFICATION: Severe.    HEPATOBILIARY: No new focal liver observation. Fatty liver suggested.  Faint cholelithiasis on image 155 suggested.    PANCREAS: Normal.    SPLEEN: Normal.    ADRENAL GLANDS: Normal.    KIDNEYS/BLADDER: No significant mass, stones, or hydronephrosis. There  are simple or benign cysts. No follow up is needed.    BOWEL: No obstruction. Normal appendix. Colonic diverticula distally  without convincing diverticulitis. There is some mild wall prominence  of the stomach but this could just relate to nondistention.    PELVIC ORGANS: Trace pelvic fluid.    ADDITIONAL FINDINGS: No new adenopathy. Vascular calcifications.    MUSCULOSKELETAL: Diffuse spine degenerative changes. Left hip  arthroplasty. No aggressive process identified. Right shoulder DJD.      Impression    IMPRESSION:  1.  Some increased wall thickening of the distal esophagus could be  esophagitis. Increasing neoplasm is not excluded. There is mildly  dilated proximal esophagus that appears similar to prior.  2.  Some wall thickening of the stomach is noted that could relate to  a mild gastritis.  3.  No other acute abnormality.    AP OLIVO MD         SYSTEM ID:  [id number]       Discharge Medications   Current Discharge Medication List        START taking these medications    Details   pantoprazole (PROTONIX) 40 MG EC tablet Take 1 tablet (40 mg) by mouth 2 times daily for 30 days  Qty: 60 tablet, Refills: 0    Associated Diagnoses: Upper GI bleed           CONTINUE these medications which have NOT CHANGED    Details   !! acetaminophen (TYLENOL) 160 MG/5ML solution Take 15.625 mLs (500 mg) by mouth every 6 hours as needed for mild pain  Qty: 473 mL, Refills: 3    Associated Diagnoses: 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)      !! acetaminophen (TYLENOL) 325 MG/10.15ML solution 20.3 mLs (650 mg) by Oral or Feeding Tube route every 4 hours as needed for mild pain or fever  Qty: 500 mL, Refills: 3    Associated Diagnoses:  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)      atorvastatin (LIPITOR) 10 MG tablet TAKE 1 TABLET BY MOUTH ONCE DAILY (APPOINTMENT  REQUIRED  FOR  FUTURE  REFILLS)  Qty: 90 tablet, Refills: 0    Associated Diagnoses: Right carotid artery occlusion      carvedilol (COREG) 6.25 MG tablet TAKE 1 TABLET BY MOUTH TWICE DAILY WITH MEALS  Qty: 60 tablet, Refills: 0    Associated Diagnoses: Right carotid artery occlusion      COLLAGEN PO 5 mLs by Per J Tube route every evening Collagen Extra Strength with 5 ml of Eniva VIBE-liquid and 5 ml of Eniva Cell Ready and qs with water to 55 ml      Dextromethorphan-guaiFENesin 5-100 MG/5ML LIQD Take 10 mLs by mouth every 6 hours as needed (for acute cough)  Qty: 237 mL, Refills: 0      diclofenac (VOLTAREN) 1 % topical gel Place 4 g onto the skin 3 times daily as needed for moderate pain (Shoulder)  Qty: 100 g, Refills: 1    Associated Diagnoses: Primary osteoarthritis of right shoulder      magic mouthwash (ENTER INGREDIENTS IN COMMENTS) suspension Swallow one to two teaspoonfuls every six hours as needed.  Qty: 300 mL, Refills: 3    Comments: 1 Part viscous lidocaine 2% 1 Part Maalox 1 Part diphenhydramine 12.5 mg per 5 ml elixir  Associated Diagnoses: 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)      metoclopramide (REGLAN) 5 MG/5ML solution Take 5-10 mLs (5-10 mg) by mouth 3 times daily as needed for nausea  Qty: 473 mL, Refills: 1    Associated Diagnoses: Nausea and vomiting, unspecified vomiting type      Multiple Vitamin (MULTI VITAMIN PO) 5 mLs by Per J Tube route daily Eniva VIBE-liquid with 5 ml of Eniva Cell Ready and 5 ml of Collagen qs with water to 55 ml      nitroGLYcerin (NITROSTAT) 0.3 MG sublingual tablet For chest pain place 1 tablet under the tongue every 5 minutes for 3 doses. If symptoms persist 5 minutes after 1st dose call 911.  Qty: 30 tablet, Refills: 3    Associated Diagnoses: Positive cardiac stress test; Angina at rest (H)       NONFORMULARY 5 mLs by Per J Tube route daily Eniva Cell-Ready Multi Minerals is a concentrated, ionic liquid mineral dietary supplement blend to help support nutritional balance and wellbeing. With Eniva VIBE-liquid with 5 ml and 5 ml of Collagen qs with water to 55 ml      ondansetron (ZOFRAN ODT) 8 MG ODT tab Take 1 tablet (8 mg) by mouth every 8 hours as needed for nausea  Qty: 30 tablet, Refills: 3    Associated Diagnoses: 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)      ondansetron (ZOFRAN) 4 MG/5ML solution Take 5 mLs (4 mg) by mouth every 8 hours as needed for nausea or vomiting  Qty: 50 mL, Refills: 3    Associated Diagnoses: 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)      !! oxyCODONE (ROXICODONE) 5 MG/5ML solution Take 5 mLs (5 mg) by mouth every 6 hours as needed for severe pain  Qty: 300 mL, Refills: 0    Associated Diagnoses: Cancer related pain      !! oxyCODONE (ROXICODONE) 5 MG/5ML solution 2.5 mLs (2.5 mg) by Oral or Feeding Tube route every 4 hours as needed for moderate pain  Qty: 100 mL, Refills: 0    Associated Diagnoses: 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)      simethicone (MYLICON) 40 MG/0.6ML suspension 40 mg by Per J Tube route 2 times daily as needed for flatulence       !! - Potential duplicate medications found. Please discuss with provider.        STOP taking these medications       aspirin (ASA) 325 MG tablet Comments:   Reason for Stopping:             Allergies   Allergies   Allergen Reactions    Metoprolol Other (See Comments)     Side effects : dry mouth    Ampicillin Rash    Latex Rash

## 2023-08-30 NOTE — PLAN OF CARE
Patient A&Ox4. VSS. LS clear mayra. BS +4, normoactive, abdomen round, soft, non-distended and non-tender. Has LR running at 100 ml/hr continuous. GJ feeding tube started at 2030, stopped at 0300 d/t nausea, restarted at 0422 and stopped at 0630 for 30 min, resumed at 100 ml/hr. PRN zofran and compazine given for nausea. PRN Tylenol and Oxycodone given for pain. Will continue to monitor.    Madhavi Whitt, RN

## 2023-08-30 NOTE — PROGRESS NOTES
[facility]    Hospitalist Progress Note    Date of Service (when I saw the patient): 08/30/2023    Assessment & Plan   Liu Lima is an 83-year-old male admitted on 8/29/2023. He presented to the emergency department for evaluation of dizziness and unsteadiness in the setting of recent melena and was found to have acute anemia secondary to presumed upper GI bleed related to his known esophageal cancer for which he is being admitted for further evaluation and treatment.      Upper GI bleed  Acute anemia due to blood loss  Presented with dizziness and lightheadedness. Initial hemoglobin 7.7, baseline 11-12. Hemodynamically stable at time of admission. Is on aspirin daily but no other anticoagulation. Guaiac positive in the emergency department. Has known esophageal mass and recently completed a 5 week radiation course. CT chest, abdomen, & pelvis shows some evidence of possible esophagitis but cannot exclude increasing neoplasm, also with some possible evidence of mild gastritis.  - Case was discussed between emergency department and on-call radiation oncology, who suspects that patient has a slow upper GI bleed at the cancer radiation site; presentation and CT findings are consistent with radiation bleeding.  Blood transfusion with 2U PRBC was initiated in the emergency department.   - Hemoglobin stable overnight at 10.5  Plan:  - Repeat hemoglobin q8h   - Conditional transfusion for hemoglobin < 7.0   - IV Protonix 40 mg bid  - Defer EGD at this time, as risk does not outweigh benefit and location of bleed site is already suspected  - Hold aspirin      Malignant neoplasm of lower third of esophagus  Ct2cN2 lower esophageal adenocarcinoma  Antineoplastic chemotherapy induced pancytopenia  Previously diagnosed, follows with oncology Dr. Locke / Dr. Haines and rad oncology Dr. Alcantar. Recently completed a course of both chemo and radiation x 5 weeks (last treatments on 8/25) with plans for  restaging in the near future.  - Has mild pancytopenia at time of admission (WBC 3.4 / hemoglobin 7.7 / platelets 147), likely due to chemo (although a component of hemoglobin drop is also from blood loss).  - Continue with outpatient management per oncology  Plan:  - CBC in am      Dysphagia  Jejunostomy tube in situ  Has a PEG tube in place, recently replaced on 8/25. Is dependent on J tube for all feeds and meds. Was having IV hydration as outpatient 3 times weekly as well.   Takes Jevita on continuous feeds at 100 ml/hr, a total of 4 cans of 337 ml daily, plus an Ensure high protein shake daily as well.   Plan:  - Strict NPO (sublingual meds OK)  - Patient states he has difficulty getting the volume of his tube feeds in per day.  Discussed with nutrition, they will look into having patient add on overnight feeds as well.     Generalized weakness  Patient with progressive generalized weakness at home, has home health RN but family has been considering possible TCU stay due to progressive weakness.  Plan:  - PT/OT evaluations  - Care Transitions consult     Hyponatremia   Admit Na = 132. Minimally low, low concern for ODS.   - No intervention      Coronary artery disease involving native coronary artery of native heart without angina pectoris  History of ischemic stroke  Benign essential hypertension, BP goal <140/90  Hyperlipidemia LDL goal <70  Diagnosed 2020, s/p PCI of left circumflex into OM1, with recommended medical management of RCA stenosis. Ischemic stroke diagnosed in 2021, with multiple small right MCA infarcts, and stenosis of right carotic and vertebral arteries. Managed prior to admission with aspirin 325 mg daily, atorvastatin 10 mg daily, carvedilol 6.25 mg bid, and prn nitroglycerine.   Plan:  - Aspirin as noted above  - Continue statin, beta blocker     FANI (obstructive sleep apnea)  Previously diagnosed, patient does not use CPAP due to claustrophobia.   - Prn supplemental O2 if needed    Diet:  NPO for Medical/Clinical Reasons Except for: NPO but receiving Tube Feeding  DVT Prophylaxis: Pneumatic Compression Devices  Vásquez Catheter: Not present  Lines: None     Cardiac Monitoring: None  Code Status: No CPR- Do NOT Intubate - discussed at time of admission, POLST reviewed    Disposition: Expected discharge in 1-2 days once strength improved and patient tolerating tube feeds..    Jaxon Rosenthal MD    Interval History   Patient states he feels better after IV fluids, but continues to feel overall weak.  He reports he has difficulty taking in the volume of tube feeds as prescribed to him.    -Data reviewed today: I reviewed all new labs and imaging results over the last 24 hours. I personally reviewed no images or EKG's today.    Physical Exam   Temp: 98.6  F (37  C) Temp src: Oral BP: (!) 144/65 Pulse: 71   Resp: 18 SpO2: 95 % O2 Device: None (Room air)    Vitals:    08/29/23 1001 08/29/23 1847 08/30/23 0605   Weight: 73.5 kg (162 lb) 72.8 kg (160 lb 7.9 oz) 74.2 kg (163 lb 9.6 oz)     Vital Signs with Ranges  Temp:  [97.8  F (36.6  C)-98.7  F (37.1  C)] 98.6  F (37  C)  Pulse:  [] 71  Resp:  [10-25] 18  BP: (135-171)/() 144/65  SpO2:  [90 %-99 %] 95 %  I/O last 3 completed shifts:  In: 2425 [I.V.:1005; NG/GT:300]  Out: 550 [Urine:550]    Gen: Well nourished, well developed, alert and oriented x 3, no acute distressed  HEENT: Atraumatic, normocephalic; sclera non-injected, anicterric; oral mucosa moist, no lesion, no exudate  Lungs: Clear to ausculation, no wheezes, no rhonchi, no rales  Heart: Regular rate, regular rhythm, no gallops, no rubs, no murmurs  GI: Bowel sound normal, no hepatosplenomegaly, no masses, non-tender, non-distended, no guarding, no rebound tenderness  Lymph: No lymphadenopathy, no edema  Skin: No rashes, no chronic venous stasis     Medications    dextrose      lactated ringers 100 mL/hr at 08/30/23 0601      [Held by provider] aspirin  325 mg Per J Tube Daily     atorvastatin  10 mg Per J Tube Daily    carvedilol  6.25 mg Per J Tube BID w/meals    pantoprazole  40 mg Intravenous Q12H    protein modular  1 packet Per Feeding Tube TID    sodium chloride (PF)  3 mL Intracatheter Q8H       Data   Recent Labs   Lab 08/30/23  0437 08/29/23 2028 08/29/23  1027   WBC 3.5* 3.1* 3.4*   HGB 10.5* 10.3* 7.7*   MCV 86 87 90    137* 147*   *  --  132*   POTASSIUM 3.9  --  4.0   CHLORIDE 102  --  97*   CO2 24  --  24   BUN 16.6  --  17.5   CR 0.71  --  0.84   ANIONGAP 8  --  11   GWEN 8.3*  --  9.0   *  --  170*   ALBUMIN  --   --  3.5   PROTTOTAL  --   --  6.0*   BILITOTAL  --   --  0.3   ALKPHOS  --   --  82   ALT  --   --  27   AST  --   --  22       Recent Results (from the past 24 hour(s))   CT Chest/Abdomen/Pelvis w Contrast    Narrative    CT CHEST/ABDOMEN/PELVIS WITH CONTRAST 8/29/2023 11:41 AM    CLINICAL HISTORY: Weakness, nausea and vomiting, status post  chemotherapy and radiation distal one third esophageal for esophageal  cancer.    TECHNIQUE: CT scan of the chest, abdomen, and pelvis was performed  following injection of IV contrast. Multiplanar reformats were  obtained. Dose reduction techniques were used.     CONTRAST: 79mL Isovue-370    COMPARISON: PET/CT 6/14/2023.    FINDINGS:   LUNGS AND PLEURA: No effusions. No acute airspace disease. Minor right  base atelectasis. No new worrisome focal airspace disease. Calcified  granulomas at the left upper lobe.    MEDIASTINUM/AXILLAE: Wall thickening of the distal esophagus appears  increased in the interval series 7 image 120. Obscuration of the  previously noted distal esophageal mass. The proximal esophagus  continues to be mildly dilated, stable. Mediastinal lymph nodes  remains stable and are small in size. Some were hypermetabolic at the  prior exam. Faint calcification associated with a few of the hilar  lymph nodes. No areas of new adenopathy.    CORONARY ARTERY CALCIFICATION:  Severe.    HEPATOBILIARY: No new focal liver observation. Fatty liver suggested.  Faint cholelithiasis on image 155 suggested.    PANCREAS: Normal.    SPLEEN: Normal.    ADRENAL GLANDS: Normal.    KIDNEYS/BLADDER: No significant mass, stones, or hydronephrosis. There  are simple or benign cysts. No follow up is needed.    BOWEL: No obstruction. Normal appendix. Colonic diverticula distally  without convincing diverticulitis. There is some mild wall prominence  of the stomach but this could just relate to nondistention.    PELVIC ORGANS: Trace pelvic fluid.    ADDITIONAL FINDINGS: No new adenopathy. Vascular calcifications.    MUSCULOSKELETAL: Diffuse spine degenerative changes. Left hip  arthroplasty. No aggressive process identified. Right shoulder DJD.      Impression    IMPRESSION:  1.  Some increased wall thickening of the distal esophagus could be  esophagitis. Increasing neoplasm is not excluded. There is mildly  dilated proximal esophagus that appears similar to prior.  2.  Some wall thickening of the stomach is noted that could relate to  a mild gastritis.  3.  No other acute abnormality.    AP OLIVO MD         SYSTEM ID:  [id number]

## 2023-08-30 NOTE — PROGRESS NOTES
"Reason for Admission:  Upper GI bleed    Activity:  SBA    Neuro: A/O    Cardiac: WNL     Telemetry: No    Resp: WNL    GI/: hiccups/nausea at times Compazine patient stated helped the most. J tube left abdomin     Skin: J tube, skin cleaned and new 4X4 placed    Diet: Jevety 1.5 infusing at 100 ml/hr    IV Access/Drains: LR infusing 100 ml/hr    Vitals: BP (!) 168/67 (BP Location: Right arm)   Pulse 89   Temp 98.3  F (36.8  C) (Oral)   Resp 18   Ht 1.549 m (5' 0.98\")   Wt 74.2 kg (163 lb 9.6 oz)   SpO2 96%   BMI 30.93 kg/m        Pain:  Oxycodone given    "

## 2023-08-30 NOTE — PLAN OF CARE
"[place] [facility] [place] ADMISSION NOTE    Patient admitted to room 2205 at approximately 1845 via cart from emergency room. Patient was accompanied by spouse, transport tech, daughter, and other: granddaughter.     Verbal SBAR report received from Germain BOSE prior to patient arrival.     Patient trasferred to bed via self. Patient alert and oriented X 3. The patient is not having any pain.  . Admission vital signs: Blood pressure (!) 171/77, pulse 89, temperature 98.1  F (36.7  C), temperature source Oral, resp. rate 18, height 1.549 m (5' 0.98\"), weight 72.8 kg (160 lb 7.9 oz), SpO2 95 %. Patient and spouse and daughter were oriented to plan of care, call light, bed controls, tv, telephone, bathroom, and visiting hours.     Risk Assessment    The following safety risks were identified during admission: fall. Yellow risk band applied: YES.     Skin Initial Assessment    This writer admitted this patient and completed a full skin assessment and [place] score in the Adult PCS flowsheet. Appropriate interventions initiated as needed.     Secondary skin check completed by Will be completed by oncoming RNs.         Education    Patient has a [place] to Observation order: No  Observation education completed and documented: N/A      Deidre Zabala RN                            "

## 2023-08-31 NOTE — LETTER
8/31/2023         RE: Liu Ragsdale  [id number]  [place] [id number]        Dear Colleague,    Thank you for referring your patient, Liu Ragsdale, to the [facility]. Please see a copy of my visit note below.    Hematology/Medical Oncology Follow-up Note      August 31, 2023    Reason for visit:  Liu Ragsdale is a 83 year old retired  from [place] accompanied by his wife Kelsi who presents for oncologic re-evaluation s/p 8/25/2023 completion concurrent chemoradiation with weekly paclitaxel/carboplatin for treatment of stage III-B distal esophageal adenocarcinoma.    Impression:  S/p 8/25/2023 completion of potential neoadjuvant chemoradiation for a stage III-B, uT2, uN2 distal esophageal adenocarcinoma, PD-L1 TPS 0, HER2 negative  History of carotid artery disease, coronary artery disease, hypertension  Jejunostomy tube enteral alimentation  Inadequate free water supplementation per jejunostomy tube    Recommendation, plan, instructions:  Increase free water flushes to 300 cc before and after tube feedings  Follow-up with me in 2 weeks with CBCs, CMP before appointment  PET scan around 9/29/2023 which would be 5 weeks after completion of chemoradiation  Dietitian to see regarding adequate free water through tube feedings and other management.  I do not believe the patient should need a implanted port for hydration purposes  Follow-up Dr. Richie Levi at [facility] for surgery follow-up week of 10/2/2023    Time with patient including review, documentation, history, examination, coordination of care and counseling was 30 minutes.    History of present illness:  In April, 2023, the patient noted the onset of solid food dysphagia, acid reflux and 10 pound weight loss.    Subsequent 6/5/2023 EGD revealed a distal esophageal large fungating mass that was partly obstructing with biopsy confirming a moderately to poorly differentiated adenocarcinoma that was PD-L1  TPS 0, HER2 negative.  6/14/2023 PET scan confirmed a hypermetabolic distal esophageal mass with low to moderate hilar and mediastinal adenopathy possibly inflammatory in nature without clear evidence of distant metastases.  7/2/2023 EUS demonstrated a lesion to involve layer 4 with five regional paraesophageal lymph nodes identified either at or above the primary with one that was inferior with malignant features.  The remainder of the study was unremarkable including gallbladder, pancreas and liver. (Clinical stage III-B, uT2, uN2, cM0)    On 6/20/2023, the patient was seen by Dr. Ferny Levi who felt the patient's performance status was that he may be a surgical candidate and will be reevaluated after chemoradiation.  On 7/5/2023, a diagnostic laparoscopy was performed with insertion of a jejunostomy feeding tube.    On 7/24/2023, chemoradiation commenced after a one week delay because of a mild COVID-19 infection.    He does not use tobacco or alcohol.  He has 4 to 5 cans of tube feedings per dietary but has not been seen by dietitian recently.     [employer]  from [id number].    Past medical history:  Past Medical History:   Diagnosis Date     Angina at rest (H)      Arthritis      Cerebrovascular accident (H) 09/27/2021     Coronary artery disease involving native coronary artery of native heart without angina pectoris      Dysphagia      Esophageal adenocarcinoma (H)     DISTAL ESOPHAGUS     Hypertension      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) 7/3/2023        Family history:  I have reviewed this patient's family history and updated it with pertinent information if needed.  Family History   Problem Relation Age of Onset     Hypertension Sister      Kidney failure Sister      Chronic Obstructive Pulmonary Disease Sister      Thyroid Disease Sister      Seizure Disorder Paternal Grandmother      Mitral valve prolapse Daughter      No Known Problems Son      Cerebrovascular Disease No family  "hx of          Medications:  Current Outpatient Medications   Medication     acetaminophen (TYLENOL) 160 MG/5ML solution     acetaminophen (TYLENOL) 325 MG/10.15ML solution     atorvastatin (LIPITOR) 10 MG tablet     carvedilol (COREG) 6.25 MG tablet     COLLAGEN PO     Dextromethorphan-guaiFENesin 5-100 MG/5ML LIQD     diclofenac (VOLTAREN) 1 % topical gel     magic mouthwash (ENTER INGREDIENTS IN COMMENTS) suspension     metoclopramide (REGLAN) 5 MG/5ML solution     Multiple Vitamin (MULTI VITAMIN PO)     nitroGLYcerin (NITROSTAT) 0.3 MG sublingual tablet     NONFORMULARY     ondansetron (ZOFRAN) 4 MG/5ML solution     oxyCODONE (ROXICODONE) 5 MG/5ML solution     oxyCODONE (ROXICODONE) 5 MG/5ML solution     simethicone (MYLICON) 40 MG/0.6ML suspension     ondansetron (ZOFRAN ODT) 8 MG ODT tab     pantoprazole (PROTONIX) 40 MG EC tablet     No current facility-administered medications for this visit.       Allergies:  Allergies   Allergen Reactions     Ampicillin Rash     Latex Rash     Metoprolol Other (See Comments)     Side effects : dry mouth       Review of systems:  Odynophagia persists and he is unable to take any food or liquids at this time.  Except as noted in the note above, the patient denies headaches, diplopia, hearing loss or dizziness; dyspnea, cough, hemoptysis, pleurisy; chest pain/pressure, palpitations, lightheadedness; anorexia, nausea, vomiting, abdominal pain, diarrhea, constipation, melena or rectal bleeding; dysuria, frequency, nocturia, blood in the urine; fever, chills, sweats; tingling, numbness, loss of balance; insomnia, depression, anxiety.    Physical examination:  BP (!) 141/69 (BP Location: Right arm, Patient Position: Sitting, Cuff Size: Adult Large)   Pulse 86   Temp 98.3  F (36.8  C) (Tympanic)   Resp 12   Ht 1.549 m (5' 1\")   Wt 76.7 kg (169 lb)   SpO2 96%   BMI 31.93 kg/m      The patient is alert and oriented. Throat and oral mucosa are normal-appearing. Thyroid gland " "is not enlarged. Adenopathy is absent in the neck, axilla, groin and supraclavicular fossa; Lungs are clear to auscultation and percussion without rales, wheezes or rubs; heart rhythm is regular, heart sounds are without murmurs or gallops; the abdomen is soft and flat without hepatosplenomegaly, masses, ascites or tenderness.; extremities and skin reveal no unusual skin lesions, joint swelling or edema;    Tucker Haines MD    Oncology Rooming Note    August 31, 2023 9:51 AM   Liu Ragsdale is a 83 year old male who presents for:    Chief Complaint   Patient presents with     Oncology Clinic Visit     Malignant neoplasm of lower third of esophagus - Labs and provider      Initial Vitals: BP (!) 141/69 (BP Location: Right arm, Patient Position: Sitting, Cuff Size: Adult Large)   Pulse 86   Temp 98.3  F (36.8  C) (Tympanic)   Resp 12   Ht 1.549 m (5' 1\")   Wt 76.7 kg (169 lb)   SpO2 96%   BMI 31.93 kg/m   Estimated body mass index is 31.93 kg/m  as calculated from the following:    Height as of this encounter: 1.549 m (5' 1\").    Weight as of this encounter: 76.7 kg (169 lb). Body surface area is 1.82 meters squared.  Mild Pain (2) Comment: Data Unavailable   No LMP for male patient.  Allergies reviewed: Yes  Medications reviewed: Yes    Medications: Medication refills not needed today.  Pharmacy name entered into EPIC:    [facility] - [place], [place] - [address] [facility] PORTAL TO REGISTERED [facility] SITES  [facility] - [place], [place] - [address] [facility] [place], [place] - [address] [facility]    Clinical concerns:  None      Natty Ribeiro [facility]                Again, thank you for allowing me to participate in the care of your patient.        Sincerely,        Tucker Haines MD  "

## 2023-08-31 NOTE — NURSING NOTE
FOLLOW-UP VISIT    Patient Name: Liu Ragsdale      : 1940     Age: 83 year old        ______________________________________________________________________________     Chief Complaint   Patient presents with    Radiation Therapy     Follow up     There were no vitals taken for this visit.     Date Radiation Completed: 23 concurrent chemoradiation for esophageal cancer    Pain  Current history of pain associated with this visit:   Intensity: 4/10  Current: aching, throbbing, and spasming  Location: distal esophagus  Treatment: magic mouthwash prn, oxycodone 5 mL/5mg prn ( patient took one dose last evening, one dose this am) 1-2 doses per day, liquid tylenol prn    Meds  Current Med List Reviewed: Yes  Medication Note:     Imaging  None    On Chemo?: No  Esophagitis: Moderate  Nausea:nausea  Bowel: Soft  Skin: Pale  Feeding Tube: Yes  Energy Level: low  Weight: There were no vitals filed for this visit.    Other Appointments:     Date  Oncologist: Dr. Haines  23   Surgeon:      Other Notes:  pt was admitted to hospital for two days after completion of treatment - upper GI bleed, low hgb with need for transfusion, nausea/vomiting/dehydration, generalized weakness

## 2023-08-31 NOTE — PROGRESS NOTES
Clinic Care Coordination Contact  Clinic Care Coordination Contact  OUTREACH    Referral Information:  Referral Source: IP Handoff    Primary Diagnosis: GI Disorders    Chief Complaint   Patient presents with    Clinic Care Coordination - Post Hospital     Clinic Care Coordination RN    ED to Hosp-Admission  Discharged  8/29/2023 - 8/30/2023 (32 hours)  [facility]     Jaxon Rosenthal MD  Last attending  Treatment team Upper GI bleed  Principal problem        U  Clinic Utilization  Difficulty keeping appointments:: No  Compliance Concerns: No  No-Show Concerns: No  No PCP office visit in Past Year: No  Utilization      Hospital Admissions  4             ED Visits  3             No Show Count (past year)  0                    Current as of: 8/30/2023 10:47 PM                Clinical Concerns:  Current Medical Concerns:  Patient had a Oncology visit today and they reviewed discharge instructions and medications .  Patient states he is done with Radiation and it will take up to 4 weeks before he is feeling back to normal  Patient gets all his nutrition through the J-tube feeding .  Patient continues to feel week and did have 2 units of blood in the hospital Patient states the hospital was able to check depends for blood which was positive   Patient states the GI bleed is minor and he has a follow up PET scan scheduled  Patient has a home care visit scheduled tomorrow   Patient has a Oncology nurse care coordinator Yue if he has questions or concerns     Current Behavioral Concerns: No    Education Provided to patient: CC role introduced    Pain  Pain (GOAL):: No  Health Maintenance Reviewed: Not assessed  Clinical Pathway: None    Medication Management:  Medication review status: Reviewed at the Cancer clinic today and no further questions     Functional Status:  Dependent ADLs:: Independent  Bed or wheelchair confined:: No  Mobility Status: Independent    Living Situation:  Current  living arrangement:: I live in a private home with spouse    Lifestyle & Psychosocial Needs:    Social Determinants of Health     Tobacco Use: Low Risk  (8/31/2023)    Patient History     Smoking Tobacco Use: Never     Smokeless Tobacco Use: Never     Passive Exposure: Never   Alcohol Use: Not on file   Financial Resource Strain: Not on file   Food Insecurity: Not on file   Transportation Needs: Not on file   Physical Activity: Not on file   Stress: Not on file   Social Connections: Not on file   Intimate Partner Violence: Not on file   Depression: Not at risk (7/20/2023)    PHQ-2     PHQ-2 Score: 1   Housing Stability: Not on file     Diet::  (J tube feedings)  Inadequate nutrition (GOAL):: No  Tube Feeding: Yes  Tube Feeding: J-tube  Inadequate activity/exercise (GOAL):: No  Significant changes in sleep pattern (GOAL): No  Transportation means:: Family     Denominational or spiritual beliefs that impact treatment:: No  Mental health DX:: No  Mental health management concern (GOAL):: No  Chemical Dependency Status: No Current Concerns  Informal Support system:: Spouse             Resources and Interventions:  Current Resources:   Skilled Home Care Services: Skilled Nursing  Community Resources: Home Care, Home Infusion     Equipment Currently Used at Home: none            Advance Care Plan/Directive  Advanced Care Plans/Directives on file:: Yes  Type Advanced Care Plans/Directives: Advanced Directive - On File    Referrals Placed: None         Patient/Caregiver understanding: Patient expresses good understanding of discharge instructions        Future Appointments                In 6 days Jarred Alcantar MD  [facility], [facility] RAD [facility]    In 2 weeks [facility]    In 2 weeks Tucker Haines MD [facility]    In 2 weeks Piyush Rogers MD [facility]    In 4 weeks  [id number] [facility] [place], [place]    In 1 month Ferny Levi MD [facility], [facility]            Plan:   Patient will keep hospital follow up appointment with PCP 9/18/2023   Patient is closely followed by the Oncology group and will call Yue NANCE RN if questions or concerns   No unmet needs, no further care coordination needed at this time     [facility]   Kimberly Hopper RN, Care Coordinator   [facility]'s   E-mail [email or web address]   [phone]

## 2023-08-31 NOTE — PATIENT INSTRUCTIONS
1. Follow-up Dr. Haines in 2 weeks with labs before appointment  2. Increase free water flushes to tube feedings to 300 ml before tube feedings and 300 ml afterwards  3. PET scan around 9/29/2023  4. Dietician to see again regarding adequate free water thru tube feedings; patient should not need port for hydration!  5. Follow-up Dr. Richie Levi at [facility] for surgery follow-up week of 10/2/2023

## 2023-08-31 NOTE — PROGRESS NOTES
"Oncology Rooming Note    August 31, 2023 9:51 AM   Liu Ragsdale is a 83 year old male who presents for:    Chief Complaint   Patient presents with    Oncology Clinic Visit     Malignant neoplasm of lower third of esophagus - Labs and provider      Initial Vitals: BP (!) 141/69 (BP Location: Right arm, Patient Position: Sitting, Cuff Size: Adult Large)   Pulse 86   Temp 98.3  F (36.8  C) (Tympanic)   Resp 12   Ht 1.549 m (5' 1\")   Wt 76.7 kg (169 lb)   SpO2 96%   BMI 31.93 kg/m   Estimated body mass index is 31.93 kg/m  as calculated from the following:    Height as of this encounter: 1.549 m (5' 1\").    Weight as of this encounter: 76.7 kg (169 lb). Body surface area is 1.82 meters squared.  Mild Pain (2) Comment: Data Unavailable   No LMP for male patient.  Allergies reviewed: Yes  Medications reviewed: Yes    Medications: Medication refills not needed today.  Pharmacy name entered into Flash Valet:    [facility] - [place], [place] - [address] [facility] [place] [facility] AT PORTAL TO REGISTERED [facility] SITES  [facility] - [place], [place] - [address]  [facility] [place], [place] - [address] [facility]    Clinical concerns:  None      Natty Ribeiro CMA              "

## 2023-09-01 NOTE — TELEPHONE ENCOUNTER
Home Care is calling regarding an established patient with [facility].       Requesting orders from: Piyush Rogers  Provider is following patient: Yes  Is this a 60-day recertification request?  No    Orders Requested    Skilled Nursing  Request for resumption in care.     1 x/wk on week 9 to complete recertification, patient was hospitalized 8/29/23 - 8/30/23 with a UGI bleed due to chemotherapy and received 2 units of PRBC.       Information was gathered and will be sent to provider for review.  RN will contact Home Care with information after provider review.  Confirmed ok to leave a detailed message with call back.  Contact information confirmed and updated as needed.    Julie Behrendt, RN

## 2023-09-05 NOTE — UTILIZATION REVIEW
Admission Status; Secondary Review Determination       As part of the [facility] Utilization review plan, a self-audit is done on Medicare inpatient admission with less than 2 midnights stay. The 2014 IPPS Final Rule allows outpatient billing in the event that a hospital determines that an inpatient admission was not medically necessary under utilization review process.          (x) Outpatient status would be Appropriate- Short Stay- Post discharge review.     RATIONALE FOR DETERMINATION   83-year-old male was admitted on 8/29/2023 for evaluation of dizziness and unsteadiness in the context of recent melena. He was found to have acute anemia attributed to an upper GI bleed related to known esophageal cancer. His initial hemoglobin was 7.7, with a baseline of 11-12, and he remained hemodynamically stable. The patient has a history of esophageal adenocarcinoma and recently completed a 5-week course of chemotherapy and radiation. CT findings suggest possible radiation-induced bleeding at the cancer site. He received a blood transfusion and his hemoglobin stabilized at 10.5. The plan includes IV Protonix, deferring EGD due to suspected bleed site, and holding aspirin. Mild pancytopenia (WBC 3.4 / hemoglobin 7.7 / platelets 147) is attributed to chemotherapy. The patient's outpatient management continues under the care of oncology and radiation oncology. Follow-up CBC is planned.  This account and medical record number for a Medicare beneficiary was an inpatient short stay (<2 midnights) and didn't meet medical necessity for inpatient admission. We recommend billing outpatient services based on the severity of illness, intensity of service provided and the inpatient length of stay.  Please contact me within one week of receiving this letter only if you disagree with this determination. If you concur, no further action is needed.  IP order Tevin Banegas MD 08/29/23 1627 < 2 midnights                        The  information on this document is developed by the utilization review team in order for the business office to ensure compliance.  This only denotes the appropriateness of proper admission status and does not reflect the quality of care rendered.         The definitions of Inpatient Status and Observation Status used in making the determination above are those provided in the CMS Coverage Manual, Chapter 1 and Chapter 6, section 70.4.      Sincerely,       DOC GUNTER MD    System Medical Director  Utilization  Management  [facility].

## 2023-09-06 NOTE — PROGRESS NOTES
Hematology/Medical Oncology Follow-up Note      September 14, 2023    ADDENDUM: Called patient regarding GI and surgery plans. He has been feeling better and is eating now. I will see him back in January, 2024 with repeat CBC, CMP and PET scan before appointment being that surgery after chemoradiation is not being contemplated at this time.    David Haines MD (11/7/2023)    ADDENDUM: Called patient regarding PET scan revealing probable complete response with only residual FDG activity in the distal esophagus likely inflammation.  Await surgical consultation with Dr. Levi next week.  David Haines MD (9/29/2023)      Reason for visit:  Liu Ragsdale is a 83 year old retired  from [employer] [place] accompanied by his wife Kelsi who presents for oncologic re-evaluation s/p 8/25/2023 completion concurrent chemoradiation with weekly paclitaxel/carboplatin for treatment of stage III-B distal esophageal adenocarcinoma.    Impression:  S/p 8/25/2023 completion of potential neoadjuvant chemoradiation for a stage III-B, uT2, uN2 distal esophageal adenocarcinoma, PD-L1 TPS 0, HER2 negative  Modest performance status recovery but with 12 pound weight loss over the last 3 weeks.  Jejunostomy tube enteral alimentation  History of carotid artery disease, coronary artery disease, hypertension    Recommendation, plan, instructions:  Follow-up Dr. Richie Levi at [facility] for surgery follow-up week of 10/2/2023 following 9/29/2023 restaging PET scan  Dietitian to see to optimize nutrition particularly in view of possible upcoming surgery in terms of increased risk for wound healing  Follow-up with me after anticipated upcoming surgery to discuss possible postsurgical intervention.    Time with patient including review, documentation, history, examination, coordination of care and counseling was 20 minutes.    History of present illness:  In April, 2023, the patient noted the onset of solid food dysphagia, acid reflux and 10 pound weight  loss.    Subsequent 6/5/2023 EGD revealed a distal esophageal large fungating mass that was partly obstructing with biopsy confirming a moderately to poorly differentiated adenocarcinoma that was PD-L1 TPS 0, HER2 negative.  6/14/2023 PET scan confirmed a hypermetabolic distal esophageal mass with low to moderate hilar and mediastinal adenopathy possibly inflammatory in nature without clear evidence of distant metastases.  7/2/2023 EUS demonstrated a lesion to involve layer 4 with five regional paraesophageal lymph nodes identified either at or above the primary with one that was inferior with malignant features.  The remainder of the study was unremarkable including gallbladder, pancreas and liver. (Clinical stage III-B, uT2, uN2, cM0)    On 6/20/2023, the patient was seen by Dr. Ferny Levi who felt the patient's performance status was that he may be a surgical candidate and will be reevaluated after chemoradiation.  On 7/5/2023, a diagnostic laparoscopy was performed with insertion of a jejunostomy feeding tube.    On 7/24/2023, chemoradiation commenced after a one week delay because of a mild COVID-19 infection.    He does not use tobacco or alcohol.  He has 4 cans of tube feedings per dietary but has not been seen by dietitian recently.    He has been very feeling very slightly stronger and less tired than before.  However he has just been using 4 cans of nutrition daily until yesterday.  He says he is taking 1400 cc of free water or Pedialyte daily.  His swelling is only very slightly improved with sips of water causing pain.    Today CBC and CMP are satisfactory with improvement of his leukopenia and anemia.  Electrolytes are stable.     [employer]  from [id number].    Past medical history:  Past Medical History:   Diagnosis Date    Angina at rest (H)     Arthritis     Cerebrovascular accident (H) 09/27/2021    Coronary artery disease involving native coronary artery of native heart without angina  pectoris     Dysphagia     Esophageal adenocarcinoma (H)     DISTAL ESOPHAGUS    Hypertension     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) 7/3/2023        Family history:  I have reviewed this patient's family history and updated it with pertinent information if needed.  Family History   Problem Relation Age of Onset    Hypertension Sister     Kidney failure Sister     Chronic Obstructive Pulmonary Disease Sister     Thyroid Disease Sister     Seizure Disorder Paternal Grandmother     Mitral valve prolapse Daughter     No Known Problems Son     Cerebrovascular Disease No family hx of          Medications:  Current Outpatient Medications   Medication    acetaminophen (TYLENOL) 325 MG/10.15ML solution    atorvastatin (LIPITOR) 10 MG tablet    carvedilol (COREG) 6.25 MG tablet    COLLAGEN PO    Dextromethorphan-guaiFENesin 5-100 MG/5ML LIQD    diclofenac (VOLTAREN) 1 % topical gel    famotidine (PEPCID) 40 MG/5ML suspension    LORazepam (ATIVAN) 0.5 MG tablet    magic mouthwash (ENTER INGREDIENTS IN COMMENTS) suspension    metoclopramide (REGLAN) 5 MG/5ML solution    Multiple Vitamin (MULTI VITAMIN PO)    nitroGLYcerin (NITROSTAT) 0.3 MG sublingual tablet    NONFORMULARY    omeprazole (PRILOSEC) 2 mg/mL suspension    ondansetron (ZOFRAN) 4 MG/5ML solution    oxyCODONE (ROXICODONE) 5 MG/5ML solution    simethicone (MYLICON) 40 MG/0.6ML suspension    acetaminophen (TYLENOL) 160 MG/5ML solution    ondansetron (ZOFRAN ODT) 8 MG ODT tab    pantoprazole (PROTONIX) 40 MG EC tablet     No current facility-administered medications for this visit.       Allergies:  Allergies   Allergen Reactions    Ampicillin Rash    Latex Rash    Metoprolol Other (See Comments)     Side effects : dry mouth       Review of systems:  Odynophagia persists and he is able to take small sips of liquids at this time.      Physical examination:  /53 (BP Location: Right arm, Patient Position: Sitting, Cuff Size: Adult Regular)   Pulse  "84   Temp 97.9  F (36.6  C) (Tympanic)   Resp 12   Ht 1.549 m (5' 1\")   Wt 71.2 kg (157 lb)   SpO2 96%   BMI 29.66 kg/m      The patient is alert and oriented.    Tucker Haines MD  "

## 2023-09-06 NOTE — TELEPHONE ENCOUNTER
Central Prior Authorization Team  Phone: [phone]    PA Initiation    Medication: LORAZEPAM 0.5 MG PO TABS  Insurance Company: [employer] Non-Specialty PA's - Phone [phone] Fax [phone]  Pharmacy Filling the Rx: [id number] - [place], [place] - [address] [place] [address]  Filling Pharmacy Phone: [phone]  Filling Pharmacy Fax:    Start Date: 9/6/2023

## 2023-09-06 NOTE — LETTER
9/6/2023         RE: Liu Ragsdale  [id number] [facility] [id number]        Dear Colleague,    Thank you for referring your patient, Liu Ragsdale, to the [facility] RAD[facility]ATION [facility]. Please see a copy of my visit note below.    Radiation Oncology Note    HPI:Mr. 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 He underwent chemoradiation therapy with plan for re-evaluation for surgical resection.     Radiation Treatment  7/24/23 to 8/25/23: Esophagus + omar regions, 5000 cGy  in 25 fractions    Patient returns for follow-up.    Posttreatment, the patient was admitted to the hospital from 8/29 to 8/30/2023 for anemia likely secondary to GI bleed as well as generalized weakness.  He received 2 units of red blood cell.  Now discharged home.    Overall, the patient remains weak but is slowly trying to improve.  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  Using oxycodone oral solution less regularly. Using Magic mouthwash  and tylenol as needed.  Continues to have nausea although slightly improved today.  Using Zofran ODT. Have prescribed low dose ativan in the past for consideration.     Follow-up with medical oncology team (Dr. Haines).    Following with surgery team (Dr. Levi). Posttreatment PET scan scheduled for 9/28/2023.    Plan:  Acute toxicities are starting to recover slowly with time.  Radiation esophagitis.  Magic mouthwash as needed.  Antacid as needed.  Nausea.  Zofran ODT. Re-discussed consideration of low-dose Ativan as needed.  Continue to advance diet as tolerated.  Continue to push fluids.  Return to clinic in 1 week.    Jarred Alcantar M.D.  Department of Radiation Oncology  [facility]

## 2023-09-06 NOTE — NURSING NOTE
FOLLOW-UP VISIT    Patient Name: Liu Ragsdale      : 1940     Age: 83 year old        ______________________________________________________________________________     Chief Complaint   Patient presents with     Radiation Therapy     Return visit, esophageal cancer     BP (!) 159/73 (BP Location: Left arm, Cuff Size: Adult Large)   Pulse 88   Resp 16   Wt 73.8 kg (162 lb 12.8 oz)   SpO2 97%   BMI 30.76 kg/m       Date Radiation Completed: 23    Pain  Slightly better this week  Still rates pain 3-4/10 - takes m.m 1x per day, oxycodone 5 mg in the evening 1x, tylenol liquid tid    Meds  Current Med List Reviewed: Yes    Esophagitis: getting better  Nausea: persistent, but slightly better  Bowel: variable, manageable  Feeding Tube: Yes, continuous feed, still NPO  Energy Level: getting slightly better, naps when needed  Weight:   Vitals:    23 1332   Weight: 73.8 kg (162 lb 12.8 oz)       Other Appointments:     Date  Oncologist:     Surgeon:      Other Notes:

## 2023-09-06 NOTE — PROGRESS NOTES
Radiation Oncology Note    HPI:Mr. 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 He underwent chemoradiation therapy with plan for re-evaluation for surgical resection.     Radiation Treatment  7/24/23 to 8/25/23: Esophagus + omar regions, 5000 cGy  in 25 fractions    Patient returns for follow-up.    Posttreatment, the patient was admitted to the hospital from 8/29 to 8/30/2023 for anemia likely secondary to GI bleed as well as generalized weakness.  He received 2 units of red blood cell.  Now discharged home.    Overall, the patient remains weak but is slowly trying to improve.  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  Using oxycodone oral solution less regularly. Using Magic mouthwash  and tylenol as needed.  Continues to have nausea although slightly improved today.  Using Zofran ODT. Have prescribed low dose ativan in the past for consideration.     Follow-up with medical oncology team (Dr. Haines).    Following with surgery team (Dr. Levi). Posttreatment PET scan scheduled for 9/28/2023.    Plan:  Acute toxicities are starting to recover slowly with time.  Radiation esophagitis.  Magic mouthwash as needed.  Antacid as needed.  Nausea.  Zofran ODT. Re-discussed consideration of low-dose Ativan as needed.  Continue to advance diet as tolerated.  Continue to push fluids.  Return to clinic in 1 week.    Jarred Alcantar M.D.  Department of Radiation Oncology  [facility]

## 2023-09-07 NOTE — TELEPHONE ENCOUNTER
Home Health Care    Reason for call:  Ericka with [facility] calling.     Orders are needed for this patient.  Calling for recertification orders with weekly nursing     Pt Provider: Sue    Phone Number Homecare Nurse can be reached at: [phone]    Can we leave a detailed message on this number? YES

## 2023-09-07 NOTE — TELEPHONE ENCOUNTER
Central Prior Authorization Team  Phone: [phone]    Received fax requesting more information. Faxed form back to insurance along with chart notes .

## 2023-09-13 NOTE — TELEPHONE ENCOUNTER
----- Message from Mónica Chase RD sent at 8/31/2023 11:03 AM CDT -----  Regarding: RE: duke Flores     ANISHA RN is taking care of this and passing it on to the RD that is following him.     Thank you,    Mónica  ----- Message -----  From: Nyla Morrell RN  Sent: 8/31/2023  10:55 AM CDT  To: [facility]; #  Subject: dietician                                        Hello,  This patient saw Dr. Haines today and and he is recommending the dietician reach out to this patient and touch base with him. He is not getting enough fluids in through his feeding tube.  Thanks!  Yue

## 2023-09-14 NOTE — PROGRESS NOTES
"Oncology Rooming Note    September 14, 2023 10:20 AM   Liu Ragsdale is a 83 year old male who presents for:    Chief Complaint   Patient presents with    Oncology Clinic Visit     Malignant neoplasm of lower third of esophagus - Labs and provider visit     Initial Vitals: /53 (BP Location: Right arm, Patient Position: Sitting, Cuff Size: Adult Regular)   Pulse 84   Temp 97.9  F (36.6  C) (Tympanic)   Resp 12   Ht 1.549 m (5' 1\")   Wt 71.2 kg (157 lb)   SpO2 96%   BMI 29.66 kg/m   Estimated body mass index is 29.66 kg/m  as calculated from the following:    Height as of this encounter: 1.549 m (5' 1\").    Weight as of this encounter: 71.2 kg (157 lb). Body surface area is 1.75 meters squared.  Mild Pain (2) Comment: Data Unavailable   No LMP for male patient.  Allergies reviewed: Yes  Medications reviewed: Yes    Medications: Medication refills not needed today.  Pharmacy name entered into EPIC:    [facility] - [place], [place] - [address] [facility] [place] [facility] AT PORTAL TO REGISTERED [facility] SITES  [facility] - [place], [place] - [address]  [facility] [place] - [place], [place] - [address] [facility]    Clinical concerns:  None      Natty Ribeiro CMA              "

## 2023-09-14 NOTE — LETTER
9/14/2023         RE: Liu Ragsdale  [id number]  [facility] [address]        Dear Colleague,    Thank you for referring your patient, Liu Ragsdale, to the [facility]. Please see a copy of my visit note below.    Hematology/Medical Oncology Follow-up Note      September 14, 2023    Reason for visit:  Liu Ragsdale is a 83 year old retired  from [employer] accompanied by his wife Kelsi who presents for oncologic re-evaluation s/p 8/25/2023 completion concurrent chemoradiation with weekly paclitaxel/carboplatin for treatment of stage III-B distal esophageal adenocarcinoma.    Impression:  S/p 8/25/2023 completion of potential neoadjuvant chemoradiation for a stage III-B, uT2, uN2 distal esophageal adenocarcinoma, PD-L1 TPS 0, HER2 negative  Modest performance status recovery but with 12 pound weight loss over the last 3 weeks.  Jejunostomy tube enteral alimentation  History of carotid artery disease, coronary artery disease, hypertension    Recommendation, plan, instructions:  Follow-up Dr. Richie Levi at [facility] for surgery follow-up week of 10/2/2023 following 9/29/2023 restaging PET scan  Dietitian to see to optimize nutrition particularly in view of possible upcoming surgery in terms of increased risk for wound healing  Follow-up with me after anticipated upcoming surgery to discuss possible postsurgical intervention.    Time with patient including review, documentation, history, examination, coordination of care and counseling was 20 minutes.    History of present illness:  In April, 2023, the patient noted the onset of solid food dysphagia, acid reflux and 10 pound weight loss.    Subsequent 6/5/2023 EGD revealed a distal esophageal large fungating mass that was partly obstructing with biopsy confirming a moderately to poorly differentiated adenocarcinoma that was PD-L1 TPS 0, HER2 negative.  6/14/2023 PET scan confirmed a hypermetabolic distal  esophageal mass with low to moderate hilar and mediastinal adenopathy possibly inflammatory in nature without clear evidence of distant metastases.  7/2/2023 EUS demonstrated a lesion to involve layer 4 with five regional paraesophageal lymph nodes identified either at or above the primary with one that was inferior with malignant features.  The remainder of the study was unremarkable including gallbladder, pancreas and liver. (Clinical stage III-B, uT2, uN2, cM0)    On 6/20/2023, the patient was seen by Dr. Ferny Levi who felt the patient's performance status was that he may be a surgical candidate and will be reevaluated after chemoradiation.  On 7/5/2023, a diagnostic laparoscopy was performed with insertion of a jejunostomy feeding tube.    On 7/24/2023, chemoradiation commenced after a one week delay because of a mild COVID-19 infection.    He does not use tobacco or alcohol.  He has 4 cans of tube feedings per dietary but has not been seen by dietitian recently.    He has been very feeling very slightly stronger and less tired than before.  However he has just been using 4 cans of nutrition daily until yesterday.  He says he is taking 1400 cc of free water or Pedialyte daily.  His swelling is only very slightly improved with sips of water causing pain.    Today CBC and CMP are satisfactory with improvement of his leukopenia and anemia.  Electrolytes are stable.     [employer]  from [id number].    Past medical history:  Past Medical History:   Diagnosis Date     Angina at rest (H)      Arthritis      Cerebrovascular accident (H) 09/27/2021     Coronary artery disease involving native coronary artery of native heart without angina pectoris      Dysphagia      Esophageal adenocarcinoma (H)     DISTAL ESOPHAGUS     Hypertension      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) 7/3/2023        Family history:  I have reviewed this patient's family history and updated it with pertinent information if  "needed.  Family History   Problem Relation Age of Onset     Hypertension Sister      Kidney failure Sister      Chronic Obstructive Pulmonary Disease Sister      Thyroid Disease Sister      Seizure Disorder Paternal Grandmother      Mitral valve prolapse Daughter      No Known Problems Son      Cerebrovascular Disease No family hx of          Medications:  Current Outpatient Medications   Medication     acetaminophen (TYLENOL) 325 MG/10.15ML solution     atorvastatin (LIPITOR) 10 MG tablet     carvedilol (COREG) 6.25 MG tablet     COLLAGEN PO     Dextromethorphan-guaiFENesin 5-100 MG/5ML LIQD     diclofenac (VOLTAREN) 1 % topical gel     famotidine (PEPCID) 40 MG/5ML suspension     LORazepam (ATIVAN) 0.5 MG tablet     magic mouthwash (ENTER INGREDIENTS IN COMMENTS) suspension     metoclopramide (REGLAN) 5 MG/5ML solution     Multiple Vitamin (MULTI VITAMIN PO)     nitroGLYcerin (NITROSTAT) 0.3 MG sublingual tablet     NONFORMULARY     omeprazole (PRILOSEC) 2 mg/mL suspension     ondansetron (ZOFRAN) 4 MG/5ML solution     oxyCODONE (ROXICODONE) 5 MG/5ML solution     simethicone (MYLICON) 40 MG/0.6ML suspension     acetaminophen (TYLENOL) 160 MG/5ML solution     ondansetron (ZOFRAN ODT) 8 MG ODT tab     pantoprazole (PROTONIX) 40 MG EC tablet     No current facility-administered medications for this visit.       Allergies:  Allergies   Allergen Reactions     Ampicillin Rash     Latex Rash     Metoprolol Other (See Comments)     Side effects : dry mouth       Review of systems:  Odynophagia persists and he is able to take small sips of liquids at this time.      Physical examination:  /53 (BP Location: Right arm, Patient Position: Sitting, Cuff Size: Adult Regular)   Pulse 84   Temp 97.9  F (36.6  C) (Tympanic)   Resp 12   Ht 1.549 m (5' 1\")   Wt 71.2 kg (157 lb)   SpO2 96%   BMI 29.66 kg/m      The patient is alert and oriented.    Tucker Haines MD    Oncology Rooming Note    September 14, 2023 10:20 AM " "  Liu Ragsdale is a 83 year old male who presents for:    Chief Complaint   Patient presents with     Oncology Clinic Visit     Malignant neoplasm of lower third of esophagus - Labs and provider visit     Initial Vitals: /53 (BP Location: Right arm, Patient Position: Sitting, Cuff Size: Adult Regular)   Pulse 84   Temp 97.9  F (36.6  C) (Tympanic)   Resp 12   Ht 1.549 m (5' 1\")   Wt 71.2 kg (157 lb)   SpO2 96%   BMI 29.66 kg/m   Estimated body mass index is 29.66 kg/m  as calculated from the following:    Height as of this encounter: 1.549 m (5' 1\").    Weight as of this encounter: 71.2 kg (157 lb). Body surface area is 1.75 meters squared.  Mild Pain (2) Comment: Data Unavailable   No LMP for male patient.  Allergies reviewed: Yes  Medications reviewed: Yes    Medications: Medication refills not needed today.  Pharmacy name entered into [employer]:    [facility] - [place], [place] - [address] [place] [address] [facility] [place] [facility] AT PORTAL TO [facility] SITES  [facility] - [place], [place] - [address] [facility]AC[facility] [place], [place] - [address] [facility]    Clinical concerns:  None      Natty Ribeiro CMA                Again, thank you for allowing me to participate in the care of your patient.        Sincerely,        Tucker Haines MD  "

## 2023-09-18 PROBLEM — T45.1X5A ANTINEOPLASTIC CHEMOTHERAPY INDUCED PANCYTOPENIA (H): Status: RESOLVED | Noted: 2023-01-01 | Resolved: 2023-01-01

## 2023-09-18 PROBLEM — D61.810 ANTINEOPLASTIC CHEMOTHERAPY INDUCED PANCYTOPENIA (H): Status: RESOLVED | Noted: 2023-01-01 | Resolved: 2023-01-01

## 2023-09-18 NOTE — PROGRESS NOTES
"  Assessment & Plan     History of recent hospitalization  History of upper gastrointestinal bleeding  Jejunostomy tube in situ (H)  Patient was hospitalized recently with upper GI bleed, transfuse 2 units of blood.  No recurrence of melena since then.  Using omeprazole regularly.  Recent hemoglobin stable.  Considering history of stroke and ischemic heart disease, recommended to take 81 mg aspirin rather than full aspirin.  Patient will discuss with Dr. Alcantar as well.  Suggested to have repeat CBC in 2 weeks, order placed.  All questions answered.      Malignant neoplasm of esophagus, unspecified location (H)  Recommended to continue following medical, radiation and surgical oncologist       MED REC REQUIRED  Post Medication Reconciliation Status: discharge medications reconciled, continue medications without change  BMI:   Estimated body mass index is 29.66 kg/m  as calculated from the following:    Height as of this encounter: 1.549 m (5' 1\").    Weight as of this encounter: 71.2 kg (157 lb).     Piyush Rogers MD  [facility]    Eder Hatfield is a 83 year old, presenting for the following health issues:  Hospital F/U      [facility]       Hospital Follow-up Visit:    Hospital/Nursing Home/IP Rehab Facility: [facility]  Date of Admission: 8/29/23  Date of Discharge: 8/30/23  Reason(s) for Admission: Upper GI bleed  Acute anemia due to blood loss  Malignant neoplasm of lower third of esophagus  Ct2cN2 lower esophageal adenocarcinoma      Was your hospitalization related to COVID-19? No   Problems taking medications regularly:  None  Medication changes since discharge: None  Problems adhering to non-medication therapy:  None    Summary of hospitalization:  [facility] discharge summary reviewed  Diagnostic Tests/Treatments reviewed.  Follow up needed: none  Other Healthcare Providers Involved in Patient s Care:         None  Update since " "discharge: stable.       Plan of care communicated with patient and spouse       Review of Systems   Constitutional, HEENT, cardiovascular, pulmonary, GI, , musculoskeletal, neuro, skin, endocrine and psych systems are negative, except as otherwise noted.      Objective    /60 (Cuff Size: Adult Regular)   Pulse 94   Temp 97  F (36.1  C) (Tympanic)   Resp 20   Ht 1.549 m (5' 1\")   Wt 71.2 kg (157 lb)   SpO2 95%   BMI 29.66 kg/m    Body mass index is 29.66 kg/m .  Physical Exam   GENERAL: alert and no distress  EYES: Eyes grossly normal to inspection, PERRL and conjunctivae and sclerae normal  NECK: no adenopathy, no asymmetry, masses, or scars and thyroid normal to palpation  RESP: lungs clear to auscultation - no rales, rhonchi or wheezes  CV: regular rates and rhythm, normal S1 S2, no S3 or S4, and no murmur, click or rub  ABDOMEN: soft, nontender and J-tube in situ  NEURO: Grossly intact  PSYCH: mentation appears normal, affect normal/bright            "

## 2023-09-18 NOTE — NURSING NOTE
"Oncology Rooming Note    September 18, 2023 3:19 PM   Liu Ragsdale is a 83 year old male who presents for:    Chief Complaint   Patient presents with    Radiation Therapy     Follow up appointment, radiation therapy     Initial Vitals: There were no vitals taken for this visit. Estimated body mass index is 29.66 kg/m  as calculated from the following:    Height as of an earlier encounter on 9/18/23: 1.549 m (5' 1\").    Weight as of an earlier encounter on 9/18/23: 71.2 kg (157 lb). There is no height or weight on file to calculate BSA.  Data Unavailable Comment: Data Unavailable   No LMP for male patient.  Allergies reviewed: Yes  Medications reviewed: Yes    Medications: would like omeprazole refilled   Pharmacy name entered into [employer]:    [facility] - [place], [place] - [address] [place] [facility] [address] [facility] AT PORTAL TO REGISTERED [facility] SITES  [facility] [place] [place] [facility] [address] [facility] [place] [facility] [place], [place] - [address] [facility]    Clinical concerns: follow up today with Dr. Ortiz Cameron, RN              "

## 2023-09-18 NOTE — LETTER
9/18/2023         RE: Liu Ragsdale  [address] [facility] [address]        Dear Colleague,    Thank you for referring your patient, Liu Ragsdale, to the [facility] RADIATION THERAPY CLINIC. Please see a copy of my visit note below.    Radiation Oncology Note    HPI:Mr. 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 He underwent chemoradiation therapy with plan for re-evaluation for surgical resection.     Radiation Treatment  7/24/23 to 8/25/23: Esophagus + omar regions, 5000 cGy  in 25 fractions    Patient returns for follow-up.    Posttreatment, the patient was admitted to the hospital from 8/29 to 8/30/2023 for anemia likely secondary to GI bleed as well as generalized weakness.  He received 2 units of red blood cell.  Now discharged home.    Overall, the patient remains weak but is slowly trying to improve.  Pain due to radiation esophagiti is improving. No  longer needing oxycodone oral solution. Using Magic mouthwash  and tylenol as needed.  Continues to have nausea although slightly improved  with regard to frequency.  Using reglan with some benefit.  Have prescribed low dose ativan in the past for consideration.     Follow-up with medical oncology team (Dr. Haines).    Following with surgery team (Dr. Levi). Posttreatment PET scan scheduled for 9/28/2023.      Plan:  Acute toxicities are starting to recover slowly with time.  Radiation esophagitis.  Magic mouthwash as needed.  Antacid as needed.  Nausea.  Continue Reglan as needed. Also re-discussed consideration of low-dose Ativan as needed.  Continue to advance diet as tolerated.  Continue to push fluids.  Return to clinic in 1 week.    Jarred Alcantar M.D.  Department of Radiation Oncology  [facility]

## 2023-09-18 NOTE — PROGRESS NOTES
Radiation Oncology Note    HPI:Mr. 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 He underwent chemoradiation therapy with plan for re-evaluation for surgical resection.     Radiation Treatment  7/24/23 to 8/25/23: Esophagus + omar regions, 5000 cGy  in 25 fractions    Patient returns for follow-up.    Posttreatment, the patient was admitted to the hospital from 8/29 to 8/30/2023 for anemia likely secondary to GI bleed as well as generalized weakness.  He received 2 units of red blood cell.  Now discharged home.    Overall, the patient remains weak but is slowly trying to improve.  Pain due to radiation esophagiti is improving. No  longer needing oxycodone oral solution. Using Magic mouthwash  and tylenol as needed.  Continues to have nausea although slightly improved  with regard to frequency.  Using reglan with some benefit.  Have prescribed low dose ativan in the past for consideration.     Follow-up with medical oncology team (Dr. Haines).    Following with surgery team (Dr. Levi). Posttreatment PET scan scheduled for 9/28/2023.      Plan:  Acute toxicities are starting to recover slowly with time.  Radiation esophagitis.  Magic mouthwash as needed.  Antacid as needed.  Nausea.  Continue Reglan as needed. Also re-discussed consideration of low-dose Ativan as needed.  Continue to advance diet as tolerated.  Continue to push fluids.  Return to clinic in 1 week.    Jarred Alcantar M.D.  Department of Radiation Oncology  [facility]

## 2023-09-18 NOTE — NURSING NOTE
"Chief Complaint   Patient presents with    Hospital F/U     /60 (Cuff Size: Adult Regular)   Pulse 94   Temp 97  F (36.1  C) (Tympanic)   Resp 20   Ht 1.549 m (5' 1\")   Wt 71.2 kg (157 lb)   SpO2 95%   BMI 29.66 kg/m   Estimated body mass index is 29.66 kg/m  as calculated from the following:    Height as of this encounter: 1.549 m (5' 1\").    Weight as of this encounter: 71.2 kg (157 lb).  Patient presents to the clinic using No DME      Health Maintenance that is potentially due pending provider review:    Health Maintenance Due   Topic Date Due    MEDICARE ANNUAL WELLNESS VISIT  09/25/2020    LIPID  09/27/2022    COVID-19 Vaccine (7 - Moderna series) 08/14/2023    INFLUENZA VACCINE (1) 09/01/2023                  "

## 2023-09-27 NOTE — PROGRESS NOTES
Radiation Oncology Note    HPI:Mr. 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 He underwent chemoradiation therapy with plan for re-evaluation for surgical resection.     Radiation Treatment  7/24/23 to 8/25/23: Esophagus + omar regions, 5000 cGy  in 25 fractions    Patient returns for follow-up.    Overall, the patient is slowly turning the corner.  Pain due to radiation esophagiti is improving. No  longer needing oxycodone oral solution. Using only tylenol as needed.  Continues to have nausea although much improved.   Using anti-emetic as needed with benefit.      Follow-up with medical oncology team (Dr. Haines).    Following with surgery team (Dr. Levi). Posttreatment PET scan scheduled for 9/28/2023.    Plan:  Acute toxicities are starting to recover slowly with time.  Radiation esophagitis.  Magic mouthwash as needed.  Antacid as needed.  Nausea.  Continue anti-emetic as needed.   Continue to advance diet as tolerated.  Continue to push fluids.  Return to clinic in 1 week.    Due to the concerns around COVID-19 and adhering to social distancing we conduct this visit over the telephone. Telephone call lasted 20 minutes.       Jarred Alcantar M.D.  Department of Radiation Oncology  [facility]

## 2023-09-27 NOTE — NURSING NOTE
"Liu is a 83 year old who is being evaluated via a billable telephone visit.      What phone number would you like to be contacted at? cell  How would you like to obtain your AVS? Samanthat    Distant Location (provider location):  On-site  Phone call duration: 10 minutes   Follow up telephone call with Dr. Alcantar  Oncology Rooming Note    September 27, 2023 1:20 PM   Liu Ragsdale is a 83 year old male who presents for:    Chief Complaint   Patient presents with    Radiation Therapy     Return visit, esophageal cancer     Initial Vitals: There were no vitals taken for this visit. Estimated body mass index is 29.66 kg/m  as calculated from the following:    Height as of 9/18/23: 1.549 m (5' 1\").    Weight as of 9/18/23: 71.2 kg (157 lb). There is no height or weight on file to calculate BSA.  Data Unavailable Comment: Data Unavailable   No LMP for male patient.  Allergies reviewed: Yes  Medications reviewed: Yes    Medications: Medication refills not needed today.  Pharmacy name entered into [employer]:    [facility] - [place], [place] - [address] [address] [facility] AT PORTAL TO REGISTERED [facility] SITES  [facility] - [place], [place] - [address] [facility] [place] - [place], [place] - [address] [facility]    Clinical concerns: Follow up today with Dr. Alcantar  via phone call      Mónica Cameron RN             "

## 2023-09-27 NOTE — LETTER
9/27/2023         RE: Liu Ragsdale  [address] [facility] [address]        Dear Colleague,    Thank you for referring your patient, Liu Ragsdale, to the [facility] RADIATION THERAPY CLINIC. Please see a copy of my visit note below.    Radiation Oncology Note    HPI:Mr. 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 He underwent chemoradiation therapy with plan for re-evaluation for surgical resection.     Radiation Treatment  7/24/23 to 8/25/23: Esophagus + omar regions, 5000 cGy  in 25 fractions    Patient returns for follow-up.    Overall, the patient is slowly turning the corner.  Pain due to radiation esophagiti is improving. No  longer needing oxycodone oral solution. Using only tylenol as needed.  Continues to have nausea although much improved.   Using anti-emetic as needed with benefit.      Follow-up with medical oncology team (Dr. Haines).    Following with surgery team (Dr. Levi). Posttreatment PET scan scheduled for 9/28/2023.    Plan:  Acute toxicities are starting to recover slowly with time.  Radiation esophagitis.  Magic mouthwash as needed.  Antacid as needed.  Nausea.  Continue anti-emetic as needed.   Continue to advance diet as tolerated.  Continue to push fluids.  Return to clinic in 1 week.    Due to the concerns around COVID-19 and adhering to social distancing we conduct this visit over the telephone. Telephone call lasted 20 minutes.       Jarred Alcantar M.D.  Department of Radiation Oncology  [facility]

## 2023-10-03 NOTE — PROGRESS NOTES
THORACIC SURGERY -  OFFICE VISIT      Dear Dr. Rogers,     I saw Liu Ragsdale at Dr. 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 He began to notice trouble swallowing food over the past 6 weeks. He has had acid reflux for a short time and is on pantoprazole. He has lost 9 pounds over the 6 weeks. He is eating a regular diet and eats smaller bites and washes the food down with water. He has no regurgitation.      He does some work around the house. He can walk two blocks without stopping or climb a flight of stairs.    Today, 10/3/2023:  His weight has been stable since finishing neoadjuvant therapy.   He completed chemoradiation on 8/25/2023. (5000 cGy and paclitaxel and carboplatin).  On 8/29, he was admitted for anemia likely from a GI bleed and he received 2 U blood. He is getting outside of the house and is doing some activity. He works around the house and has been on his riding mower.     He has had pain with swallowing so has stuck to liquids and remains on tube feeds. He lost about 15 pounds during neoadjuvant therapy and has gained back about 5 pounds.      Previsit Tests   PET (9/28/2023):       EGD(06/5/2023): Large, fungating mass with bleeding at 38 cm from the incisors.              Pathology: Esophagus, 38 cm: Invasive moderately to poorly differentiated adenocarcinoma.              Gastroesophageal junction biopsy with invasive moderately to poorly differentiated adenocarcinoma.     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    Coronary artery disease involving native coronary artery of native heart without angina pectoris      Hypertension           PSH  Reviewed, as below           Past Surgical History:   Procedure Laterality Date    ARTHROPLASTY HIP Left 02/03/2021     Procedure: Total Hip Arthroplasty;  Surgeon: Andrew Arriola MD;  Location: [facility]    COLONOSCOPY N/A 06/16/2016     Procedure: COLONOSCOPY;  Surgeon: Randy Avendano MD;  Location: [facility]    CV LEFT HEART CATH WITH PCI Left 03/10/2020     Procedure: Left Heart Cath;  Surgeon: Vicente Lopez MD;  Location:  [facility] CARD[facility] CATH LAB    ENDARTERECTOMY CAROTID Right 9/29/2021     Procedure: ENDARTERECTOMY, CAROTID;  Surgeon: Raymond Andersen MD;  [facility]    ESOPHAGOSCOPY, GASTROSCOPY, DUODENOSCOPY (EGD), COMBINED N/A 6/5/2023     Procedure: Esophagoscopy, Gastroscopy, Duodenoscopy, With Biopsy;  Surgeon: Joseph Landers DO;  Location: [facility]    EYE SURGERY   2005     cataract surgery    VASECTOMY   1981         ETOH: Rare  TOB: never smoker     Physical examination  BP (!) 159/81 (BP Location: Right arm, Patient Position: Sitting, Cuff Size: Adult Regular)   Pulse 91   Temp 98.5  F (36.9  C) (Oral)   Resp 16   Wt 72.3 kg (159 lb 4.8 oz)   SpO2 97%   BMI 30.10 kg/m     Physical Exam  Constitutional:       Appearance: Normal appearance. He is obese.   Eyes:      Conjunctiva/sclera: Conjunctivae normal.   Cardiovascular:      Rate and Rhythm: Normal rate.   Pulmonary:      Effort: Pulmonary effort is normal.   Skin:     General: Skin is warm and dry.   Neurological:      Mental Status: He is alert and oriented to person, place, and time.   Psychiatric:         Mood and Affect: Mood normal.         Behavior: Behavior normal.         Thought Content: Thought content normal.         Judgment: Judgment normal.            From a personal perspective, he is with his wife  and youngest daughter, Brenda. He has 4 step children and 2 children total.  There are 10 grandchildren and 8 greatgrandchildren.  He is retired. He was in the [employer] as an  for 4 years. 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 Then he was a .      IMPRESSION (C15.9) Esophageal adenocarcinoma (H)     This person is a 83 year old male with a distal esophageal adenocarcinoma. He is not a good surgical candidate at this time. I am concerned about persistent disease.      PLAN  I spent 40 min on the date of the encounter in chart review, patient visit, review of tests, documentation and/or discussion with other providers about the issues documented above. I reviewed the plan as follows:     Necessary Preop Tests & Appointments: EGD to assess for persistent disease and if there is, consideration of further chemotherapy.              I appreciate the opportunity to participate in the care of your patient and will keep you updated.     Sincerely,     Ferny Levi MD

## 2023-10-03 NOTE — NURSING NOTE
"Oncology Rooming Note    October 3, 2023 4:32 PM   Liu Ragsdale is a 83 year old male who presents for:    Chief Complaint   Patient presents with    Oncology Clinic Visit     Malignant Neoplasm of lower third of Esophagus     Initial Vitals: BP (!) 159/81 (BP Location: Right arm, Patient Position: Sitting, Cuff Size: Adult Regular)   Pulse 91   Temp 98.5  F (36.9  C) (Oral)   Resp 16   Wt 72.3 kg (159 lb 4.8 oz)   SpO2 97%   BMI 30.10 kg/m   Estimated body mass index is 30.1 kg/m  as calculated from the following:    Height as of 9/18/23: 1.549 m (5' 1\").    Weight as of this encounter: 72.3 kg (159 lb 4.8 oz). Body surface area is 1.76 meters squared.  Mild Pain (2) Comment: Data Unavailable   No LMP for male patient.  Allergies reviewed: Yes  Medications reviewed: Yes    Medications: Medication refills not needed today.  Pharmacy name entered into EPIC:    [facility] - [place], [place] - [address] [place] [address] [facility] AT PORTAL TO REGISTERED [facility] SITES  [facility] - [place], [place] - [address] [facility] [place] - [place], [place] - [address] [facility]    Clinical concerns: None       Nini Irby LPN  10/3/2023                "

## 2023-10-03 NOTE — LETTER
10/3/2023         RE: Liu Ragsdale  [address] [address] [address]        Dear Colleague,    Thank you for referring your patient, Liu Ragsdale, to the [facility]. Please see a copy of my visit note below.    THORACIC SURGERY -  OFFICE VISIT      Dear Dr. Rogers,     I saw Liu Ragsdale at Dr. 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 He began to notice trouble swallowing food over the past 6 weeks. He has had acid reflux for a short time and is on pantoprazole. He has lost 9 pounds over the 6 weeks. He is eating a regular diet and eats smaller bites and washes the food down with water. He has no regurgitation.      He does some work around the house. He can walk two blocks without stopping or climb a flight of stairs.    Today, 10/3/2023:  His weight has been stable since finishing neoadjuvant therapy.   He completed chemoradiation on 8/25/2023. (5000 cGy and paclitaxel and carboplatin).  On 8/29, he was admitted for anemia likely from a GI bleed and he received 2 U blood. He is getting outside of the house and is doing some activity. He works around the house and has been on his riding mower.     He has had pain with swallowing so has stuck to liquids and remains on tube feeds. He lost about 15 pounds during neoadjuvant therapy and has gained back about 5 pounds.      Previsit Tests   PET (9/28/2023):       EGD(06/5/2023): Large, fungating mass with bleeding at 38 cm from the incisors.              Pathology: Esophagus, 38 cm: Invasive moderately to poorly differentiated adenocarcinoma.              Gastroesophageal junction biopsy with invasive moderately to poorly differentiated adenocarcinoma.     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    Coronary artery disease involving native coronary artery of native heart without angina pectoris      Hypertension           PSH  Reviewed, as below           Past Surgical History:   Procedure Laterality Date    ARTHROPLASTY HIP Left 02/03/2021     Procedure: Total Hip Arthroplasty;  Surgeon: Andrew Arriola MD;  [facility]    COLONOSCOPY N/A 06/16/2016     Procedure: COLONOSCOPY;  Surgeon: Randy Avendano MD;  [facility]: [facility]    CV LEFT HEART CATH WITH PCI Left 03/10/2020     Procedure: Left Heart Cath;  Surgeon: Vicente Lopez MD;  Location: [facility] CARDIAC CATH LAB    ENDARTERECTOMY CAROTID Right 9/29/2021     Procedure: ENDARTERECTOMY, CAROTID;  Surgeon: Raymond Andersen MD;  [facility]    ESOPHAGOSCOPY, GASTROSCOPY, DUODENOSCOPY (EGD), COMBINED N/A 6/5/2023     Procedure: Esophagoscopy, Gastroscopy, Duodenoscopy, With Biopsy;  Surgeon: Joseph Landers DO;  Location: [facility]    EYE SURGERY   2005     cataract surgery    VASECTOMY   1981         ETOH: Rare  TOB: never smoker     Physical examination  BP (!) 159/81 (BP Location: Right arm, Patient Position: Sitting, Cuff Size: Adult Regular)   Pulse 91   Temp 98.5  F (36.9  C) (Oral)   Resp 16   Wt 72.3 kg (159 lb 4.8 oz)   SpO2 97%   BMI 30.10 kg/m     Physical Exam  Constitutional:       Appearance: Normal appearance. He is obese.   Eyes:      Conjunctiva/sclera: Conjunctivae normal.   Cardiovascular:      Rate and Rhythm: Normal rate.   Pulmonary:      Effort: Pulmonary effort is normal.   Skin:     General: Skin is warm and dry.   Neurological:      Mental Status: He is alert and oriented  to person, place, and time.   Psychiatric:         Mood and Affect: Mood normal.         Behavior: Behavior normal.         Thought Content: Thought content normal.         Judgment: Judgment normal.            From a personal perspective, he is with his wife and youngest daughter, Brenda. He has 4 step children and 2 children total.  There are 10 grandchildren and 8 greatgrandchildren.  He is retired. He was in the [employer] as an  for 4 years. 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 Then he was a .      IMPRESSION (C15.9) Esophageal adenocarcinoma (H)     This person is a 83 year old male with a distal esophageal adenocarcinoma. He may be a surgical candidate, but will reevaluate after chemoradiation.      PLAN  I spent 60 min on the date of the encounter in chart review, patient visit, review of tests, documentation and/or discussion with other providers about the issues documented above. I reviewed the plan as follows:     Necessary Preop Tests & Appointments: Medical oncology, radiation oncology, endoscopic ultrasound     Depending on the response to chemoradiation, I would consider a two-stage procedure.     First, laparoscopic staging and jejunostomy.     Two weeks later, minimally invasive Kurtis-Reyes esophagectomy     I discussed with the patient the conduct of the operation of an Kurtis-Reyes transthoracic esophagectomy. I explained that the hospital stay would likely be 5-10 days if there are no complications. I explained that approximately 50% of patients have some complication, ranging from minor complications to the very serious ones. The recovery takes up to 8 weeks.  I explained to the patient the risks and benefits of the procedure. The benefit of the procedure is to remove the esophageal cancer. The risks include anastomotic leak, gastric necrosis, pneumonia, DVT and pulmonary embolism,  arrhythmia, jejunostomy tube complications including intestinal obstruction, urinary tract infection and death.       However, I am concerned about his recovery and will have to reassess his surgical candidacy after chemoradiation.        I appreciate the opportunity to participate in the care of your patient and will keep you updated.     Sincerely,     Ferny Levi MD

## 2023-10-05 NOTE — LETTER
10/5/2023         RE: Liu Ragsdale  [address] [address] [facility] [address]        Dear Colleague,    Thank you for referring your patient, Liu Ragsdale, to the [facility] RADIATION THERAPY [facility]INIC. Please see a copy of my visit note below.    Radiation Oncology Note    HPI:Mr. 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 He underwent chemoradiation therapy with plan for re-evaluation for surgical resection.     Radiation Treatment  7/24/23 to 8/25/23: Esophagus + omar regions, 5000 cGy  in 25 fractions    Patient returns for follow-up.    Posttreatment PET scan on 9/28/2023 demonstrated overall good response.  No definite nodule was noted.  There was also noted reduced PET avidity indeterminate for inflammatory change versus residual malignancy.    The patient was seen by Dr. Levi of thoracic surgery team who reportedly discussed proceeding with scope and possible biopsy to determine neck steps in management.    Overall, the patient is slowly turning the corner.  Pain due to radiation esophagitis improving. No  longer needing oxycodone oral solution. Using only tylenol as needed.  Continues to have nausea although much improved.   Using anti-emetic as needed with benefit.  Starting to tolerate some soft foods orally but nutrition mainly through the J-tube.      Plan:  Posttreatment PET scan (9/28/2023) demonstrated overall good response.  No definite nodule was noted.  There was also noted reduced PET avidity indeterminate for inflammatory change versus residual malignancy.  Agree with proceeding with scope for further evaluation.  Continue follow-up with thoracic surgery team (Dr. Levi) to determine next steps in potential surgical management  Acute toxicities are starting to recover slowly with time.  Radiation esophagitis.  Magic mouthwash as needed.  Antacid as needed.  Nausea.  Continue anti-emetic as  needed.   Continue to advance diet as tolerated.  Continue to push fluids.  Return to clinic in 1 month.      Jarred Alcantar M.D.  Department of Radiation Oncology  [facility]

## 2023-10-05 NOTE — PROGRESS NOTES
Radiation Oncology Note    HPI:Mr. 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 He underwent chemoradiation therapy with plan for re-evaluation for surgical resection.     Radiation Treatment  7/24/23 to 8/25/23: Esophagus + omar regions, 5000 cGy  in 25 fractions    Patient returns for follow-up.    Posttreatment PET scan on 9/28/2023 demonstrated overall good response.  No definite nodule was noted.  There was also noted reduced PET avidity indeterminate for inflammatory change versus residual malignancy.    The patient was seen by Dr. Levi of thoracic surgery team who reportedly discussed proceeding with scope and possible biopsy to determine neck steps in management.    Overall, the patient is slowly turning the corner.  Pain due to radiation esophagitis improving. No  longer needing oxycodone oral solution. Using only tylenol as needed.  Continues to have nausea although much improved.   Using anti-emetic as needed with benefit.  Starting to tolerate some soft foods orally but nutrition mainly through the J-tube.      Plan:  Posttreatment PET scan (9/28/2023) demonstrated overall good response.  No definite nodule was noted.  There was also noted reduced PET avidity indeterminate for inflammatory change versus residual malignancy.  Agree with proceeding with scope for further evaluation.  Continue follow-up with thoracic surgery team (Dr. Levi) to determine next steps in potential surgical management  Acute toxicities are starting to recover slowly with time.  Radiation esophagitis.  Magic mouthwash as needed.  Antacid as needed.  Nausea.  Continue anti-emetic as needed.   Continue to advance diet as tolerated.  Continue to push fluids.  Return to clinic in 1 month.      Jarred Alcantar M.D.  Department of Radiation Oncology  [facility]

## 2023-10-05 NOTE — NURSING NOTE
FOLLOW-UP VISIT    Patient Name: Liu Ragsdale      : 1940     Age: 83 year old        ______________________________________________________________________________     Chief Complaint   Patient presents with    Radiation Therapy     Follow up     BP (!) 166/75   Pulse 93   Resp 18   Wt 72.7 kg (160 lb 3.2 oz)   SpO2 96%   BMI 30.27 kg/m       Date Radiation Completed: 23 concurrent chemoradiation    Pain  Denies    Meds  Current Med List Reviewed: Yes  Medication Note:     Imaging  PET/CT: 23    On Chemo?: No  Esophagitis: improving - able to swallow  Nausea:  Bowel:  soft stool - taking Senna liquid 2x/day. Had trouble with constipation and prefers to stay a little softer  Skin: Warm  Dry  Skin color more meghann, was pale at end of treatment  Feeding Tube: Yes, J-tube - main source of nutrition  Energy Level: improving - fair to good. Trying to increase activity level  Weight:   Vitals:    10/05/23 1558   Weight: 72.7 kg (160 lb 3.2 oz)       Other Appointments:     Date  Oncologist: Dr. Haines    Surgeon: Dr. Levi      Other Notes:

## 2023-10-12 NOTE — TELEPHONE ENCOUNTER
Advanced Endoscopy     Referring provider: Ferny Levi MD     Referred to: [facility]     Provider Requested: Dr Wiggins     Referral Received: 10/12/23     Records received: EPIC    PET 9/28/23  CT FINDINGS: Moderate senescent intracranial change. Marked calcified atherosclerosis, including three-vessel coronary disease and/or stents. Bilateral kidney cysts, requiring no follow-up. Moderately enlarged prostate. Pelvic phleboliths. Colonic   diverticulosis. Fat-containing right inguinal hernia. Degenerative change bilateral glenohumeral joints, moderate on the left and marked on the right. Left total hip arthroplasty.                                                        IMPRESSION:     Probable complete response. Residual FDG activity in the distal esophagus is likely inflammation    EUS 7/3/23  Impression:                 - A partially circumferential progressing to circumferential distal esophageal mass was found spanning 36-41cm with the junction estimated at 41cm; luminal patency was estimated at 12mm given passage of the linear; the lesion involved layer 4 though there was no evidence of layer 5 involvement and therefore no other structures were involved   - 5 regional paraesophageal lymph nodes were demonstrated either at or above the primary (as proximal as 30cm); only one of these, inferior to   the mass, had malignant features, no distant nodes   were appreciated   - Grossly unremarkable pancreaticobiliary system with intact gallbladder and no synchronous lesions   of the pancreas or liver   - tN2D3Io (III/Lamine)      Images received: PACs    Insurance Coverage: [employer]    Evaluation for: EUS for Esophageal adenocarcinoma , Malignant neoplasm of lower third of esophagus      Clinical History (per RN review):     Referring provider office visit 10/3/23:  From 6/20/2023:  Liu Ragsdale is a 83 year old male with 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 He began to  notice trouble swallowing food over the past 6 weeks. He has had acid reflux for a short time and is on pantoprazole. He has lost 9 pounds over the 6 weeks. He is eating a regular diet and eats smaller bites and washes the food down with water. He has no regurgitation     Necessary Preop Tests & Appointments: Medical oncology, radiation oncology, endoscopic ultrasound     Depending on the response to chemoradiation, I would consider a two-stage procedure.     First, laparoscopic staging and jejunostomy.     Two weeks later, minimally invasive Kurtis-Reyes esophagectomy      MD review date:   MD Decision for clinic consultation/Orders:    Order changed to EGD and scheduled with luminal GI on 10/18        Referral updates/Patient contacted:

## 2023-10-13 NOTE — TELEPHONE ENCOUNTER
Pre assessment completed for upcoming procedure.      Procedure details:    Patient scheduled for Upper endoscopy (EGD) on 10.18.23.     Arrival time: 0930. Procedure time 1030    Pre op exam needed? N/A    Facility location: [facility]; [address], [place] [address]    Sedation type: Conscious sedation     Indication for procedure: Esophageal adenocarcinoma     COVID policy reviewed.    Designated  policy reviewed. Instructed to have someone stay 6 hours post procedure.       Chart review:     Electronic implanted devices? No    Diabetic? No      Medication review:    Anticoagulants? No    NSAIDS? 81 mg ASA - no indication to hold    Other medication HOLDING recommendations:  N/A      Prep for procedure:     Prep instructions sent via [employer]     Reviewed procedure prep instructions.     Patient verbalized understanding and had no questions or concerns at this time.        Victorina Colorado RN  Endoscopy Procedure Pre Assessment RN  [phone] option 4

## 2023-10-13 NOTE — TELEPHONE ENCOUNTER
"Endoscopy Scheduling Screen    Have you had a positive Covid test in the last 14 days?  No    Are you active on MyChart?   Yes    What insurance is in the chart?  Other:  [employer]    Ordering/Referring Provider: CATRACHITO WHITE    (If ordering provider performs procedure, schedule with ordering provider unless otherwise instructed. )    BMI: Estimated body mass index is 30.27 kg/m  as calculated from the following:    Height as of 9/18/23: 1.549 m (5' 1\").    Weight as of 10/5/23: 72.7 kg (160 lb 3.2 oz).     Sedation Ordered  moderate sedation.   If patient BMI > 50 do not schedule in ASC.    If patient BMI > 45 do not schedule at [facility].    Are you taking methadone or Suboxone?  No    Are you taking any prescription medications for pain 3 or more times per week?   No - has rx but does not use.     Do you have a history of malignant hyperthermia or adverse reaction to anesthesia?  No    Have you been diagnosed or told you have pulmonary hypertension?   No    Do you have an LVAD?  No    Have you been told you have moderate to severe sleep apnea?  Yes (RN Review required for scheduling unless scheduling in Hospital.)    Have you been told you have COPD, asthma, or any other lung disease?  No    Do you have any heart conditions?  Yes     In the past 6 months, have you had any hospitalizations for heart related issues including cardiomyopathy, heart attack, or stent placement?  No    Do you have any implantable devices in your body (pacemaker, ICD)?  No    Do you take nitroglycerine?  No - never had to use    Have you ever had an organ transplant?   No    Have you ever had or are you awaiting a heart or lung transplant?   No    Have you had a stroke or transient ischemic attack (TIA aka \"mini stroke\" in the last 6 months?   No    Have you been diagnosed with or been told you have cirrhosis of the liver?   No    Are you currently on dialysis?   No    Do you need assistance transferring?   No    BMI: Estimated body mass index " "is 30.27 kg/m  as calculated from the following:    Height as of 9/18/23: 1.549 m (5' 1\").    Weight as of 10/5/23: 72.7 kg (160 lb 3.2 oz).     Is patients BMI > 40 and scheduling location [facility]?  No    Do you take an injectable medication for weight loss or diabetes (excluding insulin)?  No    Do you take the medication Naltrexone?  No    Do you take blood thinners?  No       Prep   Are you currently on dialysis or do you have chronic kidney disease?  No    Do you have a diagnosis of diabetes?  No    Do you have a diagnosis of cystic fibrosis (CF)?  No    On a regular basis do you go 3 -5 days between bowel movements?  No    BMI > 40?  No    Preferred Pharmacy:    [facility] - [place], [place] - [address]  [address] [facility] [address]  Phone: [phone] Fax: [phone]    [facility] [place] [facility] AT Portal to Registered [facility]  [place] [address]  Phone: [phone] Fax: [phone]    [facility] - [place], [place] - [address]   [address] [facility] [address]  Phone: [phone] Fax: [phone]    [facility] - [place], [place] - [address]  [address] [facility] [address]  Phone: [phone] Fax: [phone] Alternate Fax: [phone], [phone]      Final Scheduling Details   Colonoscopy prep sent?  N/A    Procedure scheduled  Upper endoscopy (EGD)    Surgeon:  WISAM     Date of procedure:  10/18/2023     Pre-OP / PAC:   No - Not required for this site.    Location  [facility] - Per order.    Sedation   Moderate Sedation - Per order.      Patient Reminders:   You will receive a call from a Nurse to review instructions and health history.  This assessment must be completed prior to your procedure.  Failure to complete the Nurse assessment may result in the procedure being cancelled.      On the day of your procedure, please designate an adult(s) " who can drive you home stay with you for the next 24 hours. The medicines used in the exam will make you sleepy. You will not be able to drive.      You cannot take public transportation, ride share services, or non-medical taxi service without a responsible caregiver.  Medical transport services are allowed with the requirement that a responsible caregiver will receive you at your destination.  We require that drivers and caregivers are confirmed prior to your procedure.

## 2023-10-16 NOTE — TELEPHONE ENCOUNTER
General Call    Contacts         Type Contact Phone/Fax    10/16/2023 09:08 AM CDT Phone (Incoming) Liu Ragsdale (Self) [phone] (M)     Ok to leave a detailed voicemail          Reason for Call:  Has an Endoscopy on the 18th was wondering if he should stop taking Asprin he is on 18mg     What are your questions or concerns:  NA    Date of last appointment with provider: 09/18/23    Could we send this information to you in [employer] or would you prefer to receive a phone call?:   Patient would prefer a phone call   Okay to leave a detailed message?: Yes at Cell number on file:    Telephone Information:   Mobile [phone]       
Notified   
Will recommend to hold off aspirin for 3 days.    Dr Rogers  
Never smoker

## 2023-10-18 NOTE — OR NURSING
EGD with biopsies performed under moderate sedation, patient tolerated well. Transferred to  and report given to recovery RN.

## 2023-10-18 NOTE — H&P
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     Patient Active Problem List   Diagnosis    Carotid bruit    Hyperlipidemia LDL goal <70    Benign essential hypertension, BP goal <140/90    BMI 31.0-31.9,adult    FANI (obstructive sleep apnea)    SNHL (sensory-neural hearing loss), asymmetrical    Right shoulder pain, unspecified chronicity    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    SOB (shortness of breath) on exertion    Arthritis of right glenohumeral joint- moderate    Arthritis of right acromioclavicular joint- advanced    Positive cardiac stress test    Status post coronary angiogram    Coronary artery disease involving native coronary artery of native heart without angina pectoris    Degenerative joint disease of left hip    Near syncope    Right carotid artery occlusion    Cerebrovascular accident (H)    History of ischemic stroke    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)    Upper GI bleed    Anemia, unspecified type    Jejunostomy tube in situ (H)    Dysphagia       Past surgical history:   Past Surgical History:   Procedure Laterality Date    ARTHROPLASTY HIP Left 02/03/2021    Procedure: Total Hip Arthroplasty;  Surgeon: Andrew Arriola MD;  Location: [facility]    COLONOSCOPY N/A 06/16/2016    Procedure: COLONOSCOPY;  Surgeon: Randy Avendano MD;  Location: [facility] GI    CV LEFT HEART CATH Left 03/10/2020    Procedure: Left Heart Cath;  Surgeon: Vicente Lopez MD;  Location:   HEART CARDIAC CATH LAB    ENDARTERECTOMY CAROTID Right 9/29/2021    Procedure: ENDARTERECTOMY, CAROTID;  Surgeon: Raymond Andersen MD;  [facility]    ENDOSCOPIC ULTRASOUND UPPER GASTROINTESTINAL TRACT (GI) N/A 7/3/2023    Procedure: ENDOSCOPIC ULTRASOUND, ESOPHAGOSCOPY / UPPER GASTROINTESTINAL TRACT (GI);  Surgeon: Yunier Graves MD;  Location:  OR    ESOPHAGOSCOPY, GASTROSCOPY, DUODENOSCOPY (EGD), COMBINED N/A 6/5/2023    Procedure: Esophagoscopy, Gastroscopy, Duodenoscopy, With Biopsy;  Surgeon: Joseph Landers DO;  Location: [facility]    ESOPHAGOSCOPY, GASTROSCOPY, DUODENOSCOPY (EGD), COMBINED N/A 7/3/2023    Procedure: ESOPHAGOGASTRODUODENOSCOPY;  Surgeon: Yunier Graves MD;  Location:  OR    EYE SURGERY  2005    cataract surgery    IR JEJUNOSTOMY TUBE CHANGE  8/25/2023    LAPAROSCOPIC ASSISTED INSERTION TUBE JEJUNOSTOMY N/A 7/5/2023    Procedure: INSERTION, JEJUNOSTOMY TUBE, LAPAROSCOPY-ASSISTED;  Surgeon: Judy Holguin MD;  [facility]    VASECTOMY  1981       No current facility-administered medications for this encounter.       Allergies   Allergen Reactions    Ampicillin Rash    Latex Rash    Metoprolol Other (See Comments)     Side effects : dry mouth         Physical Exam:    Mental status: alert  Heart: Normal  Lung: Normal  Assessment of patient's airway: Normal  Other as pertinent for procedure: None     Lab Results   Component Value Date    WBC 7.1 09/14/2023    WBC 6.6 04/15/2021     Lab Results   Component Value Date    RBC 4.08 09/14/2023    RBC 3.78 04/15/2021     Lab Results   Component Value Date    HGB 12.1 09/14/2023    HGB 11.3 04/15/2021     Lab Results   Component Value Date    HCT 37.6 09/14/2023    HCT 34.3 04/15/2021     Lab Results   Component Value Date    MCV 92 09/14/2023    MCV 91 04/15/2021     Lab Results   Component Value Date    MCH 29.7 09/14/2023    MCH 29.9 04/15/2021     Lab Results   Component Value Date    MCHC 32.2 09/14/2023    MCHC  32.9 04/15/2021     Lab Results   Component Value Date    RDW 16.8 09/14/2023    RDW 12.3 04/15/2021     Lab Results   Component Value Date     09/14/2023     04/15/2021     INR   Date Value Ref Range Status   09/29/2021 0.84 (L) 0.85 - 1.15 Final     Comment:     Effective 7/11/2021, the reference range for this assay has changed.   02/03/2021 1.03 0.86 - 1.14 Final        ASA Score: See Provation note    Mallampati score:  II - Faucial pillars and soft palate may be seen, but uvula is masked by the base of the tongue    Assessment/Plan:     The patient is an appropriate candidate to receive sedation.    Informed consent was discussed with the patient/family, including the risks, benefits, potential complications and any alternative options associated with sedation.    Patient assessment completed just prior to sedation and while under constant observation by the provider. Condition determined to be adequate for proceeding with sedation.    The specific risks for the procedure were discussed with the patient at the time of informed consent and include but are not limited to perforation which could require surgery, missing significant neoplasm or lesion, hemorrhage and adverse sedative complication.      Jose Salinas MD

## 2023-10-18 NOTE — H&P (VIEW-ONLY)
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     Patient Active Problem List   Diagnosis    Carotid bruit    Hyperlipidemia LDL goal <70    Benign essential hypertension, BP goal <140/90    BMI 31.0-31.9,adult    FANI (obstructive sleep apnea)    SNHL (sensory-neural hearing loss), asymmetrical    Right shoulder pain, unspecified chronicity    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    SOB (shortness of breath) on exertion    Arthritis of right glenohumeral joint- moderate    Arthritis of right acromioclavicular joint- advanced    Positive cardiac stress test    Status post coronary angiogram    Coronary artery disease involving native coronary artery of native heart without angina pectoris    Degenerative joint disease of left hip    Near syncope    Right carotid artery occlusion    Cerebrovascular accident (H)    History of ischemic stroke    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)    Upper GI bleed    Anemia, unspecified type    Jejunostomy tube in situ (H)    Dysphagia       Past surgical history:   Past Surgical History:   Procedure Laterality Date    ARTHROPLASTY HIP Left 02/03/2021    Procedure: Total Hip Arthroplasty;  Surgeon: Andrew Arriola MD;  Location: [facility]    COLONOSCOPY N/A 06/16/2016    Procedure: COLONOSCOPY;  Surgeon: Randy Avendano MD;  Location: [facility]    CV LEFT HEART CATH Left 03/10/2020    Procedure: Left Heart Cath;  Surgeon: Vicente Lopez MD;  Location:   HEART CARDIAC CATH LAB    ENDARTERECTOMY CAROTID Right 9/29/2021    Procedure: ENDARTERECTOMY, CAROTID;  Surgeon: Raymond Andersen MD;  [facility]    ENDOSCOPIC ULTRASOUND UPPER GASTROINTESTINAL TRACT (GI) N/A 7/3/2023    Procedure: ENDOSCOPIC ULTRASOUND, ESOPHAGOSCOPY / UPPER GASTROINTESTINAL TRACT (GI);  Surgeon: Yunier Graves MD;  Location:  OR    ESOPHAGOSCOPY, GASTROSCOPY, DUODENOSCOPY (EGD), COMBINED N/A 6/5/2023    Procedure: Esophagoscopy, Gastroscopy, Duodenoscopy, With Biopsy;  Surgeon: Joseph Landers DO;  Location: [facility]    ESOPHAGOSCOPY, GASTROSCOPY, DUODENOSCOPY (EGD), COMBINED N/A 7/3/2023    Procedure: ESOPHAGOGASTRODUODENOSCOPY;  Surgeon: Yunier Graves MD;  Location:  OR    EYE SURGERY  2005    cataract surgery    IR JEJUNOSTOMY TUBE CHANGE  8/25/2023    LAPAROSCOPIC ASSISTED INSERTION TUBE JEJUNOSTOMY N/A 7/5/2023    Procedure: INSERTION, JEJUNOSTOMY TUBE, LAPAROSCOPY-ASSISTED;  Surgeon: Judy Holguin MD;  [facility]    VASECTOMY  1981       No current facility-administered medications for this encounter.       Allergies   Allergen Reactions    Ampicillin Rash    Latex Rash    Metoprolol Other (See Comments)     Side effects : dry mouth         Physical Exam:    Mental status: alert  Heart: Normal  Lung: Normal  Assessment of patient's airway: Normal  Other as pertinent for procedure: None     Lab Results   Component Value Date    WBC 7.1 09/14/2023    WBC 6.6 04/15/2021     Lab Results   Component Value Date    RBC 4.08 09/14/2023    RBC 3.78 04/15/2021     Lab Results   Component Value Date    HGB 12.1 09/14/2023    HGB 11.3 04/15/2021     Lab Results   Component Value Date    HCT 37.6 09/14/2023    HCT 34.3 04/15/2021     Lab Results   Component Value Date    MCV 92 09/14/2023    MCV 91 04/15/2021     Lab Results   Component Value Date    MCH 29.7 09/14/2023    MCH 29.9 04/15/2021     Lab Results   Component Value Date    MCHC 32.2 09/14/2023    MCHC  32.9 04/15/2021     Lab Results   Component Value Date    RDW 16.8 09/14/2023    RDW 12.3 04/15/2021     Lab Results   Component Value Date     09/14/2023     04/15/2021     INR   Date Value Ref Range Status   09/29/2021 0.84 (L) 0.85 - 1.15 Final     Comment:     Effective 7/11/2021, the reference range for this assay has changed.   02/03/2021 1.03 0.86 - 1.14 Final        ASA Score: See Provation note    Mallampati score:  II - Faucial pillars and soft palate may be seen, but uvula is masked by the base of the tongue    Assessment/Plan:     The patient is an appropriate candidate to receive sedation.    Informed consent was discussed with the patient/family, including the risks, benefits, potential complications and any alternative options associated with sedation.    Patient assessment completed just prior to sedation and while under constant observation by the provider. Condition determined to be adequate for proceeding with sedation.    The specific risks for the procedure were discussed with the patient at the time of informed consent and include but are not limited to perforation which could require surgery, missing significant neoplasm or lesion, hemorrhage and adverse sedative complication.      Jose Salinas MD

## 2023-10-19 NOTE — RESULT ENCOUNTER NOTE
Mr. Ragsdale:    These biopsies of your lower esophagus did not show evidence of malignancy. As expected, it did show ulceration likely related to your prior treatment.    I suggest that you discuss this further with your Oncology team.    If you have any questions, please contact the Gastroenterology nurse at [phone].     - Dr. Salinas

## 2023-10-26 NOTE — PROGRESS NOTES
Liu has been eating small meals, he had pork roast with gravy and cooked asparagus, cheerios with a banana and milk, and had tuna fish with crackers and chips for lunch. He states he feels like his swallowing has improved but still having some difficulty. He has the most trouble with the top of the esophagus, he said it feels like pressure or discomfort. It can take 2-3 tries to swallow his food. He is burping but no liquid or food has been regurgitated. He will have a sharp pain every once in a while in his stomach, he can't pinpoint the pain as it may be related to his J-tube. He has been more physically active as well. No issues with tube feedings, weight is stable, will vary 4 to 6 cans of tube feed per day depending on how much he eats orally.     He would like to know if he should be on any medication for the ulceration of his esophagus. The ulceration is likely where the cancer was treated and no medication is needed at this time.    The next step is for patient to have an EGD Dilation to help improve his swallowing. He will receive a call from GI scheduling to schedule his procedure.    Liu complimented everyone that he has dealt with from doctors to the person to take his trash out in the hospital. We have all done a good job and put the patients first. He has appreciated the care he has gotten through [facility].

## 2023-11-02 NOTE — TELEPHONE ENCOUNTER
"Endoscopy Scheduling Screen    Have you had a positive Covid test in the last 14 days?  No    Are you active on MyChart?   Yes    What insurance is in the chart?  Other:  [employer]    Ordering/Referring Provider:   JOSE BUSTILLOS        (If ordering provider performs procedure, schedule with ordering provider unless otherwise instructed. )    BMI: Estimated body mass index is 30.27 kg/m  as calculated from the following:    Height as of 9/18/23: 1.549 m (5' 1\").    Weight as of 10/5/23: 72.7 kg (160 lb 3.2 oz).     Sedation Ordered  MAC/deep sedation.   BMI<= 45 45 < BMI <= 48 48 < BMI < = 50  BMI > 50   No Restrictions No [facility]  No [facility]  [place] [facility] with exceptions Hospital Only OR Only       Are you taking any prescription medications for pain 3 or more times per week?   No    Do you have a history of malignant hyperthermia or adverse reaction to anesthesia?  No    (Females) Are you currently pregnant?   No     Have you been diagnosed or told you have pulmonary hypertension?   No    Do you have an LVAD?  No    Have you been told you have moderate to severe sleep apnea?  Yes (RN Review required for scheduling unless scheduling in Hospital.)    Have you been told you have COPD, asthma, or any other lung disease?  No    Do you have any heart conditions?  No     Have you ever had an organ transplant?   No    Have you ever had or are you awaiting a heart or lung transplant?   No    Have you had a stroke or transient ischemic attack (TIA aka \"mini stroke\" in the last 6 months?   No    Have you been diagnosed with or been told you have cirrhosis of the liver?   No    Are you currently on dialysis?   No    Do you need assistance transferring?   No    BMI: Estimated body mass index is 30.27 kg/m  as calculated from the following:    Height as of 9/18/23: 1.549 m (5' 1\").    Weight as of 10/5/23: 72.7 kg (160 lb 3.2 oz).     Is patients BMI > 40 and scheduling location [facility]?  No    Do you take an injectable medication for " weight loss or diabetes (excluding insulin)?  No    Do you take the medication Naltrexone?  No    Do you take blood thinners?  No       Prep   Are you currently on dialysis or do you have chronic kidney disease?  No    Do you have a diagnosis of diabetes?  No    Do you have a diagnosis of cystic fibrosis (CF)?  No    On a regular basis do you go 3 -5 days between bowel movements?  No    BMI > 40?  No    Preferred Pharmacy:    [facility] - [place], [place] - [address] [facility]  [address] [facility] [address]  Phone: [phone] Fax: [phone]    [facility] - [place] [address] AT Portal to Registered [facility] Sites  [address]  [place] [address]  Phone: [phone] Fax: [phone]    [facility] - [place], [place] - [address]  [address] [facility] [address]  Phone: [phone] Fax: [phone]    [facility] - [place], [place] - [address]  [address]  [facility] [address]  Phone: [phone] Fax: [phone] Alternate Fax: [phone], [phone]      Final Scheduling Details   Colonoscopy prep sent?  N/A    Procedure scheduled  Upper endoscopy (EGD)    Surgeon:  Gunaaco     Date of procedure:  2/13/24     Pre-OP / PAC:   No - Not required for this site.    Location  [facility] - Patient preference.    Sedation   MAC/Deep Sedation - Per order.      Patient Reminders:   You will receive a call from a Nurse to review instructions and health history.  This assessment must be completed prior to your procedure.  Failure to complete the Nurse assessment may result in the procedure being cancelled.      On the day of your procedure, please designate an adult(s) who can drive you home stay with you for the next 24 hours. The medicines used in the exam will make you sleepy. You will not be able to drive.      You cannot take public transportation, ride share services, or non-medical taxi service  without a responsible caregiver.  Medical transport services are allowed with the requirement that a responsible caregiver will receive you at your destination.  We require that drivers and caregivers are confirmed prior to your procedure.

## 2023-11-03 NOTE — TELEPHONE ENCOUNTER
"Pre Assessment RN Review    Focused Assessments      FANI Hx  Estimated body mass index is 30.27 kg/m  as calculated from the following:    Height as of 9/18/23: 1.549 m (5' 1\").    Weight as of 10/5/23: 72.7 kg (160 lb 3.2 oz).       Severity Assessment    Sleep Study:   Diagnosis/Severity: Moderate    AHI: 20.8          Scheduling Status & Recommendations    Location Type: ASC - Per RN assessment.  "

## 2023-11-07 NOTE — LETTER
11/7/2023         RE: Liu Ragsdale  [address] [facility] [facility] [address]        Dear Colleague,    Thank you for referring your patient, Liu Ragsdale, to the [facility]. Please see a copy of my visit note below.    Radiation Oncology Note    HPI:Mr. 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 He underwent chemoradiation therapy with plan for re-evaluation for surgical resection.     Radiation Treatment  7/24/23 to 8/25/23: Esophagus + omar regions, 5000 cGy  in 25 fractions    Patient returns for follow-up.    Posttreatment PET scan on 9/28/2023 demonstrated overall good response.  No definite nodule was noted.  There was also noted reduced PET avidity indeterminate for inflammatory change versus residual malignancy.    The patient was seen by Dr. Levi of thoracic surgery team who reportedly discussed proceeding with scope and possible biopsy to determine next steps in management.    On 10/18/2023 the patient underwent scope with biopsy.  Scope did not demonstrate any clear evidence of residual disease.  Biopsy was negative for malignancy.    Overall, the patient is doing better.  Pain due to radiation esophagitis is largely improved. No  longer needing oxycodone oral solution or tylenol as needed.   Nausea is also much improved.   Advancing diet orally, has not used nutrition through the J-tube for about 1 week. Weight is up from prior visit.      Plan:  Posttreatment PET scan (9/28/2023) demonstrated overall good response.  No definite nodule was noted.  There was also noted reduced PET avidity indeterminate for inflammatory change versus residual malignancy.  On 10/18/2023 the patient underwent scope with biopsy.  Scope did not demonstrate any clear evidence of residual disease.  Biopsy was negative for malignancy.  Continue follow-up with thoracic surgery team (  Gurmeet).  Continue follow-up with medical oncology team (Dr. Haines).  Acute toxicities are recovering slowly with time.  Radiation esophagitis.  Largely improved. Antacid as needed.  Nausea. Improved.    Continue to advance diet as tolerated.   Return to clinic in 3 months.      Jarred Alcantar M.D.  Department of Radiation Oncology  [facility]

## 2023-11-07 NOTE — PROGRESS NOTES
Radiation Oncology Note    HPI:Mr. 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 He underwent chemoradiation therapy with plan for re-evaluation for surgical resection.     Radiation Treatment  7/24/23 to 8/25/23: Esophagus + omar regions, 5000 cGy  in 25 fractions    Patient returns for follow-up.    Posttreatment PET scan on 9/28/2023 demonstrated overall good response.  No definite nodule was noted.  There was also noted reduced PET avidity indeterminate for inflammatory change versus residual malignancy.    The patient was seen by Dr. Levi of thoracic surgery team who reportedly discussed proceeding with scope and possible biopsy to determine next steps in management.    On 10/18/2023 the patient underwent scope with biopsy.  Scope did not demonstrate any clear evidence of residual disease.  Biopsy was negative for malignancy.    Overall, the patient is doing better.  Pain due to radiation esophagitis is largely improved. No  longer needing oxycodone oral solution or tylenol as needed.   Nausea is also much improved.   Advancing diet orally, has not used nutrition through the J-tube for about 1 week. Weight is up from prior visit.      Plan:  Posttreatment PET scan (9/28/2023) demonstrated overall good response.  No definite nodule was noted.  There was also noted reduced PET avidity indeterminate for inflammatory change versus residual malignancy.  On 10/18/2023 the patient underwent scope with biopsy.  Scope did not demonstrate any clear evidence of residual disease.  Biopsy was negative for malignancy.  Continue follow-up with thoracic surgery team (Dr. Levi).  Continue follow-up with medical oncology team (Dr. Haines).  Acute toxicities are recovering slowly with time.  Radiation esophagitis.  Largely improved. Antacid as needed.  Nausea. Improved.    Continue to advance diet as tolerated.   Return to clinic in 3  months.      Jarred Alcantar M.D.  Department of Radiation Oncology  [facility]

## 2023-11-07 NOTE — NURSING NOTE
"FOLLOW-UP VISIT    Patient Name: Liu Ragsdale      : 1940     Age: 83 year old        ______________________________________________________________________________     Chief Complaint   Patient presents with    Radiation Therapy     Follow up with Dr. Alcantar     BP (!) 155/77 (BP Location: Left arm, Patient Position: Sitting, Cuff Size: Adult Regular)   Pulse 84   Wt 74.9 kg (165 lb 3.2 oz)   SpO2 97%   BMI 31.21 kg/m       Date Radiation Completed: 23    Pain  Denies    Meds  Current Med List Reviewed: Yes  Medication Note:     Imaging  PET/CT: 23   - will repeat in 2024    On Chemo?: No  Esophagitis: None  Nausea:nausea  Bowel: Normal  Skin: Warm  Dry  Intact  Feeding Tube: has been trying to do all oral intake and oral pills - has not used Jtube for the past week.  Energy Level: fair/good  \"I can do about a half day of work/chores, then I rest. It's improving\"  Weight:   Vitals:    23 1548   Weight: 74.9 kg (165 lb 3.2 oz)       Other Appointments:     Date  Oncologist: Dr. Haines  2023 and next 2024   Surgeon: Dr. Levi  10/3/23     Other Notes:    "

## 2023-11-08 NOTE — TELEPHONE ENCOUNTER
Dr Blanc approved for the patient to be seen with Dr. Hatfield on 11/14 at the [facility]. Per RN assessment he is approved with the FANI to be seen in the ASC location.     If patient calls back please reschedule them into them slot that is on hold with the MRN on 11/14 with Dr. Hatfield.

## 2023-11-09 NOTE — TELEPHONE ENCOUNTER
LVMx2 for hold for patient for spot with Dr. Hatfield 11/14 MAC at [facility]

## 2023-11-09 NOTE — TELEPHONE ENCOUNTER
Pre assessment completed for upcoming procedure.      Procedure details:    Patient scheduled for Upper endoscopy (EGD) on 11/14/23.     Arrival time: 0815. Procedure time 0915    Pre op exam needed? N/A    Facility location: [facility]; [address] [place] [address], [address], [place] [address]    Sedation type: MAC    Indication for procedure:   Radiation-induced esophageal stricture          COVID policy reviewed.    Designated  policy reviewed. Instructed to have someone stay 24 hours post procedure.       Chart review:     Electronic implanted devices? No    Recent diagnosis of diverticulitis within the last 6 weeks?  No    Diabetic? No    Diabetic medication HOLDING recommendations: (if applicable)  Oral diabetic medications: No  Diabetic injectables: No  Insulin: No    Medication review:    Anticoagulants? No    NSAIDS? No    Other medication HOLDING recommendations:  N/A      Prep for procedure:     Prep instructions sent via [employer]     Reviewed procedure prep instructions.     Patient verbalized understanding and had no questions or concerns at this time.        Diana Chau RN  Endoscopy Procedure Pre Assessment RN  [phone] option 4

## 2023-11-09 NOTE — TELEPHONE ENCOUNTER
Caller: Liu  Reason for Reschedule/Cancellation (please be detailed, any staff messages or encounters to note?): Dr. Blanc ok'd for patient to move to Dr. Hatfield's schedule to get in sooner for EGD.       Prior to reschedule please review:  Ordering Provider:     Ferny Levi MD in [facility]     Sedation per order: MAC  Does patient have any ASC Exclusions, please identify?: y FANI - approved for ACS per RN note       Notes on Cancelled Procedure:  Procedure: Upper Endoscopy [EGD]   Date: 02/13/2024  Location: [facility]; [address], [address], [place] [address]  Surgeon: Guanaco      Rescheduled: Yes  Procedure: Upper Endoscopy [EGD]   Date: 11/14/2024  Location: [facility] [place]; [address], [address], [place] [address]  Surgeon: Liu  Sedation Level Scheduled  MAC,  Reason for Sedation Level per order  Prep/Instructions updated and sent: y       Send In - basket message to Panc - David Pool if EUS  procedure is canceled or rescheduled: [ N/A, YES or NO] na

## 2023-11-09 NOTE — TELEPHONE ENCOUNTER
Pt stated the check in time on 11/14 is too early for him and his wife as they are an hour and a half away from the [facility] and with morning traffic is concerned he would not make it in time.

## 2023-11-13 NOTE — PROGRESS NOTES
"  Assessment & Plan     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)  83-year-old male presented for a follow-up visit, known to have malignant neoplasm of lower third of esophagus, s/p chemo and radiation.  Recent PET scan result reviewed, quite encouraging and suggestive of probable complete response.  Has been eating orally for the last 2 months.  Will be having upper GI endoscopy tomorrow and repeat PET scan in January.  Medications reviewed and no changes made.  Suggested regular walks and healthy diet.  Follow-up in 6 months or earlier if needed.  All questions answered.      Piyush Rogers MD  [facility]    Eder Hatfield is a 83 year old, presenting for the following health issues:  Consult      HPI       Concern - Updates   Onset:   Description: Patient is wanting to keep his PCP up to date on his current health issues. No questions or concerns.  Does have an EGD tomorrow.       Review of Systems   Constitutional, HEENT, cardiovascular, pulmonary, gi and gu systems are negative, except as otherwise noted.      Objective    /70 (Cuff Size: Adult Regular)   Pulse 72   Temp 97.3  F (36.3  C) (Tympanic)   Resp 18   Ht 1.549 m (5' 1\")   Wt 74.4 kg (164 lb)   SpO2 95%   BMI 30.99 kg/m    Body mass index is 30.99 kg/m .  Physical Exam   GENERAL: alert and no distress  EYES: Eyes grossly normal to inspection, PERRL and conjunctivae and sclerae normal  HENT: normal cephalic/atraumatic, nose and mouth without ulcers or lesions, oropharynx clear, and oral mucous membranes moist  NECK: no adenopathy, no asymmetry, masses, or scars and thyroid normal to palpation  RESP: lungs clear to auscultation - no rales, rhonchi or wheezes  CV: regular rates and rhythm, normal S1 S2, no S3 or S4, and no murmur, click or rub  ABDOMEN: soft, nontender, feeding tube well placed  MS: no gross musculoskeletal defects noted, no edema  NEURO: Normal strength and tone, mentation intact " and speech normal  PSYCH: mentation appears normal, affect normal/bright

## 2023-11-13 NOTE — NURSING NOTE
"Chief Complaint   Patient presents with    Consult     /70 (Cuff Size: Adult Regular)   Pulse 72   Temp 97.3  F (36.3  C) (Tympanic)   Resp 18   Ht 1.549 m (5' 1\")   Wt 74.4 kg (164 lb)   SpO2 95%   BMI 30.99 kg/m   Estimated body mass index is 30.99 kg/m  as calculated from the following:    Height as of this encounter: 1.549 m (5' 1\").    Weight as of this encounter: 74.4 kg (164 lb).  Patient presents to the clinic using No DME      Health Maintenance that is potentially due pending provider review:    Health Maintenance Due   Topic Date Due    MEDICARE ANNUAL WELLNESS VISIT  09/25/2020                  "

## 2023-11-14 NOTE — INTERVAL H&P NOTE
"I have reviewed the surgical (or preoperative) H&P that is linked to this encounter, and examined the patient. There are no significant changes    Clinical Conditions Present on Arrival:  Clinically Significant Risk Factors Present on Admission                  # Obesity: Estimated body mass index is 31 kg/m  as calculated from the following:    Height as of this encounter: 1.549 m (5' 0.98\").    Weight as of this encounter: 74.4 kg (164 lb).       "

## 2023-11-14 NOTE — DISCHARGE INSTRUCTIONS
[facility]  Home Care Following Anesthesia  For 24 hours after surgery:  Get plenty of rest.  A responsible adult must stay with you for at least 24 hours after you leave the surgery center.  Do not drive or use heavy equipment.  If you have weakness or tingling, don't drive or use heavy equipment until this feeling goes away.   Do not drink alcohol.   Avoid strenuous or risky activities.  Ask for help when climbing stairs.  You may feel lightheaded.  IF so, sit for a few minutes before standing.  Have someone help you get up.   If you have nausea (feel sick to your stomach): Drink only clear liquids such as apple juice, ginger ale, broth or 7-Up.  Rest may also help.  Be sure to drink enough fluids.  Move to a regular diet as you feel able.   You may have a slight fever.  Call the doctor if your fever is over 100 F (37.7 C) (taken under the tongue) or lasts longer than 24 hours.  You may have a dry mouth, a sore throat, muscle aches or trouble sleeping. These should go away after 24 hours.  Do not make important or legal decisions.   It is recommended to avoid smoking.               Tips for taking pain medications  To get the best pain relief possible, remember these points:  Take pain medications as directed, before pain becomes severe.  Pain medication can upset your stomach: taking it with food may help.  Constipation is a common side effect of pain medication. Drink plenty of  fluids.  Eat foods high in fiber. Take a stool softener if recommended by your doctor or pharmacist.  Do not drink alcohol, drive or operate machinery while taking pain medications.  Ask about other ways to control pain, such as with heat, ice or relaxation.    Tylenol/Acetaminophen Consumption    If you feel your pain relief is insufficient, you may take Tylenol/Acetaminophen in addition to your narcotic pain medication.   Be careful not to exceed 4,000 mg of Tylenol/Acetaminophen in a 24 hour  "period from all sources.  If you are taking extra strength Tylenol/acetaminophen (500 mg), the maximum dose is 8 tablets in 24 hours.  If you are taking regular strength acetaminophen (325 mg), the maximum dose is 12 tablets in 24 hours.    Call a doctor for any of the following:  Signs of infection (fever, growing tenderness at the surgery site, a large amount of drainage or bleeding, severe pain, foul-smelling drainage, redness, swelling).  It has been over 8 to 10 hours since surgery and you are still not able to urinate (pass water).  Headache for over 24 hours.  Numbness, tingling or weakness the day after surgery (if you had spinal anesthesia).  Signs of Covid-19 infection (temperature over 100 degrees, shortness of breath, cough, loss of taste/smell, generalized body aches, persistent headache, chills, sore throat, nausea/vomiting/diarrhea)  Your doctor is: MD Ernst Hatfield        {[facility].:[id number]::\"Anesthesia Pain Service\",\"Breast Center\",\"Colon Rectal\",\"ENT Otolaryngology\",\"General Surgery\",\"Gynecologic Oncology\",\"Interventional Radiology\",\"Neurosurgery\",\"OB/GYN\",\"Ophthalmology\",\"Oral Maxillofacial\",\"Oral Surgery\",\"Orthopaedics\",\"Pain Service\",\"Plastics\",\"Prostate Urology\",\"Thoracic Surgery\",\"Vascular\"}  For emergency care, call the:  {[facility][id number]::\"[place] [facility]:  [phone] (TTY for hearing impaired: [phone])\",\"[facility]: [phone] (TTY for hearing impaired: [phone])\",\"[facility]: [phone]\"}                 "

## 2023-11-14 NOTE — ANESTHESIA CARE TRANSFER NOTE
Patient: Liu Ragsdale    Procedure: Procedure(s):  Esophagoscopy, gastroscopy, duodenoscopy (EGD), combined with Dilation       Diagnosis: Radiation-induced esophageal stricture [K22.2]  Diagnosis Additional Information: No value filed.    Anesthesia Type:   MAC     Note:    Oropharynx: oropharynx clear of all foreign objects and spontaneously breathing  Level of Consciousness: drowsy  Oxygen Supplementation: room air    Independent Airway: airway patency satisfactory and stable  Dentition: dentition unchanged  Vital Signs Stable: post-procedure vital signs reviewed and stable  Report to RN Given: handoff report given  Patient transferred to: Phase II    Handoff Report: Identifed the Patient, Identified the Reponsible Provider, Reviewed the pertinent medical history, Discussed the surgical course, Reviewed Intra-OP anesthesia mangement and issues during anesthesia, Set expectations for post-procedure period and Allowed opportunity for questions and acknowledgement of understanding      Vitals:  Vitals Value Taken Time   BP 94/53    Temp 96.7    Pulse 77    Resp 16    SpO2 94        Electronically Signed By: GIO Grover CRNA  November 14, 2023  9:32 AM

## 2023-11-14 NOTE — ANESTHESIA POSTPROCEDURE EVALUATION
Patient: Liu Ragsdale    Procedure: Procedure(s):  Esophagoscopy, gastroscopy, duodenoscopy (EGD), combined with Dilation       Anesthesia Type:  MAC    Note:  Disposition: Outpatient   Postop Pain Control: Uneventful            Sign Out: Well controlled pain   PONV: No   Neuro/Psych: Uneventful            Sign Out: Acceptable/Baseline neuro status   Airway/Respiratory: Uneventful            Sign Out: Acceptable/Baseline resp. status   CV/Hemodynamics: Uneventful            Sign Out: Acceptable CV status   Other NRE: NONE   DID A NON-ROUTINE EVENT OCCUR? No           Last vitals:  Vitals Value Taken Time   /74 11/14/23 1000   Temp 36.5  C (97.7  F) 11/14/23 1000   Pulse 81 11/14/23 1000   Resp 14 11/14/23 1000   SpO2 98 % 11/14/23 1000       Electronically Signed By: Ac Pereira MD  November 14, 2023  1:25 PM

## 2023-11-14 NOTE — H&P
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    SOB (shortness of breath) on exertion    Arthritis of right glenohumeral joint- moderate    Arthritis of right acromioclavicular joint- advanced    Positive cardiac stress test    Status post coronary angiogram    Coronary artery disease involving native coronary artery of native heart without angina pectoris    Degenerative joint disease of left hip    Near syncope    Right carotid artery occlusion    Cerebrovascular accident (H)    History of ischemic stroke    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)    Upper GI bleed    Anemia, unspecified type    Jejunostomy tube in situ (H)    Dysphagia       Past Surgical History:    Past Surgical History:   Procedure Laterality Date    ARTHROPLASTY HIP Left 02/03/2021    Procedure: Total Hip Arthroplasty;  Surgeon: Andrew Arriola MD;  Location: [facility]    COLONOSCOPY N/A 06/16/2016    Procedure: COLONOSCOPY;  Surgeon: Randy Avendano MD;  Location: [facility]    CV LEFT HEART CATH Left 03/10/2020    Procedure: Left Heart Cath;  Surgeon: Vicente Lopez MD;  Location:  HEART CARDIAC CATH LAB    ENDARTERECTOMY CAROTID Right 9/29/2021    Procedure: ENDARTERECTOMY, CAROTID;  Surgeon: Raymond Andersen MD;  Location:  OR    ENDOSCOPIC ULTRASOUND UPPER GASTROINTESTINAL TRACT (GI) N/A 7/3/2023    Procedure: ENDOSCOPIC ULTRASOUND, ESOPHAGOSCOPY / UPPER GASTROINTESTINAL TRACT (GI);  Surgeon: Yunier Graves MD;  Location:  OR    ESOPHAGOSCOPY, GASTROSCOPY, DUODENOSCOPY (EGD), COMBINED  N/A 6/5/2023    Procedure: Esophagoscopy, Gastroscopy, Duodenoscopy, With Biopsy;  Surgeon: Joseph Landres DO;  Location: [facility]    ESOPHAGOSCOPY, GASTROSCOPY, DUODENOSCOPY (EGD), COMBINED N/A 7/3/2023    Procedure: ESOPHAGOGASTRODUODENOSCOPY;  Surgeon: Yunier Graves MD;  Location: [facility]    ESOPHAGOSCOPY, GASTROSCOPY, DUODENOSCOPY (EGD), COMBINED N/A 10/18/2023    Procedure: ESOPHAGOGASTRODUODENOSCOPY, WITH BIOPSY;  Surgeon: Jose Salinas MD;  Location:  GI    EYE SURGERY  2005    cataract surgery    IR JEJUNOSTOMY TUBE CHANGE  8/25/2023    LAPAROSCOPIC ASSISTED INSERTION TUBE JEJUNOSTOMY N/A 7/5/2023    Procedure: INSERTION, JEJUNOSTOMY TUBE, LAPAROSCOPY-ASSISTED;  Surgeon: Judy Holguin MD;  Location:  OR    VASECTOMY  1981       Past Medical History:   Past Medical History:   Diagnosis Date    Angina at rest     Arthritis     Cerebrovascular accident (H) 09/27/2021    Coronary artery disease involving native coronary artery of native heart without angina pectoris     Dysphagia     Esophageal adenocarcinoma (H)     DISTAL ESOPHAGUS    Hypertension     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) 7/3/2023       Social History:   Social History     Tobacco Use    Smoking status: Never     Passive exposure: Never    Smokeless tobacco: Never   Substance Use Topics    Alcohol use: Yes     Comment: Beth only       Family History:   Family History   Problem Relation Age of Onset    Hypertension Sister     Kidney failure Sister     Chronic Obstructive Pulmonary Disease Sister     Thyroid Disease Sister     Seizure Disorder Paternal Grandmother     Mitral valve prolapse Daughter     No Known Problems Son     Cerebrovascular Disease No family hx of        Allergies:   Allergies   Allergen Reactions    Ampicillin Rash    Latex Rash    Metoprolol Other (See Comments)     Side effects : dry mouth       Active Medications:   Current Outpatient Medications   Medication Sig Dispense Refill     carvedilol (COREG) 6.25 MG tablet 1 tablet (6.25 mg) by Per J Tube route 2 times daily (with meals) 90 tablet 1    COLLAGEN PO 5 mLs by Per J Tube route every evening Collagen Extra Strength with 5 ml of Eniva VIBE-liquid and 5 ml of Eniva Cell Ready and qs with water to 55 ml      diclofenac (VOLTAREN) 1 % topical gel Place 4 g onto the skin 3 times daily as needed for moderate pain (Shoulder) 100 g 1    famotidine (PEPCID) 40 MG/5ML suspension 2.5 mLs (20 mg) by Per J Tube route daily as needed for heartburn 50 mL 3    Multiple Vitamin (MULTI VITAMIN PO) 5 mLs by Per J Tube route daily Eniva VIBE-liquid with 5 ml of Eniva Cell Ready and 5 ml of Collagen qs with water to 55 ml      nitroGLYcerin (NITROSTAT) 0.3 MG sublingual tablet For chest pain place 1 tablet under the tongue every 5 minutes for 3 doses. If symptoms persist 5 minutes after 1st dose call 911. 30 tablet 3    NONFORMULARY 5 mLs by Per J Tube route daily Eniva Cell-Ready Multi Minerals is a concentrated, ionic liquid mineral dietary supplement blend to help support nutritional balance and wellbeing. With Eniva VIBE-liquid with 5 ml and 5 ml of Collagen qs with water to 55 ml      omeprazole (PRILOSEC) 20 MG DR capsule Take 1 capsule (20 mg) by mouth daily 30 capsule 3    omeprazole (PRILOSEC) 40 MG DR capsule Take 1 capsule (40 mg) by mouth 2 times daily (before meals) 60 capsule 4    ondansetron (ZOFRAN) 4 MG/5ML solution Take 5 mLs (4 mg) by mouth every 8 hours as needed for nausea or vomiting 50 mL 3    acetaminophen (TYLENOL) 160 MG/5ML solution Take 15.625 mLs (500 mg) by mouth every 6 hours as needed for mild pain 473 mL 3       Systemic Review:   CONSTITUTIONAL: NEGATIVE for fever, chills, change in weight  ENT/MOUTH: NEGATIVE for ear, mouth and throat problems  RESP: NEGATIVE for significant cough or SOB  CV: NEGATIVE for chest pain, palpitations or peripheral edema    Physical Examination:   Vital Signs: BP (!) 142/74 (Cuff Size: Adult  "Regular)   Pulse 81   Temp 97.7  F (36.5  C) (Temporal)   Resp 14   Ht 1.549 m (5' 0.98\")   Wt 74.4 kg (164 lb)   SpO2 98%   BMI 31.00 kg/m    GENERAL: healthy, alert and no distress  NECK: no adenopathy, no asymmetry, masses, or scars  RESP: lungs clear to auscultation - no rales, rhonchi or wheezes  CV: regular rate and rhythm, normal S1 S2, no S3 or S4, no murmur, click or rub, no peripheral edema and peripheral pulses strong  ABDOMEN: soft, nontender, no hepatosplenomegaly, no masses and bowel sounds normal  MS: no gross musculoskeletal defects noted, no edema    Plan: Appropriate to proceed as scheduled.      Ernst Hatfield MD  11/14/2023    PCP:  [facility]    "

## 2023-11-14 NOTE — ANESTHESIA PREPROCEDURE EVALUATION
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      Past Surgical History:   Procedure Laterality Date    ARTHROPLASTY HIP Left 2021    Procedure: Total Hip Arthroplasty;  Surgeon: Andrew Arriola MD;  Location: [facility]    COLONOSCOPY N/A 2016    Procedure: COLONOSCOPY;  Surgeon: Randy Avendano MD;  Location: [facility]    CV LEFT HEART CATH Left 03/10/2020    Procedure: Left Heart Cath;  Surgeon: Vicente Lopez MD;  Location:  HEART CARDIAC CATH LAB    ENDARTERECTOMY CAROTID Right 2021    Procedure: ENDARTERECTOMY, CAROTID;  Surgeon: Raymond Andersen MD;  Location:  OR    ENDOSCOPIC ULTRASOUND UPPER GASTROINTESTINAL TRACT (GI) N/A 7/3/2023    Procedure: ENDOSCOPIC ULTRASOUND, ESOPHAGOSCOPY / UPPER GASTROINTESTINAL TRACT (GI);  Surgeon: Yunier Graves MD;  Location:  OR    ESOPHAGOSCOPY, GASTROSCOPY, DUODENOSCOPY (EGD), COMBINED N/A 2023    Procedure: Esophagoscopy, Gastroscopy, Duodenoscopy, With Biopsy;  Surgeon: Joseph Landers DO;  Location: [facility]    ESOPHAGOSCOPY, GASTROSCOPY, DUODENOSCOPY (EGD), COMBINED N/A 7/3/2023    Procedure: ESOPHAGOGASTRODUODENOSCOPY;  Surgeon: Yunier Graves MD;  Location:  OR    ESOPHAGOSCOPY, GASTROSCOPY, DUODENOSCOPY (EGD), COMBINED N/A 10/18/2023    Procedure: ESOPHAGOGASTRODUODENOSCOPY, WITH BIOPSY;  Surgeon: Jose Salinas MD;  Location:  GI    EYE SURGERY      cataract surgery    IR JEJUNOSTOMY TUBE CHANGE   8/25/2023    LAPAROSCOPIC ASSISTED INSERTION TUBE JEJUNOSTOMY N/A 7/5/2023    Procedure: INSERTION, JEJUNOSTOMY TUBE, LAPAROSCOPY-ASSISTED;  Surgeon: Judy Holguin MD;  Location: [facility] OR    VASECTOMY  1981      Allergies   Allergen Reactions    Ampicillin Rash    Latex Rash    Metoprolol Other (See Comments)     Side effects : dry mouth      Social History     Tobacco Use    Smoking status: Never     Passive exposure: Never    Smokeless tobacco: Never   Substance Use Topics    Alcohol use: Yes     Comment: Beth only      Wt Readings from Last 1 Encounters:   11/14/23 74.4 kg (164 lb)        Anesthesia Evaluation            ROS/MED HX  ENT/Pulmonary:     (+) sleep apnea,                                      Neurologic:     (+)          CVA,                      Cardiovascular:     (+)  hypertension- -  CAD -  - -                                      METS/Exercise Tolerance:     Hematologic:       Musculoskeletal:       GI/Hepatic:       Renal/Genitourinary:       Endo:       Psychiatric/Substance Use:       Infectious Disease:       Malignancy:   (+) Malignancy, History of Other.Other CA esophageal Remission status post.    Other:            Physical Exam    Airway  airway exam normal      Mallampati: II   TM distance: > 3 FB   Neck ROM: full   Mouth opening: > 3 cm    Respiratory Devices and Support         Dental       (+) Completely normal teeth      Cardiovascular          Rhythm and rate: regular and normal     Pulmonary   pulmonary exam normal        breath sounds clear to auscultation           OUTSIDE LABS:  CBC:   Lab Results   Component Value Date    WBC 7.1 09/14/2023    WBC 2.8 (L) 08/31/2023    HGB 12.1 (L) 09/14/2023    HGB 11.2 (L) 08/31/2023    HCT 37.6 (L) 09/14/2023    HCT 32.9 (L) 08/31/2023     09/14/2023     08/31/2023     BMP:   Lab Results   Component Value Date     09/14/2023     (L) 08/31/2023    POTASSIUM 4.3 09/14/2023    POTASSIUM 4.0 08/31/2023    CHLORIDE  "102 09/14/2023    CHLORIDE 101 08/31/2023    CO2 26 09/14/2023    CO2 27 08/31/2023    BUN 16.6 09/14/2023    BUN 14.4 08/31/2023    CR 1.00 09/14/2023    CR 0.83 08/31/2023     (H) 09/14/2023     (H) 08/31/2023     COAGS:   Lab Results   Component Value Date    PTT 24 09/29/2021    INR 0.84 (L) 09/29/2021     POC: No results found for: \"BGM\", \"HCG\", \"HCGS\"  HEPATIC:   Lab Results   Component Value Date    ALBUMIN 3.7 09/14/2023    PROTTOTAL 6.7 09/14/2023    ALT 47 09/14/2023    AST 35 09/14/2023    ALKPHOS 113 09/14/2023    BILITOTAL 0.2 09/14/2023     OTHER:   Lab Results   Component Value Date    A1C 6.0 (H) 09/26/2021    GWEN 9.7 09/14/2023    PHOS 3.2 07/10/2023    MAG 1.9 08/31/2023    LIPASE 57 03/31/2007    AMYLASE 72 03/31/2007    TSH 1.41 05/30/2014       Anesthesia Plan    ASA Status:  3    NPO Status:  NPO Appropriate    Anesthesia Type: MAC.     - Reason for MAC: immobility needed, straight local not clinically adequate   Induction: Intravenous.   Maintenance: TIVA.        Consents    Anesthesia Plan(s) and associated risks, benefits, and realistic alternatives discussed. Questions answered and patient/representative(s) expressed understanding.     - Discussed:     - Discussed with:  Patient      - Extended Intubation/Ventilatory Support Discussed: No.      - Patient is DNR/DNI Status: No     Use of blood products discussed: No .     Postoperative Care    Pain management: IV analgesics, Oral pain medications, Multi-modal analgesia.   PONV prophylaxis: Ondansetron (or other 5HT-3), Background Propofol Infusion     Comments:                Ac Pereira MD  "

## 2023-11-17 NOTE — TELEPHONE ENCOUNTER
received a message from JUICE SHORT to help get Pt scheduled for a [facility] appointment with Dr. Ernst Hatfield in 6-8 weeks, per provider request. Narinderr called and talked with Pt. Pt is scheduled for an in-person clinic visit on 1/25/2024 at 10 AM with Dr. Hatfield.

## 2023-12-06 NOTE — TELEPHONE ENCOUNTER
NOTHING CHANGED PER PT FOR SCREENING QUESTIONS    Preferred Pharmacy:    [id number] - [place], [place] - [address] [facility]  [address] [facility] [address]  Phone: [phone] Fax: [phone]    Final Scheduling Details   Colonoscopy prep sent?      Procedure scheduled  Upper endoscopy (EGD)    Surgeon:  APOLLO     Date of procedure:  2/21     Pre-OP / PAC:   No - Not required for this site.    Location  [facility] - ASC - Per RN assessment.    Sedation   MAC/Deep Sedation - Per order.      Patient Reminders:   You will receive a call from a Nurse to review instructions and health history.  This assessment must be completed prior to your procedure.  Failure to complete the Nurse assessment may result in the procedure being cancelled.      On the day of your procedure, please designate an adult(s) who can drive you home stay with you for the next 24 hours. The medicines used in the exam will make you sleepy. You will not be able to drive.      You cannot take public transportation, ride share services, or non-medical taxi service without a responsible caregiver.  Medical transport services are allowed with the requirement that a responsible caregiver will receive you at your destination.  We require that drivers and caregivers are confirmed prior to your procedure.    NOTHING CHANGED PER PT FOR SCREENING QUESTIONS

## 2023-12-11 NOTE — TELEPHONE ENCOUNTER
"Ernst Hatfield MD Broich, Samantha, RN  Caller: Unspecified (Today,  8:34 AM)    \"He can have it done any time now. Can be done with me, SWAPNA Kruger, JUNIOR Walter.    Let me know who he gets scheduled with.    Also, keep his appt for 2/21 because he will likely need that one in the future too.    Continue PPI BID    Soft diet until scope.    J\"    [employer] message sent to endoscopy scheduling requesting schedules be reviewed for cancellations.     Contacted patient and discussed next steps. Patient in agreement with plan, and will anticipate endoscopy 's call.   "

## 2023-12-11 NOTE — TELEPHONE ENCOUNTER
Nurse Triage SBAR    Is this a 2nd Level Triage?     Situation:   Swallowing difficulty     Background/Assessment:     On Nov 14th, 2023 Pt had a Esophagoscopy, gastroscopy, duodenoscopy.      After this procedure Pt was having some issues with swallowing difficulties.     But it subsided a little bit.    BUT, now Pt reporting it hurts to swallow at the top of his Esophagus.        Every time he swallows it bothers him at the top of his Esophagus.      No heart burn, nausea, or vomiting noted.    Just difficulty swallowing.    Pt would like a call back and wondering if he can get back in to the clinic sooner then January 2024.     Please call the Pt back @ [phone] for further assistance.    Cyndi Alexandre RN  Central Triage Red Flags/Med Refills      Protocol Recommended Disposition:   See in Office Within 3 Days      Reason for Disposition   Patient wants to be seen    Additional Information   Negative: SEVERE difficulty swallowing (e.g., drooling or spitting) and started suddenly after taking a medicine or allergic foods   Negative: Wheezing, stridor, hoarseness, or difficulty breathing   Negative: Swollen tongue and sudden onset   Negative: Sounds like a life-threatening emergency to the triager   Negative: Sore throat (throat pain with swallowing)   Negative: SEVERE difficulty swallowing (e.g., drooling or spitting, can't swallow water)   Negative: Symptoms of food or bone stuck in throat or esophagus (e.g., pain in throat or chest, FB sensation, blood-tinged saliva)   Negative: SEVERE symptoms of pill stuck in throat or esophagus (e.g., severe pain, bleeding, or difficulty swallowing liquids)   Negative: Drinking very little and dehydration suspected (e.g., no urine > 12 hours, very dry mouth, very lightheaded)   Negative: Refuses to drink anything for > 12 hours   Negative: Patient sounds very sick or weak to the triager   Negative: Fever > 100.4 F (38.0 C)   Negative: Coughing spells occur during or within  2 hours after eating/feedings   Negative: Symptoms of pill stuck in throat or esophagus (e.g., pain in throat or chest, FB sensation) and no relief after using Care Advice   Negative: Weak immune system (e.g., HIV positive, cancer chemo, splenectomy, organ transplant, chronic steroids)   Negative: Swallowing difficulty and cause unknown  (Exception: Difficulty swallowing is a chronic symptom.)    Protocols used: Swallowing Difficulty-A-OH

## 2023-12-11 NOTE — TELEPHONE ENCOUNTER
Contacted patient to discuss.    The patient reports following the endoscopy with dilation his swallowing was at 100%.    Patient has noticed throughout the past 7-10 days his swallowing has become somewhat more difficult/ uncomfortable.     Patient states he was instructed to call and update the team if the symptoms reoccurred.     The patient is able to eat and drink, but it is uncomfortable.     Patient is taking the Omeprazole as instructed following most recent EGD on 11/14.    Follow up clinic visit scheduled with Dr. Hatfield on 1/4/24.     Next EGD scheduled for 2/21.    Routed to Dr. Hatfield.

## 2023-12-12 NOTE — TELEPHONE ENCOUNTER
Caller: Liu  Reason for Reschedule/Cancellation (please be detailed, any staff messages or encounters to note?): Patient got in sooner with Dr. Hatfield but still needs to keep the 2/21 appointment per [facility]. New orders were placed for both EGD's to be done.       Prior to reschedule please review:  Ordering Provider:     Ernst Hatfield MD     Sedation per order: MAC  Does patient have any ASC Exclusions, please identify?: n      Notes on Cancelled Procedure:  Procedure: Upper Endoscopy [EGD]   Date: 02/21/2024 procedure still happening but patient needed two EGD's   Location: [facility]; [address] [place] [address], [place] [address]  Surgeon: Guanaco      Rescheduled: Yes  Procedure: Upper Endoscopy [EGD]   Date: 12/14/2023  Location: [facility]; [address] [place] [address], [address] [place] [address]  Surgeon: Liu  Sedation Level Scheduled  mac  Reason for Sedation Level Per order  Prep/Instructions updated and sent: y       Send In - basket message to Panc - David Pool if EUS  procedure is canceled or rescheduled: [ N/A, YES or NO] na

## 2023-12-12 NOTE — TELEPHONE ENCOUNTER
Pre assessment completed for upcoming procedure.      Procedure details:    Patient scheduled for Upper endoscopy (EGD) on 12/14.     Arrival time: 1315. Procedure time 1415    Pre op exam needed? N/A    Facility location: 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    Designated  policy reviewed. Instructed to have someone stay 24 hours post procedure.       Chart review:     Electronic implanted devices? No    Recent diagnosis of diverticulitis within the last 6 weeks?  No    Diabetic? No    Diabetic medication HOLDING recommendations: (if applicable)  Oral diabetic medications: No  Diabetic injectables: No  Insulin: No    Medication review:    Anticoagulants? No    NSAIDS? No    Other medication HOLDING recommendations:  N/A      Prep for procedure:     Prep instructions sent via [employer]    Reviewed procedure prep instructions.     Patient verbalized understanding and had no questions or concerns at this time.        Diana Chau RN  Endoscopy Procedure Pre Assessment RN  [phone] option 4

## 2023-12-12 NOTE — PROGRESS NOTES
Scheduled sooner EGD appointment for dilation, per Dr. Hatfield's request. Advised to keep February EGD procedure appointment scheduled as well, as the patient will likely need the procedure. New order placed, per endoscopy 's request.

## 2023-12-13 NOTE — ANESTHESIA PREPROCEDURE EVALUATION
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      Past Surgical History:   Procedure Laterality Date     ARTHROPLASTY HIP Left 2021    Procedure: Total Hip Arthroplasty;  Surgeon: Andrew Arriola MD;  Location: [facility]     COLONOSCOPY N/A 2016    Procedure: COLONOSCOPY;  Surgeon: Randy Avendano MD;  Location: [facility]     CV LEFT HEART CATH Left 03/10/2020    Procedure: Left Heart Cath;  Surgeon: Vicente Lopez MD;  Location:  HEART CARDIAC CATH LAB     ENDARTERECTOMY CAROTID Right 2021    Procedure: ENDARTERECTOMY, CAROTID;  Surgeon: Raymond Andersen MD;  Location:  OR     ENDOSCOPIC ULTRASOUND UPPER GASTROINTESTINAL TRACT (GI) N/A 7/3/2023    Procedure: ENDOSCOPIC ULTRASOUND, ESOPHAGOSCOPY / UPPER GASTROINTESTINAL TRACT (GI);  Surgeon: Yunier Graves MD;  Location:  OR     ESOPHAGOSCOPY, GASTROSCOPY, DUODENOSCOPY (EGD), COMBINED N/A 2023    Procedure: Esophagoscopy, Gastroscopy, Duodenoscopy, With Biopsy;  Surgeon: Joseph Landers DO;  Location: [facility]     ESOPHAGOSCOPY, GASTROSCOPY, DUODENOSCOPY (EGD), COMBINED N/A 7/3/2023    Procedure: ESOPHAGOGASTRODUODENOSCOPY;  Surgeon: Yunier Graves MD;  Location:  OR     ESOPHAGOSCOPY, GASTROSCOPY, DUODENOSCOPY (EGD), COMBINED N/A 10/18/2023    Procedure: ESOPHAGOGASTRODUODENOSCOPY, WITH BIOPSY;  Surgeon: Jose Salinas MD;  Location:  GI     ESOPHAGOSCOPY, GASTROSCOPY, DUODENOSCOPY (EGD),  COMBINED N/A 11/14/2023    Procedure: Esophagoscopy, gastroscopy, duodenoscopy (EGD), combined with Dilation;  Surgeon: Ernst Hatfield MD;  Location: [facility] OR     EYE SURGERY  2005    cataract surgery     IR JEJUNOSTOMY TUBE CHANGE  8/25/2023     LAPAROSCOPIC ASSISTED INSERTION TUBE JEJUNOSTOMY N/A 7/5/2023    Procedure: INSERTION, JEJUNOSTOMY TUBE, LAPAROSCOPY-ASSISTED;  Surgeon: Judy Holguin MD;  Location: [facility] OR     VASECTOMY  1981      Allergies   Allergen Reactions     Ampicillin Rash     Latex Rash     Metoprolol Other (See Comments)     Side effects : dry mouth      Social History     Tobacco Use     Smoking status: Never     Passive exposure: Never     Smokeless tobacco: Never   Substance Use Topics     Alcohol use: Yes     Comment: Beth only      Wt Readings from Last 1 Encounters:   11/14/23 74.4 kg (164 lb)        Anesthesia Evaluation            ROS/MED HX  ENT/Pulmonary:     (+) sleep apnea,                                      Neurologic:     (+)          CVA,                      Cardiovascular:     (+)  hypertension- -  CAD -  - -                                      METS/Exercise Tolerance: >4 METS    Hematologic:       Musculoskeletal:       GI/Hepatic:       Renal/Genitourinary:       Endo:       Psychiatric/Substance Use:       Infectious Disease:       Malignancy:   (+) Malignancy, History of Other.Other CA esophageal Remission status post.    Other:            Physical Exam    Airway        Mallampati: I   TM distance: > 3 FB   Neck ROM: full   Mouth opening: > 3 cm    Respiratory Devices and Support         Dental       (+) Modest Abnormalities - crowns, retainers, 1 or 2 missing teeth      Cardiovascular          Rhythm and rate: regular and normal     Pulmonary           breath sounds clear to auscultation       OUTSIDE LABS:  CBC:   Lab Results   Component Value Date    WBC 7.1 09/14/2023    WBC 2.8 (L) 08/31/2023    HGB 12.1 (L) 09/14/2023    HGB 11.2 (L) 08/31/2023    HCT  "37.6 (L) 09/14/2023    HCT 32.9 (L) 08/31/2023     09/14/2023     08/31/2023     BMP:   Lab Results   Component Value Date     09/14/2023     (L) 08/31/2023    POTASSIUM 4.3 09/14/2023    POTASSIUM 4.0 08/31/2023    CHLORIDE 102 09/14/2023    CHLORIDE 101 08/31/2023    CO2 26 09/14/2023    CO2 27 08/31/2023    BUN 16.6 09/14/2023    BUN 14.4 08/31/2023    CR 1.00 09/14/2023    CR 0.83 08/31/2023     (H) 09/14/2023     (H) 08/31/2023     COAGS:   Lab Results   Component Value Date    PTT 24 09/29/2021    INR 0.84 (L) 09/29/2021     POC: No results found for: \"BGM\", \"HCG\", \"HCGS\"  HEPATIC:   Lab Results   Component Value Date    ALBUMIN 3.7 09/14/2023    PROTTOTAL 6.7 09/14/2023    ALT 47 09/14/2023    AST 35 09/14/2023    ALKPHOS 113 09/14/2023    BILITOTAL 0.2 09/14/2023     OTHER:   Lab Results   Component Value Date    A1C 6.0 (H) 09/26/2021    GWEN 9.7 09/14/2023    PHOS 3.2 07/10/2023    MAG 1.9 08/31/2023    LIPASE 57 03/31/2007    AMYLASE 72 03/31/2007    TSH 1.41 05/30/2014       Anesthesia Plan    ASA Status:  2    NPO Status:  NPO Appropriate    Anesthesia Type: MAC.     - Reason for MAC: immobility needed   Induction: Intravenous, Propofol.   Maintenance: TIVA.        Consents    Anesthesia Plan(s) and associated risks, benefits, and realistic alternatives discussed. Questions answered and patient/representative(s) expressed understanding.     - Discussed:     - Discussed with:  Patient      - Extended Intubation/Ventilatory Support Discussed: No.      - Patient is DNR/DNI Status: No     Use of blood products discussed: No .     Postoperative Care    Pain management: IV analgesics.   PONV prophylaxis: Ondansetron (or other 5HT-3), Background Propofol Infusion     Comments:               Mario Boone MD    I have reviewed the pertinent notes and labs in the chart from the past 30 days and (re)examined the patient.  Any updates or changes from those notes are " "reflected in this note.              # Obesity: Estimated body mass index is 31 kg/m  as calculated from the following:    Height as of 11/14/23: 1.549 m (5' 0.98\").    Weight as of 11/14/23: 74.4 kg (164 lb).      "

## 2023-12-14 NOTE — ANESTHESIA POSTPROCEDURE EVALUATION
Patient: Liu Ragsdale    Procedure: Procedure(s):  ESOPHAGOGASTRODUODENOSCOPY, WITH BALLOON DILATION OF LESS THAN 30 MILLIMETERS       Anesthesia Type:  MAC    Note:  Disposition: Outpatient   Postop Pain Control: Uneventful            Sign Out: Well controlled pain   PONV: No   Neuro/Psych: Uneventful            Sign Out: Acceptable/Baseline neuro status   Airway/Respiratory: Uneventful            Sign Out: Acceptable/Baseline resp. status   CV/Hemodynamics: Uneventful            Sign Out: Acceptable CV status; No obvious hypovolemia; No obvious fluid overload   Other NRE: NONE   DID A NON-ROUTINE EVENT OCCUR? No       Last vitals:  Vitals Value Taken Time   /84 12/14/23 1500   Temp 36.1  C (97  F) 12/14/23 1500   Pulse 86 12/14/23 1500   Resp 16 12/14/23 1500   SpO2 97 % 12/14/23 1500       Electronically Signed By: Aiden Ferraro MD  December 14, 2023  5:07 PM   Suturegard Intro: Intraoperative tissue expansion was performed, utilizing the SUTUREGARD device, in order to reduce wound tension.

## 2023-12-14 NOTE — ANESTHESIA CARE TRANSFER NOTE
Patient: Liu Ragsdale    Procedure: Procedure(s):  ESOPHAGOGASTRODUODENOSCOPY, WITH BALLOON DILATION OF LESS THAN 30 MILLIMETERS       Diagnosis: Pharyngoesophageal dysphagia [R13.14]  Diagnosis Additional Information: No value filed.    Anesthesia Type:   MAC     Note:    Oropharynx: oropharynx clear of all foreign objects  Level of Consciousness: drowsy  Oxygen Supplementation: nasal cannula  Level of Supplemental Oxygen (L/min / FiO2): 2  Independent Airway: airway patency satisfactory and stable  Dentition: dentition unchanged  Vital Signs Stable: post-procedure vital signs reviewed and stable  Report to RN Given: handoff report given  Patient transferred to: Phase II  Comments: VSS and WNL, comfortable, no PONV, report to Anay BOSE  Handoff Report: Identifed the Patient, Identified the Reponsible Provider, Reviewed the pertinent medical history, Discussed the surgical course, Reviewed Intra-OP anesthesia mangement and issues during anesthesia, Set expectations for post-procedure period and Allowed opportunity for questions and acknowledgement of understanding      Vitals:  Vitals Value Taken Time   BP     Temp     Pulse     Resp     SpO2         Electronically Signed By: GIO Holland CRNA  December 14, 2023  2:53 PM

## 2023-12-14 NOTE — H&P
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    SOB (shortness of breath) on exertion    Arthritis of right glenohumeral joint- moderate    Arthritis of right acromioclavicular joint- advanced    Positive cardiac stress test    Status post coronary angiogram    Coronary artery disease involving native coronary artery of native heart without angina pectoris    Degenerative joint disease of left hip    Near syncope    Right carotid artery occlusion    Cerebrovascular accident (H)    History of ischemic stroke    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)    Upper GI bleed    Anemia, unspecified type    Jejunostomy tube in situ (H)    Dysphagia       Past Surgical History:    Past Surgical History:   Procedure Laterality Date    ARTHROPLASTY HIP Left 02/03/2021    Procedure: Total Hip Arthroplasty;  Surgeon: Andrew Arriola MD;  Location: [facility]    COLONOSCOPY N/A 06/16/2016    Procedure: COLONOSCOPY;  Surgeon: Randy Avnedano MD;  Location: [facility]    CV LEFT HEART CATH Left 03/10/2020    Procedure: Left Heart Cath;  Surgeon: Vicente Lopez MD;  Location:  HEART CARDIAC CATH LAB    ENDARTERECTOMY CAROTID Right 9/29/2021    Procedure: ENDARTERECTOMY, CAROTID;  Surgeon: Raymond Andersen MD;  Location:  OR    ENDOSCOPIC ULTRASOUND UPPER GASTROINTESTINAL TRACT (GI) N/A 7/3/2023    Procedure: ENDOSCOPIC ULTRASOUND, ESOPHAGOSCOPY / UPPER GASTROINTESTINAL TRACT (GI);  Surgeon: Yunier Graves MD;  Location:  OR    ESOPHAGOSCOPY, GASTROSCOPY, DUODENOSCOPY (EGD), COMBINED N/A  6/5/2023    Procedure: Esophagoscopy, Gastroscopy, Duodenoscopy, With Biopsy;  Surgeon: Joseph Landers DO;  Location: [facility]    ESOPHAGOSCOPY, GASTROSCOPY, DUODENOSCOPY (EGD), COMBINED N/A 7/3/2023    Procedure: ESOPHAGOGASTRODUODENOSCOPY;  Surgeon: Yunier Graves MD;  Location:  OR    ESOPHAGOSCOPY, GASTROSCOPY, DUODENOSCOPY (EGD), COMBINED N/A 10/18/2023    Procedure: ESOPHAGOGASTRODUODENOSCOPY, WITH BIOPSY;  Surgeon: Jose Salinas MD;  Location:  GI    ESOPHAGOSCOPY, GASTROSCOPY, DUODENOSCOPY (EGD), COMBINED N/A 11/14/2023    Procedure: Esophagoscopy, gastroscopy, duodenoscopy (EGD), combined with Dilation;  Surgeon: Ernst Hatfield MD;  Location: [facility] OR    EYE SURGERY  2005    cataract surgery    IR JEJUNOSTOMY TUBE CHANGE  8/25/2023    LAPAROSCOPIC ASSISTED INSERTION TUBE JEJUNOSTOMY N/A 7/5/2023    Procedure: INSERTION, JEJUNOSTOMY TUBE, LAPAROSCOPY-ASSISTED;  Surgeon: Judy Holguin MD;  Location:  OR    VASECTOMY  1981       Past Medical History:   Past Medical History:   Diagnosis Date    Angina at rest     Arthritis     Cerebrovascular accident (H) 09/27/2021    Coronary artery disease involving native coronary artery of native heart without angina pectoris     Dysphagia     Esophageal adenocarcinoma (H)     DISTAL ESOPHAGUS    Hypertension     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) 7/3/2023       Social History:   Social History     Tobacco Use    Smoking status: Never     Passive exposure: Never    Smokeless tobacco: Never   Substance Use Topics    Alcohol use: Yes     Comment: Beth only       Family History:   Family History   Problem Relation Age of Onset    Hypertension Sister     Kidney failure Sister     Chronic Obstructive Pulmonary Disease Sister     Thyroid Disease Sister     Seizure Disorder Paternal Grandmother     Mitral valve prolapse Daughter     No Known Problems Son     Cerebrovascular Disease No family hx of        Allergies:   Allergies    Allergen Reactions    Ampicillin Rash    Latex Rash    Metoprolol Other (See Comments)     Side effects : dry mouth       Active Medications:   Current Outpatient Medications   Medication Sig Dispense Refill    aspirin 81 MG EC tablet Take 81 mg by mouth daily      carvedilol (COREG) 6.25 MG tablet TAKE 1 TAB BY MOUTH PER J-TUBE 2 TIMES DAILY WITH MEALS 90 tablet 0    COLLAGEN PO 5 mLs by Per J Tube route every evening Collagen Extra Strength with 5 ml of Eniva VIBE-liquid and 5 ml of Eniva Cell Ready and qs with water to 55 ml      diclofenac (VOLTAREN) 1 % topical gel Place 4 g onto the skin 3 times daily as needed for moderate pain (Shoulder) 100 g 1    famotidine (PEPCID) 40 MG/5ML suspension 2.5 mLs (20 mg) by Per J Tube route daily as needed for heartburn 50 mL 3    Multiple Vitamin (MULTI VITAMIN PO) 5 mLs by Per J Tube route daily Eniva VIBE-liquid with 5 ml of Eniva Cell Ready and 5 ml of Collagen qs with water to 55 ml      NONFORMULARY 5 mLs by Per J Tube route daily Eniva Cell-Ready Multi Minerals is a concentrated, ionic liquid mineral dietary supplement blend to help support nutritional balance and wellbeing. With Eniva VIBE-liquid with 5 ml and 5 ml of Collagen qs with water to 55 ml      omeprazole (PRILOSEC) 20 MG DR capsule Take 1 capsule (20 mg) by mouth daily 30 capsule 3    omeprazole (PRILOSEC) 40 MG DR capsule Take 1 capsule (40 mg) by mouth 2 times daily (before meals) 60 capsule 4    ondansetron (ZOFRAN) 4 MG/5ML solution Take 5 mLs (4 mg) by mouth every 8 hours as needed for nausea or vomiting 50 mL 3    acetaminophen (TYLENOL) 160 MG/5ML solution Take 15.625 mLs (500 mg) by mouth every 6 hours as needed for mild pain 473 mL 3    nitroGLYcerin (NITROSTAT) 0.3 MG sublingual tablet For chest pain place 1 tablet under the tongue every 5 minutes for 3 doses. If symptoms persist 5 minutes after 1st dose call 911. 30 tablet 3       Systemic Review:   CONSTITUTIONAL: NEGATIVE for fever, chills,  change in weight  ENT/MOUTH: NEGATIVE for ear, mouth and throat problems  RESP: NEGATIVE for significant cough or SOB  CV: NEGATIVE for chest pain, palpitations or peripheral edema    Physical Examination:   Vital Signs: BP (!) 157/84   Pulse 84   Temp 97.2  F (36.2  C) (Temporal)   Resp 18   Ht 1.524 m (5')   Wt 74.8 kg (165 lb)   SpO2 97%   BMI 32.22 kg/m    GENERAL: healthy, alert and no distress  NECK: no adenopathy, no asymmetry, masses, or scars  RESP: lungs clear to auscultation - no rales, rhonchi or wheezes  CV: regular rate and rhythm, normal S1 S2, no S3 or S4, no murmur, click or rub, no peripheral edema and peripheral pulses strong  ABDOMEN: soft, nontender, no hepatosplenomegaly, no masses and bowel sounds normal  MS: no gross musculoskeletal defects noted, no edema    Plan: Appropriate to proceed as scheduled.      Ernst Hatfield MD  12/14/2023    PCP:  Piyush Rogers

## 2023-12-18 NOTE — TELEPHONE ENCOUNTER
Patient is needing refill of his atorvastatin 10 mg which is not active on current medication list as it was discontinued by Dr. Rogers on 11/13/23.    Patient reports he has been taking this medication for many years.   Recent Labs   Lab Test 09/27/21  0420 08/19/20  1920   CHOL 99 123   HDL 67 76   LDL 15 27   TRIG 86 100      Last Written Prescription Date:  8/3/23  Last Fill Quantity: 90,  # refills: 0   Last office visit: 11/13/2023 ; last virtual visit: 8/24/2023 with prescribing provider:     Future Office Visit:  None.    RX pended, message routed to provider for consideration. Patient has 6 tabs remaining.    Julie Behrendt RN

## 2023-12-18 NOTE — TELEPHONE ENCOUNTER
Patient called requesting a refill on atorvastatin 10 mgs one tablet once daily.     [facility].       Renita JAEGER  Station

## 2023-12-21 NOTE — TELEPHONE ENCOUNTER
REFERRAL INFORMATION:  Appointment per Dr. Hatfield   Reason for Visit/Diagnosis: Radiation-induced esophageal stricture     FUTURE VISIT INFORMATION:  Appointment Date: 1/4/24  Appointment Time: 8:40 AM      NOTES STATUS DETAILS   OFFICE NOTE from Referring Provider Internal 12/11/23 Nurse Triage with Ernst Hatfield MD    OFFICE NOTE from Other Specialist Internal 11/13/23, 9/18/23, 8/24/23, 6/8/23 - PCC OV with Piyush Rogers MD at [facility]     11/7/23, 10/5/23, 9/27/23, 9/6/23, 8/31/23, 8/23/23, 8/16/23, 8/9/23, 8/2/23, 7/26/23, 7/13/23, 6/26/23 - RAD ONC OV with Jarred Alcantar MD at [facility]     10/3/23, 6/20/23 - ONC OV with Ferny Levi MD at [facility]     8/14/23, 7/31/23 - ONC OV with Patricia Chapman NP at [facility]    7/13/23, 6/29/23 - ONC OV with Steve Locke MD  at [facility]    9/14/23, 8/31/23 - ONC OV with Tucker Haines MD at [facility]     7/20/23 - Thoracic OV with Natalia Shafer APRN CNS at [facility]        HOSPITAL DISCHARGE SUMMARY/  ED VISITS Internal 8/29/23-8/30/23 - [facility] ED to Hospital Admission with Jaxon Rosenthal MD     8/24/23 - [facility] ED visit with Diana Steven MD     8/8/23 - [facility] ED visit with Tunde Galaviz MD     7/5/23-7/10/23 - [facility] Hospital Admission with Suhail Holguin MD    OPERATIVE REPORT Internal 7/5/23 - INSERTION, JEJUNOSTOMY TUBE, LAPAROSCOPY-ASSISTED - Judy Holguin MD at  OR     9/29/21 - RIGHT CAROTID ENDARTERECTOMY - Raymond Andersen MD at  OR    MEDICATION LIST Internal         ENDOSCOPY  Internal 12/14/23, 11/14/23, 10/18/23, 6/5/23    COLONOSCOPY Internal 6/16/16, 5/1/12   EUS Internal 7/3/23   STOOL TESTING Internal Occult Blood Stool -8/29/23(+)   PERTINENT LABS Internal 11/7/23 - CMP; CBC/diff    PATHOLOGY REPORTS (RELATED) Internal 10/18/23 - EGD bx  6/5/23 -  Esophageal mass bx    IMAGING (CT, MRI, EGD, MRCP, Small Bowel Follow Through/SBT, MR/CT Enterography) Internal CT CAP - 8/29/23    IR Jejunostomy tube change - 8/25/23    XR Abdomen - 8/8/23, 7/7/23    XR Chest - 7/16/23, 1/22/18  CT Head Neck - 9/29/21, 9/26/21, 11/29/19    MR Neck - 9/27/21

## 2023-12-30 NOTE — TELEPHONE ENCOUNTER
Central Prior Authorization Team  Phone: [phone]    PRIOR AUTHORIZATION DENIED    Medication: LORAZEPAM 0.5 MG PO TABS  Insurance Company: [employer] - Phone [phone] Fax [phone]  Denial Date: 9/7/2023  Denial Rational:         Appeal Information:         Patient Notified:       See CHF

## 2024-01-01 ENCOUNTER — APPOINTMENT (OUTPATIENT)
Dept: OCCUPATIONAL THERAPY | Facility: CLINIC | Age: 84
DRG: 374 | End: 2024-01-01
Payer: MEDICARE

## 2024-01-01 ENCOUNTER — NURSE TRIAGE (OUTPATIENT)
Dept: FAMILY MEDICINE | Facility: CLINIC | Age: 84
End: 2024-01-01
Payer: COMMERCIAL

## 2024-01-01 ENCOUNTER — VIRTUAL VISIT (OUTPATIENT)
Dept: PHARMACY | Facility: CLINIC | Age: 84
End: 2024-01-01
Payer: COMMERCIAL

## 2024-01-01 ENCOUNTER — OFFICE VISIT (OUTPATIENT)
Dept: SURGERY | Facility: CLINIC | Age: 84
End: 2024-01-01
Payer: COMMERCIAL

## 2024-01-01 ENCOUNTER — INFUSION THERAPY VISIT (OUTPATIENT)
Dept: INFUSION THERAPY | Facility: CLINIC | Age: 84
DRG: 374 | End: 2024-01-01
Attending: INTERNAL MEDICINE
Payer: MEDICARE

## 2024-01-01 ENCOUNTER — PATIENT OUTREACH (OUTPATIENT)
Dept: ONCOLOGY | Facility: CLINIC | Age: 84
End: 2024-01-01
Payer: COMMERCIAL

## 2024-01-01 ENCOUNTER — HOSPITAL ENCOUNTER (OUTPATIENT)
Facility: CLINIC | Age: 84
End: 2024-01-01
Attending: INTERNAL MEDICINE | Admitting: INTERNAL MEDICINE
Payer: MEDICARE

## 2024-01-01 ENCOUNTER — ONCOLOGY VISIT (OUTPATIENT)
Dept: ONCOLOGY | Facility: CLINIC | Age: 84
End: 2024-01-01
Attending: NURSE PRACTITIONER
Payer: MEDICARE

## 2024-01-01 ENCOUNTER — PATIENT OUTREACH (OUTPATIENT)
Dept: ONCOLOGY | Facility: CLINIC | Age: 84
End: 2024-01-01

## 2024-01-01 ENCOUNTER — PREP FOR PROCEDURE (OUTPATIENT)
Dept: GASTROENTEROLOGY | Facility: CLINIC | Age: 84
End: 2024-01-01
Payer: COMMERCIAL

## 2024-01-01 ENCOUNTER — TELEPHONE (OUTPATIENT)
Dept: NURSING | Facility: CLINIC | Age: 84
End: 2024-01-01
Payer: COMMERCIAL

## 2024-01-01 ENCOUNTER — HOSPITAL ENCOUNTER (OUTPATIENT)
Dept: ULTRASOUND IMAGING | Facility: CLINIC | Age: 84
Discharge: HOME | End: 2024-04-22
Attending: INTERNAL MEDICINE | Admitting: INTERNAL MEDICINE
Payer: MEDICARE

## 2024-01-01 ENCOUNTER — HOSPITAL ENCOUNTER (OUTPATIENT)
Dept: INTERVENTIONAL RADIOLOGY/VASCULAR | Facility: HOSPITAL | Age: 84
Discharge: HOME | End: 2024-05-08
Attending: INTERNAL MEDICINE | Admitting: INTERNAL MEDICINE
Payer: MEDICARE

## 2024-01-01 ENCOUNTER — MYC MEDICAL ADVICE (OUTPATIENT)
Dept: INTERVENTIONAL RADIOLOGY/VASCULAR | Facility: CLINIC | Age: 84
End: 2024-01-01

## 2024-01-01 ENCOUNTER — HOSPITAL ENCOUNTER (OUTPATIENT)
Dept: NUCLEAR MEDICINE | Facility: HOSPITAL | Age: 84
Discharge: HOME | End: 2024-03-15
Attending: INTERNAL MEDICINE
Payer: MEDICARE

## 2024-01-01 ENCOUNTER — HOSPITAL ENCOUNTER (EMERGENCY)
Facility: CLINIC | Age: 84
Discharge: HOME | End: 2024-04-03
Attending: EMERGENCY MEDICINE | Admitting: EMERGENCY MEDICINE
Payer: MEDICARE

## 2024-01-01 ENCOUNTER — HOSPITAL ENCOUNTER (OUTPATIENT)
Dept: ULTRASOUND IMAGING | Facility: CLINIC | Age: 84
Discharge: HOME | End: 2024-04-15
Attending: INTERNAL MEDICINE | Admitting: INTERNAL MEDICINE
Payer: MEDICARE

## 2024-01-01 ENCOUNTER — APPOINTMENT (OUTPATIENT)
Dept: ULTRASOUND IMAGING | Facility: CLINIC | Age: 84
DRG: 374 | End: 2024-01-01
Attending: INTERNAL MEDICINE
Payer: MEDICARE

## 2024-01-01 ENCOUNTER — HOSPITAL ENCOUNTER (OUTPATIENT)
Facility: CLINIC | Age: 84
Discharge: HOME | End: 2024-04-19
Attending: INTERNAL MEDICINE | Admitting: INTERNAL MEDICINE
Payer: MEDICARE

## 2024-01-01 ENCOUNTER — HOSPITAL ENCOUNTER (OUTPATIENT)
Dept: ULTRASOUND IMAGING | Facility: CLINIC | Age: 84
Discharge: HOME | End: 2024-05-06
Attending: INTERNAL MEDICINE | Admitting: INTERNAL MEDICINE
Payer: MEDICARE

## 2024-01-01 ENCOUNTER — TELEPHONE (OUTPATIENT)
Dept: GASTROENTEROLOGY | Facility: CLINIC | Age: 84
End: 2024-01-01
Payer: COMMERCIAL

## 2024-01-01 ENCOUNTER — ANESTHESIA EVENT (OUTPATIENT)
Dept: SURGERY | Facility: AMBULATORY SURGERY CENTER | Age: 84
End: 2024-01-01
Payer: MEDICARE

## 2024-01-01 ENCOUNTER — LAB (OUTPATIENT)
Dept: INFUSION THERAPY | Facility: CLINIC | Age: 84
End: 2024-01-01
Attending: INTERNAL MEDICINE
Payer: MEDICARE

## 2024-01-01 ENCOUNTER — LAB (OUTPATIENT)
Dept: INFUSION THERAPY | Facility: CLINIC | Age: 84
End: 2024-01-01
Attending: NURSE PRACTITIONER
Payer: MEDICARE

## 2024-01-01 ENCOUNTER — HOME INFUSION (PRE-WILLOW HOME INFUSION) (OUTPATIENT)
Dept: PHARMACY | Facility: CLINIC | Age: 84
End: 2024-01-01
Payer: COMMERCIAL

## 2024-01-01 ENCOUNTER — OFFICE VISIT (OUTPATIENT)
Dept: FAMILY MEDICINE | Facility: CLINIC | Age: 84
End: 2024-01-01
Payer: COMMERCIAL

## 2024-01-01 ENCOUNTER — VIRTUAL VISIT (OUTPATIENT)
Dept: PHARMACY | Facility: CLINIC | Age: 84
End: 2024-01-01
Attending: INTERNAL MEDICINE
Payer: COMMERCIAL

## 2024-01-01 ENCOUNTER — ONCOLOGY VISIT (OUTPATIENT)
Dept: SURGERY | Facility: CLINIC | Age: 84
End: 2024-01-01
Attending: THORACIC SURGERY (CARDIOTHORACIC VASCULAR SURGERY)
Payer: MEDICARE

## 2024-01-01 ENCOUNTER — ANESTHESIA (OUTPATIENT)
Dept: SURGERY | Facility: AMBULATORY SURGERY CENTER | Age: 84
End: 2024-01-01
Payer: MEDICARE

## 2024-01-01 ENCOUNTER — OFFICE VISIT (OUTPATIENT)
Dept: RADIATION THERAPY | Facility: OUTPATIENT CENTER | Age: 84
End: 2024-01-01
Payer: COMMERCIAL

## 2024-01-01 ENCOUNTER — HOSPITAL ENCOUNTER (OUTPATIENT)
Dept: INTERVENTIONAL RADIOLOGY/VASCULAR | Facility: HOSPITAL | Age: 84
Discharge: HOME | End: 2024-04-26
Attending: INTERNAL MEDICINE | Admitting: RADIOLOGY
Payer: MEDICARE

## 2024-01-01 ENCOUNTER — PRE VISIT (OUTPATIENT)
Dept: GASTROENTEROLOGY | Facility: CLINIC | Age: 84
End: 2024-01-01

## 2024-01-01 ENCOUNTER — LAB (OUTPATIENT)
Dept: LAB | Facility: CLINIC | Age: 84
End: 2024-01-01
Payer: COMMERCIAL

## 2024-01-01 ENCOUNTER — TELEPHONE (OUTPATIENT)
Dept: INTERVENTIONAL RADIOLOGY/VASCULAR | Facility: CLINIC | Age: 84
End: 2024-01-01

## 2024-01-01 ENCOUNTER — HOSPITAL ENCOUNTER (OUTPATIENT)
Dept: CT IMAGING | Facility: CLINIC | Age: 84
Discharge: HOME | End: 2024-02-25
Attending: INTERNAL MEDICINE | Admitting: INTERNAL MEDICINE
Payer: MEDICARE

## 2024-01-01 ENCOUNTER — TELEPHONE (OUTPATIENT)
Dept: FAMILY MEDICINE | Facility: CLINIC | Age: 84
End: 2024-01-01
Payer: COMMERCIAL

## 2024-01-01 ENCOUNTER — HOSPITAL ENCOUNTER (OUTPATIENT)
Facility: AMBULATORY SURGERY CENTER | Age: 84
Discharge: HOME | End: 2024-02-21
Attending: INTERNAL MEDICINE
Payer: MEDICARE

## 2024-01-01 ENCOUNTER — ANCILLARY PROCEDURE (OUTPATIENT)
Dept: GENERAL RADIOLOGY | Facility: CLINIC | Age: 84
End: 2024-01-01
Attending: FAMILY MEDICINE
Payer: COMMERCIAL

## 2024-01-01 ENCOUNTER — APPOINTMENT (OUTPATIENT)
Dept: CT IMAGING | Facility: CLINIC | Age: 84
DRG: 374 | End: 2024-01-01
Attending: FAMILY MEDICINE
Payer: MEDICARE

## 2024-01-01 ENCOUNTER — HOSPITAL ENCOUNTER (OUTPATIENT)
Dept: PET IMAGING | Facility: HOSPITAL | Age: 84
Discharge: HOME | End: 2024-03-15
Attending: INTERNAL MEDICINE
Payer: MEDICARE

## 2024-01-01 ENCOUNTER — ONCOLOGY VISIT (OUTPATIENT)
Dept: ONCOLOGY | Facility: CLINIC | Age: 84
End: 2024-01-01
Attending: INTERNAL MEDICINE
Payer: COMMERCIAL

## 2024-01-01 ENCOUNTER — LAB (OUTPATIENT)
Dept: LAB | Facility: CLINIC | Age: 84
End: 2024-01-01
Attending: INTERNAL MEDICINE
Payer: MEDICARE

## 2024-01-01 ENCOUNTER — DOCUMENTATION ONLY (OUTPATIENT)
Dept: PHARMACY | Facility: CLINIC | Age: 84
End: 2024-01-01
Payer: COMMERCIAL

## 2024-01-01 ENCOUNTER — HOSPITAL ENCOUNTER (INPATIENT)
Facility: CLINIC | Age: 84
LOS: 7 days | Discharge: HOSPICE | DRG: 374 | End: 2024-05-25
Attending: FAMILY MEDICINE | Admitting: FAMILY MEDICINE
Payer: MEDICARE

## 2024-01-01 ENCOUNTER — APPOINTMENT (OUTPATIENT)
Dept: PHYSICAL THERAPY | Facility: CLINIC | Age: 84
DRG: 374 | End: 2024-01-01
Payer: MEDICARE

## 2024-01-01 ENCOUNTER — OFFICE VISIT (OUTPATIENT)
Dept: GASTROENTEROLOGY | Facility: CLINIC | Age: 84
End: 2024-01-01
Payer: COMMERCIAL

## 2024-01-01 ENCOUNTER — APPOINTMENT (OUTPATIENT)
Dept: LAB | Facility: CLINIC | Age: 84
End: 2024-01-01
Payer: MEDICARE

## 2024-01-01 ENCOUNTER — ONCOLOGY VISIT (OUTPATIENT)
Dept: ONCOLOGY | Facility: CLINIC | Age: 84
End: 2024-01-01
Attending: INTERNAL MEDICINE
Payer: MEDICARE

## 2024-01-01 ENCOUNTER — TELEPHONE (OUTPATIENT)
Dept: FAMILY MEDICINE | Facility: CLINIC | Age: 84
End: 2024-01-01

## 2024-01-01 ENCOUNTER — APPOINTMENT (OUTPATIENT)
Dept: CT IMAGING | Facility: CLINIC | Age: 84
End: 2024-01-01
Attending: EMERGENCY MEDICINE
Payer: MEDICARE

## 2024-01-01 ENCOUNTER — PATIENT OUTREACH (OUTPATIENT)
Dept: GASTROENTEROLOGY | Facility: CLINIC | Age: 84
End: 2024-01-01
Payer: COMMERCIAL

## 2024-01-01 ENCOUNTER — OFFICE VISIT (OUTPATIENT)
Dept: PODIATRY | Facility: CLINIC | Age: 84
End: 2024-01-01
Attending: FAMILY MEDICINE
Payer: COMMERCIAL

## 2024-01-01 ENCOUNTER — HOSPITAL ENCOUNTER (OUTPATIENT)
Dept: CARDIOLOGY | Facility: HOSPITAL | Age: 84
Discharge: HOME | End: 2024-03-15
Attending: INTERNAL MEDICINE
Payer: MEDICARE

## 2024-01-01 ENCOUNTER — TELEPHONE (OUTPATIENT)
Dept: INTERVENTIONAL RADIOLOGY/VASCULAR | Facility: CLINIC | Age: 84
End: 2024-01-01
Payer: COMMERCIAL

## 2024-01-01 ENCOUNTER — CARE COORDINATION (OUTPATIENT)
Dept: GASTROENTEROLOGY | Facility: CLINIC | Age: 84
End: 2024-01-01
Payer: COMMERCIAL

## 2024-01-01 ENCOUNTER — APPOINTMENT (OUTPATIENT)
Dept: GENERAL RADIOLOGY | Facility: CLINIC | Age: 84
DRG: 374 | End: 2024-01-01
Attending: INTERNAL MEDICINE
Payer: MEDICARE

## 2024-01-01 ENCOUNTER — HOSPITAL ENCOUNTER (OUTPATIENT)
Dept: PET IMAGING | Facility: CLINIC | Age: 84
Discharge: HOME | End: 2024-01-04
Attending: INTERNAL MEDICINE
Payer: MEDICARE

## 2024-01-01 VITALS
DIASTOLIC BLOOD PRESSURE: 70 MMHG | BODY MASS INDEX: 29.45 KG/M2 | HEART RATE: 74 BPM | SYSTOLIC BLOOD PRESSURE: 136 MMHG | RESPIRATION RATE: 18 BRPM | WEIGHT: 150 LBS | TEMPERATURE: 97.2 F | HEIGHT: 60 IN | OXYGEN SATURATION: 95 %

## 2024-01-01 VITALS
RESPIRATION RATE: 14 BRPM | HEART RATE: 80 BPM | HEIGHT: 60 IN | WEIGHT: 175 LBS | DIASTOLIC BLOOD PRESSURE: 71 MMHG | TEMPERATURE: 96.8 F | OXYGEN SATURATION: 95 % | SYSTOLIC BLOOD PRESSURE: 124 MMHG | BODY MASS INDEX: 34.36 KG/M2

## 2024-01-01 VITALS
WEIGHT: 151 LBS | OXYGEN SATURATION: 95 % | TEMPERATURE: 97.5 F | BODY MASS INDEX: 29.64 KG/M2 | HEIGHT: 60 IN | RESPIRATION RATE: 16 BRPM | DIASTOLIC BLOOD PRESSURE: 71 MMHG | SYSTOLIC BLOOD PRESSURE: 142 MMHG | HEART RATE: 93 BPM

## 2024-01-01 VITALS — HEART RATE: 73 BPM

## 2024-01-01 VITALS
SYSTOLIC BLOOD PRESSURE: 186 MMHG | BODY MASS INDEX: 34.61 KG/M2 | WEIGHT: 177.2 LBS | OXYGEN SATURATION: 91 % | DIASTOLIC BLOOD PRESSURE: 88 MMHG | HEART RATE: 86 BPM | RESPIRATION RATE: 16 BRPM

## 2024-01-01 VITALS
TEMPERATURE: 97.4 F | DIASTOLIC BLOOD PRESSURE: 57 MMHG | SYSTOLIC BLOOD PRESSURE: 158 MMHG | BODY MASS INDEX: 33.85 KG/M2 | WEIGHT: 172.4 LBS | HEART RATE: 83 BPM | HEIGHT: 60 IN | OXYGEN SATURATION: 94 % | RESPIRATION RATE: 16 BRPM

## 2024-01-01 VITALS
HEIGHT: 60 IN | BODY MASS INDEX: 31.94 KG/M2 | WEIGHT: 162.7 LBS | HEART RATE: 100 BPM | DIASTOLIC BLOOD PRESSURE: 82 MMHG | TEMPERATURE: 98 F | RESPIRATION RATE: 18 BRPM | SYSTOLIC BLOOD PRESSURE: 166 MMHG | OXYGEN SATURATION: 94 %

## 2024-01-01 VITALS
TEMPERATURE: 98 F | SYSTOLIC BLOOD PRESSURE: 142 MMHG | BODY MASS INDEX: 33.59 KG/M2 | RESPIRATION RATE: 18 BRPM | OXYGEN SATURATION: 94 % | DIASTOLIC BLOOD PRESSURE: 84 MMHG | HEART RATE: 71 BPM | WEIGHT: 172 LBS

## 2024-01-01 VITALS — DIASTOLIC BLOOD PRESSURE: 57 MMHG | SYSTOLIC BLOOD PRESSURE: 117 MMHG | HEART RATE: 69 BPM | RESPIRATION RATE: 15 BRPM

## 2024-01-01 VITALS — DIASTOLIC BLOOD PRESSURE: 88 MMHG | HEART RATE: 89 BPM | SYSTOLIC BLOOD PRESSURE: 158 MMHG

## 2024-01-01 VITALS
BODY MASS INDEX: 30.47 KG/M2 | WEIGHT: 156 LBS | HEART RATE: 75 BPM | SYSTOLIC BLOOD PRESSURE: 130 MMHG | DIASTOLIC BLOOD PRESSURE: 63 MMHG | TEMPERATURE: 97.8 F

## 2024-01-01 VITALS
WEIGHT: 158.7 LBS | TEMPERATURE: 97.5 F | DIASTOLIC BLOOD PRESSURE: 62 MMHG | BODY MASS INDEX: 30.99 KG/M2 | HEART RATE: 72 BPM | OXYGEN SATURATION: 96 % | SYSTOLIC BLOOD PRESSURE: 156 MMHG

## 2024-01-01 VITALS
OXYGEN SATURATION: 94 % | BODY MASS INDEX: 33.38 KG/M2 | WEIGHT: 170 LBS | SYSTOLIC BLOOD PRESSURE: 146 MMHG | HEART RATE: 75 BPM | DIASTOLIC BLOOD PRESSURE: 88 MMHG | HEIGHT: 60 IN

## 2024-01-01 VITALS
DIASTOLIC BLOOD PRESSURE: 62 MMHG | WEIGHT: 153.4 LBS | TEMPERATURE: 97 F | HEIGHT: 60 IN | OXYGEN SATURATION: 96 % | SYSTOLIC BLOOD PRESSURE: 130 MMHG | BODY MASS INDEX: 30.12 KG/M2 | HEART RATE: 85 BPM

## 2024-01-01 VITALS
HEART RATE: 85 BPM | OXYGEN SATURATION: 95 % | RESPIRATION RATE: 12 BRPM | SYSTOLIC BLOOD PRESSURE: 139 MMHG | DIASTOLIC BLOOD PRESSURE: 76 MMHG | WEIGHT: 157 LBS | BODY MASS INDEX: 30.66 KG/M2

## 2024-01-01 VITALS — DIASTOLIC BLOOD PRESSURE: 67 MMHG | RESPIRATION RATE: 16 BRPM | HEART RATE: 82 BPM | SYSTOLIC BLOOD PRESSURE: 144 MMHG

## 2024-01-01 VITALS
SYSTOLIC BLOOD PRESSURE: 125 MMHG | RESPIRATION RATE: 16 BRPM | HEART RATE: 73 BPM | DIASTOLIC BLOOD PRESSURE: 76 MMHG | TEMPERATURE: 97.6 F

## 2024-01-01 VITALS
HEART RATE: 82 BPM | DIASTOLIC BLOOD PRESSURE: 86 MMHG | BODY MASS INDEX: 33.77 KG/M2 | WEIGHT: 172 LBS | HEIGHT: 60 IN | OXYGEN SATURATION: 98 % | SYSTOLIC BLOOD PRESSURE: 161 MMHG | TEMPERATURE: 97.3 F

## 2024-01-01 VITALS
SYSTOLIC BLOOD PRESSURE: 140 MMHG | TEMPERATURE: 98.6 F | RESPIRATION RATE: 10 BRPM | DIASTOLIC BLOOD PRESSURE: 64 MMHG | OXYGEN SATURATION: 96 % | HEART RATE: 73 BPM

## 2024-01-01 VITALS
WEIGHT: 174 LBS | TEMPERATURE: 97.8 F | OXYGEN SATURATION: 95 % | DIASTOLIC BLOOD PRESSURE: 83 MMHG | BODY MASS INDEX: 34.16 KG/M2 | HEART RATE: 70 BPM | SYSTOLIC BLOOD PRESSURE: 159 MMHG | RESPIRATION RATE: 12 BRPM | HEIGHT: 60 IN

## 2024-01-01 VITALS
SYSTOLIC BLOOD PRESSURE: 125 MMHG | OXYGEN SATURATION: 98 % | RESPIRATION RATE: 16 BRPM | HEART RATE: 60 BPM | DIASTOLIC BLOOD PRESSURE: 72 MMHG

## 2024-01-01 VITALS
OXYGEN SATURATION: 95 % | HEIGHT: 60 IN | BODY MASS INDEX: 33.77 KG/M2 | WEIGHT: 172 LBS | RESPIRATION RATE: 18 BRPM | SYSTOLIC BLOOD PRESSURE: 124 MMHG | DIASTOLIC BLOOD PRESSURE: 70 MMHG | TEMPERATURE: 97.3 F | HEART RATE: 78 BPM

## 2024-01-01 VITALS
TEMPERATURE: 97.8 F | SYSTOLIC BLOOD PRESSURE: 150 MMHG | DIASTOLIC BLOOD PRESSURE: 81 MMHG | HEART RATE: 85 BPM | BODY MASS INDEX: 30.39 KG/M2 | WEIGHT: 155.6 LBS

## 2024-01-01 VITALS
TEMPERATURE: 97.6 F | SYSTOLIC BLOOD PRESSURE: 119 MMHG | DIASTOLIC BLOOD PRESSURE: 63 MMHG | RESPIRATION RATE: 13 BRPM | OXYGEN SATURATION: 94 % | HEART RATE: 78 BPM

## 2024-01-01 VITALS
WEIGHT: 172.9 LBS | OXYGEN SATURATION: 98 % | BODY MASS INDEX: 33.77 KG/M2 | HEART RATE: 79 BPM | TEMPERATURE: 97.8 F | DIASTOLIC BLOOD PRESSURE: 83 MMHG | RESPIRATION RATE: 18 BRPM | SYSTOLIC BLOOD PRESSURE: 169 MMHG

## 2024-01-01 DIAGNOSIS — C15.5 MALIGNANT NEOPLASM OF LOWER THIRD OF ESOPHAGUS (H): Primary | ICD-10-CM

## 2024-01-01 DIAGNOSIS — I10 BENIGN ESSENTIAL HYPERTENSION: Chronic | ICD-10-CM

## 2024-01-01 DIAGNOSIS — C15.5 MALIGNANT NEOPLASM OF LOWER THIRD OF ESOPHAGUS (H): ICD-10-CM

## 2024-01-01 DIAGNOSIS — R13.19 ESOPHAGEAL DYSPHAGIA: ICD-10-CM

## 2024-01-01 DIAGNOSIS — I63.231 CEREBROVASCULAR ACCIDENT (CVA) DUE TO STENOSIS OF RIGHT CAROTID ARTERY (H): ICD-10-CM

## 2024-01-01 DIAGNOSIS — A04.8 H. PYLORI INFECTION: ICD-10-CM

## 2024-01-01 DIAGNOSIS — I10 BENIGN ESSENTIAL HYPERTENSION: ICD-10-CM

## 2024-01-01 DIAGNOSIS — R63.4 WEIGHT LOSS: ICD-10-CM

## 2024-01-01 DIAGNOSIS — I10 BENIGN ESSENTIAL HYPERTENSION: Primary | ICD-10-CM

## 2024-01-01 DIAGNOSIS — R63.30 FEEDING DIFFICULTIES: ICD-10-CM

## 2024-01-01 DIAGNOSIS — R18.0 MALIGNANT ASCITES (H): Primary | ICD-10-CM

## 2024-01-01 DIAGNOSIS — G89.18 POST PROCEDURE DISCOMFORT: Primary | ICD-10-CM

## 2024-01-01 DIAGNOSIS — R18.8 OTHER ASCITES: Primary | ICD-10-CM

## 2024-01-01 DIAGNOSIS — R18.0 MALIGNANT ASCITES (H): ICD-10-CM

## 2024-01-01 DIAGNOSIS — R13.10 DYSPHAGIA, UNSPECIFIED TYPE: ICD-10-CM

## 2024-01-01 DIAGNOSIS — A04.8 H. PYLORI INFECTION: Primary | ICD-10-CM

## 2024-01-01 DIAGNOSIS — R18.8 OTHER ASCITES: ICD-10-CM

## 2024-01-01 DIAGNOSIS — R19.7 NAUSEA VOMITING AND DIARRHEA: ICD-10-CM

## 2024-01-01 DIAGNOSIS — K22.2 ESOPHAGEAL STRICTURE: Primary | ICD-10-CM

## 2024-01-01 DIAGNOSIS — K59.00 CONSTIPATION, UNSPECIFIED CONSTIPATION TYPE: ICD-10-CM

## 2024-01-01 DIAGNOSIS — K22.2 RADIATION-INDUCED ESOPHAGEAL STRICTURE: Primary | ICD-10-CM

## 2024-01-01 DIAGNOSIS — E86.0 DEHYDRATION: ICD-10-CM

## 2024-01-01 DIAGNOSIS — C17.0 MALIGNANT NEOPLASM OF DUODENUM (H): ICD-10-CM

## 2024-01-01 DIAGNOSIS — I65.23 CAROTID STENOSIS, BILATERAL: ICD-10-CM

## 2024-01-01 DIAGNOSIS — Z93.1 G TUBE FEEDINGS (H): Primary | ICD-10-CM

## 2024-01-01 DIAGNOSIS — M79.674 PAIN OF RIGHT GREAT TOE: ICD-10-CM

## 2024-01-01 DIAGNOSIS — A04.8 HELICOBACTER PYLORI (H. PYLORI) INFECTION: Primary | ICD-10-CM

## 2024-01-01 DIAGNOSIS — C15.8 MALIGNANT NEOPLASM OF OVERLAPPING SITES OF ESOPHAGUS (H): ICD-10-CM

## 2024-01-01 DIAGNOSIS — E46 MALNUTRITION (H): ICD-10-CM

## 2024-01-01 DIAGNOSIS — R10.13 EPIGASTRIC PAIN: ICD-10-CM

## 2024-01-01 DIAGNOSIS — I25.10 CORONARY ARTERY DISEASE INVOLVING NATIVE CORONARY ARTERY OF NATIVE HEART WITHOUT ANGINA PECTORIS: ICD-10-CM

## 2024-01-01 DIAGNOSIS — K21.00 GASTROESOPHAGEAL REFLUX DISEASE WITH ESOPHAGITIS WITHOUT HEMORRHAGE: ICD-10-CM

## 2024-01-01 DIAGNOSIS — C15.9 PRIMARY ESOPHAGEAL ADENOCARCINOMA (H): ICD-10-CM

## 2024-01-01 DIAGNOSIS — R11.2 NAUSEA VOMITING AND DIARRHEA: ICD-10-CM

## 2024-01-01 DIAGNOSIS — K22.2 ESOPHAGEAL STRICTURE: ICD-10-CM

## 2024-01-01 DIAGNOSIS — Z98.890 STATUS POST CORONARY ANGIOGRAM: ICD-10-CM

## 2024-01-01 DIAGNOSIS — Z01.818 PREOP GENERAL PHYSICAL EXAM: Primary | ICD-10-CM

## 2024-01-01 DIAGNOSIS — I65.21 RIGHT CAROTID ARTERY OCCLUSION: ICD-10-CM

## 2024-01-01 DIAGNOSIS — C15.9 ESOPHAGEAL ADENOCARCINOMA (H): ICD-10-CM

## 2024-01-01 DIAGNOSIS — Z93.4 JEJUNOSTOMY TUBE IN SITU (H): Primary | ICD-10-CM

## 2024-01-01 DIAGNOSIS — E86.0 DEHYDRATION: Primary | ICD-10-CM

## 2024-01-01 DIAGNOSIS — G89.18 POST PROCEDURE DISCOMFORT: ICD-10-CM

## 2024-01-01 DIAGNOSIS — I25.9 IHD (ISCHEMIC HEART DISEASE): ICD-10-CM

## 2024-01-01 DIAGNOSIS — Z86.73 H/O ISCHEMIC RIGHT MCA STROKE: ICD-10-CM

## 2024-01-01 DIAGNOSIS — R13.14 PHARYNGOESOPHAGEAL DYSPHAGIA: ICD-10-CM

## 2024-01-01 DIAGNOSIS — M20.21 HALLUX RIGIDUS, RIGHT FOOT: Primary | ICD-10-CM

## 2024-01-01 DIAGNOSIS — N18.2 CHRONIC KIDNEY DISEASE, STAGE 2 (MILD): ICD-10-CM

## 2024-01-01 DIAGNOSIS — E78.2 MIXED HYPERLIPIDEMIA: ICD-10-CM

## 2024-01-01 DIAGNOSIS — G47.33 OSA (OBSTRUCTIVE SLEEP APNEA): Chronic | ICD-10-CM

## 2024-01-01 LAB
ABSOLUTE NEUTROPHILS, BODY FLUID: 10.1 /UL
ABSOLUTE NEUTROPHILS, BODY FLUID: 3899.7 /UL
ABSOLUTE NEUTROPHILS, BODY FLUID: 75 /UL
ACANTHOCYTES BLD QL SMEAR: NORMAL
ALBUMIN BODY FLUID SOURCE: NORMAL
ALBUMIN BODY FLUID SOURCE: NORMAL
ALBUMIN FLD-MCNC: 2.1 G/DL
ALBUMIN FLD-MCNC: 2.4 G/DL
ALBUMIN SERPL BCG-MCNC: 2 G/DL (ref 3.5–5.2)
ALBUMIN SERPL BCG-MCNC: 2 G/DL (ref 3.5–5.2)
ALBUMIN SERPL BCG-MCNC: 2.1 G/DL (ref 3.5–5.2)
ALBUMIN SERPL BCG-MCNC: 3.1 G/DL (ref 3.5–5.2)
ALBUMIN SERPL BCG-MCNC: 3.3 G/DL (ref 3.5–5.2)
ALBUMIN SERPL BCG-MCNC: 3.3 G/DL (ref 3.5–5.2)
ALBUMIN SERPL BCG-MCNC: 4 G/DL (ref 3.5–5.2)
ALBUMIN SERPL BCG-MCNC: 4 G/DL (ref 3.5–5.2)
ALP SERPL-CCNC: 114 U/L (ref 40–150)
ALP SERPL-CCNC: 127 U/L (ref 40–150)
ALP SERPL-CCNC: 235 U/L (ref 40–150)
ALP SERPL-CCNC: 254 U/L (ref 40–150)
ALP SERPL-CCNC: 270 U/L (ref 40–150)
ALP SERPL-CCNC: 460 U/L (ref 40–150)
ALT SERPL W P-5'-P-CCNC: 11 U/L (ref 0–70)
ALT SERPL W P-5'-P-CCNC: 16 U/L (ref 0–70)
ALT SERPL W P-5'-P-CCNC: 17 U/L (ref 0–70)
ALT SERPL W P-5'-P-CCNC: 17 U/L (ref 0–70)
ALT SERPL W P-5'-P-CCNC: 19 U/L (ref 0–70)
ALT SERPL W P-5'-P-CCNC: 20 U/L (ref 0–70)
ANION GAP SERPL CALCULATED.3IONS-SCNC: 10 MMOL/L (ref 7–15)
ANION GAP SERPL CALCULATED.3IONS-SCNC: 11 MMOL/L (ref 7–15)
ANION GAP SERPL CALCULATED.3IONS-SCNC: 13 MMOL/L (ref 7–15)
ANION GAP SERPL CALCULATED.3IONS-SCNC: 13 MMOL/L (ref 7–15)
ANION GAP SERPL CALCULATED.3IONS-SCNC: 14 MMOL/L (ref 7–15)
ANION GAP SERPL CALCULATED.3IONS-SCNC: 8 MMOL/L (ref 7–15)
ANION GAP SERPL CALCULATED.3IONS-SCNC: 9 MMOL/L (ref 7–15)
ANION GAP SERPL CALCULATED.3IONS-SCNC: 9 MMOL/L (ref 7–15)
APPEARANCE FLD: ABNORMAL
APPEARANCE FLD: ABNORMAL
APPEARANCE FLD: CLEAR
AST SERPL W P-5'-P-CCNC: 23 U/L (ref 0–45)
AST SERPL W P-5'-P-CCNC: 23 U/L (ref 0–45)
AST SERPL W P-5'-P-CCNC: 25 U/L (ref 0–45)
AST SERPL W P-5'-P-CCNC: 27 U/L (ref 0–45)
AST SERPL W P-5'-P-CCNC: 31 U/L (ref 0–45)
AST SERPL W P-5'-P-CCNC: 31 U/L (ref 0–45)
AUER BODIES BLD QL SMEAR: NORMAL
BACTERIA FLD CULT: ABNORMAL
BASO STIPL BLD QL SMEAR: NORMAL
BASOPHILS # BLD AUTO: 0 10E3/UL (ref 0–0.2)
BASOPHILS # BLD AUTO: 0.1 10E3/UL (ref 0–0.2)
BASOPHILS NFR BLD AUTO: 0 %
BASOPHILS NFR BLD AUTO: 1 %
BASOPHILS NFR BLD AUTO: 2 %
BILIRUB SERPL-MCNC: 0.4 MG/DL
BILIRUB SERPL-MCNC: 0.5 MG/DL
BILIRUB SERPL-MCNC: 0.5 MG/DL
BITE CELLS BLD QL SMEAR: NORMAL
BKR LAB AP ADDL TEST(S) ADDED: YES
BLISTER CELLS BLD QL SMEAR: NORMAL
BUN SERPL-MCNC: 12.1 MG/DL (ref 8–23)
BUN SERPL-MCNC: 15.1 MG/DL (ref 8–23)
BUN SERPL-MCNC: 16.4 MG/DL (ref 8–23)
BUN SERPL-MCNC: 17.3 MG/DL (ref 8–23)
BUN SERPL-MCNC: 17.3 MG/DL (ref 8–23)
BUN SERPL-MCNC: 17.4 MG/DL (ref 8–23)
BUN SERPL-MCNC: 17.8 MG/DL (ref 8–23)
BUN SERPL-MCNC: 20.3 MG/DL (ref 8–23)
BUN SERPL-MCNC: 21.8 MG/DL (ref 8–23)
BUN SERPL-MCNC: 22.5 MG/DL (ref 8–23)
BUN SERPL-MCNC: 7.4 MG/DL (ref 8–23)
BUN SERPL-MCNC: 9.6 MG/DL (ref 8–23)
BURR CELLS BLD QL SMEAR: NORMAL
CALCIUM SERPL-MCNC: 7.9 MG/DL (ref 8.8–10.2)
CALCIUM SERPL-MCNC: 8 MG/DL (ref 8.8–10.2)
CALCIUM SERPL-MCNC: 8.1 MG/DL (ref 8.8–10.2)
CALCIUM SERPL-MCNC: 8.1 MG/DL (ref 8.8–10.2)
CALCIUM SERPL-MCNC: 8.2 MG/DL (ref 8.8–10.2)
CALCIUM SERPL-MCNC: 8.2 MG/DL (ref 8.8–10.2)
CALCIUM SERPL-MCNC: 9 MG/DL (ref 8.8–10.2)
CALCIUM SERPL-MCNC: 9.3 MG/DL (ref 8.8–10.2)
CALCIUM SERPL-MCNC: 9.5 MG/DL (ref 8.8–10.2)
CELL COUNT BODY FLUID SOURCE: ABNORMAL
CELL COUNT BODY FLUID SOURCE: ABNORMAL
CELL COUNT BODY FLUID SOURCE: NORMAL
CHLORIDE SERPL-SCNC: 100 MMOL/L (ref 98–107)
CHLORIDE SERPL-SCNC: 100 MMOL/L (ref 98–107)
CHLORIDE SERPL-SCNC: 101 MMOL/L (ref 98–107)
CHLORIDE SERPL-SCNC: 102 MMOL/L (ref 98–107)
CHLORIDE SERPL-SCNC: 105 MMOL/L (ref 98–107)
CHLORIDE SERPL-SCNC: 105 MMOL/L (ref 98–107)
CHLORIDE SERPL-SCNC: 96 MMOL/L (ref 98–107)
CHLORIDE SERPL-SCNC: 97 MMOL/L (ref 98–107)
CHLORIDE SERPL-SCNC: 98 MMOL/L (ref 98–107)
CHLORIDE SERPL-SCNC: 99 MMOL/L (ref 98–107)
COLOR FLD: YELLOW
CREAT BLD-MCNC: 1.2 MG/DL (ref 0.7–1.3)
CREAT BLD-MCNC: 1.2 MG/DL (ref 0.7–1.3)
CREAT SERPL-MCNC: 0.61 MG/DL (ref 0.67–1.17)
CREAT SERPL-MCNC: 0.67 MG/DL (ref 0.67–1.17)
CREAT SERPL-MCNC: 0.68 MG/DL (ref 0.67–1.17)
CREAT SERPL-MCNC: 0.71 MG/DL (ref 0.67–1.17)
CREAT SERPL-MCNC: 0.74 MG/DL (ref 0.67–1.17)
CREAT SERPL-MCNC: 0.78 MG/DL (ref 0.67–1.17)
CREAT SERPL-MCNC: 1.07 MG/DL (ref 0.67–1.17)
CREAT SERPL-MCNC: 1.08 MG/DL (ref 0.67–1.17)
CREAT SERPL-MCNC: 1.1 MG/DL (ref 0.67–1.17)
CREAT SERPL-MCNC: 1.14 MG/DL (ref 0.67–1.17)
CREAT SERPL-MCNC: 1.15 MG/DL (ref 0.67–1.17)
CREAT SERPL-MCNC: 1.24 MG/DL (ref 0.67–1.17)
CV STRESS CURRENT BP HE: NORMAL
CV STRESS CURRENT HR HE: 59
CV STRESS CURRENT HR HE: 62
CV STRESS CURRENT HR HE: 76
CV STRESS CURRENT HR HE: 76
CV STRESS CURRENT HR HE: 79
CV STRESS CURRENT HR HE: 81
CV STRESS CURRENT HR HE: 81
CV STRESS CURRENT HR HE: 83
CV STRESS CURRENT HR HE: 83
CV STRESS CURRENT HR HE: 84
CV STRESS CURRENT HR HE: 85
CV STRESS CURRENT HR HE: 85
CV STRESS CURRENT HR HE: 86
CV STRESS CURRENT HR HE: 88
CV STRESS CURRENT HR HE: 89
CV STRESS CURRENT HR HE: 89
CV STRESS CURRENT HR HE: 90
CV STRESS CURRENT HR HE: 92
CV STRESS DEVIATION TIME HE: NORMAL
CV STRESS ECHO PERCENT HR HE: NORMAL
CV STRESS EXERCISE STAGE HE: NORMAL
CV STRESS FINAL RESTING BP HE: NORMAL
CV STRESS FINAL RESTING HR HE: 76
CV STRESS MAX HR HE: 106
CV STRESS MAX TREADMILL GRADE HE: 0
CV STRESS MAX TREADMILL SPEED HE: 0
CV STRESS PEAK DIA BP HE: NORMAL
CV STRESS PEAK SYS BP HE: NORMAL
CV STRESS PHASE HE: NORMAL
CV STRESS PROTOCOL HE: NORMAL
CV STRESS RESTING PT POSITION HE: NORMAL
CV STRESS ST DEVIATION AMOUNT HE: NORMAL
CV STRESS ST DEVIATION ELEVATION HE: NORMAL
CV STRESS ST EVELATION AMOUNT HE: NORMAL
CV STRESS TEST TYPE HE: NORMAL
CV STRESS TOTAL STAGE TIME MIN 1 HE: NORMAL
DACRYOCYTES BLD QL SMEAR: NORMAL
DEPRECATED HCO3 PLAS-SCNC: 21 MMOL/L (ref 22–29)
DEPRECATED HCO3 PLAS-SCNC: 22 MMOL/L (ref 22–29)
DEPRECATED HCO3 PLAS-SCNC: 23 MMOL/L (ref 22–29)
DEPRECATED HCO3 PLAS-SCNC: 23 MMOL/L (ref 22–29)
DEPRECATED HCO3 PLAS-SCNC: 24 MMOL/L (ref 22–29)
DEPRECATED HCO3 PLAS-SCNC: 24 MMOL/L (ref 22–29)
DEPRECATED HCO3 PLAS-SCNC: 25 MMOL/L (ref 22–29)
DEPRECATED HCO3 PLAS-SCNC: 25 MMOL/L (ref 22–29)
DEPRECATED HCO3 PLAS-SCNC: 26 MMOL/L (ref 22–29)
DEPRECATED HCO3 PLAS-SCNC: 27 MMOL/L (ref 22–29)
EGFRCR SERPLBLD CKD-EPI 2021: 58 ML/MIN/1.73M2
EGFRCR SERPLBLD CKD-EPI 2021: 60 ML/MIN/1.73M2
EGFRCR SERPLBLD CKD-EPI 2021: 60 ML/MIN/1.73M2
EGFRCR SERPLBLD CKD-EPI 2021: 64 ML/MIN/1.73M2
EGFRCR SERPLBLD CKD-EPI 2021: 64 ML/MIN/1.73M2
EGFRCR SERPLBLD CKD-EPI 2021: 67 ML/MIN/1.73M2
EGFRCR SERPLBLD CKD-EPI 2021: 69 ML/MIN/1.73M2
EGFRCR SERPLBLD CKD-EPI 2021: 69 ML/MIN/1.73M2
EGFRCR SERPLBLD CKD-EPI 2021: 89 ML/MIN/1.73M2
EGFRCR SERPLBLD CKD-EPI 2021: 90 ML/MIN/1.73M2
EGFRCR SERPLBLD CKD-EPI 2021: >90 ML/MIN/1.73M2
ELLIPTOCYTES BLD QL SMEAR: NORMAL
EOSINOPHIL # BLD AUTO: 0 10E3/UL (ref 0–0.7)
EOSINOPHIL # BLD AUTO: 0.1 10E3/UL (ref 0–0.7)
EOSINOPHIL # BLD AUTO: 0.2 10E3/UL (ref 0–0.7)
EOSINOPHIL # BLD AUTO: 0.2 10E3/UL (ref 0–0.7)
EOSINOPHIL # BLD AUTO: 0.3 10E3/UL (ref 0–0.7)
EOSINOPHIL # BLD AUTO: 0.3 10E3/UL (ref 0–0.7)
EOSINOPHIL # BLD AUTO: 0.4 10E3/UL (ref 0–0.7)
EOSINOPHIL NFR BLD AUTO: 0 %
EOSINOPHIL NFR BLD AUTO: 0 %
EOSINOPHIL NFR BLD AUTO: 1 %
EOSINOPHIL NFR BLD AUTO: 2 %
EOSINOPHIL NFR BLD AUTO: 2 %
EOSINOPHIL NFR BLD AUTO: 4 %
EOSINOPHIL NFR BLD AUTO: 4 %
EOSINOPHIL NFR BLD AUTO: 7 %
EOSINOPHIL NFR BLD AUTO: 7 %
ERYTHROCYTE [DISTWIDTH] IN BLOOD BY AUTOMATED COUNT: 12.4 % (ref 10–15)
ERYTHROCYTE [DISTWIDTH] IN BLOOD BY AUTOMATED COUNT: 13 % (ref 10–15)
ERYTHROCYTE [DISTWIDTH] IN BLOOD BY AUTOMATED COUNT: 13.2 % (ref 10–15)
ERYTHROCYTE [DISTWIDTH] IN BLOOD BY AUTOMATED COUNT: 13.3 % (ref 10–15)
ERYTHROCYTE [DISTWIDTH] IN BLOOD BY AUTOMATED COUNT: 13.4 % (ref 10–15)
ERYTHROCYTE [DISTWIDTH] IN BLOOD BY AUTOMATED COUNT: 13.6 % (ref 10–15)
ERYTHROCYTE [DISTWIDTH] IN BLOOD BY AUTOMATED COUNT: 13.6 % (ref 10–15)
FRAGMENTS BLD QL SMEAR: NORMAL
GIANT PLATELETS BLD QL SMEAR: NORMAL
GLUCOSE BLDC GLUCOMTR-MCNC: 137 MG/DL (ref 70–99)
GLUCOSE BLDC GLUCOMTR-MCNC: 148 MG/DL (ref 70–99)
GLUCOSE BLDC GLUCOMTR-MCNC: 89 MG/DL (ref 70–99)
GLUCOSE SERPL-MCNC: 101 MG/DL (ref 70–99)
GLUCOSE SERPL-MCNC: 104 MG/DL (ref 70–99)
GLUCOSE SERPL-MCNC: 108 MG/DL (ref 70–99)
GLUCOSE SERPL-MCNC: 109 MG/DL (ref 70–99)
GLUCOSE SERPL-MCNC: 109 MG/DL (ref 70–99)
GLUCOSE SERPL-MCNC: 112 MG/DL (ref 70–99)
GLUCOSE SERPL-MCNC: 112 MG/DL (ref 70–99)
GLUCOSE SERPL-MCNC: 125 MG/DL (ref 70–99)
GLUCOSE SERPL-MCNC: 130 MG/DL (ref 70–99)
GLUCOSE SERPL-MCNC: 171 MG/DL (ref 70–99)
GLUCOSE SERPL-MCNC: 196 MG/DL (ref 70–99)
GLUCOSE SERPL-MCNC: 197 MG/DL (ref 70–99)
GRAM STAIN RESULT: ABNORMAL
GRAM STAIN RESULT: ABNORMAL
H PYLORI AG STL QL IA: NEGATIVE
HCT VFR BLD AUTO: 36.9 % (ref 40–53)
HCT VFR BLD AUTO: 37.7 % (ref 40–53)
HCT VFR BLD AUTO: 37.9 % (ref 40–53)
HCT VFR BLD AUTO: 38.4 % (ref 40–53)
HCT VFR BLD AUTO: 38.5 % (ref 40–53)
HCT VFR BLD AUTO: 38.6 % (ref 40–53)
HCT VFR BLD AUTO: 38.8 % (ref 40–53)
HCT VFR BLD AUTO: 41.6 % (ref 40–53)
HCT VFR BLD AUTO: 41.9 % (ref 40–53)
HCT VFR BLD AUTO: 43 % (ref 40–53)
HCT VFR BLD AUTO: 44.2 % (ref 40–53)
HCT VFR BLD AUTO: 44.5 % (ref 40–53)
HCT VFR BLD AUTO: 45.2 % (ref 40–53)
HGB BLD-MCNC: 12.5 G/DL (ref 13.3–17.7)
HGB BLD-MCNC: 13.1 G/DL (ref 13.3–17.7)
HGB BLD-MCNC: 13.2 G/DL (ref 13.3–17.7)
HGB BLD-MCNC: 13.2 G/DL (ref 13.3–17.7)
HGB BLD-MCNC: 13.3 G/DL (ref 13.3–17.7)
HGB BLD-MCNC: 13.9 G/DL (ref 13.3–17.7)
HGB BLD-MCNC: 14.2 G/DL (ref 13.3–17.7)
HGB BLD-MCNC: 14.3 G/DL (ref 13.3–17.7)
HGB BLD-MCNC: 14.6 G/DL (ref 13.3–17.7)
HGB BLD-MCNC: 15.1 G/DL (ref 13.3–17.7)
HGB BLD-MCNC: 15.2 G/DL (ref 13.3–17.7)
HGB C CRYSTALS: NORMAL
HOLD SPECIMEN: NORMAL
HOWELL-JOLLY BOD BLD QL SMEAR: NORMAL
IMM GRANULOCYTES # BLD: 0 10E3/UL
IMM GRANULOCYTES # BLD: 0.1 10E3/UL
IMM GRANULOCYTES NFR BLD: 0 %
IMM GRANULOCYTES NFR BLD: 1 %
INR PPP: 1.02 (ref 0.85–1.15)
INTERPRETATION: NORMAL
INTERPRETATION: NORMAL
LAB DIRECTOR COMMENTS: NORMAL
LAB DIRECTOR DISCLAIMER: NORMAL
LAB DIRECTOR INTERPRETATION: NORMAL
LAB DIRECTOR METHODOLOGY: NORMAL
LAB DIRECTOR RESULTS: NORMAL
LD BODY BODY FLUID SOURCE: NORMAL
LDH FLD L TO P-CCNC: 169 U/L
LOCATION OF TASK: NORMAL
LYMPHOCYTES # BLD AUTO: 0.2 10E3/UL (ref 0.8–5.3)
LYMPHOCYTES # BLD AUTO: 0.3 10E3/UL (ref 0.8–5.3)
LYMPHOCYTES # BLD AUTO: 0.4 10E3/UL (ref 0.8–5.3)
LYMPHOCYTES # BLD AUTO: 0.6 10E3/UL (ref 0.8–5.3)
LYMPHOCYTES # BLD AUTO: 0.7 10E3/UL (ref 0.8–5.3)
LYMPHOCYTES # BLD AUTO: 1 10E3/UL (ref 0.8–5.3)
LYMPHOCYTES # BLD AUTO: 1 10E3/UL (ref 0–5.3)
LYMPHOCYTES NFR BLD AUTO: 10 %
LYMPHOCYTES NFR BLD AUTO: 15 %
LYMPHOCYTES NFR BLD AUTO: 16 %
LYMPHOCYTES NFR BLD AUTO: 17 %
LYMPHOCYTES NFR BLD AUTO: 22 %
LYMPHOCYTES NFR BLD AUTO: 3 %
LYMPHOCYTES NFR BLD AUTO: 4 %
LYMPHOCYTES NFR BLD AUTO: 6 %
LYMPHOCYTES NFR BLD AUTO: 8 %
LYMPHOCYTES NFR BLD AUTO: 9 %
LYMPHOCYTES NFR BLD AUTO: 9 %
LYMPHOCYTES NFR FLD MANUAL: 15 %
LYMPHOCYTES NFR FLD MANUAL: 4 %
LYMPHOCYTES NFR FLD MANUAL: 78 %
MCH RBC QN AUTO: 29.3 PG (ref 26.5–33)
MCH RBC QN AUTO: 29.4 PG (ref 26.5–33)
MCH RBC QN AUTO: 29.5 PG (ref 26.5–33)
MCH RBC QN AUTO: 29.6 PG (ref 26.5–33)
MCH RBC QN AUTO: 29.6 PG (ref 26.5–33)
MCH RBC QN AUTO: 29.7 PG (ref 26.5–33)
MCH RBC QN AUTO: 29.8 PG (ref 26.5–33)
MCH RBC QN AUTO: 30 PG (ref 26.5–33)
MCH RBC QN AUTO: 30 PG (ref 26.5–33)
MCHC RBC AUTO-ENTMCNC: 33 G/DL (ref 31.5–36.5)
MCHC RBC AUTO-ENTMCNC: 33 G/DL (ref 31.5–36.5)
MCHC RBC AUTO-ENTMCNC: 33.4 G/DL (ref 31.5–36.5)
MCHC RBC AUTO-ENTMCNC: 33.6 G/DL (ref 31.5–36.5)
MCHC RBC AUTO-ENTMCNC: 33.9 G/DL (ref 31.5–36.5)
MCHC RBC AUTO-ENTMCNC: 34 G/DL (ref 31.5–36.5)
MCHC RBC AUTO-ENTMCNC: 34.1 G/DL (ref 31.5–36.5)
MCHC RBC AUTO-ENTMCNC: 34.3 G/DL (ref 31.5–36.5)
MCHC RBC AUTO-ENTMCNC: 34.6 G/DL (ref 31.5–36.5)
MCHC RBC AUTO-ENTMCNC: 34.6 G/DL (ref 31.5–36.5)
MCHC RBC AUTO-ENTMCNC: 34.7 G/DL (ref 31.5–36.5)
MCV RBC AUTO: 85 FL (ref 78–100)
MCV RBC AUTO: 86 FL (ref 78–100)
MCV RBC AUTO: 86 FL (ref 78–100)
MCV RBC AUTO: 87 FL (ref 78–100)
MCV RBC AUTO: 88 FL (ref 78–100)
MCV RBC AUTO: 88 FL (ref 78–100)
MCV RBC AUTO: 89 FL (ref 78–100)
MCV RBC AUTO: 89 FL (ref 78–100)
MCV RBC AUTO: 90 FL (ref 78–100)
MONOCYTES # BLD AUTO: 0 10E3/UL (ref 0–1.3)
MONOCYTES # BLD AUTO: 0.1 10E3/UL (ref 0–1.3)
MONOCYTES # BLD AUTO: 0.4 10E3/UL (ref 0–1.3)
MONOCYTES # BLD AUTO: 0.6 10E3/UL (ref 0–1.3)
MONOCYTES # BLD AUTO: 0.7 10E3/UL (ref 0–1.3)
MONOCYTES NFR BLD AUTO: 11 %
MONOCYTES NFR BLD AUTO: 11 %
MONOCYTES NFR BLD AUTO: 18 %
MONOCYTES NFR BLD AUTO: 19 %
MONOCYTES NFR BLD AUTO: 2 %
MONOCYTES NFR BLD AUTO: 3 %
MONOCYTES NFR BLD AUTO: 4 %
MONOCYTES NFR BLD AUTO: 5 %
MONOCYTES NFR BLD AUTO: 9 %
MONOS+MACROS NFR FLD MANUAL: 10 %
MONOS+MACROS NFR FLD MANUAL: 15 %
MONOS+MACROS NFR FLD MANUAL: 6 %
NEUTROPHILS # BLD AUTO: 0.6 10E3/UL (ref 1.6–8.3)
NEUTROPHILS # BLD AUTO: 1 10E3/UL (ref 1.6–8.3)
NEUTROPHILS # BLD AUTO: 2.6 10E3/UL (ref 1.6–8.3)
NEUTROPHILS # BLD AUTO: 2.6 10E3/UL (ref 1.6–8.3)
NEUTROPHILS # BLD AUTO: 2.8 10E3/UL (ref 1.6–8.3)
NEUTROPHILS # BLD AUTO: 3.2 10E3/UL (ref 1.6–8.3)
NEUTROPHILS # BLD AUTO: 4 10E3/UL (ref 1.6–8.3)
NEUTROPHILS # BLD AUTO: 4.4 10E3/UL (ref 1.6–8.3)
NEUTROPHILS # BLD AUTO: 5.5 10E3/UL (ref 1.6–8.3)
NEUTROPHILS # BLD AUTO: 5.6 10E3/UL (ref 1.6–8.3)
NEUTROPHILS # BLD AUTO: 6.2 10E3/UL (ref 1.6–8.3)
NEUTROPHILS NFR BLD AUTO: 64 %
NEUTROPHILS NFR BLD AUTO: 71 %
NEUTROPHILS NFR BLD AUTO: 73 %
NEUTROPHILS NFR BLD AUTO: 79 %
NEUTROPHILS NFR BLD AUTO: 80 %
NEUTROPHILS NFR BLD AUTO: 85 %
NEUTROPHILS NFR BLD AUTO: 86 %
NEUTROPHILS NFR BLD AUTO: 90 %
NEUTS BAND NFR FLD MANUAL: 7 %
NEUTS BAND NFR FLD MANUAL: 75 %
NEUTS BAND NFR FLD MANUAL: 90 %
NEUTS HYPERSEG BLD QL SMEAR: NORMAL
NRBC # BLD AUTO: 0 10E3/UL
NRBC BLD AUTO-RTO: 0 /100
NUC STRESS EJECTION FRACTION: 70 %
PATH REPORT.ADDENDUM SPEC: NORMAL
PATH REPORT.COMMENTS IMP SPEC: ABNORMAL
PATH REPORT.COMMENTS IMP SPEC: ABNORMAL
PATH REPORT.COMMENTS IMP SPEC: NORMAL
PATH REPORT.COMMENTS IMP SPEC: YES
PATH REPORT.FINAL DX SPEC: ABNORMAL
PATH REPORT.FINAL DX SPEC: NORMAL
PATH REPORT.FINAL DX SPEC: NORMAL
PATH REPORT.GROSS SPEC: ABNORMAL
PATH REPORT.GROSS SPEC: NORMAL
PATH REPORT.GROSS SPEC: NORMAL
PATH REPORT.MICROSCOPIC SPEC OTHER STN: NORMAL
PATH REPORT.RELEVANT HX SPEC: ABNORMAL
PATH REPORT.RELEVANT HX SPEC: NORMAL
PATH REPORT.RELEVANT HX SPEC: NORMAL
PATH REV: NORMAL
PHOSPHATE SERPL-MCNC: 1.5 MG/DL (ref 2.5–4.5)
PHOSPHATE SERPL-MCNC: 2 MG/DL (ref 2.5–4.5)
PHOSPHATE SERPL-MCNC: 3 MG/DL (ref 2.5–4.5)
PHOSPHATE SERPL-MCNC: 3 MG/DL (ref 2.5–4.5)
PHOTO IMAGE: NORMAL
PHOTO IMAGE: NORMAL
PLAT MORPH BLD: NORMAL
PLATELET # BLD AUTO: 106 10E3/UL (ref 150–450)
PLATELET # BLD AUTO: 113 10E3/UL (ref 150–450)
PLATELET # BLD AUTO: 126 10E3/UL (ref 150–450)
PLATELET # BLD AUTO: 148 10E3/UL (ref 150–450)
PLATELET # BLD AUTO: 154 10E3/UL (ref 150–450)
PLATELET # BLD AUTO: 155 10E3/UL (ref 150–450)
PLATELET # BLD AUTO: 156 10E3/UL (ref 150–450)
PLATELET # BLD AUTO: 161 10E3/UL (ref 150–450)
PLATELET # BLD AUTO: 163 10E3/UL (ref 150–450)
PLATELET # BLD AUTO: 166 10E3/UL (ref 150–450)
PLATELET # BLD AUTO: 167 10E3/UL (ref 150–450)
PLATELET # BLD AUTO: 167 10E3/UL (ref 150–450)
PLATELET # BLD AUTO: 176 10E3/UL (ref 150–450)
POLYCHROMASIA BLD QL SMEAR: NORMAL
POTASSIUM SERPL-SCNC: 3.6 MMOL/L (ref 3.4–5.3)
POTASSIUM SERPL-SCNC: 3.6 MMOL/L (ref 3.4–5.3)
POTASSIUM SERPL-SCNC: 4.1 MMOL/L (ref 3.4–5.3)
POTASSIUM SERPL-SCNC: 4.3 MMOL/L (ref 3.4–5.3)
POTASSIUM SERPL-SCNC: 4.4 MMOL/L (ref 3.4–5.3)
POTASSIUM SERPL-SCNC: 4.5 MMOL/L (ref 3.4–5.3)
POTASSIUM SERPL-SCNC: 4.5 MMOL/L (ref 3.4–5.3)
POTASSIUM SERPL-SCNC: 4.7 MMOL/L (ref 3.4–5.3)
POTASSIUM SERPL-SCNC: 4.7 MMOL/L (ref 3.4–5.3)
POTASSIUM SERPL-SCNC: 5.2 MMOL/L (ref 3.4–5.3)
PROT FLD-MCNC: 3.4 G/DL
PROT FLD-MCNC: 3.7 G/DL
PROT FLD-MCNC: 3.7 G/DL
PROT SERPL-MCNC: 5.1 G/DL (ref 6.4–8.3)
PROT SERPL-MCNC: 6.2 G/DL (ref 6.4–8.3)
PROT SERPL-MCNC: 6.3 G/DL (ref 6.4–8.3)
PROT SERPL-MCNC: 6.4 G/DL (ref 6.4–8.3)
PROT SERPL-MCNC: 7 G/DL (ref 6.4–8.3)
PROT SERPL-MCNC: 7.4 G/DL (ref 6.4–8.3)
PROTEIN BODY FLUID SOURCE: NORMAL
RATE PRESSURE PRODUCT: NORMAL
RBC # BLD AUTO: 4.26 10E6/UL (ref 4.4–5.9)
RBC # BLD AUTO: 4.37 10E6/UL (ref 4.4–5.9)
RBC # BLD AUTO: 4.44 10E6/UL (ref 4.4–5.9)
RBC # BLD AUTO: 4.44 10E6/UL (ref 4.4–5.9)
RBC # BLD AUTO: 4.46 10E6/UL (ref 4.4–5.9)
RBC # BLD AUTO: 4.48 10E6/UL (ref 4.4–5.9)
RBC # BLD AUTO: 4.48 10E6/UL (ref 4.4–5.9)
RBC # BLD AUTO: 4.66 10E6/UL (ref 4.4–5.9)
RBC # BLD AUTO: 4.82 10E6/UL (ref 4.4–5.9)
RBC # BLD AUTO: 4.83 10E6/UL (ref 4.4–5.9)
RBC # BLD AUTO: 4.93 10E6/UL (ref 4.4–5.9)
RBC # BLD AUTO: 5.04 10E6/UL (ref 4.4–5.9)
RBC # BLD AUTO: 5.12 10E6/UL (ref 4.4–5.9)
RBC AGGLUT BLD QL: NORMAL
RBC MORPH BLD: NORMAL
ROULEAUX BLD QL SMEAR: NORMAL
SICKLE CELLS BLD QL SMEAR: NORMAL
SIGNIFICANT RESULTS: NORMAL
SMUDGE CELLS BLD QL SMEAR: NORMAL
SODIUM SERPL-SCNC: 129 MMOL/L (ref 135–145)
SODIUM SERPL-SCNC: 129 MMOL/L (ref 135–145)
SODIUM SERPL-SCNC: 130 MMOL/L (ref 135–145)
SODIUM SERPL-SCNC: 131 MMOL/L (ref 135–145)
SODIUM SERPL-SCNC: 132 MMOL/L (ref 135–145)
SODIUM SERPL-SCNC: 132 MMOL/L (ref 135–145)
SODIUM SERPL-SCNC: 134 MMOL/L (ref 135–145)
SODIUM SERPL-SCNC: 134 MMOL/L (ref 135–145)
SODIUM SERPL-SCNC: 137 MMOL/L (ref 135–145)
SODIUM SERPL-SCNC: 137 MMOL/L (ref 135–145)
SODIUM SERPL-SCNC: 140 MMOL/L (ref 135–145)
SODIUM SERPL-SCNC: 141 MMOL/L (ref 135–145)
SPECIMEN DESCRIPTION: NORMAL
SPECIMEN DESCRIPTION: NORMAL
SPHEROCYTES BLD QL SMEAR: NORMAL
STOMATOCYTES BLD QL SMEAR: NORMAL
STRESS ECHO BASELINE DIASTOLIC HE: 88
STRESS ECHO BASELINE HR: 71
STRESS ECHO BASELINE SYSTOLIC BP: 172
STRESS ECHO CALCULATED PERCENT HR: 77 %
STRESS ECHO LAST STRESS DIASTOLIC BP: 72
STRESS ECHO LAST STRESS HR: 89
STRESS ECHO LAST STRESS SYSTOLIC BP: 150
STRESS ECHO TARGET HR: 138
TARGETS BLD QL SMEAR: NORMAL
TEST DETAILS, MDL: NORMAL
TOXIC GRANULES BLD QL SMEAR: NORMAL
TSH SERPL DL<=0.005 MIU/L-ACNC: 3.95 UIU/ML (ref 0.3–4.2)
TSH SERPL DL<=0.005 MIU/L-ACNC: 4.03 UIU/ML (ref 0.3–4.2)
UPPER GI ENDOSCOPY: NORMAL
UPPER GI ENDOSCOPY: NORMAL
VARIANT LYMPHS BLD QL SMEAR: NORMAL
WBC # BLD AUTO: 0.8 10E3/UL (ref 4–11)
WBC # BLD AUTO: 1.3 10E3/UL (ref 4–11)
WBC # BLD AUTO: 1.3 10E3/UL (ref 4–11)
WBC # BLD AUTO: 3.5 10E3/UL (ref 4–11)
WBC # BLD AUTO: 3.5 10E3/UL (ref 4–11)
WBC # BLD AUTO: 3.6 10E3/UL (ref 4–11)
WBC # BLD AUTO: 4 10E3/UL (ref 4–11)
WBC # BLD AUTO: 5.7 10E3/UL (ref 4–11)
WBC # BLD AUTO: 6.2 10E3/UL (ref 4–11)
WBC # BLD AUTO: 6.3 10E3/UL (ref 4–11)
WBC # BLD AUTO: 6.4 10E3/UL (ref 4–11)
WBC # BLD AUTO: 6.4 10E3/UL (ref 4–11)
WBC # BLD AUTO: 7.7 10E3/UL (ref 4–11)
WBC # FLD AUTO: 144 /UL
WBC # FLD AUTO: 4333 /UL
WBC # FLD AUTO: ABNORMAL 10*3/UL

## 2024-01-01 PROCEDURE — 343N000001 HC RX 343: Performed by: INTERNAL MEDICINE

## 2024-01-01 PROCEDURE — 74177 CT ABD & PELVIS W/CONTRAST: CPT | Mod: MG

## 2024-01-01 PROCEDURE — 36591 DRAW BLOOD OFF VENOUS DEVICE: CPT | Performed by: INTERNAL MEDICINE

## 2024-01-01 PROCEDURE — G0463 HOSPITAL OUTPT CLINIC VISIT: HCPCS | Performed by: THORACIC SURGERY (CARDIOTHORACIC VASCULAR SURGERY)

## 2024-01-01 PROCEDURE — 82565 ASSAY OF CREATININE: CPT

## 2024-01-01 PROCEDURE — G0500 MOD SEDAT ENDO SERVICE >5YRS: HCPCS | Performed by: INTERNAL MEDICINE

## 2024-01-01 PROCEDURE — 96367 TX/PROPH/DG ADDL SEQ IV INF: CPT

## 2024-01-01 PROCEDURE — 99203 OFFICE O/P NEW LOW 30 MIN: CPT | Performed by: STUDENT IN AN ORGANIZED HEALTH CARE EDUCATION/TRAINING PROGRAM

## 2024-01-01 PROCEDURE — 84443 ASSAY THYROID STIM HORMONE: CPT | Performed by: INTERNAL MEDICINE

## 2024-01-01 PROCEDURE — 250N000013 HC RX MED GY IP 250 OP 250 PS 637: Performed by: STUDENT IN AN ORGANIZED HEALTH CARE EDUCATION/TRAINING PROGRAM

## 2024-01-01 PROCEDURE — 49083 ABD PARACENTESIS W/IMAGING: CPT

## 2024-01-01 PROCEDURE — 88360 TUMOR IMMUNOHISTOCHEM/MANUAL: CPT | Mod: 26 | Performed by: PATHOLOGY

## 2024-01-01 PROCEDURE — G0452 MOLECULAR PATHOLOGY INTERPR: HCPCS | Mod: 26 | Performed by: PATHOLOGY

## 2024-01-01 PROCEDURE — 89050 BODY FLUID CELL COUNT: CPT | Performed by: FAMILY MEDICINE

## 2024-01-01 PROCEDURE — 36415 COLL VENOUS BLD VENIPUNCTURE: CPT | Performed by: INTERNAL MEDICINE

## 2024-01-01 PROCEDURE — 71260 CT THORAX DX C+: CPT | Mod: MG

## 2024-01-01 PROCEDURE — 250N000011 HC RX IP 250 OP 636: Performed by: FAMILY MEDICINE

## 2024-01-01 PROCEDURE — 250N000013 HC RX MED GY IP 250 OP 250 PS 637: Performed by: INTERNAL MEDICINE

## 2024-01-01 PROCEDURE — 99214 OFFICE O/P EST MOD 30 MIN: CPT | Performed by: FAMILY MEDICINE

## 2024-01-01 PROCEDURE — 250N000011 HC RX IP 250 OP 636: Performed by: INTERNAL MEDICINE

## 2024-01-01 PROCEDURE — 96417 CHEMO IV INFUS EACH ADDL SEQ: CPT

## 2024-01-01 PROCEDURE — 99285 EMERGENCY DEPT VISIT HI MDM: CPT | Mod: 25 | Performed by: EMERGENCY MEDICINE

## 2024-01-01 PROCEDURE — 272N000706 US PARACENTESIS WITHOUT ALBUMIN

## 2024-01-01 PROCEDURE — 87338 HPYLORI STOOL AG IA: CPT

## 2024-01-01 PROCEDURE — 84100 ASSAY OF PHOSPHORUS: CPT | Performed by: STUDENT IN AN ORGANIZED HEALTH CARE EDUCATION/TRAINING PROGRAM

## 2024-01-01 PROCEDURE — 49083 ABD PARACENTESIS W/IMAGING: CPT | Performed by: EMERGENCY MEDICINE

## 2024-01-01 PROCEDURE — 85025 COMPLETE CBC W/AUTO DIFF WBC: CPT

## 2024-01-01 PROCEDURE — 250N000011 HC RX IP 250 OP 636: Performed by: NURSE PRACTITIONER

## 2024-01-01 PROCEDURE — 96415 CHEMO IV INFUSION ADDL HR: CPT

## 2024-01-01 PROCEDURE — G0463 HOSPITAL OUTPT CLINIC VISIT: HCPCS | Mod: 25 | Performed by: INTERNAL MEDICINE

## 2024-01-01 PROCEDURE — 99232 SBSQ HOSP IP/OBS MODERATE 35: CPT | Performed by: STUDENT IN AN ORGANIZED HEALTH CARE EDUCATION/TRAINING PROGRAM

## 2024-01-01 PROCEDURE — 99233 SBSQ HOSP IP/OBS HIGH 50: CPT | Performed by: NURSE PRACTITIONER

## 2024-01-01 PROCEDURE — 88341 IMHCHEM/IMCYTCHM EA ADD ANTB: CPT | Mod: 26 | Performed by: PATHOLOGY

## 2024-01-01 PROCEDURE — 250N000013 HC RX MED GY IP 250 OP 250 PS 637: Performed by: NURSE PRACTITIONER

## 2024-01-01 PROCEDURE — G1010 CDSM STANSON: HCPCS

## 2024-01-01 PROCEDURE — 120N000001 HC R&B MED SURG/OB

## 2024-01-01 PROCEDURE — 99214 OFFICE O/P EST MOD 30 MIN: CPT | Performed by: INTERNAL MEDICINE

## 2024-01-01 PROCEDURE — 84157 ASSAY OF PROTEIN OTHER: CPT | Performed by: FAMILY MEDICINE

## 2024-01-01 PROCEDURE — 99215 OFFICE O/P EST HI 40 MIN: CPT | Performed by: INTERNAL MEDICINE

## 2024-01-01 PROCEDURE — 258N000003 HC RX IP 258 OP 636: Performed by: FAMILY MEDICINE

## 2024-01-01 PROCEDURE — 88342 IMHCHEM/IMCYTCHM 1ST ANTB: CPT | Mod: TC | Performed by: INTERNAL MEDICINE

## 2024-01-01 PROCEDURE — 89050 BODY FLUID CELL COUNT: CPT | Performed by: INTERNAL MEDICINE

## 2024-01-01 PROCEDURE — 78816 PET IMAGE W/CT FULL BODY: CPT | Mod: MA,PS

## 2024-01-01 PROCEDURE — 250N000013 HC RX MED GY IP 250 OP 250 PS 637: Performed by: FAMILY MEDICINE

## 2024-01-01 PROCEDURE — 85025 COMPLETE CBC W/AUTO DIFF WBC: CPT | Performed by: FAMILY MEDICINE

## 2024-01-01 PROCEDURE — 82040 ASSAY OF SERUM ALBUMIN: CPT | Performed by: NURSE PRACTITIONER

## 2024-01-01 PROCEDURE — 96361 HYDRATE IV INFUSION ADD-ON: CPT | Performed by: FAMILY MEDICINE

## 2024-01-01 PROCEDURE — 36591 DRAW BLOOD OFF VENOUS DEVICE: CPT

## 2024-01-01 PROCEDURE — 88377 M/PHMTRC ALYS ISHQUANT/SEMIQ: CPT | Mod: 26 | Performed by: MEDICAL GENETICS

## 2024-01-01 PROCEDURE — 78452 HT MUSCLE IMAGE SPECT MULT: CPT | Mod: MF

## 2024-01-01 PROCEDURE — 250N000009 HC RX 250: Performed by: STUDENT IN AN ORGANIZED HEALTH CARE EDUCATION/TRAINING PROGRAM

## 2024-01-01 PROCEDURE — 250N000009 HC RX 250: Performed by: RADIOLOGY

## 2024-01-01 PROCEDURE — 49440 PLACE GASTROSTOMY TUBE PERC: CPT

## 2024-01-01 PROCEDURE — 96376 TX/PRO/DX INJ SAME DRUG ADON: CPT

## 2024-01-01 PROCEDURE — 99222 1ST HOSP IP/OBS MODERATE 55: CPT | Performed by: FAMILY MEDICINE

## 2024-01-01 PROCEDURE — 78452 HT MUSCLE IMAGE SPECT MULT: CPT | Mod: 26 | Performed by: INTERNAL MEDICINE

## 2024-01-01 PROCEDURE — 272N000151 HC KIT CR11

## 2024-01-01 PROCEDURE — 93016 CV STRESS TEST SUPVJ ONLY: CPT | Performed by: INTERNAL MEDICINE

## 2024-01-01 PROCEDURE — 258N000003 HC RX IP 258 OP 636: Performed by: NURSE PRACTITIONER

## 2024-01-01 PROCEDURE — 88342 IMHCHEM/IMCYTCHM 1ST ANTB: CPT | Mod: 26 | Performed by: PATHOLOGY

## 2024-01-01 PROCEDURE — 78816 PET IMAGE W/CT FULL BODY: CPT | Mod: MG,PS

## 2024-01-01 PROCEDURE — 71046 X-RAY EXAM CHEST 2 VIEWS: CPT

## 2024-01-01 PROCEDURE — G0452 MOLECULAR PATHOLOGY INTERPR: HCPCS | Mod: 26 | Performed by: STUDENT IN AN ORGANIZED HEALTH CARE EDUCATION/TRAINING PROGRAM

## 2024-01-01 PROCEDURE — 250N000011 HC RX IP 250 OP 636: Performed by: RADIOLOGY

## 2024-01-01 PROCEDURE — 82962 GLUCOSE BLOOD TEST: CPT

## 2024-01-01 PROCEDURE — 84157 ASSAY OF PROTEIN OTHER: CPT | Performed by: INTERNAL MEDICINE

## 2024-01-01 PROCEDURE — 250N000009 HC RX 250

## 2024-01-01 PROCEDURE — C9113 INJ PANTOPRAZOLE SODIUM, VIA: HCPCS | Performed by: INTERNAL MEDICINE

## 2024-01-01 PROCEDURE — 82042 OTHER SOURCE ALBUMIN QUAN EA: CPT | Performed by: FAMILY MEDICINE

## 2024-01-01 PROCEDURE — 99203 OFFICE O/P NEW LOW 30 MIN: CPT | Performed by: PODIATRIST

## 2024-01-01 PROCEDURE — 71260 CT THORAX DX C+: CPT

## 2024-01-01 PROCEDURE — 85025 COMPLETE CBC W/AUTO DIFF WBC: CPT | Performed by: INTERNAL MEDICINE

## 2024-01-01 PROCEDURE — 43239 EGD BIOPSY SINGLE/MULTIPLE: CPT | Performed by: NURSE ANESTHETIST, CERTIFIED REGISTERED

## 2024-01-01 PROCEDURE — A9500 TC99M SESTAMIBI: HCPCS | Performed by: INTERNAL MEDICINE

## 2024-01-01 PROCEDURE — 99232 SBSQ HOSP IP/OBS MODERATE 35: CPT | Performed by: NURSE PRACTITIONER

## 2024-01-01 PROCEDURE — 99497 ADVNCD CARE PLAN 30 MIN: CPT | Mod: 25 | Performed by: NURSE PRACTITIONER

## 2024-01-01 PROCEDURE — 96413 CHEMO IV INFUSION 1 HR: CPT

## 2024-01-01 PROCEDURE — 43239 EGD BIOPSY SINGLE/MULTIPLE: CPT | Performed by: ANESTHESIOLOGY

## 2024-01-01 PROCEDURE — 99153 MOD SED SAME PHYS/QHP EA: CPT | Performed by: INTERNAL MEDICINE

## 2024-01-01 PROCEDURE — G1010 CDSM STANSON: HCPCS | Performed by: INTERNAL MEDICINE

## 2024-01-01 PROCEDURE — 80048 BASIC METABOLIC PNL TOTAL CA: CPT | Performed by: EMERGENCY MEDICINE

## 2024-01-01 PROCEDURE — 43249 ESOPH EGD DILATION <30 MM: CPT | Performed by: INTERNAL MEDICINE

## 2024-01-01 PROCEDURE — 96360 HYDRATION IV INFUSION INIT: CPT

## 2024-01-01 PROCEDURE — 87205 SMEAR GRAM STAIN: CPT | Performed by: FAMILY MEDICINE

## 2024-01-01 PROCEDURE — 81455 SO/HL 51/>GSAP DNA/DNA&RNA: CPT | Performed by: INTERNAL MEDICINE

## 2024-01-01 PROCEDURE — 73660 X-RAY EXAM OF TOE(S): CPT | Mod: TC | Performed by: RADIOLOGY

## 2024-01-01 PROCEDURE — C1769 GUIDE WIRE: HCPCS

## 2024-01-01 PROCEDURE — 99233 SBSQ HOSP IP/OBS HIGH 50: CPT | Performed by: STUDENT IN AN ORGANIZED HEALTH CARE EDUCATION/TRAINING PROGRAM

## 2024-01-01 PROCEDURE — 99418 PROLNG IP/OBS E/M EA 15 MIN: CPT | Performed by: STUDENT IN AN ORGANIZED HEALTH CARE EDUCATION/TRAINING PROGRAM

## 2024-01-01 PROCEDURE — 88305 TISSUE EXAM BY PATHOLOGIST: CPT | Mod: 26 | Performed by: PATHOLOGY

## 2024-01-01 PROCEDURE — 80048 BASIC METABOLIC PNL TOTAL CA: CPT | Performed by: FAMILY MEDICINE

## 2024-01-01 PROCEDURE — 999N000127 HC STATISTIC PERIPHERAL IV START W US GUIDANCE

## 2024-01-01 PROCEDURE — 250N000013 HC RX MED GY IP 250 OP 250 PS 637: Performed by: EMERGENCY MEDICINE

## 2024-01-01 PROCEDURE — 36415 COLL VENOUS BLD VENIPUNCTURE: CPT | Performed by: FAMILY MEDICINE

## 2024-01-01 PROCEDURE — 97165 OT EVAL LOW COMPLEX 30 MIN: CPT | Mod: GO | Performed by: OCCUPATIONAL THERAPIST

## 2024-01-01 PROCEDURE — 76705 ECHO EXAM OF ABDOMEN: CPT

## 2024-01-01 PROCEDURE — 84075 ASSAY ALKALINE PHOSPHATASE: CPT | Performed by: INTERNAL MEDICINE

## 2024-01-01 PROCEDURE — 250N000009 HC RX 250: Performed by: INTERNAL MEDICINE

## 2024-01-01 PROCEDURE — 85610 PROTHROMBIN TIME: CPT

## 2024-01-01 PROCEDURE — 250N000011 HC RX IP 250 OP 636: Performed by: EMERGENCY MEDICINE

## 2024-01-01 PROCEDURE — 96375 TX/PRO/DX INJ NEW DRUG ADDON: CPT

## 2024-01-01 PROCEDURE — 93017 CV STRESS TEST TRACING ONLY: CPT

## 2024-01-01 PROCEDURE — 36415 COLL VENOUS BLD VENIPUNCTURE: CPT | Performed by: EMERGENCY MEDICINE

## 2024-01-01 PROCEDURE — 85025 COMPLETE CBC W/AUTO DIFF WBC: CPT | Performed by: NURSE PRACTITIONER

## 2024-01-01 PROCEDURE — G0378 HOSPITAL OBSERVATION PER HR: HCPCS

## 2024-01-01 PROCEDURE — 99605 MTMS BY PHARM NP 15 MIN: CPT | Mod: 93

## 2024-01-01 PROCEDURE — 250N000011 HC RX IP 250 OP 636: Mod: JZ | Performed by: RADIOLOGY

## 2024-01-01 PROCEDURE — 99232 SBSQ HOSP IP/OBS MODERATE 35: CPT | Performed by: INTERNAL MEDICINE

## 2024-01-01 PROCEDURE — 250N000011 HC RX IP 250 OP 636: Performed by: STUDENT IN AN ORGANIZED HEALTH CARE EDUCATION/TRAINING PROGRAM

## 2024-01-01 PROCEDURE — 97110 THERAPEUTIC EXERCISES: CPT | Mod: GO | Performed by: OCCUPATIONAL THERAPIST

## 2024-01-01 PROCEDURE — 0D9W3ZX DRAINAGE OF PERITONEUM, PERCUTANEOUS APPROACH, DIAGNOSTIC: ICD-10-PCS | Performed by: FAMILY MEDICINE

## 2024-01-01 PROCEDURE — 99152 MOD SED SAME PHYS/QHP 5/>YRS: CPT

## 2024-01-01 PROCEDURE — G2211 COMPLEX E/M VISIT ADD ON: HCPCS | Performed by: NURSE PRACTITIONER

## 2024-01-01 PROCEDURE — 97161 PT EVAL LOW COMPLEX 20 MIN: CPT | Mod: GP | Performed by: PHYSICAL MEDICINE & REHABILITATION

## 2024-01-01 PROCEDURE — 96361 HYDRATE IV INFUSION ADD-ON: CPT

## 2024-01-01 PROCEDURE — 99100 ANES PT EXTEME AGE<1 YR&>70: CPT | Performed by: NURSE ANESTHETIST, CERTIFIED REGISTERED

## 2024-01-01 PROCEDURE — 250N000009 HC RX 250: Performed by: NURSE PRACTITIONER

## 2024-01-01 PROCEDURE — 43239 EGD BIOPSY SINGLE/MULTIPLE: CPT | Mod: 59 | Performed by: INTERNAL MEDICINE

## 2024-01-01 PROCEDURE — 272N000042 US PARACENTESIS WITHOUT ALBUMIN

## 2024-01-01 PROCEDURE — 258N000003 HC RX IP 258 OP 636: Performed by: INTERNAL MEDICINE

## 2024-01-01 PROCEDURE — G0463 HOSPITAL OUTPT CLINIC VISIT: HCPCS | Mod: 25 | Performed by: NURSE PRACTITIONER

## 2024-01-01 PROCEDURE — 80053 COMPREHEN METABOLIC PANEL: CPT

## 2024-01-01 PROCEDURE — 97535 SELF CARE MNGMENT TRAINING: CPT | Mod: GO | Performed by: OCCUPATIONAL THERAPIST

## 2024-01-01 PROCEDURE — 99207 PR NO CHARGE LOS: CPT | Mod: 93

## 2024-01-01 PROCEDURE — 82042 OTHER SOURCE ALBUMIN QUAN EA: CPT | Performed by: INTERNAL MEDICINE

## 2024-01-01 PROCEDURE — G0498 CHEMO EXTEND IV INFUS W/PUMP: HCPCS

## 2024-01-01 PROCEDURE — 89050 BODY FLUID CELL COUNT: CPT | Performed by: EMERGENCY MEDICINE

## 2024-01-01 PROCEDURE — 88112 CYTOPATH CELL ENHANCE TECH: CPT | Mod: 26 | Performed by: PATHOLOGY

## 2024-01-01 PROCEDURE — 43245 EGD DILATE STRICTURE: CPT | Performed by: INTERNAL MEDICINE

## 2024-01-01 PROCEDURE — G2211 COMPLEX E/M VISIT ADD ON: HCPCS | Performed by: INTERNAL MEDICINE

## 2024-01-01 PROCEDURE — 85041 AUTOMATED RBC COUNT: CPT | Performed by: EMERGENCY MEDICINE

## 2024-01-01 PROCEDURE — 99213 OFFICE O/P EST LOW 20 MIN: CPT | Performed by: FAMILY MEDICINE

## 2024-01-01 PROCEDURE — 93018 CV STRESS TEST I&R ONLY: CPT | Performed by: INTERNAL MEDICINE

## 2024-01-01 PROCEDURE — 80053 COMPREHEN METABOLIC PANEL: CPT | Performed by: INTERNAL MEDICINE

## 2024-01-01 PROCEDURE — 99233 SBSQ HOSP IP/OBS HIGH 50: CPT | Performed by: INTERNAL MEDICINE

## 2024-01-01 PROCEDURE — 83615 LACTATE (LD) (LDH) ENZYME: CPT | Performed by: EMERGENCY MEDICINE

## 2024-01-01 PROCEDURE — 99214 OFFICE O/P EST MOD 30 MIN: CPT | Performed by: NURSE PRACTITIONER

## 2024-01-01 PROCEDURE — G0463 HOSPITAL OUTPT CLINIC VISIT: HCPCS | Performed by: INTERNAL MEDICINE

## 2024-01-01 PROCEDURE — A9552 F18 FDG: HCPCS | Performed by: INTERNAL MEDICINE

## 2024-01-01 PROCEDURE — C1788 PORT, INDWELLING, IMP: HCPCS

## 2024-01-01 PROCEDURE — 99239 HOSP IP/OBS DSCHRG MGMT >30: CPT | Performed by: INTERNAL MEDICINE

## 2024-01-01 PROCEDURE — 80069 RENAL FUNCTION PANEL: CPT | Performed by: STUDENT IN AN ORGANIZED HEALTH CARE EDUCATION/TRAINING PROGRAM

## 2024-01-01 PROCEDURE — 85025 COMPLETE CBC W/AUTO DIFF WBC: CPT | Performed by: STUDENT IN AN ORGANIZED HEALTH CARE EDUCATION/TRAINING PROGRAM

## 2024-01-01 PROCEDURE — 343N000001 HC RX 343: Mod: 59 | Performed by: INTERNAL MEDICINE

## 2024-01-01 PROCEDURE — 99213 OFFICE O/P EST LOW 20 MIN: CPT | Performed by: THORACIC SURGERY (CARDIOTHORACIC VASCULAR SURGERY)

## 2024-01-01 PROCEDURE — 85048 AUTOMATED LEUKOCYTE COUNT: CPT | Performed by: INTERNAL MEDICINE

## 2024-01-01 PROCEDURE — 99100 ANES PT EXTEME AGE<1 YR&>70: CPT | Performed by: ANESTHESIOLOGY

## 2024-01-01 PROCEDURE — 84157 ASSAY OF PROTEIN OTHER: CPT | Performed by: EMERGENCY MEDICINE

## 2024-01-01 PROCEDURE — 272N000500 HC NEEDLE CR2

## 2024-01-01 PROCEDURE — 96374 THER/PROPH/DIAG INJ IV PUSH: CPT | Mod: 59 | Performed by: FAMILY MEDICINE

## 2024-01-01 PROCEDURE — 99285 EMERGENCY DEPT VISIT HI MDM: CPT | Performed by: FAMILY MEDICINE

## 2024-01-01 PROCEDURE — 99223 1ST HOSP IP/OBS HIGH 75: CPT | Mod: 25 | Performed by: NURSE PRACTITIONER

## 2024-01-01 PROCEDURE — 88377 M/PHMTRC ALYS ISHQUANT/SEMIQ: CPT | Mod: XU | Performed by: INTERNAL MEDICINE

## 2024-01-01 PROCEDURE — 97116 GAIT TRAINING THERAPY: CPT | Mod: GP | Performed by: PHYSICAL MEDICINE & REHABILITATION

## 2024-01-01 PROCEDURE — 82374 ASSAY BLOOD CARBON DIOXIDE: CPT | Performed by: INTERNAL MEDICINE

## 2024-01-01 PROCEDURE — A9500 TC99M SESTAMIBI: HCPCS | Mod: 59 | Performed by: INTERNAL MEDICINE

## 2024-01-01 PROCEDURE — 99207 PR APP CREDIT; MD BILLING SHARED VISIT: CPT | Performed by: INTERNAL MEDICINE

## 2024-01-01 PROCEDURE — 250N000009 HC RX 250: Performed by: FAMILY MEDICINE

## 2024-01-01 PROCEDURE — 36561 INSERT TUNNELED CV CATH: CPT

## 2024-01-01 PROCEDURE — 36415 COLL VENOUS BLD VENIPUNCTURE: CPT

## 2024-01-01 PROCEDURE — 250N000009 HC RX 250: Performed by: EMERGENCY MEDICINE

## 2024-01-01 PROCEDURE — 99607 MTMS BY PHARM ADDL 15 MIN: CPT | Mod: 93

## 2024-01-01 PROCEDURE — 88305 TISSUE EXAM BY PATHOLOGIST: CPT | Mod: TC

## 2024-01-01 PROCEDURE — 250N000011 HC RX IP 250 OP 636

## 2024-01-01 PROCEDURE — 99213 OFFICE O/P EST LOW 20 MIN: CPT | Performed by: CLINICAL NURSE SPECIALIST

## 2024-01-01 PROCEDURE — 0W9G3ZZ DRAINAGE OF PERITONEAL CAVITY, PERCUTANEOUS APPROACH: ICD-10-PCS | Performed by: RADIOLOGY

## 2024-01-01 PROCEDURE — 99285 EMERGENCY DEPT VISIT HI MDM: CPT | Mod: 25 | Performed by: FAMILY MEDICINE

## 2024-01-01 PROCEDURE — 82040 ASSAY OF SERUM ALBUMIN: CPT | Performed by: INTERNAL MEDICINE

## 2024-01-01 PROCEDURE — 80048 BASIC METABOLIC PNL TOTAL CA: CPT | Performed by: INTERNAL MEDICINE

## 2024-01-01 RX ORDER — NALOXONE HYDROCHLORIDE 0.4 MG/ML
0.2 INJECTION, SOLUTION INTRAMUSCULAR; INTRAVENOUS; SUBCUTANEOUS
Status: DISCONTINUED | OUTPATIENT
Start: 2024-01-01 | End: 2024-01-01 | Stop reason: HOSPADM

## 2024-01-01 RX ORDER — ONDANSETRON 2 MG/ML
8 INJECTION INTRAMUSCULAR; INTRAVENOUS ONCE
Status: CANCELLED | OUTPATIENT
Start: 2024-01-01

## 2024-01-01 RX ORDER — ENOXAPARIN SODIUM 100 MG/ML
40 INJECTION SUBCUTANEOUS EVERY 24 HOURS
Status: DISCONTINUED | OUTPATIENT
Start: 2024-01-01 | End: 2024-01-01

## 2024-01-01 RX ORDER — AMOXICILLIN 250 MG
2 CAPSULE ORAL 2 TIMES DAILY PRN
Status: DISCONTINUED | OUTPATIENT
Start: 2024-01-01 | End: 2024-01-01 | Stop reason: HOSPADM

## 2024-01-01 RX ORDER — EPINEPHRINE 1 MG/ML
0.3 INJECTION, SOLUTION, CONCENTRATE INTRAVENOUS EVERY 5 MIN PRN
Status: CANCELLED | OUTPATIENT
Start: 2024-01-01

## 2024-01-01 RX ORDER — BISMUTH SUBSALICYLATE 262 MG/1
2 TABLET, CHEWABLE ORAL
Qty: 112 TABLET | Refills: 0 | Status: SHIPPED | OUTPATIENT
Start: 2024-01-01 | End: 2024-01-01

## 2024-01-01 RX ORDER — NALOXONE HYDROCHLORIDE 0.4 MG/ML
0.4 INJECTION, SOLUTION INTRAMUSCULAR; INTRAVENOUS; SUBCUTANEOUS
Status: CANCELLED | OUTPATIENT
Start: 2024-01-01

## 2024-01-01 RX ORDER — HEPARIN SODIUM,PORCINE 10 UNIT/ML
5-20 VIAL (ML) INTRAVENOUS DAILY PRN
Status: CANCELLED | OUTPATIENT
Start: 2024-01-01

## 2024-01-01 RX ORDER — FENTANYL CITRATE 50 UG/ML
INJECTION, SOLUTION INTRAMUSCULAR; INTRAVENOUS PRN
Status: DISCONTINUED | OUTPATIENT
Start: 2024-01-01 | End: 2024-01-01 | Stop reason: HOSPADM

## 2024-01-01 RX ORDER — HEPARIN SODIUM (PORCINE) LOCK FLUSH IV SOLN 100 UNIT/ML 100 UNIT/ML
5 SOLUTION INTRAVENOUS
Status: CANCELLED | OUTPATIENT
Start: 2024-01-01

## 2024-01-01 RX ORDER — HEPARIN SODIUM,PORCINE 10 UNIT/ML
5-10 VIAL (ML) INTRAVENOUS
Status: DISCONTINUED | OUTPATIENT
Start: 2024-01-01 | End: 2024-01-01 | Stop reason: HOSPADM

## 2024-01-01 RX ORDER — LIDOCAINE HYDROCHLORIDE 20 MG/ML
INJECTION, SOLUTION INFILTRATION; PERINEURAL PRN
Status: DISCONTINUED | OUTPATIENT
Start: 2024-01-01 | End: 2024-01-01

## 2024-01-01 RX ORDER — FLUMAZENIL 0.1 MG/ML
0.2 INJECTION, SOLUTION INTRAVENOUS
Status: DISCONTINUED | OUTPATIENT
Start: 2024-01-01 | End: 2024-01-01 | Stop reason: HOSPADM

## 2024-01-01 RX ORDER — LIDOCAINE 40 MG/G
CREAM TOPICAL
Status: DISCONTINUED | OUTPATIENT
Start: 2024-01-01 | End: 2024-01-01 | Stop reason: HOSPADM

## 2024-01-01 RX ORDER — CYCLOBENZAPRINE HCL 5 MG
5 TABLET ORAL 3 TIMES DAILY PRN
Qty: 15 TABLET | Refills: 0 | Status: SHIPPED | OUTPATIENT
Start: 2024-01-01 | End: 2024-01-01

## 2024-01-01 RX ORDER — LORAZEPAM 0.5 MG/1
0.5 TABLET ORAL EVERY 8 HOURS PRN
Qty: 5 TABLET | Refills: 0 | Status: SHIPPED | OUTPATIENT
Start: 2024-01-01

## 2024-01-01 RX ORDER — IOPAMIDOL 755 MG/ML
85 INJECTION, SOLUTION INTRAVASCULAR ONCE
Status: COMPLETED | OUTPATIENT
Start: 2024-01-01 | End: 2024-01-01

## 2024-01-01 RX ORDER — HEPARIN SODIUM (PORCINE) LOCK FLUSH IV SOLN 100 UNIT/ML 100 UNIT/ML
SOLUTION INTRAVENOUS
Status: DISPENSED
Start: 2024-01-01 | End: 2024-01-01

## 2024-01-01 RX ORDER — HEPARIN SODIUM,PORCINE 10 UNIT/ML
5-20 VIAL (ML) INTRAVENOUS DAILY PRN
Status: DISCONTINUED | OUTPATIENT
Start: 2024-01-01 | End: 2024-01-01 | Stop reason: HOSPADM

## 2024-01-01 RX ORDER — LORAZEPAM 2 MG/ML
0.5 INJECTION INTRAMUSCULAR EVERY 4 HOURS PRN
Status: CANCELLED | OUTPATIENT
Start: 2024-01-01

## 2024-01-01 RX ORDER — ATORVASTATIN CALCIUM 10 MG/1
10 TABLET, FILM COATED ORAL DAILY
Status: DISCONTINUED | OUTPATIENT
Start: 2024-01-01 | End: 2024-01-01

## 2024-01-01 RX ORDER — ALBUTEROL SULFATE 90 UG/1
1-2 AEROSOL, METERED RESPIRATORY (INHALATION)
Status: CANCELLED
Start: 2024-01-01

## 2024-01-01 RX ORDER — METRONIDAZOLE 500 MG/1
500 TABLET ORAL 4 TIMES DAILY
Qty: 56 TABLET | Refills: 0 | Status: SHIPPED | OUTPATIENT
Start: 2024-01-01 | End: 2024-01-01

## 2024-01-01 RX ORDER — BENZONATATE 200 MG/1
200 CAPSULE ORAL 3 TIMES DAILY
Qty: 90 CAPSULE | Refills: 0 | Status: SHIPPED | OUTPATIENT
Start: 2024-01-01

## 2024-01-01 RX ORDER — BUPRENORPHINE 5 UG/H
1 PATCH TRANSDERMAL WEEKLY
Status: DISCONTINUED | OUTPATIENT
Start: 2024-01-01 | End: 2024-01-01 | Stop reason: HOSPADM

## 2024-01-01 RX ORDER — ONDANSETRON 2 MG/ML
4 INJECTION INTRAMUSCULAR; INTRAVENOUS EVERY 6 HOURS PRN
Status: DISCONTINUED | OUTPATIENT
Start: 2024-01-01 | End: 2024-01-01 | Stop reason: HOSPADM

## 2024-01-01 RX ORDER — MORPHINE SULFATE 10 MG/5ML
10 SOLUTION ORAL
Status: DISCONTINUED | OUTPATIENT
Start: 2024-01-01 | End: 2024-01-01 | Stop reason: HOSPADM

## 2024-01-01 RX ORDER — ALBUMIN (HUMAN) 12.5 G/50ML
25-50 SOLUTION INTRAVENOUS ONCE
Status: COMPLETED | OUTPATIENT
Start: 2024-01-01 | End: 2024-01-01

## 2024-01-01 RX ORDER — ONDANSETRON 2 MG/ML
4 INJECTION INTRAMUSCULAR; INTRAVENOUS
Status: DISCONTINUED | OUTPATIENT
Start: 2024-01-01 | End: 2024-01-01 | Stop reason: HOSPADM

## 2024-01-01 RX ORDER — MEPERIDINE HYDROCHLORIDE 25 MG/ML
25 INJECTION INTRAMUSCULAR; INTRAVENOUS; SUBCUTANEOUS EVERY 30 MIN PRN
Status: CANCELLED | OUTPATIENT
Start: 2024-01-01

## 2024-01-01 RX ORDER — IOPAMIDOL 755 MG/ML
10-135 INJECTION, SOLUTION INTRAVASCULAR ONCE
Status: COMPLETED | OUTPATIENT
Start: 2024-01-01 | End: 2024-01-01

## 2024-01-01 RX ORDER — FENTANYL CITRATE 50 UG/ML
25-50 INJECTION, SOLUTION INTRAMUSCULAR; INTRAVENOUS EVERY 5 MIN PRN
Status: DISCONTINUED | OUTPATIENT
Start: 2024-01-01 | End: 2024-01-01 | Stop reason: HOSPADM

## 2024-01-01 RX ORDER — DOXYCYCLINE 100 MG/1
100 CAPSULE ORAL 2 TIMES DAILY
Qty: 28 CAPSULE | Refills: 0 | Status: SHIPPED | OUTPATIENT
Start: 2024-01-01 | End: 2024-01-01

## 2024-01-01 RX ORDER — CEFEPIME HYDROCHLORIDE 2 G/1
2 INJECTION, POWDER, FOR SOLUTION INTRAVENOUS EVERY 8 HOURS
Status: DISCONTINUED | OUTPATIENT
Start: 2024-01-01 | End: 2024-01-01

## 2024-01-01 RX ORDER — ONDANSETRON 2 MG/ML
8 INJECTION INTRAMUSCULAR; INTRAVENOUS ONCE
Status: COMPLETED | OUTPATIENT
Start: 2024-01-01 | End: 2024-01-01

## 2024-01-01 RX ORDER — MORPHINE SULFATE 10 MG/5ML
10 SOLUTION ORAL
Qty: 50 ML | Refills: 0 | Status: SHIPPED | OUTPATIENT
Start: 2024-01-01

## 2024-01-01 RX ORDER — METHYLPREDNISOLONE SODIUM SUCCINATE 125 MG/2ML
125 INJECTION, POWDER, LYOPHILIZED, FOR SOLUTION INTRAMUSCULAR; INTRAVENOUS
Status: CANCELLED
Start: 2024-01-01

## 2024-01-01 RX ORDER — NALOXONE HYDROCHLORIDE 0.4 MG/ML
0.4 INJECTION, SOLUTION INTRAMUSCULAR; INTRAVENOUS; SUBCUTANEOUS
Status: DISCONTINUED | OUTPATIENT
Start: 2024-01-01 | End: 2024-01-01 | Stop reason: HOSPADM

## 2024-01-01 RX ORDER — LORAZEPAM 0.5 MG/1
0.5 TABLET ORAL EVERY 8 HOURS PRN
Qty: 20 TABLET | Refills: 0 | Status: SHIPPED | OUTPATIENT
Start: 2024-01-01 | End: 2024-01-01

## 2024-01-01 RX ORDER — ALBUTEROL SULFATE 0.83 MG/ML
2.5 SOLUTION RESPIRATORY (INHALATION)
Status: CANCELLED | OUTPATIENT
Start: 2024-01-01

## 2024-01-01 RX ORDER — ALBUMIN (HUMAN) 12.5 G/50ML
25-50 SOLUTION INTRAVENOUS ONCE
Status: CANCELLED | OUTPATIENT
Start: 2024-01-01 | End: 2024-01-01

## 2024-01-01 RX ORDER — HYDROMORPHONE HCL IN WATER/PF 6 MG/30 ML
0.2 PATIENT CONTROLLED ANALGESIA SYRINGE INTRAVENOUS ONCE
Status: DISCONTINUED | OUTPATIENT
Start: 2024-01-01 | End: 2024-01-01

## 2024-01-01 RX ORDER — DIPHENHYDRAMINE HYDROCHLORIDE 50 MG/ML
50 INJECTION INTRAMUSCULAR; INTRAVENOUS
Status: CANCELLED
Start: 2024-01-01

## 2024-01-01 RX ORDER — LIDOCAINE HYDROCHLORIDE AND EPINEPHRINE 10; 10 MG/ML; UG/ML
INJECTION, SOLUTION INFILTRATION; PERINEURAL PRN
Status: COMPLETED | OUTPATIENT
Start: 2024-01-01 | End: 2024-01-01

## 2024-01-01 RX ORDER — ONDANSETRON 8 MG/1
8 TABLET, FILM COATED ORAL EVERY 8 HOURS PRN
Qty: 30 TABLET | Refills: 2 | Status: SHIPPED | OUTPATIENT
Start: 2024-01-01 | End: 2024-01-01

## 2024-01-01 RX ORDER — HEPARIN SODIUM,PORCINE 10 UNIT/ML
5-10 VIAL (ML) INTRAVENOUS EVERY 24 HOURS
Status: DISCONTINUED | OUTPATIENT
Start: 2024-01-01 | End: 2024-01-01 | Stop reason: HOSPADM

## 2024-01-01 RX ORDER — HEPARIN SODIUM (PORCINE) LOCK FLUSH IV SOLN 100 UNIT/ML 100 UNIT/ML
5 SOLUTION INTRAVENOUS
Status: DISCONTINUED | OUTPATIENT
Start: 2024-01-01 | End: 2024-01-01 | Stop reason: HOSPADM

## 2024-01-01 RX ORDER — GLYCOPYRROLATE 0.2 MG/ML
INJECTION, SOLUTION INTRAMUSCULAR; INTRAVENOUS PRN
Status: DISCONTINUED | OUTPATIENT
Start: 2024-01-01 | End: 2024-01-01

## 2024-01-01 RX ORDER — FAMOTIDINE 40 MG/5ML
20 POWDER, FOR SUSPENSION ORAL 2 TIMES DAILY
Status: DISCONTINUED | OUTPATIENT
Start: 2024-01-01 | End: 2024-01-01 | Stop reason: HOSPADM

## 2024-01-01 RX ORDER — METOCLOPRAMIDE 5 MG/1
5 TABLET ORAL
Status: DISCONTINUED | OUTPATIENT
Start: 2024-01-01 | End: 2024-01-01

## 2024-01-01 RX ORDER — PROCHLORPERAZINE MALEATE 5 MG
5 TABLET ORAL EVERY 6 HOURS PRN
Status: DISCONTINUED | OUTPATIENT
Start: 2024-01-01 | End: 2024-01-01 | Stop reason: HOSPADM

## 2024-01-01 RX ORDER — CEFTRIAXONE 2 G/1
2 INJECTION, POWDER, FOR SOLUTION INTRAMUSCULAR; INTRAVENOUS EVERY 24 HOURS
Status: DISCONTINUED | OUTPATIENT
Start: 2024-01-01 | End: 2024-01-01 | Stop reason: HOSPADM

## 2024-01-01 RX ORDER — BENZONATATE 100 MG/1
200 CAPSULE ORAL 3 TIMES DAILY
Status: DISCONTINUED | OUTPATIENT
Start: 2024-01-01 | End: 2024-01-01 | Stop reason: HOSPADM

## 2024-01-01 RX ORDER — DEXTROMETHORPHAN POLISTIREX 30 MG/5ML
15 SUSPENSION ORAL 2 TIMES DAILY PRN
Status: DISCONTINUED | OUTPATIENT
Start: 2024-01-01 | End: 2024-01-01 | Stop reason: HOSPADM

## 2024-01-01 RX ORDER — SODIUM CHLORIDE, SODIUM LACTATE, POTASSIUM CHLORIDE, CALCIUM CHLORIDE 600; 310; 30; 20 MG/100ML; MG/100ML; MG/100ML; MG/100ML
INJECTION, SOLUTION INTRAVENOUS CONTINUOUS
Status: DISCONTINUED | OUTPATIENT
Start: 2024-01-01 | End: 2024-01-01 | Stop reason: HOSPADM

## 2024-01-01 RX ORDER — AMOXICILLIN 250 MG
2 CAPSULE ORAL 2 TIMES DAILY PRN
Status: DISCONTINUED | OUTPATIENT
Start: 2024-01-01 | End: 2024-01-01

## 2024-01-01 RX ORDER — NALOXONE HYDROCHLORIDE 0.4 MG/ML
0.2 INJECTION, SOLUTION INTRAMUSCULAR; INTRAVENOUS; SUBCUTANEOUS
Status: CANCELLED | OUTPATIENT
Start: 2024-01-01

## 2024-01-01 RX ORDER — ONDANSETRON 2 MG/ML
4 INJECTION INTRAMUSCULAR; INTRAVENOUS
Status: CANCELLED | OUTPATIENT
Start: 2024-01-01

## 2024-01-01 RX ORDER — OMEPRAZOLE 40 MG/1
40 CAPSULE, DELAYED RELEASE ORAL 2 TIMES DAILY
COMMUNITY

## 2024-01-01 RX ORDER — ONDANSETRON 4 MG/1
8 TABLET, ORALLY DISINTEGRATING ORAL EVERY 8 HOURS PRN
Status: DISCONTINUED | OUTPATIENT
Start: 2024-01-01 | End: 2024-01-01 | Stop reason: HOSPADM

## 2024-01-01 RX ORDER — ONDANSETRON 4 MG/1
8 TABLET, ORALLY DISINTEGRATING ORAL EVERY 8 HOURS PRN
Qty: 60 TABLET | Refills: 1 | Status: SHIPPED | OUTPATIENT
Start: 2024-01-01

## 2024-01-01 RX ORDER — BACLOFEN 10 MG/1
10 TABLET ORAL 3 TIMES DAILY
Status: DISCONTINUED | OUTPATIENT
Start: 2024-01-01 | End: 2024-01-01 | Stop reason: HOSPADM

## 2024-01-01 RX ORDER — METOCLOPRAMIDE 5 MG/1
7.5 TABLET ORAL
Qty: 120 TABLET | Refills: 0 | Status: SHIPPED | OUTPATIENT
Start: 2024-01-01

## 2024-01-01 RX ORDER — LORAZEPAM 0.5 MG/1
0.5 TABLET ORAL EVERY 8 HOURS PRN
Status: DISCONTINUED | OUTPATIENT
Start: 2024-01-01 | End: 2024-01-01 | Stop reason: HOSPADM

## 2024-01-01 RX ORDER — CEFAZOLIN SODIUM/WATER 2 G/20 ML
2 SYRINGE (ML) INTRAVENOUS
Status: COMPLETED | OUTPATIENT
Start: 2024-01-01 | End: 2024-01-01

## 2024-01-01 RX ORDER — CARVEDILOL 6.25 MG/1
TABLET ORAL
Qty: 90 TABLET | Refills: 0 | Status: SHIPPED | OUTPATIENT
Start: 2024-01-01 | End: 2024-01-01

## 2024-01-01 RX ORDER — ACETAMINOPHEN 650 MG/20.3ML
650 LIQUID ORAL EVERY 6 HOURS PRN
Status: DISCONTINUED | OUTPATIENT
Start: 2024-01-01 | End: 2024-01-01 | Stop reason: HOSPADM

## 2024-01-01 RX ORDER — ONDANSETRON 4 MG/1
4 TABLET, ORALLY DISINTEGRATING ORAL EVERY 6 HOURS PRN
Status: DISCONTINUED | OUTPATIENT
Start: 2024-01-01 | End: 2024-01-01 | Stop reason: HOSPADM

## 2024-01-01 RX ORDER — OXYCODONE HYDROCHLORIDE 5 MG/1
5 TABLET ORAL EVERY 6 HOURS PRN
Qty: 16 TABLET | Refills: 0 | Status: SHIPPED | OUTPATIENT
Start: 2024-01-01 | End: 2024-01-01

## 2024-01-01 RX ORDER — PROPOFOL 10 MG/ML
INJECTION, EMULSION INTRAVENOUS CONTINUOUS PRN
Status: DISCONTINUED | OUTPATIENT
Start: 2024-01-01 | End: 2024-01-01

## 2024-01-01 RX ORDER — DEXTROSE, SODIUM CHLORIDE, SODIUM LACTATE, POTASSIUM CHLORIDE, AND CALCIUM CHLORIDE 5; .6; .31; .03; .02 G/100ML; G/100ML; G/100ML; G/100ML; G/100ML
INJECTION, SOLUTION INTRAVENOUS CONTINUOUS
Status: DISCONTINUED | OUTPATIENT
Start: 2024-01-01 | End: 2024-01-01 | Stop reason: HOSPADM

## 2024-01-01 RX ORDER — AMOXICILLIN 250 MG
1 CAPSULE ORAL 2 TIMES DAILY PRN
Status: DISCONTINUED | OUTPATIENT
Start: 2024-01-01 | End: 2024-01-01

## 2024-01-01 RX ORDER — BUPRENORPHINE 5 UG/H
1 PATCH TRANSDERMAL WEEKLY
Qty: 3 PATCH | Refills: 0 | Status: SHIPPED | OUTPATIENT
Start: 2024-05-29

## 2024-01-01 RX ORDER — AMOXICILLIN 250 MG
1 CAPSULE ORAL 2 TIMES DAILY PRN
Status: DISCONTINUED | OUTPATIENT
Start: 2024-01-01 | End: 2024-01-01 | Stop reason: HOSPADM

## 2024-01-01 RX ORDER — IOPAMIDOL 755 MG/ML
70 INJECTION, SOLUTION INTRAVASCULAR ONCE
Status: COMPLETED | OUTPATIENT
Start: 2024-01-01 | End: 2024-01-01

## 2024-01-01 RX ORDER — PROCHLORPERAZINE 25 MG
12.5 SUPPOSITORY, RECTAL RECTAL EVERY 12 HOURS PRN
Status: DISCONTINUED | OUTPATIENT
Start: 2024-01-01 | End: 2024-01-01 | Stop reason: HOSPADM

## 2024-01-01 RX ORDER — POLYETHYLENE GLYCOL 3350 17 G/17G
238 POWDER, FOR SOLUTION ORAL ONCE
Status: COMPLETED | OUTPATIENT
Start: 2024-01-01 | End: 2024-01-01

## 2024-01-01 RX ORDER — OXYCODONE HYDROCHLORIDE 5 MG/1
5 TABLET ORAL EVERY 4 HOURS PRN
Status: DISCONTINUED | OUTPATIENT
Start: 2024-01-01 | End: 2024-01-01

## 2024-01-01 RX ORDER — HYDROMORPHONE HCL IN WATER/PF 6 MG/30 ML
0.2 PATIENT CONTROLLED ANALGESIA SYRINGE INTRAVENOUS ONCE
Status: COMPLETED | OUTPATIENT
Start: 2024-01-01 | End: 2024-01-01

## 2024-01-01 RX ORDER — CYCLOBENZAPRINE HCL 5 MG
5 TABLET ORAL 3 TIMES DAILY PRN
Status: DISCONTINUED | OUTPATIENT
Start: 2024-01-01 | End: 2024-01-01 | Stop reason: HOSPADM

## 2024-01-01 RX ORDER — DEXTROSE MONOHYDRATE 100 MG/ML
INJECTION, SOLUTION INTRAVENOUS CONTINUOUS PRN
Status: DISCONTINUED | OUTPATIENT
Start: 2024-01-01 | End: 2024-01-01 | Stop reason: HOSPADM

## 2024-01-01 RX ORDER — ASPIRIN 81 MG/1
81 TABLET ORAL DAILY
Status: DISCONTINUED | OUTPATIENT
Start: 2024-01-01 | End: 2024-01-01 | Stop reason: HOSPADM

## 2024-01-01 RX ORDER — CEFTRIAXONE 1 G/1
1 INJECTION, POWDER, FOR SOLUTION INTRAMUSCULAR; INTRAVENOUS EVERY 24 HOURS
Status: DISCONTINUED | OUTPATIENT
Start: 2024-01-01 | End: 2024-01-01

## 2024-01-01 RX ORDER — PANTOPRAZOLE SODIUM 40 MG/1
40 TABLET, DELAYED RELEASE ORAL 2 TIMES DAILY
Status: DISCONTINUED | OUTPATIENT
Start: 2024-01-01 | End: 2024-01-01

## 2024-01-01 RX ORDER — SODIUM CHLORIDE 9 MG/ML
INJECTION, SOLUTION INTRAVENOUS CONTINUOUS
Status: DISCONTINUED | OUTPATIENT
Start: 2024-01-01 | End: 2024-01-01 | Stop reason: HOSPADM

## 2024-01-01 RX ORDER — CLINDAMYCIN PHOSPHATE 900 MG/50ML
900 INJECTION, SOLUTION INTRAVENOUS
Status: DISCONTINUED | OUTPATIENT
Start: 2024-01-01 | End: 2024-01-01 | Stop reason: ALTCHOICE

## 2024-01-01 RX ORDER — CALCIUM CARBONATE 500 MG/1
1000 TABLET, CHEWABLE ORAL 4 TIMES DAILY PRN
Status: DISCONTINUED | OUTPATIENT
Start: 2024-01-01 | End: 2024-01-01 | Stop reason: HOSPADM

## 2024-01-01 RX ORDER — CARVEDILOL 6.25 MG/1
6.25 TABLET ORAL 2 TIMES DAILY WITH MEALS
Status: DISCONTINUED | OUTPATIENT
Start: 2024-01-01 | End: 2024-01-01

## 2024-01-01 RX ORDER — CEFTRIAXONE 2 G/1
2 INJECTION, POWDER, FOR SOLUTION INTRAMUSCULAR; INTRAVENOUS EVERY 24 HOURS
Status: DISCONTINUED | OUTPATIENT
Start: 2024-01-01 | End: 2024-01-01

## 2024-01-01 RX ORDER — LIDOCAINE 40 MG/G
CREAM TOPICAL
Status: CANCELLED | OUTPATIENT
Start: 2024-01-01

## 2024-01-01 RX ORDER — ONDANSETRON 2 MG/ML
4 INJECTION INTRAMUSCULAR; INTRAVENOUS
Status: COMPLETED | OUTPATIENT
Start: 2024-01-01 | End: 2024-01-01

## 2024-01-01 RX ORDER — PROPOFOL 10 MG/ML
INJECTION, EMULSION INTRAVENOUS PRN
Status: DISCONTINUED | OUTPATIENT
Start: 2024-01-01 | End: 2024-01-01

## 2024-01-01 RX ORDER — NITROGLYCERIN 0.3 MG/1
TABLET SUBLINGUAL
Qty: 30 TABLET | Refills: 3 | Status: SHIPPED | OUTPATIENT
Start: 2024-01-01

## 2024-01-01 RX ORDER — DEXTROSE MONOHYDRATE AND SODIUM CHLORIDE 5; .9 G/100ML; G/100ML
INJECTION, SOLUTION INTRAVENOUS CONTINUOUS
Status: ACTIVE | OUTPATIENT
Start: 2024-01-01 | End: 2024-01-01

## 2024-01-01 RX ORDER — PROCHLORPERAZINE 25 MG
12.5 SUPPOSITORY, RECTAL RECTAL EVERY 12 HOURS PRN
Status: DISCONTINUED | OUTPATIENT
Start: 2024-01-01 | End: 2024-01-01

## 2024-01-01 RX ORDER — CYCLOBENZAPRINE HCL 5 MG
5 TABLET ORAL 3 TIMES DAILY PRN
Status: DISCONTINUED | OUTPATIENT
Start: 2024-01-01 | End: 2024-01-01

## 2024-01-01 RX ORDER — IOPAMIDOL 755 MG/ML
84 INJECTION, SOLUTION INTRAVASCULAR ONCE
Status: COMPLETED | OUTPATIENT
Start: 2024-01-01 | End: 2024-01-01

## 2024-01-01 RX ORDER — BISACODYL 5 MG
15 TABLET, DELAYED RELEASE (ENTERIC COATED) ORAL ONCE
Status: COMPLETED | OUTPATIENT
Start: 2024-01-01 | End: 2024-01-01

## 2024-01-01 RX ORDER — REGADENOSON 0.08 MG/ML
0.4 INJECTION, SOLUTION INTRAVENOUS ONCE
Status: COMPLETED | OUTPATIENT
Start: 2024-01-01 | End: 2024-01-01

## 2024-01-01 RX ORDER — MORPHINE SULFATE 10 MG/5ML
10 SOLUTION ORAL
Qty: 100 ML | Refills: 0 | Status: SHIPPED | OUTPATIENT
Start: 2024-01-01 | End: 2024-01-01

## 2024-01-01 RX ORDER — ATORVASTATIN CALCIUM 10 MG/1
10 TABLET, FILM COATED ORAL DAILY
Status: DISCONTINUED | OUTPATIENT
Start: 2024-01-01 | End: 2024-01-01 | Stop reason: HOSPADM

## 2024-01-01 RX ORDER — CYCLOBENZAPRINE HCL 5 MG
5 TABLET ORAL 3 TIMES DAILY PRN
Qty: 15 TABLET | Refills: 0 | Status: SHIPPED | OUTPATIENT
Start: 2024-01-01

## 2024-01-01 RX ORDER — ONDANSETRON 2 MG/ML
4 INJECTION INTRAMUSCULAR; INTRAVENOUS EVERY 6 HOURS PRN
Status: CANCELLED | OUTPATIENT
Start: 2024-01-01

## 2024-01-01 RX ORDER — PROCHLORPERAZINE MALEATE 5 MG
5 TABLET ORAL EVERY 6 HOURS PRN
Status: DISCONTINUED | OUTPATIENT
Start: 2024-01-01 | End: 2024-01-01

## 2024-01-01 RX ORDER — LIDOCAINE HYDROCHLORIDE 20 MG/ML
5 SOLUTION OROPHARYNGEAL 3 TIMES DAILY
Status: DISCONTINUED | OUTPATIENT
Start: 2024-01-01 | End: 2024-01-01 | Stop reason: HOSPADM

## 2024-01-01 RX ORDER — MORPHINE SULFATE 10 MG/5ML
5 SOLUTION ORAL
Status: DISCONTINUED | OUTPATIENT
Start: 2024-01-01 | End: 2024-01-01 | Stop reason: HOSPADM

## 2024-01-01 RX ORDER — HEPARIN SODIUM (PORCINE) LOCK FLUSH IV SOLN 100 UNIT/ML 100 UNIT/ML
500 SOLUTION INTRAVENOUS ONCE
Status: COMPLETED | OUTPATIENT
Start: 2024-01-01 | End: 2024-01-01

## 2024-01-01 RX ORDER — ONDANSETRON 4 MG/1
4 TABLET, ORALLY DISINTEGRATING ORAL EVERY 6 HOURS PRN
Status: CANCELLED | OUTPATIENT
Start: 2024-01-01

## 2024-01-01 RX ORDER — SUCRALFATE ORAL 1 G/10ML
1 SUSPENSION ORAL
Status: DISCONTINUED | OUTPATIENT
Start: 2024-01-01 | End: 2024-01-01 | Stop reason: HOSPADM

## 2024-01-01 RX ORDER — CARVEDILOL 6.25 MG/1
6.25 TABLET ORAL 2 TIMES DAILY WITH MEALS
Status: DISCONTINUED | OUTPATIENT
Start: 2024-01-01 | End: 2024-01-01 | Stop reason: HOSPADM

## 2024-01-01 RX ORDER — CARVEDILOL 6.25 MG/1
6.25 TABLET ORAL 2 TIMES DAILY WITH MEALS
Qty: 90 TABLET | Refills: 0 | Status: SHIPPED | OUTPATIENT
Start: 2024-01-01

## 2024-01-01 RX ORDER — AMINOPHYLLINE 25 MG/ML
50 INJECTION, SOLUTION INTRAVENOUS
Status: DISCONTINUED | OUTPATIENT
Start: 2024-01-01 | End: 2024-01-01 | Stop reason: HOSPADM

## 2024-01-01 RX ORDER — PROCHLORPERAZINE MALEATE 5 MG
5 TABLET ORAL EVERY 6 HOURS PRN
Status: CANCELLED | OUTPATIENT
Start: 2024-01-01

## 2024-01-01 RX ORDER — FLUMAZENIL 0.1 MG/ML
0.2 INJECTION, SOLUTION INTRAVENOUS
Status: CANCELLED | OUTPATIENT
Start: 2024-01-01 | End: 2024-01-01

## 2024-01-01 RX ORDER — HEPARIN SODIUM (PORCINE) LOCK FLUSH IV SOLN 100 UNIT/ML 100 UNIT/ML
5-10 SOLUTION INTRAVENOUS
Status: DISCONTINUED | OUTPATIENT
Start: 2024-01-01 | End: 2024-01-01 | Stop reason: HOSPADM

## 2024-01-01 RX ORDER — LIDOCAINE HYDROCHLORIDE 20 MG/ML
5 SOLUTION OROPHARYNGEAL
Status: DISCONTINUED | OUTPATIENT
Start: 2024-01-01 | End: 2024-01-01

## 2024-01-01 RX ORDER — PROCHLORPERAZINE MALEATE 10 MG
10 TABLET ORAL EVERY 6 HOURS PRN
Qty: 30 TABLET | Refills: 2 | Status: SHIPPED | OUTPATIENT
Start: 2024-01-01

## 2024-01-01 RX ADMIN — SODIUM CHLORIDE 100 ML: 9 INJECTION, SOLUTION INTRAVENOUS at 00:15

## 2024-01-01 RX ADMIN — SENNOSIDES 5 ML: 8.8 LIQUID ORAL at 08:42

## 2024-01-01 RX ADMIN — OXYCODONE HYDROCHLORIDE 2.5 MG: 5 TABLET ORAL at 16:46

## 2024-01-01 RX ADMIN — HEPARIN, PORCINE (PF) 10 UNIT/ML INTRAVENOUS SYRINGE 5 ML: at 13:16

## 2024-01-01 RX ADMIN — METOCLOPRAMIDE 7.5 MG: 5 TABLET ORAL at 15:36

## 2024-01-01 RX ADMIN — FLUDEOXYGLUCOSE F-18 10.42 MILLICURIE: 500 INJECTION, SOLUTION INTRAVENOUS at 11:51

## 2024-01-01 RX ADMIN — FAMOTIDINE 20 MG: 40 POWDER, FOR SUSPENSION ORAL at 08:04

## 2024-01-01 RX ADMIN — FENTANYL CITRATE 50 MCG: 50 INJECTION, SOLUTION INTRAMUSCULAR; INTRAVENOUS at 13:19

## 2024-01-01 RX ADMIN — BENZONATATE 200 MG: 100 CAPSULE ORAL at 14:31

## 2024-01-01 RX ADMIN — SODIUM CHLORIDE, SODIUM LACTATE, POTASSIUM CHLORIDE, CALCIUM CHLORIDE AND DEXTROSE MONOHYDRATE: 5; 600; 310; 30; 20 INJECTION, SOLUTION INTRAVENOUS at 23:30

## 2024-01-01 RX ADMIN — BENZONATATE 200 MG: 100 CAPSULE ORAL at 21:22

## 2024-01-01 RX ADMIN — METOCLOPRAMIDE 5 MG: 5 TABLET ORAL at 11:49

## 2024-01-01 RX ADMIN — ONDANSETRON 8 MG: 2 INJECTION INTRAMUSCULAR; INTRAVENOUS at 09:15

## 2024-01-01 RX ADMIN — LIDOCAINE HYDROCHLORIDE 5 ML: 20 SOLUTION ORAL at 09:02

## 2024-01-01 RX ADMIN — ACETAMINOPHEN 650 MG: 160 SOLUTION ORAL at 08:43

## 2024-01-01 RX ADMIN — ATORVASTATIN CALCIUM 10 MG: 10 TABLET, FILM COATED ORAL at 09:21

## 2024-01-01 RX ADMIN — ATORVASTATIN CALCIUM 10 MG: 10 TABLET, FILM COATED ORAL at 08:56

## 2024-01-01 RX ADMIN — DICLOFENAC SODIUM 2 G: 10 GEL TOPICAL at 09:51

## 2024-01-01 RX ADMIN — SUCRALFATE 1 G: 1 SUSPENSION ORAL at 16:24

## 2024-01-01 RX ADMIN — Medication 500 UNITS: at 10:45

## 2024-01-01 RX ADMIN — CARVEDILOL 6.25 MG: 6.25 TABLET, FILM COATED ORAL at 08:03

## 2024-01-01 RX ADMIN — Medication 2 G: at 13:17

## 2024-01-01 RX ADMIN — MORPHINE SULFATE 5 MG: 10 SOLUTION ORAL at 05:54

## 2024-01-01 RX ADMIN — LIDOCAINE HYDROCHLORIDE 7 ML: 10 INJECTION, SOLUTION EPIDURAL; INFILTRATION; INTRACAUDAL; PERINEURAL at 10:23

## 2024-01-01 RX ADMIN — CEFEPIME 2 G: 2 INJECTION, POWDER, FOR SOLUTION INTRAVENOUS at 17:56

## 2024-01-01 RX ADMIN — PANTOPRAZOLE SODIUM 40 MG: 40 INJECTION, POWDER, FOR SOLUTION INTRAVENOUS at 09:06

## 2024-01-01 RX ADMIN — IOPAMIDOL 84 ML: 755 INJECTION, SOLUTION INTRAVENOUS at 13:20

## 2024-01-01 RX ADMIN — PANTOPRAZOLE SODIUM 40 MG: 40 INJECTION, POWDER, FOR SOLUTION INTRAVENOUS at 07:35

## 2024-01-01 RX ADMIN — POTASSIUM & SODIUM PHOSPHATES POWDER PACK 280-160-250 MG 2 PACKET: 280-160-250 PACK at 01:16

## 2024-01-01 RX ADMIN — BENZONATATE 200 MG: 100 CAPSULE ORAL at 14:38

## 2024-01-01 RX ADMIN — PANTOPRAZOLE SODIUM 40 MG: 40 INJECTION, POWDER, FOR SOLUTION INTRAVENOUS at 08:41

## 2024-01-01 RX ADMIN — BISACODYL 15 MG: 5 TABLET, COATED ORAL at 18:46

## 2024-01-01 RX ADMIN — ONDANSETRON 4 MG: 2 INJECTION INTRAMUSCULAR; INTRAVENOUS at 23:31

## 2024-01-01 RX ADMIN — METOCLOPRAMIDE 7.5 MG: 5 TABLET ORAL at 10:38

## 2024-01-01 RX ADMIN — BUPRENORPHINE 1 PATCH: 5 PATCH, EXTENDED RELEASE TRANSDERMAL at 14:31

## 2024-01-01 RX ADMIN — SUCRALFATE 1 G: 1 SUSPENSION ORAL at 11:11

## 2024-01-01 RX ADMIN — FAMOTIDINE 20 MG: 40 POWDER, FOR SUSPENSION ORAL at 09:43

## 2024-01-01 RX ADMIN — BACLOFEN 10 MG: 10 TABLET ORAL at 20:55

## 2024-01-01 RX ADMIN — ONDANSETRON 4 MG: 2 INJECTION INTRAMUSCULAR; INTRAVENOUS at 03:16

## 2024-01-01 RX ADMIN — POTASSIUM & SODIUM PHOSPHATES POWDER PACK 280-160-250 MG 2 PACKET: 280-160-250 PACK at 15:58

## 2024-01-01 RX ADMIN — FAMOTIDINE 20 MG: 10 INJECTION, SOLUTION INTRAVENOUS at 09:16

## 2024-01-01 RX ADMIN — MIDAZOLAM HYDROCHLORIDE 1 MG: 1 INJECTION, SOLUTION INTRAMUSCULAR; INTRAVENOUS at 13:25

## 2024-01-01 RX ADMIN — PANTOPRAZOLE SODIUM 40 MG: 40 INJECTION, POWDER, FOR SOLUTION INTRAVENOUS at 20:51

## 2024-01-01 RX ADMIN — MORPHINE SULFATE 5 MG: 10 SOLUTION ORAL at 23:27

## 2024-01-01 RX ADMIN — SENNOSIDES 5 ML: 8.8 LIQUID ORAL at 08:02

## 2024-01-01 RX ADMIN — METOCLOPRAMIDE 5 MG: 5 TABLET ORAL at 15:57

## 2024-01-01 RX ADMIN — SODIUM CHLORIDE 1000 ML: 9 INJECTION, SOLUTION INTRAVENOUS at 23:20

## 2024-01-01 RX ADMIN — Medication 1 LOZENGE: at 01:01

## 2024-01-01 RX ADMIN — LIDOCAINE HYDROCHLORIDE 5 ML: 20 SOLUTION ORAL at 15:36

## 2024-01-01 RX ADMIN — CARVEDILOL 6.25 MG: 6.25 TABLET, FILM COATED ORAL at 09:21

## 2024-01-01 RX ADMIN — Medication 8.6 MILLICURIE: at 07:02

## 2024-01-01 RX ADMIN — METOCLOPRAMIDE 5 MG: 5 TABLET ORAL at 09:21

## 2024-01-01 RX ADMIN — PROCHLORPERAZINE EDISYLATE 5 MG: 5 INJECTION INTRAMUSCULAR; INTRAVENOUS at 13:08

## 2024-01-01 RX ADMIN — CEFTRIAXONE SODIUM 2 G: 2 INJECTION, POWDER, FOR SOLUTION INTRAMUSCULAR; INTRAVENOUS at 12:17

## 2024-01-01 RX ADMIN — MIDAZOLAM HYDROCHLORIDE 1 MG: 1 INJECTION, SOLUTION INTRAMUSCULAR; INTRAVENOUS at 13:17

## 2024-01-01 RX ADMIN — CYCLOBENZAPRINE 5 MG: 5 TABLET, FILM COATED ORAL at 12:15

## 2024-01-01 RX ADMIN — CARVEDILOL 6.25 MG: 6.25 TABLET, FILM COATED ORAL at 08:15

## 2024-01-01 RX ADMIN — ONDANSETRON 4 MG: 4 TABLET, ORALLY DISINTEGRATING ORAL at 16:33

## 2024-01-01 RX ADMIN — SUCRALFATE 1 G: 1 SUSPENSION ORAL at 05:59

## 2024-01-01 RX ADMIN — HEPARIN, PORCINE (PF) 10 UNIT/ML INTRAVENOUS SYRINGE 5 ML: at 11:36

## 2024-01-01 RX ADMIN — CALCIUM CARBONATE (ANTACID) CHEW TAB 500 MG 1000 MG: 500 CHEW TAB at 02:51

## 2024-01-01 RX ADMIN — CEFTRIAXONE SODIUM 2 G: 2 INJECTION, POWDER, FOR SOLUTION INTRAMUSCULAR; INTRAVENOUS at 10:37

## 2024-01-01 RX ADMIN — METOCLOPRAMIDE 7.5 MG: 5 TABLET ORAL at 16:24

## 2024-01-01 RX ADMIN — CYCLOBENZAPRINE 5 MG: 5 TABLET, FILM COATED ORAL at 20:50

## 2024-01-01 RX ADMIN — CEFTRIAXONE SODIUM 2 G: 2 INJECTION, POWDER, FOR SOLUTION INTRAMUSCULAR; INTRAVENOUS at 11:30

## 2024-01-01 RX ADMIN — SENNOSIDES 5 ML: 8.8 LIQUID ORAL at 20:10

## 2024-01-01 RX ADMIN — LIDOCAINE HYDROCHLORIDE 6 ML: 10 INJECTION, SOLUTION EPIDURAL; INFILTRATION; INTRACAUDAL; PERINEURAL at 08:04

## 2024-01-01 RX ADMIN — PANTOPRAZOLE SODIUM 40 MG: 40 INJECTION, POWDER, FOR SOLUTION INTRAVENOUS at 08:56

## 2024-01-01 RX ADMIN — CEFEPIME 2 G: 2 INJECTION, POWDER, FOR SOLUTION INTRAVENOUS at 03:10

## 2024-01-01 RX ADMIN — HEPARIN, PORCINE (PF) 10 UNIT/ML INTRAVENOUS SYRINGE 5 ML: at 20:42

## 2024-01-01 RX ADMIN — FAMOTIDINE 20 MG: 40 POWDER, FOR SUSPENSION ORAL at 20:55

## 2024-01-01 RX ADMIN — SODIUM CHLORIDE 240 MG: 9 INJECTION, SOLUTION INTRAVENOUS at 10:09

## 2024-01-01 RX ADMIN — SUCRALFATE 1 G: 1 SUSPENSION ORAL at 21:15

## 2024-01-01 RX ADMIN — GLYCOPYRROLATE 0.2 MG: 0.2 INJECTION, SOLUTION INTRAMUSCULAR; INTRAVENOUS at 11:30

## 2024-01-01 RX ADMIN — CARVEDILOL 6.25 MG: 6.25 TABLET, FILM COATED ORAL at 17:12

## 2024-01-01 RX ADMIN — CARVEDILOL 6.25 MG: 6.25 TABLET, FILM COATED ORAL at 08:41

## 2024-01-01 RX ADMIN — HEPARIN, PORCINE (PF) 10 UNIT/ML INTRAVENOUS SYRINGE 5 ML: at 05:58

## 2024-01-01 RX ADMIN — LIDOCAINE HYDROCHLORIDE 100 MG: 20 INJECTION, SOLUTION INFILTRATION; PERINEURAL at 11:27

## 2024-01-01 RX ADMIN — FLUDEOXYGLUCOSE F-18 12.67 MILLICURIE: 500 INJECTION, SOLUTION INTRAVENOUS at 11:38

## 2024-01-01 RX ADMIN — SODIUM CHLORIDE 1000 ML: 9 INJECTION, SOLUTION INTRAVENOUS at 11:19

## 2024-01-01 RX ADMIN — CARVEDILOL 6.25 MG: 6.25 TABLET, FILM COATED ORAL at 18:05

## 2024-01-01 RX ADMIN — CYCLOBENZAPRINE HYDROCHLORIDE 5 MG: 5 TABLET, FILM COATED ORAL at 11:11

## 2024-01-01 RX ADMIN — SUCRALFATE 1 G: 1 SUSPENSION ORAL at 10:37

## 2024-01-01 RX ADMIN — SUCRALFATE 1 G: 1 SUSPENSION ORAL at 05:40

## 2024-01-01 RX ADMIN — PANTOPRAZOLE SODIUM 40 MG: 40 INJECTION, POWDER, FOR SOLUTION INTRAVENOUS at 21:22

## 2024-01-01 RX ADMIN — METOCLOPRAMIDE 7.5 MG: 5 TABLET ORAL at 22:20

## 2024-01-01 RX ADMIN — ONDANSETRON 8 MG: 2 INJECTION INTRAMUSCULAR; INTRAVENOUS at 09:18

## 2024-01-01 RX ADMIN — ONDANSETRON 8 MG: 4 TABLET, ORALLY DISINTEGRATING ORAL at 08:24

## 2024-01-01 RX ADMIN — FAMOTIDINE 20 MG: 40 POWDER, FOR SUSPENSION ORAL at 21:15

## 2024-01-01 RX ADMIN — PANTOPRAZOLE SODIUM 40 MG: 40 INJECTION, POWDER, FOR SOLUTION INTRAVENOUS at 21:11

## 2024-01-01 RX ADMIN — IOPAMIDOL 85 ML: 755 INJECTION, SOLUTION INTRAVENOUS at 11:16

## 2024-01-01 RX ADMIN — DEXTROSE AND SODIUM CHLORIDE: 5; 900 INJECTION, SOLUTION INTRAVENOUS at 23:33

## 2024-01-01 RX ADMIN — Medication 100 MCG: at 11:52

## 2024-01-01 RX ADMIN — CARVEDILOL 6.25 MG: 6.25 TABLET, FILM COATED ORAL at 16:39

## 2024-01-01 RX ADMIN — Medication 500 UNITS: at 12:30

## 2024-01-01 RX ADMIN — CYCLOBENZAPRINE HYDROCHLORIDE 5 MG: 5 TABLET, FILM COATED ORAL at 02:51

## 2024-01-01 RX ADMIN — CEFTRIAXONE SODIUM 2 G: 2 INJECTION, POWDER, FOR SOLUTION INTRAMUSCULAR; INTRAVENOUS at 11:10

## 2024-01-01 RX ADMIN — MORPHINE SULFATE 5 MG: 10 SOLUTION ORAL at 11:22

## 2024-01-01 RX ADMIN — SENNOSIDES 5 ML: 8.8 LIQUID ORAL at 21:13

## 2024-01-01 RX ADMIN — REGADENOSON 0.4 MG: 0.08 INJECTION, SOLUTION INTRAVENOUS at 08:08

## 2024-01-01 RX ADMIN — METOCLOPRAMIDE 5 MG: 5 TABLET ORAL at 12:17

## 2024-01-01 RX ADMIN — LIDOCAINE HYDROCHLORIDE 10 ML: 10 INJECTION, SOLUTION INFILTRATION; PERINEURAL at 10:46

## 2024-01-01 RX ADMIN — ATORVASTATIN CALCIUM 10 MG: 10 TABLET, FILM COATED ORAL at 08:43

## 2024-01-01 RX ADMIN — DICLOFENAC SODIUM 2 G: 10 GEL TOPICAL at 21:15

## 2024-01-01 RX ADMIN — BENZONATATE 200 MG: 100 CAPSULE ORAL at 20:50

## 2024-01-01 RX ADMIN — BACLOFEN 10 MG: 10 TABLET ORAL at 08:02

## 2024-01-01 RX ADMIN — HEPARIN, PORCINE (PF) 10 UNIT/ML INTRAVENOUS SYRINGE 5 ML: at 06:20

## 2024-01-01 RX ADMIN — OXYCODONE HYDROCHLORIDE 5 MG: 5 TABLET ORAL at 20:51

## 2024-01-01 RX ADMIN — SUCRALFATE 1 G: 1 SUSPENSION ORAL at 06:20

## 2024-01-01 RX ADMIN — LIDOCAINE HYDROCHLORIDE 6 ML: 10 INJECTION, SOLUTION EPIDURAL; INFILTRATION; INTRACAUDAL; PERINEURAL at 09:42

## 2024-01-01 RX ADMIN — MORPHINE SULFATE 5 MG: 10 SOLUTION ORAL at 10:31

## 2024-01-01 RX ADMIN — PROPOFOL 150 MCG/KG/MIN: 10 INJECTION, EMULSION INTRAVENOUS at 11:27

## 2024-01-01 RX ADMIN — CALCIUM CARBONATE (ANTACID) CHEW TAB 500 MG 1000 MG: 500 CHEW TAB at 07:35

## 2024-01-01 RX ADMIN — PANTOPRAZOLE SODIUM 40 MG: 40 INJECTION, POWDER, FOR SOLUTION INTRAVENOUS at 20:19

## 2024-01-01 RX ADMIN — SODIUM CHLORIDE 240 MG: 9 INJECTION, SOLUTION INTRAVENOUS at 09:45

## 2024-01-01 RX ADMIN — BENZONATATE 200 MG: 100 CAPSULE ORAL at 08:41

## 2024-01-01 RX ADMIN — SUCRALFATE 1 G: 1 SUSPENSION ORAL at 06:36

## 2024-01-01 RX ADMIN — HEPARIN, PORCINE (PF) 10 UNIT/ML INTRAVENOUS SYRINGE 5 ML: at 20:21

## 2024-01-01 RX ADMIN — PANTOPRAZOLE SODIUM 40 MG: 40 INJECTION, POWDER, FOR SOLUTION INTRAVENOUS at 09:21

## 2024-01-01 RX ADMIN — IOPAMIDOL 90 ML: 755 INJECTION, SOLUTION INTRAVENOUS at 11:39

## 2024-01-01 RX ADMIN — LIDOCAINE HYDROCHLORIDE 10 ML: 10 INJECTION, SOLUTION INFILTRATION; PERINEURAL at 13:32

## 2024-01-01 RX ADMIN — HEPARIN, PORCINE (PF) 10 UNIT/ML INTRAVENOUS SYRINGE 5 ML: at 12:14

## 2024-01-01 RX ADMIN — DICLOFENAC SODIUM 2 G: 10 GEL TOPICAL at 22:20

## 2024-01-01 RX ADMIN — Medication 31.6 MILLICURIE: at 08:15

## 2024-01-01 RX ADMIN — AMINOPHYLLINE 50 MG: 25 INJECTION, SOLUTION INTRAVENOUS at 08:15

## 2024-01-01 RX ADMIN — SODIUM CHLORIDE 61 ML: 9 INJECTION, SOLUTION INTRAVENOUS at 11:16

## 2024-01-01 RX ADMIN — FENTANYL CITRATE 50 MCG: 50 INJECTION, SOLUTION INTRAMUSCULAR; INTRAVENOUS at 13:28

## 2024-01-01 RX ADMIN — ACETAMINOPHEN 650 MG: 160 SOLUTION ORAL at 02:22

## 2024-01-01 RX ADMIN — CEFEPIME 2 G: 2 INJECTION, POWDER, FOR SOLUTION INTRAVENOUS at 10:58

## 2024-01-01 RX ADMIN — HEPARIN, PORCINE (PF) 10 UNIT/ML INTRAVENOUS SYRINGE 5 ML: at 16:33

## 2024-01-01 RX ADMIN — FENTANYL CITRATE 50 MCG: 50 INJECTION, SOLUTION INTRAMUSCULAR; INTRAVENOUS at 10:30

## 2024-01-01 RX ADMIN — SODIUM CHLORIDE, SODIUM LACTATE, POTASSIUM CHLORIDE, CALCIUM CHLORIDE AND DEXTROSE MONOHYDRATE: 5; 600; 310; 30; 20 INJECTION, SOLUTION INTRAVENOUS at 23:56

## 2024-01-01 RX ADMIN — PANTOPRAZOLE SODIUM 40 MG: 40 TABLET, DELAYED RELEASE ORAL at 08:15

## 2024-01-01 RX ADMIN — OXALIPLATIN 110 MG: 5 INJECTION, SOLUTION INTRAVENOUS at 10:46

## 2024-01-01 RX ADMIN — SENNOSIDES 5 ML: 8.8 LIQUID ORAL at 20:56

## 2024-01-01 RX ADMIN — ONDANSETRON 8 MG: 4 TABLET, ORALLY DISINTEGRATING ORAL at 09:04

## 2024-01-01 RX ADMIN — ONDANSETRON 4 MG: 4 TABLET, ORALLY DISINTEGRATING ORAL at 04:30

## 2024-01-01 RX ADMIN — SODIUM CHLORIDE 250 ML: 9 INJECTION, SOLUTION INTRAVENOUS at 09:16

## 2024-01-01 RX ADMIN — OXYCODONE HYDROCHLORIDE 2.5 MG: 5 TABLET ORAL at 16:39

## 2024-01-01 RX ADMIN — SODIUM CHLORIDE, SODIUM LACTATE, POTASSIUM CHLORIDE, CALCIUM CHLORIDE AND DEXTROSE MONOHYDRATE: 5; 600; 310; 30; 20 INJECTION, SOLUTION INTRAVENOUS at 09:46

## 2024-01-01 RX ADMIN — CARVEDILOL 6.25 MG: 6.25 TABLET, FILM COATED ORAL at 09:12

## 2024-01-01 RX ADMIN — FAMOTIDINE 20 MG: 10 INJECTION, SOLUTION INTRAVENOUS at 09:15

## 2024-01-01 RX ADMIN — HEPARIN, PORCINE (PF) 10 UNIT/ML INTRAVENOUS SYRINGE 5 ML: at 11:49

## 2024-01-01 RX ADMIN — CARVEDILOL 6.25 MG: 6.25 TABLET, FILM COATED ORAL at 08:43

## 2024-01-01 RX ADMIN — ATORVASTATIN CALCIUM 10 MG: 10 TABLET, FILM COATED ORAL at 08:41

## 2024-01-01 RX ADMIN — SUCRALFATE 1 G: 1 SUSPENSION ORAL at 22:20

## 2024-01-01 RX ADMIN — MORPHINE SULFATE 5 MG: 10 SOLUTION ORAL at 02:50

## 2024-01-01 RX ADMIN — BACLOFEN 10 MG: 10 TABLET ORAL at 13:41

## 2024-01-01 RX ADMIN — PANTOPRAZOLE SODIUM 40 MG: 40 INJECTION, POWDER, FOR SOLUTION INTRAVENOUS at 21:43

## 2024-01-01 RX ADMIN — ONDANSETRON 4 MG: 2 INJECTION INTRAMUSCULAR; INTRAVENOUS at 18:50

## 2024-01-01 RX ADMIN — FAMOTIDINE 20 MG: 40 POWDER, FOR SUSPENSION ORAL at 09:02

## 2024-01-01 RX ADMIN — SUCRALFATE 1 G: 1 SUSPENSION ORAL at 11:22

## 2024-01-01 RX ADMIN — HEPARIN, PORCINE (PF) 10 UNIT/ML INTRAVENOUS SYRINGE 5 ML: at 12:31

## 2024-01-01 RX ADMIN — LIDOCAINE HYDROCHLORIDE 5 ML: 20 SOLUTION ORAL at 20:55

## 2024-01-01 RX ADMIN — SODIUM CHLORIDE 1000 ML: 9 INJECTION, SOLUTION INTRAVENOUS at 11:33

## 2024-01-01 RX ADMIN — ACETAMINOPHEN 650 MG: 160 SOLUTION ORAL at 16:24

## 2024-01-01 RX ADMIN — POLYETHYLENE GLYCOL 3350 238 G: 17 POWDER, FOR SOLUTION ORAL at 18:46

## 2024-01-01 RX ADMIN — LIDOCAINE HYDROCHLORIDE AND EPINEPHRINE 10 ML: 10; 10 INJECTION, SOLUTION INFILTRATION; PERINEURAL at 10:47

## 2024-01-01 RX ADMIN — ATORVASTATIN CALCIUM 10 MG: 10 TABLET, FILM COATED ORAL at 08:02

## 2024-01-01 RX ADMIN — CARVEDILOL 6.25 MG: 6.25 TABLET, FILM COATED ORAL at 17:28

## 2024-01-01 RX ADMIN — SODIUM CHLORIDE 61 ML: 9 INJECTION, SOLUTION INTRAVENOUS at 13:20

## 2024-01-01 RX ADMIN — SUCRALFATE 1 G: 1 SUSPENSION ORAL at 11:52

## 2024-01-01 RX ADMIN — SODIUM CHLORIDE, SODIUM LACTATE, POTASSIUM CHLORIDE, CALCIUM CHLORIDE AND DEXTROSE MONOHYDRATE: 5; 600; 310; 30; 20 INJECTION, SOLUTION INTRAVENOUS at 12:44

## 2024-01-01 RX ADMIN — LIDOCAINE HYDROCHLORIDE 5 ML: 20 SOLUTION ORAL at 15:00

## 2024-01-01 RX ADMIN — DICLOFENAC SODIUM 2 G: 10 GEL TOPICAL at 13:42

## 2024-01-01 RX ADMIN — LIDOCAINE HYDROCHLORIDE 5 ML: 20 SOLUTION ORAL at 08:06

## 2024-01-01 RX ADMIN — Medication 5 ML: at 12:22

## 2024-01-01 RX ADMIN — BACLOFEN 10 MG: 10 TABLET ORAL at 15:35

## 2024-01-01 RX ADMIN — HEPARIN, PORCINE (PF) 10 UNIT/ML INTRAVENOUS SYRINGE 5 ML: at 18:50

## 2024-01-01 RX ADMIN — PANTOPRAZOLE SODIUM 40 MG: 40 INJECTION, POWDER, FOR SOLUTION INTRAVENOUS at 12:03

## 2024-01-01 RX ADMIN — PANTOPRAZOLE SODIUM 40 MG: 40 INJECTION, POWDER, FOR SOLUTION INTRAVENOUS at 20:41

## 2024-01-01 RX ADMIN — BENZONATATE 200 MG: 100 CAPSULE ORAL at 20:41

## 2024-01-01 RX ADMIN — DEXTROSE MONOHYDRATE 250 ML: 50 INJECTION, SOLUTION INTRAVENOUS at 10:24

## 2024-01-01 RX ADMIN — METOCLOPRAMIDE 7.5 MG: 5 TABLET ORAL at 11:30

## 2024-01-01 RX ADMIN — PROPOFOL 70 MG: 10 INJECTION, EMULSION INTRAVENOUS at 11:27

## 2024-01-01 RX ADMIN — METOCLOPRAMIDE 7.5 MG: 5 TABLET ORAL at 06:36

## 2024-01-01 RX ADMIN — SUCRALFATE 1 G: 1 SUSPENSION ORAL at 21:49

## 2024-01-01 RX ADMIN — BENZONATATE 200 MG: 100 CAPSULE ORAL at 20:04

## 2024-01-01 RX ADMIN — METOCLOPRAMIDE 7.5 MG: 5 TABLET ORAL at 05:58

## 2024-01-01 RX ADMIN — MIDAZOLAM HYDROCHLORIDE 1 MG: 1 INJECTION, SOLUTION INTRAMUSCULAR; INTRAVENOUS at 10:35

## 2024-01-01 RX ADMIN — PANTOPRAZOLE SODIUM 40 MG: 40 INJECTION, POWDER, FOR SOLUTION INTRAVENOUS at 08:43

## 2024-01-01 RX ADMIN — METOCLOPRAMIDE 5 MG: 5 TABLET ORAL at 16:51

## 2024-01-01 RX ADMIN — PANTOPRAZOLE SODIUM 40 MG: 40 INJECTION, POWDER, FOR SOLUTION INTRAVENOUS at 08:03

## 2024-01-01 RX ADMIN — HEPARIN, PORCINE (PF) 10 UNIT/ML INTRAVENOUS SYRINGE 5 ML: at 13:38

## 2024-01-01 RX ADMIN — DEXTROSE MONOHYDRATE 250 ML: 50 INJECTION, SOLUTION INTRAVENOUS at 10:56

## 2024-01-01 RX ADMIN — HEPARIN, PORCINE (PF) 10 UNIT/ML INTRAVENOUS SYRINGE 5 ML: at 04:25

## 2024-01-01 RX ADMIN — SUCRALFATE 1 G: 1 SUSPENSION ORAL at 15:57

## 2024-01-01 RX ADMIN — ATORVASTATIN CALCIUM 10 MG: 10 TABLET, FILM COATED ORAL at 09:12

## 2024-01-01 RX ADMIN — BACLOFEN 10 MG: 10 TABLET ORAL at 08:56

## 2024-01-01 RX ADMIN — POTASSIUM & SODIUM PHOSPHATES POWDER PACK 280-160-250 MG 2 PACKET: 280-160-250 PACK at 20:10

## 2024-01-01 RX ADMIN — CEFTRIAXONE SODIUM 2 G: 2 INJECTION, POWDER, FOR SOLUTION INTRAMUSCULAR; INTRAVENOUS at 15:41

## 2024-01-01 RX ADMIN — BACLOFEN 10 MG: 10 TABLET ORAL at 21:14

## 2024-01-01 RX ADMIN — METOCLOPRAMIDE 7.5 MG: 5 TABLET ORAL at 13:41

## 2024-01-01 RX ADMIN — SODIUM CHLORIDE, SODIUM LACTATE, POTASSIUM CHLORIDE, CALCIUM CHLORIDE: 600; 310; 30; 20 INJECTION, SOLUTION INTRAVENOUS at 11:24

## 2024-01-01 RX ADMIN — OXYCODONE HYDROCHLORIDE 2.5 MG: 5 TABLET ORAL at 21:31

## 2024-01-01 RX ADMIN — SUCRALFATE 1 G: 1 SUSPENSION ORAL at 15:36

## 2024-01-01 RX ADMIN — FAMOTIDINE 20 MG: 40 POWDER, FOR SUSPENSION ORAL at 20:10

## 2024-01-01 RX ADMIN — SENNOSIDES 5 ML: 8.8 LIQUID ORAL at 08:56

## 2024-01-01 RX ADMIN — METOCLOPRAMIDE 7.5 MG: 5 TABLET ORAL at 21:14

## 2024-01-01 RX ADMIN — LIDOCAINE HYDROCHLORIDE 5 ML: 20 SOLUTION ORAL at 09:43

## 2024-01-01 RX ADMIN — DEXAMETHASONE SODIUM PHOSPHATE: 10 INJECTION, SOLUTION INTRAMUSCULAR; INTRAVENOUS at 09:21

## 2024-01-01 RX ADMIN — PANTOPRAZOLE SODIUM 40 MG: 40 INJECTION, POWDER, FOR SOLUTION INTRAVENOUS at 20:56

## 2024-01-01 RX ADMIN — FOSAPREPITANT: 150 INJECTION, POWDER, LYOPHILIZED, FOR SOLUTION INTRAVENOUS at 09:40

## 2024-01-01 RX ADMIN — Medication 2 G: at 10:25

## 2024-01-01 RX ADMIN — SODIUM CHLORIDE 1000 ML: 9 INJECTION, SOLUTION INTRAVENOUS at 08:14

## 2024-01-01 RX ADMIN — SUCRALFATE 1 G: 1 SUSPENSION ORAL at 16:51

## 2024-01-01 RX ADMIN — CARVEDILOL 6.25 MG: 6.25 TABLET, FILM COATED ORAL at 16:46

## 2024-01-01 RX ADMIN — FILGRASTIM-AAFI 300 MCG: 300 INJECTION, SOLUTION SUBCUTANEOUS at 12:03

## 2024-01-01 RX ADMIN — MIDAZOLAM HYDROCHLORIDE 1 MG: 1 INJECTION, SOLUTION INTRAMUSCULAR; INTRAVENOUS at 10:27

## 2024-01-01 RX ADMIN — GLUCAGON 1 MG: KIT at 13:28

## 2024-01-01 RX ADMIN — DICLOFENAC SODIUM 2 G: 10 GEL TOPICAL at 17:12

## 2024-01-01 RX ADMIN — HYDROMORPHONE HYDROCHLORIDE 0.2 MG: 0.2 INJECTION, SOLUTION INTRAMUSCULAR; INTRAVENOUS; SUBCUTANEOUS at 10:29

## 2024-01-01 RX ADMIN — LIDOCAINE HYDROCHLORIDE 5 ML: 20 SOLUTION ORAL at 21:14

## 2024-01-01 RX ADMIN — IOPAMIDOL 70 ML: 755 INJECTION, SOLUTION INTRAVENOUS at 00:15

## 2024-01-01 RX ADMIN — Medication 1 MG: at 21:49

## 2024-01-01 RX ADMIN — CALCIUM CARBONATE (ANTACID) CHEW TAB 500 MG 1000 MG: 500 CHEW TAB at 17:56

## 2024-01-01 RX ADMIN — SUCRALFATE 1 G: 1 SUSPENSION ORAL at 12:17

## 2024-01-01 RX ADMIN — OXALIPLATIN 110 MG: 5 INJECTION, SOLUTION INTRAVENOUS at 10:25

## 2024-01-01 RX ADMIN — CARVEDILOL 6.25 MG: 6.25 TABLET, FILM COATED ORAL at 08:56

## 2024-01-01 RX ADMIN — CEFTRIAXONE SODIUM 1 G: 1 INJECTION, POWDER, FOR SOLUTION INTRAMUSCULAR; INTRAVENOUS at 10:31

## 2024-01-01 ASSESSMENT — ACTIVITIES OF DAILY LIVING (ADL)
ADLS_ACUITY_SCORE: 39
ADLS_ACUITY_SCORE: 43
ADLS_ACUITY_SCORE: 39
ADLS_ACUITY_SCORE: 43
ADLS_ACUITY_SCORE: 39
ADLS_ACUITY_SCORE: 36
ADLS_ACUITY_SCORE: 43
ADLS_ACUITY_SCORE: 43
ADLS_ACUITY_SCORE: 38
ADLS_ACUITY_SCORE: 42
ADLS_ACUITY_SCORE: 35
ADLS_ACUITY_SCORE: 35
ADLS_ACUITY_SCORE: 43
ADLS_ACUITY_SCORE: 43
ADLS_ACUITY_SCORE: 35
ADLS_ACUITY_SCORE: 43
ADLS_ACUITY_SCORE: 42
ADLS_ACUITY_SCORE: 43
ADLS_ACUITY_SCORE: 36
ADLS_ACUITY_SCORE: 35
ADLS_ACUITY_SCORE: 35
ADLS_ACUITY_SCORE: 36
ADLS_ACUITY_SCORE: 39
ADLS_ACUITY_SCORE: 39
ADLS_ACUITY_SCORE: 36
ADLS_ACUITY_SCORE: 44
ADLS_ACUITY_SCORE: 43
ADLS_ACUITY_SCORE: 39
ADLS_ACUITY_SCORE: 35
ADLS_ACUITY_SCORE: 43
ADLS_ACUITY_SCORE: 44
ADLS_ACUITY_SCORE: 39
ADLS_ACUITY_SCORE: 39
ADLS_ACUITY_SCORE: 35
ADLS_ACUITY_SCORE: 36
ADLS_ACUITY_SCORE: 39
ADLS_ACUITY_SCORE: 42
ADLS_ACUITY_SCORE: 43
ADLS_ACUITY_SCORE: 39
ADLS_ACUITY_SCORE: 35
ADLS_ACUITY_SCORE: 43
ADLS_ACUITY_SCORE: 35
ADLS_ACUITY_SCORE: 35
ADLS_ACUITY_SCORE: 43
ADLS_ACUITY_SCORE: 36
ADLS_ACUITY_SCORE: 39
ADLS_ACUITY_SCORE: 35
ADLS_ACUITY_SCORE: 35
ADLS_ACUITY_SCORE: 39
ADLS_ACUITY_SCORE: 39
ADLS_ACUITY_SCORE: 43
ADLS_ACUITY_SCORE: 35
ADLS_ACUITY_SCORE: 35
ADLS_ACUITY_SCORE: 43
ADLS_ACUITY_SCORE: 39
ADLS_ACUITY_SCORE: 43
ADLS_ACUITY_SCORE: 39
ADLS_ACUITY_SCORE: 43
ADLS_ACUITY_SCORE: 35
ADLS_ACUITY_SCORE: 38
ADLS_ACUITY_SCORE: 38
ADLS_ACUITY_SCORE: 43
ADLS_ACUITY_SCORE: 36
ADLS_ACUITY_SCORE: 39
ADLS_ACUITY_SCORE: 39
ADLS_ACUITY_SCORE: 38
ADLS_ACUITY_SCORE: 38
ADLS_ACUITY_SCORE: 36
ADLS_ACUITY_SCORE: 39
ADLS_ACUITY_SCORE: 43
ADLS_ACUITY_SCORE: 43
ADLS_ACUITY_SCORE: 44
ADLS_ACUITY_SCORE: 39
ADLS_ACUITY_SCORE: 43
ADLS_ACUITY_SCORE: 39
ADLS_ACUITY_SCORE: 36
ADLS_ACUITY_SCORE: 43
ADLS_ACUITY_SCORE: 38
ADLS_ACUITY_SCORE: 36
ADLS_ACUITY_SCORE: 35
ADLS_ACUITY_SCORE: 43
ADLS_ACUITY_SCORE: 42
ADLS_ACUITY_SCORE: 42
ADLS_ACUITY_SCORE: 43
ADLS_ACUITY_SCORE: 43
ADLS_ACUITY_SCORE: 32
ADLS_ACUITY_SCORE: 36
ADLS_ACUITY_SCORE: 43
ADLS_ACUITY_SCORE: 42
ADLS_ACUITY_SCORE: 39
ADLS_ACUITY_SCORE: 43
ADLS_ACUITY_SCORE: 42
ADLS_ACUITY_SCORE: 36
ADLS_ACUITY_SCORE: 43
ADLS_ACUITY_SCORE: 35
ADLS_ACUITY_SCORE: 43
ADLS_ACUITY_SCORE: 39
ADLS_ACUITY_SCORE: 43
ADLS_ACUITY_SCORE: 42
ADLS_ACUITY_SCORE: 43
ADLS_ACUITY_SCORE: 38
ADLS_ACUITY_SCORE: 43
ADLS_ACUITY_SCORE: 43
ADLS_ACUITY_SCORE: 36
ADLS_ACUITY_SCORE: 38
ADLS_ACUITY_SCORE: 43
ADLS_ACUITY_SCORE: 35
ADLS_ACUITY_SCORE: 43
ADLS_ACUITY_SCORE: 43
ADLS_ACUITY_SCORE: 35
ADLS_ACUITY_SCORE: 36
ADLS_ACUITY_SCORE: 39
ADLS_ACUITY_SCORE: 43
ADLS_ACUITY_SCORE: 43
ADLS_ACUITY_SCORE: 36
ADLS_ACUITY_SCORE: 39
ADLS_ACUITY_SCORE: 35
ADLS_ACUITY_SCORE: 43
ADLS_ACUITY_SCORE: 43
ADLS_ACUITY_SCORE: 44
ADLS_ACUITY_SCORE: 39
ADLS_ACUITY_SCORE: 43
ADLS_ACUITY_SCORE: 42
ADLS_ACUITY_SCORE: 42
ADLS_ACUITY_SCORE: 39
ADLS_ACUITY_SCORE: 38
ADLS_ACUITY_SCORE: 36
ADLS_ACUITY_SCORE: 39
ADLS_ACUITY_SCORE: 43
ADLS_ACUITY_SCORE: 39
ADLS_ACUITY_SCORE: 44
ADLS_ACUITY_SCORE: 39
ADLS_ACUITY_SCORE: 36
ADLS_ACUITY_SCORE: 35
ADLS_ACUITY_SCORE: 44
ADLS_ACUITY_SCORE: 44
ADLS_ACUITY_SCORE: 39
ADLS_ACUITY_SCORE: 38
ADLS_ACUITY_SCORE: 43
ADLS_ACUITY_SCORE: 35
ADLS_ACUITY_SCORE: 34
ADLS_ACUITY_SCORE: 43
ADLS_ACUITY_SCORE: 43
DEPENDENT_IADLS:: INDEPENDENT
ADLS_ACUITY_SCORE: 43
ADLS_ACUITY_SCORE: 43
ADLS_ACUITY_SCORE: 42
ADLS_ACUITY_SCORE: 43
ADLS_ACUITY_SCORE: 43
ADLS_ACUITY_SCORE: 36
ADLS_ACUITY_SCORE: 36
ADLS_ACUITY_SCORE: 43
ADLS_ACUITY_SCORE: 43
ADLS_ACUITY_SCORE: 39
ADLS_ACUITY_SCORE: 43
ADLS_ACUITY_SCORE: 35
ADLS_ACUITY_SCORE: 43
ADLS_ACUITY_SCORE: 43
ADLS_ACUITY_SCORE: 36
ADLS_ACUITY_SCORE: 43
ADLS_ACUITY_SCORE: 36
ADLS_ACUITY_SCORE: 38
ADLS_ACUITY_SCORE: 43
ADLS_ACUITY_SCORE: 36
ADLS_ACUITY_SCORE: 42
ADLS_ACUITY_SCORE: 36
ADLS_ACUITY_SCORE: 36
ADLS_ACUITY_SCORE: 32

## 2024-01-01 ASSESSMENT — PAIN SCALES - GENERAL
PAINLEVEL: MILD PAIN (3)
PAINLEVEL: NO PAIN (0)
PAINLEVEL: MILD PAIN (3)
PAINLEVEL: NO PAIN (0)
PAINLEVEL: MODERATE PAIN (4)
PAINLEVEL: NO PAIN (0)
PAINLEVEL: MILD PAIN (2)
PAINLEVEL: MILD PAIN (2)
PAINLEVEL: MODERATE PAIN (5)
PAINLEVEL: MILD PAIN (2)
PAINLEVEL: MODERATE PAIN (5)
PAINLEVEL: NO PAIN (0)

## 2024-01-01 ASSESSMENT — COLUMBIA-SUICIDE SEVERITY RATING SCALE - C-SSRS
1. IN THE PAST MONTH, HAVE YOU WISHED YOU WERE DEAD OR WISHED YOU COULD GO TO SLEEP AND NOT WAKE UP?: NO
2. HAVE YOU ACTUALLY HAD ANY THOUGHTS OF KILLING YOURSELF IN THE PAST MONTH?: NO
6. HAVE YOU EVER DONE ANYTHING, STARTED TO DO ANYTHING, OR PREPARED TO DO ANYTHING TO END YOUR LIFE?: NO
1. IN THE PAST MONTH, HAVE YOU WISHED YOU WERE DEAD OR WISHED YOU COULD GO TO SLEEP AND NOT WAKE UP?: NO
2. HAVE YOU ACTUALLY HAD ANY THOUGHTS OF KILLING YOURSELF IN THE PAST MONTH?: NO
6. HAVE YOU EVER DONE ANYTHING, STARTED TO DO ANYTHING, OR PREPARED TO DO ANYTHING TO END YOUR LIFE?: NO

## 2024-01-04 NOTE — LETTER
1/4/2024         RE: Liu Ragsdale  [id number] [facility] [id number]        Dear Colleague,    Thank you for referring your patient, Liu Ragsdale, to the [facility] [place]. Please see a copy of my visit note below.    GI CLINIC VISIT    CC/REFERRING MD:  No ref. provider found  REASON FOR CONSULTATION:   No ref. provider found for   Chief Complaint   Patient presents with    New Patient       ASSESSMENT/PLAN:    1.  Esophageal adenocarcinoma status post chemoradiation due to being a poor surgical candidate complicated by radiation esophagitis and esophageal stricture now status post dilation.  He has had a good response to PPI therapy and dilation.  He is currently having minimal symptoms and is tolerating regular diet with excellent weight gain.  I recommend we continue his omeprazole 40 mg twice daily until his repeat endoscopy in February.  If things continue to look improved we can likely decrease his omeprazole down to once daily.  I would recommend he continue to stay on once daily PPI given his history of radiation and esophageal adenocarcinoma.    Prior biopsies of his stricture showed no evidence of residual tumor.  We will see what his PET scan shows.  If there is persistent significant activity on the PET scan then we will make sure that we get repeat biopsies on his upcoming endoscopy.    - Continue omeprazole 40 mg p.o. twice daily  - Keep scheduled appointment for repeat endoscopy 2/21/2024 with Dr. Blanc  - Continue to follow-up with medical oncology and radiation oncology    2.  Nutrition-he is eating very well and has gained 15 pounds.  From my perspective he does not need his G-tube any longer.  I have sent a message to his care team (including Dr. Haines, Dr. Alcantar, Dr. Rogers and Dr. Levi) that from my perspective it is okay to remove the J-tube.      RTC PRN    Thank you for this consultation.  It was a pleasure to participate in the care  of this patient; please contact us with any further questions.  A total of 43 minutes, face to face, was spent with this patient, >50% of which was counseling regarding the above delineated issues.    This note was created with voice recognition software, and while reviewed for accuracy, typos may remain.     Ernst Hatfield MD  Inflammatory Bowel Disease Program  Division of Gastroenterology, Hepatology and Nutrition  [facility]  Pager: [phone]      HPI:    Liu is a very pleasant 83-year-old male with history of esophageal adenocarcinoma status post chemoradiation who is in clinic today to be evaluated for dysphagia.  He has follow-up with medical oncology, thoracic surgery, and radiation oncology.  He is felt to be a poor surgical candidate so surgery was not performed.  He was treated with chemo and radiation (5000 cGy and paclitaxel and carboplatin-finished on 8/25/2023).  He had residual dysphagia and some odynophagia.  PET scan did show activity in his distal esophagus.  He underwent an upper endoscopy in October by Dr. Salinas which showed significant radiation esophagitis and some nodular mucosa.  Biopsies showed inflammation but no evidence of tumor.  On 11/14/2023 he underwent an endoscopy by myself that showed esophageal stenosis likely due to radiation.  There was a 2 m stricture that was ulcerated and edematous.  This was dilated to 16 mm with good effect.  He did have improvement in his symptoms of dysphagia but after 3 weeks he had some worsening of his dysphagia.  Therefore, we repeated the endoscopy on 12/14/2023.  An improved stricture was found and was dilated to 19 mm with balloon.  The stricture was from 30 to 40 cm from incisors and was estimated to be 50 mm in diameter.  There was significant improvement in the inflammation from radiation.    Today, he notes continued improvement in his dysphagia.  He is able to tolerate a regular diet and has gained 15 pounds.  He does have a  slight sensation that food could be hung up a little bit at the level of his sternal notch.  However, this is much improved from prior to his dilation and it has not changed in the last several weeks.  Again he is tolerating a regular diet.    He denies any GERD symptoms.  He denies any chest discomfort or abdominal discomfort.    He is scheduled for a follow-up PET scan today and to follow-up with his oncology team on 1/11/2024.    ROS:    No fevers or chills  No weight loss  No blurry vision, double vision or change in vision  No sore throat  No lymphadenopathy  No headache, paraesthesias, or weakness in a limb  No shortness of breath or wheezing  No chest pain or pressure  No arthralgias or myalgias  No rashes or skin changes  No odynophagia or dysphagia  No BRBPR, hematochezia, melena  No dysuria, frequency or urgency  No hot/cold intolerance or polyria  No anxiety or depression    PREVIOUS ENDOSCOPY:  As above    PERTINENT RELEVANT IMAGING OR LABS:  As above    ALLERGIES:     Allergies   Allergen Reactions    Ampicillin Rash    Latex Rash    Metoprolol Other (See Comments)     Side effects : dry mouth       PERTINENT MEDICATIONS:    Current Outpatient Medications:     aspirin 81 MG EC tablet, Take 81 mg by mouth daily, Disp: , Rfl:     atorvastatin (LIPITOR) 10 MG tablet, Take 1 tablet (10 mg) by mouth daily, Disp: 90 tablet, Rfl: 1    carvedilol (COREG) 6.25 MG tablet, TAKE 1 TAB BY MOUTH PER J-TUBE 2 TIMES DAILY WITH MEALS, Disp: 90 tablet, Rfl: 0    COLLAGEN PO, 5 mLs by Per J Tube route every evening Collagen Extra Strength with 5 ml of Eniva VIBE-liquid and 5 ml of Eniva Cell Ready and qs with water to 55 ml, Disp: , Rfl:     diclofenac (VOLTAREN) 1 % topical gel, Place 4 g onto the skin 3 times daily as needed for moderate pain (Shoulder), Disp: 100 g, Rfl: 1    Multiple Vitamin (MULTI VITAMIN PO), 5 mLs by Per J Tube route daily Eniva VIBE-liquid with 5 ml of Eniva Cell Ready and 5 ml of Collagen qs with  water to 55 ml, Disp: , Rfl:     nitroGLYcerin (NITROSTAT) 0.3 MG sublingual tablet, For chest pain place 1 tablet under the tongue every 5 minutes for 3 doses. If symptoms persist 5 minutes after 1st dose call 911., Disp: 30 tablet, Rfl: 3    NONFORMULARY, 5 mLs by Per J Tube route daily Eniva Cell-Ready Multi Minerals is a concentrated, ionic liquid mineral dietary supplement blend to help support nutritional balance and wellbeing. With Eniva VIBE-liquid with 5 ml and 5 ml of Collagen qs with water to 55 ml, Disp: , Rfl:     omeprazole (PRILOSEC) 40 MG DR capsule, Take 1 capsule (40 mg) by mouth 2 times daily (before meals), Disp: 60 capsule, Rfl: 4    acetaminophen (TYLENOL) 160 MG/5ML solution, Take 15.625 mLs (500 mg) by mouth every 6 hours as needed for mild pain, Disp: 473 mL, Rfl: 3    famotidine (PEPCID) 40 MG/5ML suspension, 2.5 mLs (20 mg) by Per J Tube route daily as needed for heartburn, Disp: 50 mL, Rfl: 3    omeprazole (PRILOSEC) 20 MG DR capsule, Take 1 capsule (20 mg) by mouth daily, Disp: 30 capsule, Rfl: 3    ondansetron (ZOFRAN) 4 MG/5ML solution, Take 5 mLs (4 mg) by mouth every 8 hours as needed for nausea or vomiting, Disp: 50 mL, Rfl: 3    PROBLEM LIST  Patient Active Problem List    Diagnosis Date Noted    Upper GI bleed 08/29/2023     Priority: Medium    Anemia, unspecified type 08/29/2023     Priority: Medium    Jejunostomy tube in situ (H) 08/29/2023     Priority: Medium    Dysphagia 08/29/2023     Priority: Medium     J tube dependent      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) 07/03/2023     Priority: Medium     EOTD 10.23.23 - [place]       History of ischemic stroke 09/28/2021     Priority: Medium    Cerebrovascular accident (H) 09/27/2021     Priority: Medium    Near syncope 09/26/2021     Priority: Medium    Right carotid artery occlusion 09/26/2021     Priority: Medium    Degenerative joint disease of left hip 02/03/2021     Priority: Medium    Coronary artery disease  involving native coronary artery of native heart without angina pectoris 03/23/2020     Priority: Medium     coronary artery disease diagnosed in 03/2020 at which time he was noted to have a  of the proximal left circumflex into the OM, moderate to severe ramus disease and moderate to severe RCA/PLB disease.  He underwent drug-eluting stent placement from the proximal left circumflex into the OM1.  He has residual small vessel coronary artery disease as well as moderate to severe stenosis in the ramus and RCA/YAA for which medical management has been recommended.         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 11/06/2019     Priority: Medium    SOB (shortness of breath) on exertion 11/06/2019     Priority: Medium    Arthritis of right glenohumeral joint- moderate 11/06/2019     Priority: Medium    Arthritis of right acromioclavicular joint- advanced 11/06/2019     Priority: Medium    SNHL (sensory-neural hearing loss), asymmetrical 07/22/2019     Priority: Medium    FANI (obstructive sleep apnea) 03/15/2016     Priority: Medium     NOT using CPAP 3/15/16      BMI 31.0-31.9,adult 04/07/2015     Priority: Medium    Benign essential hypertension, BP goal <140/90 09/10/2014     Priority: Medium    Hyperlipidemia LDL goal <70 12/12/2013     Priority: Medium     Diagnosis updated by automated process. Provider to review and confirm.      Carotid bruit 03/30/2010     Priority: Medium     right carotid bruit on exam- u/s 7/09 with minimal stenosis on left and not well seen on right          PERTINENT PAST MEDICAL HISTORY:  Past Medical History:   Diagnosis Date    Angina at rest     Arthritis     Cerebrovascular accident (H) 09/27/2021     Coronary artery disease involving native coronary artery of native heart without angina pectoris     Dysphagia     Esophageal adenocarcinoma (H)     DISTAL ESOPHAGUS    Hypertension     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) 7/3/2023       PREVIOUS SURGERIES:  Past Surgical History:   Procedure Laterality Date    ARTHROPLASTY HIP Left 02/03/2021    Procedure: Total Hip Arthroplasty;  Surgeon: Andrew Arriola MD;  Location: [facility] OR    COLONOSCOPY N/A 06/16/2016    Procedure: COLONOSCOPY;  Surgeon: Randy Avendano MD;  Location: [facility] GI    CV LEFT HEART CATH Left 03/10/2020    Procedure: Left Heart Cath;  Surgeon: Vicente Lopez MD;  Location:  HEART CARDIAC CATH LAB    ENDARTERECTOMY CAROTID Right 9/29/2021    Procedure: ENDARTERECTOMY, CAROTID;  Surgeon: Raymond Andersen MD;  Location:  OR    ENDOSCOPIC ULTRASOUND UPPER GASTROINTESTINAL TRACT (GI) N/A 7/3/2023    Procedure: ENDOSCOPIC ULTRASOUND, ESOPHAGOSCOPY / UPPER GASTROINTESTINAL TRACT (GI);  Surgeon: Yunier Graves MD;  Location:  OR    ESOPHAGOSCOPY, GASTROSCOPY, DUODENOSCOPY (EGD), COMBINED N/A 6/5/2023    Procedure: Esophagoscopy, Gastroscopy, Duodenoscopy, With Biopsy;  Surgeon: Joseph Landers DO;  Location: [facility] GI    ESOPHAGOSCOPY, GASTROSCOPY, DUODENOSCOPY (EGD), COMBINED N/A 7/3/2023    Procedure: ESOPHAGOGASTRODUODENOSCOPY;  Surgeon: Yunier Graves MD;  Location:  OR    ESOPHAGOSCOPY, GASTROSCOPY, DUODENOSCOPY (EGD), COMBINED N/A 10/18/2023    Procedure: ESOPHAGOGASTRODUODENOSCOPY, WITH BIOPSY;  Surgeon: Jose Salinas MD;  Location:  GI    ESOPHAGOSCOPY, GASTROSCOPY, DUODENOSCOPY (EGD), COMBINED N/A 11/14/2023    Procedure: Esophagoscopy, gastroscopy, duodenoscopy (EGD), combined with Dilation;  Surgeon: Ernst Hatfield MD;  Location: [facility] OR    EYE SURGERY  2005    cataract surgery    IR JEJUNOSTOMY TUBE CHANGE  8/25/2023    LAPAROSCOPIC ASSISTED INSERTION TUBE JEJUNOSTOMY N/A  7/5/2023    Procedure: INSERTION, JEJUNOSTOMY TUBE, LAPAROSCOPY-ASSISTED;  Surgeon: Judy Holguin MD;  Location: [facility] OR    VASECTOMY  1981       SOCIAL HISTORY:  Social History     Socioeconomic History    Marital status:      Spouse name: Not on file    Number of children: Not on file    Years of education: Not on file    Highest education level: Not on file   Occupational History    Occupation:    Tobacco Use    Smoking status: Never     Passive exposure: Never    Smokeless tobacco: Never   Vaping Use    Vaping Use: Never used   Substance and Sexual Activity    Alcohol use: Yes     Comment: Beth only    Drug use: No    Sexual activity: Not Currently     Partners: Female     Birth control/protection: Male Surgical     Comment: [id number]   Other Topics Concern    Parent/sibling w/ CABG, MI or angioplasty before 65F 55M? No   Social History Narrative    Not on file     Social Determinants of Health     Financial Resource Strain: Low Risk  (11/6/2023)    Financial Resource Strain     Within the past 12 months, have you or your family members you live with been unable to get utilities (heat, electricity) when it was really needed?: No   Food Insecurity: Low Risk  (11/6/2023)    Food Insecurity     Within the past 12 months, did you worry that your food would run out before you got money to buy more?: No     Within the past 12 months, did the food you bought just not last and you didn t have money to get more?: No   Transportation Needs: Low Risk  (11/6/2023)    Transportation Needs     Within the past 12 months, has lack of transportation kept you from medical appointments, getting your medicines, non-medical meetings or appointments, work, or from getting things that you need?: No   Physical Activity: Not on file   Stress: Not on file   Social Connections: Not on file   Interpersonal Safety: Low Risk  (11/7/2023)    Interpersonal Safety     Do you feel physically and emotionally safe where  you currently live?: Yes     Within the past 12 months, have you been hit, slapped, kicked or otherwise physically hurt by someone?: No     Within the past 12 months, have you been humiliated or emotionally abused in other ways by your partner or ex-partner?: No   Housing Stability: Low Risk  (11/6/2023)    Housing Stability     Do you have housing? : Yes     Are you worried about losing your housing?: No       FAMILY HISTORY:  Family History   Problem Relation Age of Onset    Hypertension Sister     Kidney failure Sister     Chronic Obstructive Pulmonary Disease Sister     Thyroid Disease Sister     Seizure Disorder Paternal Grandmother     Mitral valve prolapse Daughter     No Known Problems Son     Cerebrovascular Disease No family hx of        Past/family/social history reviewed and no changes    PHYSICAL EXAMINATION:  Constitutional: aaox3, cooperative, pleasant, not dyspneic/diaphoretic, no acute distress  Vitals reviewed: BP (!) 146/88   Pulse 75   Ht 1.524 m (5')   Wt 77.1 kg (170 lb)   SpO2 94%   BMI 33.20 kg/m    Wt:   Wt Readings from Last 2 Encounters:   01/04/24 77.1 kg (170 lb)   12/14/23 74.8 kg (165 lb)      Eyes: Sclera anicteric/injected  Ears/nose/mouth/throat: Normal oropharynx without ulcers or exudate, mucus membranes moist, hearing intact  Neck: supple, thyroid normal size  CV: No edema  Respiratory: Unlabored breathing  Lymph: No axillary, submandibular, supraclavicular or inguinal lymphadenopathy  Abd:  Nondistended, +bs, no hepatosplenomegaly, nontender, no peritoneal signs; J tube in place  Skin: warm, perfused, no jaundice  Psych: Normal affect  MSK: Normal gait            Again, thank you for allowing me to participate in the care of your patient.      Sincerely,    Ernst Hatfield MD

## 2024-01-04 NOTE — NURSING NOTE
Chief Complaint   Patient presents with    New Patient       Vitals:    01/04/24 0831 01/04/24 0835   BP: (!) 172/82 (!) 146/88   Pulse: 75    SpO2: 94%    Weight: 77.1 kg (170 lb)    Height: 1.524 m (5')        Body mass index is 33.2 kg/m .    Roberta Peres

## 2024-01-04 NOTE — PROGRESS NOTES
GI CLINIC VISIT    CC/REFERRING MD:  No ref. provider found  REASON FOR CONSULTATION:   No ref. provider found for   Chief Complaint   Patient presents with    New Patient       ASSESSMENT/PLAN:    1.  Esophageal adenocarcinoma status post chemoradiation due to being a poor surgical candidate complicated by radiation esophagitis and esophageal stricture now status post dilation.  He has had a good response to PPI therapy and dilation.  He is currently having minimal symptoms and is tolerating regular diet with excellent weight gain.  I recommend we continue his omeprazole 40 mg twice daily until his repeat endoscopy in February.  If things continue to look improved we can likely decrease his omeprazole down to once daily.  I would recommend he continue to stay on once daily PPI given his history of radiation and esophageal adenocarcinoma.    Prior biopsies of his stricture showed no evidence of residual tumor.  We will see what his PET scan shows.  If there is persistent significant activity on the PET scan then we will make sure that we get repeat biopsies on his upcoming endoscopy.    - Continue omeprazole 40 mg p.o. twice daily  - Keep scheduled appointment for repeat endoscopy 2/21/2024 with Dr. Blanc  - Continue to follow-up with medical oncology and radiation oncology    2.  Nutrition-he is eating very well and has gained 15 pounds.  From my perspective he does not need his G-tube any longer.  I have sent a message to his care team (including Dr. Haines, Dr. Alcantar, Dr. Rogers and Dr. Levi) that from my perspective it is okay to remove the J-tube.      RTC PRN    Thank you for this consultation.  It was a pleasure to participate in the care of this patient; please contact us with any further questions.  A total of 43 minutes, face to face, was spent with this patient, >50% of which was counseling regarding the above delineated issues.    This note was created with voice recognition software, and while reviewed  for accuracy, typos may remain.     Ernst Hatfield MD  Inflammatory Bowel Disease Program  Division of Gastroenterology, Hepatology and Nutrition  [facility]  Pager: [phone]      HPI:    Liu is a very pleasant 83-year-old male with history of esophageal adenocarcinoma status post chemoradiation who is in clinic today to be evaluated for dysphagia.  He has follow-up with medical oncology, thoracic surgery, and radiation oncology.  He is felt to be a poor surgical candidate so surgery was not performed.  He was treated with chemo and radiation (5000 cGy and paclitaxel and carboplatin-finished on 8/25/2023).  He had residual dysphagia and some odynophagia.  PET scan did show activity in his distal esophagus.  He underwent an upper endoscopy in October by Dr. Salinas which showed significant radiation esophagitis and some nodular mucosa.  Biopsies showed inflammation but no evidence of tumor.  On 11/14/2023 he underwent an endoscopy by myself that showed esophageal stenosis likely due to radiation.  There was a 2 m stricture that was ulcerated and edematous.  This was dilated to 16 mm with good effect.  He did have improvement in his symptoms of dysphagia but after 3 weeks he had some worsening of his dysphagia.  Therefore, we repeated the endoscopy on 12/14/2023.  An improved stricture was found and was dilated to 19 mm with balloon.  The stricture was from 30 to 40 cm from incisors and was estimated to be 50 mm in diameter.  There was significant improvement in the inflammation from radiation.    Today, he notes continued improvement in his dysphagia.  He is able to tolerate a regular diet and has gained 15 pounds.  He does have a slight sensation that food could be hung up a little bit at the level of his sternal notch.  However, this is much improved from prior to his dilation and it has not changed in the last several weeks.  Again he is tolerating a regular diet.    He denies any GERD symptoms.  He  denies any chest discomfort or abdominal discomfort.    He is scheduled for a follow-up PET scan today and to follow-up with his oncology team on 1/11/2024.    ROS:    No fevers or chills  No weight loss  No blurry vision, double vision or change in vision  No sore throat  No lymphadenopathy  No headache, paraesthesias, or weakness in a limb  No shortness of breath or wheezing  No chest pain or pressure  No arthralgias or myalgias  No rashes or skin changes  No odynophagia or dysphagia  No BRBPR, hematochezia, melena  No dysuria, frequency or urgency  No hot/cold intolerance or polyria  No anxiety or depression    PREVIOUS ENDOSCOPY:  As above    PERTINENT RELEVANT IMAGING OR LABS:  As above    ALLERGIES:     Allergies   Allergen Reactions    Ampicillin Rash    Latex Rash    Metoprolol Other (See Comments)     Side effects : dry mouth       PERTINENT MEDICATIONS:    Current Outpatient Medications:     aspirin 81 MG EC tablet, Take 81 mg by mouth daily, Disp: , Rfl:     atorvastatin (LIPITOR) 10 MG tablet, Take 1 tablet (10 mg) by mouth daily, Disp: 90 tablet, Rfl: 1    carvedilol (COREG) 6.25 MG tablet, TAKE 1 TAB BY MOUTH PER J-TUBE 2 TIMES DAILY WITH MEALS, Disp: 90 tablet, Rfl: 0    COLLAGEN PO, 5 mLs by Per J Tube route every evening Collagen Extra Strength with 5 ml of Eniva VIBE-liquid and 5 ml of Eniva Cell Ready and qs with water to 55 ml, Disp: , Rfl:     diclofenac (VOLTAREN) 1 % topical gel, Place 4 g onto the skin 3 times daily as needed for moderate pain (Shoulder), Disp: 100 g, Rfl: 1    Multiple Vitamin (MULTI VITAMIN PO), 5 mLs by Per J Tube route daily Eniva VIBE-liquid with 5 ml of Eniva Cell Ready and 5 ml of Collagen qs with water to 55 ml, Disp: , Rfl:     nitroGLYcerin (NITROSTAT) 0.3 MG sublingual tablet, For chest pain place 1 tablet under the tongue every 5 minutes for 3 doses. If symptoms persist 5 minutes after 1st dose call 911., Disp: 30 tablet, Rfl: 3    NONFORMULARY, 5 mLs by Per J  Tube route daily Eniva Cell-Ready Multi Minerals is a concentrated, ionic liquid mineral dietary supplement blend to help support nutritional balance and wellbeing. With Eniva VIBE-liquid with 5 ml and 5 ml of Collagen qs with water to 55 ml, Disp: , Rfl:     omeprazole (PRILOSEC) 40 MG DR capsule, Take 1 capsule (40 mg) by mouth 2 times daily (before meals), Disp: 60 capsule, Rfl: 4    acetaminophen (TYLENOL) 160 MG/5ML solution, Take 15.625 mLs (500 mg) by mouth every 6 hours as needed for mild pain, Disp: 473 mL, Rfl: 3    famotidine (PEPCID) 40 MG/5ML suspension, 2.5 mLs (20 mg) by Per J Tube route daily as needed for heartburn, Disp: 50 mL, Rfl: 3    omeprazole (PRILOSEC) 20 MG DR capsule, Take 1 capsule (20 mg) by mouth daily, Disp: 30 capsule, Rfl: 3    ondansetron (ZOFRAN) 4 MG/5ML solution, Take 5 mLs (4 mg) by mouth every 8 hours as needed for nausea or vomiting, Disp: 50 mL, Rfl: 3    PROBLEM LIST  Patient Active Problem List    Diagnosis Date Noted    Upper GI bleed 08/29/2023     Priority: Medium    Anemia, unspecified type 08/29/2023     Priority: Medium    Jejunostomy tube in situ (H) 08/29/2023     Priority: Medium    Dysphagia 08/29/2023     Priority: Medium     J tube dependent      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) 07/03/2023     Priority: Medium     EOTD 10.23.23 - [place]ucky       History of ischemic stroke 09/28/2021     Priority: Medium    Cerebrovascular accident (H) 09/27/2021     Priority: Medium    Near syncope 09/26/2021     Priority: Medium    Right carotid artery occlusion 09/26/2021     Priority: Medium    Degenerative joint disease of left hip 02/03/2021     Priority: Medium    Coronary artery disease involving native coronary artery of native heart without angina pectoris 03/23/2020     Priority: Medium     coronary artery disease diagnosed in 03/2020 at which time he was noted to have a  of the proximal left circumflex into the OM, moderate to severe ramus disease and  moderate to severe RCA/PLB disease.  He underwent drug-eluting stent placement from the proximal left circumflex into the OM1.  He has residual small vessel coronary artery disease as well as moderate to severe stenosis in the ramus and RCA/YAA for which medical management has been recommended.         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 11/06/2019     Priority: Medium    SOB (shortness of breath) on exertion 11/06/2019     Priority: Medium    Arthritis of right glenohumeral joint- moderate 11/06/2019     Priority: Medium    Arthritis of right acromioclavicular joint- advanced 11/06/2019     Priority: Medium    SNHL (sensory-neural hearing loss), asymmetrical 07/22/2019     Priority: Medium    FANI (obstructive sleep apnea) 03/15/2016     Priority: Medium     NOT using CPAP 3/15/16      BMI 31.0-31.9,adult 04/07/2015     Priority: Medium    Benign essential hypertension, BP goal <140/90 09/10/2014     Priority: Medium    Hyperlipidemia LDL goal <70 12/12/2013     Priority: Medium     Diagnosis updated by automated process. Provider to review and confirm.      Carotid bruit 03/30/2010     Priority: Medium     right carotid bruit on exam- u/s 7/09 with minimal stenosis on left and not well seen on right          PERTINENT PAST MEDICAL HISTORY:  Past Medical History:   Diagnosis Date    Angina at rest     Arthritis     Cerebrovascular accident (H) 09/27/2021    Coronary artery disease involving native coronary artery of native heart without angina pectoris     Dysphagia     Esophageal adenocarcinoma (H)     DISTAL ESOPHAGUS    Hypertension     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) 7/3/2023       PREVIOUS SURGERIES:  Past  Surgical History:   Procedure Laterality Date    ARTHROPLASTY HIP Left 02/03/2021    Procedure: Total Hip Arthroplasty;  Surgeon: Andrew Arriola MD;  Location: [facility] OR    COLONOSCOPY N/A 06/16/2016    Procedure: COLONOSCOPY;  Surgeon: Randy Avendano MD;  Location: [facility] GI    CV LEFT HEART CATH Left 03/10/2020    Procedure: Left Heart Cath;  Surgeon: Vicente Lopez MD;  Location:  HEART CARDIAC CATH LAB    ENDARTERECTOMY CAROTID Right 9/29/2021    Procedure: ENDARTERECTOMY, CAROTID;  Surgeon: Raymond Andersen MD;  Location: [facility] OR    ENDOSCOPIC ULTRASOUND UPPER GASTROINTESTINAL TRACT (GI) N/A 7/3/2023    Procedure: ENDOSCOPIC ULTRASOUND, ESOPHAGOSCOPY / UPPER GASTROINTESTINAL TRACT (GI);  Surgeon: Yunier Graves MD;  Location:  OR    ESOPHAGOSCOPY, GASTROSCOPY, DUODENOSCOPY (EGD), COMBINED N/A 6/5/2023    Procedure: Esophagoscopy, Gastroscopy, Duodenoscopy, With Biopsy;  Surgeon: Joseph Landers DO;  Location: [facility] GI    ESOPHAGOSCOPY, GASTROSCOPY, DUODENOSCOPY (EGD), COMBINED N/A 7/3/2023    Procedure: ESOPHAGOGASTRODUODENOSCOPY;  Surgeon: Yunier Graves MD;  Location:  OR    ESOPHAGOSCOPY, GASTROSCOPY, DUODENOSCOPY (EGD), COMBINED N/A 10/18/2023    Procedure: ESOPHAGOGASTRODUODENOSCOPY, WITH BIOPSY;  Surgeon: Jose Salinas MD;  Location:  GI    ESOPHAGOSCOPY, GASTROSCOPY, DUODENOSCOPY (EGD), COMBINED N/A 11/14/2023    Procedure: Esophagoscopy, gastroscopy, duodenoscopy (EGD), combined with Dilation;  Surgeon: Ernst Hatfield MD;  Location: [facility] OR    EYE SURGERY  2005    cataract surgery    IR JEJUNOSTOMY TUBE CHANGE  8/25/2023    LAPAROSCOPIC ASSISTED INSERTION TUBE JEJUNOSTOMY N/A 7/5/2023    Procedure: INSERTION, JEJUNOSTOMY TUBE, LAPAROSCOPY-ASSISTED;  Surgeon: Judy Holguin MD;  Location: [facility] OR    VASECTOMY  1981       SOCIAL HISTORY:  Social History     Socioeconomic History    Marital status:      Spouse name: Not on file    Number of children:  Not on file    Years of education: Not on file    Highest education level: Not on file   Occupational History    Occupation:    Tobacco Use    Smoking status: Never     Passive exposure: Never    Smokeless tobacco: Never   Vaping Use    Vaping Use: Never used   Substance and Sexual Activity    Alcohol use: Yes     Comment: Beth only    Drug use: No    Sexual activity: Not Currently     Partners: Female     Birth control/protection: Male Surgical     Comment: [id number]   Other Topics Concern    Parent/sibling w/ CABG, MI or angioplasty before 65F 55M? No   Social History Narrative    Not on file     Social Determinants of Health     Financial Resource Strain: Low Risk  (11/6/2023)    Financial Resource Strain     Within the past 12 months, have you or your family members you live with been unable to get utilities (heat, electricity) when it was really needed?: No   Food Insecurity: Low Risk  (11/6/2023)    Food Insecurity     Within the past 12 months, did you worry that your food would run out before you got money to buy more?: No     Within the past 12 months, did the food you bought just not last and you didn t have money to get more?: No   Transportation Needs: Low Risk  (11/6/2023)    Transportation Needs     Within the past 12 months, has lack of transportation kept you from medical appointments, getting your medicines, non-medical meetings or appointments, work, or from getting things that you need?: No   Physical Activity: Not on file   Stress: Not on file   Social Connections: Not on file   Interpersonal Safety: Low Risk  (11/7/2023)    Interpersonal Safety     Do you feel physically and emotionally safe where you currently live?: Yes     Within the past 12 months, have you been hit, slapped, kicked or otherwise physically hurt by someone?: No     Within the past 12 months, have you been humiliated or emotionally abused in other ways by your partner or ex-partner?: No   Housing Stability:  Low Risk  (11/6/2023)    Housing Stability     Do you have housing? : Yes     Are you worried about losing your housing?: No       FAMILY HISTORY:  Family History   Problem Relation Age of Onset    Hypertension Sister     Kidney failure Sister     Chronic Obstructive Pulmonary Disease Sister     Thyroid Disease Sister     Seizure Disorder Paternal Grandmother     Mitral valve prolapse Daughter     No Known Problems Son     Cerebrovascular Disease No family hx of        Past/family/social history reviewed and no changes    PHYSICAL EXAMINATION:  Constitutional: aaox3, cooperative, pleasant, not dyspneic/diaphoretic, no acute distress  Vitals reviewed: BP (!) 146/88   Pulse 75   Ht 1.524 m (5')   Wt 77.1 kg (170 lb)   SpO2 94%   BMI 33.20 kg/m    Wt:   Wt Readings from Last 2 Encounters:   01/04/24 77.1 kg (170 lb)   12/14/23 74.8 kg (165 lb)      Eyes: Sclera anicteric/injected  Ears/nose/mouth/throat: Normal oropharynx without ulcers or exudate, mucus membranes moist, hearing intact  Neck: supple, thyroid normal size  CV: No edema  Respiratory: Unlabored breathing  Lymph: No axillary, submandibular, supraclavicular or inguinal lymphadenopathy  Abd:  Nondistended, +bs, no hepatosplenomegaly, nontender, no peritoneal signs; J tube in place  Skin: warm, perfused, no jaundice  Psych: Normal affect  MSK: Normal gait

## 2024-01-04 NOTE — PATIENT INSTRUCTIONS
It is good to see you today. I am glad you are doing well.    Please continue your omeprazole 40 mg twice daily. Take this until your repeat endoscopy in February. If all looks good we can try going down to once daily.    Continue your current diet.    Notify us if any significant changes is your swallowing before your next endoscopy    From my perspective you are ok to have your feeding tube removed. I will send a message to your care team.    Please get your PET scan today. If there is still significant activity in the lower esophagus we will get more biopsies on your upcoming endoscopy    Follow up as well needed

## 2024-01-04 NOTE — PROGRESS NOTES
Hematology/Medical Oncology Follow-up Note      January 11, 2024        Reason for visit:  Liu Ragsdale is a 83 year old retired  from [employer] accompanied by his wife Kelsi who presents for oncologic re-evaluation s/p 8/25/2023 completion concurrent chemoradiation with weekly paclitaxel/carboplatin for treatment of stage III-B distal esophageal adenocarcinoma.    Impression:  S/p 8/25/2023 completion of potential neoadjuvant chemoradiation for a stage III-B, uT2, uN2 distal esophageal adenocarcinoma, PD-L1 TPS 0, HER2 negative; there is no radiographic or clinical evidence of recurrent or metastatic disease.  His performance status is currently normal and he is doing quite well symptomatically  Jejunostomy tube enteral alimentation  History of carotid artery disease, coronary artery disease, hypertension    Recommendation, plan, instructions:  Medical oncology follow-up in 3 months with CT chest, abdomen and pelvis, CBC and CMP before appointment  I am also okay with having his jejunostomy tube removed    Time with patient including review, documentation, history, examination, coordination of care and counseling was 20 minutes.    History of present illness:  In April, 2023, the patient noted the onset of solid food dysphagia, acid reflux and 10 pound weight loss.    Subsequent 6/5/2023 EGD revealed a distal esophageal large fungating mass that was partly obstructing with biopsy confirming a moderately to poorly differentiated adenocarcinoma that was PD-L1 TPS 0, HER2 negative.  6/14/2023 PET scan confirmed a hypermetabolic distal esophageal mass with low to moderate hilar and mediastinal adenopathy possibly inflammatory in nature without clear evidence of distant metastases.  7/2/2023 EUS demonstrated a lesion to involve layer 4 with five regional paraesophageal lymph nodes identified either at or above the primary with one that was inferior with malignant features.  The remainder of the study was  unremarkable including gallbladder, pancreas and liver. (Clinical stage III-B, uT2, uN2, cM0)    On 6/20/2023, the patient was seen by Dr. Ferny Levi who felt the patient's performance status was that he may be a surgical candidate and will be reevaluated after chemoradiation.  On 7/5/2023, a diagnostic laparoscopy was performed with insertion of a jejunostomy feeding tube. On 7/24/2023, chemoradiation commenced after a one week delay because of a mild COVID-19 infection.    A posttreatment 9/28/2023 PET scan had revealed a probable complete response with residual activity in the distal esophagus likely representing inflammation.  On 10/3/2023  reevaluated and felt he was not a good surgical candidate at this time and was also concerned about persistent disease.  Follow-up 10/1820/2023 EGD revealed mucosal ulceration with negative biopsies.  Follow-up EGDs on 11/14/2023 and 12/14/2023 revealed 90 appearing, intrinsic mild stenosis measuring 1.5 cm with improved ulceration.    A follow-up 1/5/2024 PET scan revealed no evidence of recurrent or residual neoplasia with multiple new nodular opacity in the right lower lobe suspicious for an inflammatory process.    He does not use tobacco or alcohol. [employer]  from [id number].    Past medical history:  Past Medical History:   Diagnosis Date    Angina at rest     Arthritis     Cerebrovascular accident (H) 09/27/2021    Coronary artery disease involving native coronary artery of native heart without angina pectoris     Dysphagia     Esophageal adenocarcinoma (H)     DISTAL ESOPHAGUS    Hypertension     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) 7/3/2023        Family history:  I have reviewed this patient's family history and updated it with pertinent information if needed.  Family History   Problem Relation Age of Onset    Hypertension Sister     Kidney failure Sister     Chronic Obstructive Pulmonary Disease Sister     Thyroid Disease Sister      Seizure Disorder Paternal Grandmother     Mitral valve prolapse Daughter     No Known Problems Son     Cerebrovascular Disease No family hx of          Medications:  Current Outpatient Medications   Medication    aspirin 81 MG EC tablet    atorvastatin (LIPITOR) 10 MG tablet    carvedilol (COREG) 6.25 MG tablet    COLLAGEN PO    Multiple Vitamin (MULTI VITAMIN PO)    NONFORMULARY    omeprazole (PRILOSEC) 40 MG DR capsule    diclofenac (VOLTAREN) 1 % topical gel    nitroGLYcerin (NITROSTAT) 0.3 MG sublingual tablet     No current facility-administered medications for this visit.       Allergies:  Allergies   Allergen Reactions    Ampicillin Rash    Latex Rash    Metoprolol Other (See Comments)     Side effects : dry mouth       Review of systems:  He experiences momentary tightness in the throat when he swallows food or liquids when he is less erect.  Otherwise his swallowing is completely normal and he has gained lots of weight.    Except as noted in the note above, the patient denies headaches, diplopia, hearing loss or dizziness; dyspnea, cough, hemoptysis, pleurisy; chest pain/pressure, palpitations, lightheadedness; anorexia, nausea, vomiting, abdominal pain, diarrhea, constipation, melena or rectal bleeding; dysuria, frequency, nocturia, blood in the urine; fever, chills, sweats; tingling, numbness, loss of balance; insomnia, depression, anxiety.      Physical examination:  BP (!) 158/57 (BP Location: Right arm, Patient Position: Sitting, Cuff Size: Adult Regular)   Pulse 83   Temp 97.4  F (36.3  C) (Tympanic)   Resp 16   Ht 1.524 m (5')   Wt 78.2 kg (172 lb 6.4 oz)   SpO2 94%   BMI 33.67 kg/m      The patient is alert and oriented. Adenopathy is absent in the neck, axilla, groin and supraclavicular fossa; Lungs are clear to auscultation and percussion without rales, wheezes or rubs; heart rhythm is regular, heart sounds are without murmurs or gallops; the abdomen is soft and flat without  hepatosplenomegaly, masses, ascites or tenderness.; extremities and skin reveal no unusual skin lesions, joint swelling or edema; jejunostomy tube is present.    Tucker Haines MD

## 2024-01-11 NOTE — LETTER
1/11/2024         RE: Liu Ragsdale  [id number]  [facility] [address]        Dear Colleague,    Thank you for referring your patient, Liu Ragsdale, to the [facility]. Please see a copy of my visit note below.    Hematology/Medical Oncology Follow-up Note      January 11, 2024        Reason for visit:  Liu Ragsdale is a 83 year old retired  from [employer] accompanied by his wife Kelsi who presents for oncologic re-evaluation s/p 8/25/2023 completion concurrent chemoradiation with weekly paclitaxel/carboplatin for treatment of stage III-B distal esophageal adenocarcinoma.    Impression:  S/p 8/25/2023 completion of potential neoadjuvant chemoradiation for a stage III-B, uT2, uN2 distal esophageal adenocarcinoma, PD-L1 TPS 0, HER2 negative; there is no radiographic or clinical evidence of recurrent or metastatic disease.  His performance status is currently normal and he is doing quite well symptomatically  Jejunostomy tube enteral alimentation  History of carotid artery disease, coronary artery disease, hypertension    Recommendation, plan, instructions:  Medical oncology follow-up in 3 months with CT chest, abdomen and pelvis, CBC and CMP before appointment  I am also okay with having his jejunostomy tube removed    Time with patient including review, documentation, history, examination, coordination of care and counseling was 20 minutes.    History of present illness:  In April, 2023, the patient noted the onset of solid food dysphagia, acid reflux and 10 pound weight loss.    Subsequent 6/5/2023 EGD revealed a distal esophageal large fungating mass that was partly obstructing with biopsy confirming a moderately to poorly differentiated adenocarcinoma that was PD-L1 TPS 0, HER2 negative.  6/14/2023 PET scan confirmed a hypermetabolic distal esophageal mass with low to moderate hilar and mediastinal adenopathy possibly inflammatory in nature without clear  evidence of distant metastases.  7/2/2023 EUS demonstrated a lesion to involve layer 4 with five regional paraesophageal lymph nodes identified either at or above the primary with one that was inferior with malignant features.  The remainder of the study was unremarkable including gallbladder, pancreas and liver. (Clinical stage III-B, uT2, uN2, cM0)    On 6/20/2023, the patient was seen by Dr. Ferny Levi who felt the patient's performance status was that he may be a surgical candidate and will be reevaluated after chemoradiation.  On 7/5/2023, a diagnostic laparoscopy was performed with insertion of a jejunostomy feeding tube. On 7/24/2023, chemoradiation commenced after a one week delay because of a mild COVID-19 infection.    A posttreatment 9/28/2023 PET scan had revealed a probable complete response with residual activity in the distal esophagus likely representing inflammation.  On 10/3/2023  reevaluated and felt he was not a good surgical candidate at this time and was also concerned about persistent disease.  Follow-up 10/1820/2023 EGD revealed mucosal ulceration with negative biopsies.  Follow-up EGDs on 11/14/2023 and 12/14/2023 revealed 90 appearing, intrinsic mild stenosis measuring 1.5 cm with improved ulceration.    A follow-up 1/5/2024 PET scan revealed no evidence of recurrent or residual neoplasia with multiple new nodular opacity in the right lower lobe suspicious for an inflammatory process.    He does not use tobacco or alcohol.  [employer]  from [id number].    Past medical history:  Past Medical History:   Diagnosis Date     Angina at rest      Arthritis      Cerebrovascular accident (H) 09/27/2021     Coronary artery disease involving native coronary artery of native heart without angina pectoris      Dysphagia      Esophageal adenocarcinoma (H)     DISTAL ESOPHAGUS     Hypertension      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) 7/3/2023        Family history:  I have  reviewed this patient's family history and updated it with pertinent information if needed.  Family History   Problem Relation Age of Onset     Hypertension Sister      Kidney failure Sister      Chronic Obstructive Pulmonary Disease Sister      Thyroid Disease Sister      Seizure Disorder Paternal Grandmother      Mitral valve prolapse Daughter      No Known Problems Son      Cerebrovascular Disease No family hx of          Medications:  Current Outpatient Medications   Medication     aspirin 81 MG EC tablet     atorvastatin (LIPITOR) 10 MG tablet     carvedilol (COREG) 6.25 MG tablet     COLLAGEN PO     Multiple Vitamin (MULTI VITAMIN PO)     NONFORMULARY     omeprazole (PRILOSEC) 40 MG DR capsule     diclofenac (VOLTAREN) 1 % topical gel     nitroGLYcerin (NITROSTAT) 0.3 MG sublingual tablet     No current facility-administered medications for this visit.       Allergies:  Allergies   Allergen Reactions     Ampicillin Rash     Latex Rash     Metoprolol Other (See Comments)     Side effects : dry mouth       Review of systems:  He experiences momentary tightness in the throat when he swallows food or liquids when he is less erect.  Otherwise his swallowing is completely normal and he has gained lots of weight.    Except as noted in the note above, the patient denies headaches, diplopia, hearing loss or dizziness; dyspnea, cough, hemoptysis, pleurisy; chest pain/pressure, palpitations, lightheadedness; anorexia, nausea, vomiting, abdominal pain, diarrhea, constipation, melena or rectal bleeding; dysuria, frequency, nocturia, blood in the urine; fever, chills, sweats; tingling, numbness, loss of balance; insomnia, depression, anxiety.      Physical examination:  BP (!) 158/57 (BP Location: Right arm, Patient Position: Sitting, Cuff Size: Adult Regular)   Pulse 83   Temp 97.4  F (36.3  C) (Tympanic)   Resp 16   Ht 1.524 m (5')   Wt 78.2 kg (172 lb 6.4 oz)   SpO2 94%   BMI 33.67 kg/m      The patient is alert  and oriented. Adenopathy is absent in the neck, axilla, groin and supraclavicular fossa; Lungs are clear to auscultation and percussion without rales, wheezes or rubs; heart rhythm is regular, heart sounds are without murmurs or gallops; the abdomen is soft and flat without hepatosplenomegaly, masses, ascites or tenderness.; extremities and skin reveal no unusual skin lesions, joint swelling or edema; jejunostomy tube is present.    Tucker Haines MD    Oncology Rooming Note    January 11, 2024 8:53 AM   Liu Ragsdale is a 83 year old male who presents for:    Chief Complaint   Patient presents with     Oncology Clinic Visit     Malignant neoplasm of lower third of esophagus - provider only visit     Initial Vitals: BP (!) 158/57 (BP Location: Right arm, Patient Position: Sitting, Cuff Size: Adult Regular)   Pulse 83   Temp 97.4  F (36.3  C) (Tympanic)   Resp 16   Ht 1.524 m (5')   Wt 78.2 kg (172 lb 6.4 oz)   SpO2 94%   BMI 33.67 kg/m   Estimated body mass index is 33.67 kg/m  as calculated from the following:    Height as of this encounter: 1.524 m (5').    Weight as of this encounter: 78.2 kg (172 lb 6.4 oz). Body surface area is 1.82 meters squared.  Mild Pain (2) Comment: around j tube site   No LMP for male patient.  Allergies reviewed: Yes  Medications reviewed: Yes    Medications: Medication refills not needed today.  Pharmacy name entered into [facility]:    [facility] - [place], [place] - [address] [place] [address] [facility] [place] [facility] [address] [facility] AT PORTAL TO REGISTERED [place] SITES  [facility] - [place], [place] [facility] [address] [facility] [place] - [address] [facility]    Frailty Screening:   Is the patient here for a new oncology consult visit in cancer care? 2. No      Clinical concerns:  None      Carlos Farr                Again, thank you for allowing me to participate in the care of your patient.         Sincerely,        Tucker Haines MD

## 2024-01-11 NOTE — PROGRESS NOTES
Oncology Rooming Note    January 11, 2024 8:53 AM   Liu Ragsdale is a 83 year old male who presents for:    Chief Complaint   Patient presents with    Oncology Clinic Visit     Malignant neoplasm of lower third of esophagus - provider only visit     Initial Vitals: BP (!) 158/57 (BP Location: Right arm, Patient Position: Sitting, Cuff Size: Adult Regular)   Pulse 83   Temp 97.4  F (36.3  C) (Tympanic)   Resp 16   Ht 1.524 m (5')   Wt 78.2 kg (172 lb 6.4 oz)   SpO2 94%   BMI 33.67 kg/m   Estimated body mass index is 33.67 kg/m  as calculated from the following:    Height as of this encounter: 1.524 m (5').    Weight as of this encounter: 78.2 kg (172 lb 6.4 oz). Body surface area is 1.82 meters squared.  Mild Pain (2) Comment: around j tube site   No LMP for male patient.  Allergies reviewed: Yes  Medications reviewed: Yes    Medications: Medication refills not needed today.  Pharmacy name entered into [employer]:    [facility] - [place], [place] - [address] [facility] [place] - [address] [facility] AT PORTAL TO REGISTERED [facility] SITES  [facility] - [place], [place] - [address] [facility] [place] - [place], [place] - [address] [facility]    Frailty Screening:   Is the patient here for a new oncology consult visit in cancer care? 2. No      Clinical concerns:  None      Carlos Farr

## 2024-01-24 NOTE — PROGRESS NOTES
REASON FOR VISIT: Removal of jejunostomy tube    TUBE TYPE: 16 Fr jejunostomy tube    DATE PLACED: 07/05/2023    SUBJECTIVE: Liu is doing great. He completed chemotherapy and radiation therapy in August. He is eating 100% by mouth and has not used his feeding tube in several months. The tube does cause him some discomfort and he would like it removed.    OBJECTIVE:   BP (!) 161/86   Pulse 82   Temp 97.3  F (36.3  C) (Tympanic)   Ht 1.524 m (5')   Wt 78 kg (172 lb)   SpO2 98%   BMI 33.59 kg/m       Tube site is clean and dry. Surrounding skin is clean, dry and intact.     I deflated the retention balloon and removed the jejunostomy tube without any difficulty. There was scant bleeding. I applied a clean dressing and reviewed site care instructions with Liu.    From a personal perspective, he has breakfast with his friends at the [employer] every Wednesday. Today it was his turn to cook. He made pancakes and sausage.    SITE CARE INSTRUCTIONS: It may take 10-14 days for this to heal completely.    Keep a gauze dressing on and change it daily and as needed.    There are no dietary restrictions.    You may hear air gurgling noises coming from this site until it closes.    It is OK to shower but no tubs, pools, etc until healed.    If, after 3-4 weeks, you still have a little drainage and it hasn't closed, let us know.      PLAN: Follow up with Thoracic Surgery as needed    Total time spent, 15 minutes, all in counseling and coordination of care.    Natalia Shafer, CNS  Thoracic Surgery

## 2024-01-24 NOTE — PROGRESS NOTES
Assessment & Plan     Benign essential hypertension  Blood pressure within target goal of less than 140/90.  Recommended to monitor blood pressure at home regularly.  Continue healthy diet and activity as tolerated    Pain of right great toe  Experiencing right great toe pain months for last few months.  Differentials discussed in detail including chronic paronychia, ingrown toenail and musculoskeletal etiology.  Right great toe x-ray ordered and podiatry referral placed.  Patient can use athlete's foot cream for intermittent rash involving left fourth and fifth interdigital space, no rash currently.  - XR Toe Right G/E 2 Views; Future  - Orthopedic  Referral; Future      Subjective   Liu is a 83 year old, presenting for the following health issues:  Hypertension    History of Present Illness       Hypertension: He presents for follow up of hypertension.  He does check blood pressure  regularly outside of the clinic. Outside blood pressures have been over 140/90. He does not follow a low salt diet.     He eats 2-3 servings of fruits and vegetables daily.He consumes 1 sweetened beverage(s) daily.He exercises with enough effort to increase his heart rate 9 or less minutes per day.  He exercises with enough effort to increase his heart rate 4 days per week.   He is taking medications regularly.     Rash on toes on left foot little toe for 2 years, comes and goes, right foot is painful and red for 3-4 months.       Review of Systems  Constitutional, neuro, ENT, endocrine, pulmonary, cardiac, gastrointestinal, genitourinary, musculoskeletal, integument and psychiatric systems are negative, except as otherwise noted.      Objective    /70 (Cuff Size: Adult Large)   Pulse 78   Temp 97.3  F (36.3  C) (Tympanic)   Resp 18   Ht 1.524 m (5')   Wt 78 kg (172 lb)   SpO2 95%   BMI 33.59 kg/m    Body mass index is 33.59 kg/m .  Physical Exam   GENERAL: alert and no distress  EYES: Eyes grossly normal to  inspection, PERRL and conjunctivae and sclerae normal  HENT: normal cephalic/atraumatic, nose and mouth without ulcers or lesions, oropharynx clear, and oral mucous membranes moist  NECK: no adenopathy, no asymmetry, masses, or scars  RESP: lungs clear to auscultation - no rales, rhonchi or wheezes  CV: regular rates and rhythm, normal S1 S2, no S3 or S4, and no murmur, click or rub  MS: Right great toe nail fold mildly erythematous and swollen, ingrown bilateral great toenail, dry skin involving bilateral feet, chronic OA changes involving multiple joints, pedal pulses 3+, no pedal edema noted  NEURO: Normal strength and tone, mentation intact and speech normal  PSYCH: mentation appears normal, affect normal/bright      Signed Electronically by: Piyush Rogers MD

## 2024-01-24 NOTE — LETTER
1/24/2024         RE: Liu Ragsdale  [address] [facility] [address]        Dear Colleague,    Thank you for referring your patient, Liu Ragsdale, to the [facility]. Please see a copy of my visit note below.    REASON FOR VISIT: Removal of jejunostomy tube    TUBE TYPE: 16 Fr jejunostomy tube    DATE PLACED: 07/05/2023    SUBJECTIVE: Liu is doing great. He completed chemotherapy and radiation therapy in August. He is eating 100% by mouth and has not used his feeding tube in several months. The tube does cause him some discomfort and he would like it removed.    OBJECTIVE:   BP (!) 161/86   Pulse 82   Temp 97.3  F (36.3  C) (Tympanic)   Ht 1.524 m (5')   Wt 78 kg (172 lb)   SpO2 98%   BMI 33.59 kg/m       Tube site is clean and dry. Surrounding skin is clean, dry and intact.     I deflated the retention balloon and removed the jejunostomy tube without any difficulty. There was scant bleeding. I applied a clean dressing and reviewed site care instructions with Liu.    From a personal perspective, he has breakfast with his friends at the [employer] every Wednesday. Today it was his turn to cook. He made pancakes and sausage.    SITE CARE INSTRUCTIONS: It may take 10-14 days for this to heal completely.    Keep a gauze dressing on and change it daily and as needed.    There are no dietary restrictions.    You may hear air gurgling noises coming from this site until it closes.    It is OK to shower but no tubs, pools, etc until healed.    If, after 3-4 weeks, you still have a little drainage and it hasn't closed, let us know.      PLAN: Follow up with Thoracic Surgery as needed    Total time spent, 15 minutes, all in counseling and coordination of care.    Natalia Shafer, THAD  Thoracic Surgery      Again, thank you for allowing me to participate in the care of your patient.        Sincerely,        Natalia Shafer, GIO ONEIL

## 2024-01-24 NOTE — NURSING NOTE
Chief Complaint   Patient presents with    Hypertension     /70 (Cuff Size: Adult Large)   Pulse 78   Temp 97.3  F (36.3  C) (Tympanic)   Resp 18   Ht 1.524 m (5')   Wt 78 kg (172 lb)   SpO2 95%   BMI 33.59 kg/m   Estimated body mass index is 33.59 kg/m  as calculated from the following:    Height as of this encounter: 1.524 m (5').    Weight as of this encounter: 78 kg (172 lb).  Patient presents to the clinic using No DME      Health Maintenance that is potentially due pending provider review:    Health Maintenance Due   Topic Date Due    MEDICARE ANNUAL WELLNESS VISIT  09/25/2020

## 2024-01-25 NOTE — PROGRESS NOTES
PATIENT HISTORY:  Liu Ragsdale is a 83 year old male who presents to clinic in consultation at the request of  Piyush Rogers M.D. with a chief complaint of great toe of the right foot.  The patient is seen by themselves.  The patient relates the pain is primarily located around the right great toe, .  Patient describes injury as starting to hurt last fall, he thinks possibly he stubbed it, unsure.  The patient relates that the symptoms have been going on for several month(s).  The patient has previously tried different shoes, 4 with little relief.  The patient is retired.  Any previous notes and studies that pertain to the patient's condition were reviewed.    Pertinent medical, surgical and family history was reviewed in the [facility] chart.    Past Medical History:   Past Medical History:   Diagnosis Date    Angina at rest     Arthritis     Cerebrovascular accident (H) 09/27/2021    Coronary artery disease involving native coronary artery of native heart without angina pectoris     Dysphagia     Esophageal adenocarcinoma (H)     DISTAL ESOPHAGUS    Hypertension     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) 7/3/2023       Medications:   Current Outpatient Medications:     aspirin 81 MG EC tablet, Take 81 mg by mouth daily, Disp: , Rfl:     atorvastatin (LIPITOR) 10 MG tablet, Take 1 tablet (10 mg) by mouth daily, Disp: 90 tablet, Rfl: 1    carvedilol (COREG) 6.25 MG tablet, TAKE ONE TABLET BY MOUTH PER J-TUBE TWO TIMES DAILY WITH MEALS, Disp: 90 tablet, Rfl: 0    COLLAGEN PO, 5 mLs by Per J Tube route every evening Collagen Extra Strength with 5 ml of Eniva VIBE-liquid and 5 ml of Eniva Cell Ready and qs with water to 55 ml, Disp: , Rfl:     diclofenac (VOLTAREN) 1 % topical gel, Place 4 g onto the skin 3 times daily as needed for moderate pain (Shoulder) (Patient not taking: Reported on 1/11/2024), Disp: 100 g, Rfl: 1    Multiple Vitamin (MULTI VITAMIN PO), 5 mLs by Per J Tube route daily Eniva  VIBE-liquid with 5 ml of Eniva Cell Ready and 5 ml of Collagen qs with water to 55 ml, Disp: , Rfl:     nitroGLYcerin (NITROSTAT) 0.3 MG sublingual tablet, For chest pain place 1 tablet under the tongue every 5 minutes for 3 doses. If symptoms persist 5 minutes after 1st dose call 911. (Patient not taking: Reported on 1/11/2024), Disp: 30 tablet, Rfl: 3    NONFORMULARY, 5 mLs by Per J Tube route daily Eniva Cell-Ready Multi Minerals is a concentrated, ionic liquid mineral dietary supplement blend to help support nutritional balance and wellbeing. With Eniva VIBE-liquid with 5 ml and 5 ml of Collagen qs with water to 55 ml, Disp: , Rfl:     omeprazole (PRILOSEC) 40 MG DR capsule, Take 1 capsule (40 mg) by mouth 2 times daily (before meals), Disp: 60 capsule, Rfl: 4     Allergies:    Allergies   Allergen Reactions    Ampicillin Rash    Latex Rash    Metoprolol Other (See Comments)     Side effects : dry mouth       Vitals: There were no vitals taken for this visit.  BMI= There is no height or weight on file to calculate BMI.    LOWER EXTREMITY PHYSICAL EXAM    Dermatologic: Skin is intact to right lower extremity without significant lesions, rash or abrasion.        Vascular: DP & PT pulses are intact & regular on the right.   CFT and skin temperature is normal to the right lower extremity.     Neurologic: Lower extremity sensation is intact to light touch.  No evidence of weakness in the right lower extremity.        Musculoskeletal: Patient is ambulatory without assistive device or brace.  No gross ankle deformity noted.  No foot or ankle joint effusion is noted.  Noted pain with limited range of motion of the first metatarsophalangeal joint on the right.  No erythema or edema noted.    Diagnostics:  Radiographs included three views of the right foot demonstrating marked degenerative changes to the first metatarsophalangeal joint.  Noted bone callus formation around the proximal phalanx of the great toe.  No  cortical erosions or periosteal elevation.  All joint margins appear stable.  There is no apparent fracture or tumor formation noted.  There is no evidence of foreign body.  The images were independently reviewed by myself along with the patient explaining the findings.              ASSESSMENT / PLAN:     ICD-10-CM    1. Hallux rigidus, right foot  M20.21       2. Pain of right great toe  M79.674 Orthopedic  Referral          I have explained to Liu about the conditions.  We discussed the underlying contributing factors to the condition as well as both conservative and surgical treatment options along with expected length of recovery.  At this time, the patient was educated on the importance of offloading supportive shoes and other devices.  I demonstrated to the patient calf stretches to perform every hour daily until symptoms resolve.  After symptoms resolve, the patient was advised to perform the stretches 3 times daily to prevent future recurrence.  The patient was instructed to perform warm soaks with Epson salt after which to also apply over-the-counter Voltaren gel to deeply massage the injured tissue.  The patient was instructed to do this on a daily basis until symptoms resolve.   The patient may return in four weeks for reevaluation to determine if any further treatment will be needed.      Liu verbalized agreement with and understanding of the rational for the diagnosis and treatment plan.  All questions were answered to best of my ability and the patient's satisfaction. The patient was advised to contact the clinic with any questions that may arise after the clinic visit.      Disclaimer: This note consists of symbols derived from keyboarding, dictation and/or voice recognition software. As a result, there may be errors in the script that have gone undetected. Please consider this when interpreting information found in this chart.       JUAN Gómez D.P.M., [email or web address].

## 2024-01-25 NOTE — LETTER
1/25/2024         RE: Liu Ragsdale  [address] [facility] [id number]        Dear Colleague,    Thank you for referring your patient, Liu Ragsdale, to the [facility] [place]. Please see a copy of my visit note below.    PATIENT HISTORY:  Liu Ragsdale is a 83 year old male who presents to clinic in consultation at the request of  Piyush Rgoers M.D. with a chief complaint of great toe of the right foot.  The patient is seen by themselves.  The patient relates the pain is primarily located around the right great toe, .  Patient describes injury as starting to hurt last fall, he thinks possibly he stubbed it, unsure.  The patient relates that the symptoms have been going on for several month(s).  The patient has previously tried different shoes, 4 with little relief.  The patient is retired.  Any previous notes and studies that pertain to the patient's condition were reviewed.    Pertinent medical, surgical and family history was reviewed in the Epic chart.    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       Medications:   Current Outpatient Medications:      aspirin 81 MG EC tablet, Take 81 mg by mouth daily, Disp: , Rfl:      atorvastatin (LIPITOR) 10 MG tablet, Take 1 tablet (10 mg) by mouth daily, Disp: 90 tablet, Rfl: 1     carvedilol (COREG) 6.25 MG tablet, TAKE ONE TABLET BY MOUTH PER J-TUBE TWO TIMES DAILY WITH MEALS, Disp: 90 tablet, Rfl: 0     COLLAGEN PO, 5 mLs by Per J Tube route every evening Collagen Extra Strength with 5 ml of Eniva VIBE-liquid and 5 ml of Eniva Cell Ready and qs with water to 55 ml, Disp: ,  Rfl:      diclofenac (VOLTAREN) 1 % topical gel, Place 4 g onto the skin 3 times daily as needed for moderate pain (Shoulder) (Patient not taking: Reported on 1/11/2024), Disp: 100 g, Rfl: 1     Multiple Vitamin (MULTI VITAMIN PO), 5 mLs by Per J Tube route daily Eniva VIBE-liquid with 5 ml of Eniva Cell Ready and 5 ml of Collagen qs with water to 55 ml, Disp: , Rfl:      nitroGLYcerin (NITROSTAT) 0.3 MG sublingual tablet, For chest pain place 1 tablet under the tongue every 5 minutes for 3 doses. If symptoms persist 5 minutes after 1st dose call 911. (Patient not taking: Reported on 1/11/2024), Disp: 30 tablet, Rfl: 3     NONFORMULARY, 5 mLs by Per J Tube route daily Eniva Cell-Ready Multi Minerals is a concentrated, ionic liquid mineral dietary supplement blend to help support nutritional balance and wellbeing. With Eniva VIBE-liquid with 5 ml and 5 ml of Collagen qs with water to 55 ml, Disp: , Rfl:      omeprazole (PRILOSEC) 40 MG DR capsule, Take 1 capsule (40 mg) by mouth 2 times daily (before meals), Disp: 60 capsule, Rfl: 4     Allergies:    Allergies   Allergen Reactions     Ampicillin Rash     Latex Rash     Metoprolol Other (See Comments)     Side effects : dry mouth       Vitals: There were no vitals taken for this visit.  BMI= There is no height or weight on file to calculate BMI.    LOWER EXTREMITY PHYSICAL EXAM    Dermatologic: Skin is intact to right lower extremity without significant lesions, rash or abrasion.        Vascular: DP & PT pulses are intact & regular on the right.   CFT and skin temperature is normal to the right lower extremity.     Neurologic: Lower extremity sensation is intact to light touch.  No evidence of weakness in the right lower extremity.        Musculoskeletal: Patient is ambulatory without assistive device or brace.  No gross ankle deformity noted.  No foot or ankle joint effusion is noted.  Noted pain with limited range of motion of the first metatarsophalangeal joint on  the right.  No erythema or edema noted.    Diagnostics:  Radiographs included three views of the right foot demonstrating marked degenerative changes to the first metatarsophalangeal joint.  Noted bone callus formation around the proximal phalanx of the great toe.  No cortical erosions or periosteal elevation.  All joint margins appear stable.  There is no apparent fracture or tumor formation noted.  There is no evidence of foreign body.  The images were independently reviewed by myself along with the patient explaining the findings.              ASSESSMENT / PLAN:     ICD-10-CM    1. Hallux rigidus, right foot  M20.21       2. Pain of right great toe  M79.674 Orthopedic  Referral          I have explained to Liu about the conditions.  We discussed the underlying contributing factors to the condition as well as both conservative and surgical treatment options along with expected length of recovery.  At this time, the patient was educated on the importance of offloading supportive shoes and other devices.  I demonstrated to the patient calf stretches to perform every hour daily until symptoms resolve.  After symptoms resolve, the patient was advised to perform the stretches 3 times daily to prevent future recurrence.  The patient was instructed to perform warm soaks with Epson salt after which to also apply over-the-counter Voltaren gel to deeply massage the injured tissue.  The patient was instructed to do this on a daily basis until symptoms resolve.   The patient may return in four weeks for reevaluation to determine if any further treatment will be needed.      Lui verbalized agreement with and understanding of the rational for the diagnosis and treatment plan.  All questions were answered to best of my ability and the patient's satisfaction. The patient was advised to contact the clinic with any questions that may arise after the clinic visit.      Disclaimer: This note consists of symbols derived  from keyboarding, dictation and/or voice recognition software. As a result, there may be errors in the script that have gone undetected. Please consider this when interpreting information found in this chart.       JUAN Gómez D.P.M., F.A.C.F.A.S.      Again, thank you for allowing me to participate in the care of your patient.        Sincerely,        Conor Gómez DPM

## 2024-01-25 NOTE — PATIENT INSTRUCTIONS

## 2024-02-08 NOTE — TELEPHONE ENCOUNTER
Attempted to contact patient in order to complete pre assessment questions. Pre assessment completed below.     No answer. Cannot leave message        Brianna España, RN  Endoscopy Procedure Pre Assessment RN

## 2024-02-08 NOTE — TELEPHONE ENCOUNTER
Pre assessment completed for upcoming procedure.   (Please see previous telephone encounter notes for complete details)    Patient  returned call.     Patient stated he thought maybe the procedure was cancelled but was glad it wasn't. Patient wants to proceed as scheduled for EGD on 2/21/24.    Procedure details:    Arrival time and facility location reviewed.    Pre op exam needed? N/A    Designated  policy reviewed. Instructed to have someone stay 24  hours post procedure.     COVID policy reviewed.      Medication review:    Medications reviewed. Please see supporting documentation below. Holding recommendations discussed (if applicable).   NSAID medication(s): Ibuprofen (Advil, Motrin): HOLD 1 day before procedure.      Prep for procedure:     Procedure prep instructions reviewed.        Any additional information needed:  N/A      Patient  verbalized understanding and had no questions or concerns at this time.      Dilcia Davis RN  Endoscopy Procedure Pre Assessment RN  [phone] option 4

## 2024-02-08 NOTE — TELEPHONE ENCOUNTER
Pre visit planning completed.      Procedure details:    Patient scheduled for Upper endoscopy (EGD) on 2/21/24.     Arrival time: 0945. Procedure time 1045    Pre op exam needed? N/A    Facility location: [facility]; [address] [place] [address], [address] Floor, [place] [address]    Sedation type: MAC    Indication for procedure:     EGD with dilation       Chart review:     Electronic implanted devices? No    Recent diagnosis of diverticulitis within the last 6 weeks? No    Diabetic? No      Medication review:    Anticoagulants? No    NSAIDS? No    Other medication HOLDING recommendations:  N/A      Prep for procedure:     Bowel prep recommendation: N/A     Prep instructions sent via [employer]     Diana Johnson RN  Endoscopy Procedure Pre Assessment RN  [phone] option 4

## 2024-02-15 NOTE — LETTER
2/15/2024         RE: Liu Ragsdale  [address] [facility] [id number]        Dear Colleague,    Thank you for referring your patient, Liu Ragsdale, to the [facility]. Please see a copy of my visit note below.    Radiation Oncology Note    HPI:Mr. 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 He underwent chemoradiation therapy with plan for re-evaluation for surgical resection.     Radiation Treatment  7/24/23 to 8/25/23: Esophagus + omar regions, 5000 cGy  in 25 fractions    Patient returns for follow-up.    Posttreatment PET scan on 9/28/2023 demonstrated overall good response.  No definite nodule was noted.  There was also noted reduced PET avidity indeterminate for inflammatory change versus residual malignancy.    The patient was seen by Dr. Levi of thoracic surgery team who reportedly discussed proceeding with scope and possible biopsy to determine next steps in management.    On 10/18/2023 the patient underwent scope with biopsy.  Scope did not demonstrate any clear evidence of residual disease.  Biopsy was negative for malignancy.    The patient subsequently underwent upper endoscopy on 11/14/2023 and 12/14/2023.  No noted evidence of recurrent disease.  Has underwent dilation for stricture formation.    Most recent PET scan on 1/4/2024 demonstrated continued reduction in activity and area of prior treatment site.  No clear evidence of recurrent disease noted.    Overall, the patient is doing well.  Has been able to advance diet orally.  Tolerating almost all foods.  Does have mild dysphagia but this is improving with dilation.  PEG is out.  Weight is up.           Plan:  Posttreatment PET scan (9/28/2023) demonstrated overall good response.  No definite nodule was noted.  There was also noted reduced PET avidity indeterminate for inflammatory change versus residual malignancy.   On 10/18/2023 the patient underwent scope with biopsy.  Scope did not demonstrate any clear evidence of residual disease.  Biopsy was negative for malignancy. Most recent PET scan on 1/4/2024 demonstrated continued reduction in activity and area of prior treatment site.  No clear evidence of recurrent disease noted.  Continue follow-up and oncologic surveillance with medical oncology team (Dr. Haines).  Esophageal stricture.  Following with GI team, improving with dilation.  Return to clinic in 3 months.      Jarred Alcantar M.D.  Department of Radiation Oncology  [facility]               Again, thank you for allowing me to participate in the care of your patient.        Sincerely,        Jarred Alcantar MD

## 2024-02-15 NOTE — PROGRESS NOTES
Radiation Oncology Note    HPI:Mr. 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 He underwent chemoradiation therapy with plan for re-evaluation for surgical resection.     Radiation Treatment  7/24/23 to 8/25/23: Esophagus + omar regions, 5000 cGy  in 25 fractions    Patient returns for follow-up.    Posttreatment PET scan on 9/28/2023 demonstrated overall good response.  No definite nodule was noted.  There was also noted reduced PET avidity indeterminate for inflammatory change versus residual malignancy.    The patient was seen by Dr. Levi of thoracic surgery team who reportedly discussed proceeding with scope and possible biopsy to determine next steps in management.    On 10/18/2023 the patient underwent scope with biopsy.  Scope did not demonstrate any clear evidence of residual disease.  Biopsy was negative for malignancy.    The patient subsequently underwent upper endoscopy on 11/14/2023 and 12/14/2023.  No noted evidence of recurrent disease.  Has underwent dilation for stricture formation.    Most recent PET scan on 1/4/2024 demonstrated continued reduction in activity and area of prior treatment site.  No clear evidence of recurrent disease noted.    Overall, the patient is doing well.  Has been able to advance diet orally.  Tolerating almost all foods.  Does have mild dysphagia but this is improving with dilation.  PEG is out.  Weight is up.           Plan:  Posttreatment PET scan (9/28/2023) demonstrated overall good response.  No definite nodule was noted.  There was also noted reduced PET avidity indeterminate for inflammatory change versus residual malignancy.  On 10/18/2023 the patient underwent scope with biopsy.  Scope did not demonstrate any clear evidence of residual disease.  Biopsy was negative for malignancy. Most recent PET scan on 1/4/2024 demonstrated continued reduction in activity and area of prior  treatment site.  No clear evidence of recurrent disease noted.  Continue follow-up and oncologic surveillance with medical oncology team (Dr. Haines).  Esophageal stricture.  Following with GI team, improving with dilation.  Return to clinic in 3 months.      Jarred Alcantar M.D.  Department of Radiation Oncology  [facility]

## 2024-02-15 NOTE — NURSING NOTE
Oncology Rooming Note    February 15, 2024 3:10 PM   Liu Ragsdale is a 83 year old male who presents for:    Chief Complaint   Patient presents with    Radiation Therapy     Return visit with Dr. Alcantar     Initial Vitals: BP (!) 186/88 (BP Location: Left arm, Cuff Size: Adult Large)   Pulse 86   Resp 16   Wt 80.4 kg (177 lb 3.2 oz)   SpO2 91%   BMI 34.61 kg/m   Estimated body mass index is 34.61 kg/m  as calculated from the following:    Height as of 1/24/24: 1.524 m (5').    Weight as of this encounter: 80.4 kg (177 lb 3.2 oz). Body surface area is 1.84 meters squared.  No Pain (0) Comment: Data Unavailable   No LMP for male patient.  Allergies reviewed: Yes  Medications reviewed: Yes    Medications: Medication refills not needed today.  Pharmacy name entered into EPIC:    [facility] - [place], [place] - [address] [address] [facility] AT PORTAL TO [place] [facility] SITES  [facility] - [place], [place] - [address] [facility] [place] - [place], [place] - [address] [facility]    Frailty Screening:   Is the patient here for a new oncology consult visit in cancer care? 2. No      Clinical concerns: follow up today with Dr. Ortiz Cameron RN

## 2024-02-21 NOTE — ANESTHESIA POSTPROCEDURE EVALUATION
Patient: Liu Ragsdale    Procedure: Procedure(s):  ESOPHAGOGASTRODUODENOSCOPY, WITH BIOPSY AND BALLOON DILATION       Anesthesia Type:  MAC    Note:  Disposition: Outpatient   Postop Pain Control: Uneventful            Sign Out: Well controlled pain   PONV: No   Neuro/Psych: Uneventful            Sign Out: Acceptable/Baseline neuro status   Airway/Respiratory: Uneventful            Sign Out: Acceptable/Baseline resp. status   CV/Hemodynamics: Uneventful            Sign Out: Acceptable CV status; No obvious hypovolemia; No obvious fluid overload   Other NRE: NONE   DID A NON-ROUTINE EVENT OCCUR? No           Last vitals:  Vitals Value Taken Time   /71 02/21/24 1215   Temp 36  C (96.8  F) 02/21/24 1200   Pulse 80 02/21/24 1215   Resp 14 02/21/24 1215   SpO2 95 % 02/21/24 1215       Electronically Signed By: Vega Lees MD, MD  February 21, 2024  12:48 PM

## 2024-02-21 NOTE — ANESTHESIA CARE TRANSFER NOTE
Patient: Liu Ragsdale    Procedure: Procedure(s):  ESOPHAGOGASTRODUODENOSCOPY, WITH BIOPSY AND BALLOON DILATION       Diagnosis: Radiation-induced esophageal stricture [K22.2]  Diagnosis Additional Information: No value filed.    Anesthesia Type:   MAC     Note:    Oropharynx: oropharynx clear of all foreign objects and spontaneously breathing  Level of Consciousness: drowsy  Oxygen Supplementation: room air    Independent Airway: airway patency satisfactory and stable  Dentition: dentition unchanged  Vital Signs Stable: post-procedure vital signs reviewed and stable  Report to RN Given: handoff report given  Patient transferred to: Phase II    Handoff Report: Identifed the Patient, Identified the Reponsible Provider, Reviewed the pertinent medical history, Discussed the surgical course, Reviewed Intra-OP anesthesia mangement and issues during anesthesia, Set expectations for post-procedure period and Allowed opportunity for questions and acknowledgement of understanding      Vitals:  Vitals Value Taken Time   /71 02/21/24 1215   Temp 36  C (96.8  F) 02/21/24 1200   Pulse 80 02/21/24 1215   Resp 14 02/21/24 1215   SpO2 95 % 02/21/24 1215       Electronically Signed By: GIO Fuller CRNA  February 21, 2024  12:22 PM

## 2024-02-21 NOTE — ANESTHESIA PREPROCEDURE EVALUATION
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) 2023    Status post coronary angiogram 03/10/2020      Past Surgical History:   Procedure Laterality Date    ARTHROPLASTY HIP Left 2021    Procedure: Total Hip Arthroplasty;  Surgeon: Andrew Arriola MD;  Location: [facility]    COLONOSCOPY N/A 2016    Procedure: COLONOSCOPY;  Surgeon: Randy Avendano MD;  Location: [facility] GI    CV LEFT HEART CATH Left 03/10/2020    Procedure: Left Heart Cath;  Surgeon: Vicente Lopez MD;  Location:  HEART CARDIAC CATH LAB    ENDARTERECTOMY CAROTID Right 2021    Procedure: ENDARTERECTOMY, CAROTID;  Surgeon: Raymond Andersen MD;  Location:  OR    ENDOSCOPIC ULTRASOUND UPPER GASTROINTESTINAL TRACT (GI) N/A 7/3/2023    Procedure: ENDOSCOPIC ULTRASOUND, ESOPHAGOSCOPY / UPPER GASTROINTESTINAL TRACT (GI);  Surgeon: Yunier Graves MD;  Location:  OR    ESOPHAGOSCOPY, GASTROSCOPY, DUODENOSCOPY (EGD), COMBINED N/A 2023    Procedure: Esophagoscopy, Gastroscopy, Duodenoscopy, With Biopsy;  Surgeon: Joseph Landers DO;  Location: [facility] GI    ESOPHAGOSCOPY, GASTROSCOPY, DUODENOSCOPY (EGD), COMBINED N/A 7/3/2023    Procedure: ESOPHAGOGASTRODUODENOSCOPY;  Surgeon: Yunier Graves MD;  Location:  OR    ESOPHAGOSCOPY, GASTROSCOPY, DUODENOSCOPY (EGD), COMBINED N/A 10/18/2023    Procedure: ESOPHAGOGASTRODUODENOSCOPY, WITH BIOPSY;  Surgeon: Jose Salinas MD;  Location:  GI    ESOPHAGOSCOPY,  GASTROSCOPY, DUODENOSCOPY (EGD), COMBINED N/A 11/14/2023    Procedure: Esophagoscopy, gastroscopy, duodenoscopy (EGD), combined with Dilation;  Surgeon: Ernst Hatfield MD;  Location: [facility] OR    EYE SURGERY  2005    cataract surgery    IR JEJUNOSTOMY TUBE CHANGE  8/25/2023    LAPAROSCOPIC ASSISTED INSERTION TUBE JEJUNOSTOMY N/A 7/5/2023    Procedure: INSERTION, JEJUNOSTOMY TUBE, LAPAROSCOPY-ASSISTED;  Surgeon: Judy Holguin MD;  Location: [facility] OR    VASECTOMY  1981      Allergies   Allergen Reactions    Ampicillin Rash    Latex Rash    Metoprolol Other (See Comments)     Side effects : dry mouth      Social History     Tobacco Use    Smoking status: Never     Passive exposure: Never    Smokeless tobacco: Never   Substance Use Topics    Alcohol use: Yes     Comment: Beth only      Wt Readings from Last 1 Encounters:   02/21/24 79.4 kg (175 lb)        Anesthesia Evaluation   Pt has had prior anesthetic.     No history of anesthetic complications       ROS/MED HX  ENT/Pulmonary:     (+) sleep apnea, uses CPAP,                                      Neurologic:     (+)          CVA,  with deficits,  TIA,                  Cardiovascular:     (+)  hypertension- -  CAD angina-  - stent-                                      METS/Exercise Tolerance:     Hematologic:       Musculoskeletal:       GI/Hepatic:       Renal/Genitourinary:       Endo:       Psychiatric/Substance Use:       Infectious Disease:       Malignancy:       Other:            Physical Exam    Airway  airway exam normal           Respiratory Devices and Support         Dental       (+) Minor Abnormalities - some fillings, tiny chips      Cardiovascular   cardiovascular exam normal          Pulmonary   pulmonary exam normal                OUTSIDE LABS:  CBC:   Lab Results   Component Value Date    WBC 6.2 01/04/2024    WBC 7.1 09/14/2023    HGB 13.9 01/04/2024    HGB 12.1 (L) 09/14/2023    HCT 41.6 01/04/2024    HCT 37.6 (L) 09/14/2023      "01/04/2024     09/14/2023     BMP:   Lab Results   Component Value Date     01/04/2024     09/14/2023    POTASSIUM 4.3 01/04/2024    POTASSIUM 4.3 09/14/2023    CHLORIDE 105 01/04/2024    CHLORIDE 102 09/14/2023    CO2 25 01/04/2024    CO2 26 09/14/2023    BUN 12.1 01/04/2024    BUN 16.6 09/14/2023    CR 1.2 01/04/2024    CR 1.14 01/04/2024     (H) 01/04/2024     (H) 09/14/2023     COAGS:   Lab Results   Component Value Date    PTT 24 09/29/2021    INR 0.84 (L) 09/29/2021     POC: No results found for: \"BGM\", \"HCG\", \"HCGS\"  HEPATIC:   Lab Results   Component Value Date    ALBUMIN 4.0 01/04/2024    PROTTOTAL 7.4 01/04/2024    ALT 17 01/04/2024    AST 23 01/04/2024    ALKPHOS 127 01/04/2024    BILITOTAL 0.5 01/04/2024     OTHER:   Lab Results   Component Value Date    A1C 6.0 (H) 09/26/2021    GWEN 9.5 01/04/2024    PHOS 3.2 07/10/2023    MAG 1.9 08/31/2023    LIPASE 57 03/31/2007    AMYLASE 72 03/31/2007    TSH 1.41 05/30/2014       Anesthesia Plan    ASA Status:  3    NPO Status:  NPO Appropriate    Anesthesia Type: MAC.     - Reason for MAC: straight local not clinically adequate   Induction: Intravenous.   Maintenance: TIVA.        Consents    Anesthesia Plan(s) and associated risks, benefits, and realistic alternatives discussed. Questions answered and patient/representative(s) expressed understanding.     - Discussed: Risks, Benefits and Alternatives for BOTH SEDATION and the PROCEDURE were discussed     - Discussed with:  Patient            Postoperative Care    Pain management: Oral pain medications, Multi-modal analgesia.   PONV prophylaxis: Ondansetron (or other 5HT-3), Dexamethasone or Solumedrol     Comments:               Vega Lees MD, MD    I have reviewed the pertinent notes and labs in the chart from the past 30 days and (re)examined the patient.  Any updates or changes from those notes are reflected in this note.             # Drug Induced Platelet Defect: " home medication list includes an antiplatelet medication  # Obesity: Estimated body mass index is 34.18 kg/m  as calculated from the following:    Height as of this encounter: 1.524 m (5').    Weight as of this encounter: 79.4 kg (175 lb).

## 2024-02-21 NOTE — H&P
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    SOB (shortness of breath) on exertion    Arthritis of right glenohumeral joint- moderate    Arthritis of right acromioclavicular joint- advanced    Positive cardiac stress test    Status post coronary angiogram    Coronary artery disease involving native coronary artery of native heart without angina pectoris    Degenerative joint disease of left hip    Near syncope    Right carotid artery occlusion    Cerebrovascular accident (H)    History of ischemic stroke    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)    Upper GI bleed    Anemia, unspecified type    Jejunostomy tube in situ (H)    Dysphagia       Past Surgical History:    Past Surgical History:   Procedure Laterality Date    ARTHROPLASTY HIP Left 02/03/2021    Procedure: Total Hip Arthroplasty;  Surgeon: Andrew Arriola MD;  Location: [facility]    COLONOSCOPY N/A 06/16/2016    Procedure: COLONOSCOPY;  Surgeon: Rnady Avendano MD;  Location: [facility]    CV LEFT HEART CATH Left 03/10/2020    Procedure: Left Heart Cath;  Surgeon: Vicente Lopez MD;  Location:  HEART CARDIAC CATH LAB    ENDARTERECTOMY CAROTID Right 9/29/2021    Procedure: ENDARTERECTOMY, CAROTID;  Surgeon: Raymond Andersen MD;  Location:  OR    ENDOSCOPIC ULTRASOUND UPPER GASTROINTESTINAL TRACT (GI) N/A 7/3/2023    Procedure: ENDOSCOPIC ULTRASOUND, ESOPHAGOSCOPY / UPPER GASTROINTESTINAL TRACT (GI);  Surgeon: Yunier Graves MD;  Location:  OR    ESOPHAGOSCOPY, GASTROSCOPY, DUODENOSCOPY  (EGD), COMBINED N/A 6/5/2023    Procedure: Esophagoscopy, Gastroscopy, Duodenoscopy, With Biopsy;  Surgeon: Joseph Landers DO;  Location: [facility]    ESOPHAGOSCOPY, GASTROSCOPY, DUODENOSCOPY (EGD), COMBINED N/A 7/3/2023    Procedure: ESOPHAGOGASTRODUODENOSCOPY;  Surgeon: Yunier Graves MD;  Location:  OR    ESOPHAGOSCOPY, GASTROSCOPY, DUODENOSCOPY (EGD), COMBINED N/A 10/18/2023    Procedure: ESOPHAGOGASTRODUODENOSCOPY, WITH BIOPSY;  Surgeon: Jose Salinas MD;  Location:  GI    ESOPHAGOSCOPY, GASTROSCOPY, DUODENOSCOPY (EGD), COMBINED N/A 11/14/2023    Procedure: Esophagoscopy, gastroscopy, duodenoscopy (EGD), combined with Dilation;  Surgeon: Ernst Hatfield MD;  [place] [facility] [place]    EYE SURGERY  2005    cataract surgery    IR JEJUNOSTOMY TUBE CHANGE  8/25/2023    LAPAROSCOPIC ASSISTED INSERTION TUBE JEJUNOSTOMY N/A 7/5/2023    Procedure: INSERTION, JEJUNOSTOMY TUBE, LAPAROSCOPY-ASSISTED;  Surgeon: Judy Holguin MD;  Location:  OR    VASECTOMY  1981       Past Medical History:   Past Medical History:   Diagnosis Date    Angina at rest     Arthritis     Cerebrovascular accident (H) 09/27/2021    Coronary artery disease involving native coronary artery of native heart without angina pectoris     Dysphagia     Esophageal adenocarcinoma (H)     DISTAL ESOPHAGUS    Hypertension     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) 07/03/2023    Status post coronary angiogram 03/10/2020       Social History:   Social History     Tobacco Use    Smoking status: Never     Passive exposure: Never    Smokeless tobacco: Never   Substance Use Topics    Alcohol use: Yes     Comment: Beth only       Family History:   Family History   Problem Relation Age of Onset    Hypertension Sister     Kidney failure Sister     Chronic Obstructive Pulmonary Disease Sister     Thyroid Disease Sister     Seizure Disorder Paternal Grandmother     Mitral valve prolapse Daughter     No Known Problems Son      Cerebrovascular Disease No family hx of        Allergies:   Allergies   Allergen Reactions    Ampicillin Rash    Latex Rash    Metoprolol Other (See Comments)     Side effects : dry mouth       Active Medications:   Current Outpatient Medications   Medication Sig Dispense Refill    aspirin 81 MG EC tablet Take 81 mg by mouth daily      atorvastatin (LIPITOR) 10 MG tablet Take 1 tablet (10 mg) by mouth daily 90 tablet 1    carvedilol (COREG) 6.25 MG tablet TAKE ONE TABLET BY MOUTH PER J-TUBE TWO TIMES DAILY WITH MEALS 90 tablet 0    Multiple Vitamin (MULTI VITAMIN PO) 5 mLs by Per J Tube route daily Eniva VIBE-liquid with 5 ml of Eniva Cell Ready and 5 ml of Collagen qs with water to 55 ml      omeprazole (PRILOSEC) 40 MG DR capsule Take 1 capsule (40 mg) by mouth 2 times daily (before meals) 60 capsule 4    diclofenac (VOLTAREN) 1 % topical gel Place 4 g onto the skin 3 times daily as needed for moderate pain (Shoulder) (Patient not taking: Reported on 1/11/2024) 100 g 1    nitroGLYcerin (NITROSTAT) 0.3 MG sublingual tablet For chest pain place 1 tablet under the tongue every 5 minutes for 3 doses. If symptoms persist 5 minutes after 1st dose call 911. (Patient not taking: Reported on 1/11/2024) 30 tablet 3       Systemic Review:   CONSTITUTIONAL: NEGATIVE for fever, chills, change in weight  ENT/MOUTH: NEGATIVE for ear, mouth and throat problems  RESP: NEGATIVE for significant cough or SOB  CV: NEGATIVE for chest pain, palpitations or peripheral edema    Physical Examination:   Vital Signs: BP (!) 157/73 (BP Location: Right arm)   Pulse 69   Temp 97.7  F (36.5  C) (Temporal)   Resp 18   Ht 1.524 m (5')   Wt 79.4 kg (175 lb)   SpO2 97%   BMI 34.18 kg/m    GENERAL: healthy, alert and no distress  NECK: no adenopathy, no asymmetry, masses, or scars  RESP: lungs clear to auscultation - no rales, rhonchi or wheezes  CV: regular rate and rhythm, normal S1 S2, no S3 or S4, no murmur, click or rub, no peripheral  edema and peripheral pulses strong  ABDOMEN: soft, nontender, no hepatosplenomegaly, no masses and bowel sounds normal  MS: no gross musculoskeletal defects noted, no edema    I examined patient s dentition and informed the patient that dental injuries are a risk of any anesthetic management. No concerns were noted with this patient's dentition. . The patient has consented to proceed with the procedure.    Plan: Appropriate to proceed as scheduled.      Harlan Blanc DO  2/21/2024    PCP:  Piysuh Rogers

## 2024-02-22 NOTE — TELEPHONE ENCOUNTER
GI update note:    Notified of results of EGD and biopsies. + adenocarcinoma in ulcerated nodule of distal esophagus.    Called patient and discussed results with the patient on the phone. Persistent/recurrent adenocarcinoma of distal esophagus.    The patient expressed understanding of these results. All questions were answered.    Findings shared with oncology who will arrange further evaluation and treatment    The patient knows he will be contacted by his oncology team to arrange further evaluation and treatment.      Ernst Hatfield MD

## 2024-02-22 NOTE — TELEPHONE ENCOUNTER
Attempted to relay the biopsy results from the patient's recent endoscopy to the patient by phone. He was driving and his granddaughter answered the phone. He requested a call back at a later time to discuss the results of his procedure. Information regarding findings was not shared in light of patient's granddaughter present and on the phone.     Harlan Blanc DO   of Medicine  Director, Esophageal Disorders Program  Division of Gastroenterology, Hepatology, and Nutrition  [facility]

## 2024-02-23 NOTE — TELEPHONE ENCOUNTER
Requested Prescriptions   Pending Prescriptions Disp Refills    carvedilol (COREG) 6.25 MG tablet [Pharmacy Med Name: Carvedilol 6.25 MG Oral Tablet] 90 tablet 0     Sig: TAKE 1 TABLET BY MOUTH PER J-TUBE TWICE DAILY WITH MEALS       Beta-Blockers Protocol Failed - 2/23/2024  9:13 AM        Failed - Medication indicated for associated diagnosis     Medication is associated with one or more of the following diagnoses:     Hypertension (HTN)   Atrial fibrillation/flutter   Angina   ASCVD   Migraine   Heart Failure   Tremor   Anxiety   Ocular hypertension   Glaucoma          Failed - Recent (12 mo) or future (90 days) visit within the authorizing provider's specialty     The patient must have completed an in-person or virtual visit within the past 12 months or has a future visit scheduled within the next 90 days with the authorizing provider s specialty.  Urgent care and e-visits do not quality as an office visit for this protocol.          Passed - Blood pressure under 140/90 in past 12 months     BP Readings from Last 3 Encounters:   02/21/24 124/71   02/15/24 (!) 186/88   01/24/24 (!) 161/86                 Passed - Patient is age 6 or older        Passed - Medication is active on med list

## 2024-02-28 NOTE — PROGRESS NOTES
Called pt to discuss h pylori +    Offered no treatment vs delayed treatment (in 6 months) vs treatment.    He has elected treatment    Will put in MTM referral    He agrees to plan. All questions answered.    Ernst Hatfield MD

## 2024-02-29 NOTE — PROGRESS NOTES
Hematology/Medical Oncology Follow-up Note    ADDENDUM: Called patient after recent ED visit for ascites. 700 ml ascitic fluid was removed but not submitted for cytology. He has experienced re-accumulation and abdominal distention.    Plan:  Therapeutic and diagnostic paracentesis ASAP with fluid sent for cytology  Follow-up Dr. Atkins next week according to Dilcia Wheeler, RN, Cancer Center Nurse Coordinator  I indicated to the patient the abdominal fluid is probably related to recurrent tumor and ultimate treatment will be governed by treating the cancer if confirmed.    David Haines MD (4/8/2024)    ADDENDUM: Called patient regarding ambiguous PET findings. Await surgical follow-up scheduled for 3/26/2024.  David Haines MD (3/18/2024)    ADDENDUM: GI NGS revealed AKT1 alteration. David Haines MD (3/12/2024)    March 8, 2024      Reason for visit:  Liu Ragsdale is a 83 year old retired  from [employer] accompanied by his wife Kelsi who presents for oncologic re-evaluation s/p 8/25/2023 completion concurrent chemoradiation with weekly paclitaxel/carboplatin for treatment of stage III-B distal esophageal adenocarcinoma.    Impression:  S/p 8/25/2023 completion of potential neoadjuvant chemoradiation for a stage III-B, uT2, uN2 distal esophageal adenocarcinoma, PD-L1 TPS 0, HER2 negative;  Recent, 1/4/2024 PET scan with new suspected nodular inflammatory opacities and new 2/21/2024 biopsy positive esophageal nodule for recurrent adenocarcinoma at site of previous ulceration  Gastric Helicobacter pylori infection  History of carotid artery disease, coronary artery disease, hypertension  Normal performance status    Recommendation, plan, instructions:  At this time, my opinion is his overall performance status suggests he is a candidate for gastroesophagectomy.  CBC, CMP today; PET scan and Lexiscan stress test to soon as possible  Initiate triple antibiotic therapy for Helicobacter pylori infection which in retrospect  was also present in June, 2023; I will call back with test results  Follow-up with Dr. Levi after test results are available  In event of metastatic disease on PET imaging, my treatment considerations would be nivolumab/FOLFOX or nivolumab monotherapy.    Time with patient including review, documentation, history, examination, coordination of care and counseling was 30 minutes.    History of present illness:  In April, 2023, the patient noted the onset of solid food dysphagia, acid reflux and 10 pound weight loss.    Subsequent 6/5/2023 EGD revealed a distal esophageal large fungating mass that was partly obstructing with biopsy confirming a moderately to poorly differentiated adenocarcinoma that was PD-L1 TPS 0, HER2 negative.  6/14/2023 PET scan confirmed a hypermetabolic distal esophageal mass with low to moderate hilar and mediastinal adenopathy possibly inflammatory in nature without clear evidence of distant metastases.  7/2/2023 EUS demonstrated a lesion to involve layer 4 with five regional paraesophageal lymph nodes identified either at or above the primary with one that was inferior with malignant features.  The remainder of the study was unremarkable including gallbladder, pancreas and liver. (Clinical stage III-B, uT2, uN2, cM0)    On 6/20/2023, the patient was seen by Dr. Ferny Levi who felt the patient's performance status was that he may be a surgical candidate and will be reevaluated after chemoradiation.  On 7/5/2023, a diagnostic laparoscopy was performed with insertion of a jejunostomy feeding tube. On 7/24/2023, chemoradiation commenced after a one week delay because of a mild COVID-19 infection.    A posttreatment 9/28/2023 PET scan had revealed a probable complete response with residual activity in the distal esophagus likely representing inflammation.  On 10/3/2023  reevaluated and felt he was not a good surgical candidate at this time and was also concerned about persistent  disease.  Follow-up 10/1820/2023 EGD revealed mucosal ulceration with negative biopsies.  Follow-up EGDs on 11/14/2023 and 12/14/2023 revealed benign appearing, intrinsic mild stenosis measuring 1.5 cm with improved ulceration.    A follow-up 1/5/2024 PET scan revealed no evidence of recurrent or residual neoplasia with multiple new nodular opacity in the right lower lobe suspicious for an inflammatory process.  Follow-up 2/21/2024 revealed a nodule biopsy revealing recurrent adenocarcinoma. HER2 FISH was negative. PD-L1 TPS was 15%  Gastrointestinal NGS panel is pending.  2/25/2024 CT scan was otherwise unremarkable and showed no evidence of metastatic disease    Is felt well and denies any current complaints.  He has gained another 2 pounds since January. His jejunostomy tube was removed recently.  He has had no dysphagia and has been as active as prior to his illness diagnosis.    He does not use tobacco or alcohol.  [employer]  from [id number].    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  Family History   Problem Relation Age of Onset    Hypertension Sister     Kidney failure Sister     Chronic Obstructive Pulmonary Disease Sister     Thyroid Disease Sister     Seizure Disorder Paternal Grandmother     Mitral valve prolapse Daughter     No Known Problems Son     Cerebrovascular Disease No family hx of          Medications:  Current Outpatient Medications   Medication    aspirin 81 MG EC tablet     atorvastatin (LIPITOR) 10 MG tablet    bismuth subsalicylate (PEPTO BISMOL) 262 MG chewable tablet    carvedilol (COREG) 6.25 MG tablet    diclofenac (VOLTAREN) 1 % topical gel    doxycycline hyclate (VIBRAMYCIN) 100 MG capsule    metroNIDAZOLE (FLAGYL) 500 MG tablet    Multiple Vitamin (MULTI VITAMIN PO)    nitroGLYcerin (NITROSTAT) 0.3 MG sublingual tablet    omeprazole (PRILOSEC) 40 MG DR capsule     No current facility-administered medications for this visit.       Allergies:  Allergies   Allergen Reactions    Ampicillin Rash    Latex Rash    Metoprolol Other (See Comments)     Side effects : dry mouth       Review of systems:    Except as noted in the note above, the patient denies headaches, diplopia, hearing loss or dizziness; dyspnea, cough, hemoptysis, pleurisy; chest pain/pressure, palpitations, lightheadedness; anorexia, nausea, vomiting, abdominal pain, diarrhea, constipation, melena or rectal bleeding; dysuria, frequency, nocturia, blood in the urine; fever, chills, sweats; tingling, numbness, loss of balance; insomnia, depression, anxiety.      Physical examination:  BP (!) 159/83 (BP Location: Right arm, Patient Position: Sitting, Cuff Size: Adult Regular)   Pulse 70   Temp 97.8  F (36.6  C) (Oral)   Resp 12   Ht 1.524 m (5')   Wt 78.9 kg (174 lb)   SpO2 95%   BMI 33.98 kg/m      The patient is alert and oriented. Adenopathy is absent in the neck, axilla, groin and supraclavicular fossa; Lungs are clear to auscultation and percussion without rales, wheezes or rubs; heart rhythm is regular, heart sounds are without murmurs or gallops; the abdomen is soft and flat without hepatosplenomegaly, masses, ascites or tenderness.; extremities and skin reveal no unusual skin lesions, joint swelling or edema; jejunostomy tube has been removed.    Tucker Haines MD

## 2024-03-07 NOTE — LETTER
_  Medication List        Prepared on: 03/07/2024     Bring your Medication List when you go to the doctor, hospital, or   emergency room. And, share it with your family or caregivers.     Note any changes to how you take your medications.  Cross out medications when you no longer use them.    Medication How I take it Why I use it Prescriber   aspirin 81 MG EC tablet Take 81 mg by mouth daily  Stroke Prevention Patient Reported   atorvastatin (LIPITOR) 10 MG tablet Take 1 tablet (10 mg) by mouth daily Right carotid artery occlusion Piyush Rogers MD   bismuth subsalicylate (PEPTO BISMOL) 262 MG chewable tablet Take 2 tablets (524 mg) by mouth 4 times daily (before meals and nightly) H. Pylori Infection Ernst Hatfield MD   carvedilol (COREG) 6.25 MG tablet TAKE 1 TABLET BY MOUTH PER J-TUBE TWICE DAILY WITH MEALS Right carotid artery occlusion Piyush Rogers MD   diclofenac (VOLTAREN) 1 % topical gel Place 4 g onto the skin 3 times daily as needed for moderate pain (Shoulder) Primary osteoarthritis of right shoulder Piyush Rogers MD   doxycycline hyclate (VIBRAMYCIN) 100 MG capsule Take 1 capsule (100 mg) by mouth 2 times daily H. Pylori Infection Ernst Hatfield MD   metroNIDAZOLE (FLAGYL) 500 MG tablet Take 1 tablet (500 mg) by mouth 4 times daily H. Pylori Infection Ernst Hatfield MD   Multiple Vitamin (MULTI VITAMIN PO) 5 mLs by Per J Tube route daily Eniva VIBE-liquid with 5 ml of Eniva Cell Ready and 5 ml of Collagen qs with water to 55 ml  General health Patient Reported   nitroGLYcerin (NITROSTAT) 0.3 MG sublingual tablet For chest pain place 1 tablet under the tongue every 5 minutes for 3 doses. If symptoms persist 5 minutes after 1st dose call 911.  Stroke Prevention Ernst Hatfield MD   omeprazole (PRILOSEC) 40 MG DR capsule Take 1 capsule (40 mg) by mouth 2 times daily (before meals) Pharyngoesophageal Dysphagia Ernst Hatfield MD         Add new  medications, over-the-counter drugs, herbals, vitamins, or  minerals in the blank rows below.    Medication How I take it Why I use it Prescriber                                      Allergies:      ampicillin; latex; metoprolol        Side effects I have had:               Other Information:              My notes and questions:

## 2024-03-07 NOTE — PATIENT INSTRUCTIONS
"Recommendations from today's MTM visit:                                                      MTM (medication therapy management) is a service provided by a clinical pharmacist designed to help you get the most of out of your medicines.   Today we reviewed what your medicines are for, how to know if they are working, that your medicines are safe and how to make your medicine regimen as easy as possible.      I recommend treatment with Bismuth quadruple therapy x 14 days:      Bismuth subsalicylate 262 mg tablets Take 2 tablets (524 mg) by mouth four times daily  Doxycycline 100 mg Take 1 tablet by mouth two times daily  Metronidazole 500 mg Take 1 tablet by mouth four times daily  Omeprazole 40 mg Take 1 capsule by mouth twice daily 30-60 minutes before morning and evening meal     Do not take any multivitamins or dairy while on these antibiotics as they can make the tetracycline less effective.      Please contact Boy via "[employer] if any issues arise with insurance coverage/cost or if you have trouble getting medications from the pharmacy.      4.  You will be due for stool testing to make sure the H. Pylori is gone no sooner than 4 weeks after completion of your antibiotics. You must be off PPIs such as omeprazole for at least 1-2 weeks prior to eradication testing.    Follow-up: Due on 03/14/2024     It was great speaking with you today.  I value your experience and would be very thankful for your time in providing feedback in our clinic survey. In the next few days, you may receive an email or text message from [employer] with a link to a survey related to your  clinical pharmacist.\"     To schedule another MTM appointment, please call the clinic directly or you may call the MTM scheduling line at [phone].    My Clinical Pharmacist's contact information:                                                      Please feel free to contact me with any questions or concerns you have.        Boy Martini, " PharmD     Medication Therapy Management (MTM) Pharmacist     [phone]     [email or web address]

## 2024-03-07 NOTE — LETTER
March 7, 2024  Liu Ragsdale  [address] [address] [id number]    Dear  [facility]     Thank you for talking with me on Mar 7, 2024 about your health and medications. As a follow-up to our conversation, I have included two documents:      Your Recommended To-Do List has steps you should take to get the best results from your medications.  Your Medication List will help you keep track of your medications and how to take them.    If you want to talk about these documents, please call ANTHAN CHISHOLM RPH at phone: [phone], Monday-Friday 8-4:30pm.    I look forward to working with you and your doctors to make sure your medications work well for you.    Sincerely,  NATHAN CHISHOLM RPH  [facility] Pharmacist, [facility]

## 2024-03-07 NOTE — PROGRESS NOTES
Medication Therapy Management (MTM) Encounter    ASSESSMENT:                            Medication Adherence/Access: No issues identified     Unsure if patient has been treated for H pylori before; there was an order for the concomitant therapy. Considering patient has used antibiotics before and he has allergy for ampicillin appropriate to use bismuth quadruple therapy. Bismuth quadruple therapy by replacing tetracycline with doxycycline is appropriate for patient.  Patient has taken metronidazole before and appropriate to increase the dose to overcome resistance.   Not taking medications as prescribed can lead to failure and worse future treatment encounters considering bacteria might develop resistance.  Will do eradication check a month from treatment completion, and advised patient to hold omeprazole for 1 - 2 weeks before eradication test. Advised patient of drug food interactions and side effects of the medication.  Will follow up with patient in a week.      PLAN:                            I recommend treatment with Bismuth quadruple therapy x 14 days:      Bismuth subsalicylate 262 mg tablets Take 2 tablets (524 mg) by mouth four times daily  Doxycycline 100 mg Take 1 tablet by mouth two times daily  Metronidazole 500 mg Take 1 tablet by mouth four times daily  Omeprazole 40 mg Take 1 capsule by mouth twice daily 30-60 minutes before morning and evening meal     Do not take any multivitamins or dairy while on these antibiotics as they can make the tetracycline less effective.      Please contact [facility] via [employer] if any issues arise with insurance coverage/cost or if you have trouble getting medications from the pharmacy.      4.  You will be due for stool testing to make sure the H. Pylori is gone no sooner than 4 weeks after completion of your antibiotics. You must be off PPIs such as omeprazole for at least 1-2 weeks prior to eradication testing.    Follow-up: Due on 03/14/2024      SUBJECTIVE/OBJECTIVE:                          Lui Ragsdale is a 83 year old male called for an initial visit. He was referred to me from Dr. Hatfield     Reason for visit: H Pylori Treatment .    Allergies/ADRs: Reviewed in chart  Past Medical History: Reviewed in chart  Tobacco: He reports that he has never smoked. He has never been exposed to tobacco smoke. He has never used smokeless tobacco.  Alcohol: none  Caffeine: one cup of coffee a day   Medication Adherence/Access: no issues reported    H. Pylori Treatment:     Diagnosis by stomach biopsy on 02/21/2024  Previous treatment regimens: None per patient or chart review   Previous Macrolide exposure: Unknown. None found per Epic/OneMedNet records or pharmacy dispense report.  Penicillin Allergy: yes  Pregnant/breastfeeding: NO  KIDNEY: No known chronic kidney disease  LIVER: No known chronic liver disease. Just higher ALT, but not 3 X the baseline  Drug-drug interactions: No multivitamins and advised when to take diary products    Current symptoms: Has pain on his abdomen.  The pain comes and goes away. The pain might depend on what he eats.         Today's Vitals: There were no vitals taken for this visit.  ----------------      I spent 40 minutes with this patient today. All changes were made via collaborative practice agreement with Piyush Rogers MD. A copy of the visit note was provided to the patient's provider(s).    A summary of these recommendations was sent via [employer].      Telemedicine Visit Details  Type of service:  Telephone visit  Start Time:  12:30 PM  End Time: 1:10 PM     Medication Therapy Recommendations  No medication therapy recommendations to display     Boy Martiin, PharmD     Medication Therapy Management (MTM) Pharmacist     [phone]     [email or web address]

## 2024-03-07 NOTE — LETTER
"Recommended To-Do List      Prepared on: 03/07/2024       You can get the best results from your medications by completing the items on this \"To-Do List.\"      Bring your To-Do List when you go to your doctor. And, share it with your family or caregivers.    My To-Do List:  What we talked about: What I should do:   A new medication that may be of benefit to you    Start taking bismuth          What we talked about: What I should do:   A new medication that may be of benefit to you    Start taking doxycycline hyclate (VIBRAMYCIN)          What we talked about: What I should do:   A new medication that may be of benefit to you    Start taking metroNIDAZOLE (FLAGYL)          What we talked about: What I should do:                     "

## 2024-03-07 NOTE — TELEPHONE ENCOUNTER
Patient Quality Outreach    Patient is due for the following:   Hypertension -  BP check    Next Steps:   BP was normal at gastro- bp abstracted to primary care    Type of outreach:    Chart review performed, no outreach needed.  BP abstracted from gastro    Questions for provider review:    None           Viviana Garces CMA

## 2024-03-08 NOTE — PATIENT INSTRUCTIONS
1. Blood test today  2. PET scan as soon as possible  3. Lexiscan stress test  4. Start antibiotics for stomach infection  5. Dr. Haines will call with results  6. Follow-up with Dr. Levi

## 2024-03-08 NOTE — LETTER
3/8/2024         RE: Liu Ragsdale  [id number]  \[facility]\"" [id number]        Dear Colleague,    Thank you for referring your patient, Liu Ragsdale, to the [facility]. Please see a copy of my visit note below.    Hematology/Medical Oncology Follow-up Note      March 8, 2024      Reason for visit:  Liu Ragsdale is a 83 year old retired  from [place] accompanied by his wife Kelsi who presents for oncologic re-evaluation s/p 8/25/2023 completion concurrent chemoradiation with weekly paclitaxel/carboplatin for treatment of stage III-B distal esophageal adenocarcinoma.    Impression:  S/p 8/25/2023 completion of potential neoadjuvant chemoradiation for a stage III-B, uT2, uN2 distal esophageal adenocarcinoma, PD-L1 TPS 0, HER2 negative;  Recent, 1/4/2024 PET scan with new suspected nodular inflammatory opacities and new 2/21/2024 biopsy positive esophageal nodule for recurrent adenocarcinoma at site of previous ulceration  Gastric Helicobacter pylori infection  History of carotid artery disease, coronary artery disease, hypertension  Normal performance status    Recommendation, plan, instructions:  At this time, my opinion is his overall performance status suggests he is a candidate for gastroesophagectomy.  CBC, CMP today; PET scan and Lexiscan stress test to soon as possible  Initiate triple antibiotic therapy for Helicobacter pylori infection which in retrospect was also present in June, 2023; I will call back with test results  Follow-up with Dr. Levi after test results are available  In event of metastatic disease on PET imaging, my treatment considerations would be nivolumab/FOLFOX or nivolumab monotherapy.    Time with patient including review, documentation, history, examination, coordination of care and counseling was 30 minutes.    History of present illness:  In April, 2023, the patient noted the onset of solid food dysphagia, acid reflux and  10 pound weight loss.    Subsequent 6/5/2023 EGD revealed a distal esophageal large fungating mass that was partly obstructing with biopsy confirming a moderately to poorly differentiated adenocarcinoma that was PD-L1 TPS 0, HER2 negative.  6/14/2023 PET scan confirmed a hypermetabolic distal esophageal mass with low to moderate hilar and mediastinal adenopathy possibly inflammatory in nature without clear evidence of distant metastases.  7/2/2023 EUS demonstrated a lesion to involve layer 4 with five regional paraesophageal lymph nodes identified either at or above the primary with one that was inferior with malignant features.  The remainder of the study was unremarkable including gallbladder, pancreas and liver. (Clinical stage III-B, uT2, uN2, cM0)    On 6/20/2023, the patient was seen by Dr. Ferny Levi who felt the patient's performance status was that he may be a surgical candidate and will be reevaluated after chemoradiation.  On 7/5/2023, a diagnostic laparoscopy was performed with insertion of a jejunostomy feeding tube. On 7/24/2023, chemoradiation commenced after a one week delay because of a mild COVID-19 infection.    A posttreatment 9/28/2023 PET scan had revealed a probable complete response with residual activity in the distal esophagus likely representing inflammation.  On 10/3/2023  reevaluated and felt he was not a good surgical candidate at this time and was also concerned about persistent disease.  Follow-up 10/1820/2023 EGD revealed mucosal ulceration with negative biopsies.  Follow-up EGDs on 11/14/2023 and 12/14/2023 revealed benign appearing, intrinsic mild stenosis measuring 1.5 cm with improved ulceration.    A follow-up 1/5/2024 PET scan revealed no evidence of recurrent or residual neoplasia with multiple new nodular opacity in the right lower lobe suspicious for an inflammatory process.  Follow-up 2/21/2024 revealed a nodule biopsy revealing recurrent adenocarcinoma. HER2  FISH was negative. PD-L1 TPS was 15%  Gastrointestinal NGS panel is pending.  2/25/2024 CT scan was otherwise unremarkable and showed no evidence of metastatic disease    Is felt well and denies any current complaints.  He has gained another 2 pounds since January. His jejunostomy tube was removed recently.  He has had no dysphagia and has been as active as prior to his illness diagnosis.    He does not use tobacco or alcohol.  [employer]  from [id number].    Past medical history:  Past Medical History:   Diagnosis Date     Angina at rest      Arthritis      Cerebrovascular accident (H) 09/27/2021     Coronary artery disease involving native coronary artery of native heart without angina pectoris      Dysphagia      Esophageal adenocarcinoma (H)     DISTAL ESOPHAGUS     Hypertension      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) 07/03/2023     Status post coronary angiogram 03/10/2020        Family history:  I have reviewed this patient's family history and updated it with pertinent information if needed.  Family History   Problem Relation Age of Onset     Hypertension Sister      Kidney failure Sister      Chronic Obstructive Pulmonary Disease Sister      Thyroid Disease Sister      Seizure Disorder Paternal Grandmother      Mitral valve prolapse Daughter      No Known Problems Son      Cerebrovascular Disease No family hx of          Medications:  Current Outpatient Medications   Medication     aspirin 81 MG EC tablet     atorvastatin (LIPITOR) 10 MG tablet     bismuth subsalicylate (PEPTO BISMOL) 262 MG chewable tablet     carvedilol (COREG) 6.25 MG tablet     diclofenac (VOLTAREN) 1 % topical gel     doxycycline hyclate (VIBRAMYCIN) 100 MG capsule     metroNIDAZOLE (FLAGYL) 500 MG tablet     Multiple Vitamin (MULTI VITAMIN PO)     nitroGLYcerin (NITROSTAT) 0.3 MG sublingual tablet     omeprazole (PRILOSEC) 40 MG DR capsule     No current facility-administered medications for this visit.        Allergies:  Allergies   Allergen Reactions     Ampicillin Rash     Latex Rash     Metoprolol Other (See Comments)     Side effects : dry mouth       Review of systems:    Except as noted in the note above, the patient denies headaches, diplopia, hearing loss or dizziness; dyspnea, cough, hemoptysis, pleurisy; chest pain/pressure, palpitations, lightheadedness; anorexia, nausea, vomiting, abdominal pain, diarrhea, constipation, melena or rectal bleeding; dysuria, frequency, nocturia, blood in the urine; fever, chills, sweats; tingling, numbness, loss of balance; insomnia, depression, anxiety.      Physical examination:  BP (!) 159/83 (BP Location: Right arm, Patient Position: Sitting, Cuff Size: Adult Regular)   Pulse 70   Temp 97.8  F (36.6  C) (Oral)   Resp 12   Ht 1.524 m (5')   Wt 78.9 kg (174 lb)   SpO2 95%   BMI 33.98 kg/m      The patient is alert and oriented. Adenopathy is absent in the neck, axilla, groin and supraclavicular fossa; Lungs are clear to auscultation and percussion without rales, wheezes or rubs; heart rhythm is regular, heart sounds are without murmurs or gallops; the abdomen is soft and flat without hepatosplenomegaly, masses, ascites or tenderness.; extremities and skin reveal no unusual skin lesions, joint swelling or edema; jejunostomy tube has been removed.    Tucker Haines MD    Oncology Rooming Note    March 8, 2024 7:46 AM   Liu Ragsdale is a 83 year old male who presents for:    Chief Complaint   Patient presents with     Oncology Clinic Visit     Malignant neoplasm of lower third of esophagus - Provider visit only     Initial Vitals: BP (!) 159/83 (BP Location: Right arm, Patient Position: Sitting, Cuff Size: Adult Regular)   Pulse 70   Temp 97.8  F (36.6  C) (Oral)   Resp 12   Ht 1.524 m (5')   Wt 78.9 kg (174 lb)   SpO2 95%   BMI 33.98 kg/m   Estimated body mass index is 33.98 kg/m  as calculated from the following:    Height as of this encounter: 1.524 m  (5').    Weight as of this encounter: 78.9 kg (174 lb). Body surface area is 1.83 meters squared.  Mild Pain (2) Comment: Data Unavailable   No LMP for male patient.  Allergies reviewed: Yes  Medications reviewed: Yes    Medications: Medication refills not needed today.  Pharmacy name entered into EPIC:    [id number] - [place], [place] - [address] [facility] [place] [facility] [address] [facility] AT PORTAL TO [facility] SITES  [facility] - [place], [place] - [address] [facility] [place] - [place], [place] - [address] [facility]    Frailty Screening:   Is the patient here for a new oncology consult visit in cancer care? 2. No      Clinical concerns:  None      Natty Ribeiro CMA                Again, thank you for allowing me to participate in the care of your patient.        Sincerely,        Tucker Haines MD

## 2024-03-08 NOTE — PROGRESS NOTES
Oncology Rooming Note    March 8, 2024 7:46 AM   Liu Ragsdale is a 83 year old male who presents for:    Chief Complaint   Patient presents with    Oncology Clinic Visit     Malignant neoplasm of lower third of esophagus - Provider visit only     Initial Vitals: BP (!) 159/83 (BP Location: Right arm, Patient Position: Sitting, Cuff Size: Adult Regular)   Pulse 70   Temp 97.8  F (36.6  C) (Oral)   Resp 12   Ht 1.524 m (5')   Wt 78.9 kg (174 lb)   SpO2 95%   BMI 33.98 kg/m   Estimated body mass index is 33.98 kg/m  as calculated from the following:    Height as of this encounter: 1.524 m (5').    Weight as of this encounter: 78.9 kg (174 lb). Body surface area is 1.83 meters squared.  Mild Pain (2) Comment: Data Unavailable   No LMP for male patient.  Allergies reviewed: Yes  Medications reviewed: Yes    Medications: Medication refills not needed today.  Pharmacy name entered into [employer]:    [facility] - [place], [place] - [address] [facility] - [place] - [address] [facility] AT PORTAL TO REG[facility] SITES  [facility] - [place], [place] [facility] [address] [facility] [place] [facility] [place], [place] - [address] [facility]    Frailty Screening:   Is the patient here for a new oncology consult visit in cancer care? 2. No      Clinical concerns:  None      Natty Ribeiro, ROD

## 2024-03-14 NOTE — PATIENT INSTRUCTIONS
"Recommendations from today's MTM visit:                                                      MTM (medication therapy management) is a service provided by a clinical pharmacist designed to help you get the most of out of your medicines.   Today we reviewed what your medicines are for, how to know if they are working, that your medicines are safe and how to make your medicine regimen as easy as possible.      1. Please do eradication test after 04/22/2024  2. PharmD to order H pylori stool antigen test   3. Please stop taking omeprazole 1 (04/08/2024) - 2 (04/15/2024) weeks before the eradication test  4. Please take sample kit from any  clinic or hospital lab to provide stool sample to that facility      Follow-up: Due when needed by pharmacy    It was great speaking with you today.  I value your experience and would be very thankful for your time in providing feedback in our clinic survey. In the next few days, you may receive an email or text message from [employer] with a link to a survey related to your  clinical pharmacist.\"     To schedule another MTM appointment, please call the clinic directly or you may call the MTM scheduling line at [phone].    My Clinical Pharmacist's contact information:                                                      Please feel free to contact me with any questions or concerns you have.        Boy Martini, PharmD     Medication Therapy Management (MTM) Pharmacist     [phone]     [email or web address]     [facility]    "

## 2024-03-14 NOTE — PROGRESS NOTES
Clinical Pharmacy Consult:                                                    Liu Ragsdale is a 83 year old male called for a clinical pharmacist consult.  He was referred to me from Liu.     Reason for Consult: H pylori Treatment Follow up    Discussion: Called patient to assess how he has been doing, and if he has been taking his medications as prescribed. He has said he has been doing better. He said he has been constipated; he took once milk magnesia and it helped; advised patient against using it with the current antibiotics.  Advised patient to eat more veggies to help for constipation. Yesterday, he forgot to take metronidazole four times a day rather he took it three times a day. Patient reported he started taking the antibiotics on 03/08/2024.       Plan:  1. Please do eradication test after 04/22/2024  2. PharmD to order H pylori stool antigen test   3. Please stop taking omeprazole 1 (04/08/2024) - 2 (04/15/2024) weeks before the eradication test  4. Please take sample kit from any  clinic or hospital lab to provide stool sample that facility        Boy Martini, PharmD     Medication Therapy Management (MTM) Pharmacist     [phone]     [email or web address]

## 2024-04-01 NOTE — PROGRESS NOTES
THORACIC SURGERY FOLLOW UP VISIT     Dear Dr. Rogers,     I saw Liu Ragsdale in follow-up today. The clinical summary follows:      In April, 2023, the patient noted the onset of solid food dysphagia, acid reflux and 10 pound weight loss. He subsequently underwent an EGD on 6/5/2023 which revealed a distal esophageal fungating mass that was partially obstructing.  Biopsy confirmed moderately to poorly differentiated adenocarcinoma that was PD-L1 TPS 0 and HER2 negative.  PET scan on 6/14/2023 confirmed a hypermetabolic distal esophageal mass with low to moderate hilar and mediastinal adenopathy.  He underwent EUS on 7/2/2023 which revealed 4 layer depth of invasion and 5 regional paraesophageal lymph nodes making his clinical stage III-B, uT2, uN2, uM0.    He underwent surgical evaluation on 6/20 and at that time his performance status was borderline and plans were made for reevaluation after chemoradiation.  He was taken to the operating room on 7/5/2023 for a diagnostic laparoscopy and jejunostomy tube placement.  He then began chemoradiation on 7/24/2023.      He completed chemoradiation on 8/25/2023 (5000 cGy and paclitaxel and carboplatin).  Unfortunately, on 8/29/2023, he was admitted for anemia likely from a GI bleed and he received 2 units of blood.  He underwent post-treatment PET scan on 9/28/2023 which showed probably completed response with residual activity at the distal esophagus felt to represent inflammation. He was reevaluated for surgical intervention on 10/3/2023 and deemed a poor surgical candidate due to his functional status and concerns for residual disease.  He underwent EGD on 10/18/2023 which showed mucosal ulceration but there was no malignancy seen.  Follow up EGDs were performed on 11/14/2023 and 12/14/2023 with biopsies showing no signs of malignancy but he did have mild stenosis.      Follow up PET imaging was performed on 1/5/2024 which revealed recurrent or residual esophageal  adenocarcinoma with an inflammatory process in his right lower lobe.  An EGD was performed on 2/21/24 which revealed ulcerations and biopsies were consistent with adenocarcinoma.  Cross sectional imaging performed on 2/25/2024 showed no signed on metastatic disease.  Repeat PET imaging performed on 3/15/2024 shows mildly avid distal esophageal lesion and intraabdominal ascites.      Mr. Ragsdale is maintaining his weight. He is having early satiety and some recurrence of dysphagia. He is having decreased energy and is not as steady on his feet.        INTERVAL STUDIES    PET (03/15/2024): Slightly increased FDG avid thickening involving the distal esophagus, which could suggest mild disease progression. Development of a small volume of mildly FDG avid intra-abdominal ascites. This may simply be inflammatory/reactive in nature, the exact etiology remains indeterminate     BP (!) 169/83   Pulse 79   Temp 97.8  F (36.6  C) (Oral)   Resp 18   Wt 78.4 kg (172 lb 14.4 oz)   SpO2 98%   BMI 33.77 kg/m       Physical Exam  Constitutional:       Appearance: He is normal weight.   Cardiovascular:      Rate and Rhythm: Normal rate.   Skin:     General: Skin is warm and dry.   Neurological:      Mental Status: He is alert and oriented to person, place, and time.   Psychiatric:         Mood and Affect: Mood normal.         Behavior: Behavior normal.         Thought Content: Thought content normal.         Judgment: Judgment normal.          From a personal perspective, he is here with his daughter.     IMPRESSION   (C15.5) 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)  (primary encounter diagnosis)        Liu Ragsdale is a 83 year old man with persistent or recurrent esophageal adenocarcinoma. He remains a poor surgical candidate - the time interval to recurrence is short, he has ascites and he is becoming more unsteady on his feet.     PLAN  I spent 25 min on the date of the encounter in chart review, patient visit,  review of tests, documentation and/or discussion with other providers about the issues documented above. I reviewed the plan as follows:    I recommend consideration for next-line chemotherapy and explained to Mr. Ragsdale that there is not a surgical option for treatment.      All questions were answered and Liu Ragsdale and present family were in agreement with the plan.    I appreciate the opportunity to participate in the care of your patient and will keep you updated.    Mateo Zavaleta MD  Cardiothoracic Surgery Fellow  Pager: [phone]      Physician Attestation   I, Ferny Levi MD, saw this patient and agree with the findings and plan of care as documented in the note.      Items personally reviewed/procedural attestation: vitals, labs, and imaging and agree with the interpretation documented in the note.    Ferny Levi MD

## 2024-04-02 NOTE — NURSING NOTE
Oncology Rooming Note    April 2, 2024 4:54 PM   Liu Ragsdale is a 83 year old male who presents for:    Chief Complaint   Patient presents with    Oncology Clinic Visit     RTN for Malignant Neoplasm of Esophageal      Initial Vitals: BP (!) 169/83   Pulse 79   Temp 97.8  F (36.6  C) (Oral)   Resp 18   Wt 78.4 kg (172 lb 14.4 oz)   SpO2 98%   BMI 33.77 kg/m   Estimated body mass index is 33.77 kg/m  as calculated from the following:    Height as of 3/8/24: 1.524 m (5').    Weight as of this encounter: 78.4 kg (172 lb 14.4 oz). Body surface area is 1.82 meters squared.  Mild Pain (3) Comment: Data Unavailable   No LMP for male patient.  Allergies reviewed: Yes  Medications reviewed: Yes    Medications: Medication refills not needed today.  Pharmacy name entered into EPIC:    [facility] - [place], [place] - [address] - [place] [facility] [address] [facility] [address]AL TO REGISTERED [facility] SITES  [facility] - [place], [place] - [address] [facility] [place] - [place], [place] - [address] [facility]    Frailty Screening:   Is the patient here for a new oncology consult visit in cancer care? 2. No      Clinical concerns: Pt is having some discomfort  in abdomen.       Ml Oneal MA

## 2024-04-02 NOTE — LETTER
4/2/2024         RE: Liu Ragsdale  [id number] [facility] [id number]        Dear Colleague,    Thank you for referring your patient, Liu Ragsdale, to the [facility]. Please see a copy of my visit note below.    THORACIC SURGERY FOLLOW UP VISIT     Dear Dr. Rogers,     I saw Liu Ragsdale in follow-up today. The clinical summary follows:      In April, 2023, the patient noted the onset of solid food dysphagia, acid reflux and 10 pound weight loss. He subsequently underwent an EGD on 6/5/2023 which revealed a distal esophageal fungating mass that was partially obstructing.  Biopsy confirmed moderately to poorly differentiated adenocarcinoma that was PD-L1 TPS 0 and HER2 negative.  PET scan on 6/14/2023 confirmed a hypermetabolic distal esophageal mass with low to moderate hilar and mediastinal adenopathy.  He underwent EUS on 7/2/2023 which revealed 4 layer depth of invasion and 5 regional paraesophageal lymph nodes making his clinical stage III-B, uT2, uN2, uM0.    He underwent surgical evaluation on 6/20 and at that time his performance status was borderline and plans were made for reevaluation after chemoradiation.  He was taken to the operating room on 7/5/2023 for a diagnostic laparoscopy and jejunostomy tube placement.  He then began chemoradiation on 7/24/2023.      He completed chemoradiation on 8/25/2023 (5000 cGy and paclitaxel and carboplatin).  Unfortunately, on 8/29/2023, he was admitted for anemia likely from a GI bleed and he received 2 units of blood.  He underwent post-treatment PET scan on 9/28/2023 which showed probably completed response with residual activity at the distal esophagus felt to represent inflammation. He was reevaluated for surgical intervention on 10/3/2023 and deemed a poor surgical candidate due to his functional status and concerns for residual disease.  He underwent EGD on 10/18/2023 which showed mucosal ulceration but there was no  malignancy seen.  Follow up EGDs were performed on 11/14/2023 and 12/14/2023 with biopsies showing no signs of malignancy but he did have mild stenosis.      Follow up PET imaging was performed on 1/5/2024 which revealed recurrent or residual esophageal adenocarcinoma with an inflammatory process in his right lower lobe.  An EGD was performed on 2/21/24 which revealed ulcerations and biopsies were consistent with adenocarcinoma.  Cross sectional imaging performed on 2/25/2024 showed no signed on metastatic disease.  Repeat PET imaging performed on 3/15/2024 shows mildly avid distal esophageal lesion and intraabdominal ascites.      Mr. Ragsdale is maintaining his weight. He is having early satiety and some recurrence of dysphagia. He is having decreased energy and is not as steady on his feet.        INTERVAL STUDIES    PET (03/15/2024): Slightly increased FDG avid thickening involving the distal esophagus, which could suggest mild disease progression. Development of a small volume of mildly FDG avid intra-abdominal ascites. This may simply be inflammatory/reactive in nature, the exact etiology remains indeterminate     BP (!) 169/83   Pulse 79   Temp 97.8  F (36.6  C) (Oral)   Resp 18   Wt 78.4 kg (172 lb 14.4 oz)   SpO2 98%   BMI 33.77 kg/m       Physical Exam  Constitutional:       Appearance: He is normal weight.   Cardiovascular:      Rate and Rhythm: Normal rate.   Skin:     General: Skin is warm and dry.   Neurological:      Mental Status: He is alert and oriented to person, place, and time.   Psychiatric:         Mood and Affect: Mood normal.         Behavior: Behavior normal.         Thought Content: Thought content normal.         Judgment: Judgment normal.          From a personal perspective, he is here with his daughter.     IMPRESSION   (C15.5) 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)  (primary encounter diagnosis)        Liu Ragsdale is a 83 year old man with persistent or recurrent  esophageal adenocarcinoma. He remains a poor surgical candidate - the time interval to recurrence is short, he has ascites and he is becoming more unsteady on his feet.     PLAN  I spent 25 min on the date of the encounter in chart review, patient visit, review of tests, documentation and/or discussion with other providers about the issues documented above. I reviewed the plan as follows:    I recommend consideration for next-line chemotherapy and explained to Mr. Ragsdale that there is not a surgical option for treatment.      All questions were answered and Liu Ragsdale and present family were in agreement with the plan.    I appreciate the opportunity to participate in the care of your patient and will keep you updated.    Mateo Zavaleta MD  Cardiothoracic Surgery Fellow  Pager: [phone]      Physician Attestation  I, Ferny Levi MD, saw this patient and agree with the findings and plan of care as documented in the note.      Items personally reviewed/procedural attestation: vitals, labs, and imaging and agree with the interpretation documented in the note.    Ferny Levi MD

## 2024-04-03 NOTE — ED TRIAGE NOTES
Possible impaction, according to pt. Pt states that he has been on antibiotics for the last month due to H. Pylori. Pt states that he has been having small bm's and is getting more bloating in abdomen.

## 2024-04-03 NOTE — ED NOTES
"Pt c/o having trouble with bowel movements over the last few weeks.  \"I feel so full and now I'm getting a little uncomfortable in my abdomen.\"  "

## 2024-04-03 NOTE — ED PROVIDER NOTES
"  History     Chief Complaint   Patient presents with    Abdominal Pain     Possible impaction, according to pt. Pt states that he has been on antibiotics for the last month due to H. pylori     HPI  Liu Ragsdale is a 83 year old male who presents with constipation. For past few weeks he has been having very small bowel movements, has to strain. No fevers. No vomiting. Hx of J tube that is now removed in the setting of esophageal cancer. Completed radiation/chemo, now has returend and will be starting again. Abdomen hurts \"a little.\" No confusion. Finished course of antibiotics for H. pylori recently, says this may be the cause of constipation. Also is maybe not drinking enough water. Wife is at bedside providing additional history.     Allergies:  Allergies   Allergen Reactions    Ampicillin Rash    Latex Rash    Metoprolol Other (See Comments)     Side effects : dry mouth       Problem List:    Patient Active Problem List    Diagnosis Date Noted    Upper GI bleed 08/29/2023     Priority: Medium    Anemia, unspecified type 08/29/2023     Priority: Medium    Jejunostomy tube in situ (H) 08/29/2023     Priority: Medium    Dysphagia 08/29/2023     Priority: Medium     J tube dependent      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) 07/03/2023     Priority: Medium     EOTD 10.23.23 - [place]       History of ischemic stroke 09/28/2021     Priority: Medium    Cerebrovascular accident (H) 09/27/2021     Priority: Medium    Near syncope 09/26/2021     Priority: Medium    Right carotid artery occlusion 09/26/2021     Priority: Medium    Degenerative joint disease of left hip 02/03/2021     Priority: Medium    Coronary artery disease involving native coronary artery of native heart without angina pectoris 03/23/2020     Priority: Medium     coronary artery disease diagnosed in 03/2020 at which time he was noted to have a  of the proximal left circumflex into the OM, moderate to severe ramus disease and moderate " to severe RCA/PLB disease.  He underwent drug-eluting stent placement from the proximal left circumflex into the OM1.  He has residual small vessel coronary artery disease as well as moderate to severe stenosis in the ramus and RCA/YAA for which medical management has been recommended.         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 11/06/2019     Priority: Medium    SOB (shortness of breath) on exertion 11/06/2019     Priority: Medium    Arthritis of right glenohumeral joint- moderate 11/06/2019     Priority: Medium    Arthritis of right acromioclavicular joint- advanced 11/06/2019     Priority: Medium    SNHL (sensory-neural hearing loss), asymmetrical 07/22/2019     Priority: Medium    FANI (obstructive sleep apnea) 03/15/2016     Priority: Medium     NOT using CPAP 3/15/16      BMI 31.0-31.9,adult 04/07/2015     Priority: Medium    Benign essential hypertension, BP goal <140/90 09/10/2014     Priority: Medium    Hyperlipidemia LDL goal <70 12/12/2013     Priority: Medium     Diagnosis updated by automated process. Provider to review and confirm.      Carotid bruit 03/30/2010     Priority: Medium     right carotid bruit on exam- u/s 7/09 with minimal stenosis on left and not well seen on right           Past Medical History:    Past Medical History:   Diagnosis Date    Angina at rest (H24)     Arthritis     Cerebrovascular accident (H) 09/27/2021    Coronary artery disease involving native coronary artery of native heart without angina pectoris     Dysphagia     Esophageal adenocarcinoma (H)     Hypertension     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) 07/03/2023    Status post coronary angiogram 03/10/2020       Past  Surgical History:    Past Surgical History:   Procedure Laterality Date    ARTHROPLASTY HIP Left 02/03/2021    Procedure: Total Hip Arthroplasty;  Surgeon: Andrew Arriola MD;  Location: [facility]    COLONOSCOPY N/A 06/16/2016    Procedure: COLONOSCOPY;  Surgeon: Randy Avendano MD;  Location: [facility]    CV LEFT HEART CATH Left 03/10/2020    Procedure: Left Heart Cath;  Surgeon: Vicente Lopez MD;  Location:  HEART CARDIAC CATH LAB    ENDARTERECTOMY CAROTID Right 9/29/2021    Procedure: ENDARTERECTOMY, CAROTID;  Surgeon: Raymond Andersen MD;  Location:  OR    ENDOSCOPIC ULTRASOUND UPPER GASTROINTESTINAL TRACT (GI) N/A 7/3/2023    Procedure: ENDOSCOPIC ULTRASOUND, ESOPHAGOSCOPY / UPPER GASTROINTESTINAL TRACT (GI);  Surgeon: Yunier Graves MD;  Location:  OR    ESOPHAGOSCOPY, GASTROSCOPY, DUODENOSCOPY (EGD), COMBINED N/A 6/5/2023    Procedure: Esophagoscopy, Gastroscopy, Duodenoscopy, With Biopsy;  Surgeon: Joseph Landers DO;  Location: [facility]    ESOPHAGOSCOPY, GASTROSCOPY, DUODENOSCOPY (EGD), COMBINED N/A 7/3/2023    Procedure: ESOPHAGOGASTRODUODENOSCOPY;  Surgeon: Yunier Graves MD;  Location:  OR    ESOPHAGOSCOPY, GASTROSCOPY, DUODENOSCOPY (EGD), COMBINED N/A 10/18/2023    Procedure: ESOPHAGOGASTRODUODENOSCOPY, WITH BIOPSY;  Surgeon: Jose Salinas MD;  Location:  GI    ESOPHAGOSCOPY, GASTROSCOPY, DUODENOSCOPY (EGD), COMBINED N/A 11/14/2023    Procedure: Esophagoscopy, gastroscopy, duodenoscopy (EGD), combined with Dilation;  Surgeon: Ernst Hatfield MD;  [place] [facility] [place]    ESOPHAGOSCOPY, GASTROSCOPY, DUODENOSCOPY (EGD), COMBINED N/A 2/21/2024    Procedure: ESOPHAGOGASTRODUODENOSCOPY, WITH BIOPSY AND BALLOON DILATION;  Surgeon: Harlan Blanc DO;  [place] [facility] [place]    EYE SURGERY  2005    cataract surgery    IR JEJUNOSTOMY TUBE CHANGE  8/25/2023    LAPAROSCOPIC ASSISTED INSERTION TUBE JEJUNOSTOMY N/A 7/5/2023    Procedure: INSERTION, JEJUNOSTOMY TUBE,  LAPAROSCOPY-ASSISTED;  Surgeon: Judy Holguin MD;  Location: [facility] OR    VASECTOMY  1981       Family History:    Family History   Problem Relation Age of Onset    Hypertension Sister     Kidney failure Sister     Chronic Obstructive Pulmonary Disease Sister     Thyroid Disease Sister     Seizure Disorder Paternal Grandmother     Mitral valve prolapse Daughter     No Known Problems Son     Cerebrovascular Disease No family hx of        Social History:  Marital Status:   [2]  Social History     Tobacco Use    Smoking status: Never     Passive exposure: Never    Smokeless tobacco: Never   Vaping Use    Vaping Use: Never used   Substance Use Topics    Alcohol use: Yes     Comment: Beth only    Drug use: No        Medications:    aspirin 81 MG EC tablet  atorvastatin (LIPITOR) 10 MG tablet  bismuth subsalicylate (PEPTO BISMOL) 262 MG chewable tablet  carvedilol (COREG) 6.25 MG tablet  diclofenac (VOLTAREN) 1 % topical gel  doxycycline hyclate (VIBRAMYCIN) 100 MG capsule  metroNIDAZOLE (FLAGYL) 500 MG tablet  Multiple Vitamin (MULTI VITAMIN PO)  nitroGLYcerin (NITROSTAT) 0.3 MG sublingual tablet  omeprazole (PRILOSEC) 40 MG DR capsule          Review of Systems    Physical Exam   BP: (!) 169/76  Pulse: 78  Temp: 98  F (36.7  C)  Resp: 18  Weight: 78 kg (172 lb)  SpO2: 97 %      Physical Exam  Constitutional:       General: He is not in acute distress.     Appearance: He is not toxic-appearing.   HENT:      Head: Normocephalic.      Right Ear: External ear normal.      Left Ear: External ear normal.      Mouth/Throat:      Mouth: Mucous membranes are moist.   Eyes:      General:         Right eye: No discharge.         Left eye: No discharge.      Extraocular Movements: Extraocular movements intact.   Cardiovascular:      Rate and Rhythm: Normal rate.   Abdominal:      General: There is no distension.      Comments: No guarding, +nbs, no CVA ttp   Musculoskeletal:      Cervical back: No rigidity.   Skin:      Capillary Refill: Capillary refill takes less than 2 seconds.      Coloration: Skin is not jaundiced.   Neurological:      General: No focal deficit present.   Psychiatric:      Comments: Very nice         ED Course     ED Course as of 04/03/24 1856   Wed Apr 03, 2024   1335 White count elevation, BUN is slightly low and creatinine is reassuring.  Electrolytes are generally reassuring.   1406 CT shows:    IMPRESSION:   1.  Increased moderate ascites. Mild fat stranding in the omentum may  be due to presence of ascites. Malignant ascites is not entirely  excluded. No discrete peritoneal masses.  2.  Mild wall thickening in the transverse colon, descending colon and  sigmoid colon slightly exaggerated by decompression. This may be due  to acute infectious or inflammatory colitis.  3.  Mild wall thickening of the distal stomach and antrum, likely due  to edema.  4.  Focal gallbladder wall thickening in the neck is nonspecific, may  be due to presence of ascites. Right upper quadrant ultrasound can be  considered for further evaluation.  5.  Stable small right pleural effusion. Slight increase in bibasilar  linear and patchy opacities, either due to pneumonitis or atelectasis.  6.  New asymmetric thickening of the left adrenal gland measuring 11  mm, indeterminate. Attention on follow-up.  7.  Stable wall thickening of the distal esophagus.      I independently interpreted the CT and agree there there is moderate ascites. I reviewed the previous CT and it has increased in volume.      1619 I consented the patient to therapeutic and diagnostic paracentesis.  That is currently underway.  So far has removed about 700 mL of fluid.  It is yellow.   1638 I completed the paracentesis.   1716 Patient is feeling much better after the paracentesis   1819 Cell count not suggestive of SBP.     1819 Patient updated.  He feels very safe for discharge very appreciative.  I will get a give him 3 tabs of Dulcolax and the MiraLAX to take  with him and told him to drink this at home.  I think this will resolve his symptoms.   1823 The patient is very appreciative.  He looks well.  He has no abdominal pain and he says his discomfort is much better now.  Me to give him the MiraLax for home.  His wife both feel safe with this plan of care.     [facility]    -Paracentesis    Date/Time: 4/3/2024 6:54 PM    Performed by: Vega Lowery MD  Authorized by: Vega Lowery MD    Risks, benefits and alternatives discussed.      PRE-PROCEDURE DETAILS     Procedure purpose:  Therapeutic    ANESTHESIA (see MAR for exact dosages):     Anesthesia method:  Local infiltration    Local anesthetic:  Bupivacaine 0.25% w/o epi    PROCEDURE DETAILS     Needle gauge:  18    Ultrasound guidance: yes      Puncture site:  R lower quadrant    Fluid removed amount:  Approximately 800 mLs    Fluid appearance:  Sobeida    Dressing:  Adhesive bandage      PROCEDURE  Describe Procedure: Ultrasound guidance, pocket was located and the site was marked.  There is no evidence of underlying vessels prior to the procedure.  Procedure was not painful for the patient in approximately a total of 800 mLs of fluid was removed. It was sobeida. Patient felt much better after. Studies were sent.   Patient Tolerance:  Patient tolerated the procedure well with no immediate complications                Results for orders placed or performed during the hospital encounter of 04/03/24 (from the past 24 hour(s))   CBC with platelets   Result Value Ref Range    WBC Count 5.7 4.0 - 11.0 10e3/uL    RBC Count 4.93 4.40 - 5.90 10e6/uL    Hemoglobin 14.6 13.3 - 17.7 g/dL    Hematocrit 44.2 40.0 - 53.0 %    MCV 90 78 - 100 fL    MCH 29.6 26.5 - 33.0 pg    MCHC 33.0 31.5 - 36.5 g/dL    RDW 13.4 10.0 - 15.0 %    Platelet Count 167 150 - 450 10e3/uL   Basic metabolic panel   Result Value Ref Range    Sodium 137 135 - 145 mmol/L    Potassium 5.2 3.4 - 5.3 mmol/L    Chloride 101  98 - 107 mmol/L    Carbon Dioxide (CO2) 26 22 - 29 mmol/L    Anion Gap 10 7 - 15 mmol/L    Urea Nitrogen 7.4 (L) 8.0 - 23.0 mg/dL    Creatinine 1.10 0.67 - 1.17 mg/dL    GFR Estimate 67 >60 mL/min/1.73m2    Calcium 9.0 8.8 - 10.2 mg/dL    Glucose 112 (H) 70 - 99 mg/dL   CT Abdomen Pelvis w Contrast    Narrative    CT ABDOMEN PELVIS W CONTRAST 4/3/2024 1:28 PM    CLINICAL HISTORY: patient with constipation for several weeks, hx of  esophageal cancer and J tube previously, query SBO, no fevers or  vomiting    TECHNIQUE: CT scan of the abdomen and pelvis was performed following  injection of IV contrast. Multiplanar reformats were obtained. Dose  reduction techniques were used.  CONTRAST: 84 mL Isovue-370    COMPARISON: 2/25/2024 and 3/15/2024    FINDINGS:   LOWER CHEST: Partly visualized small right pleural effusion has  slightly increased. Linear and mild patchy opacities in the posterior  dependent lower lobes bilaterally has slightly worsened. Partly  visualized small pericardial effusion is stable. Stable eccentric wall  thickening of the distal esophagus just above the gastroesophageal  junction measuring about 14 mm (3/49).    HEPATOBILIARY: No focal lesions in the liver. No calcified gallstones.  Impression wall thickening of the gallbladder neck is new. No biliary  duct dilatation.    PANCREAS: Stable pancreatic parenchymal fatty atrophy with preserved  parenchyma in the uncinate process. No pancreatic duct dilatation or  surrounding inflammatory changes.    SPLEEN: Normal.    ADRENAL GLANDS: New left adrenal gland thickening measuring about 11  mm (3/89).    KIDNEYS/BLADDER: No calculi or hydronephrosis. Stable benign cyst  requiring no specific follow-up.    BOWEL: Mild circumferential wall thickening of the distal gastric body  and antrum. No small bowel or colonic obstruction. Normal appendix.  Mild circumferential wall thickening in the mid transverse colon,  descending colon and sigmoid colon exaggerated  by the segments being  decompressed. Sigmoid diverticulosis.    PELVIC ORGANS: No pelvic masses. Small fat-containing bilateral  inguinal hernia.    ADDITIONAL FINDINGS: Increased moderate ascites. Mild hazy stranding  in the omentum is indeterminate, may be related to presence of ascites  or peritoneal carcinomatosis. No discrete peritoneal masses  visualized. No lymphadenopathy in the abdomen and pelvis. No abdominal  aortic aneurysm.    MUSCULOSKELETAL: Left PATY. Degenerative changes in the spine. No  suspicious lesions in the bones.      Impression    IMPRESSION:   1.  Increased moderate ascites. Mild fat stranding in the omentum may  be due to presence of ascites. Malignant ascites is not entirely  excluded. No discrete peritoneal masses.  2.  Mild wall thickening in the transverse colon, descending colon and  sigmoid colon slightly exaggerated by decompression. This may be due  to acute infectious or inflammatory colitis.  3.  Mild wall thickening of the distal stomach and antrum, likely due  to edema.  4.  Focal gallbladder wall thickening in the neck is nonspecific, may  be due to presence of ascites. Right upper quadrant ultrasound can be  considered for further evaluation.  5.  Stable small right pleural effusion. Slight increase in bibasilar  linear and patchy opacities, either due to pneumonitis or atelectasis.  6.  New asymmetric thickening of the left adrenal gland measuring 11  mm, indeterminate. Attention on follow-up.  7.  Stable wall thickening of the distal esophagus.    SONU MARQUEZ MD         SYSTEM ID:  [id number]   Cell count with differential fluid    Narrative    The following orders were created for panel order Cell count with differential fluid.  Procedure                               Abnormality         Status                     ---------                               -----------         ------                     Cell Count Body Fluid[[id number]]                            Final result                Differential Body Fluid[[id number]]                          Final result                 Please view results for these tests on the individual orders.   Cell Count Body Fluid   Result Value Ref Range    Color Yellow Colorless, Yellow    Clarity Clear Clear    Cell Count Fluid Source Paracentesis     Total Nucleated Cells 144 /uL    Narrative    No reference ranges have been established.  This result  should be interpreted in the context of the patient's clinical condition and   compared to simultaneous measurement in the patient's blood.        Small clot present, count may be inaccurate.   Differential Body Fluid   Result Value Ref Range    % Neutrophils 7 %    % Lymphocytes 78 %    % Monocyte/Macrophages 15 %    Absolute Neutrophils, Body Fluid 10.1   /uL    Narrative    No reference ranges have been established. This result should be interpreted in the context of the patient's clinical condition and compared to simultaneous measurement in the patient's blood.       Medications   lactated ringers BOLUS 1,000 mL (has no administration in time range)   bisacodyl (DULCOLAX) EC tablet 15 mg (15 mg Oral $Given 4/3/24 1846)   polyethylene glycol (MIRALAX) powder 238 g (238 g Oral $Given 4/3/24 1846)   iopamidol (ISOVUE-370) solution 84 mL (84 mLs Intravenous $Given 4/3/24 1320)   sodium chloride 0.9 % bag 500mL for CT scan flush use (61 mLs Intravenous $Given 4/3/24 1320)       Assessments & Plan (with Medical Decision Making)     Suspect some effect of the antibiotics that disrupted gut bacteria leading to constipation. Will give Dulcolax and Miralax. Will get CT given hx of J tube, query Sbo. Will get basic labs. Will monitor closely. Will reassess.     I have reviewed the nursing notes.    I have reviewed the findings, diagnosis, plan and need for follow up with the patient.          Medical Decision Making  The patient's presentation was of moderate complexity (an undiagnosed new problem with uncertain  diagnosis).    The patient's evaluation involved:  ordering and/or review of 3+ test(s) in this encounter (see separate area of note for details)  independent interpretation of testing performed by another health professional (see separate area of note for details)  An independent historian (wife)  Review of testing done previously (previous CT)    The patient's management necessitated moderate risk (prescription drug management including medications given in the ED) and high risk (a decision regarding emergency major procedure (paracentesis)).        Discharge Medication List as of 4/3/2024  6:29 PM          Final diagnoses:   Other ascites   Constipation, unspecified constipation type       4/3/2024   [facility] EMERGENCY DEPT       Vega Lowery MD  04/03/24 1902

## 2024-04-03 NOTE — TELEPHONE ENCOUNTER
"S-(situation): 1-2 weeks constipation issues. Increasing abd pain, distention    B-(background): pt has hx esoph cancer. Had recent PET scan that showed scattered fluid in abdomen.   1 mos ago dx with h. Pylori & was on 2 week regimen of 2 abx's. Pt had to stop the liquid vitamin while on the abx's.   Pt has had constipation issue & has been working on this with increased fluids, stool softeners.  Pt normally takes a liquid multi vit & when takes this with juice it acts like a diuretic. Pt norm has 1-2 BM's per day. Last Bm yest am & was small amt soft, pencil like. \"Feels like there is an obstacle\" when trying to pass stool. Sometimes stool is carla, small amt (last was 3 days). Stool has been soft, small & pencil like & the last BM was yest morning.  No hx hemorrhoids, fissures.    A-(assessment): pt denies blood on/in stool. No fever. Pt nauseated this morning. Abd distended, firm & passing very little gas. Abd is normally soft. Has abd pain in whole abd 5/10. Pt had been drinking around 5 large mugs of water per day. Voiding normal. +belching. Pt eating less.    R-(recommendations): protocol advises ER for worsening abd distention, pain. Pt verbalized understanding & will go to [place] [facility] today.    Nasrin Mcgraw RN          Reason for Disposition   Abdomen pain is main symptom and male   Abdomen bloating or swelling are main symptoms   MODERATE - SEVERE SWELLING of abdomen (e.g., looks very distended or swollen) that is NEW-ONSET or much worse    Answer Assessment - Initial Assessment Questions  1. STOOL PATTERN OR FREQUENCY: \"How often do you have a bowel movement (BM)?\"  (Normal range: 3 times a day to every 3 days)  \"When was your last BM?\"        Last Bm was yest morning. Usually has 1-2 per day  2. STRAINING: \"Do you have to strain to have a BM?\"       Yes, feels like causing a hernia. Gets pain in bladder area  3. RECTAL PAIN: \"Does your rectum hurt when the stool comes out?\" If Yes, ask: \"Do you have " "hemorrhoids? How bad is the pain?\"  (Scale 1-10; or mild, moderate, severe)      Yes, hurts when stool attempts to come out.   4. STOOL COMPOSITION: \"Are the stools hard?\"       No. When can pass stool sometimes its carla, small amt (has been 3 days since this). Stool has been soft, small amts & pencil size (last was yesterday)  5. BLOOD ON STOOLS: \"Has there been any blood on the toilet tissue or on the surface of the BM?\" If Yes, ask: \"When was the last time?\"      No blood  6. CHRONIC CONSTIPATION: \"Is this a new problem for you?\"  If No, ask: \"How long have you had this problem?\" (days, weeks, months)       Yes to this extent. Would happen occ & lack of water & usually resolved. Takes liquid multivit in glass of juice & if drinks down it acts like a diuretic. This has helped until a few weeks ago.    7. CHANGES IN DIET OR HYDRATION: \"Have there been any recent changes in your diet?\" \"How much fluids are you drinking on a daily basis?\"  \"How much have you had to drink today?\"      No, eating less. Drinking lots of water, more than usually 5 big mugs of water per day  8. MEDICINES: \"Have you been taking any new medicines?\" \"Are you taking any narcotic pain medicines?\" (e.g., Dilaudid, morphine, Percocet, Vicodin)      Around 1 mos ago dx with h. Pylori & was on 2 week regimen of 2 abx's & had to stop the liquid vitamin. Constipation issues the last 1-2 weeks  9. LAXATIVES: \"Have you been using any stool softeners, laxatives, or enemas?\"  If Yes, ask \"What, how often, and when was the last time?\"      Yes. Has tried dulcolax chewables 1x/day, sometimes 2x/day. Anti gas med to release pressure in abd.  10. ACTIVITY:  \"How much walking do you do every day?\"  \"Has your activity level decreased in the past week?\"         Not as much as usual. Last few days hasn't felt well  11. CAUSE: \"What do you think is causing the constipation?\"         ?  12. OTHER SYMPTOMS: \"Do you have any other symptoms?\" (e.g., abdomen pain, " "bloating, fever, vomiting)        Abd pain-constant in whole abd, bloating, abd firm to touch (usually soft), passing very little gas, +belching quite a bit. No fever. No n/v. Today felt a little nauseated after taking morning meds. Eating less  13. MEDICAL HISTORY: \"Do you have a history of hemorrhoids, rectal fissures, or rectal surgery or rectal abscess?\"          No hx hemorrhoids, fissure.  14. PREGNANCY: \"Is there any chance you are pregnant?\" \"When was your last menstrual period?\"    Protocols used: Constipation-A-OH, Abdominal Pain - Male-A-OH, Abdomen Bloating and Swelling-A-OH    "

## 2024-04-03 NOTE — DISCHARGE INSTRUCTIONS
You were seen today for constipation and some abdominal pain. We drained some fluid from your belly. I sent studies that your oncologist can follow up.     I want you to go home and drink the Miralax. It'll start working in a few hours.     If you develop a fever or worsening pain, come back.    Thank you for your patience. Take care and feel better soon.

## 2024-04-16 NOTE — PROGRESS NOTES
Oncology Rooming Note    April 16, 2024 3:35 PM   Liu Ragsdale is a 83 year old male who presents for:    Chief Complaint   Patient presents with    Oncology Clinic Visit     Malignant neoplasm of lower third of esophagus - provider visit only     Initial Vitals: There were no vitals taken for this visit. Estimated body mass index is 33.59 kg/m  as calculated from the following:    Height as of 3/8/24: 1.524 m (5').    Weight as of 4/3/24: 78 kg (172 lb). There is no height or weight on file to calculate BSA.  Data Unavailable Comment: Data Unavailable   No LMP for male patient.  Allergies reviewed: Yes  Medications reviewed: Yes    Medications: Medication refills not needed today.  Pharmacy name entered into [employer]:    [facility] - [place], [place] - [facility] [address] [facility]      Frailty Screening:   Is the patient here for a new oncology consult visit in cancer care? 2. No      Clinical concerns: Some anxiety with reoccurrence. May need some assistance.         Diana Arias MA

## 2024-04-16 NOTE — LETTER
4/16/2024         RE: Liu Ragsdale  [id number] [facility] [address]        Dear Colleague,    Thank you for referring your patient, Liu Ragsdale, to the [facility]. Please see a copy of my visit note below.    Oncology Rooming Note    April 16, 2024 3:35 PM   Liu Ragsdale is a 83 year old male who presents for:    Chief Complaint   Patient presents with     Oncology Clinic Visit     Malignant neoplasm of lower third of esophagus - provider visit only     Initial Vitals: There were no vitals taken for this visit. Estimated body mass index is 33.59 kg/m  as calculated from the following:    Height as of 3/8/24: 1.524 m (5').    Weight as of 4/3/24: 78 kg (172 lb). There is no height or weight on file to calculate BSA.  Data Unavailable Comment: Data Unavailable   No LMP for male patient.  Allergies reviewed: Yes  Medications reviewed: Yes    Medications: Medication refills not needed today.  Pharmacy name entered into [employer]:    [id number] - [place], [place] - [address] [place] [address]      Frailty Screening:   Is the patient here for a new oncology consult visit in cancer care? 2. No      Clinical concerns: Some anxiety with reoccurrence. May need some assistance.         Diana Arias MA              [facility] Outpatient Progress Note    Patient: Liu Ragsdale  MRN: [id number]  Date of Service: Apr 16, 2024          Reason for Visit    Locally advanced esophageal cancer    Primary Oncologist: Formerly, Dr. Locke      Assessment/Plan  cT2cN2 lower esophageal adenocarcinoma, status post concurrent chemoradiation  Recurrent esophageal cancer involving his esophagus and possible peritoneal spread    I have reviewed his chart in detail.  He was diagnosed with low esophageal adenocarcinoma back in June 2023.  Received concurrent chemoradiation with weekly carboplatin and paclitaxel.  Finished therapy in August 2023.  Was deemed not a  surgical candidate.  Was on surveillance.  Recently noted to have recurrent disease in the lower esophagus along with malignant ascites.  Underwent paracentesis yesterday.  Results are still pending but highly concerning for metastatic disease involving peritoneum.    Today I reviewed further management of esophageal cancer.  Explained to him and his wife and his daughter that systemic chemotherapy would be our only option going forward.  I am highly suspicious that he has metastatic disease involving peritoneum.  Will have to wait for the cytology from paracentesis to come back.  Mainstay of therapy will be combination chemotherapy with FOLFOX.  If his PD-L1 CPS is more than 5 then will add any more lab to it.  If CPS is more than 10 could also consider single agent Keytruda however, considering that he has evidence of peritoneal spread I would rather start with chemotherapy and immunotherapy combination.  If it does not tolerate chemotherapy then definitely we can scale down to single agent immunotherapy.    I extensively discussed the side effects and complications associated with FOLFOX chemotherapy.  He is agreeable to the plan.  Will put a plan in and start treatment soon.    Nutrition/hydration  Dysphagia  Has significant swallowing issues due to recurrent malignancy.  Recently underwent EGD with dilatation.  Looks like he needs 1 more.  Will touch base with GI again.  Plan is to start chemotherapy after GI procedure.      ______________________________________________________________________________    History of Present Illness/ Interval History    Mr. Liu Ragsdale  is a 83 year old with lower esophageal adenocarcinoma.      This is my first time meeting with him.  He was diagnosed with    Esophageal adenocarcinoma (lower thyroid) in June 2023 after he presented a couple months prior to that with weight loss and dysphagia.  He underwent upper endoscopy at that time which showed distal esophageal  fungating mass which was partially obstructing.  Biopsy confirmed poorly differentiated adenocarcinoma.  PD-L1 TPS 0%.  HER2 negative.  PET scan showed no evidence of any distant metastatic disease.  He did have some high and mediastinal adenopathy.  Clinical stage IIIb.  Underwent concurrent chemoradiation with weekly carboplatin and paclitaxel.  Therapy was complicated by posttreatment GI bleed with anemia requiring blood transfusion.  Repeat PET scan done in September 2023 showed complete response with some activity at the distal esophagus.  This was felt to be due to inflammation.  He was seen by thoracic surgery who deemed him not a candidate for resection due to his medical issues and age.  He was on surveillance since then with repeat EGDs which showed no evidence of any recurrence.    Had a repeat PET scan done in January 2024 which showed recurrent/residual adenocarcinoma in the lower third.  Underwent EGD with biopsy of the lower third lesion which confirmed recurrent malignancy.  No evidence of any metastatic disease.  However repeat PET scan done in March showed FDG avid distal esophageal lesion along with intra-abdominal ascites which was also FDG avid.    Since then he had progressive abdominal discomfort and constipation.  Underwent paracentesis on 4/15/2024.  Surgical path is still pending but it is highly suspicious for malignant ascites.    He is here to discuss further management of his recurrent esophageal cancer.      ECOG Performance    0        Oncology History/Treatment  Diagnosis/Stage:   6/2023: [id number] lower third esophageal adenocarcinoma  -Presented with dysphagia x2 months, increased acid reflux and 10 pound weight loss  -6/5/2023 EGD: Large fungating mass in the lower third esophagus, 38 cm from incisors.  Mass partially obstructing and circumferential.  Biopsy of mass: Adenocarcinoma.  GE junction biopsy also positive for adenocarcinoma.  -PET scan: Hypermetabolic activity distal  esophageal mass.  Low-moderate activity in hilar and mediastinal adenopathy, indeterminant and possibly inflammatory in nature.  No evident distant metastases.  -Negative PD-L1 expression  -7/2/2023 EUS: Partially circumferential progressing to circumferential distal esophageal mass spanning 36-41 cm with the junction estimated at 41 cm.  Lesion involves layer 4 but no evidence of layer 5 involvement no other structures involved.  5 regional periesophageal lymph nodes were noted, only 1 of these had malignant; no distant nodes noted.  Unremarkable pancreaticobiliary system and liver.  -7/5/2023: Diagnostic lap pleuroscopy without metastasis.  PEJ placed    Treatment:  Met with Dr. Levi in Thoracic Surgery.  Discussed consideration of surgical resection pending response to preoperative chemoradiation.    7/5/2023: PEJ tube feeding placement    7/24/2023 -8/25/2023: Definitive/neoadjuvant chemoradiation with weekly carboplatin and Taxol    Deemed not a surgical candidate.  Was on surveillance.    Recurrence noted in the PET scan dated 1/5/2024.  Distal esophageal lesion.  Biopsy-proven to be recurrent malignancy.    PET scan done on 3/15/2024 showed evidence of ascites thought to be malignant along with recurrent esophageal lesion.      Physical Exam    GENERAL: Alert and oriented to time place and person. Seated comfortably. In no distress.  Alone.  HEAD: Atraumatic and normocephalic. No alopecia.  EYES: ANTONIA, EOMI. No erythema. No icterus.  EXTREMITIES: Warm. No peripheral edema.  SKIN: no rash, or bruising or purpura.   NEURO: No gross deficit noted. Non-antalgic gait.      Lab Results    Recent Results (from the past 168 hour(s))   Cytology, non-gynecologic   Result Value Ref Range    Final Diagnosis       Specimen A     Interpretation:       Positive for malignancy     A. Abdomen:  - Moderately to poorly differentiated adenocarcinoma consistent with GE origin     Other Findings:      Chronic inflammation  present.     Adequacy:     Satisfactory for evaluation          Comment       The clinical history has been reviewed.  Cytology and histology demonstrate partially cohesive moderately to poorly differentiated adenocarcinoma.  The tumor cells are positive for CK7, CK20 (partial) and CDX2 with focal p53 expression.  HER2/deepthi is is generally negative (0-1+); we can certainly send the case for HER2/deepthi FISH if requested.  Minimal PD-L1 staining.  I am happy review any subsequent data.    RESULT FOR IMMUNOHISTOCHEMICAL [place] CLONE  PD-L1 ASSAY  COMBINED POSITIVE SCORE (CPS): 15  TUMOR PROPORTION SCORE (TPS): 10 %  INTERPRETATION: LOW PD-L1 EXPRESSION (TPS >/=1-49%)    COMMENT:  This [place]  PD-L1 immunohistochemistry antibody assay is a laboratory developed test to be used for patients with non-small cell lung carcinoma (NSCLC), gastric or gastroesophageal junction (GEJ) adenocarcinoma, urothelial carcinomas, melanomas, liver, breast and brain tumors who are being considered for treatment with Keytruda (Pembrolizumab), an anti-PD-1 immune checkpoint inhibitor. The [place]  PD-L1 assay has been validated by the [facility] against the FDA approved clinical trial-validated PharmDx 22C3 PD-L1 assay. Evidence suggests that the level of PD-L1 expression in the tumor cell population by immunohistochemistry is a major predictor of response to checkpoint inhibitor therapy. Previous studies demonstrate a high correlation between PD-L1 immunohistochemistry expression data obtained with PD-L1 clones Dako 22C3 and [place]  in NSCLC. (References: Lancet 387:1540-50, 2016; :1823-33, 2016; J Clin Oncol 34:4102-9. 2016; J Thorac Oncol 12:1654-63, 2017; [facility] PD-L1 2018 assessment)  Scoring system: The tumor proportion score (TPS) is determined by enumeration of the percentage of PD-L1 tumor cells with any amount of membrane positivity expressed  as a whole number relative to all viable tumor cells in the specimen. The scoring system for PD-L1 expression is divided into three groups: a) High expressor, for tumors with TPS >/= 50%; b) Low expressor, for tumors with TPS of >/=1%-49%; and c) Negative, for tumors with TPS <1%. If CPS score is reported, it is calculated based on the following formula: [(PD-L1 positive tumor cells + PD-L1 positive mononuclear inflammatory cells)/Total tumor cells] x 100.  Assay conditions:  - Fixation and processing: 10% neutral buffered formalin, paraffin embedded.  - Staining method: [place] predilute monoclonal PD-L1 antibody clone , standard heat induced epitope retrieval in cell conditioning 1 (CC1 - EDTA, alkaline pH), primary antibody incubation 16 minutes, [place] Optiview detection kit, and Smart Panel BenchMark Ultra automated instrument.  - Minimum tumor cell requirement: >/= 100 viable tumor cells present in the specimen.  - Positive and negative controls react appropriately.         Clinical Information       GE adenocarcinoma; new onsert ascites      Gross Description       A(A). Abdomen, Ascites:A. Abdomen, Ascites, Peritoneal Fluid:  Received 100 ml of clear, yellow fluid, processed as 1 Pap stained Autocyte,  1 [place] stained cytospin and one hematoxylin and eosin stained cell block.                  Abnormal Result? Yes (A) No    Performing Labs       The technical component of this testing was completed at [facility]     UPPER GI ENDOSCOPY   Result Value Ref Range    Upper GI Endoscopy       [address] [place] [address] [phone]     Endoscopy Department  _______________________________________________________________________________  Patient Name: Liu Johnson           Procedure Date: 4/19/2024 9:42 AM  MRN: [id number]                       Account Number: [id number]  Date of  Birth: 1940              Admit Type: Outpatient  Age: 83                               Room: [place] [address]  Gender: Male                          Note Status: Finalized  Attending MD: GUILLERMO TMZ MD,   Total Sedation Time:   _______________________________________________________________________________     Procedure:             Upper GI endoscopy  Indications:           Dysphagia  Providers:             GUILLERMO MTZ MD, Selina Segura RN, PRIYANKA ANDERS  Patient Profile:       84 yo M with history of poorly differentiated                          esophageal adenocarcinoma uT2, uN 2, uM0 s/p                          chemoradiation who had a recurrence of esophageal                          adenocarcinoma at GE junction, biopsy proven and                          empiric dilation in Feb 2024. He returned for                          recurrenc eof dysphagia symptoms after temporary                          relief for 3 weeks.  Referring MD:          JUSTO JAQUEZ MD  Medicines:             Fentanyl 100 micrograms IV, Midazolam 4 mg IV,                          Benzocaine spray  Complications:         No immediate complications. Estimated blood loss:                          Minimal.  _______________________________________________________________________________  Procedure:             Pre-Anesthesia Assessment:                         - Prior to the procedure, a History and Physical was                          performed, and patient medications and allergies were                          reviewed. The patient is competent. The risks and                           benefits of the procedure and the sedation options and                          risks were discussed with the patient. All questions                          were answered and informed consent was obtained.                          Patient identification and proposed procedure were                           verified by the physician in the pre-procedure area in                          the procedure room. Mental Status Examination: normal.                          Airway Examination: normal oropharyngeal airway and                          neck mobility. Respiratory Examination: clear to                          auscultation. CV Examination: normal. Prophylactic                          Antibiotics: The patient does not require prophylactic                          antibiotics. Prior Anticoagulants: The patient has                          taken no anticoagulant or antiplatelet agents. ASA                          Grade Assessment: III - A patient with severe systemic                           disease. After reviewing the risks and benefits, the                          patient was deemed in satisfactory condition to                          undergo the procedure. The anesthesia plan was to use                          moderate sedation / analgesia (conscious sedation).                          Immediately prior to administration of medications,                          the patient was re-assessed for adequacy to receive                          sedatives. The heart rate, respiratory rate, oxygen                          saturations, blood pressure, adequacy of pulmonary                          ventilation, and response to care were monitored                          throughout the procedure. The physical status of the                          patient was re-assessed after the procedure.                         After obtaining informed consent, the endoscope was                          passed under direct vision. Throughout the procedure,                           the patient's blood pressure, pulse, and oxygen                          saturations were monitored continuously. The Endoscope                          was introduced through the mouth, and advanced to the                           second part of duodenum. The upper GI endoscopy was                          accomplished without difficulty. The patient tolerated                          the procedure fairly well.                                                                                   Findings:       A small hiatal hernia was present.       The gastroesophageal flap valve was visualized endoscopically and        classified as Hill Grade III (minimal fold, loose to endoscope, hiatal        hernia likely).       A single 10mm nodule with a localized distribution was found at the        gastroesophageal junction. The nodule appeared similar in size to the        previous Endoscopy, biopsy proven to be recurrent malignancy, hence it        was not biospied. There were no o ther fungating mass or strictures that        could explain patient's dysphaia. A TTS dilator was passed through the        scope. Dilation with a 90-41-77-20-21 mm balloon dilator was performed        to 21 mm. Minimal rents were noted at the end of procedure in the area        of dilation.       Diffuse mildly erythematous mucosa without bleeding was found in the        gastric body and at the pylorus. Biopsies were taken with a cold forceps        for histology.       The duodenal bulb and second portion of the duodenum were normal.       The exam was otherwise without abnormality.                                                                                   Moderate Sedation:       Moderate (conscious) sedation was administered by the nurse and        supervised by the endoscopist. The patient's oxygen saturation, heart        rate, blood pressure and response to care were monitored. Total        physician intraservice time was 21 minutes.  Impression:            - No evidence  of high grade intraluminal stricture.                          Dilation was performed to 21mm today so there should                          not be a mechanical impediment to oral intake.  The                          symptoms may be due to pseudoachalsia in the setting                          of known recurrent adenocarcinoma. Treatment would be                          treatment of his underlying adenocarcinoma. Future                          empiric dilations can be considered if he has                          subjective improvement in his symptoms after today's                          intervention.                         - Mild erythema throughout the stomach with edema of                          the gastric antral folds. Biopsies taken to evaluate                          for H-pylori.                         - Normal duodenum.  Recommendation:        - Await pathology results for H.pylori                         - Discharge patient to home.                         - Resume p revious diet today.                         - Continue twice daily omeprazole.                         -Repeat dilation to be considered if patient has                          recurrence of symptoms.                         - Follow up with oncology for further evaluation and                          treatment.                                                                                       _____________________________  GUILLERMO MTZ MD  4/19/2024 11:38:06 AM  I was physically present for the entire viewing portion of the exam.  __________________________  Signature of teaching physician  [id number] MTZ MD    _____________  PRIYANKA ANDERS,   Number of Addenda: 0    Note Initiated On: 4/19/2024 9:42 AM  Scope In:  Scope Out:         Imaging    US Paracentesis without Albumin    Result Date: 4/15/2024  EXAM: 1. PARACENTESIS 2. ULTRASOUND GUIDANCE LOCATION: [facility] DATE: 4/15/2024 INDICATION: Ascites. PROCEDURE: Informed consent obtained. Time out performed. The abdomen was prepped and draped in a sterile fashion. 10 mL of 1% lidocaine was infused into local  soft tissues. A 5 Kosovan catheter system was introduced into the abdominal ascites under ultrasound guidance. 2 liters of clear yellow fluid were removed and sent to lab if requested. Patient tolerated procedure well. Ultrasound imaging was obtained and placed in the patient's permanent medical record.     IMPRESSION: 1.  Status post ultrasound-guided paracentesis. Reference CPT Code: 80926    CT Abdomen Pelvis w Contrast    Result Date: 4/3/2024  CT ABDOMEN PELVIS W CONTRAST 4/3/2024 1:28 PM CLINICAL HISTORY: patient with constipation for several weeks, hx of esophageal cancer and J tube previously, query SBO, no fevers or vomiting TECHNIQUE: CT scan of the abdomen and pelvis was performed following injection of IV contrast. Multiplanar reformats were obtained. Dose reduction techniques were used. CONTRAST: 84 mL Isovue-370 COMPARISON: 2/25/2024 and 3/15/2024 FINDINGS: LOWER CHEST: Partly visualized small right pleural effusion has slightly increased. Linear and mild patchy opacities in the posterior dependent lower lobes bilaterally has slightly worsened. Partly visualized small pericardial effusion is stable. Stable eccentric wall thickening of the distal esophagus just above the gastroesophageal junction measuring about 14 mm (3/49). HEPATOBILIARY: No focal lesions in the liver. No calcified gallstones. Impression wall thickening of the gallbladder neck is new. No biliary duct dilatation. PANCREAS: Stable pancreatic parenchymal fatty atrophy with preserved parenchyma in the uncinate process. No pancreatic duct dilatation or surrounding inflammatory changes. SPLEEN: Normal. ADRENAL GLANDS: New left adrenal gland thickening measuring about 11 mm (3/89). KIDNEYS/BLADDER: No calculi or hydronephrosis. Stable benign cyst requiring no specific follow-up. BOWEL: Mild circumferential wall thickening of the distal gastric body and antrum. No small bowel or colonic obstruction. Normal appendix. Mild circumferential wall  thickening in the mid transverse colon, descending colon and sigmoid colon exaggerated by the segments being decompressed. Sigmoid diverticulosis. PELVIC ORGANS: No pelvic masses. Small fat-containing bilateral inguinal hernia. ADDITIONAL FINDINGS: Increased moderate ascites. Mild hazy stranding in the omentum is indeterminate, may be related to presence of ascites or peritoneal carcinomatosis. No discrete peritoneal masses visualized. No lymphadenopathy in the abdomen and pelvis. No abdominal aortic aneurysm. MUSCULOSKELETAL: Left PATY. Degenerative changes in the spine. No suspicious lesions in the bones.     IMPRESSION: 1.  Increased moderate ascites. Mild fat stranding in the omentum may be due to presence of ascites. Malignant ascites is not entirely excluded. No discrete peritoneal masses. 2.  Mild wall thickening in the transverse colon, descending colon and sigmoid colon slightly exaggerated by decompression. This may be due to acute infectious or inflammatory colitis. 3.  Mild wall thickening of the distal stomach and antrum, likely due to edema. 4.  Focal gallbladder wall thickening in the neck is nonspecific, may be due to presence of ascites. Right upper quadrant ultrasound can be considered for further evaluation. 5.  Stable small right pleural effusion. Slight increase in bibasilar linear and patchy opacities, either due to pneumonitis or atelectasis. 6.  New asymmetric thickening of the left adrenal gland measuring 11 mm, indeterminate. Attention on follow-up. 7.  Stable wall thickening of the distal esophagus. SONU MARQUEZ MD   SYSTEM ID:  [id number]     Billing  A total of 45 min was spent today on this visit which included face to face conversation with the patient, EMR review, counseling and co-ordination of care and medical documentation.    The longitudinal plan of care for the diagnosis(es)/condition(s) as documented were addressed during this visit. Due to the added complexity in care, I will  continue to support Liu in the subsequent management and with ongoing continuity of care.      Signed by:   La Nena Atkins MD  Hematology and Medical Oncology  [facility]      Again, thank you for allowing me to participate in the care of your patient.        Sincerely,        La Nena Atkins MD

## 2024-04-18 NOTE — TELEPHONE ENCOUNTER
Pre visit planning completed.      Procedure details:    Patient scheduled for Upper endoscopy (EGD) on 4/19/2024.     Arrival time: 0845. Procedure time 0945    Facility location: [facility]; [address] [place] [address], [address] Floor, [place] [address]. Check in location: Main entrance at registration desk.    Sedation type: Conscious sedation     Pre op exam needed? 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 No    Recent diagnosis of diverticulitis within the last 6 weeks? N/A    Diabetic? No      Medication review:    Anticoagulants? No    NSAIDS? No NSAID medications per patient's medication list.  RN will verify with pre-assessment call.    Other medication HOLDING recommendations:  N/A      Prep for procedure:     Bowel prep recommendation: N/A    Prep instructions [facility] via [facility]  Endoscopy Procedure Pre Assessment RN  [phone] option 4

## 2024-04-18 NOTE — TELEPHONE ENCOUNTER
Pre Assessment RN Review    Focused Assessments      FANI Hx  Estimated body mass index is 30.99 kg/m  as calculated from the following:    Height as of 3/8/24: 1.524 m (5').    Weight as of 4/16/24: 72 kg (158 lb 11.2 oz).     Patient has reported / documented history FANI and reports he does not use a a device for sleep.     Device: N/A    Severity Assessment    Sleep Study:   Diagnosis/Severity: Moderate    AHI: 20.8          Scheduling Status & Recommendations    Location Type: Hospital - Per exclusion criteria.  Also noted a paracentesis in the last month, hospital needed

## 2024-04-18 NOTE — CONFIDENTIAL NOTE
Patient called requesting a paracentesis as he feels his belly is filling with fluid again. Dr. Atkins ok with plan.Nyla Morrell RN on 4/18/2024 at 9:08 AM

## 2024-04-18 NOTE — TELEPHONE ENCOUNTER
Pre assessment completed for upcoming procedure.   (Please see previous telephone encounter notes for complete details)      Procedure details:    Arrival time and facility location reviewed.    Pre op exam needed? N/A    Designated  policy reviewed. Instructed to have someone stay 6  hours post procedure.       Medication review:    NSAID medication(s): Ibuprofen (Advil, Motrin): HOLD 1 day before procedure.      Prep for procedure:     Procedure prep instructions reviewed.        Any additional information needed:  N/A      Patient  verbalized understanding and had no questions or concerns at this time.      Janet Jordan RN  Endoscopy Procedure Pre Assessment RN  [phone] option 4

## 2024-04-18 NOTE — TELEPHONE ENCOUNTER
"Endoscopy Scheduling Screen    Have you had a positive Covid test in the last 14 days?  No    What is your communication preference for Instructions and/or Bowel Prep?   MyChart    What insurance is in the chart?  Other:  medicare/medica     Ordering/Referring Provider: Ernst Hatfield MD     (If ordering provider performs procedure, schedule with ordering provider unless otherwise instructed. )    BMI: Estimated body mass index is 30.99 kg/m  as calculated from the following:    Height as of 3/8/24: 1.524 m (5').    Weight as of 4/16/24: 72 kg (158 lb 11.2 oz).     Sedation Ordered  MAC/deep sedation.   BMI<= 45 45 < BMI <= 48 48 < BMI < = 50  BMI > 50   No Restrictions No [facility]  No [facility]  [facility] with exceptions Hospital Only OR Only       Do you have a history of malignant hyperthermia?  No    (Females) Are you currently pregnant?   No     Have you been diagnosed or told you have pulmonary hypertension?   No    Do you have an LVAD?  No    Have you been told you have moderate to severe sleep apnea?  Yes (RN Review required for scheduling unless scheduling in Hospital.) - no cpap     Have you been told you have COPD, asthma, or any other lung disease?  No    Do you have any heart conditions?  No  but had stents in a few years ago     Have you ever had or are you waiting for an organ transplant?  No. Continue scheduling, no site restrictions.    Have you had a stroke or transient ischemic attack (TIA aka \"mini stroke\" in the last 6 months?   No    Have you been diagnosed with or been told you have cirrhosis of the liver?   No    Are you currently on dialysis?   No    Do you need assistance transferring?   No    BMI: Estimated body mass index is 30.99 kg/m  as calculated from the following:    Height as of 3/8/24: 1.524 m (5').    Weight as of 4/16/24: 72 kg (158 lb 11.2 oz).     Is patients BMI > 40 and scheduling location [facility]?  No    Do you take an injectable medication for weight loss or diabetes " (excluding insulin)?  No    Do you take the medication Naltrexone?  No    Do you take blood thinners?  No asprin       Prep   Are you currently on dialysis or do you have chronic kidney disease?  No    Do you have a diagnosis of diabetes?  No    Do you have a diagnosis of cystic fibrosis (CF)?  No    On a regular basis do you go 3 -5 days between bowel movements?  No    BMI > 40?  No    Preferred Pharmacy:    [facility] [address] [place] [address] [address] [facility]  [address] [facility] [address]  Phone: [phone] Fax: [phone]      Final Scheduling Details     Procedure scheduled  Upper endoscopy (EGD)    Surgeon:  Guanaco      Date of procedure:  07/30/2024     Pre-OP / PAC:   No - Not required for this site.    Location  [facility] - Per RN assessment.    Sedation   MAC/Deep Sedation - Per order.      Patient Reminders:   You will receive a call from a Nurse to review instructions and health history.  This assessment must be completed prior to your procedure.  Failure to complete the Nurse assessment may result in the procedure being cancelled.      On the day of your procedure, please designate an adult(s) who can drive you home stay with you for the next 24 hours. The medicines used in the exam will make you sleepy. You will not be able to drive.      You cannot take public transportation, ride share services, or non-medical taxi service without a responsible caregiver.  Medical transport services are allowed with the requirement that a responsible caregiver will receive you at your destination.  We require that drivers and caregivers are confirmed prior to your procedure.

## 2024-04-18 NOTE — TELEPHONE ENCOUNTER
Caller: Liu    Reason for Reschedule/Cancellation   (please be detailed, any staff messages or encounters to note?): Per inbasket message from [facility] patient is having dificulty swallowing. Looking ot get patient in within the next day or two. Patient can be seen under CS or MAC per Dr. Hatfield.       Prior to reschedule please review:  Ordering Provider: Liu   Sedation Determined: moderate or MAC  Does patient have any ASC Exclusions, please identify?: y FANI      Notes on Cancelled Procedure:  Procedure: Upper Endoscopy [EGD]   Date:07/30/2024   Location: [facility]; [address] [facility], [address] [place] [address]   Surgeon: Guanaco      Rescheduled: Yes,   Procedure: Upper Endoscopy [EGD]    Date: 04/18/2024   Location: [facility]; [address], [address] [place] [address]    Surgeon: Ira    Sedation Level Scheduled  Moderate ,  Reason for Sedation Level per inbasket message from [facility]   Instructions updated and sent: Y     Does patient need PAC or Pre -Op Rescheduled? : no       Did you cancel or rescheduled an EUS procedure? No.

## 2024-04-19 PROBLEM — R18.0 MALIGNANT ASCITES (H): Status: ACTIVE | Noted: 2024-01-01

## 2024-04-19 NOTE — OR NURSING
Procedure: egd with gastric bxs and balloon dilation  Sedation: conscious sedation   Specimens: verified, sent to lab.   O2: ra  Tolerated procedure: well  Pt to recovery area in stable condition accompanied by RN, bedside report given to recovery RN  Other:  n/a    All belongings with patient at time of transfer.

## 2024-04-19 NOTE — DISCHARGE INSTRUCTIONS
Discharge Instructions after  Upper Endoscopy (EGD)    Activity and Diet  You were given medicine for pain. You may be dizzy or sleepy.  For 24 hours:   Do not drive or use heavy equipment.   Do not make important decisions.   Do not drink any alcohol.  ___ You may return to your regular diet.    Discomfort  You may have a sore throat for 2 to 3 days. It may help to:   Avoid hot liquids for 24 hours.   Use sore throat lozenges.   Gargle as needed with salt water up to 4 times a day. Mix 1 cup of warm water  with 1 teaspoon of salt. Do not swallow.  ___ Your esophagus was dilated (opened) or banded during the exam:   Drink only cool liquids for the rest of the day. Eat a soft diet for the next few days.   You may have a sore chest for 2 to 3 days.    You may take Tylenol (acetaminophen) for pain unless your doctor has told you not to.    Do not take aspirin or ibuprofen (Advil, Motrin) or other NSAIDS  (anti-inflammatory drugs) for ___ days.    Follow-up  ___ We took small tissue samples for study. If you do not have a follow-up visit scheduled,  call your provider s office in 2 weeks for the results.    Other instructions________________________________________________________    When to call us:  Problems are rare. Call right away if you have:   Unusual throat pain or trouble swallowing   Unusual pain in belly or chest that is not relieved by belching or passing air   Black stools (tar-like looking bowel movement)   Temperature above 100.6  F. (37.5  C).    If you vomit blood or have severe pain, go to an emergency room.    If you have questions, call:  Monday to Friday, 8 a.m. to 4:30 p.m.: Central Scheduling Department:[phone]    After hours: Hospital: [phone] (Ask for the GI fellow on call)

## 2024-04-22 NOTE — LETTER
4/22/2024         RE: Liu Ragsdale  [address] [facility] [address]        Dear Colleague,    Thank you for referring your patient, Liu Ragsdale, to the [facility]. Please see a copy of my visit note below.    Surgical Consultation/History and Physical  [facility]    Liu is seen in consultation for port placement, at the request of Piyush Rogers MD.    Chief Complaint:  esophageal cancer    HPI:  Liu Ragsdale presents in consultation today for evaluation of infusion port placement. He has a history of esophageal cancer, non-operable. The patient does have a significant history of prior CVA and right carotid endarterectomy in 2021. 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     SOB (shortness of breath) on exertion     Arthritis of right glenohumeral joint- moderate     Arthritis of right acromioclavicular joint- advanced     Positive cardiac stress test     Status post coronary angiogram     Coronary artery disease involving native coronary artery of native heart without angina pectoris     Degenerative joint disease of left hip     Near syncope     Right carotid artery occlusion     Cerebrovascular accident (H)     History of ischemic stroke     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)     Upper GI bleed     Anemia, unspecified type     Jejunostomy tube in situ (H)     Dysphagia     Malignant ascites (H28)       Past Medical History:   Diagnosis Date     Angina at rest (H24)      Arthritis      Cerebrovascular accident (H) 09/27/2021     Coronary artery disease  involving native coronary artery of native heart without angina pectoris      Dysphagia      Esophageal adenocarcinoma (H)     DISTAL ESOPHAGUS     Hypertension      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) 07/03/2023     Status post coronary angiogram 03/10/2020       Past Surgical History:   Procedure Laterality Date     ARTHROPLASTY HIP Left 02/03/2021    Procedure: Total Hip Arthroplasty;  Surgeon: Andrew Arriola MD;  Location: [facility] OR     COLONOSCOPY N/A 06/16/2016    Procedure: COLONOSCOPY;  Surgeon: Randy Avendano MD;  Location: [facility] GI     CV LEFT HEART CATH Left 03/10/2020    Procedure: Left Heart Cath;  Surgeon: Vicente Lopez MD;  Location:  HEART CARDIAC CATH LAB     ENDARTERECTOMY CAROTID Right 9/29/2021    Procedure: ENDARTERECTOMY, CAROTID;  Surgeon: Raymond Andersen MD;  Location:  OR     ENDOSCOPIC ULTRASOUND UPPER GASTROINTESTINAL TRACT (GI) N/A 7/3/2023    Procedure: ENDOSCOPIC ULTRASOUND, ESOPHAGOSCOPY / UPPER GASTROINTESTINAL TRACT (GI);  Surgeon: Yunier Graves MD;  Location:  OR     ESOPHAGOSCOPY, GASTROSCOPY, DUODENOSCOPY (EGD), COMBINED N/A 6/5/2023    Procedure: Esophagoscopy, Gastroscopy, Duodenoscopy, With Biopsy;  Surgeon: Joseph Landers DO;  Location: [facility] GI     ESOPHAGOSCOPY, GASTROSCOPY, DUODENOSCOPY (EGD), COMBINED N/A 7/3/2023    Procedure: ESOPHAGOGASTRODUODENOSCOPY;  Surgeon: Yunier Graves MD;  Location:  OR     ESOPHAGOSCOPY, GASTROSCOPY, DUODENOSCOPY (EGD), COMBINED N/A 10/18/2023    Procedure: ESOPHAGOGASTRODUODENOSCOPY, WITH BIOPSY;  Surgeon: Jose Salinas MD;  Location:  GI     ESOPHAGOSCOPY, GASTROSCOPY, DUODENOSCOPY (EGD), COMBINED N/A 11/14/2023    Procedure: Esophagoscopy, gastroscopy, duodenoscopy (EGD), combined with Dilation;  Surgeon: Ernst Hatfield MD;  Location: [facility] OR     ESOPHAGOSCOPY, GASTROSCOPY, DUODENOSCOPY (EGD), COMBINED N/A 2/21/2024    Procedure: ESOPHAGOGASTRODUODENOSCOPY, WITH BIOPSY  AND BALLOON DILATION;  Surgeon: Harlan Blanc DO;  Location: [facility] OR     ESOPHAGOSCOPY, GASTROSCOPY, DUODENOSCOPY (EGD), DILATATION, COMBINED N/A 4/19/2024    Procedure: Esophagoscopy, gastroscopy, duodenoscopy (EGD), dilatation, combined;  Surgeon: Steve Roth MD;  [facility]: [facility] GI     EYE SURGERY  2005    cataract surgery     IR JEJUNOSTOMY TUBE CHANGE  8/25/2023     LAPAROSCOPIC ASSISTED INSERTION TUBE JEJUNOSTOMY N/A 7/5/2023    Procedure: INSERTION, JEJUNOSTOMY TUBE, LAPAROSCOPY-ASSISTED;  Surgeon: Judy Holguin MD;  Location: [facility] OR     VASECTOMY  1981       Family History   Problem Relation Age of Onset     Hypertension Sister      Kidney failure Sister      Chronic Obstructive Pulmonary Disease Sister      Thyroid Disease Sister      Seizure Disorder Paternal Grandmother      Mitral valve prolapse Daughter      No Known Problems Son      Cerebrovascular Disease No family hx of        Social History     Tobacco Use     Smoking status: Never     Passive exposure: Never     Smokeless tobacco: Never   Substance Use Topics     Alcohol use: Yes     Comment: Beth only        History   Drug Use No       Current Outpatient Medications   Medication Sig Dispense Refill     aspirin 81 MG EC tablet Take 81 mg by mouth daily       atorvastatin (LIPITOR) 10 MG tablet Take 1 tablet (10 mg) by mouth daily 90 tablet 1     carvedilol (COREG) 6.25 MG tablet TAKE 1 TABLET BY MOUTH PER J-TUBE TWICE DAILY WITH MEALS 90 tablet 0     diclofenac (VOLTAREN) 1 % topical gel Place 4 g onto the skin 3 times daily as needed for moderate pain (Shoulder) 100 g 1     Multiple Vitamin (MULTI VITAMIN PO) 5 mLs by Per J Tube route daily Eniva VIBE-liquid with 5 ml of Eniva Cell Ready and 5 ml of Collagen qs with water to 55 ml       omeprazole (PRILOSEC) 40 MG DR capsule Take 40 mg by mouth 2 times daily       nitroGLYcerin (NITROSTAT) 0.3 MG sublingual tablet For chest pain place 1 tablet under the tongue every 5 minutes  for 3 doses. If symptoms persist 5 minutes after 1st dose call 911. (Patient not taking: Reported on 4/22/2024) 30 tablet 3       Allergies   Allergen Reactions     Ampicillin Rash     Latex Rash     Metoprolol Other (See Comments)     Side effects : dry mouth       Review of Systems:   10 point ROS negative other than what was listed in HPI    Physical Exam:  /63 (BP Location: Right arm, Patient Position: Chair, Cuff Size: Adult Large)   Pulse 75   Temp 97.8  F (36.6  C) (Tympanic)   Wt 70.8 kg (156 lb)   BMI 30.47 kg/m      Constitutional- No acute distress, well nourished, non-toxic  Eyes: Anicteric, no injection.  PERRL  ENT:  Normocephalic, atraumatic, Nose midline, moist mucus membranes  Neck - supple, no LAD.  Well-healed incisions over right neck from prior carotid endarterectomy  Respiratory-nonlabored breathing on room air  Cardiovascular -extremities warm and well-perfused neuro - No focal neuro deficits, Alert and oriented x 3  Psych: Appropriate mood and affect  Musculoskeletal: Normal gait, symmetric strength.  FROM upper and lower extremities.  Skin: Warm, Dry    LABS:     Lab Results   Component Value Date    WBC 5.7 04/03/2024    HGB 14.6 04/03/2024    HCT 44.2 04/03/2024    MCV 90 04/03/2024     04/03/2024      Lab Results   Component Value Date    INR 0.84 (L) 09/29/2021    INR 1.03 09/26/2021    INR 1.03 02/03/2021          ASSESSMENT AND PLAN:     Mr. Liu Ragsdale is in need of an infusion port for chemotherapy in the setting of non-operative esophageal cancer.  He was referred to my surgery clinic for evaluation for possible surgically placed port.  Patient has a history of prior procedures on his neck, specifically on the right side, which I anticipate would put him at elevated risk of complications with a surgically placed port.  For these reasons, I recommend patient to be evaluated for port placement by interventional radiology.  Orders placed and patient is amenable to  the proposed plan.  Patient understands the information given and wishes to proceed accordingly.    John Calixto MD on 4/22/2024 at 11:19 AM            Again, thank you for allowing me to participate in the care of your patient.        Sincerely,        John Calixto MD

## 2024-04-22 NOTE — PROGRESS NOTES
Surgical Consultation/History and Physical  [facility]    Liu is seen in consultation for port placement, at the request of Piyush Rogers MD.    Chief Complaint:  esophageal cancer    HPI:  Liu Ragsdale presents in consultation today for evaluation of infusion port placement. He has a history of esophageal cancer, non-operable. The patient does have a significant history of prior CVA and right carotid endarterectomy in 2021. 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    SOB (shortness of breath) on exertion    Arthritis of right glenohumeral joint- moderate    Arthritis of right acromioclavicular joint- advanced    Positive cardiac stress test    Status post coronary angiogram    Coronary artery disease involving native coronary artery of native heart without angina pectoris    Degenerative joint disease of left hip    Near syncope    Right carotid artery occlusion    Cerebrovascular accident (H)    History of ischemic stroke    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)    Upper GI bleed    Anemia, unspecified type    Jejunostomy tube in situ (H)    Dysphagia    Malignant ascites (H28)       Past Medical History:   Diagnosis Date    Angina at rest (H24)     Arthritis     Cerebrovascular accident (H) 09/27/2021    Coronary artery disease involving native coronary artery of native heart without angina pectoris     Dysphagia     Esophageal adenocarcinoma (H)     DISTAL ESOPHAGUS    Hypertension     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) 07/03/2023    Status post coronary angiogram 03/10/2020       Past  Surgical History:   Procedure Laterality Date    ARTHROPLASTY HIP Left 02/03/2021    Procedure: Total Hip Arthroplasty;  Surgeon: Andrew Arriola MD;  Location: [facility]    COLONOSCOPY N/A 06/16/2016    Procedure: COLONOSCOPY;  Surgeon: Randy Avendano MD;  Location: [facility]    CV LEFT HEART CATH Left 03/10/2020    Procedure: Left Heart Cath;  Surgeon: Vicente Lopez MD;  Location:  HEART CARDIAC CATH LAB    ENDARTERECTOMY CAROTID Right 9/29/2021    Procedure: ENDARTERECTOMY, CAROTID;  Surgeon: Raymond Andersen MD;  Location: [facility]    ENDOSCOPIC ULTRASOUND UPPER GASTROINTESTINAL TRACT (GI) N/A 7/3/2023    Procedure: ENDOSCOPIC ULTRASOUND, ESOPHAGOSCOPY / UPPER GASTROINTESTINAL TRACT (GI);  Surgeon: Yunier Graves MD;  Location:  OR    ESOPHAGOSCOPY, GASTROSCOPY, DUODENOSCOPY (EGD), COMBINED N/A 6/5/2023    Procedure: Esophagoscopy, Gastroscopy, Duodenoscopy, With Biopsy;  Surgeon: Joseph Landers DO;  Location: [facility]    ESOPHAGOSCOPY, GASTROSCOPY, DUODENOSCOPY (EGD), COMBINED N/A 7/3/2023    Procedure: ESOPHAGOGASTRODUODENOSCOPY;  Surgeon: Yunier Graves MD;  Location:  OR    ESOPHAGOSCOPY, GASTROSCOPY, DUODENOSCOPY (EGD), COMBINED N/A 10/18/2023    Procedure: ESOPHAGOGASTRODUODENOSCOPY, WITH BIOPSY;  Surgeon: Jose Salinas MD;  Location:  GI    ESOPHAGOSCOPY, GASTROSCOPY, DUODENOSCOPY (EGD), COMBINED N/A 11/14/2023    Procedure: Esophagoscopy, gastroscopy, duodenoscopy (EGD), combined with Dilation;  Surgeon: Ernst Hatfield MD;  [place] [facility]    ESOPHAGOSCOPY, GASTROSCOPY, DUODENOSCOPY (EGD), COMBINED N/A 2/21/2024    Procedure: ESOPHAGOGASTRODUODENOSCOPY, WITH BIOPSY AND BALLOON DILATION;  Surgeon: Harlan Blanc DO;  [place] [facility] [place]    ESOPHAGOSCOPY, GASTROSCOPY, DUODENOSCOPY (EGD), DILATATION, COMBINED N/A 4/19/2024    Procedure: Esophagoscopy, gastroscopy, duodenoscopy (EGD), dilatation, combined;  Surgeon: Steve Roth MD;  Location:  [facility]    EYE SURGERY  2005    cataract surgery    IR JEJUNOSTOMY TUBE CHANGE  8/25/2023    LAPAROSCOPIC ASSISTED INSERTION TUBE JEJUNOSTOMY N/A 7/5/2023    Procedure: INSERTION, JEJUNOSTOMY TUBE, LAPAROSCOPY-ASSISTED;  Surgeon: Judy Holguin MD;  Location: [facility] OR    VASECTOMY  1981       Family History   Problem Relation Age of Onset    Hypertension Sister     Kidney failure Sister     Chronic Obstructive Pulmonary Disease Sister     Thyroid Disease Sister     Seizure Disorder Paternal Grandmother     Mitral valve prolapse Daughter     No Known Problems Son     Cerebrovascular Disease No family hx of        Social History     Tobacco Use    Smoking status: Never     Passive exposure: Never    Smokeless tobacco: Never   Substance Use Topics    Alcohol use: Yes     Comment: Beth only        History   Drug Use No       Current Outpatient Medications   Medication Sig Dispense Refill    aspirin 81 MG EC tablet Take 81 mg by mouth daily      atorvastatin (LIPITOR) 10 MG tablet Take 1 tablet (10 mg) by mouth daily 90 tablet 1    carvedilol (COREG) 6.25 MG tablet TAKE 1 TABLET BY MOUTH PER J-TUBE TWICE DAILY WITH MEALS 90 tablet 0    diclofenac (VOLTAREN) 1 % topical gel Place 4 g onto the skin 3 times daily as needed for moderate pain (Shoulder) 100 g 1    Multiple Vitamin (MULTI VITAMIN PO) 5 mLs by Per J Tube route daily Eniva VIBE-liquid with 5 ml of Eniva Cell Ready and 5 ml of Collagen qs with water to 55 ml      omeprazole (PRILOSEC) 40 MG DR capsule Take 40 mg by mouth 2 times daily      nitroGLYcerin (NITROSTAT) 0.3 MG sublingual tablet For chest pain place 1 tablet under the tongue every 5 minutes for 3 doses. If symptoms persist 5 minutes after 1st dose call 911. (Patient not taking: Reported on 4/22/2024) 30 tablet 3       Allergies   Allergen Reactions    Ampicillin Rash    Latex Rash    Metoprolol Other (See Comments)     Side effects : dry mouth       Review of Systems:   10 point ROS negative other  than what was listed in HPI    Physical Exam:  /63 (BP Location: Right arm, Patient Position: Chair, Cuff Size: Adult Large)   Pulse 75   Temp 97.8  F (36.6  C) (Tympanic)   Wt 70.8 kg (156 lb)   BMI 30.47 kg/m      Constitutional- No acute distress, well nourished, non-toxic  Eyes: Anicteric, no injection.  PERRL  ENT:  Normocephalic, atraumatic, Nose midline, moist mucus membranes  Neck - supple, no LAD.  Well-healed incisions over right neck from prior carotid endarterectomy  Respiratory-nonlabored breathing on room air  Cardiovascular -extremities warm and well-perfused neuro - No focal neuro deficits, Alert and oriented x 3  Psych: Appropriate mood and affect  Musculoskeletal: Normal gait, symmetric strength.  FROM upper and lower extremities.  Skin: Warm, Dry    LABS:     Lab Results   Component Value Date    WBC 5.7 04/03/2024    HGB 14.6 04/03/2024    HCT 44.2 04/03/2024    MCV 90 04/03/2024     04/03/2024      Lab Results   Component Value Date    INR 0.84 (L) 09/29/2021    INR 1.03 09/26/2021    INR 1.03 02/03/2021          ASSESSMENT AND PLAN:     Mr. Liu Ragsdale is in need of an infusion port for chemotherapy in the setting of non-operative esophageal cancer.  He was referred to my surgery clinic for evaluation for possible surgically placed port.  Patient has a history of prior procedures on his neck, specifically on the right side, which I anticipate would put him at elevated risk of complications with a surgically placed port.  For these reasons, I recommend patient to be evaluated for port placement by interventional radiology.  Orders placed and patient is amenable to the proposed plan.  Patient understands the information given and wishes to proceed accordingly.    John Calixto MD on 4/22/2024 at 11:19 AM

## 2024-04-22 NOTE — PROGRESS NOTES
[facility] Hematology and Oncology Outpatient Progress Note    Patient: Liu Ragsdale  MRN: [id number]  Date of Service: Apr 16, 2024          Reason for Visit    Locally advanced esophageal cancer    Primary Oncologist: Formerly, Dr. Locke      Assessment/Plan  cT2cN2 lower esophageal adenocarcinoma, status post concurrent chemoradiation  Recurrent esophageal cancer involving his esophagus and possible peritoneal spread    I have reviewed his chart in detail.  He was diagnosed with low esophageal adenocarcinoma back in June 2023.  Received concurrent chemoradiation with weekly carboplatin and paclitaxel.  Finished therapy in August 2023.  Was deemed not a surgical candidate.  Was on surveillance.  Recently noted to have recurrent disease in the lower esophagus along with malignant ascites.  Underwent paracentesis yesterday.  Results are still pending but highly concerning for metastatic disease involving peritoneum.    Today I reviewed further management of esophageal cancer.  Explained to him and his wife and his daughter that systemic chemotherapy would be our only option going forward.  I am highly suspicious that he has metastatic disease involving peritoneum.  Will have to wait for the cytology from paracentesis to come back.  Mainstay of therapy will be combination chemotherapy with FOLFOX.  If his PD-L1 CPS is more than 5 then will add any more lab to it.  If CPS is more than 10 could also consider single agent Keytruda however, considering that he has evidence of peritoneal spread I would rather start with chemotherapy and immunotherapy combination.  If it does not tolerate chemotherapy then definitely we can scale down to single agent immunotherapy.    I extensively discussed the side effects and complications associated with FOLFOX chemotherapy.  He is agreeable to the plan.  Will put a plan in and start treatment soon.    Nutrition/hydration  Dysphagia  Has significant swallowing issues due to  recurrent malignancy.  Recently underwent EGD with dilatation.  Looks like he needs 1 more.  Will touch base with GI again.  Plan is to start chemotherapy after GI procedure.     He will probably need G-tube placement to maintain adequate nutrition especially since we expect recurrent obstruction issues going forward.  And probably this will be for longer time, more than 3 months at least.      ______________________________________________________________________________    History of Present Illness/ Interval History    Mr. Liu Ragsdale  is a 83 year old with lower esophageal adenocarcinoma.      This is my first time meeting with him.  He was diagnosed with    Esophageal adenocarcinoma (lower thyroid) in June 2023 after he presented a couple months prior to that with weight loss and dysphagia.  He underwent upper endoscopy at that time which showed distal esophageal fungating mass which was partially obstructing.  Biopsy confirmed poorly differentiated adenocarcinoma.  PD-L1 TPS 0%.  HER2 negative.  PET scan showed no evidence of any distant metastatic disease.  He did have some high and mediastinal adenopathy.  Clinical stage IIIb.  Underwent concurrent chemoradiation with weekly carboplatin and paclitaxel.  Therapy was complicated by posttreatment GI bleed with anemia requiring blood transfusion.  Repeat PET scan done in September 2023 showed complete response with some activity at the distal esophagus.  This was felt to be due to inflammation.  He was seen by thoracic surgery who deemed him not a candidate for resection due to his medical issues and age.  He was on surveillance since then with repeat EGDs which showed no evidence of any recurrence.    Had a repeat PET scan done in January 2024 which showed recurrent/residual adenocarcinoma in the lower third.  Underwent EGD with biopsy of the lower third lesion which confirmed recurrent malignancy.  No evidence of any metastatic disease.  However repeat PET  scan done in March showed FDG avid distal esophageal lesion along with intra-abdominal ascites which was also FDG avid.    Since then he had progressive abdominal discomfort and constipation.  Underwent paracentesis on 4/15/2024.  Surgical path is still pending but it is highly suspicious for malignant ascites.    He is here to discuss further management of his recurrent esophageal cancer.      ECOG Performance    0        Oncology History/Treatment  Diagnosis/Stage:   6/2023: [id number] lower third esophageal adenocarcinoma  -Presented with dysphagia x2 months, increased acid reflux and 10 pound weight loss  -6/5/2023 EGD: Large fungating mass in the lower third esophagus, 38 cm from incisors.  Mass partially obstructing and circumferential.  Biopsy of mass: Adenocarcinoma.  GE junction biopsy also positive for adenocarcinoma.  -PET scan: Hypermetabolic activity distal esophageal mass.  Low-moderate activity in hilar and mediastinal adenopathy, indeterminant and possibly inflammatory in nature.  No evident distant metastases.  -Negative PD-L1 expression  -7/2/2023 EUS: Partially circumferential progressing to circumferential distal esophageal mass spanning 36-41 cm with the junction estimated at 41 cm.  Lesion involves layer 4 but no evidence of layer 5 involvement no other structures involved.  5 regional periesophageal lymph nodes were noted, only 1 of these had malignant; no distant nodes noted.  Unremarkable pancreaticobiliary system and liver.  -7/5/2023: Diagnostic lap pleuroscopy without metastasis.  PEJ placed    Treatment:  Met with Dr. Levi in Thoracic Surgery.  Discussed consideration of surgical resection pending response to preoperative chemoradiation.    7/5/2023: PEJ tube feeding placement    7/24/2023 -8/25/2023: Definitive/neoadjuvant chemoradiation with weekly carboplatin and Taxol    Deemed not a surgical candidate.  Was on surveillance.    Recurrence noted in the PET scan dated 1/5/2024.   Distal esophageal lesion.  Biopsy-proven to be recurrent malignancy.    PET scan done on 3/15/2024 showed evidence of ascites thought to be malignant along with recurrent esophageal lesion.      Physical Exam    GENERAL: Alert and oriented to time place and person. Seated comfortably. In no distress.  Alone.  HEAD: Atraumatic and normocephalic. No alopecia.  EYES: ANTONIA, EOMI. No erythema. No icterus.  EXTREMITIES: Warm. No peripheral edema.  SKIN: no rash, or bruising or purpura.   NEURO: No gross deficit noted. Non-antalgic gait.      Lab Results    Recent Results (from the past 168 hour(s))   Cytology, non-gynecologic   Result Value Ref Range    Final Diagnosis       Specimen A     Interpretation:       Positive for malignancy     A. Abdomen:  - Moderately to poorly differentiated adenocarcinoma consistent with GE origin     Other Findings:      Chronic inflammation present.     Adequacy:     Satisfactory for evaluation          Comment       The clinical history has been reviewed.  Cytology and histology demonstrate partially cohesive moderately to poorly differentiated adenocarcinoma.  The tumor cells are positive for CK7, CK20 (partial) and CDX2 with focal p53 expression.  HER2/deepthi is is generally negative (0-1+); we can certainly send the case for HER2/deepthi FISH if requested.  Minimal PD-L1 staining.  I am happy review any subsequent data.    RESULT FOR IMMUNOHISTOCHEMICAL [place] CLONE  PD-L1 ASSAY  COMBINED POSITIVE SCORE (CPS): 15  TUMOR PROPORTION SCORE (TPS): 10 %  INTERPRETATION: LOW PD-L1 EXPRESSION (TPS >/=1-49%)    COMMENT:  This [place]  PD-L1 immunohistochemistry antibody assay is a laboratory developed test to be used for patients with non-small cell lung carcinoma (NSCLC), gastric or gastroesophageal junction (GEJ) adenocarcinoma, urothelial carcinomas, melanomas, liver, breast and brain tumors who are being considered for treatment with Keytruda (Pembrolizumab), an anti-PD-1 immune  checkpoint inhibitor. The [place]  PD-L1 assay has been validated by the [facility] against the FDA approved clinical trial-validated PharmDx 22C3 PD-L1 assay. Evidence suggests that the level of PD-L1 expression in the tumor cell population by immunohistochemistry is a major predictor of response to checkpoint inhibitor therapy. Previous studies demonstrate a high correlation between PD-L1 immunohistochemistry expression data obtained with PD-L1 clones Dako 22C3 and [place]  in NSCLC. (References: Lancet 387:1540-50, 2016; [facility] 375:1823-33, 2016; J Clin Oncol 34:4102-9. 2016; J Thorac Oncol 12:1654-63, 2017; [facility] PD-L1 2018 assessment)  Scoring system: The tumor proportion score (TPS) is determined by enumeration of the percentage of PD-L1 tumor cells with any amount of membrane positivity expressed as a whole number relative to all viable tumor cells in the specimen. The scoring system for PD-L1 expression is divided into three groups: a) High expressor, for tumors with TPS >/= 50%; b) Low expressor, for tumors with TPS of >/=1%-49%; and c) Negative, for tumors with TPS <1%. If CPS score is reported, it is calculated based on the following formula: [(PD-L1 positive tumor cells + PD-L1 positive mononuclear inflammatory cells)/Total tumor cells] x 100.  Assay conditions:  - Fixation and processing: 10% neutral buffered formalin, paraffin embedded.  - Staining method: [place] predilute monoclonal PD-L1 antibody clone , standard heat induced epitope retrieval in cell conditioning 1 (CC1 - EDTA, alkaline pH), primary antibody incubation 16 minutes, [place] Optiview detection kit, and [place] BenchMark Ultra automated instrument.  - Minimum tumor cell requirement: >/= 100 viable tumor cells present in the specimen.  - Positive and negative controls react appropriately.         Clinical Information       GE adenocarcinoma; new onsert ascites       Gross Description       A(A). Abdomen, Ascites:A. Abdomen, Ascites, Peritoneal Fluid:  Received 100 ml of clear, yellow fluid, processed as 1 Pap stained Autocyte,  1 [place] stained cytospin and one hematoxylin and eosin stained cell block.                  Abnormal Result? Yes (A) No    Performing Labs       The technical component of this testing was completed at [facility]     UPPER GI ENDOSCOPY   Result Value Ref Range    Upper GI Endoscopy       [facility]  [address] [place] [address] [phone]     Endoscopy Department  _______________________________________________________________________________  Patient Name: Liu Johnson           Procedure Date: 4/19/2024 9:42 AM  MRN: [id number]                       Account Number: [id number]  YOB: 1940              Admit Type: Outpatient  Age: 83                               Room: [place] [address]  Gender: Male                          Note Status: Finalized  Attending MD: GUILLERMO MTZ MD,   Total Sedation Time:   _______________________________________________________________________________     Procedure:             Upper GI endoscopy  Indications:           Dysphagia  Providers:             GUILLERMO MTZ MD, Selina Segura RN, PRIYANKA ANDERS  Patient Profile:       82 yo M with history of poorly differentiated                          esophageal adenocarcinoma uT2, uN 2, uM0 s/p                          chemoradiation who had a recurrence of esophageal                          adenocarcinoma at GE junction, biopsy proven and                          empiric dilation in Feb 2024. He returned for                          recurrenc eof dysphagia symptoms after temporary                          relief for 3 weeks.  Referring MD:          JUSTO JAQUEZ MD  Medicines:              Fentanyl 100 micrograms IV, Midazolam 4 mg IV,                          Benzocaine spray  Complications:         No immediate complications. Estimated blood loss:                          Minimal.  _______________________________________________________________________________  Procedure:             Pre-Anesthesia Assessment:                         - Prior to the procedure, a History and Physical was                          performed, and patient medications and allergies were                          reviewed. The patient is competent. The risks and                           benefits of the procedure and the sedation options and                          risks were discussed with the patient. All questions                          were answered and informed consent was obtained.                          Patient identification and proposed procedure were                          verified by the physician in the pre-procedure area in                          the procedure room. Mental Status Examination: normal.                          Airway Examination: normal oropharyngeal airway and                          neck mobility. Respiratory Examination: clear to                          auscultation. CV Examination: normal. Prophylactic                          Antibiotics: The patient does not require prophylactic                          antibiotics. Prior Anticoagulants: The patient has                          taken no anticoagulant or antiplatelet agents. ASA                          Grade Assessment: III - A patient with severe systemic                           disease. After reviewing the risks and benefits, the                          patient was deemed in satisfactory condition to                          undergo the procedure. The anesthesia plan was to use                          moderate sedation / analgesia (conscious sedation).                          Immediately prior to administration of  medications,                          the patient was re-assessed for adequacy to receive                          sedatives. The heart rate, respiratory rate, oxygen                          saturations, blood pressure, adequacy of pulmonary                          ventilation, and response to care were monitored                          throughout the procedure. The physical status of the                          patient was re-assessed after the procedure.                         After obtaining informed consent, the endoscope was                          passed under direct vision. Throughout the procedure,                           the patient's blood pressure, pulse, and oxygen                          saturations were monitored continuously. The Endoscope                          was introduced through the mouth, and advanced to the                          second part of duodenum. The upper GI endoscopy was                          accomplished without difficulty. The patient tolerated                          the procedure fairly well.                                                                                   Findings:       A small hiatal hernia was present.       The gastroesophageal flap valve was visualized endoscopically and        classified as Hill Grade III (minimal fold, loose to endoscope, hiatal        hernia likely).       A single 10mm nodule with a localized distribution was found at the        gastroesophageal junction. The nodule appeared similar in size to the        previous Endoscopy, biopsy proven to be recurrent malignancy, hence it        was not biospied. There were no o ther fungating mass or strictures that        could explain patient's dysphaia. A TTS dilator was passed through the        scope. Dilation with a 48-33-51-20-21 mm balloon dilator was performed        to 21 mm. Minimal rents were noted at the end of procedure in the area        of dilation.       Diffuse  mildly erythematous mucosa without bleeding was found in the        gastric body and at the pylorus. Biopsies were taken with a cold forceps        for histology.       The duodenal bulb and second portion of the duodenum were normal.       The exam was otherwise without abnormality.                                                                                   Moderate Sedation:       Moderate (conscious) sedation was administered by the nurse and        supervised by the endoscopist. The patient's oxygen saturation, heart        rate, blood pressure and response to care were monitored. Total        physician intraservice time was 21 minutes.  Impression:            - No evidence  of high grade intraluminal stricture.                          Dilation was performed to 21mm today so there should                          not be a mechanical impediment to oral intake. The                          symptoms may be due to pseudoachalsia in the setting                          of known recurrent adenocarcinoma. Treatment would be                          treatment of his underlying adenocarcinoma. Future                          empiric dilations can be considered if he has                          subjective improvement in his symptoms after today's                          intervention.                         - Mild erythema throughout the stomach with edema of                          the gastric antral folds. Biopsies taken to evaluate                          for H-pylori.                         - Normal duodenum.  Recommendation:        - Await pathology results for H.pylori                         - Discharge patient to home.                         - Resume p revious diet today.                         - Continue twice daily omeprazole.                         -Repeat dilation to be considered if patient has                          recurrence of symptoms.                         - Follow up with oncology for  further evaluation and                          treatment.                                                                                       _____________________________  GUILLERMO TMZ MD  4/19/2024 11:38:06 AM  I was physically present for the entire viewing portion of the exam.  __________________________  Signature of teaching physician  [id number] MTZ MD    _____________  PRIYANKA ANDERS,   Number of Addenda: 0    Note Initiated On: 4/19/2024 9:42 AM  Scope In:  Scope Out:         Imaging    US Paracentesis without Albumin    Result Date: 4/15/2024  EXAM: 1. PARACENTESIS 2. ULTRASOUND GUIDANCE LOCATION: [facility] DATE: 4/15/2024 INDICATION: Ascites. PROCEDURE: Informed consent obtained. Time out performed. The abdomen was prepped and draped in a sterile fashion. 10 mL of 1% lidocaine was infused into local soft tissues. A 5 [place] catheter system was introduced into the abdominal ascites under ultrasound guidance. 2 liters of clear yellow fluid were removed and sent to lab if requested. Patient tolerated procedure well. Ultrasound imaging was obtained and placed in the patient's permanent medical record.     IMPRESSION: 1.  Status post ultrasound-guided paracentesis. Reference CPT Code: 60617    CT Abdomen Pelvis w Contrast    Result Date: 4/3/2024  CT ABDOMEN PELVIS W CONTRAST 4/3/2024 1:28 PM CLINICAL HISTORY: patient with constipation for several weeks, hx of esophageal cancer and J tube previously, query SBO, no fevers or vomiting TECHNIQUE: CT scan of the abdomen and pelvis was performed following injection of IV contrast. Multiplanar reformats were obtained. Dose reduction techniques were used. CONTRAST: 84 mL Isovue-370 COMPARISON: 2/25/2024 and 3/15/2024 FINDINGS: LOWER CHEST: Partly visualized small right pleural effusion has slightly increased. Linear and mild patchy opacities in the posterior dependent lower lobes bilaterally has slightly  worsened. Partly visualized small pericardial effusion is stable. Stable eccentric wall thickening of the distal esophagus just above the gastroesophageal junction measuring about 14 mm (3/49). HEPATOBILIARY: No focal lesions in the liver. No calcified gallstones. Impression wall thickening of the gallbladder neck is new. No biliary duct dilatation. PANCREAS: Stable pancreatic parenchymal fatty atrophy with preserved parenchyma in the uncinate process. No pancreatic duct dilatation or surrounding inflammatory changes. SPLEEN: Normal. ADRENAL GLANDS: New left adrenal gland thickening measuring about 11 mm (3/89). KIDNEYS/BLADDER: No calculi or hydronephrosis. Stable benign cyst requiring no specific follow-up. BOWEL: Mild circumferential wall thickening of the distal gastric body and antrum. No small bowel or colonic obstruction. Normal appendix. Mild circumferential wall thickening in the mid transverse colon, descending colon and sigmoid colon exaggerated by the segments being decompressed. Sigmoid diverticulosis. PELVIC ORGANS: No pelvic masses. Small fat-containing bilateral inguinal hernia. ADDITIONAL FINDINGS: Increased moderate ascites. Mild hazy stranding in the omentum is indeterminate, may be related to presence of ascites or peritoneal carcinomatosis. No discrete peritoneal masses visualized. No lymphadenopathy in the abdomen and pelvis. No abdominal aortic aneurysm. MUSCULOSKELETAL: Left PATY. Degenerative changes in the spine. No suspicious lesions in the bones.     IMPRESSION: 1.  Increased moderate ascites. Mild fat stranding in the omentum may be due to presence of ascites. Malignant ascites is not entirely excluded. No discrete peritoneal masses. 2.  Mild wall thickening in the transverse colon, descending colon and sigmoid colon slightly exaggerated by decompression. This may be due to acute infectious or inflammatory colitis. 3.  Mild wall thickening of the distal stomach and antrum, likely due to  edema. 4.  Focal gallbladder wall thickening in the neck is nonspecific, may be due to presence of ascites. Right upper quadrant ultrasound can be considered for further evaluation. 5.  Stable small right pleural effusion. Slight increase in bibasilar linear and patchy opacities, either due to pneumonitis or atelectasis. 6.  New asymmetric thickening of the left adrenal gland measuring 11 mm, indeterminate. Attention on follow-up. 7.  Stable wall thickening of the distal esophagus. SONU MARQUEZ MD   SYSTEM ID:  [id number]     Billing  A total of 45 min was spent today on this visit which included face to face conversation with the patient, EMR review, counseling and co-ordination of care and medical documentation.    The longitudinal plan of care for the diagnosis(es)/condition(s) as documented were addressed during this visit. Due to the added complexity in care, I will continue to support Liu in the subsequent management and with ongoing continuity of care.      Signed by:   La Nena Atkins MD  Hematology and Medical Oncology  [facility]

## 2024-04-22 NOTE — DISCHARGE INSTRUCTIONS
Radiology  Discharge Instructions for Abdominal Paracentesis    You have had an abdominal paracentesis procedure today.  A needle or catheter was put into your abdomen to remove fluid for laboratory studies and/or to remove the extra fluid from your abdomen.    AFTER YOU ARE HOME:  Rest at home today.  Limit physical activity such as lifting, straining, or exercise for 48 hours.  You may resume normal activity in 24 hours.  Resume previous diet and medications.  You may remove bandage in 24 hours.    CALL YOUR PRIMARY PROVIDER IF:  You develop temperature over 101o F, or redness at the drainage site.  You have any other questions or concerns.    AFTER HOURS CALL [facility] NURSE ADVISORS AT [phone]    COME TO EMERGENCY ROOM IF:  You develop severe abdominal pain, swelling the size of a baseball or larger, continuous oozing from puncture site longer than 24 hours, bleeding that saturates a gauze dressing even after you have put direct pressure on the site for 15 minutes, severe lightheadedness or fainting.  DO NOT DRIVE YOURSELF.

## 2024-04-22 NOTE — PROGRESS NOTES
RLQ paracentesis performed by radiologist. 1900 Ml clear yellow fluid removed. Pressure held at site after catheter removed. No bleeding noted. No Albumin given.

## 2024-04-22 NOTE — PATIENT INSTRUCTIONS
- referral placed to interventional radiology for port placement given significant surgical history of procedures on neck

## 2024-04-22 NOTE — NURSING NOTE
Initial /63 (BP Location: Right arm, Patient Position: Chair, Cuff Size: Adult Large)   Pulse 75   Temp 97.8  F (36.6  C) (Tympanic)   Wt 70.8 kg (156 lb)   BMI 30.47 kg/m   Estimated body mass index is 30.47 kg/m  as calculated from the following:    Height as of 3/8/24: 1.524 m (5').    Weight as of this encounter: 70.8 kg (156 lb). .  Brenda Hope LPN

## 2024-04-22 NOTE — TELEPHONE ENCOUNTER
Requested Prescriptions   Pending Prescriptions Disp Refills    carvedilol (COREG) 6.25 MG tablet [Pharmacy Med Name: Carvedilol 6.25 MG Oral Tablet] 90 tablet 0     Sig: TAKE 1 TABLET BY MOUTH PER J-TUBE TWICE DAILY WITH MEALS       Beta-Blockers Protocol Failed - 4/19/2024  6:46 PM        Failed - Medication indicated for associated diagnosis     Medication is associated with one or more of the following diagnoses:     Hypertension (HTN)   Atrial fibrillation/flutter   Angina   ASCVD   Migraine   Heart Failure   Tremor   Anxiety   Ocular hypertension   Glaucoma          Failed - Recent (12 mo) or future (90 days) visit within the authorizing provider's specialty     The patient must have completed an in-person or virtual visit within the past 12 months or has a future visit scheduled within the next 90 days with the authorizing provider s specialty.  Urgent care and e-visits do not quality as an office visit for this protocol.          Passed - Blood pressure under 140/90 in past 12 months     BP Readings from Last 3 Encounters:   04/22/24 117/57   04/19/24 125/72   04/16/24 (!) 156/62       Systolic (Patient Reported): 124 (3/7/2024  1:26 PM)  Diastolic (Patient Reported): 71 (3/7/2024  1:26 PM)              Passed - Patient is age 6 or older        Passed - Medication is active on med list

## 2024-04-25 NOTE — PRE-PROCEDURE
Interventional Radiology Pre-Procedure Assessment  [facility] - 04/26/24    Procedure requested: Port placement  Requesting provider: La Nena Atkins MD    Brief HPI  Liu Ragsdale is an 83 year old male with a pertinent past medical history of CVA in 2021, hypertension and lower esophageal adenocarcinoma diagnosed in 6/2023 who received concurrent chemoradiation with weekly carboplatin and paclitaxel. He finished therapy in 8/2023. He was deemed not a surgical candidate. Surveillance imaging demonstrated recurrent disease in the lower esophagus along with malignant ascites. Plan is to proceed with systemic chemotherapy. Request for port placement.    NPO since Midnight  ANTICOAGULANT(S): ASA 81 mg  ANTIBIOTIC(S): Cleocin pre-procedure; order signed and held.    IMAGING  3/15/2024 PET ONCOLOGY WHOLE BODY\  PET/CT FINDINGS: Slightly increased FDG avid thickening involving the distal esophagus (max SUV 6.6, previously 4.5), which could suggest mild disease progression     Degenerative shoulder synovitis. Diffuse esophagitis. Residual FDG avid airspace opacities in the lower lobes suggesting a resolving inflammatory/infectious process. Development of a small volume of mildly FDG avid intra-abdominal ascites.     CT FINDINGS: Mild senescent intracranial changes. Postoperative change of the bilateral lenses. Mild carotid artery bifurcation calcification. Moderate coronary calcium. Small pericardial effusion. Trace pleural effusions. Mild volume of intra-abdominal   ascites. Sigmoid diverticulosis. Pelvic phleboliths. Left total hip arthroplasty, otherwise the lower extremities are unremarkable. Multilevel degenerative changes of the spine.                           IMPRESSION:  1. Slightly increased FDG avid thickening involving the distal esophagus, which could suggest mild disease progression.  2. Development of a small volume of mildly FDG avid intra-abdominal ascites.  This may simply be inflammatory/reactive in nature, the exact etiology remains indeterminate.    ALLERGIES  Allergies   Allergen Reactions    Ampicillin Rash    Latex Rash    Metoprolol Other (See Comments)     Side effects : dry mouth     LABORATORY VALUES  INR   Date Value Ref Range Status   09/29/2021 0.84 (L) 0.85 - 1.15 Final     Comment:     Effective 7/11/2021, the reference range for this assay has changed.   02/03/2021 1.03 0.86 - 1.14 Final      Hemoglobin   Date Value Ref Range Status   04/03/2024 14.6 13.3 - 17.7 g/dL Final   04/15/2021 11.3 (L) 13.3 - 17.7 g/dL Final     Platelet Count   Date Value Ref Range Status   04/03/2024 167 150 - 450 10e3/uL Final   04/15/2021 199 150 - 450 10e9/L Final     Creatinine   Date Value Ref Range Status   04/03/2024 1.10 0.67 - 1.17 mg/dL Final   04/15/2021 1.72 (H) 0.66 - 1.25 mg/dL Final     Potassium   Date Value Ref Range Status   04/03/2024 5.2 3.4 - 5.3 mmol/L Final   10/06/2021 4.5 3.4 - 5.3 mmol/L Final   04/15/2021 3.8 3.4 - 5.3 mmol/L Final     EXAM  BP (!) 163/74 (BP Location: Left arm)   Pulse 74   Temp 97.6  F (36.4  C) (Oral)   Resp 18   SpO2 97%   General: No apparent acute distress.  Respiratory: Normal depth and regular rhythm. Clear and equal throughout without adventitious sounds.  Cardiovascular: S1 & S2; regular rate and rhythm.  Integumentary: No excoriation, ecchymosis, erythema, lesions or open sores to bilateral upper chest wall and neck.  Neurological: Alert and oriented. Thoughts coherent; speech clear and logical.  Psychiatric: Appropriate mood and affect.    Code Status: FULL CODE    ASSESSMENT  83 year old male with malignant neoplasm of the lower third of the esophagus s/p concurrent chemoradiation with recurrence who plans to proceed with systemic chemotherapy. Request for port placement.    PLAN  Esophageal adenocarcinoma   - Image-guided port placement with sedation.  - Procedural education reviewed with patient in detail  including but not limited to risks, benefits and alternatives. Patient verbalized understanding.      Total time spent on the date of the encounter is 30 minutes.    GIO Schultz, CNP  Interventional Radiology  Pager Number: [phone]

## 2024-04-25 NOTE — PROGRESS NOTES
Therapy: 5fu  Insurance: [employer]   Ded: Does not apply    Co-Insurance: 80/20  Max Out of Pocket: $3400  Met: $273    Pt meets Medicare criteria for 5fu. 5fu must be done via CADD pump for coverage. Pt has coverage for home disconnects through their [employer] plan. HB status does not apply.    In reference to workahead orders received from MAYELIN Cabello on 04/24 to check for 5fu coverage.    Please contact Intake with any questions, [phone] or In Basket pool,  Home [facility]fusion ([phone]).

## 2024-04-26 NOTE — PRE-PROCEDURE
GENERAL PRE-PROCEDURE:   Procedure:  Port placement  Date/Time:  4/26/2024 10:11 AM    Written consent obtained?: Yes    Risks and benefits: Risks, benefits and alternatives were discussed    Consent given by:  Patient  Patient states understanding of procedure being performed: Yes    Patient's understanding of procedure matches consent: Yes    Procedure consent matches procedure scheduled: Yes    Expected level of sedation:  Moderate  Appropriately NPO:  Yes  ASA Class:  3  Mallampati  :  Grade 2- soft palate, base of uvula, tonsillar pillars, and portion of posterior pharyngeal wall visible  Lungs:  Lungs clear with good breath sounds bilaterally  Heart:  Normal heart sounds and rate  History & Physical reviewed:  History and physical reviewed and no updates needed  Statement of review:  I have reviewed the lab findings, diagnostic data, medications, and the plan for sedation

## 2024-04-26 NOTE — DISCHARGE INSTRUCTIONS
Port Placement Procedure Discharge Instructions:  You had a port placed. A port is a small medical device that is placed under the skin and is connected to a vein with a catheter (thin, flexible tube). Ports can be used to administer IV medications (including chemotherapy), fluids or blood products or for blood lab draws. Please follow the below instructions after your procedure:    Care Instructions:  - If you received sedation for your procedure, do not drive or operate heavy machinery for the rest of the day.  - You may shower beginning tomorrow (post procedure day #1). Do not scrub site until well healed; pat dry gently with a towel.  - You likely have skin adhesive over your port site. Skin adhesive works like a bandage to keep the site covered and protected. Do not use antibiotic ointment or creams/lotions over adhesive as it can break it down. The skin adhesive will peel off on its own (typically in 5-14 days).  - Avoid submerging the port site under water (ex: tub baths, Jacuzzis, lakes, hot tubs and pools) for 10 days or until your site is well healed.  - You may have some discomfort, minimal swelling, redness and/or bruising at your port site/procedure site. You may take over the counter pain medication for discomfort (follow the package directions) or apply an ice pack wrapped in a towel over the site (rotating 20 minutes with ice pack on and 20 minutes with ice pack off) for comfort as needed. It can take several days for these to resolve.  - Avoid heavy lifting (greater than 10 pounds) and strenuous activities for 2 days following your procedure.   - If you experience significant bleeding at site, apply pressure with hands above the clavicle bone, sit upright and seek immediate medical assistance.  - Ports need to be flushed approximately every 4-6 weeks, if not being used more frequently. Follow up with the provider who ordered your port placement for further instructions for this.    Seek medical  evaluation or contact [facility] RN Line at [phone] if you experience the following:  - Uncontrolled bleeding from port site  - Fever (greater than 101 F (38.3C))  - Purulent (yellow/green/foul smelling) drainage from port insertion site  - Increasing pain at port site  - Increasing redness at port site

## 2024-04-26 NOTE — PROCEDURES
[facility]    Procedure: IR Procedure Note    Date/Time: 4/26/2024 10:57 AM    Performed by: Tevin Nolasco MD  Authorized by: Tevin Nolasco MD      UNIVERSAL PROTOCOL   Site Marked: NA  Prior Images Obtained and Reviewed:  Yes  Required items: Required blood products, implants, devices and special equipment available    Patient identity confirmed:  Verbally with patient, arm band, provided demographic data and hospital-assigned identification number  Patient was reevaluated immediately before administering moderate or deep sedation or anesthesia  Confirmation Checklist:  Patient's identity using two indicators, relevant allergies, procedure was appropriate and matched the consent or emergent situation and correct equipment/implants were available  Time out: Immediately prior to the procedure a time out was called    Universal Protocol: the Joint Commission Universal Protocol was followed    Preparation: Patient was prepped and draped in usual sterile fashion    ESBL (mL):  0     ANESTHESIA    Anesthesia:  Local infiltration  Local Anesthetic:  Lidocaine 1% without epinephrine  Anesthetic Total (mL):  20      SEDATION  Patient Sedated: Yes    Sedation Type:  Moderate (conscious) sedation  Vital signs: Vital signs monitored during sedation    Fluoroscopy Time: 1 minute(s)  See dictated procedure note for full details.  Findings: Successful placement of right internal jugular port.  OK to use port. Discharge in 1 hour.    Specimens: none    Complications: None    Condition: Stable    Plan: Successful placement of right internal jugular port.  OK to use port. Discharge in 1 hour.      PROCEDURE  Describe Procedure: Successful placement of right internal jugular port.  OK to use port. Discharge in 1 hour.  Patient Tolerance:  Patient tolerated the procedure well with no immediate complications  Length of time physician/provider present for 1:1 monitoring during sedation: 20

## 2024-04-26 NOTE — IR NOTE
Patient Name: Liu Ragsdale  Medical Record Number: [id number]  Today's Date: 4/26/2024    Procedure: Port  Proceduralist: Dr. Nolasco    Sedation medications administered: 2 mg midazolam and 50 mcg fentanyl   Sedation time: 20 minutes

## 2024-04-26 NOTE — CONFIDENTIAL NOTE
[facility]: Cancer Care                                                                                        Patient called stating he is having a hard time eating still even after dilation. Only takes a few bites at a time and then can't eat any more. No appetite or taste. Patient is hoping to get a feeding tube as he states he has lost about 20lbs in the last 2 months. Discussed with Dr. Atkins who agreed to have patient get a G-tube placed. IR referral placed. Patient aware and in agreement with plan.       Signature:  Nyla Morrell RN

## 2024-04-30 NOTE — TELEPHONE ENCOUNTER
Called pt by request of Dr Hatfield to arrange procedures with Dr Chase as follows    will need to do at least 3 of these every 2-3 weeks.      Procedure/Imaging/Clinic: EGD with TruFreeze cryodimarva   Physician: Salvatore   Timing: next avail   Scope time needed: 30 min   Anesthesia: MAC   Dx: malignant esophageal stricture   Tier: 3   Location: [facility]   Header of letter for pt communication: Upper endsocopy with cryodilation     Pt in agreement with scheduling serial scopes. Said he will be getting chemo in May and that they are 48 hour infusions. Dates of Monday May 20th , Monday June 3rd and Monday June 24th agreed upon, but pt is not certain of his chemo regimen for June yet.       Explained OR will call 1-2 days prior to procedure date with arrival time, will need a , someone to stay with them for 24 hours and should stay in town for 24 hours (within 45 min of Hospital) post procedure    Patient needs to get pre-op physical completed. If outside  health system will need physical faxed to number [phone]     If you do not get a preop physical, your procedure could be cancelled, patient voiced understanding*    Preop Plan: Office visit with Patricia Chapman, 5/6/24  To be utilized    Does patient have [employer] insurance?:[employer]    Med Review    Blood thinner -  none  ASA - 81mg  Diabetic - none  Any meds by injection or mouth for weight loss or diabetes-none    Patient Education r/t procedure:mychart    A pre-op nurse will call 1-2 days prior to the procedure.    Verbalized understanding of all instructions. All questions answered.     Procedure order placed, message routed to OR       Cyndi Cardozo, RN, BSN,   [facility]  Care coordinator

## 2024-05-01 NOTE — CONFIDENTIAL NOTE
[facility]: Cancer Care Initial Note                                    Discussion with Patient:                                                      Chemo teach done today prior to patient receiving FOLFOX and Nivolumab today.      Antiemetics: Zofran and compazine        Assessment:                                                      Initial  Side effects discussed and reviewed with patient. Drug handouts on each treatment drug given to patient today.                  Intervention/Education provided during outreach:                                                       All questions answered. No concerns at this time.     Patient to follow up as scheduled at next appt. New appointment calendar printed and given to patient today.    Signature:  Nyla Morrell RN  [facility]: Cancer Care Plan of Care Education Note

## 2024-05-01 NOTE — PROGRESS NOTES
"Infusion Nursing Note:  Liu Ragsdale presents today for C1D1 FOLFOX/.    Patient seen by provider today: No   present during visit today: Not Applicable.    Note: Discussed side effects of opdivo/oxaliplatin/fluorouracil with pt and his wife, Kelsi.  Specifically discussed nausea, cold sensitivity, fatigue, blood counts, and when to call his care team.  Provided pt with printouts on these medications for him to review per his request.  Pt is not new to chemotherapy but still went over expectations and his fluorouracil pump.  Pt and wife aware to contact [facility] should any questions with their pump.  Answered questions to pt's satisfaction.  Pt verbalized understanding of information provided.     Intravenous Access:  Labs drawn without difficulty.  Implanted Port.    Treatment Conditions:  Lab Results   Component Value Date    HGB 15.1 05/01/2024    WBC 7.7 05/01/2024    ANEU 1.8 08/31/2023    ANEUTAUTO 6.2 05/01/2024     05/01/2024        Lab Results   Component Value Date     (L) 05/01/2024    POTASSIUM 4.7 05/01/2024    MAG 1.9 08/31/2023    CR 1.07 05/01/2024    GWEN 9.0 05/01/2024    BILITOTAL 0.4 05/01/2024    ALBUMIN 3.3 (L) 05/01/2024    ALT 11 05/01/2024    AST 25 05/01/2024       Results reviewed, labs MET treatment parameters, ok to proceed with treatment.      Post Infusion Assessment:  Patient tolerated infusion without incident.  Blood return noted pre and post infusion.  Site patent and intact, free from redness, edema or discomfort.  No evidence of extravasations.     Prior to discharge: Port is secured in place with tegaderm and flushed with 10cc NS with positive blood return noted.    Continuous home infusion CADD pump connected.    All connectors secured in place and clamps taped open.    Pump started, \"running\" noted on display (CADD): YES.   Capillary element taped to pt's skin per protocol.  Pump Connection double checked with Yana Dc RN .  Patient instructed to " call our clinic or [facility] with any questions or concerns at home.  Patient verbalized understanding.    Patient set up for pump disconnect at home with [facility] on 5/3/24 at 10:30 am.  Went over paperwork with pt and sent him home with appropriate supplies.  Sent [facility] RN CC update on disconnect time of 10:30 am on 5/3/24.          Discharge Plan:   Patient discharged in stable condition accompanied by: wife.  Departure Mode: Ambulatory.  Pt to return 5/6/24 at 8:45 am for pac labs followed by clinic visit with Patricia Chapman NP.      Minal Jessica RN

## 2024-05-03 NOTE — CONFIDENTIAL NOTE
[facility]: Cancer Care                                                                                          Called to do status check after starting chemo this week. Patient states he is doing well. Maybe just a hint of nausea 1 time and he took a nausea med for with relief. Otherwise denies any other side effects at this time. Patient to call with any new concerns.    Signature:  Nyla Morrell RN

## 2024-05-03 NOTE — PROGRESS NOTES
[facility] Outpatient Progress Note    Patient: Liu Ragsdale  MRN: [id number]  Date of Service: May 6, 2024          Reason for Visit    Recurrent/metastatic esophageal cancer    Primary Oncologist: Dr. Atkins      Assessment/Plan  Hx cT2cN2 lower esophageal adenocarcinoma, status post concurrent chemoradiation  Recurrent esophageal cancer involving distal esophagus and peritoneum with malignant ascites (PDL1 TPS 15%)  Ascites cytology confirms metastatic GE adenocarcinoma, as suspected. PDL1 staining on the distal esophageal recurrence is TPS 15%.    Dr. Atkins recommended palliative chemotherapy +/-immunotherapy as best treatment option, which patient desired proceeding with. Initiated cycle 1 FOLFOX + Nivo last week.     Tolerating this well so far.   Labs: CBC WNL. CMP: Alk phos 270 (up slightly from 235), rest LFTs WNL. Creatinine WNL.    He's requiring therapeutic paracenteses ~week currently.   He's struggling with progressive dysphagia despite esophageal dilation and getting very minimal calories/fluid in (addressed below).    Plan:  -Return 5/14 with Dr. Atkins ahead of C2  -Therapeutic paracentesis scheduled today. Continue as needed, standing orders in place for weekly PRN; averaging every 1 week currently. Hopefully, will see reduced accumulation on more chemo.   -Progressive dysphagia related to esophageal tumor recurrence; will take some time on chemo to see response.   -Likely, reimage with CT or PET after 4-6 cycles    Nutrition/hydration  Dysphagia  Has significant swallowing issues due to recurrent esophageal malignancy.  Recently underwent EGD with dilatation.  Hasn't noted a lot of benefit. Scheduled for PEG placement this Wednesay.    Plan:  -Small snacks and work on oral fluid intake  -PEG placement scheduled this week to supplement to oral intake  -IVF today and later in week + two days next week until he's able to supplement with PEG  sufficiently  -Hopefully, will see some response to chemo with more time in esophageal tumor and can increase oral as able.      ______________________________________________________________________________    History of Present Illness/ Interval History    Mr. Liu Ragsdale  is a 83 year old with lower esophageal adenocarcinoma treated with definitive chemoRT in 2023. While under surveillance, he was first noted to have distal esophageal recurrence 1/2024 (biopsy proven) and more recently new malignant ascites on 3/2024 PET scan.      He has been having progressive abdominal discomfort and constipation. Has undergone paracenteses on 4/15 and 4/22. Symptoms are improved 4-5 days following this each time.    Struggling with progressive dysphagia despite recent esophageal dilation. Losing weight (20 lb over the last month). He can now only get 1 Ensure/day in and sips of fluid. Referred to IR for PEG placement, scheduled this week.     Tolerating first cycle of chemo/immunotherapy quite well.  Minimal nausea, managed with antiemetics. Notes some brain fog, hard to concentrate. Very mild cold sensitivity , no residual neuropathy.       ECOG Performance    0      Oncology History/Treatment  Diagnosis/Stage:   6/2023: [id number] lower third esophageal adenocarcinoma  -Presented with dysphagia x2 months, increased acid reflux and 10 pound weight loss  -6/5/2023 EGD: Large fungating mass in the lower third esophagus, 38 cm from incisors.  Mass partially obstructing and circumferential.  Biopsy of mass: Adenocarcinoma.  GE junction biopsy also positive for adenocarcinoma.  -PET scan: Hypermetabolic activity distal esophageal mass.  Low-moderate activity in hilar and mediastinal adenopathy, indeterminant and possibly inflammatory in nature.  No evident distant metastases.  -Negative PD-L1 expression  -7/2/2023 EUS: Partially circumferential progressing to circumferential distal esophageal mass spanning 36-41 cm with the  junction estimated at 41 cm.  Lesion involves layer 4 but no evidence of layer 5 involvement no other structures involved.  5 regional periesophageal lymph nodes were noted, only 1 of these had malignant; no distant nodes noted.  Unremarkable pancreaticobiliary system and liver.  -7/5/2023: Diagnostic lap pleuroscopy without metastasis.  PEJ placed    1/2024: Recurrence distal esophagus + peritoneal involvement with malignant ascites  -Routine PET scan: new avid distal esophageal lesion.    -Biopsy-proven to be recurrent malignancy.  -3/2024 repeat PET: evidence of ascites thought to be malignant along with recurrent esophageal lesion. Pt having new/progressive abd pain + constipation.  -4/15/2024 paracentesis cytology: moderately-poorly diff adenocarcinoma consistent with GE origin. positive for CK7, CK20 (partial) and CDX2 with focal p53 expression. HER2/deepthi is is generally negative (0-1+). Minimal PD-L1 staining   -PDL1 testing on 2/21/2024 distal esophageal lesion: TPS 15%; CPS 0%    Treatment:  Locally advanced stage  Met with Dr. Levi in Thoracic Surgery.  Discussed consideration of surgical resection pending response to preoperative chemoradiation.    7/5/2023: PEJ tube feeding placement    7/24/2023 -8/25/2023: Definitive/neoadjuvant chemoradiation with weekly carboplatin and Taxol    Deemed not a surgical candidate.  Was on surveillance.    Stage IV recurrence  -5/1/2024 - present: FOLFOX + nivo every 2 weeks      Physical Exam    GENERAL: Alert and oriented to time place and person. Seated comfortably. In no distress.  Wife accompanies.  HEAD: Atraumatic and normocephalic. No alopecia.  LYMPH: No palpable cervical/supraclav nodes  EYES: ANTONIA, EOMI. No erythema. No icterus.  HEART: RRR  LUNGS: Clear bilaterally  ABD: Soft, moderately distended  EXTREMITIES: Warm. No peripheral edema.  SKIN: no rash, or bruising or purpura.   NEURO: No gross deficit noted. Non-antalgic gait.      Lab Results    Recent  Results (from the past 168 hour(s))   Comprehensive metabolic panel   Result Value Ref Range    Sodium 134 (L) 135 - 145 mmol/L    Potassium 4.7 3.4 - 5.3 mmol/L    Carbon Dioxide (CO2) 21 (L) 22 - 29 mmol/L    Anion Gap 13 7 - 15 mmol/L    Urea Nitrogen 17.8 8.0 - 23.0 mg/dL    Creatinine 1.07 0.67 - 1.17 mg/dL    GFR Estimate 69 >60 mL/min/1.73m2    Calcium 9.0 8.8 - 10.2 mg/dL    Chloride 100 98 - 107 mmol/L    Glucose 112 (H) 70 - 99 mg/dL    Alkaline Phosphatase 235 (H) 40 - 150 U/L    AST 25 0 - 45 U/L    ALT 11 0 - 70 U/L    Protein Total 6.4 6.4 - 8.3 g/dL    Albumin 3.3 (L) 3.5 - 5.2 g/dL    Bilirubin Total 0.4 <=1.2 mg/dL   TSH with free T4 reflex   Result Value Ref Range    TSH 4.03 0.30 - 4.20 uIU/mL   CBC with platelets and differential   Result Value Ref Range    WBC Count 7.7 4.0 - 11.0 10e3/uL    RBC Count 5.04 4.40 - 5.90 10e6/uL    Hemoglobin 15.1 13.3 - 17.7 g/dL    Hematocrit 44.5 40.0 - 53.0 %    MCV 88 78 - 100 fL    MCH 30.0 26.5 - 33.0 pg    MCHC 33.9 31.5 - 36.5 g/dL    RDW 13.3 10.0 - 15.0 %    Platelet Count 176 150 - 450 10e3/uL    % Neutrophils 80 %    % Lymphocytes 8 %    % Monocytes 9 %    % Eosinophils 2 %    % Basophils 1 %    % Immature Granulocytes 0 %    NRBCs per 100 WBC 0 <1 /100    Absolute Neutrophils 6.2 1.6 - 8.3 10e3/uL    Absolute Lymphocytes 0.7 (L) 0.8 - 5.3 10e3/uL    Absolute Monocytes 0.7 0.0 - 1.3 10e3/uL    Absolute Eosinophils 0.2 0.0 - 0.7 10e3/uL    Absolute Basophils 0.1 0.0 - 0.2 10e3/uL    Absolute Immature Granulocytes 0.0 <=0.4 10e3/uL    Absolute NRBCs 0.0 10e3/uL       Imaging    IR Chest Port Placement > 5 Yrs of Age    Result Date: 4/26/2024  [facility] LOCATION: [facility] DATE: 4/26/2024 PROCEDURE: IMPLANTABLE VENOUS PORT PLACEMENT: 1. ULTRASOUND GUIDANCE FOR VASCULAR ACCESS. A PERMANENT IMAGE WAS STORED. 2. IMPLANTABLE VENOUS ACCESS PORT PLACEMENT. 3. FLUOROSCOPIC GUIDANCE FOR CENTRAL VENOUS ACCESS DEVICE PLACEMENT.  INTERVENTIONAL RADIOLOGIST: Tevin Nolasco MD. INDICATION: Esophageal cancer with plans for chemotherapy. CONSENT: The risks, benefits and alternatives of port placement were discussed with the patient  in detail. All questions were answered. Informed consent was given to proceed with the procedure. MODERATE SEDATION: Versed 2 mg IV; Fentanyl 50 mcg IV.  Under physician supervision, Versed and fentanyl were administered for moderate sedation. Pulse oximetry, heart rate and blood pressure were continuously monitored by an independent trained observer. The physician spent 20 minutes of face-to-face sedation time with the patient. During the timeout, immediately prior to administration of medications, the patient was reassessed for adequacy to receive conscious sedation. CONTRAST: None ANTIBIOTICS: 2 g of Ancef IV ADDITIONAL MEDICATIONS: None. FLUOROSCOPIC TIME: 0.7 minutes. RADIATION DOSE: Air Kerma: 9 mGy. COMPLICATIONS: No immediate complications. STERILE BARRIER TECHNIQUE: Maximum sterile barrier technique was used. Cutaneous antisepsis was performed at the operative site with application of 2% chlorhexidine and large sterile drape. Prior to the procedure, the  and assistant performed hand hygiene and wore hat, mask, sterile gown, and sterile gloves during the entire procedure. PROCEDURE: Ultrasound demonstrated a patent and compressible right internal  jugular vein, and an ultrasound image was obtained and placed in the patient's permanent medical record. The right neck and chest were prepped and draped in usual sterile fashion. Using real-time ultrasound guidance, the right internal jugular vein was accessed. A subcutaneous pocket was created and irrigated with sterile normal saline. The catheter was tunneled in an antegrade fashion. Over the guidewire, a peel-away sheath was advanced with fluoroscopic monitoring. Through the peel-away sheath, the catheter was advanced until the tip was at the cavoatrial  junction. The catheter was cut to length and attached firmly to the port. The port was anchored with 2-0 Prolene in the subcutaneous pocket.  The incisions were closed with layered absorbable suture and surgical glue. FINDINGS: Ultrasound shows an anechoic and compressible jugular vein. At the completion of the study, the port tip lies at the cavoatrial junction. The central venous catheter insertion checklist was reviewed prior to placement and followed throughout the procedure.     IMPRESSION: Successful power-injectable venous port placement. CPT codes for physician reference only: 97896 [id number]    US Paracentesis without Albumin    Result Date: 4/22/2024  US PARACENTESIS WITHOUT ALBUMIN 4/22/2024 8:36 AM CLINICAL HISTORY: Other ascites PROCEDURE: Informed consent obtained. Time out performed. The abdomen was prepped and draped in a sterile fashion. 10 mL of 1% lidocaine was infused into local soft tissues. A 5 Cameroonian catheter system was introduced into the abdominal ascites under ultrasound guidance. 1.9 liters of clear fluid were removed and sent to lab if requested. Patient tolerated procedure well. Ultrasound imaging was obtained and placed in the patient's permanent medical record.     IMPRESSION: 1.  Status post ultrasound-guided paracentesis. RADHA OSEGUERA MD   SYSTEM ID:  [id number]    US Paracentesis without Albumin    Result Date: 4/15/2024  EXAM: 1. PARACENTESIS 2. ULTRASOUND GUIDANCE LOCATION: [facility] DATE: 4/15/2024 INDICATION: Ascites. PROCEDURE: Informed consent obtained. Time out performed. The abdomen was prepped and draped in a sterile fashion. 10 mL of 1% lidocaine was infused into local soft tissues. A 5 Cameroonian catheter system was introduced into the abdominal ascites under ultrasound guidance. 2 liters of clear yellow fluid were removed and sent to lab if requested. Patient tolerated procedure well. Ultrasound imaging was obtained and placed in the  patient's permanent medical record.     IMPRESSION: 1.  Status post ultrasound-guided paracentesis. Reference CPT Code: 72397    CT Abdomen Pelvis w Contrast    Result Date: 4/3/2024  CT ABDOMEN PELVIS W CONTRAST 4/3/2024 1:28 PM CLINICAL HISTORY: patient with constipation for several weeks, hx of esophageal cancer and J tube previously, query SBO, no fevers or vomiting TECHNIQUE: CT scan of the abdomen and pelvis was performed following injection of IV contrast. Multiplanar reformats were obtained. Dose reduction techniques were used. CONTRAST: 84 mL Isovue-370 COMPARISON: 2/25/2024 and 3/15/2024 FINDINGS: LOWER CHEST: Partly visualized small right pleural effusion has slightly increased. Linear and mild patchy opacities in the posterior dependent lower lobes bilaterally has slightly worsened. Partly visualized small pericardial effusion is stable. Stable eccentric wall thickening of the distal esophagus just above the gastroesophageal junction measuring about 14 mm (3/49). HEPATOBILIARY: No focal lesions in the liver. No calcified gallstones. Impression wall thickening of the gallbladder neck is new. No biliary duct dilatation. PANCREAS: Stable pancreatic parenchymal fatty atrophy with preserved parenchyma in the uncinate process. No pancreatic duct dilatation or surrounding inflammatory changes. SPLEEN: Normal. ADRENAL GLANDS: New left adrenal gland thickening measuring about 11 mm (3/89). KIDNEYS/BLADDER: No calculi or hydronephrosis. Stable benign cyst requiring no specific follow-up. BOWEL: Mild circumferential wall thickening of the distal gastric body and antrum. No small bowel or colonic obstruction. Normal appendix. Mild circumferential wall thickening in the mid transverse colon, descending colon and sigmoid colon exaggerated by the segments being decompressed. Sigmoid diverticulosis. PELVIC ORGANS: No pelvic masses. Small fat-containing bilateral inguinal hernia. ADDITIONAL FINDINGS: Increased moderate  ascites. Mild hazy stranding in the omentum is indeterminate, may be related to presence of ascites or peritoneal carcinomatosis. No discrete peritoneal masses visualized. No lymphadenopathy in the abdomen and pelvis. No abdominal aortic aneurysm. MUSCULOSKELETAL: Left PATY. Degenerative changes in the spine. No suspicious lesions in the bones.     IMPRESSION: 1.  Increased moderate ascites. Mild fat stranding in the omentum may be due to presence of ascites. Malignant ascites is not entirely excluded. No discrete peritoneal masses. 2.  Mild wall thickening in the transverse colon, descending colon and sigmoid colon slightly exaggerated by decompression. This may be due to acute infectious or inflammatory colitis. 3.  Mild wall thickening of the distal stomach and antrum, likely due to edema. 4.  Focal gallbladder wall thickening in the neck is nonspecific, may be due to presence of ascites. Right upper quadrant ultrasound can be considered for further evaluation. 5.  Stable small right pleural effusion. Slight increase in bibasilar linear and patchy opacities, either due to pneumonitis or atelectasis. 6.  New asymmetric thickening of the left adrenal gland measuring 11 mm, indeterminate. Attention on follow-up. 7.  Stable wall thickening of the distal esophagus. SONU MARQUEZ MD   SYSTEM ID:  [id number]     Billing  Total time 35 minutes, to include face to face visit, review of EMR, ordering, documentation and coordination of care on date of service.    complexity modifier for longitudinal care.     Signed by:   Patricia Chapman NP

## 2024-05-06 PROBLEM — R13.19 ESOPHAGEAL DYSPHAGIA: Status: ACTIVE | Noted: 2023-01-01

## 2024-05-06 PROBLEM — E86.0 DEHYDRATION: Status: ACTIVE | Noted: 2024-01-01

## 2024-05-06 NOTE — DISCHARGE INSTRUCTIONS
Radiology  Discharge Instructions for Abdominal Paracentesis    You have had an abdominal paracentesis procedure today.  A needle or catheter was put into your abdomen to remove fluid for laboratory studies and/or to remove the extra fluid from your abdomen.    AFTER YOU ARE HOME:  Rest at home today.  Limit physical activity such as lifting, straining, or exercise for 48 hours.  You may resume normal activity in 24 hours.  Resume previous diet and medications.  You may remove bandage in 24 hours.    CALL YOUR PRIMARY PROVIDER IF:  You develop temperature over 101o F, or redness at the drainage site.  You have any other questions or concerns.    AFTER HOURS CALL [facility] NURSE ADVISORS AT [phone]    COME TO EMERGENCY ROOM IF:  You develop severe abdominal pain, swelling the size of a baseball or larger, continuous oozing from puncture site longer than 24 hours, bleeding that saturates a gauze dressing even after you have put direct pressure on the site for 15 minutes, severe lightheadedness or fainting.  DO NOT DRIVE YOURSELF.

## 2024-05-06 NOTE — PROGRESS NOTES
Oncology Rooming Note    May 6, 2024 9:15 AM   Liu Ragsdale is a 83 year old male who presents for:    Chief Complaint   Patient presents with    Oncology Clinic Visit     Recurrent/metastatic esophageal cancer - labs and provider visit     Initial Vitals: /62 (BP Location: Right arm, Patient Position: Sitting, Cuff Size: Adult Regular)   Pulse 85   Temp 97  F (36.1  C) (Tympanic)   Ht 1.524 m (5')   Wt 69.6 kg (153 lb 6.4 oz)   SpO2 96%   BMI 29.96 kg/m   Estimated body mass index is 29.96 kg/m  as calculated from the following:    Height as of this encounter: 1.524 m (5').    Weight as of this encounter: 69.6 kg (153 lb 6.4 oz). Body surface area is 1.72 meters squared.  Mild Pain (3) Comment: Data Unavailable   No LMP for male patient.  Allergies reviewed: Yes  Medications reviewed: Yes    Medications: Medication refills not needed today.  Pharmacy name entered into [employer]:    [facility] [place], [place] - [address] [place] [address] [facility] AT PORTAL TO REGISTERED [facility] SITES  [facility] - [place], [place] - [address] [facility] [place], [place] - [address] [facility]    Frailty Screening:   Is the patient here for a new oncology consult visit in cancer care? 2. No      Clinical concerns: Pain in right shoulder has been increased, does have some arthritis. Using tylenol to relieve pain which is sufficient. Feeling like there has been an increase with memory recall, struggling to eat, and mild confusion.       Diana Arias MA

## 2024-05-06 NOTE — LETTER
5/6/2024         RE: Liu Ragsdale  [address] [facility] [id number]        Dear Colleague,    Thank you for referring your patient, Liu Ragsdale, to the [facility]. Please see a copy of my visit note below.    [facility] Outpatient Progress Note    Patient: Liu Ragsdale  MRN: [id number]  Date of Service: May 6, 2024          Reason for Visit    Recurrent/metastatic esophageal cancer    Primary Oncologist: Dr. Atkins      Assessment/Plan  Hx cT2cN2 lower esophageal adenocarcinoma, status post concurrent chemoradiation  Recurrent esophageal cancer involving distal esophagus and peritoneum with malignant ascites (PDL1 TPS 15%)  Ascites cytology confirms metastatic GE adenocarcinoma, as suspected. PDL1 staining on the distal esophageal recurrence is TPS 15%.    Dr. Atkins recommended palliative chemotherapy +/-immunotherapy as best treatment option, which patient desired proceeding with. Initiated cycle 1 FOLFOX + Nivo last week.     Tolerating this well so far.   Labs: CBC WNL. CMP: Alk phos 270 (up slightly from 235), rest LFTs WNL. Creatinine WNL.    He's requiring therapeutic paracenteses ~week currently.   He's struggling with progressive dysphagia despite esophageal dilation and getting very minimal calories/fluid in (addressed below).    Plan:  -Return 5/14 with Dr. Atkins ahead of C2  -Therapeutic paracentesis scheduled today. Continue as needed, standing orders in place for weekly PRN; averaging every 1 week currently. Hopefully, will see reduced accumulation on more chemo.   -Progressive dysphagia related to esophageal tumor recurrence; will take some time on chemo to see response.   -Likely, reimage with CT or PET after 4-6 cycles    Nutrition/hydration  Dysphagia  Has significant swallowing issues due to recurrent esophageal malignancy.  Recently underwent EGD with dilatation.  Hasn't noted a lot of benefit. Scheduled for  PEG placement this Wednesay.    Plan:  -Small snacks and work on oral fluid intake  -PEG placement scheduled this week to supplement to oral intake  -IVF today and later in week + two days next week until he's able to supplement with PEG sufficiently  -Hopefully, will see some response to chemo with more time in esophageal tumor and can increase oral as able.      ______________________________________________________________________________    History of Present Illness/ Interval History    Mr. Liu Ragsdale  is a 83 year old with lower esophageal adenocarcinoma treated with definitive chemoRT in 2023. While under surveillance, he was first noted to have distal esophageal recurrence 1/2024 (biopsy proven) and more recently new malignant ascites on 3/2024 PET scan.      He has been having progressive abdominal discomfort and constipation. Has undergone paracenteses on 4/15 and 4/22. Symptoms are improved 4-5 days following this each time.    Struggling with progressive dysphagia despite recent esophageal dilation. Losing weight (20 lb over the last month). He can now only get 1 Ensure/day in and sips of fluid. Referred to IR for PEG placement, scheduled this week.     Tolerating first cycle of chemo/immunotherapy quite well.  Minimal nausea, managed with antiemetics. Notes some brain fog, hard to concentrate. Very mild cold sensitivity , no residual neuropathy.       ECOG Performance    0      Oncology History/Treatment  Diagnosis/Stage:   6/2023: [id number] lower third esophageal adenocarcinoma  -Presented with dysphagia x2 months, increased acid reflux and 10 pound weight loss  -6/5/2023 EGD: Large fungating mass in the lower third esophagus, 38 cm from incisors.  Mass partially obstructing and circumferential.  Biopsy of mass: Adenocarcinoma.  GE junction biopsy also positive for adenocarcinoma.  -PET scan: Hypermetabolic activity distal esophageal mass.  Low-moderate activity in hilar and mediastinal  adenopathy, indeterminant and possibly inflammatory in nature.  No evident distant metastases.  -Negative PD-L1 expression  -7/2/2023 EUS: Partially circumferential progressing to circumferential distal esophageal mass spanning 36-41 cm with the junction estimated at 41 cm.  Lesion involves layer 4 but no evidence of layer 5 involvement no other structures involved.  5 regional periesophageal lymph nodes were noted, only 1 of these had malignant; no distant nodes noted.  Unremarkable pancreaticobiliary system and liver.  -7/5/2023: Diagnostic lap pleuroscopy without metastasis.  PEJ placed    1/2024: Recurrence distal esophagus + peritoneal involvement with malignant ascites  -Routine PET scan: new avid distal esophageal lesion.    -Biopsy-proven to be recurrent malignancy.  -3/2024 repeat PET: evidence of ascites thought to be malignant along with recurrent esophageal lesion. Pt having new/progressive abd pain + constipation.  -4/15/2024 paracentesis cytology: moderately-poorly diff adenocarcinoma consistent with GE origin. positive for CK7, CK20 (partial) and CDX2 with focal p53 expression. HER2/deepthi is is generally negative (0-1+). Minimal PD-L1 staining   -PDL1 testing on 2/21/2024 distal esophageal lesion: TPS 15%; CPS 0%    Treatment:  Locally advanced stage  Met with Dr. Levi in Thoracic Surgery.  Discussed consideration of surgical resection pending response to preoperative chemoradiation.    7/5/2023: PEJ tube feeding placement    7/24/2023 -8/25/2023: Definitive/neoadjuvant chemoradiation with weekly carboplatin and Taxol    Deemed not a surgical candidate.  Was on surveillance.    Stage IV recurrence  -5/1/2024 - present: FOLFOX + nivo every 2 weeks      Physical Exam    GENERAL: Alert and oriented to time place and person. Seated comfortably. In no distress.  Wife accompanies.  HEAD: Atraumatic and normocephalic. No alopecia.  LYMPH: No palpable cervical/supraclav nodes  EYES: ANTONIA, EOMI. No  erythema. No icterus.  HEART: RRR  LUNGS: Clear bilaterally  ABD: Soft, moderately distended  EXTREMITIES: Warm. No peripheral edema.  SKIN: no rash, or bruising or purpura.   NEURO: No gross deficit noted. Non-antalgic gait.      Lab Results    Recent Results (from the past 168 hour(s))   Comprehensive metabolic panel   Result Value Ref Range    Sodium 134 (L) 135 - 145 mmol/L    Potassium 4.7 3.4 - 5.3 mmol/L    Carbon Dioxide (CO2) 21 (L) 22 - 29 mmol/L    Anion Gap 13 7 - 15 mmol/L    Urea Nitrogen 17.8 8.0 - 23.0 mg/dL    Creatinine 1.07 0.67 - 1.17 mg/dL    GFR Estimate 69 >60 mL/min/1.73m2    Calcium 9.0 8.8 - 10.2 mg/dL    Chloride 100 98 - 107 mmol/L    Glucose 112 (H) 70 - 99 mg/dL    Alkaline Phosphatase 235 (H) 40 - 150 U/L    AST 25 0 - 45 U/L    ALT 11 0 - 70 U/L    Protein Total 6.4 6.4 - 8.3 g/dL    Albumin 3.3 (L) 3.5 - 5.2 g/dL    Bilirubin Total 0.4 <=1.2 mg/dL   TSH with free T4 reflex   Result Value Ref Range    TSH 4.03 0.30 - 4.20 uIU/mL   CBC with platelets and differential   Result Value Ref Range    WBC Count 7.7 4.0 - 11.0 10e3/uL    RBC Count 5.04 4.40 - 5.90 10e6/uL    Hemoglobin 15.1 13.3 - 17.7 g/dL    Hematocrit 44.5 40.0 - 53.0 %    MCV 88 78 - 100 fL    MCH 30.0 26.5 - 33.0 pg    MCHC 33.9 31.5 - 36.5 g/dL    RDW 13.3 10.0 - 15.0 %    Platelet Count 176 150 - 450 10e3/uL    % Neutrophils 80 %    % Lymphocytes 8 %    % Monocytes 9 %    % Eosinophils 2 %    % Basophils 1 %    % Immature Granulocytes 0 %    NRBCs per 100 WBC 0 <1 /100    Absolute Neutrophils 6.2 1.6 - 8.3 10e3/uL    Absolute Lymphocytes 0.7 (L) 0.8 - 5.3 10e3/uL    Absolute Monocytes 0.7 0.0 - 1.3 10e3/uL    Absolute Eosinophils 0.2 0.0 - 0.7 10e3/uL    Absolute Basophils 0.1 0.0 - 0.2 10e3/uL    Absolute Immature Granulocytes 0.0 <=0.4 10e3/uL    Absolute NRBCs 0.0 10e3/uL       Imaging    IR Chest Port Placement > 5 Yrs of Age    Result Date: 4/26/2024  [facility] LOCATION: [facility]  DATE: 4/26/2024 PROCEDURE: IMPLANTABLE VENOUS PORT PLACEMENT: 1. ULTRASOUND GUIDANCE FOR VASCULAR ACCESS. A PERMANENT IMAGE WAS STORED. 2. IMPLANTABLE VENOUS ACCESS PORT PLACEMENT. 3. FLUOROSCOPIC GUIDANCE FOR CENTRAL VENOUS ACCESS DEVICE PLACEMENT. INTERVENTIONAL RADIOLOGIST: Tevin Nolasco MD. INDICATION: Esophageal cancer with plans for chemotherapy. CONSENT: The risks, benefits and alternatives of port placement were discussed with the patient  in detail. All questions were answered. Informed consent was given to proceed with the procedure. MODERATE SEDATION: Versed 2 mg IV; Fentanyl 50 mcg IV.  Under physician supervision, Versed and fentanyl were administered for moderate sedation. Pulse oximetry, heart rate and blood pressure were continuously monitored by an independent trained observer. The physician spent 20 minutes of face-to-face sedation time with the patient. During the timeout, immediately prior to administration of medications, the patient was reassessed for adequacy to receive conscious sedation. CONTRAST: None ANTIBIOTICS: 2 g of Ancef IV ADDITIONAL MEDICATIONS: None. FLUOROSCOPIC TIME: 0.7 minutes. RADIATION DOSE: Air Kerma: 9 mGy. COMPLICATIONS: No immediate complications. STERILE BARRIER TECHNIQUE: Maximum sterile barrier technique was used. Cutaneous antisepsis was performed at the operative site with application of 2% chlorhexidine and large sterile drape. Prior to the procedure, the  and assistant performed hand hygiene and wore hat, mask, sterile gown, and sterile gloves during the entire procedure. PROCEDURE: Ultrasound demonstrated a patent and compressible right internal  jugular vein, and an ultrasound image was obtained and placed in the patient's permanent medical record. The right neck and chest were prepped and draped in usual sterile fashion. Using real-time ultrasound guidance, the right internal jugular vein was accessed. A subcutaneous pocket was created and irrigated with  sterile normal saline. The catheter was tunneled in an antegrade fashion. Over the guidewire, a peel-away sheath was advanced with fluoroscopic monitoring. Through the peel-away sheath, the catheter was advanced until the tip was at the cavoatrial junction. The catheter was cut to length and attached firmly to the port. The port was anchored with 2-0 Prolene in the subcutaneous pocket.  The incisions were closed with layered absorbable suture and surgical glue. FINDINGS: Ultrasound shows an anechoic and compressible jugular vein. At the completion of the study, the port tip lies at the cavoatrial junction. The central venous catheter insertion checklist was reviewed prior to placement and followed throughout the procedure.     IMPRESSION: Successful power-injectable venous port placement. CPT codes for physician reference only: 78118 [id number]    US Paracentesis without Albumin    Result Date: 4/22/2024  US PARACENTESIS WITHOUT ALBUMIN 4/22/2024 8:36 AM CLINICAL HISTORY: Other ascites PROCEDURE: Informed consent obtained. Time out performed. The abdomen was prepped and draped in a sterile fashion. 10 mL of 1% lidocaine was infused into local soft tissues. A 5 Uzbek catheter system was introduced into the abdominal ascites under ultrasound guidance. 1.9 liters of clear fluid were removed and sent to lab if requested. Patient tolerated procedure well. Ultrasound imaging was obtained and placed in the patient's permanent medical record.     IMPRESSION: 1.  Status post ultrasound-guided paracentesis. RADHA OSEGUERA MD   SYSTEM ID:  [id number]    US Paracentesis without Albumin    Result Date: 4/15/2024  EXAM: 1. PARACENTESIS 2. ULTRASOUND GUIDANCE LOCATION: [facility] DATE: 4/15/2024 INDICATION: Ascites. PROCEDURE: Informed consent obtained. Time out performed. The abdomen was prepped and draped in a sterile fashion. 10 mL of 1% lidocaine was infused into local soft tissues. A 5 Uzbek  catheter system was introduced into the abdominal ascites under ultrasound guidance. 2 liters of clear yellow fluid were removed and sent to lab if requested. Patient tolerated procedure well. Ultrasound imaging was obtained and placed in the patient's permanent medical record.     IMPRESSION: 1.  Status post ultrasound-guided paracentesis. Reference CPT Code: 08056    CT Abdomen Pelvis w Contrast    Result Date: 4/3/2024  CT ABDOMEN PELVIS W CONTRAST 4/3/2024 1:28 PM CLINICAL HISTORY: patient with constipation for several weeks, hx of esophageal cancer and J tube previously, query SBO, no fevers or vomiting TECHNIQUE: CT scan of the abdomen and pelvis was performed following injection of IV contrast. Multiplanar reformats were obtained. Dose reduction techniques were used. CONTRAST: 84 mL Isovue-370 COMPARISON: 2/25/2024 and 3/15/2024 FINDINGS: LOWER CHEST: Partly visualized small right pleural effusion has slightly increased. Linear and mild patchy opacities in the posterior dependent lower lobes bilaterally has slightly worsened. Partly visualized small pericardial effusion is stable. Stable eccentric wall thickening of the distal esophagus just above the gastroesophageal junction measuring about 14 mm (3/49). HEPATOBILIARY: No focal lesions in the liver. No calcified gallstones. Impression wall thickening of the gallbladder neck is new. No biliary duct dilatation. PANCREAS: Stable pancreatic parenchymal fatty atrophy with preserved parenchyma in the uncinate process. No pancreatic duct dilatation or surrounding inflammatory changes. SPLEEN: Normal. ADRENAL GLANDS: New left adrenal gland thickening measuring about 11 mm (3/89). KIDNEYS/BLADDER: No calculi or hydronephrosis. Stable benign cyst requiring no specific follow-up. BOWEL: Mild circumferential wall thickening of the distal gastric body and antrum. No small bowel or colonic obstruction. Normal appendix. Mild circumferential wall thickening in the mid  transverse colon, descending colon and sigmoid colon exaggerated by the segments being decompressed. Sigmoid diverticulosis. PELVIC ORGANS: No pelvic masses. Small fat-containing bilateral inguinal hernia. ADDITIONAL FINDINGS: Increased moderate ascites. Mild hazy stranding in the omentum is indeterminate, may be related to presence of ascites or peritoneal carcinomatosis. No discrete peritoneal masses visualized. No lymphadenopathy in the abdomen and pelvis. No abdominal aortic aneurysm. MUSCULOSKELETAL: Left PATY. Degenerative changes in the spine. No suspicious lesions in the bones.     IMPRESSION: 1.  Increased moderate ascites. Mild fat stranding in the omentum may be due to presence of ascites. Malignant ascites is not entirely excluded. No discrete peritoneal masses. 2.  Mild wall thickening in the transverse colon, descending colon and sigmoid colon slightly exaggerated by decompression. This may be due to acute infectious or inflammatory colitis. 3.  Mild wall thickening of the distal stomach and antrum, likely due to edema. 4.  Focal gallbladder wall thickening in the neck is nonspecific, may be due to presence of ascites. Right upper quadrant ultrasound can be considered for further evaluation. 5.  Stable small right pleural effusion. Slight increase in bibasilar linear and patchy opacities, either due to pneumonitis or atelectasis. 6.  New asymmetric thickening of the left adrenal gland measuring 11 mm, indeterminate. Attention on follow-up. 7.  Stable wall thickening of the distal esophagus. SONU MARQUEZ MD   SYSTEM ID:  [id number]     Billing  Total time 35 minutes, to include face to face visit, review of EMR, ordering, documentation and coordination of care on date of service.    complexity modifier for longitudinal care.     Signed by:   Patricia Chapman NP      Oncology Rooming Note    May 6, 2024 9:15 AM   Liu Ragsdale is a 83 year old male who presents for:    Chief Complaint   Patient presents  with     Oncology Clinic Visit     Recurrent/metastatic esophageal cancer - labs and provider visit     Initial Vitals: /62 (BP Location: Right arm, Patient Position: Sitting, Cuff Size: Adult Regular)   Pulse 85   Temp 97  F (36.1  C) (Tympanic)   Ht 1.524 m (5')   Wt 69.6 kg (153 lb 6.4 oz)   SpO2 96%   BMI 29.96 kg/m   Estimated body mass index is 29.96 kg/m  as calculated from the following:    Height as of this encounter: 1.524 m (5').    Weight as of this encounter: 69.6 kg (153 lb 6.4 oz). Body surface area is 1.72 meters squared.  Mild Pain (3) Comment: Data Unavailable   No LMP for male patient.  Allergies reviewed: Yes  Medications reviewed: Yes    Medications: Medication refills not needed today.  Pharmacy name entered into [employer]yp[employer]:    [facility] - [place], [place] - [address] [facility] [address] [facility] AT [address] TO [facility] SITES  [facility] - [place], [place] - [address] [facility] [place], [place] - [address] [facility]    Frailty Screening:   Is the patient here for a new oncology consult visit in cancer care? 2. No      Clinical concerns: Pain in right shoulder has been increased, does have some arthritis. Using tylenol to relieve pain which is sufficient. Feeling like there has been an increase with memory recall, struggling to eat, and mild confusion.       Diana Arias MA                Again, thank you for allowing me to participate in the care of your patient.        Sincerely,        Patricia Chapman NP

## 2024-05-06 NOTE — PROGRESS NOTES
Infusion Nursing Note:  Liu Ragsdale presents today for IVF.    Patient seen by provider today: No   present during visit today: Not Applicable.    Note: Pt had paracentesis and return for IVF and needle de-access.      Intravenous Access:  Implanted Port.    Treatment Conditions:  Not Applicable.      Post Infusion Assessment:  Patient tolerated infusion without incident.  Blood return noted pre and post infusion.  Site patent and intact, free from redness, edema or discomfort.  No evidence of extravasations.  Access discontinued per protocol.       Discharge Plan:   Discharge instructions reviewed with: Patient.  Patient and/or family verbalized understanding of discharge instructions and all questions answered.  Patient discharged in stable condition accompanied by: self.  Departure Mode: Ambulatory.      Loraine Barr RN

## 2024-05-06 NOTE — PROGRESS NOTES
RLQ paracentesis performed by radiologist. 2,400 Ml clear yellow fluid removed. Pressure held at site after catheter removed. No bleeding noted. Skin glue applied. No Albumin per protocol.

## 2024-05-07 NOTE — NURSING NOTE
Chief Complaint   Patient presents with    Pre-Op Exam     /70 (Cuff Size: Adult Regular)   Pulse 74   Temp 97.2  F (36.2  C) (Tympanic)   Resp 18   Ht 1.524 m (5')   Wt 68 kg (150 lb)   SpO2 95%   BMI 29.29 kg/m   Estimated body mass index is 29.29 kg/m  as calculated from the following:    Height as of this encounter: 1.524 m (5').    Weight as of this encounter: 68 kg (150 lb).  Patient presents to the clinic using No DME      Health Maintenance that is potentially due pending provider review:    Health Maintenance Due   Topic Date Due    MEDICARE ANNUAL WELLNESS VISIT  09/25/2020    COVID-19 Vaccine (8 - 2023-24 season) 12/25/2023

## 2024-05-07 NOTE — PROGRESS NOTES
Preoperative Evaluation  [facility]  [address] [place] [id number]  Phone: [phone]  Fax: [phone]  Primary Provider: Piyush Rogers  Pre-op Performing Provider: PIYUSH ROGERS  May 7, 2024     Liu is a 83 year old, presenting for the following:  Pre-Op Exam    Surgical Information  Surgery/Procedure: ESOPHAGOGASTRODUODENOSCOPY with Trufreeze cryodilation   Surgery Location: [place]   Surgeon: Salvatore  Surgery Date: 5/20/24  Time of Surgery: ?  Where patient plans to recover: At home with family  Fax number for surgical facility: Note does not need to be faxed, will be available electronically in Epic.    Assessment & Plan     The proposed surgical procedure is considered LOW risk.      ICD-10-CM    1. Preop general physical exam  Z01.818       2. Esophageal stricture  K22.2       3. Benign essential hypertension  I10       4. 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 IHD (ischemic heart disease)  I25.9       6. Mixed hyperlipidemia  E78.2       7. Carotid stenosis, bilateral  I65.23       8. H/O ischemic right MCA stroke  Z86.73       9. FANI (obstructive sleep apnea)  G47.33            - No identified additional risk factors other than previously addressed    Antiplatelet or Anticoagulation Medication Instructions   - aspirin: Discontinue aspirin 7-10 days prior to procedure to reduce bleeding risk. It should be resumed postoperatively.     Additional Medication Instructions  Patient is to take all scheduled medications on the day of surgery    Recommendation  APPROVAL GIVEN to proceed with proposed procedure, without further diagnostic evaluation.      Subjective   HPI related to upcoming procedure:   83-year-old male presents for a preop physical exam.  Patient is scheduled to have esophagogastroduodenoscopy with cryodilation on May 20, 2024. He requires evaluation and anesthesia risk assessment prior to undergoing  surgery/procedure.  Patient denies any fever, chills, sore throat, cough, shortness of breath, chest pain, palpitation, diarrhea, constipation, abdominal pain, headache or other relevant systemic symptoms.         4/30/2024     8:27 PM   Preop Questions   1. Have you ever had a heart attack or stroke? YES - s/p angioplasty x1 stent    2. Have you ever had surgery on your heart or blood vessels, such as a stent placement, a coronary artery bypass, or surgery on an artery in your head, neck, heart, or legs? YES -    3. Do you have chest pain with activity? No   4. Do you have a history of  heart failure? No   5. Do you currently have a cold, bronchitis or symptoms of other infection? No   6. Do you have a cough, shortness of breath, or wheezing? No   7. Do you or anyone in your family have previous history of blood clots? No   8. Do you or does anyone in your family have a serious bleeding problem such as prolonged bleeding following surgeries or cuts? No   9. Have you ever had problems with anemia or been told to take iron pills? No   10. Have you had any abnormal blood loss such as black, tarry or bloody stools? No   11. Have you ever had a blood transfusion? YES -    11a. Have you ever had a transfusion reaction? No   12. Are you willing to have a blood transfusion if it is medically needed before, during, or after your surgery? Yes   13. Have you or any of your relatives ever had problems with anesthesia? No   14. Do you have sleep apnea, excessive snoring or daytime drowsiness? YES - FANI   14a. Do you have a CPAP machine? No   15. Do you have any artifical heart valves or other implanted medical devices like a pacemaker, defibrillator, or continuous glucose monitor? No   16. Do you have artificial joints? YES -    17. Are you allergic to latex? YES:        Health Care Directive  Patient has a Health Care Directive on file      Preoperative Review of    reviewed - controlled substances reflected in  medication list.      Status of Chronic Conditions:  See problem list for active medical problems.  Problems all longstanding and stable, except as noted/documented.  See ROS for pertinent symptoms related to these conditions.    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) 07/03/2023     Priority: Medium     EOTD 10.23.23 - [place]       History of ischemic stroke 09/28/2021     Priority: Medium    Cerebrovascular accident (H) 09/27/2021     Priority: Medium    Near syncope 09/26/2021     Priority: Medium    Right carotid artery occlusion 09/26/2021     Priority: Medium    Degenerative joint disease of left hip 02/03/2021     Priority: Medium    Coronary artery disease involving native coronary artery of native heart without angina pectoris 03/23/2020     Priority: Medium     coronary artery disease diagnosed in 03/2020 at which time he was noted to have a  of the proximal left circumflex into the OM, moderate to severe ramus disease and moderate to severe RCA/PLB disease.  He underwent drug-eluting stent placement from the proximal left circumflex into the OM1.  He has residual small vessel coronary artery disease as well as moderate to severe stenosis in the ramus and RCA/YAA for which medical management has been recommended.         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 11/06/2019     Priority: Medium    SOB (shortness of breath) on exertion 11/06/2019     Priority: Medium    Arthritis of right glenohumeral joint- moderate 11/06/2019     Priority: Medium    Arthritis of right acromioclavicular joint- advanced 11/06/2019     Priority: Medium    SNHL (sensory-neural hearing loss), asymmetrical 07/22/2019     Priority: Medium    FANI (obstructive sleep apnea) 03/15/2016     Priority: Medium     NOT using CPAP 3/15/16      BMI 31.0-31.9,adult 04/07/2015     Priority: Medium    Benign essential hypertension, BP goal <140/90 09/10/2014     Priority: Medium    Hyperlipidemia LDL goal <70 12/12/2013     Priority: Medium     Diagnosis updated by automated process. Provider to review and confirm.      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) 07/03/2023    Status post coronary angiogram 03/10/2020     Past Surgical History:   Procedure Laterality Date    ARTHROPLASTY HIP Left 02/03/2021    Procedure: Total Hip Arthroplasty;  Surgeon: Andrew Arriola MD;  Location: [facility] OR    BIOPSY      CARDIAC SURGERY  3-10 2020    stent installed    COLONOSCOPY N/A 06/16/2016    Procedure: COLONOSCOPY;  Surgeon: Randy Avendano MD;  Location: [facility] GI    CV LEFT HEART CATH Left 03/10/2020    Procedure: Left Heart Cath;  Surgeon: Vicente Loepz MD;  Location: [facility] CARDIAC CATH LAB    ENDARTERECTOMY CAROTID Right 09/29/2021    Procedure: ENDARTERECTOMY, CAROTID;  Surgeon: Ganesh  Raymond Palomares MD;  Location:  OR    ENDOSCOPIC ULTRASOUND UPPER GASTROINTESTINAL TRACT (GI) N/A 07/03/2023    Procedure: ENDOSCOPIC ULTRASOUND, ESOPHAGOSCOPY / UPPER GASTROINTESTINAL TRACT (GI);  Surgeon: Yunier Graves MD;  Location:  OR    ESOPHAGOSCOPY, GASTROSCOPY, DUODENOSCOPY (EGD), COMBINED N/A 06/05/2023    Procedure: Esophagoscopy, Gastroscopy, Duodenoscopy, With Biopsy;  Surgeon: Joseph Landers DO;  Location: [facility] GI    ESOPHAGOSCOPY, GASTROSCOPY, DUODENOSCOPY (EGD), COMBINED N/A 07/03/2023    Procedure: ESOPHAGOGASTRODUODENOSCOPY;  Surgeon: Yunier Graves MD;  Location:  OR    ESOPHAGOSCOPY, GASTROSCOPY, DUODENOSCOPY (EGD), COMBINED N/A 10/18/2023    Procedure: ESOPHAGOGASTRODUODENOSCOPY, WITH BIOPSY;  Surgeon: Jose Salinas MD;  Location:  GI    ESOPHAGOSCOPY, GASTROSCOPY, DUODENOSCOPY (EGD), COMBINED N/A 11/14/2023    Procedure: Esophagoscopy, gastroscopy, duodenoscopy (EGD), combined with Dilation;  Surgeon: Ernst Hatfield MD;  [place] [facility] [place]    ESOPHAGOSCOPY, GASTROSCOPY, DUODENOSCOPY (EGD), COMBINED N/A 02/21/2024    Procedure: ESOPHAGOGASTRODUODENOSCOPY, WITH BIOPSY AND BALLOON DILATION;  Surgeon: Harlan Blanc DO;  [place] [facility] [place]    ESOPHAGOSCOPY, GASTROSCOPY, DUODENOSCOPY (EGD), DILATATION, COMBINED N/A 04/19/2024    Procedure: Esophagoscopy, gastroscopy, duodenoscopy (EGD), dilatation, combined;  Surgeon: Steve Roth MD;  Location:  GI    EYE SURGERY  2005    cataract surgery    IR CHEST PORT PLACEMENT > 5 YRS OF AGE  04/26/2024    IR JEJUNOSTOMY TUBE CHANGE  08/25/2023    LAPAROSCOPIC ASSISTED INSERTION TUBE JEJUNOSTOMY N/A 07/05/2023    Procedure: INSERTION, JEJUNOSTOMY TUBE, LAPAROSCOPY-ASSISTED;  Surgeon: Judy Holguin MD;  Location:  OR    VASECTOMY  1981     Current Outpatient Medications   Medication Sig Dispense Refill    aspirin 81 MG EC tablet Take 81 mg by mouth daily      atorvastatin (LIPITOR) 10 MG tablet  Take 1 tablet (10 mg) by mouth daily 90 tablet 1    carvedilol (COREG) 6.25 MG tablet TAKE 1 TABLET BY MOUTH PER J-TUBE TWICE DAILY WITH MEALS 90 tablet 0    diclofenac (VOLTAREN) 1 % topical gel Place 4 g onto the skin 3 times daily as needed for moderate pain (Shoulder) 100 g 1    Multiple Vitamin (MULTI VITAMIN PO) 5 mLs by Per J Tube route daily Eniva VIBE-liquid with 5 ml of Eniva Cell Ready and 5 ml of Collagen qs with water to 55 ml      omeprazole (PRILOSEC) 40 MG DR capsule Take 40 mg by mouth 2 times daily      ondansetron (ZOFRAN) 8 MG tablet Take 1 tablet (8 mg) by mouth every 8 hours as needed for nausea (vomiting) 30 tablet 2    prochlorperazine (COMPAZINE) 10 MG tablet Take 1 tablet (10 mg) by mouth every 6 hours as needed (Nausea/Vomiting) 30 tablet 2    nitroGLYcerin (NITROSTAT) 0.3 MG sublingual tablet For chest pain place 1 tablet under the tongue every 5 minutes for 3 doses. If symptoms persist 5 minutes after 1st dose call 911. (Patient not taking: Reported on 4/22/2024) 30 tablet 3       Allergies   Allergen Reactions    Ampicillin Rash    Latex Rash    Metoprolol Other (See Comments)     Side effects : dry mouth        Social History     Tobacco Use    Smoking status: Never     Passive exposure: Never    Smokeless tobacco: Never   Substance Use Topics    Alcohol use: Not Currently     Comment: Beth only     Family History   Problem Relation Age of Onset    Hypertension Sister     Kidney failure Sister     Chronic Obstructive Pulmonary Disease Sister     Thyroid Disease Sister     Seizure Disorder Paternal Grandmother     Mitral valve prolapse Daughter     No Known Problems Son     Cerebrovascular Disease No family hx of      History   Drug Use No         Review of Systems    Review of Systems  Constitutional, neuro, ENT, endocrine, pulmonary, cardiac, gastrointestinal, genitourinary, musculoskeletal, integument and psychiatric systems are negative, except as otherwise noted.    Objective     /70 (Cuff Size: Adult Regular)   Pulse 74   Temp 97.2  F (36.2  C) (Tympanic)   Resp 18   Ht 1.524 m (5')   Wt 68 kg (150 lb)   SpO2 95%   BMI 29.29 kg/m     Estimated body mass index is 29.29 kg/m  as calculated from the following:    Height as of this encounter: 1.524 m (5').    Weight as of this encounter: 68 kg (150 lb).  Physical Exam  GENERAL: alert, frail, and elderly  EYES: Eyes grossly normal to inspection, PERRL and conjunctivae and sclerae normal  HENT: normal cephalic/atraumatic, nose and mouth without ulcers or lesions, oropharynx clear, and oral mucous membranes moist  NECK: no adenopathy, no asymmetry, masses, or scars  RESP: lungs clear to auscultation - no rales, rhonchi or wheezes  CV: regular rate and rhythm, normal S1 S2, no S3 or S4, no murmur, click or rub, no peripheral edema  ABDOMEN: soft, nontender, no hepatosplenomegaly, no masses and bowel sounds normal  MS: no gross musculoskeletal defects noted, no edema  NEURO: Normal strength and tone, mentation intact and speech normal  PSYCH: mentation appears normal, affect normal/bright    Recent Labs   Lab Test 05/06/24  0842 05/01/24  0800   HGB 15.2 15.1    176    134*   POTASSIUM 4.5 4.7   CR 1.08 1.07        Diagnostics  No labs were ordered during this visit.   No EKG required for low risk surgery (cataract, skin procedure, breast biopsy, etc).    Revised Cardiac Risk Index (RCRI)  The patient has the following serious cardiovascular risks for perioperative complications:   - Coronary Artery Disease (MI, positive stress test, angina, Qs on EKG) = 1 point   - Cerebrovascular Disease (TIA or CVA) = 1 point     RCRI Interpretation: 2 points: Class III (moderate risk - 6.6% complication rate)     Estimated Functional Capacity: Performs 4 METS exercise without symptoms (e.g., light housework, stairs, 4 mph walk, 7 mph bike, slow step dance)       Signed Electronically by: Piyush Rogers MD  Copy of this evaluation  report is provided to requesting physician.

## 2024-05-08 NOTE — TELEPHONE ENCOUNTER
Order/Referral Request    Who is requesting: Daughter    Orders being requested: Nutrition Referral    Reason service is needed/diagnosis: Tube Feeding start    When are orders needed by: asap    Has this been discussed with Provider: Yes    Does patient have a preference on a Group/Provider/Facility? [facility]    Does patient have an appointment scheduled?: No - Patient has a referral from his Oncology provider but the order is incorrect. Requesting an updated referral specifically for Nutrition if possible.    Where to send orders: Place orders within Epic    Could we send this information to you in [facility] or would you prefer to receive a phone call?:   Patient would prefer a phone call   Okay to leave a detailed message?: Yes at Other phone number:  [phone]

## 2024-05-08 NOTE — PROCEDURES
[facility]    Procedure: G tube placement    Date/Time: 5/8/2024 1:41 PM    Performed by: Tucker Leblanc MD  Authorized by: Tucker Leblanc MD      UNIVERSAL PROTOCOL   Site Marked: NA  Prior Images Obtained and Reviewed:  Yes  Required items: Required blood products, implants, devices and special equipment available    Patient identity confirmed:  Verbally with patient, arm band, provided demographic data and hospital-assigned identification number  Patient was reevaluated immediately before administering moderate or deep sedation or anesthesia  Confirmation Checklist:  Patient's identity using two indicators, relevant allergies, procedure was appropriate and matched the consent or emergent situation and correct equipment/implants were available  Time out: Immediately prior to the procedure a time out was called    Universal Protocol: the Joint Commission Universal Protocol was followed    Preparation: Patient was prepped and draped in usual sterile fashion       ANESTHESIA    Anesthesia:  Local infiltration  Local Anesthetic:  Lidocaine 1% without epinephrine      SEDATION  Patient Sedated: Yes    Sedation Type:  Moderate (conscious) sedation  Vital signs: Vital signs monitored during sedation    See dictated procedure note for full details.  Findings: 18 F G tube    Specimens: none    Complications: None    Condition: Stable      PROCEDURE    Patient Tolerance:  Patient tolerated the procedure well with no immediate complications  Length of time physician/provider present for 1:1 monitoring during sedation: 15

## 2024-05-08 NOTE — DISCHARGE INSTRUCTIONS
Gastrostomy (G) Tube Discharge Instructions:   You had a gastrostomy tube (G tube) placed. The purpose of a gastrostomy tube is to provide someone temporary or permanent nutritional support and medication administration or for stomach drainage/venting directly through a tube into their stomach.    Care instructions:  - If you received sedation for your procedure, do not drive or operate heavy machinery for the rest of the day.  - Avoid soaking in stagnant water (tub baths, Jacuzzis, pools, lake, or ocean).   - You may shower beginning the day after the tube was placed.   - Clean under the disc daily with soap and water. Pat dry under disc and apply new split gauze dressing under disc. Cleaning tube site daily will help prevent infection and skin irritation.  - A small amount of clear tan drainage from the tube exit site can be normal.  - Make sure the disc on the tube fits slightly snug against the skin so that the tube does not move in or out of the body easily.   - If you experience leaking from around tube exit site, the most common cause is that the disc is not tightened against the skin.   - To tighten the disc, pull gently on the tube so the retention balloon (under the skin) is pulled up against the skin under the tube. Push the disc down until it is tight against the skin without a gap between the skin and the disc.  - Flush your tube with 60cc of water twice a day (using a cath tip syringe) to prevent tube from clogging (or follow recommendations from your doctor or dietician if given).    Giving Feedings and Medications:  - Follow-up with your dietician, primary care provider, or oncologist for instructions on tube feedings and medication administration.    - ONLY use specific enteric feedings with your G tube (unless otherwise discussed with your dietitian).    - Flush tube at least twice daily with 60ml of water using cath tip syringe unless otherwise instructed by your doctor or dietician.  - Flush the  tube before and after administrating medications and bolus feedings with 60cc of water.    Follow Up:  -Recommend routine 3 month exchanges of feeding tube. Please contact your primary care provider or speak with your dietitian to obtain an order to have your G tube exchanged. Then contact [facility]'s Medical Imaging scheduling department at [phone] to schedule your G tube exchange appointment.  - G tubes CANNOT be removed until 6 weeks after date of tube placement (Tube was placed 5/8/24).  - T-fasteners/Buttons (suture) should be removed 7-10 days after tube placement. This may have been done while you were still and inpatient, or can be completed in your outpatient clinic or care facility.     Please seek medical evaluation for:  - Fever (greater than 101 F (38.3C)).  - Purulent (yellow/green/foul smelling) drainage from tube exit site.   - Significant or worsening abdominal pain.   - Skin that is hot to the touch or significantly reddened at the tube exit site.   - Bleeding at tube exit site.    Call [place] IR RN Line at [phone] with questions or if you have any of the following symptoms:  - Tube falls out or felt to be out of position.  - Unable to flush tube.  - Significant leakage around tube site (tube feeding, medications or drainage).  - Significant bleeding at the tube exit site.  - Severe pain at tube exit site.

## 2024-05-08 NOTE — PROGRESS NOTES
Interventional Radiology - Pre-Procedure Note:  [facility]  05/08/2024     Procedure Requested: Gastrostomy tube placement  Requested by: La Nena Atkins MD     History and Physical Reviewed: H&P documented within 30 days (by Piyush Rogers MD on 05/07/2024). I have personally reviewed the patient's medical history and have updated the medical record as necessary.    Brief HPI: Liu Ragsdale is a 83 year old male with a PMH of CVA, CAD s/p PCI, carotid endarterectomy, J-tube placement, and esophageal adenocarcinoma who presents for a gastrostomy tube placement. He has had progressive dysphagia r/t his tumor recurrence. Requesting gastrostomy tube to supplement oral intake.    Abdominal surgeries include J-tube placement. Small hiatal hernia noted on endoscopy 04/16/2024. No history of GERD. Dietician should be consulted to manage TF.     IMAGING:  CT Abdomen Pelvis w Contrast 04/03/2024  CT ABDOMEN PELVIS W CONTRAST 4/3/2024 1:28 PM     CLINICAL HISTORY: patient with constipation for several weeks, hx of  esophageal cancer and J tube previously, query SBO, no fevers or  vomiting     TECHNIQUE: CT scan of the abdomen and pelvis was performed following  injection of IV contrast. Multiplanar reformats were obtained. Dose  reduction techniques were used.  CONTRAST: 84 mL [id number]     COMPARISON: 2/25/2024 and 3/15/2024     FINDINGS:   LOWER CHEST: Partly visualized small right pleural effusion has  slightly increased. Linear and mild patchy opacities in the posterior  dependent lower lobes bilaterally has slightly worsened. Partly  visualized small pericardial effusion is stable. Stable eccentric wall  thickening of the distal esophagus just above the gastroesophageal  junction measuring about 14 mm (3/49).     HEPATOBILIARY: No focal lesions in the liver. No calcified gallstones.  Impression wall thickening of the gallbladder neck is new. No biliary  duct  dilatation.     PANCREAS: Stable pancreatic parenchymal fatty atrophy with preserved  parenchyma in the uncinate process. No pancreatic duct dilatation or  surrounding inflammatory changes.     SPLEEN: Normal.     ADRENAL GLANDS: New left adrenal gland thickening measuring about 11  mm (3/89).     KIDNEYS/BLADDER: No calculi or hydronephrosis. Stable benign cyst  requiring no specific follow-up.     BOWEL: Mild circumferential wall thickening of the distal gastric body  and antrum. No small bowel or colonic obstruction. Normal appendix.  Mild circumferential wall thickening in the mid transverse colon,  descending colon and sigmoid colon exaggerated by the segments being  decompressed. Sigmoid diverticulosis.     PELVIC ORGANS: No pelvic masses. Small fat-containing bilateral  inguinal hernia.     ADDITIONAL FINDINGS: Increased moderate ascites. Mild hazy stranding  in the omentum is indeterminate, may be related to presence of ascites  or peritoneal carcinomatosis. No discrete peritoneal masses  visualized. No lymphadenopathy in the abdomen and pelvis. No abdominal  aortic aneurysm.     MUSCULOSKELETAL: Left PATY. Degenerative changes in the spine. No  suspicious lesions in the bones.                                                                      IMPRESSION:   1.  Increased moderate ascites. Mild fat stranding in the omentum may  be due to presence of ascites. Malignant ascites is not entirely  excluded. No discrete peritoneal masses.  2.  Mild wall thickening in the transverse colon, descending colon and  sigmoid colon slightly exaggerated by decompression. This may be due  to acute infectious or inflammatory colitis.  3.  Mild wall thickening of the distal stomach and antrum, likely due  to edema.  4.  Focal gallbladder wall thickening in the neck is nonspecific, may  be due to presence of ascites. Right upper quadrant ultrasound can be  considered for further evaluation.  5.  Stable small right pleural  effusion. Slight increase in bibasilar  linear and patchy opacities, either due to pneumonitis or atelectasis.  6.  New asymmetric thickening of the left adrenal gland measuring 11  mm, indeterminate. Attention on follow-up.  7.  Stable wall thickening of the distal esophagus.    NPO: Midnight  ANTICOAGULANTS: ASA 81 mg  ANTIBIOTICS: Ancef 2 g    ALLERGIES  Allergies   Allergen Reactions    Ampicillin Rash    Latex Rash    Metoprolol Other (See Comments)     Side effects : dry mouth         LABS:   INR   Date Value Ref Range Status   05/08/2024 1.02 0.85 - 1.15 Final   02/03/2021 1.03 0.86 - 1.14 Final      Hemoglobin   Date Value Ref Range Status   05/06/2024 15.2 13.3 - 17.7 g/dL Final   04/15/2021 11.3 (L) 13.3 - 17.7 g/dL Final     Platelet Count   Date Value Ref Range Status   05/06/2024 155 150 - 450 10e3/uL Final   04/15/2021 199 150 - 450 10e9/L Final     Creatinine   Date Value Ref Range Status   05/06/2024 1.08 0.67 - 1.17 mg/dL Final   04/15/2021 1.72 (H) 0.66 - 1.25 mg/dL Final     Potassium   Date Value Ref Range Status   05/06/2024 4.5 3.4 - 5.3 mmol/L Final   10/06/2021 4.5 3.4 - 5.3 mmol/L Final   04/15/2021 3.8 3.4 - 5.3 mmol/L Final         EXAM:  BP (!) 164/83   Pulse 80   Temp 98.2  F (36.8  C) (Oral)   Resp 16   SpO2 98%    General: Stable. In no acute distress.    Neuro: Alert and oriented x 3. No focal deficits.  Psych: Appropriate mood and affect. Linear/coherent thought process.   Resp: Normal respirations. Lungs clear to auscultation bilaterally.  Cardio: S1S2, regular rate and rhythm, without murmur, clicks or rubs.  Skin: Warm and dry. Without excoriations, ecchymosis, erythema, lesions or open sores on abdomen.      Pre-Sedation Assessment:  Mallampati Airway Classification:  II - Faucial pillars and soft palate may be seen, but uvula is masked by the base of the tongue  Previous reaction to anesthesia/sedation:  No  Sedation plan based on assessment: Moderate (conscious)  sedation  ASA Classification: Class 3 - SEVERE SYSTEMIC DISEASE, DEFINITE FUNCTIONAL LIMITATIONS.   Code Status: FULL CODE      ASSESSMENT/PLAN:   Gastrostomy tube placement with sedation. Will send patient home with prescriptions for oxycodone and flexeril for post procedure pain.    Procedural education reviewed with patient/family in detail including, but not limited to risks, benefits and alternatives with understanding verbalized by patient/family.    Total time spent on the date of the encounter: 30 minutes.      GIO CONLEY CNP  Interventional Radiology

## 2024-05-08 NOTE — PRE-PROCEDURE
GENERAL PRE-PROCEDURE:   Procedure:  Gastrostomy tube placement  Date/Time:  5/8/2024 12:13 PM    Written consent obtained?: Yes    Risks and benefits: Risks, benefits and alternatives were discussed    Consent given by:  Patient  Patient states understanding of procedure being performed: Yes    Patient's understanding of procedure matches consent: Yes    Procedure consent matches procedure scheduled: Yes    Expected level of sedation:  Moderate  Appropriately NPO:  Yes  ASA Class:  3  Mallampati  :  Grade 2- soft palate, base of uvula, tonsillar pillars, and portion of posterior pharyngeal wall visible  Lungs:  Lungs clear with good breath sounds bilaterally  Heart:  Normal heart sounds and rate  History & Physical reviewed:  History and physical reviewed and no updates needed  Statement of review:  I have reviewed the lab findings, diagnostic data, medications, and the plan for sedation

## 2024-05-09 NOTE — PROGRESS NOTES
CHENCHOLiu receives [facility] services at home for chemo disconnects.   If he needs IVFs, would it be okay if he had them at home in the future?   If so, please specify/send orders to [facility].     He is starting enteral feedings today via G-tube with water flushes.     Thank you,  Elma ARAIZA, RN  [facility]   [address] [place] [id number]   [email or web address]   Cell: [phone]  Fax: [phone]

## 2024-05-09 NOTE — TELEPHONE ENCOUNTER
Call placed to daughter to inform referral placed. Left detailed message with referral info and number for scheduling. Advised to return call to clinic if any questions.     Shannon Cohen RN

## 2024-05-11 NOTE — TELEPHONE ENCOUNTER
Clinic Action Needed: FYI  [facility] Triage Call  Presenting Problem:    Patient seen yesterday (5/10/24) by  [facility] nurse, and was given a paper which includes metoclopramide in the med list. He states he does not have this med available at home.    [facility] advised that metoclopramide is not an active medication.     To infusion team: kindly coordinate with [facility] for med reconciliation/review.    aPty Butler RN/[facility] Nurse Advisor

## 2024-05-14 NOTE — PROGRESS NOTES
[facility] Hematology and Oncology Outpatient Progress Note    Patient: Liu Ragsdlae  MRN: [id number]  Date of Service: May 14, 2024          Reason for Visit    Recurrent/metastatic esophageal cancer    Primary Oncologist: Dr. 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 PDL1 staining on the distal esophageal recurrence is CPS 15.    He is on palliative chemotherapy +immunotherapy with FOLFOX + Nivo. Status post 1 cycle.    Has done fairly well on chemotherapy without any major side effects.  He does feel fatigued but this could be from many different things.  Continues to lose some weight.  He is using G-tube for his nutrition right now.  Still not able to swallow.  Labs reviewed today.  CBC is normal except for mildly low lymphocyte count.  Serum chemistry shows mildly low sodium at 132.  Normal potassium.  Albumin is down at 3.1.  He continues to have alk phos elevation which is currently at 460.  Normal bilirubin.  Normal LFTs otherwise.  Normal calcium.  No contraindications to proceed with cycle 2 FOLFOX with nivolumab today.  He is getting pump disconnect at home.  He will come back in 2 weeks for cycle 3.  Plan is to repeat a CT scan after 4-5 cycles.    He continues to have recurrent malignant ascites and is getting paracentesis every 1 to 2 weeks.  Scheduled for next week.  He already has evidence of reaccumulation today.    If this becomes more frequent then may have to consider peritoneal Pleurx catheter placement.    Nutrition/hydration  Dysphagia  Has significant swallowing issues due to recurrent esophageal malignancy.  Recently underwent EGD with dilatation. No significant benefit. He underwent G tube placement.  He is using tube feeds 2-3 cartons a  day.  But he does get nauseated and has GERD symptoms.  Not sure if pills are all going down.  For his dehydration I recommended doing twice weekly IV fluids at home via home infusion.  He does have mild hyponatremia which is probably due to dehydration.  Fortunately creatinine looks okay.  For his nausea I recommended starting Zofran oral disintegrating tablets so that he does not have to swallow.  I will also send omeprazole in the suspension form for his GERD symptoms.  I encouraged him to try oral intake with protein shakes and other liquids small quantities at a time but multiple times a day to keep up with the hydration and nutrition.  Also recommended to use tube feeds as much as possible for the bulk of his nutrition.  I want him to touch base with nutritionist again.    Continue to use oxycodone and Tylenol as needed for G-tube insertion site pain.  If not able to swallow then again we may have to get him some suspension or liquid formulation.        ______________________________________________________________________________    History of Present Illness/ Interval History    Mr. Liu Ragsdale  is a 83 year old with lower esophageal adenocarcinoma treated with definitive chemoRT in 2023. While under surveillance, he was first noted to have distal esophageal recurrence 1/2024 (biopsy proven) and more recently new malignant ascites on 3/2024 PET scan.      Has started palliative chemotherapy with FOLFOX with nivolumab.  Status post 1 cycle.  He continues to have significant abdominal discomfort issues stemming from malignant ascites.  Has been getting paracentesis pretty much on a weekly basis.  He had esophageal dilatation but this did not improve his oral intake.  Now status post G-tube placement.    No significant side effects from chemo-immunotherapy so far.  Some peripheral neuro sensitivity from oxaliplatin.  No obvious neuropathy.  His biggest issue is nausea and swallowing difficulty.  Also he has  G-tube he is using tube feeds 2-3 times a day.  Still trying to drink orally as much as possible.  But after he takes his pills he gets nauseated and does vomit.  Had an episode of emesis this morning.  He is not sure if the pills are going all the way down.  Also has some pain around the G-tube site.  But no signs of infection.  No diarrhea.      ECOG Performance    0      Oncology History/Treatment  Diagnosis/Stage:   6/2023: [id number] lower third esophageal adenocarcinoma  -Presented with dysphagia x2 months, increased acid reflux and 10 pound weight loss  -6/5/2023 EGD: Large fungating mass in the lower third esophagus, 38 cm from incisors.  Mass partially obstructing and circumferential.  Biopsy of mass: Adenocarcinoma.  GE junction biopsy also positive for adenocarcinoma.  -PET scan: Hypermetabolic activity distal esophageal mass.  Low-moderate activity in hilar and mediastinal adenopathy, indeterminant and possibly inflammatory in nature.  No evident distant metastases.  -Negative PD-L1 expression  -7/2/2023 EUS: Partially circumferential progressing to circumferential distal esophageal mass spanning 36-41 cm with the junction estimated at 41 cm.  Lesion involves layer 4 but no evidence of layer 5 involvement no other structures involved.  5 regional periesophageal lymph nodes were noted, only 1 of these had malignant; no distant nodes noted.  Unremarkable pancreaticobiliary system and liver.  -7/5/2023: Diagnostic lap pleuroscopy without metastasis.  PEJ placed    1/2024: Recurrence distal esophagus + peritoneal involvement with malignant ascites  -Routine PET scan: new avid distal esophageal lesion.    -Biopsy-proven to be recurrent malignancy.  -3/2024 repeat PET: evidence of ascites thought to be malignant along with recurrent esophageal lesion. Pt having new/progressive abd pain + constipation.  -4/15/2024 paracentesis cytology: moderately-poorly diff adenocarcinoma consistent with GE origin. positive  for CK7, CK20 (partial) and CDX2 with focal p53 expression. HER2/deepthi is is generally negative (0-1+). Minimal PD-L1 staining   -PDL1 testing on 2/21/2024 distal esophageal lesion: TPS 15%; CPS 0%    Treatment:  Locally advanced stage  Met with Dr. Levi in Thoracic Surgery.  Discussed consideration of surgical resection pending response to preoperative chemoradiation.    7/5/2023: PEJ tube feeding placement    7/24/2023 -8/25/2023: Definitive/neoadjuvant chemoradiation with weekly carboplatin and Taxol    Deemed not a surgical candidate.  Was on surveillance.    Stage IV recurrence  -5/1/2024 - present: FOLFOX + nivo every 2 weeks      Physical Exam    GENERAL: Alert and oriented to time place and person.  Appears fatigued.  Seated comfortably. In no distress.  Wife accompanies.  HEAD: Atraumatic and normocephalic. No alopecia.  LYMPH: No palpable cervical/supraclav nodes  EYES: ANTONIA, EOMI. No erythema. No icterus.  HEART: RRR  LUNGS: Clear bilaterally  ABD: Soft, moderately distended G-tube in place.  No signs of infection  EXTREMITIES: Warm. No peripheral edema.  SKIN: no rash, or bruising or purpura.   NEURO: No gross deficit noted. Non-antalgic gait.      Lab Results    Recent Results (from the past 168 hour(s))   INR   Result Value Ref Range    INR 1.02 0.85 - 1.15       Imaging    IR Gastrostomy Tube Percutaneous Plcmnt    Result Date: 5/8/2024  [place] LOCATION: [facility] DATE: 5/8/2024 PROCEDURE: 1.  PERCUTANEOUS GASTROSTOMY PLACEMENT. 2.  MODERATE SEDATION. ATTENDING: Tucker Leblanc MD INDICATION: esophageal cancer CONSENT:  The risks, benefits, and alternatives of gastrostomy tube placement were discussed with the patient in detail.  All questions were answered.  Informed consent was given to proceed with the procedure. UNIVERSAL PRECAUTIONS:  The procedure was performed utilizing maximum sterile barrier technique.  Prior to the start of the procedure a standard  pause for patient safety was performed with site marking as indicated. MODERATE SEDATION:  During the time-out, immediately prior to administration of medications, the patient was reassessed for adequacy to receive conscious sedation. Versed 2 mg IV and Fentanyl 100 mcg IV were administered by the radiology nurse with continuous vital sign monitoring under my direct supervision.  Total face to face moderate sedation time was 15 minutes. ANTIBIOTICS: Ancef 2 g IV ADDITIONAL MEDICATIONS: Glucagon 1 mg IV FLUOROSCOPIC TIME: 3.3 mins. DOSE: Air Kerma:39 mGy. CONTRAST: 30 mL Omnipaque intraenteric. COMPLICATIONS:  No immediate complications. PROCEDURE:  The liver edge was marked on the skin surface with ultrasound. The abdomen was prepped and draped in the usual fashion. Abdominal ultrasound was performed, and the left lobe of the liver was identified and marked on the abdominal wall. A nasogastric tube  was placed and used to insufflate the stomach. Under fluoroscopic guidance, the body of the stomach was then punctured. Two T-tacks were deposited within the stomach and an 0.035-inch guidewire looped within the stomach, after confirming the position of  the needle with contrast. The guidewire was advanced into the stomach. The tract was then dilated using serial dilators. The 22-Kiswahili peel-away sheath was then placed into the stomach. The 18-Kiswahili ANNELIESE gastrostomy tube was  passed over the guidewire through the peel-away so that the distal tip was  in the stomach. The balloon was then inflated with 8 cc of dilute contrast and snugged against the anterior abdominal wall. Contrast was injected through the port to document the position. The nasogastric  tube was removed. The patient tolerated the procedure well without immediate complication. FINDINGS:  The procedural images demonstrate the newly placed tube within the gastric lumen.     IMPRESSION: Successful percutaneous 18 F gastrostomy tube  placement. PLAN: The tube  will be placed to gravity drainage and after 3 hours, feedings may begin. Gastropexy sutures to be removed in 1 week or sooner if discharged. Preventative maintenance changes should be performed at approximately 3 month intervals.     US Paracentesis without Albumin    Result Date: 5/6/2024  US PARACENTESIS WITHOUT ALBUMIN 5/6/2024 10:46 AM CLINICAL HISTORY: malignant Pleural fluid; 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); Malignant ascites (H28) PROCEDURE: Informed consent obtained. Time out performed. The abdomen was prepped and draped in a sterile fashion. 10 mL of 1% lidocaine was infused into local soft tissues. A 5 Arabic catheter system was introduced into the abdominal ascites under ultrasound guidance. 2.4 liters of clear fluid were removed and sent to lab if requested. Patient tolerated procedure well. Ultrasound imaging was obtained and placed in the patient's permanent medical record.     IMPRESSION: 1.  Status post ultrasound-guided paracentesis. RADHA OSEGUERA MD   SYSTEM ID:  [id number]    IR Chest Port Placement > 5 Yrs of Age    Result Date: 4/26/2024  [place] LOCATION: [facility] DATE: 4/26/2024 PROCEDURE: IMPLANTABLE VENOUS PORT PLACEMENT: 1. ULTRASOUND GUIDANCE FOR VASCULAR ACCESS. A PERMANENT IMAGE WAS STORED. 2. IMPLANTABLE VENOUS ACCESS PORT PLACEMENT. 3. FLUOROSCOPIC GUIDANCE FOR CENTRAL VENOUS ACCESS DEVICE PLACEMENT. INTERVENTIONAL RADIOLOGIST: Tevin Nolasco MD. INDICATION: Esophageal cancer with plans for chemotherapy. CONSENT: The risks, benefits and alternatives of port placement were discussed with the patient  in detail. All questions were answered. Informed consent was given to proceed with the procedure. MODERATE SEDATION: Versed 2 mg IV; Fentanyl 50 mcg IV.  Under physician supervision, Versed and fentanyl were administered for moderate sedation. Pulse oximetry, heart rate and blood pressure were continuously monitored by an  independent trained observer. The physician spent 20 minutes of face-to-face sedation time with the patient. During the timeout, immediately prior to administration of medications, the patient was reassessed for adequacy to receive conscious sedation. CONTRAST: None ANTIBIOTICS: 2 g of Ancef IV ADDITIONAL MEDICATIONS: None. FLUOROSCOPIC TIME: 0.7 minutes. RADIATION DOSE: Air Kerma: 9 mGy. COMPLICATIONS: No immediate complications. STERILE BARRIER TECHNIQUE: Maximum sterile barrier technique was used. Cutaneous antisepsis was performed at the operative site with application of 2% chlorhexidine and large sterile drape. Prior to the procedure, the  and assistant performed hand hygiene and wore hat, mask, sterile gown, and sterile gloves during the entire procedure. PROCEDURE: Ultrasound demonstrated a patent and compressible right internal  jugular vein, and an ultrasound image was obtained and placed in the patient's permanent medical record. The right neck and chest were prepped and draped in usual sterile fashion. Using real-time ultrasound guidance, the right internal jugular vein was accessed. A subcutaneous pocket was created and irrigated with sterile normal saline. The catheter was tunneled in an antegrade fashion. Over the guidewire, a peel-away sheath was advanced with fluoroscopic monitoring. Through the peel-away sheath, the catheter was advanced until the tip was at the cavoatrial junction. The catheter was cut to length and attached firmly to the port. The port was anchored with 2-0 Prolene in the subcutaneous pocket.  The incisions were closed with layered absorbable suture and surgical glue. FINDINGS: Ultrasound shows an anechoic and compressible jugular vein. At the completion of the study, the port tip lies at the cavoatrial junction. The central venous catheter insertion checklist was reviewed prior to placement and followed throughout the procedure.     IMPRESSION: Successful  power-injectable venous port placement. CPT codes for physician reference only: 06183 [id number]    US Paracentesis without Albumin    Result Date: 4/22/2024  US PARACENTESIS WITHOUT ALBUMIN 4/22/2024 8:36 AM CLINICAL HISTORY: Other ascites PROCEDURE: Informed consent obtained. Time out performed. The abdomen was prepped and draped in a sterile fashion. 10 mL of 1% lidocaine was infused into local soft tissues. A 5 Taiwanese catheter system was introduced into the abdominal ascites under ultrasound guidance. 1.9 liters of clear fluid were removed and sent to lab if requested. Patient tolerated procedure well. Ultrasound imaging was obtained and placed in the patient's permanent medical record.     IMPRESSION: 1.  Status post ultrasound-guided paracentesis. RADHA OSEGUERA MD   SYSTEM ID:  [id number]    US Paracentesis without Albumin    Result Date: 4/15/2024  EXAM: 1. PARACENTESIS 2. ULTRASOUND GUIDANCE LOCATION: [facility] DATE: 4/15/2024 INDICATION: Ascites. PROCEDURE: Informed consent obtained. Time out performed. The abdomen was prepped and draped in a sterile fashion. 10 mL of 1% lidocaine was infused into local soft tissues. A 5 Taiwanese catheter system was introduced into the abdominal ascites under ultrasound guidance. 2 liters of clear yellow fluid were removed and sent to lab if requested. Patient tolerated procedure well. Ultrasound imaging was obtained and placed in the patient's permanent medical record.     IMPRESSION: 1.  Status post ultrasound-guided paracentesis. Reference CPT Code: 01482     Billing  A total of 40 min was spent today on this visit which included face to face conversation with the patient, EMR review, counseling and co-ordination of care and medical documentation.    Complex patient.  Multiple medical issues and incurable malignancy requiring intensive monitoring and follow-up.    The longitudinal plan of care for the diagnosis(es)/condition(s) as documented  were addressed during this visit. Due to the added complexity in care, I will continue to support Liu in the subsequent management and with ongoing continuity of care.    Signed by:   La Nena Atkins MD  Hematology and Medical Oncology  [facility]

## 2024-05-14 NOTE — PROGRESS NOTES
Infusion Nursing Note:  Liu Ragsdale presents today for C2D1 modified FOLFOX, Opdivo & IVFs.    Patient seen by provider today: Yes   present during visit today: Not Applicable.    Note: Liu is having abd pain & throat pain, he did not take any meds at home, nor does he have meds with him. Dilaudid x 1 dose given with good response.      Intravenous Access:  Implanted Port.    Treatment Conditions:  Lab Results   Component Value Date    HGB 14.3 05/14/2024    WBC 4.0 05/14/2024    ANEU 1.8 08/31/2023    ANEUTAUTO 2.8 05/14/2024     05/14/2024        Lab Results   Component Value Date     (L) 05/14/2024    POTASSIUM 4.4 05/14/2024    MAG 1.9 08/31/2023    CR 1.15 05/14/2024    GWEN 9.0 05/14/2024    BILITOTAL 0.4 05/14/2024    ALBUMIN 3.1 (L) 05/14/2024    ALT 19 05/14/2024    AST 31 05/14/2024       Results reviewed, labs MET treatment parameters, ok to proceed with treatment.      Post Infusion Assessment:  Patient tolerated infusion without incident.  Blood return noted pre and post infusion.  Site patent and intact, free from redness, edema or discomfort.  No evidence of extravasations.       Discharge Plan:   Patient discharged in stable condition accompanied by: wife.  Departure Mode: Ambulatory.  [facility] to disconnect pump 5/16 @ 11am      Sarah Campuzano RN

## 2024-05-14 NOTE — LETTER
5/14/2024         RE: Liu Ragsdale  [id number] [facility] [id number]        Dear Colleague,    Thank you for referring your patient, Liu Ragsdale, to the [facility] [place]. Please see a copy of my visit note below.    [facility] Hematology and Oncology Outpatient Progress Note    Patient: Liu Ragsdale  MRN: [id number]  Date of Service: May 14, 2024          Reason for Visit    Recurrent/metastatic esophageal cancer    Primary Oncologist: Dr. 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 PDL1 staining on the distal esophageal recurrence is CPS 15.    He is on palliative chemotherapy +immunotherapy with FOLFOX + Nivo. Status post 1 cycle.    Has done fairly well on chemotherapy without any major side effects.  He does feel fatigued but this could be from many different things.  Continues to lose some weight.  He is using G-tube for his nutrition right now.  Still not able to swallow.  Labs reviewed today.  CBC is normal except for mildly low lymphocyte count.  Serum chemistry shows mildly low sodium at 132.  Normal potassium.  Albumin is down at 3.1.  He continues to have alk phos elevation which is currently at 460.  Normal bilirubin.  Normal LFTs otherwise.  Normal calcium.  No contraindications to proceed with cycle 2 FOLFOX with nivolumab today.  He is getting pump disconnect at home.  He will come back in 2 weeks for cycle 3.  Plan is to repeat a CT scan after 4-5 cycles.    He continues to have recurrent malignant ascites and is getting paracentesis every 1 to 2 weeks.  Scheduled for next week.  He already has evidence of reaccumulation today.    If this becomes more frequent then may have to consider peritoneal Pleurx  catheter placement.    Nutrition/hydration  Dysphagia  Has significant swallowing issues due to recurrent esophageal malignancy.  Recently underwent EGD with dilatation. No significant benefit. He underwent G tube placement.  He is using tube feeds 2-3 cartons a day.  But he does get nauseated and has GERD symptoms.  Not sure if pills are all going down.  For his dehydration I recommended doing twice weekly IV fluids at home via home infusion.  He does have mild hyponatremia which is probably due to dehydration.  Fortunately creatinine looks okay.  For his nausea I recommended starting Zofran oral disintegrating tablets so that he does not have to swallow.  I will also send omeprazole in the suspension form for his GERD symptoms.  I encouraged him to try oral intake with protein shakes and other liquids small quantities at a time but multiple times a day to keep up with the hydration and nutrition.  Also recommended to use tube feeds as much as possible for the bulk of his nutrition.  I want him to touch base with nutritionist again.    Continue to use oxycodone and Tylenol as needed for G-tube insertion site pain.  If not able to swallow then again we may have to get him some suspension or liquid formulation.        ______________________________________________________________________________    History of Present Illness/ Interval History    Mr. Liu Ragsdale  is a 83 year old with lower esophageal adenocarcinoma treated with definitive chemoRT in 2023. While under surveillance, he was first noted to have distal esophageal recurrence 1/2024 (biopsy proven) and more recently new malignant ascites on 3/2024 PET scan.      Has started palliative chemotherapy with FOLFOX with nivolumab.  Status post 1 cycle.  He continues to have significant abdominal discomfort issues stemming from malignant ascites.  Has been getting paracentesis pretty much on a weekly basis.  He had esophageal dilatation but this did not  improve his oral intake.  Now status post G-tube placement.    No significant side effects from chemo-immunotherapy so far.  Some peripheral neuro sensitivity from oxaliplatin.  No obvious neuropathy.  His biggest issue is nausea and swallowing difficulty.  Also he has G-tube he is using tube feeds 2-3 times a day.  Still trying to drink orally as much as possible.  But after he takes his pills he gets nauseated and does vomit.  Had an episode of emesis this morning.  He is not sure if the pills are going all the way down.  Also has some pain around the G-tube site.  But no signs of infection.  No diarrhea.      ECOG Performance    0      Oncology History/Treatment  Diagnosis/Stage:   6/2023: [id number] lower third esophageal adenocarcinoma  -Presented with dysphagia x2 months, increased acid reflux and 10 pound weight loss  -6/5/2023 EGD: Large fungating mass in the lower third esophagus, 38 cm from incisors.  Mass partially obstructing and circumferential.  Biopsy of mass: Adenocarcinoma.  GE junction biopsy also positive for adenocarcinoma.  -PET scan: Hypermetabolic activity distal esophageal mass.  Low-moderate activity in hilar and mediastinal adenopathy, indeterminant and possibly inflammatory in nature.  No evident distant metastases.  -Negative PD-L1 expression  -7/2/2023 EUS: Partially circumferential progressing to circumferential distal esophageal mass spanning 36-41 cm with the junction estimated at 41 cm.  Lesion involves layer 4 but no evidence of layer 5 involvement no other structures involved.  5 regional periesophageal lymph nodes were noted, only 1 of these had malignant; no distant nodes noted.  Unremarkable pancreaticobiliary system and liver.  -7/5/2023: Diagnostic lap pleuroscopy without metastasis.  PEJ placed    1/2024: Recurrence distal esophagus + peritoneal involvement with malignant ascites  -Routine PET scan: new avid distal esophageal lesion.    -Biopsy-proven to be recurrent  malignancy.  -3/2024 repeat PET: evidence of ascites thought to be malignant along with recurrent esophageal lesion. Pt having new/progressive abd pain + constipation.  -4/15/2024 paracentesis cytology: moderately-poorly diff adenocarcinoma consistent with GE origin. positive for CK7, CK20 (partial) and CDX2 with focal p53 expression. HER2/deepthi is is generally negative (0-1+). Minimal PD-L1 staining   -PDL1 testing on 2/21/2024 distal esophageal lesion: TPS 15%; CPS 0%    Treatment:  Locally advanced stage  Met with Dr. Levi in Thoracic Surgery.  Discussed consideration of surgical resection pending response to preoperative chemoradiation.    7/5/2023: PEJ tube feeding placement    7/24/2023 -8/25/2023: Definitive/neoadjuvant chemoradiation with weekly carboplatin and Taxol    Deemed not a surgical candidate.  Was on surveillance.    Stage IV recurrence  -5/1/2024 - present: FOLFOX + nivo every 2 weeks      Physical Exam    GENERAL: Alert and oriented to time place and person.  Appears fatigued.  Seated comfortably. In no distress.  Wife accompanies.  HEAD: Atraumatic and normocephalic. No alopecia.  LYMPH: No palpable cervical/supraclav nodes  EYES: ANTONIA, EOMI. No erythema. No icterus.  HEART: RRR  LUNGS: Clear bilaterally  ABD: Soft, moderately distended G-tube in place.  No signs of infection  EXTREMITIES: Warm. No peripheral edema.  SKIN: no rash, or bruising or purpura.   NEURO: No gross deficit noted. Non-antalgic gait.      Lab Results    Recent Results (from the past 168 hour(s))   INR   Result Value Ref Range    INR 1.02 0.85 - 1.15       Imaging    IR Gastrostomy Tube Percutaneous Plcmnt    Result Date: 5/8/2024  [facility] LOCATION: [facility] DATE: 5/8/2024 PROCEDURE: 1.  PERCUTANEOUS GASTROSTOMY PLACEMENT. 2.  MODERATE SEDATION. ATTENDING: Tucker Leblanc MD INDICATION: esophageal cancer CONSENT:  The risks, benefits, and alternatives of gastrostomy tube placement  were discussed with the patient in detail.  All questions were answered.  Informed consent was given to proceed with the procedure. UNIVERSAL PRECAUTIONS:  The procedure was performed utilizing maximum sterile barrier technique.  Prior to the start of the procedure a standard pause for patient safety was performed with site marking as indicated. MODERATE SEDATION:  During the time-out, immediately prior to administration of medications, the patient was reassessed for adequacy to receive conscious sedation. Versed 2 mg IV and Fentanyl 100 mcg IV were administered by the radiology nurse with continuous vital sign monitoring under my direct supervision.  Total face to face moderate sedation time was 15 minutes. ANTIBIOTICS: Ancef 2 g IV ADDITIONAL MEDICATIONS: Glucagon 1 mg IV FLUOROSCOPIC TIME: 3.3 mins. DOSE: Air Kerma:39 mGy. CONTRAST: 30 mL Omnipaque intraenteric. COMPLICATIONS:  No immediate complications. PROCEDURE:  The liver edge was marked on the skin surface with ultrasound. The abdomen was prepped and draped in the usual fashion. Abdominal ultrasound was performed, and the left lobe of the liver was identified and marked on the abdominal wall. A nasogastric tube  was placed and used to insufflate the stomach. Under fluoroscopic guidance, the body of the stomach was then punctured. Two T-tacks were deposited within the stomach and an 0.035-inch guidewire looped within the stomach, after confirming the position of  the needle with contrast. The guidewire was advanced into the stomach. The tract was then dilated using serial dilators. The 22-Mosotho peel-away sheath was then placed into the stomach. The 18-Mosotho ANNELIESE gastrostomy tube was  passed over the guidewire through the peel-away so that the distal tip was  in the stomach. The balloon was then inflated with 8 cc of dilute contrast and snugged against the anterior abdominal wall. Contrast was injected through the port to document the position. The  nasogastric  tube was removed. The patient tolerated the procedure well without immediate complication. FINDINGS:  The procedural images demonstrate the newly placed tube within the gastric lumen.     IMPRESSION: Successful percutaneous 18 F gastrostomy tube  placement. PLAN: The tube will be placed to gravity drainage and after 3 hours, feedings may begin. Gastropexy sutures to be removed in 1 week or sooner if discharged. Preventative maintenance changes should be performed at approximately 3 month intervals.     US Paracentesis without Albumin    Result Date: 5/6/2024  US PARACENTESIS WITHOUT ALBUMIN 5/6/2024 10:46 AM CLINICAL HISTORY: malignant Pleural fluid; 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); Malignant ascites (H28) PROCEDURE: Informed consent obtained. Time out performed. The abdomen was prepped and draped in a sterile fashion. 10 mL of 1% lidocaine was infused into local soft tissues. A 5 Serbian catheter system was introduced into the abdominal ascites under ultrasound guidance. 2.4 liters of clear fluid were removed and sent to lab if requested. Patient tolerated procedure well. Ultrasound imaging was obtained and placed in the patient's permanent medical record.     IMPRESSION: 1.  Status post ultrasound-guided paracentesis. RADHA OSEGUERA MD   SYSTEM ID:  [id number]    IR Chest Port Placement > 5 Yrs of Age    Result Date: 4/26/2024  [facility] LOCATION: [facility] DATE: 4/26/2024 PROCEDURE: IMPLANTABLE VENOUS PORT PLACEMENT: 1. ULTRASOUND GUIDANCE FOR VASCULAR ACCESS. A PERMANENT IMAGE WAS STORED. 2. IMPLANTABLE VENOUS ACCESS PORT PLACEMENT. 3. FLUOROSCOPIC GUIDANCE FOR CENTRAL VENOUS ACCESS DEVICE PLACEMENT. INTERVENTIONAL RADIOLOGIST: Tevin Nolasco MD. INDICATION: Esophageal cancer with plans for chemotherapy. CONSENT: The risks, benefits and alternatives of port placement were discussed with the patient  in detail. All questions were answered.  Informed consent was given to proceed with the procedure. MODERATE SEDATION: Versed 2 mg IV; Fentanyl 50 mcg IV.  Under physician supervision, Versed and fentanyl were administered for moderate sedation. Pulse oximetry, heart rate and blood pressure were continuously monitored by an independent trained observer. The physician spent 20 minutes of face-to-face sedation time with the patient. During the timeout, immediately prior to administration of medications, the patient was reassessed for adequacy to receive conscious sedation. CONTRAST: None ANTIBIOTICS: 2 g of Ancef IV ADDITIONAL MEDICATIONS: None. FLUOROSCOPIC TIME: 0.7 minutes. RADIATION DOSE: Air Kerma: 9 mGy. COMPLICATIONS: No immediate complications. STERILE BARRIER TECHNIQUE: Maximum sterile barrier technique was used. Cutaneous antisepsis was performed at the operative site with application of 2% chlorhexidine and large sterile drape. Prior to the procedure, the  and assistant performed hand hygiene and wore hat, mask, sterile gown, and sterile gloves during the entire procedure. PROCEDURE: Ultrasound demonstrated a patent and compressible right internal  jugular vein, and an ultrasound image was obtained and placed in the patient's permanent medical record. The right neck and chest were prepped and draped in usual sterile fashion. Using real-time ultrasound guidance, the right internal jugular vein was accessed. A subcutaneous pocket was created and irrigated with sterile normal saline. The catheter was tunneled in an antegrade fashion. Over the guidewire, a peel-away sheath was advanced with fluoroscopic monitoring. Through the peel-away sheath, the catheter was advanced until the tip was at the cavoatrial junction. The catheter was cut to length and attached firmly to the port. The port was anchored with 2-0 Prolene in the subcutaneous pocket.  The incisions were closed with layered absorbable suture and surgical glue. FINDINGS: Ultrasound  shows an anechoic and compressible jugular vein. At the completion of the study, the port tip lies at the cavoatrial junction. The central venous catheter insertion checklist was reviewed prior to placement and followed throughout the procedure.     IMPRESSION: Successful power-injectable venous port placement. CPT codes for physician reference only: 62981 [id number]    US Paracentesis without Albumin    Result Date: 4/22/2024  US PARACENTESIS WITHOUT ALBUMIN 4/22/2024 8:36 AM CLINICAL HISTORY: Other ascites PROCEDURE: Informed consent obtained. Time out performed. The abdomen was prepped and draped in a sterile fashion. 10 mL of 1% lidocaine was infused into local soft tissues. A 5 Citizen of [place] catheter system was introduced into the abdominal ascites under ultrasound guidance. 1.9 liters of clear fluid were removed and sent to lab if requested. Patient tolerated procedure well. Ultrasound imaging was obtained and placed in the patient's permanent medical record.     IMPRESSION: 1.  Status post ultrasound-guided paracentesis. RADHA OSEGUERA MD   SYSTEM ID:  [id number]    US Paracentesis without Albumin    Result Date: 4/15/2024  EXAM: 1. PARACENTESIS 2. ULTRASOUND GUIDANCE LOCATION: [facility] DATE: 4/15/2024 INDICATION: Ascites. PROCEDURE: Informed consent obtained. Time out performed. The abdomen was prepped and draped in a sterile fashion. 10 mL of 1% lidocaine was infused into local soft tissues. A 5 Citizen of [place] catheter system was introduced into the abdominal ascites under ultrasound guidance. 2 liters of clear yellow fluid were removed and sent to lab if requested. Patient tolerated procedure well. Ultrasound imaging was obtained and placed in the patient's permanent medical record.     IMPRESSION: 1.  Status post ultrasound-guided paracentesis. Reference CPT Code: 60086     Billing  A total of 40 min was spent today on this visit which included face to face conversation with the patient,  EMR review, counseling and co-ordination of care and medical documentation.    Complex patient.  Multiple medical issues and incurable malignancy requiring intensive monitoring and follow-up.    The longitudinal plan of care for the diagnosis(es)/condition(s) as documented were addressed during this visit. Due to the added complexity in care, I will continue to support Liu in the subsequent management and with ongoing continuity of care.    Signed by:   La Nena Atkins MD  Hematology and Medical Oncology  [facility]        Oncology Rooming Note    May 14, 2024 8:21 AM   Liu Ragsdlae is a 83 year old male who presents for:    Chief Complaint   Patient presents with     Oncology Clinic Visit     Malignant neoplasm of lower third of esophagus - Labs provider and infusion     Initial Vitals: BP (!) 142/71 (BP Location: Right arm, Patient Position: Sitting, Cuff Size: Adult Regular)   Pulse 93   Temp 97.5  F (36.4  C) (Tympanic)   Resp 16   Ht 1.524 m (5')   Wt 68.5 kg (151 lb)   SpO2 95%   BMI 29.49 kg/m   Estimated body mass index is 29.49 kg/m  as calculated from the following:    Height as of this encounter: 1.524 m (5').    Weight as of this encounter: 68.5 kg (151 lb). Body surface area is 1.7 meters squared.  Moderate Pain (5) Comment: Data Unavailable   No LMP for male patient.  Allergies reviewed: Yes  Medications reviewed: Yes    Medications: Medication refills not needed today.  Pharmacy name entered into EPIC:    [facility] - [place], [place] - [address] [place] [facility] [address] [facility] AT PORTAL TO REGISTERED [facility] SITES  [facility] - [place], [place] - [address] [facility] [place] - [place], [place] - [address] [facility] [place] - [place], [place] - [address] [facility]    Frailty Screening:   Is the patient here for a new oncology consult visit in cancer care?  2. No      Clinical concerns:  None      Natty Ribeiro CMA                Again, thank you for allowing me to participate in the care of your patient.        Sincerely,        La Nena Atkins MD

## 2024-05-14 NOTE — PROGRESS NOTES
Oncology Rooming Note    May 14, 2024 8:21 AM   Liu Ragsdale is a 83 year old male who presents for:    Chief Complaint   Patient presents with    Oncology Clinic Visit     Malignant neoplasm of lower third of esophagus - Labs provider and infusion     Initial Vitals: BP (!) 142/71 (BP Location: Right arm, Patient Position: Sitting, Cuff Size: Adult Regular)   Pulse 93   Temp 97.5  F (36.4  C) (Tympanic)   Resp 16   Ht 1.524 m (5')   Wt 68.5 kg (151 lb)   SpO2 95%   BMI 29.49 kg/m   Estimated body mass index is 29.49 kg/m  as calculated from the following:    Height as of this encounter: 1.524 m (5').    Weight as of this encounter: 68.5 kg (151 lb). Body surface area is 1.7 meters squared.  Moderate Pain (5) Comment: Data Unavailable   No LMP for male patient.  Allergies reviewed: Yes  Medications reviewed: Yes    Medications: Medication refills not needed today.  Pharmacy name entered into [email or web address]:    [facility] - [place], [place] - [address] [place] [address] [facility] [place], [place] - [address] [facility] AT PORTAL TO REGISTERED [facility] SITES  [facility] - [place], [place] - [address] [facility] [place] - [place], [place] - [address] [facility] [place] - [place], [place] - [address] [facility]    Frailty Screening:   Is the patient here for a new oncology consult visit in cancer care? 2. No      Clinical concerns:  None      Natty Ribeiro CMA

## 2024-05-16 NOTE — NURSING NOTE
FOLLOW-UP VISIT    Patient Name: Liu Ragsdale      : 1940     Age: 83 year old        ______________________________________________________________________________     Chief Complaint   Patient presents with    Radiation Therapy     Return visit with Dr. Alcantar     /76 (BP Location: Left arm, Patient Position: Sitting, Cuff Size: Adult Regular)   Pulse 85   Resp 12   Wt 71.2 kg (157 lb)   SpO2 95%   BMI 30.66 kg/m       Date Radiation Completed: 23    Pain  Current history of pain associated with this visit:   Intensity: Patient is unable to quantify.  Current: aching. Burning in back of throat. Pressure in abdomen - ok right now, but worsens before bi-weekly fluid tapping.  Location: abdomen, esophagus, epigastric area.  Treatment: flexeril    Meds  Current Med List Reviewed: No  Medication Note:     Imaging  March PET CT    On Chemo?: Yes - recurrance found 2024 - has now had 2 cycles of chemo  Esophagitis: Moderate - having routine dilation procedures  Nausea:nausea  Bowel: constipated and irregular due to loss of appeite/not eating. Just recently had PEG re placed and restarting nutrition via peg  Skin: Pale  Feeding Tube: Yes  Energy Level: low  Weight:   Vitals:    24 1320   Weight: 71.2 kg (157 lb)       Other Appointments:     Date  Oncologist: Dr. Atkins  Ongoing chemo   Surgeon:  Juli Levi - thoracic                    Dr. Calixto- port placement      Other Notes:

## 2024-05-16 NOTE — LETTER
5/16/2024         RE: Liu Ragsdale  [address] [facility] [id number]        Dear Colleague,    Thank you for referring your patient, Liu Ragsdale, to the [facility]. Please see a copy of my visit note below.    Radiation Oncology Note    HPI:Mr. 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 He underwent chemoradiation therapy with plan for re-evaluation for surgical resection.     Radiation Treatment  7/24/23 to 8/25/23: Esophagus + omar regions, 5000 cGy  in 25 fractions    Patient returns for follow-up.    Posttreatment PET scan on 9/28/2023 demonstrated overall good response.  No definite nodule was noted.  There was also noted reduced PET avidity indeterminate for inflammatory change versus residual malignancy.    The patient was seen by Dr. Levi of thoracic surgery team who reportedly discussed proceeding with scope and possible biopsy to determine next steps in management.    On 10/18/2023 the patient underwent scope with biopsy.  Scope did not demonstrate any clear evidence of residual disease.  Biopsy was negative for malignancy.    The patient subsequently underwent upper endoscopy on 11/14/2023 and 12/14/2023.  No noted evidence of recurrent disease.  Has underwent dilation for stricture formation.    PET scan on 1/4/2024 demonstrated continued reduction in activity and area of prior treatment site.  No clear evidence of recurrent disease noted.    PET scan obtained on 3/15/2024 demonstrated slight increase in FDG avid thickening involving the distal esophagus suggestive of progressive disease.  There is also and FDG avid intra-abdominal ascites.    On 4/15/2024 evaluation of ascites fluid demonstrated metastatic esophageal cancer.    The patient has since started systemic treatment under care of Dr. Atkins.  He is currently receiving FOLFOX plus immunotherapy,    Patient  is overall weak.  Minimal p.o. intake.  All nutrition is currently via the J-tube.  Feels he is tolerating systemic treatment okay at this time    Plan:  The patient has developed recurrent metastatic disease with development of malignant ascites.  He is started systemic chemotherapy with FOLFOX and immunotherapy.  Continue systemic treatment per medical oncology team at this time.  Return to clinic as needed.      Jarred Alcantar M.D.  Department of Radiation Oncology  [facility]

## 2024-05-16 NOTE — PROGRESS NOTES
Infusion Nursing Note:  Liu Ragsdale presents today for Pump disconnect and IVF.    Patient seen by provider today: No   present during visit today: Not Applicable.    Note: N/A.      Intravenous Access:  Implanted Port.    Treatment Conditions:  Not Applicable.      Post Infusion Assessment:  Patient tolerated infusion without incident.  Blood return noted pre and post infusion.  Pump disconnect, the bag is empty.  Site patent and intact, free from redness, edema or discomfort.  No evidence of extravasations.  Access discontinued per protocol.       Discharge Plan:   Discharge instructions reviewed with: Patient.  Patient discharged in stable condition accompanied by: self and wife.  Departure Mode: Ambulatory.      Georgia Acosta RN

## 2024-05-16 NOTE — PROGRESS NOTES
Radiation Oncology Note    HPI:Mr. 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 He underwent chemoradiation therapy with plan for re-evaluation for surgical resection.     Radiation Treatment  7/24/23 to 8/25/23: Esophagus + omar regions, 5000 cGy  in 25 fractions    Patient returns for follow-up.    Posttreatment PET scan on 9/28/2023 demonstrated overall good response.  No definite nodule was noted.  There was also noted reduced PET avidity indeterminate for inflammatory change versus residual malignancy.    The patient was seen by Dr. Levi of thoracic surgery team who reportedly discussed proceeding with scope and possible biopsy to determine next steps in management.    On 10/18/2023 the patient underwent scope with biopsy.  Scope did not demonstrate any clear evidence of residual disease.  Biopsy was negative for malignancy.    The patient subsequently underwent upper endoscopy on 11/14/2023 and 12/14/2023.  No noted evidence of recurrent disease.  Has underwent dilation for stricture formation.    PET scan on 1/4/2024 demonstrated continued reduction in activity and area of prior treatment site.  No clear evidence of recurrent disease noted.    PET scan obtained on 3/15/2024 demonstrated slight increase in FDG avid thickening involving the distal esophagus suggestive of progressive disease.  There is also and FDG avid intra-abdominal ascites.    On 4/15/2024 evaluation of ascites fluid demonstrated metastatic esophageal cancer.    The patient has since started systemic treatment under care of Dr. Atkins.  He is currently receiving FOLFOX plus immunotherapy,    Patient is overall weak.  Minimal p.o. intake.  All nutrition is currently via the J-tube.  Feels he is tolerating systemic treatment okay at this time    Plan:  The patient has developed recurrent metastatic disease with development of malignant ascites.  He is  started systemic chemotherapy with FOLFOX and immunotherapy.  Continue systemic treatment per medical oncology team at this time.  Return to clinic as needed.      Jarred Alcantar M.D.  Department of Radiation Oncology  [facility]

## 2024-05-17 NOTE — PROGRESS NOTES
"Liu does not feel comfortable to do the IVFs at home (pt reports he doesn't feel comfortable connecting/discontinuing fluids and flushing port). He said maybe when/if he feels better. He is giving himself free water flushes via G-tube. Liu states \"feels like I'm going down hill\". However, he looks/moving around better today than he did last week. He is feeling very bloated (needs to burp but can't very well) with a lot of acid reflux, and hiccups. Stomach is hard/distended, loose stool. No appetite. He is taking Omeprazole and pepto bismol. Wondering if he can get a RX for Pepcid too. Currently, he is only able to tolerate a total of 2 cartons of formula every day. Pain is in the Gtube site and chest muscles, >L side of abdomen (rates pain 7/10, and then goes up to 8-9/10 with coughing). He took Compazine/Zofran for nausea; Tylenol/Cyclobenzaprine this morning, hasn't taken any Oxycodone yet today. VS: /63, P 70, R 16, T 97.3, O2 99% RA Weight 153 lbs.         Elma DIXONN, RN  [facility]   [address] [place] [address]   [email or web address]   Cell: [phone]  Fax: [phone]    "

## 2024-05-17 NOTE — TELEPHONE ENCOUNTER
----- Message from Ryann Hernandez RN sent at 5/17/2024 12:21 PM CDT -----  Regarding: RE: IV fluids  Bal Turner,    I have the patient set up for twice weekly fluids at home.  I believe the RN is already with him for pump disconnect and 1st IVF infusion.  The RN at the home will set up his next hydration visit with him before she leaves today.  He's all set!    Thanks,  Ryann BOSE  [facility]  ----- Message -----  From: Rosa Puga  Sent: 5/17/2024  11:55 AM CDT  To: [facility] ([facility])  Subject: FW: IV fluids                                      ----- Message -----  From: Nyla Morrell RN  Sent: 5/17/2024  11:17 AM CDT  To: [facility]  Subject: IV fluids                                        Hello,  Just checking on this patient. Is he set up for IV fluids now with you guys 2X/week?  Let me know!  ThanksYue

## 2024-05-18 PROBLEM — R11.2 NAUSEA VOMITING AND DIARRHEA: Status: ACTIVE | Noted: 2024-01-01

## 2024-05-18 PROBLEM — R19.7 NAUSEA VOMITING AND DIARRHEA: Status: ACTIVE | Noted: 2024-01-01

## 2024-05-18 PROBLEM — R10.13 EPIGASTRIC PAIN: Status: ACTIVE | Noted: 2024-01-01

## 2024-05-18 NOTE — PLAN OF CARE
Problem: Adult Inpatient Plan of Care  Goal: Absence of Hospital-Acquired Illness or Injury  Outcome: Progressing  Intervention: Identify and Manage Fall Risk  Recent Flowsheet Documentation  Taken 5/18/2024 0900 by Nyla Chaudhary RN  Safety Promotion/Fall Prevention:   activity supervised   nonskid shoes/slippers when out of bed  Intervention: Prevent Skin Injury  Recent Flowsheet Documentation  Taken 5/18/2024 0900 by Nyla Chaudhary RN  Body Position: position changed independently     Problem: Adult Inpatient Plan of Care  Goal: Optimal Comfort and Wellbeing  Outcome: Progressing  Intervention: Monitor Pain and Promote Comfort  Recent Flowsheet Documentation  Taken 5/18/2024 1215 by Nyla Chaudhary RN  Pain Management Interventions: medication (see MAR)     Goal Outcome Evaluation:  Patient is alert and oriented.  Spitting up phlegm this morning, per patient not necessarily from cough but from esophagus.  Difficult for patient to swallow due to esophagus swelling and discomfort.  Flexeril given x 1 for hiccups and discomfort.  Patient is able to swallow sips of water and small pills.  Tube feeding was started continuous starting at 20ml/hour for four hours.  Orders to increase rate if patient is tolerating, goal rate of 50ml/hr.  Patient's abdomen is distended from ascites.  Received call from lab the abdominal fluid was positive for 1+ gram negative bacilli.  MD updated and new order received for IV rocephin.  Patient is alert and oriented and up in room with walker and assist of one/sba.

## 2024-05-18 NOTE — H&P
[facility]    History and Physical - Hospitalist Service       Date of Admission:  5/17/2024    Assessment & Plan      Adenocarcinoma of the distal esophageal origin with metastasis/ascites.   -    patient currently on chemo and immunotherapy.  -    neutropenia from chemotherapy, consider Neupogen infusion,,   -    Restart home dose of Prilosec    2. Dysphagia   -   s/p G tube placement. Restart feeding.     3. Generalized weakness/ debility   -    PT/OT evaluation, case management.    4.  Nonproductive cough  -    CT showed bilateral pleural effusion.   -    consult IR for possible thoracentesis of the right side. Which showed moderate amount of fluid.   -    Likely from diaphragm irritation from ascites.  Patient has not been febrile.  No signs of pneumonia at this time.  Will start Tessalon Perles for cough suppression.    5. Ascites   -   Paracentesis fluid status from the ER and did not show evidence of SBP.   -   Patient gets scheduled paracentesis every 2 weeks outpatient.     6.  Hyperlipidemia on Lipitor.    7.  Hypertension on Coreg,    Repeat morning labs including CBC, BMP.          Diet:  G-tube feedings.   DVT Prophylaxis: Enoxaparin (Lovenox) SQ  Vásquez Catheter: Not present  Lines: PRESENT      Port A Cath Single 04/26/24 Right Chest wall-Site Assessment: WDL      Cardiac Monitoring: None  Code Status:  full code.     Clinically Significant Risk Factors Present on Admission         # Hyponatremia: Lowest Na = 129 mmol/L in last 2 days, will monitor as appropriate        # Drug Induced Platelet Defect: home medication list includes an antiplatelet medication   # Hypertension: Noted on problem list      # Overweight: Estimated body mass index is 28.32 kg/m  as calculated from the following:    Height as of this encounter: 1.524 m (5').    Weight as of this encounter: 65.8 kg (145 lb).              Disposition Plan     Medically Ready for Discharge: Anticipated Tomorrow        Jesus Jane MD  Hospitalist Service  [facility]  Securely message with [employer] (more info)  Text page via [facility] Paging/Directory     ______________________________________________________________________    Chief Complaint   Generalized weakness.     History is obtained from the patient and medical records.     History of Present Illness   Liu Ragsdale is a 83 year old male significant past medical history of adenocarcinoma of the distal esophagus with metastasis and ascites on chemotherapy, dysphagia status post G-tube placement, CAD, CVA, hypertension, presents to the ED for generalized weakness.  He usually does not use a walker or cane but has been feeling more unstable on his feet.  He is taking nutrition through the G-tube but sometimes feels nauseous.  He denies any fever, chills, chest pain, shortness of breath, or urinary symptoms.  He has noticed a dry cough.  Paracentesis was done in the ED to check ascites fluid did not show evidence of SBP. CT abdomen pelvis with contrast was done in the ED which showed bilateral pleural effusion, pleural effusion, moderate ascites.  Labs showed a WBC of 0.8, sodium of 129.       Past Medical History    Past Medical History:   Diagnosis Date    Angina at rest (H24)     Arthritis     Cerebrovascular accident (H) 09/27/2021    Coronary artery disease involving native coronary artery of native heart without angina pectoris     Dysphagia     Esophageal adenocarcinoma (H)     DISTAL ESOPHAGUS    Hypertension     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) 07/03/2023    Nausea vomiting and diarrhea 5/18/2024    Status post coronary angiogram 03/10/2020       Past Surgical History   Past Surgical History:   Procedure Laterality Date    ARTHROPLASTY HIP Left 02/03/2021    Procedure: Total Hip Arthroplasty;  Surgeon: Andrew Arriola MD;  Location: [facility] OR    BIOPSY      CARDIAC SURGERY  3-10 2020    stent installed    COLONOSCOPY N/A  06/16/2016    Procedure: COLONOSCOPY;  Surgeon: Randy Avendano MD;  Location: [facility] GI    CV LEFT HEART CATH Left 03/10/2020    Procedure: Left Heart Cath;  Surgeon: Vicente Lopez MD;  Location:  HEART CARDIAC CATH LAB    ENDARTERECTOMY CAROTID Right 09/29/2021    Procedure: ENDARTERECTOMY, CAROTID;  Surgeon: Raymond Andersen MD;  Location:  OR    ENDOSCOPIC ULTRASOUND UPPER GASTROINTESTINAL TRACT (GI) N/A 07/03/2023    Procedure: ENDOSCOPIC ULTRASOUND, ESOPHAGOSCOPY / UPPER GASTROINTESTINAL TRACT (GI);  Surgeon: Yunier Graves MD;  Location:  OR    ESOPHAGOSCOPY, GASTROSCOPY, DUODENOSCOPY (EGD), COMBINED N/A 06/05/2023    Procedure: Esophagoscopy, Gastroscopy, Duodenoscopy, With Biopsy;  Surgeon: Joseph Landers DO;  Location: [facility]    ESOPHAGOSCOPY, GASTROSCOPY, DUODENOSCOPY (EGD), COMBINED N/A 07/03/2023    Procedure: ESOPHAGOGASTRODUODENOSCOPY;  Surgeon: Yunier Graves MD;  Location:  OR    ESOPHAGOSCOPY, GASTROSCOPY, DUODENOSCOPY (EGD), COMBINED N/A 10/18/2023    Procedure: ESOPHAGOGASTRODUODENOSCOPY, WITH BIOPSY;  Surgeon: Jose Salinas MD;  Location: [facility]    ESOPHAGOSCOPY, GASTROSCOPY, DUODENOSCOPY (EGD), COMBINED N/A 11/14/2023    Procedure: Esophagoscopy, gastroscopy, duodenoscopy (EGD), combined with Dilation;  Surgeon: Ernst Hatfield MD;  [place] [facility]    ESOPHAGOSCOPY, GASTROSCOPY, DUODENOSCOPY (EGD), COMBINED N/A 02/21/2024    Procedure: ESOPHAGOGASTRODUODENOSCOPY, WITH BIOPSY AND BALLOON DILATION;  Surgeon: Harlan Blanc DO;  [place] [facility]    ESOPHAGOSCOPY, GASTROSCOPY, DUODENOSCOPY (EGD), DILATATION, COMBINED N/A 04/19/2024    Procedure: Esophagoscopy, gastroscopy, duodenoscopy (EGD), dilatation, combined;  Surgeon: Steve Roth MD;  Location: [facility] GI    EYE SURGERY  2005    cataract surgery    IR CHEST PORT PLACEMENT > 5 YRS OF AGE  04/26/2024    IR GASTROSTOMY TUBE PERCUTANEOUS PLCMNT  5/8/2024    IR JEJUNOSTOMY TUBE  CHANGE  2023    LAPAROSCOPIC ASSISTED INSERTION TUBE JEJUNOSTOMY N/A 2023    Procedure: INSERTION, JEJUNOSTOMY TUBE, LAPAROSCOPY-ASSISTED;  Surgeon: Judy Holguin MD;  Location: [facility] OR    VASECTOMY         Prior to Admission Medications   Prior to Admission Medications   Prescriptions Last Dose Informant Patient Reported? Taking?   Multiple Vitamin (MULTI VITAMIN PO)  Self Yes No   Si mLs by Per J Tube route daily Eniva VIBE-liquid with 5 ml of Eniva Cell Ready and 5 ml of Collagen qs with water to 55 ml   aspirin 81 MG EC tablet   Yes No   Sig: Take 81 mg by mouth daily   atorvastatin (LIPITOR) 10 MG tablet   No No   Sig: Take 1 tablet (10 mg) by mouth daily   carvedilol (COREG) 6.25 MG tablet   No No   Sig: Take 1 tablet (6.25 mg) by mouth 2 times daily (with meals)   cyclobenzaprine (FLEXERIL) 5 MG tablet   No No   Sig: Take 1 tablet (5 mg) by mouth 3 times daily as needed for muscle spasms   diclofenac (VOLTAREN) 1 % topical gel  Self No No   Sig: Place 4 g onto the skin 3 times daily as needed for moderate pain (Shoulder)   nitroGLYcerin (NITROSTAT) 0.3 MG sublingual tablet   No No   Sig: For chest pain place 1 tablet under the tongue every 5 minutes for 3 doses. If symptoms persist 5 minutes after 1st dose call 911.   omeprazole (PRILOSEC) 2 mg/mL suspension   No No   Sig: Take 10 mLs (20 mg) by mouth daily   omeprazole (PRILOSEC) 40 MG DR capsule   Yes No   Sig: Take 40 mg by mouth 2 times daily   ondansetron (ZOFRAN ODT) 4 MG ODT tab   No No   Sig: Take 2 tablets (8 mg) by mouth every 8 hours as needed for nausea   prochlorperazine (COMPAZINE) 10 MG tablet   No No   Sig: Take 1 tablet (10 mg) by mouth every 6 hours as needed (Nausea/Vomiting)      Facility-Administered Medications: None        Review of Systems    The 5 point Review of Systems is negative other than noted in the HPI or here. Generalized weakness, nausea, abdominal discomfort, cough.      Physical Exam   Vital Signs:  Temp: 98.2  F (36.8  C) Temp src: Tympanic BP: 126/66 Pulse: 97   Resp: 16 SpO2: 95 % O2 Device: None (Room air)    Weight: 145 lbs 0 oz    General Appearance: Alert and oriented x 3.  Seems nauseated.  Respiratory: Diminished lung sounds in the right lower lung.  Has good air entry  Cardiovascular: Regular rate and rhythm, S1-S2 positive.  GI: Abdomen is distended, nontender, G tube presents.  No signs of erythema or infection.  Extremities: 1+ pitting edema  Neurological: Alert and oriented x 3, no slurred speech or facial droop.  Generalized weakness.    Medical Decision Making       55 MINUTES SPENT BY ME on the date of service doing chart review, history, exam, documentation & further activities per the note.      Data     I have personally reviewed the following data over the past 24 hrs:    0.8 (LL)  \   13.1 (L)   / 166     129 (L) 97 (L) 21.8 /  196 (H)   4.5 22 0.78 \       Imaging results reviewed over the past 24 hrs:   Recent Results (from the past 24 hour(s))   CT Abdomen Pelvis w Contrast    Narrative    EXAM: CT ABDOMEN PELVIS W CONTRAST  LOCATION: [facility]  DATE: 5/18/2024    INDICATION: Worsened nausea, retching, abdominal pain and tenderness, no diarrhea, known history of distal esophageal cancer and malignant ascites peritoneal involvement.  COMPARISON: 4/3/2024.  TECHNIQUE: CT scan of the abdomen and pelvis was performed following injection of IV contrast. Multiplanar reformats were obtained. Dose reduction techniques were used.  CONTRAST: 70 mL Isovue-370.    FINDINGS:   LOWER CHEST: Moderate-sized right pleural effusion and small left pleural effusion. There is near-complete atelectasis of the right lower lobe. Large left lower lobe consolidation. There is circumferential wall thickening of the distal esophagus. The   esophagus is distended with fluid. Small pericardial effusion.    HEPATOBILIARY: Normal.    PANCREAS: Normal.    SPLEEN: Normal.    ADRENAL GLANDS:  Stable nonspecific thickening of the left adrenal gland.    KIDNEYS/BLADDER: There is no hydronephrosis. Few renal cysts.    BOWEL: G-tube in place. The stomach is distended with air and fluid. There are colonic diverticula without acute diverticulitis. No bowel obstruction. There is a moderate amount of ascites. There is again probable edema in the omentum. No definite soft   tissue nodules.    LYMPH NODES: Normal.    VASCULATURE: Atherosclerotic calcification of the aorta and its branches. No aneurysm.    PELVIC ORGANS: The prostate gland is enlarged.    MUSCULOSKELETAL: Left hip arthroplasty. Degenerative disease in the spine and right hip.      Impression    IMPRESSION:   1.  Circumferential wall thickening of the distal esophagus is likely inflammatory.  2.  Bilateral pleural effusions and lower lobe atelectasis.  3.  Small pericardial effusion.  4.  Moderate amount of ascites, increased.  5.  Colonic diverticula without acute diverticulitis. No bowel obstruction.  6.  Stable thickening of the left adrenal gland.

## 2024-05-18 NOTE — PROGRESS NOTES
[facility] Medicine Progress Note  Date of Service (when I saw the patient): 05/18/2024    REASON FOR ADMISSION / INTERVAL HISTORY:  Patient is a an 83-year-old male admitted 5/18 for  nausea, vomiting and epigastric pain.   Continues to have epigastric tightness/ nausea and retching. See details below      Assessment/ Plan     Nausea/ vomiting/ dysphagia  Likely due to esophagitis/ stricture. H/o adenoCA of distal esophagous--undergoin chemo-immunotherapy. S/p radiation.  S/p multiple dilatations in past. Last was in 4/24. Scheduled for EGD with dilatation on Tuesday. Pt having sx for months but worse last few days-has persistent epigastric discomfort. CT abd showed thickened distal esophagous.  -will change protonix to iv bid. Continue conservative rx with zofran prn.    Cough with mucous production  CXR shows B atelectasis / infiltrates. No fever. Neutropenic. O2 sats stable on RA  -will watch. If any fever, will rx as pneumonia. Follow WBC    Neutropenia  2ndry to chemo. Last chemo 5/14. WBC/ ANC on 5/14 were 4.0/ 2.8. presented with WBC/ANC of 0.8/0.6.  No fever. No sn/sx of infection. CXR as above. Ascitic fluid cx-p. Asitic fluid labs do not look like peritonitis  -will give neupogen x 1 and re-assess in AM    Lower esophageal adenocarcinoma, status post concurrent chemoradiation  Recurrent esophageal cancer involving 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  He is on palliative chemotherapy +immunotherapy with FOLFOX + Nivo. Status post 2 cycle 5/14. S/p multiple paracentesis in past-last was 5/6-2.4L fluid removed. Now had only 12 ml removed in ED. CT shows moderate ascitis and abdomen distended.   May need another paracentesis soon. Will watch. F/up oncology as scheduled    Nutrition  G tube placed 5/8. Supposed to take 5 cans of TF-uses only 2. Also drinks water   -will have nutrition to assess for TF    Hyperlipidemia on Lipitor.      Hypertension on Coreg,    Diet: Regular Diet Adult    DVT Prophylaxis: Low Risk/Ambulatory with no VTE prophylaxis indicated  Vásquez Catheter: Not present  Code Status:  full      WHITNEY WINKLER MD   Pg [phone]        ROS:  As described in A/P and Exam.  Otherwise ALL are  negative.    PHYSICAL EXAM:  All vitals have been reviewed    Blood pressure (!) 156/81, pulse 96, temperature 98.2  F (36.8  C), temperature source Oral, resp. rate 16, height 1.524 m (5'), weight 65.8 kg (145 lb), SpO2 94%.    No intake/output data recorded.    GENERAL APPEARANCE: healthy, alert and no distress  EYES: conjunctiva clear, eyes grossly normal  HENT: external ears and nose normal   RESP: lungs clear to auscultation - no rales, rhonchi or wheezes  CV: regular rate and rhythm, normal S1 S2, no S3 or S4 and no murmur, click or rub   ABDOMEN: soft, nontender, no HSM or masses and bowel sounds normal  MS: no clubbing, cyanosis; no edema  SKIN: clear without significant rashes or lesions  NEURO: -non-focal moves all 4 extr    ROUTINE  LABS (Last four results)  CMP  Recent Labs   Lab 05/18/24  0801 05/17/24 2317 05/14/24  0805   * 129* 132*   POTASSIUM 4.4 4.5 4.4   CHLORIDE 100 97* 96*   CO2 21* 22 23   ANIONGAP 11 10 13   * 196* 130*   BUN 16.4 21.8 17.3   CR 0.71 0.78 1.15   GFRESTIMATED >90 89 64   GWEN 8.1* 8.2* 9.0   PROTTOTAL  --   --  6.3*   ALBUMIN  --   --  3.1*   BILITOTAL  --   --  0.4   ALKPHOS  --   --  460*   AST  --   --  31   ALT  --   --  19     CBC  Recent Labs   Lab 05/18/24  0801 05/17/24 2317 05/14/24  0805   WBC 1.3*  1.3* 0.8* 4.0   RBC 4.48  4.44 4.46 4.83   HGB 13.2*  13.2* 13.1* 14.3   HCT 38.8*  38.5* 38.6* 41.9   MCV 87  87 87 87   MCH 29.5  29.7 29.4 29.6   MCHC 34.0  34.3 33.9 34.1   RDW 13.4  13.4 13.2 13.0     161 166 154     INRNo lab results found in last 7 days.  Arterial Blood GasNo lab results found in last 7 days.    Recent Results (from the past 24 hour(s))   CT  Abdomen Pelvis w Contrast    Narrative    EXAM: CT ABDOMEN PELVIS W CONTRAST  LOCATION: [facility]  DATE: 5/18/2024    INDICATION: Worsened nausea, retching, abdominal pain and tenderness, no diarrhea, known history of distal esophageal cancer and malignant ascites peritoneal involvement.  COMPARISON: 4/3/2024.  TECHNIQUE: CT scan of the abdomen and pelvis was performed following injection of IV contrast. Multiplanar reformats were obtained. Dose reduction techniques were used.  CONTRAST: 70 mL Isovue-370.    FINDINGS:   LOWER CHEST: Moderate-sized right pleural effusion and small left pleural effusion. There is near-complete atelectasis of the right lower lobe. Large left lower lobe consolidation. There is circumferential wall thickening of the distal esophagus. The   esophagus is distended with fluid. Small pericardial effusion.    HEPATOBILIARY: Normal.    PANCREAS: Normal.    SPLEEN: Normal.    ADRENAL GLANDS: Stable nonspecific thickening of the left adrenal gland.    KIDNEYS/BLADDER: There is no hydronephrosis. Few renal cysts.    BOWEL: G-tube in place. The stomach is distended with air and fluid. There are colonic diverticula without acute diverticulitis. No bowel obstruction. There is a moderate amount of ascites. There is again probable edema in the omentum. No definite soft   tissue nodules.    LYMPH NODES: Normal.    VASCULATURE: Atherosclerotic calcification of the aorta and its branches. No aneurysm.    PELVIC ORGANS: The prostate gland is enlarged.    MUSCULOSKELETAL: Left hip arthroplasty. Degenerative disease in the spine and right hip.      Impression    IMPRESSION:   1.  Circumferential wall thickening of the distal esophagus is likely inflammatory.  2.  Bilateral pleural effusions and lower lobe atelectasis.  3.  Small pericardial effusion.  4.  Moderate amount of ascites, increased.  5.  Colonic diverticula without acute diverticulitis. No bowel obstruction.  6.   Stable thickening of the left adrenal gland.   Chest 2 Views*    Narrative    EXAM: XR CHEST 2 VIEWS  LOCATION: [facility]  DATE: 5/18/2024    INDICATION: chest pain  cough r o aspiration pneumonia  COMPARISON: CT 2/25/2024      Impression    IMPRESSION: Right IJ port catheter with the tip at the cavoatrial junction. Low lung volumes. Trace bilateral pleural effusions with adjacent atelectasis/infiltrates. No pneumothorax. Normal heart size. Spinal and right glenohumeral joint degenerative   changes.

## 2024-05-18 NOTE — PROGRESS NOTES
05/18/24 1200   Appointment Info   Signing Clinician's Name / Credentials (PT) Ramesh Chin, PT, DPT   Living Environment   People in Home spouse   Current Living Arrangements house   Home Accessibility stairs to enter home;stairs within home   Number of Stairs, Main Entrance 4   Stair Railings, Main Entrance railings safe and in good condition;railings on both sides of stairs   Number of Stairs, Within Home, Primary greater than 10 stairs   Stair Railings, Within Home, Primary railing on left side (ascending)   Transportation Anticipated family or friend will provide   Living Environment Comments does not have to use 10+ stairs   Self-Care   Equipment Currently Used at Home cane, straight;walker, rolling;raised toilet seat;other (see comments);shower chair   Fall history within last six months yes   Number of times patient has fallen within last six months 1   Activity/Exercise/Self-Care Comment needs help with socks and lower body dressing at times   General Information   Onset of Illness/Injury or Date of Surgery 05/17/24   Referring Physician Dr. 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  He usually does not use a walker or cane but has been feeling more unstable on his feet.   Existing Precautions/Restrictions fall   Cognition   Affect/Mental Status (Cognition) WFL   Orientation Status (Cognition) oriented x 4   Follows Commands (Cognition) WFL   Pain Assessment   Patient Currently in Pain Yes, see Vital Sign flowsheet   Range of Motion (ROM)   Range of Motion ROM is WFL   Strength (Manual Muscle Testing)   Strength (Manual  Muscle Testing) Deficits observed during functional mobility   Bed Mobility   Bed Mobility no deficits identified   Transfers   Transfers no deficits identified   Gait/Stairs (Locomotion)   [place] Level (Gait) supervision   Distance in Feet (Gait) 125   Pattern (Gait) step-through   Deviations/Abnormal Patterns (Gait) gait speed decreased;stride length decreased   Clinical Impression   Criteria for Skilled Therapeutic Intervention Yes, treatment indicated   PT Diagnosis (PT) impaired functional mobility   Influenced by the following impairments weakness   Functional limitations due to impairments gait, stairs   Clinical Presentation (PT Evaluation Complexity) stable   Clinical Presentation Rationale clinical judgement   Clinical Decision Making (Complexity) low complexity   Planned Therapy Interventions (PT) gait training;progressive activity/exercise;strengthening;stair training   Risk & Benefits of therapy have been explained evaluation/treatment results reviewed;care plan/treatment goals reviewed;risks/benefits reviewed;current/potential barriers reviewed;participants voiced agreement with care plan;participants included;patient   PT Total Evaluation Time   PT Eval, Low Complexity Minutes (29585) 10   Physical Therapy Goals   PT Frequency One time eval and treatment only   PT Predicted Duration/Target Date for Goal Attainment 05/18/24   PT Goals Gait;Stairs   PT: Gait Supervision/stand-by assist;Rolling walker;100 feet;Goal Met;Completed   PT: Stairs Supervision/stand-by assist;6 stairs;Rail on both sides;Goal Met;Completed   Interventions   Interventions Quick Adds Gait Training   Gait Training   Gait Training Minutes (09134) 11   Symptoms Noted During/After Treatment (Gait Training) fatigue;increased pain   Treatment Detail/Skilled Intervention Patient ambulates 125 feet with cues to increase step length and keep forward gaze for safe navigation in hallways. Gait pattern is mild stop/go with FWW and slow  due to pain. Stair training up/down 6 steps with cues for ascending/descending technique to improve stability. Educated patient on walking 3x/day when returning home to improve mobility and reduce muscular atrophy.   Distance in Feet 125   PT Discharge Planning   PT Plan d/c PT, no needs   PT Discharge Recommendation (DC Rec) home with assist;home with home care physical therapy   PT Rationale for DC Rec Patient moving at baseline functional level. After procedure in 2 days, returning home with home care PT would be beneficial to improve strength/endurance and to safely move around the home transitioning to community.   PT Brief overview of current status SBA bed mob, transfers, gait x125 feet, up/down 6 steps   Total Session Time   Timed Code Treatment Minutes 11   Total Session Time (sum of timed and untimed services) 21

## 2024-05-18 NOTE — PROGRESS NOTES
[place] [facility] [place] ADMISSION NOTE    Patient admitted to room 2308 at approximately 0325 via cart from emergency room. Patient was accompanied by transport tech.     Verbal SBAR report received from Janet prior to patient arrival.     Patient ambulated to bed with stand-by assist. Patient alert and oriented X 3. Pain is controlled with current analgesics.  Medication(s) being used: narcotic analgesics including hydromorphone (Dilaudid).  . Admission vital signs: Blood pressure (!) 160/77, pulse 100, temperature 97.9  F (36.6  C), temperature source Oral, resp. rate 18, height 1.524 m (5'), weight 65.8 kg (145 lb), SpO2 95%. Patient was oriented to plan of care, call light, bed controls, tv, telephone, bathroom, and visiting hours.     Risk Assessment    The following safety risks were identified during admission: fall. Yellow risk band applied: YES. Potential neutropenic precautions necessary.     Skin Initial Assessment    This writer admitted this patient and completed a full skin assessment and [place] score in the Adult PCS flowsheet. Appropriate interventions initiated as needed. Unable to view every inch of skin, patient moved clothing around as able.     Secondary skin check completed by none, per patient.    [place] Risk Assessment  Sensory Perception: 4-->no impairment  Moisture: 4-->rarely moist  Activity: 3-->walks occasionally  Mobility: 3-->slightly limited  Nutrition: 2-->probably inadequate  Friction and Shear: 2-->potential problem  [place] Score: 18  Nutrition Interventions: Nutrition consult, Maintain adequate hydration, Encourage protein intake  Friction/Shear Interventions: HOB 30 degrees or less  Mattress: Standard gel/foam mattress (IsoFlex, Atmos Air, etc.), Bariatric foam  Bed Frame: Standard width and length    Education    Patient has a Imogene to Observation order: Yes  Observation education completed and documented: Yes      Franny Hand RN

## 2024-05-18 NOTE — ED TRIAGE NOTES
Patient presents with weakness and nausea/vomiting/diarrhea. States he is on a full liquid diet and has a feeding tube due to esophageal cancer. States symptoms have been ongoing for 3 weeks and have worsened.      Triage Assessment (Adult)       Row Name 05/17/24 4862          Triage Assessment    Airway WDL WDL        Respiratory WDL    Respiratory WDL WDL        Skin Circulation/Temperature WDL    Skin Circulation/Temperature WDL WDL        Cardiac WDL    Cardiac WDL WDL        Peripheral/Neurovascular WDL    Peripheral Neurovascular WDL WDL        Cognitive/Neuro/Behavioral WDL    Cognitive/Neuro/Behavioral WDL WDL

## 2024-05-18 NOTE — PLAN OF CARE
Goal Outcome Evaluation:      Plan of Care Reviewed With: patient    Overall Patient Progress: no changeOverall Patient Progress: no change    Outcome Evaluation: Patient planning to discharge back home with family to provide transportation.Plan for PT/OT.

## 2024-05-18 NOTE — ED PROVIDER NOTES
"  HPI   Patient is a an 83-year-old male presenting with nausea, vomiting, and concern for dehydration.  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  He has undergone chemoradiation for palliative care.  He has had decreased weight.  He has a G-tube with recommendations for increasing volume of nutrition.  The patient is told to take 5 cans of nutrition daily, he is currently worked up to 2 cans/day.  He has had esophageal dilatation and can take fluid and some small pills by mouth when he feels like he is able.  His most recent visit with oncology was on 5/14, 3 days ago.  He also has hypertension, hyperlipidemia, and coronary artery disease.  He takes Lipitor and Coreg.    The patient experiences nausea on a daily basis.  He has difficulty vomiting because of his pain and esophageal obstruction.  He says, \"I get stuck in the middle.  I feel like I have to throw up but I cannot.\"  He has increased epigastric pain, but this seems to be much worse with his nausea and retching movements..  No hematemesis or coffee-ground emesis.  He has had 2 episodes of diarrhea today which is unusual for him.  No mucus or blood in the stool.  No melena.  No fever.  He has been throwing up some and feels like it started after his second can of nutrition into the G-tube.  Last paracentesis was on 5/6.  Last CT scan of the abdomen was on 4/3.      Allergies:  Allergies   Allergen Reactions    Ampicillin Rash    Latex Rash    Metoprolol Other (See Comments)     Side effects : dry mouth     Problem List:    Patient Active Problem List    Diagnosis Date Noted    Epigastric pain 05/18/2024     Priority: Medium    Nausea vomiting and diarrhea 05/18/2024     Priority: Medium    Dehydration 05/06/2024     Priority: Medium    Malignant ascites (H28) 04/19/2024     Priority: Medium    Upper GI bleed 08/29/2023     Priority: Medium    Anemia, unspecified type " 08/29/2023     Priority: Medium    Jejunostomy tube in situ (H) 08/29/2023     Priority: Medium    Esophageal dysphagia 08/29/2023     Priority: Medium     J tube dependent      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) 07/03/2023     Priority: Medium     EOTD 10.23.23 - [place]       History of ischemic stroke 09/28/2021     Priority: Medium    Cerebrovascular accident (H) 09/27/2021     Priority: Medium    Near syncope 09/26/2021     Priority: Medium    Right carotid artery occlusion 09/26/2021     Priority: Medium    Degenerative joint disease of left hip 02/03/2021     Priority: Medium    Coronary artery disease involving native coronary artery of native heart without angina pectoris 03/23/2020     Priority: Medium     coronary artery disease diagnosed in 03/2020 at which time he was noted to have a  of the proximal left circumflex into the OM, moderate to severe ramus disease and moderate to severe RCA/PLB disease.  He underwent drug-eluting stent placement from the proximal left circumflex into the OM1.  He has residual small vessel coronary artery disease as well as moderate to severe stenosis in the ramus and RCA/YAA for which medical management has been recommended.         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 11/06/2019     Priority: Medium    SOB (shortness of breath) on exertion 11/06/2019     Priority: Medium    Arthritis of right glenohumeral joint- moderate 11/06/2019     Priority: Medium    Arthritis of right acromioclavicular joint- advanced 11/06/2019     Priority: Medium    SNHL (sensory-neural hearing loss), asymmetrical 07/22/2019     Priority: Medium    FANI (obstructive sleep apnea) 03/15/2016      Priority: Medium     NOT using CPAP 3/15/16      BMI 31.0-31.9,adult 04/07/2015     Priority: Medium    Benign essential hypertension, BP goal <140/90 09/10/2014     Priority: Medium    Hyperlipidemia LDL goal <70 12/12/2013     Priority: Medium     Diagnosis updated by automated process. Provider to review and confirm.      Carotid bruit 03/30/2010     Priority: Medium     right carotid bruit on exam- u/s 7/09 with minimal stenosis on left and not well seen on right         Past Medical History:    Past Medical History:   Diagnosis Date    Angina at rest (H24)     Arthritis     Cerebrovascular accident (H) 09/27/2021    Coronary artery disease involving native coronary artery of native heart without angina pectoris     Dysphagia     Esophageal adenocarcinoma (H)     Hypertension     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) 07/03/2023    Nausea vomiting and diarrhea 5/18/2024    Status post coronary angiogram 03/10/2020     Past Surgical History:    Past Surgical History:   Procedure Laterality Date    ARTHROPLASTY HIP Left 02/03/2021    Procedure: Total Hip Arthroplasty;  Surgeon: Andrew Arriola MD;  Location: [facility]    BIOPSY      CARDIAC SURGERY  3-10 2020    stent installed    COLONOSCOPY N/A 06/16/2016    Procedure: COLONOSCOPY;  Surgeon: Randy Avendano MD;  Location: [facility]    CV LEFT HEART CATH Left 03/10/2020    Procedure: Left Heart Cath;  Surgeon: Vicente Lopez MD;  Location:  HEART CARDIAC CATH LAB    ENDARTERECTOMY CAROTID Right 09/29/2021    Procedure: ENDARTERECTOMY, CAROTID;  Surgeon: Raymond Andersen MD;  Location:  OR    ENDOSCOPIC ULTRASOUND UPPER GASTROINTESTINAL TRACT (GI) N/A 07/03/2023    Procedure: ENDOSCOPIC ULTRASOUND, ESOPHAGOSCOPY / UPPER GASTROINTESTINAL TRACT (GI);  Surgeon: Yunier Graves MD;  Location:  OR    ESOPHAGOSCOPY, GASTROSCOPY, DUODENOSCOPY (EGD), COMBINED N/A 06/05/2023    Procedure: Esophagoscopy, Gastroscopy, Duodenoscopy, With  Biopsy;  Surgeon: Joseph Landers DO;  Location: [facility]    ESOPHAGOSCOPY, GASTROSCOPY, DUODENOSCOPY (EGD), COMBINED N/A 07/03/2023    Procedure: ESOPHAGOGASTRODUODENOSCOPY;  Surgeon: Yunire Graves MD;  Location:  OR    ESOPHAGOSCOPY, GASTROSCOPY, DUODENOSCOPY (EGD), COMBINED N/A 10/18/2023    Procedure: ESOPHAGOGASTRODUODENOSCOPY, WITH BIOPSY;  Surgeon: Jose Salinas MD;  Location:  GI    ESOPHAGOSCOPY, GASTROSCOPY, DUODENOSCOPY (EGD), COMBINED N/A 11/14/2023    Procedure: Esophagoscopy, gastroscopy, duodenoscopy (EGD), combined with Dilation;  Surgeon: Ernst Hatfield MD;  [place] [facility] [place]    ESOPHAGOSCOPY, GASTROSCOPY, DUODENOSCOPY (EGD), COMBINED N/A 02/21/2024    Procedure: ESOPHAGOGASTRODUODENOSCOPY, WITH BIOPSY AND BALLOON DILATION;  Surgeon: Harlan Blanc DO;  [place] [facility] [place]    ESOPHAGOSCOPY, GASTROSCOPY, DUODENOSCOPY (EGD), DILATATION, COMBINED N/A 04/19/2024    Procedure: Esophagoscopy, gastroscopy, duodenoscopy (EGD), dilatation, combined;  Surgeon: Steve Roth MD;  Location:  GI    EYE SURGERY  2005    cataract surgery    IR CHEST PORT PLACEMENT > 5 YRS OF AGE  04/26/2024    IR GASTROSTOMY TUBE PERCUTANEOUS PLCMNT  5/8/2024    IR JEJUNOSTOMY TUBE CHANGE  08/25/2023    LAPAROSCOPIC ASSISTED INSERTION TUBE JEJUNOSTOMY N/A 07/05/2023    Procedure: INSERTION, JEJUNOSTOMY TUBE, LAPAROSCOPY-ASSISTED;  Surgeon: Judy Holguin MD;  Location:  OR    VASECTOMY  1981     Family History:    Family History   Problem Relation Age of Onset    Hypertension Sister     Kidney failure Sister     Chronic Obstructive Pulmonary Disease Sister     Thyroid Disease Sister     Seizure Disorder Paternal Grandmother     Mitral valve prolapse Daughter     No Known Problems Son     Cerebrovascular Disease No family hx of      Social History:  Marital Status:   [2]  Social History     Tobacco Use    Smoking status: Never     Passive exposure: Never    Smokeless tobacco:  Never   Vaping Use    Vaping status: Never Used   Substance Use Topics    Alcohol use: Not Currently     Comment: [place] only    Drug use: No      Medications:    No current outpatient medications on file.    Review of Systems   Constitutional:         Weight loss.  Increased fatigue and generalized weakness.   All other systems reviewed and are negative.      PE   BP: 130/84  Pulse: 95  Temp: 98.2  F (36.8  C)  Resp: 16  Height: 152.4 cm (5')  Weight: 65.8 kg (145 lb)  SpO2: 96 %  Physical Exam  Vitals and nursing note reviewed.   Constitutional:       Comments: The patient is disheveled and tired appearing.  He continues to have muscle spasms in his abdomen which appeare to suggest he is trying to throw up.   HENT:      Head: Atraumatic.      Right Ear: External ear normal.      Left Ear: External ear normal.      Nose: Nose normal.      Mouth/Throat:      Mouth: Mucous membranes are dry.      Pharynx: Oropharynx is clear.   Eyes:      General: No scleral icterus.     Extraocular Movements: Extraocular movements intact.      Conjunctiva/sclera: Conjunctivae normal.      Pupils: Pupils are equal, round, and reactive to light.   Cardiovascular:      Rate and Rhythm: Normal rate.   Pulmonary:      Effort: Pulmonary effort is normal. No respiratory distress.   Abdominal:      Comments: Tenderness in the epigastrium and around the G-tube.   Musculoskeletal:         General: Normal range of motion.      Cervical back: Normal range of motion.   Skin:     General: Skin is warm and dry.   Neurological:      Mental Status: He is alert and oriented to person, place, and time.   Psychiatric:         Behavior: Behavior normal.         ED COURSE and MDM   2259.  Patient with increased nausea and retching.  Known esophageal cancer requiring dilatation.  G-tube used for all nutrition.  Taking sips of fluid to swallow some small pills only.  Tender in the abdomen especially around the G-tube site.  No fever or infectious  symptoms.  New diarrhea which is unusual for him, uncertain about the significance of this as it came on with increased nutrition.  He does not receive IV hydration on a regular basis.  He was told to increase this to twice weekly but it has yet to be done.  Hydration primarily appears to come from his G-tube nutrition.    2339.  Patient with cycle 2 of FOLFOX with nivolumab on 5/14, 3 days ago.  Leukopenia, neutropenia seen today.  White blood cell count on 5/14 was 4.0, neutrophil count 0.4.    0053.  Ascitic fluid sample obtained from the patient.  I discussed risks and benefits of doing the procedure.  The patient requested that I obtain fluid to make sure that there was not spontaneous bacterial peritonitis.  I used sterile technique throughout.  I anesthetized the skin with lidocaine no epinephrine.  I then used an 18-gauge needle to obtain approximately 12 mL of fluid.  The fluid was straw-colored and somewhat cloudy.  Ascitic fluid analysis orders placed.  CT scan does not show acute pathology.  The patient needs hospitalization for further cares.  He is improved after medication given here in the ED.    0103.  Signed out to Dr. Willett.      Electronic medical chart reviewed, including medical problems, medications, medical allergies, social history.  Recent hospitalizations and surgical procedures reviewed.  Recent clinic visits and consultations reviewed.  Recent labs and test results reviewed.  Nursing notes reviewed.    The patient, their parent if applicable, and/or their medical decision maker(s) and I have reviewed all of the available historical information, applicable PMH, physical exam findings, and objective diagnostic data gathered during this ED visit.  We then discussed all work-up options and then together agreed upon the course taken during this visit.  The ultimate disposition and plan was a cooperative decision made between myself and the patient, their parent if applicable, and/or their  legal decision maker(s).  The risks and benefits of all decisions made during this visit were discussed to the best of my abilities given the circumstances, and all parties are understanding of the pertinent ramifications of these decisions.      LABS  Labs Ordered and Resulted from Time of ED Arrival to Time of ED Departure   BASIC METABOLIC PANEL - Abnormal       Result Value    Sodium 129 (*)     Potassium 4.5      Chloride 97 (*)     Carbon Dioxide (CO2) 22      Anion Gap 10      Urea Nitrogen 21.8      Creatinine 0.78      GFR Estimate 89      Calcium 8.2 (*)     Glucose 196 (*)    CBC WITH PLATELETS AND DIFFERENTIAL - Abnormal    WBC Count 0.8 (*)     RBC Count 4.46      Hemoglobin 13.1 (*)     Hematocrit 38.6 (*)     MCV 87      MCH 29.4      MCHC 33.9      RDW 13.2      Platelet Count 166      % Neutrophils 71      % Lymphocytes 22      % Monocytes 5      % Eosinophils 1      % Basophils 0      % Immature Granulocytes 1      NRBCs per 100 WBC 0      Absolute Neutrophils 0.6 (*)     Absolute Lymphocytes 0.2 (*)     Absolute Monocytes 0.0      Absolute Eosinophils 0.0      Absolute Basophils 0.0      Absolute Immature Granulocytes 0.0      Absolute NRBCs 0.0     CELL COUNT BODY FLUID - Abnormal    Color Yellow      Clarity Hazy (*)     Cell Count Fluid Source Abdomen      Total Nucleated Cells       RBC AND PLATELET MORPHOLOGY    RBC Morphology Confirmed RBC Indices      Platelet Assessment        Value: Automated Count Confirmed. Platelet morphology is normal.    Giant Platelets        Acanthocytes        Ilir Rods        Basophilic Stippling        Bite Cells        Blister Cells        Hagaman Cells        Elliptocytes        Hgb C Crystals        Christian-Jolly Bodies        Hypersegmented Neutrophils        Polychromasia        RBC agglutination        RBC Fragments        Reactive Lymphocytes        Rouleaux        Sickle Cells        Smudge Cells        Spherocytes        Stomatocytes        Target Cells         Teardrop Cells        Toxic Neutrophils        Pathologist Review Comments (Blood)       DIFERENTIAL BODY FLUID    % Neutrophils 75      % Lymphocytes 15      % Monocyte/Macrophages 10      Absolute Neutrophils, Body Fluid 75.0     CELL COUNT WITH DIFFERENTIAL FLUID       IMAGING  Images reviewed by me.  Radiology report also reviewed.  Chest 2 Views*   Final Result   IMPRESSION: Right IJ port catheter with the tip at the cavoatrial junction. Low lung volumes. Trace bilateral pleural effusions with adjacent atelectasis/infiltrates. No pneumothorax. Normal heart size. Spinal and right glenohumeral joint degenerative    changes.      CT Abdomen Pelvis w Contrast   Final Result   IMPRESSION:    1.  Circumferential wall thickening of the distal esophagus is likely inflammatory.   2.  Bilateral pleural effusions and lower lobe atelectasis.   3.  Small pericardial effusion.   4.  Moderate amount of ascites, increased.   5.  Colonic diverticula without acute diverticulitis. No bowel obstruction.   6.  Stable thickening of the left adrenal gland.          Procedures    Medications   dextrose 5% and 0.9% NaCl infusion (0 mLs Intravenous Stopped 5/18/24 0320)   calcium carbonate (TUMS) chewable tablet 1,000 mg (has no administration in time range)   aspirin EC tablet 81 mg ( Oral Automatically Held 5/24/24 0800)   atorvastatin (LIPITOR) tablet 10 mg (10 mg Oral Not Given 5/18/24 1006)   carvedilol (COREG) tablet 6.25 mg (6.25 mg Oral $Given 5/18/24 1639)   cyclobenzaprine (FLEXERIL) tablet 5 mg (5 mg Oral $Given 5/18/24 2050)   ondansetron (ZOFRAN ODT) ODT tab 8 mg (8 mg Oral $Given 5/18/24 0824)   prochlorperazine (COMPAZINE) tablet 5 mg (has no administration in time range)   senna-docusate (SENOKOT-S/PERICOLACE) 8.6-50 MG per tablet 1 tablet (has no administration in time range)     Or   senna-docusate (SENOKOT-S/PERICOLACE) 8.6-50 MG per tablet 2 tablet (has no administration in time range)   oxyCODONE IR (ROXICODONE)  half-tab 2.5 mg (2.5 mg Oral $Given 5/18/24 1639)   oxyCODONE (ROXICODONE) tablet 5 mg (5 mg Oral $Given 5/18/24 2051)   melatonin tablet 1 mg (has no administration in time range)   benzonatate (TESSALON) capsule 200 mg (200 mg Oral $Given 5/18/24 2050)   naloxone (NARCAN) injection 0.2 mg (has no administration in time range)     Or   naloxone (NARCAN) injection 0.4 mg (has no administration in time range)     Or   naloxone (NARCAN) injection 0.2 mg (has no administration in time range)     Or   naloxone (NARCAN) injection 0.4 mg (has no administration in time range)   pantoprazole (PROTONIX) IV push injection 40 mg (40 mg Intravenous $Given 5/18/24 2051)   sodium chloride (PF) 0.9% PF flush 10-20 mL (has no administration in time range)   sodium chloride (PF) 0.9% PF flush 10-20 mL (has no administration in time range)   sodium chloride (PF) 0.9% PF flush 10-20 mL (10 mLs Intracatheter $Given 5/18/24 1214)   heparin lock flush 10 UNIT/ML injection 5-10 mL (5 mLs Intracatheter $Given 5/18/24 1633)   heparin lock flush 10 UNIT/ML injection 5-10 mL (5 mLs Intracatheter $Given 5/18/24 1214)   heparin 100 unit/mL injection 5-10 mL ( Intracatheter Not Given 5/18/24 1215)   dextrose 10% infusion (has no administration in time range)   cefTRIAXone (ROCEPHIN) 2 g vial to attach to  ml bag for ADULTS or NS 50 ml bag for PEDS (2 g Intravenous $New Bag 5/18/24 1541)   benzocaine-menthol (CHLORASEPTIC) 6-10 MG lozenge 1 lozenge (has no administration in time range)   ondansetron (ZOFRAN) injection 4 mg (4 mg Intravenous $Given 5/18/24 0316)   sodium chloride 0.9% BOLUS 1,000 mL (0 mLs Intravenous Stopped 5/18/24 0148)   iopamidol (ISOVUE-370) solution 70 mL (70 mLs Intravenous $Given 5/18/24 0015)   sodium chloride 0.9 % bag 500mL for CT scan flush use (100 mLs As instructed $Given 5/18/24 0015)   filgrastim-aafi (NIVESTYM) injection 300 mcg (300 mcg Subcutaneous $Given 5/18/24 1203)         IMPRESSION       ICD-10-CM     1. Nausea vomiting and diarrhea  R11.2     R19.7       2. Epigastric pain  R10.13                Medication List        ASK your doctor about these medications      omeprazole 40 MG DR capsule  Commonly known as: PriLOSEC  Ask about: Which instructions should I use?                                  Ac Tabares MD  05/19/24 0055

## 2024-05-18 NOTE — PROGRESS NOTES
CLINICAL NUTRITION SERVICES - ASSESSMENT NOTE     Nutrition Prescription    RECOMMENDATIONS FOR MDs/PROVIDERS TO ORDER:  Check and replace lytes   Pt would like a feeding pump at time of discharge     Malnutrition Status:    Severe malnutrition in the context of chronic illness     Recommendations already ordered by Registered Dietitian (RD):    Jevity 1.5 via G tube at 20 mL/hr x 4 hrs, advance by 10 mL q 4 hrs to goal rate of 50ml/hr (1185 ml/day/ 5 cartons) provides: 1775 kcals, 76 g PRO, 912 ml free H20, 258 g CHO, and 25 g fiber daily.   Free water flushes of 120 mL q 4 hrs. Total fluid (free water + flushes + IVF) = 1632 mL per day.     -Recommend MD decrease or discontinue IVF as TF advances to prevent overhydration.  -RD to adjust free water flushes pending IVF and per MD recommendation pending fluid status.     Once pt tolerating continuous, transition to day time feeds of 5 cartons (1185 mL/day) @ 100 mL/hr x 12 hrs (8am-8pm) with 120 mL water flush 6 times/day.       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   - Per chart review, pt has hx of J tube, removed this winter and pt was trying to eat. G tube placed 5/8/24, pt started with 2 cartons of Jevity 1.5 via gravity drip.   -Pt was previously on day time feeds from 8am-8pm. Pt would like to start on continuous feedings and then transition to day time feeds once tolerating.       CURRENT NUTRITION ORDERS  Diet: Regular   Intake/Tolerance: reports taking only sips of water and 2 small pills by mouth.   "    LABS  Recent Labs   Lab Test 05/18/24  0801 05/17/24  2317   * 129*   POTASSIUM 4.4 4.5   CHLORIDE 100 97*   CO2 21* 22   ANIONGAP 11 10   * 196*   BUN 16.4 21.8   CR 0.71 0.78   GWEN 8.1* 8.2*       MEDICATIONS  Medications reviewed    ANTHROPOMETRICS  Height: 152.4 cm (5' 0\")  Most Recent Weight: 65.8 kg (145 lb)    IBW: 48.2 kg  BMI: Overweight BMI 25-29.9  Weight History:     Wt Readings from Last 20 Encounters:   05/17/24 65.8 kg (145 lb)   05/16/24 71.2 kg (157 lb)   05/14/24 68.5 kg (151 lb)   05/07/24 68 kg (150 lb)   05/06/24 69.6 kg (153 lb 6.4 oz)   05/01/24 70.6 kg (155 lb 9.6 oz)   04/22/24 70.8 kg (156 lb)   04/16/24 72 kg (158 lb 11.2 oz)   04/03/24 78 kg (172 lb)   04/02/24 78.4 kg (172 lb 14.4 oz)   03/08/24 78.9 kg (174 lb)   02/21/24 79.4 kg (175 lb)   02/15/24 80.4 kg (177 lb 3.2 oz)   01/24/24 78 kg (172 lb)   01/24/24 78 kg (172 lb)   01/11/24 78.2 kg (172 lb 6.4 oz)   01/04/24 77.1 kg (170 lb)   12/14/23 74.8 kg (165 lb)   11/14/23 74.4 kg (164 lb)   11/13/23 74.4 kg (164 lb)       Dosing Weight: 66kg CBW     ASSESSED NUTRITION NEEDS  Estimated Energy Needs: 6238-8573 kcals/day (25 - 30 kcals/kg)  Justification: Maintenance  Estimated Protein Needs: 79-99 grams protein/day (1.2 - 1.5 grams of pro/kg)  Justification: Maintenance  Estimated Fluid Needs: 1900 mL/day (1 mL/kcal)   Justification: Maintenance    PHYSICAL FINDINGS  See malnutrition section below.  RD off-site and unable to conduct NFPA    MALNUTRITION  % Intake: </=75% for >/= 1 month (severe)  % Weight Loss: > 5% in 1 month (severe)  Subcutaneous Fat Loss: Unable to assess  Muscle Loss: Unable to assess, however pt notes muscle loss   Fluid Accumulation/Edema: None noted  Malnutrition Diagnosis: Severe malnutrition in the context of chronic illness     NUTRITION DIAGNOSIS  Altered GI function related to dysphagia as evidenced by need for G tube feedings       INTERVENTIONS  Implementation    Enteral Nutrition - " Initiate     Goals  Total avg nutritional intake to meet a minimum of 1700 kcal/kg and 70 g PRO/kg daily (per dosing wt 66 kg).     Monitoring/Evaluation  Progress toward goals will be monitored and evaluated per protocol.  Sierra Burnham RD, LD   Clinical Dietitian   Weekend Pager: [phone]

## 2024-05-18 NOTE — PHARMACY-CONSULT NOTE
Pharmacy Tube Feeding Consult    Per RN, patient able to take medications by mouth at this time, so no change in medication formulation/route pursued at this time.    Pharmacy will continue to follow.

## 2024-05-18 NOTE — PROGRESS NOTES
[facility]  OUTPATIENT OCCUPATIONAL THERAPY  EVALUATION  PLAN OF TREATMENT FOR OUTPATIENT REHABILITATION  (COMPLETE FOR INITIAL CLAIMS ONLY)  Patient's Last Name, First Name, M.I.  YOB: 1940  Liu Ragsdale                          Provider's Name  [facility] Medical Record No.  [id number]                             Onset Date:  05/17/24   Start of Care Date:  (P) 05/18/24   Type:     ___PT   _X_OT   ___SLP Medical Diagnosis:  (P) fall risk, weakness                    OT Diagnosis:  Impaired ADL's Visits from SOC:  1     See note for plan of treatment, functional goals and certification details    I CERTIFY THE NEED FOR THESE SERVICES FURNISHED UNDER        THIS PLAN OF TREATMENT AND WHILE UNDER MY CARE     (Physician co-signature of this document indicates review and certification of the therapy plan).                               05/18/24 1000   Appointment Info   Signing Clinician's Name / Credentials (OT) Richard Wilkins OTR/L   Quick Adds   Quick Adds Certification   Living Environment   People in Home spouse   Self-Care   Equipment Currently Used at Home cane, straight;walker, rolling;raised toilet seat;other (see comments)   Fall history within last six months yes   Number of times patient has fallen within last six months 1   General Information   Onset of Illness/Injury or Date of Surgery 05/17/24   Patient/Family Therapy Goal Statement (OT) Return home strong enough, has plans for procedure at [facility] on Monday   Additional Occupational Profile Info/Pertinent History of Current Problem Lives at home with wife who helps with LE dressing,  He reports he could feel him self getting weaker and weaker   Left Upper Extremity (Weight-bearing Status) full weight-bearing (FWB)   Right Upper Extremity (Weight-bearing Status) full weight-bearing (FWB)   Left Lower Extremity (Weight-bearing Status) full weight-bearing (FWB)   Right Lower  Extremity (Weight-bearing Status) full weight-bearing (FWB)   Cognitive Status Examination   Orientation Status orientation to person, place and time   Affect/Mental Status (Cognitive) WFL   Follows Commands follows one-step commands   Cognitive Status Comments Pt reporting that with this round of chemo he is having more brain fog and difficulty thinking than previous chemos   Pain Assessment   Patient Currently in Pain Yes, see Vital Sign flowsheet   Posture   Posture forward head position;kyphosis   Range of Motion Comprehensive   General Range of Motion no range of motion deficits identified   Strength Comprehensive (MMT)   General Manual Muscle Testing (MMT) Assessment   (generalized weakness in UE, triggers pain in abdominal area)   Comment, General Manual Muscle Testing (MMT) Assessment He demonstrates functional UE strength for BADL's, noting use of UE causes abdominal discomfort/pain   Coordination   Upper Extremity Coordination No deficits were identified   Transfers   Transfer Comments Sit to stand with SBA/belt   Clinical Impression   Criteria for Skilled Therapeutic Interventions Met (OT) Yes, treatment indicated   OT Diagnosis Impaired ADL's   Influenced by the following impairments weakness   OT Problem List-Impairments impacting ADL problems related to;activity tolerance impaired;pain;strength   Assessment of Occupational Performance 3-5 Performance Deficits   Identified Performance Deficits BADL's, moblity endurance, IADL's, home making   Planned Therapy Interventions (OT) strengthening;ADL retraining   Intervention Comments Focus on increasing strength and endurance for ADL's   Clinical Decision Making Complexity (OT) problem focused assessment/low complexity   Risk & Benefits of therapy have been explained evaluation/treatment results reviewed;care plan/treatment goals reviewed;risks/benefits reviewed;current/potential barriers reviewed;participants voiced agreement with care plan;participants  included;patient   Clinical Impression Comments Pt is a pleasant 83 year old male who presents to OT for weakness and decreased ADL's/mobility and deconditioning.  He demonstrates today mild struggles with thinking through questions at times.  He is appropriate for OT intervention focusing on increasing strength and endurance to aide in improved ADL performance.  He is left with call light and is in the presence of a visitor.  He is cued to ask for assistance when getting up   OT Total Evaluation Time   OT Eval, Low Complexity Minutes (26349) 10   Therapy Certification   Medical Diagnosis fall risk, weakness   Start of Care Date 05/18/24   Certification date from 05/18/24   Certification date to 05/25/24   OT Goals   Therapy Frequency (OT) 3 times/week   OT Predicted Duration/Target Date for Goal Attainment 05/25/24   OT Goals Hygiene/Grooming;OT Goal 1   OT: Hygiene/Grooming modified independent;while standing   OT: Goal 1 Pt will participate in HEP to aide in increasing strength and endurance for improved performance of ADL's and mobility   Interventions   Interventions Quick Adds Self-Care/Home Management   Self-Care/Home Management   Self-Care/Home Mgmt/ADL, Compensatory, Meal Prep Minutes (24477) 8   Symptoms Noted During/After Treatment (Meal Preparation/Planning Training) fatigue;increased pain   Treatment Detail/Skilled Intervention OT:  Pt demonstrates SBA with sit to stand and mobility around room with CGA.   He requires assist with LE dressing due to pain in abdominal area.  He demonstrates managing items on tray.   OT Discharge Planning   OT Plan Standing at sink for cares, UE exercise program   OT Discharge Recommendation (DC Rec) home with assist;home with home care occupational therapy   OT Rationale for DC Rec Pt demonstrating mild decrease in conditioning/strength impacting ADL's   OT Brief overview of current status Transfers and mobility with CGA/SBA and belt, no device.   Total Session Time    Timed Code Treatment Minutes 8   Total Session Time (sum of timed and untimed services) 18        Attending note. Benign positional vertigo. Meclizine. Followup ENT or neurology for further evaluation and management. Patient is advised to return to the emergency department at anytime if develops other neurologic symptoms.

## 2024-05-19 NOTE — PLAN OF CARE
Problem: Adult Inpatient Plan of Care  Goal: Optimal Comfort and Wellbeing  Outcome: Not Progressing  Intervention: Monitor Pain and Promote Comfort  Flowsheets (Taken 5/19/2024 4460)  Pain Management Interventions:   medication (see MAR)   care clustered   relaxation techniques promoted       Goal Outcome Evaluation:  Pt alert but having dry throat and productive cough. Lung sounds are diminished and rhonchi. Pt coughing and trying to get phlegm up. Pt able to sleep majority of shift but lungs still sound tight. New order for Cepacol cough drops PRN for sore throat. Pt feeding tube increased up to 50ml/hr and pt at desired flow.

## 2024-05-19 NOTE — PLAN OF CARE
Problem: Pain Acute  Goal: Optimal Pain Control and Function  Outcome: Not Progressing  Intervention: Develop Pain Management Plan  Flowsheets  Taken 5/18/2024 2300  Pain Management Interventions:   medication (see MAR)   cold applied   heat applied  Taken 5/18/2024 1639  Pain Management Interventions: medication (see MAR)  Intervention: Prevent or Manage Pain  Flowsheets  Taken 5/18/2024 2300  Sensory Stimulation Regulation:   care clustered   quiet environment promoted  Medication Review/Management: medications reviewed  Taken 5/18/2024 1700  Medication Review/Management: medications reviewed   Problem: Adult Inpatient Plan of Care  Goal: Plan of Care Review  Outcome: Progressing  Flowsheets (Taken 5/18/2024 2259)  Plan of Care Reviewed With: patient  Overall Patient Progress: no change     Goal Outcome Evaluation:      Plan of Care Reviewed With: patient    Overall Patient Progress: no change    Outcome Evaluation: Pt is A&O x3, disoriented to time by one day, re-orientation provided. Pt is otherwise able to make his needs known. Pt on continuous tube feedings every 4 hours. Pt received roughly 120 mL of tube feeding by end of shift. Pt requested at second tube feeding that rate be less than 30 mL as he began to c/o increased discomfort. Decreased tube feeding to 25 mL/hr, per his request. Pt tolerated tube feeding well. This author replaced tube feeding line and bag d/t increased beeping. Dressing change and CHG wipes completed as well. Pt received PRN oxycodone and PRN flexeril per MAR, which did not help improve his epigastric pain. Hot and cold packs offered, pt declined at this time. Will continue to monitor per plan of care.

## 2024-05-19 NOTE — CONSULTS
Care Management Initial Consult    General Information  Assessment completed with: Patient, VM-chart review,    Type of CM/SW Visit: Initial Assessment    Primary Care Provider verified and updated as needed: Yes   Readmission within the last 30 days: no previous admission in last 30 days      Reason for Consult: discharge planning  Advance Care Planning: Advance Care Planning Reviewed: present on chart          Communication Assessment  Patient's communication style: spoken language (English or Bilingual)    Hearing Difficulty or Deaf: yes   Wear Glasses or Blind: yes    Cognitive  Cognitive/Neuro/Behavioral: WDL  Level of Consciousness: alert  Arousal Level: opens eyes spontaneously  Orientation: disoriented to, time  Mood/Behavior: calm, cooperative  Best Language: 0 - No aphasia  Speech: clear, spontaneous    Living Environment:   People in home: spouse     Current living Arrangements: house      Able to return to prior arrangements: yes       Family/Social Support:  Care provided by: self  Provides care for: spouse  Marital Status:   Wife, Children          Description of Support System: Supportive, Involved         Current Resources:   Patient receiving home care services: Yes  Skilled Home Care Services: Skilled Nursing  Community Resources: Home Infusion, Home Care  Equipment currently used at home: cane, straight, walker, rolling, raised toilet seat, shower chair  Supplies currently used at home: Enteral Nutrition & Supplies    Employment/Financial:  Employment Status: retired        Financial Concerns: none           Does the patient's insurance plan have a 3 day qualifying hospital stay waiver?  No    Lifestyle & Psychosocial Needs:  Social Determinants of Health     Food Insecurity: Low Risk  (1/17/2024)    Food Insecurity     Within the past 12 months, did you worry that your food would run out before you got money to buy more?: No     Within the past 12 months, did the food you bought just not  last and you didn t have money to get more?: No   Depression: Not at risk (1/4/2024)    PHQ-2     PHQ-2 Score: 0   Housing Stability: Low Risk  (1/17/2024)    Housing Stability     Do you have housing? : Yes     Are you worried about losing your housing?: No   Tobacco Use: Low Risk  (5/17/2024)    Patient History     Smoking Tobacco Use: Never     Smokeless Tobacco Use: Never     Passive Exposure: Never   Financial Resource Strain: Low Risk  (1/17/2024)    Financial Resource Strain     Within the past 12 months, have you or your family members you live with been unable to get utilities (heat, electricity) when it was really needed?: No   Alcohol Use: Not on file   Transportation Needs: Low Risk  (1/17/2024)    Transportation Needs     Within the past 12 months, has lack of transportation kept you from medical appointments, getting your medicines, non-medical meetings or appointments, work, or from getting things that you need?: No   Physical Activity: Not on file   Interpersonal Safety: Low Risk  (5/7/2024)    Interpersonal Safety     Do you feel physically and emotionally safe where you currently live?: Yes     Within the past 12 months, have you been hit, slapped, kicked or otherwise physically hurt by someone?: No     Within the past 12 months, have you been humiliated or emotionally abused in other ways by your partner or ex-partner?: No   Stress: Not on file   Social Connections: Not on file   Health Literacy: Not on file       Functional Status:  Prior to admission patient needed assistance:   Dependent ADLs:: Independent, Ambulation-walker, Ambulation-cane  Dependent IADLs:: Independent       Mental Health Status:  Mental Health Status: No Current Concerns       Chemical Dependency Status:  Chemical Dependency Status: No Current Concerns             Values/Beliefs:  Spiritual, Cultural Beliefs, Scientologist Practices, Values that affect care: no               Additional Information:  CM consulted for discharge  planning.    Per MD at IDT rounds pt is not medically stable for discharge today. Per therapy recommending homecare upon discharge.    CM placed call to patient due to remote status and performed assessment. Patient lives at home with his wife in a house, pt is caregiver for his wife. Patient currently has tube feeding services through [facility] at [phone]/Fax: [phone]. Patient interested in homecare services upon discharge, pt does not have preference, HC ref sent through [facility].    Pt accepted for cares with [facility] (Phone: [phone]) for SN,PT,OT,HHA. Patient in agreement with these services upon discharge. At baseline patient pretty independent with IALDS and ADLS, uses a walker and cane for ambulation.    Plan: Home with resumption of [facility] for tube feeds and start [facility] (Phone: [phone]) SN,OT,PT,HHA    Transport: patients step daughter    Eboni Rice, RNCM  Care Transitions Registered Nurse  Tele: [phone]

## 2024-05-19 NOTE — PROGRESS NOTES
[facility] Medicine Progress Note  Date of Service (when I saw the patient): 05/19/2024    REASON FOR ADMISSION / INTERVAL HISTORY:  Patient is a an 83-year-old male admitted 5/18 for  nausea, vomiting and epigastric pain.   Continues to have epigastric tightness/ nausea and retching. See details below      Assessment/ Plan     Nausea/ vomiting/ dysphagia  Recurrent lower esophageal stricture  Likely due to esophagitis/ stricture. H/o adenoCA of distal esophagous--undergoin chemo-immunotherapy. S/p radiation.  S/p multiple dilatations in past. Last was in 4/24. Scheduled for EGD with dilatation on Tuesday. Pt having sx for months but worse last few days-has persistent epigastric discomfort. CT abd showed thickened distal esophagous.  Pt vomited all TF started last evening. Continues to have nausea.  -continue  conservative rx with zofran prn. Continue iv protonix. Stop TF and resume 2 cartons/ day tonight. Needs to re-schedule dilatation in future.     SBP  Ascitic fluid cx from 5/18 growing  lactose fermenting GNB. ANC was reported 75. Discussed with ID-recommended to rx 5-7 days.   Still has mild abd discomfort  -will change rocephin to cefepime. Follow cx. Needs paracentesis in AM    Neutropenia  2ndry to chemo. Last chemo 5/14. WBC/ ANC on 5/14 were 4.0/ 2.8. presented with WBC/ANC of 0.8/0.6.  No fever. No sn/sx of infection. CXR as above. Ascitic fluid cx-growing lactose fermenting GNB.   Got neupogen x 1 on 5/18. 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 Status post 2 cycle 5/14. S/p multiple paracentesis in past-last was 5/6-2.4L fluid removed. Now had only 12 ml removed in ED. CT shows moderate ascitis and abdomen  distended.   -needs another paracentesis in AM.     Cough with mucous production  CXR shows B atelectasis / infiltrates. No fever. Neutropenic. O2 sats stable on RA  -will watch. If any fever, will rx as pneumonia. Follow WBC    Nutrition  G tube placed 5/8. Supposed to take 5 cans of TF-uses only 2. Also drinks water   -will have nutrition to assess for TF    Hyperlipidemia on Lipitor.     Hypertension on Coreg    Hypo natremia  Mild-stable.     Dispo  Pt retching/ having emesis. Has SBP and is on rx now. Was scheduled for EGD dilatation tomorrow 5/19. This needs to be re-scheduled when pt better.     Diet:   Was started on TF at 50cc/ hr last evening. Pt did not tolerate and had huge emesis this AM.  Will stop feeds-resume 2 cartons/ day from to night-reassess in AM.      DVT Prophylaxis: Low Risk/Ambulatory with no VTE prophylaxis indicated  Vásquez Catheter: Not present  Code Status:  full      WHITNEY WINKLER MD   Pg [phone]        ROS:  As described in A/P and Exam.  Otherwise ALL are  negative.    PHYSICAL EXAM:  All vitals have been reviewed    Blood pressure (!) 169/81, pulse 98, temperature 97.9  F (36.6  C), temperature source Oral, resp. rate 18, height 1.524 m (5'), weight 71.3 kg (157 lb 3 oz), SpO2 95%.    I/O this shift:  In: -   Out: 50 [Urine:50]    GENERAL APPEARANCE: healthy, alert and no distress  EYES: conjunctiva clear, eyes grossly normal  HENT: external ears and nose normal   RESP: lungs clear to auscultation - no rales, rhonchi or wheezes  CV: regular rate and rhythm, normal S1 S2, no S3 or S4 and no murmur, click or rub   ABDOMEN: soft, mild tenderness at G tube site, no HSM or masses and bowel sounds normal  MS: no clubbing, cyanosis; no edema  SKIN: clear without significant rashes or lesions  NEURO: -non-focal moves all 4 extr    ROUTINE  LABS (Last four results)  CMP  Recent Labs   Lab 05/19/24  0600 05/18/24  0801 05/17/24  2317 05/14/24  0805   * 132* 129* 132*   POTASSIUM 4.7  4.4 4.5 4.4   CHLORIDE 98 100 97* 96*   CO2 23 21* 22 23   ANIONGAP 9 11 10 13   * 171* 196* 130*   BUN 17.4 16.4 21.8 17.3   CR 0.74 0.71 0.78 1.15   GFRESTIMATED 90 >90 89 64   GWEN 7.9* 8.1* 8.2* 9.0   PROTTOTAL  --   --   --  6.3*   ALBUMIN  --   --   --  3.1*   BILITOTAL  --   --   --  0.4   ALKPHOS  --   --   --  460*   AST  --   --   --  31   ALT  --   --   --  19     CBC  Recent Labs   Lab 05/19/24  0600 05/18/24  0801 05/17/24  2317 05/14/24  0805   WBC 3.5* 1.3*  1.3* 0.8* 4.0   RBC 4.37* 4.48  4.44 4.46 4.83   HGB 13.1* 13.2*  13.2* 13.1* 14.3   HCT 37.7* 38.8*  38.5* 38.6* 41.9   MCV 86 87  87 87 87   MCH 30.0 29.5  29.7 29.4 29.6   MCHC 34.7 34.0  34.3 33.9 34.1   RDW 13.6 13.4  13.4 13.2 13.0    167  161 166 154     INRNo lab results found in last 7 days.  Arterial Blood GasNo lab results found in last 7 days.    No results found for this or any previous visit (from the past 24 hour(s)).

## 2024-05-19 NOTE — PROGRESS NOTES
Pt had large emesis x 2 at 0800.  He says that he feels like he received more TF than what he is used to overnight.  Pt has been taking 2 cans of Jevity per day at home.  TF stopped at 0830.  Zofran administered.

## 2024-05-19 NOTE — PLAN OF CARE
Problem: Adult Inpatient Plan of Care  Goal: Absence of Hospital-Acquired Illness or Injury  Intervention: Prevent Infection  Recent Flowsheet Documentation  Taken 5/19/2024 0900 by Mary Arambula RN  Infection Prevention: rest/sleep promoted     Problem: Adult Inpatient Plan of Care  Goal: Absence of Hospital-Acquired Illness or Injury  Outcome: Not Progressing  Intervention: Identify and Manage Fall Risk  Recent Flowsheet Documentation  Taken 5/19/2024 0900 by Mary Arambula RN  Safety Promotion/Fall Prevention:   activity supervised   clutter free environment maintained   nonskid shoes/slippers when out of bed  Intervention: Prevent Skin Injury  Recent Flowsheet Documentation  Taken 5/19/2024 0900 by Mary Arambula RN  Body Position: position changed independently  Intervention: Prevent Infection  Recent Flowsheet Documentation  Taken 5/19/2024 0900 by Mary Arambula RN  Infection Prevention: rest/sleep promoted   Goal Outcome Evaluation:      Plan of Care Reviewed With: patient    Pt c/o intermittent nausea this morning. Emesis x 3.  Some abdominal discomfort, worse with vomiting.  No relief from zofran, nausea improved after compazine given.  TF has been stopped since this morning, pt feels like he received too much overnight.  Ambulates with SBA in room, up in chair most of day.

## 2024-05-20 NOTE — PROGRESS NOTES
RLQ paracentesis performed by radiologist. 2000  Ml clear yellow fluid removed. Pressure held at site after catheter removed. No bleeding noted. Skin glue applied. Samples sent to lab. The pt tolerated the procedure without difficulty. No Albumin given     The pt returned to M/s room 2308. Report given to nurse Marissa BOSE. Band aide on the site can be removed after 24 hours.

## 2024-05-20 NOTE — TELEPHONE ENCOUNTER
Son Joel calling from [place] concerned about how father is doing and recent lab results. Caller is not listed on the consent to communicate.   Joel asking if he needs to change his ticket to come up sooner to see his father and is concerned he is going to die. Patient is currently in [facility]. Caller's sister has consent but is out of the country on a vacation per caller.   Gave caller phone number to the [facility] and directed to call patient room or charge nurse desk on unit where patient is staying to get update.  Glendy Deras RN   05/20/24 3:38 PM  [employer] Nurse Advisor

## 2024-05-20 NOTE — PROGRESS NOTES
Nutrition Note Brief    Liu Ragsdale is a 83 year old male significant past medical history of adenocarcinoma of the distal esophagus with metastasis and ascites on chemotherapy, dysphagia status post G-tube placement, CAD, CVA, hypertension, presents to the ED for generalized weakness.   - Per chart review, pt has hx of J tube, removed this winter and pt was trying to eat. G tube placed 5/8/24, pt started with 2 cartons of Jevity 1.5 via gravity drip.   -Pt was previously on day time feeds from 8am-8pm. Pt would like to start on continuous feedings and then transition to day time feeds once tolerating.     RD spoke to patient who noted a concern that his rate was advanced to quickly over the weekend. Patient noted that he has tolerated Jevity in the past and did not believe the formula to be the issue. RD discussed with patient and RN restarting TF at lower rate. Patient requested to start at 10 mL and advance much slower as this has been what has worked when he previously was using TF at home. RD updated patient orders in chart.    Dosing Weight: 66kg CBW      ASSESSED NUTRITION NEEDS  Estimated Energy Needs: 0219-3008 kcals/day (25 - 30 kcals/kg)  Justification: Maintenance  Estimated Protein Needs: 79-99 grams protein/day (1.2 - 1.5 grams of pro/kg)  Justification: Maintenance  Estimated Fluid Needs: 1900 mL/day (1 mL/kcal)   Justification: Maintenance     Patient noted to have had issues with tube feeding and rested with feeding off overnight. Pt stated he feels better this AM and doesn't feel so nauseated. Patient noted to have emesis X 3 on morning of 5/19. Pt feels that he received too much overnight.     Patient meds/labs reviewed. Patient GI reviewed. Patient last BM noted on 5/19; constipation not contributing to emesis episodes.  Patient noted to previously use Jevity 1.5 per RD notes. Suspect not a formula intolerance but a too fast of a rate increase.     Recommendations:   1) Verify tube is in  correct position and no blockage has occurred/  2) Restart TF of Jevity 1.5 at 10 mL/hr x 8 hrs. Advance by 10 mL q 8 hrs to goal rate of 50ml/hr (1185 ml/day/ 5 cartons) provides: 1775 kcals, 76 g PRO, 912 ml free H20, 258 g CHO, and 25 g fiber daily.   Free water flushes of 120 mL q 4 hrs. Total fluid (free water + flushes + IVF) = 1632 mL per day.     Once pt tolerating continuous, transition to day time feeds of 5 cartons (1185 mL/day) @ 100 mL/hr x 12 hrs (8am-8pm) with 120 mL water flush 6 times/day.     **If patient continues to not tolerate TF despite slower rate progression would recommend consideration of J-tube placement for improved tolerance.     Brenda Santos RDN, LD  Clinical Dietitian  Office: [phone]  Weekend pager: [phone]

## 2024-05-20 NOTE — PROGRESS NOTES
[facility] Medicine Progress Note  Date of Service (when I saw the patient): 05/20/2024    REASON FOR ADMISSION / INTERVAL HISTORY:  Patient is a an 83-year-old male admitted 5/18 for  nausea, vomiting and epigastric pain.   Continues to have epigastric tightness when swallowing. Has abdominal discomfort-not too different than baseline. See details below      Assessment/ Plan     Nausea/ vomiting/ dysphagia  Recurrent lower esophageal stricture  Likely due to esophagitis/ stricture. H/o adenoCA of distal esophagous--undergoin chemo-immunotherapy. S/p radiation.  S/p multiple dilatations in past. Last was in 4/24. Was Scheduled for EGD with dilatation today Monday. Pt having sx for months but worse last few days-has persistent epigastric discomfort. CT abd showed thickened distal esophagous. On prilosec 40mg bid at home.  Pt vomited all TF started 5/18 evening hence TF were held 5/19. Continues t have nausea/ abd discomfort.   -continue  conservative rx with zofran/ compazine prn. Continue iv protonix bid. Stop po clears and resume TF very slow as per new nutrition recs today.  Needs to re-schedule dilatation in future.     SBP  Ascitic fluid cx from 5/18 growing  lactose fermenting GNB. ANC was reported 75. Discussed with ID 5/19-recommended to rx 5-7 days.   S/p paracentesis today 5/20-2L of clear fluid removed. ANC is 3899! Today. Fluid cx from 3/18 growing e coli. Discussed with ID again today. Will continue rocephin. Continue to monitor cx. Fluid cx from today-p    Neutropenia  2ndry to chemo. Last chemo 5/14. WBC/ ANC on 5/14 were 4.0/ 2.8. Presented  now with WBC/ANC of 0.8/0.6.  No fever. Got neupogen x 1 on 5/18. 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 Status post 2 cycle 5/14. S/p multiple paracentesis in past-last was 5/6-2.4L fluid removed.   CT 5/18 showed moderate ascitis and abdomen distended. S/p paracentesis today 5/20-2L of clear fluid removed.  Needs to f/up hemeonc OP    Cough with mucous production  CXR shows B atelectasis / infiltrates. No fever. Neutropenic. O2 sats stable on RA  Was wheezing today a little. O2 sats still stable on RA. High risk of asp-hence will stop po clears. Continue TF.   -if hypoxic, consider repeat CXR    Nutrition  G tube placed 5/8. Supposed to take 5 cans of TF-uses only 2. Also drinks water   Resume TF today per nutrition recs    Hyperlipidemia on Lipitor.     Hypertension on Coreg    Hypo natremia  Mild-stable.     Dispo   Was scheduled for EGD dilatation today 5/20.. This needs to be re-scheduled when pt better. If not improving, need to consider transfering to [facility] for GI consult-EGD and dilatation.    Diet:   Was started on TF at 50cc/ hr 5/18 evening. Pt did not tolerate and had huge emesis 5/19 AM.  Resume TF as above      DVT Prophylaxis: Low Risk/Ambulatory with no VTE prophylaxis indicated  Vásquez Catheter: Not present  Code Status: Full Codefull      WHITNEY WINKLER MD   Pg [phone]        ROS:  As described in A/P and Exam.  Otherwise ALL are  negative.    PHYSICAL EXAM:  All vitals have been reviewed    Blood pressure (!) 146/65, pulse 96, temperature 99.5  F (37.5  C), temperature source Axillary, resp. rate 16, height 1.524 m (5'), weight 71.1 kg (156 lb 12 oz), SpO2 94%.    I/O this shift:  In: 120 [NG/GT:120]  Out: 650 [Urine:650]    GENERAL APPEARANCE: healthy, alert and no distress  EYES: conjunctiva clear, eyes grossly normal  HENT: external ears and nose normal   RESP: lungs -few B wheezes  CV: regular rate and rhythm, normal S1 S2, no S3 or S4 and no murmur, click or rub   ABDOMEN: soft, mild tenderness at G tube site and diffusely, no HSM or  masses and bowel sounds normal  MS: no clubbing, cyanosis; no edema  SKIN: clear without significant rashes or lesions  NEURO: -non-focal moves all 4 extr    ROUTINE  LABS (Last four results)  CMP  Recent Labs   Lab 05/19/24  0600 05/18/24  0801 05/17/24  2317 05/14/24  0805   * 132* 129* 132*   POTASSIUM 4.7 4.4 4.5 4.4   CHLORIDE 98 100 97* 96*   CO2 23 21* 22 23   ANIONGAP 9 11 10 13   * 171* 196* 130*   BUN 17.4 16.4 21.8 17.3   CR 0.74 0.71 0.78 1.15   GFRESTIMATED 90 >90 89 64   GWEN 7.9* 8.1* 8.2* 9.0   PROTTOTAL  --   --   --  6.3*   ALBUMIN  --   --   --  3.1*   BILITOTAL  --   --   --  0.4   ALKPHOS  --   --   --  460*   AST  --   --   --  31   ALT  --   --   --  19     CBC  Recent Labs   Lab 05/19/24  0600 05/18/24  0801 05/17/24 2317 05/14/24  0805   WBC 3.5* 1.3*  1.3* 0.8* 4.0   RBC 4.37* 4.48  4.44 4.46 4.83   HGB 13.1* 13.2*  13.2* 13.1* 14.3   HCT 37.7* 38.8*  38.5* 38.6* 41.9   MCV 86 87  87 87 87   MCH 30.0 29.5  29.7 29.4 29.6   MCHC 34.7 34.0  34.3 33.9 34.1   RDW 13.6 13.4  13.4 13.2 13.0    167  161 166 154     INRNo lab results found in last 7 days.  Arterial Blood GasNo lab results found in last 7 days.    Recent Results (from the past 24 hour(s))   US Paracentesis without Albumin    Narrative    US PARACENTESIS WITHOUT ALBUMIN 5/20/2024 10:18 AM    CLINICAL HISTORY: recurrent malignant ascitis-r/o SBP    PROCEDURE: Informed consent obtained. Time out performed. The abdomen  was prepped and draped in a sterile fashion. 10 mL of 1% lidocaine was  infused into local soft tissues. A 5 Burkinan catheter system was  introduced into the abdominal ascites under ultrasound guidance.    2.0 liters of clear fluid were removed and sent to lab if requested.    Patient tolerated procedure well.    Ultrasound imaging was obtained and placed in the patient's permanent  medical record.      Impression    IMPRESSION:  1.  Status post ultrasound-guided paracentesis.    RADHA GILL  MD OUMAR         SYSTEM ID:  [id number]

## 2024-05-20 NOTE — PLAN OF CARE
Problem: Malnutrition  Goal: Improved Nutritional Intake  Outcome: Progressing   Goal Outcome Evaluation:      Plan of Care Reviewed With: patient, spouse, child    Overall Patient Progress: improvingOverall Patient Progress: improving         Patient is alert and oriented.  Up with stand by assist to ambulate to the bathroom.  Pt has a productive cough with large amounts of phlegm.  Pt has expiratory wheezes that have improved after encouraging deep breathing and cough and having patient sit upright in the chair through out the shift.  Maintaining oxygen saturations greater than 90% on room air.  Pt reporting abdominal discomfort rating it 4/10 but declines any intervention.  Tube feeding was restarted at 10mL/hr with 120mL free water flushes every 4 hours.  Pt reported some slight nausea that was relived with prn zofran.  Pt also having intermittent heartburn and was given prn TUMS with adequate relief.  Paracentesis this morning where 2L of fluid were removed.  Port a cath accessed and heparin locked receiving IV antibiotics every 24 hours.

## 2024-05-20 NOTE — PLAN OF CARE
Problem: Adult Inpatient Plan of Care  Goal: Absence of Hospital-Acquired Illness or Injury  Intervention: Prevent Skin Injury  Recent Flowsheet Documentation  Taken 5/20/2024 0348 by Aggie Lovell RN  Body Position: position changed independently  Intervention: Prevent and Manage VTE (Venous Thromboembolism) Risk  Recent Flowsheet Documentation  Taken 5/20/2024 0348 by Aggie Lovell RN  VTE Prevention/Management: (ambulation encouraged) other (see comments)  Goal: Optimal Comfort and Wellbeing  Outcome: Progressing  Intervention: Monitor Pain and Promote Comfort  Flowsheets (Taken 5/20/2024 0680)  Pain Management Interventions:   pillow support provided   rest       Goal Outcome Evaluation:  Pt alert and oriented x4. Pt had issues with tube feeding and rested with feeding off overnight. Pt states he feels better this morning and doesn't feel so nauseated. Pt slept all shift and received IV antibiotic.

## 2024-05-20 NOTE — PLAN OF CARE
Problem: Adult Inpatient Plan of Care  Goal: Plan of Care Review  Outcome: Progressing    Problem: Pain Acute  Goal: Optimal Pain Control and Function  Outcome: Not Progressing  Intervention: Develop Pain Management Plan  Flowsheets  Taken 5/19/2024 2236  Pain Management Interventions: medication (see MAR)  Taken 5/19/2024 1632  Pain Management Interventions: medication (see MAR)  Intervention: Prevent or Manage Pain  Flowsheets  Taken 5/19/2024 2236  Sensory Stimulation Regulation:   care clustered   quiet environment promoted  Medication Review/Management: medications reviewed  Taken 5/19/2024 1630  Medication Review/Management: medications reviewed   Goal Outcome Evaluation:      Plan of Care Reviewed With: patient    Overall Patient Progress: no change    Outcome Evaluation: Pt is A&O x4 and is able to make his needs known appropriately. Pt continued to experience intermittent episodes of tan/phlegm-like emesis this shift. Pt recieved PRN zofran once this evening, which helped relieve his NV. Pt did not receive any tube feeding this evening, d/t being overloaded yesterday. Pt continues to c/o 4/10 pain to his bilateral abdomen, which worsens when he coughs. Pt received PRN oxycodone bid per MAR.  Pt continues with loose, congested cough and expiratory wheezing. This author changed dressing to G-tube this evening. Will continue to monitor per plan of care.

## 2024-05-21 NOTE — PLAN OF CARE
"  Problem: Malnutrition  Goal: Improved Nutritional Intake  Outcome: Progressing     Problem: Adult Inpatient Plan of Care  Goal: Optimal Comfort and Wellbeing  Outcome: Progressing     Problem: Pain Acute  Goal: Optimal Pain Control and Function  Outcome: Progressing   Goal Outcome Evaluation:      Plan of Care Reviewed With: patient    Overall Patient Progress: no changeOverall Patient Progress: no change         Patient is alert and oriented and able to make his needs known.  Pt has reported abdominal discomfort but declined any prn intervention.  Tube feeding infusing @ 10mL/hr and pt had declined to increase tube feeding due to not previously being able to tolerate a higher rate.  Receiving 120mL free water flushes every 4 hours.  Pt was started on scheduled reglan and reports that this has helped his nausea.  Pt still is experiencing significant heartburn despite scheduled carafate, protonix and having pt sit upright in chair and in bed while tube feed is running. Family has been at bedside through out the shift and left around suppertime.  At around 1730 pt asked for tube feeding to be turned off due to the heartburn and he is starting to become more uncomfortable.  Pt stated that he wants to have the night to be comfortable so that he can think about what care he wants to pursue.  He stated \"I have been miserable for so long and I would like to live another 5 years but I realize that this is not possible so I am wanting to be able to go home and be comfortable for what time I do have left but I just need to think about all my options\".  Palliative care was consulted and will see pt and family tomorrow.    "

## 2024-05-21 NOTE — CONFIDENTIAL NOTE
[facility]: Cancer Care                                                                                          Patients son from [employer] called asking for some information on patient. Due to not on consent to communicate, unable to give him information, but did call patient and got a hold of him and he is going to call his son to give him updates.    Signature:  Nyla Morrell RN

## 2024-05-21 NOTE — PLAN OF CARE
Problem: Risk for Delirium  Goal: Improved Sleep  Outcome: Progressing     Problem: Pain Acute  Goal: Optimal Pain Control and Function  Outcome: Progressing     Problem: Malnutrition  Goal: Improved Nutritional Intake  Outcome: Progressing   Goal Outcome Evaluation:  A&O x4  Pt ambulates w/ SBA and is able to make his needs known.   Continues to have intermittent Nausea. Zofran provided with positive results. Pt still has productive cough, tissues and emesis bags were provided.   Able to tolerate 15ml/hr feeding tube but has declined for it to be increased, until he feels more awake.

## 2024-05-21 NOTE — PROGRESS NOTES
[facility]    Medicine Progress Note - Hospitalist Service    Date of Admission:  5/17/2024    Assessment & Plan   Patient is a an 83-year-old male admitted 5/18 for  nausea, vomiting and epigastric pain.   Continues to have epigastric tightness when swallowing. Has abdominal discomfort-not too different than baseline. Hospitalization complicated by spontaneous bacterial peritonitis. Patient is contemplating hospice, palliative care consulted.     Goals of Care Discussion 05/21:  Patient changed code status to DNR-okay with intubation. Wants to speak with palliative for possible hospice/comfort-focused cares.    Nausea/ vomiting/ dysphagia  Recurrent lower esophageal stricture  Likely due to esophagitis/ stricture. H/o adenoCA of distal esophagous--undergoin chemo-immunotherapy. S/p radiation.  S/p multiple dilatations in past. Last was in 4/24. Was Scheduled for EGD with dilatation today Monday. Pt having sx for months but worse last few days-has persistent epigastric discomfort. CT abd showed thickened distal esophagous. On prilosec 40mg bid at home.  Pt vomited all TF started 5/18 evening hence TF were held 5/19. Continues t have nausea/ abd discomfort.     - 05/21: Added carafate with viscous lidocaine and scheduled reglan  - continue  conservative rx with zofran/ compazine prn. Continue iv protonix bid. Stop po clears and resume TF very slow as per new nutrition recs today.  Needs to re-schedule dilatation in future.      SBP  E. Coli Peritonitis  Ascitic fluid cx from 5/18 growing  lactose fermenting GNB. ANC was reported 75. Discussed with ID 5/19-recommended to rx 5-7 days.   S/p paracentesis today 5/20-2L of clear fluid removed. ANC is 3899! Today. Fluid cx from 3/18 growing e coli. Prior hospitalist discussed with ID. Will continue rocephin. Continue to monitor cx.   Ascites Cx: + E. Coli pan-susceptible    Continue rocephin 7d     Neutropenia  2ndry to chemo. Last chemo 5/14.  WBC/ ANC on 5/14 were 4.0/ 2.8. Presented  now with WBC/ANC of 0.8/0.6.  No fever. Got neupogen x 1 on 5/18. 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 Status post 2 cycle 5/14. S/p multiple paracentesis in past-last was 5/6-2.4L fluid removed.   CT 5/18 showed moderate ascitis and abdomen distended. S/p paracentesis today 5/20-2L of clear fluid removed.    Needs to f/up hemeonc OP  Palliative Care consulted     Cough with mucous production  CXR shows B atelectasis / infiltrates. No fever. Neutropenic. O2 sats stable on RA  Was wheezing today a little. O2 sats still stable on RA. High risk of asp-hence will stop po clears. Continue TF.   -if hypoxic, consider repeat CXR     Severe Protein Calorie Malnutrition, POA  Nutrition  Malnutrition 2/2 chronic illness  G tube placed 5/8. Supposed to take 5 cans of TF-uses only 2. Also drinks water   Resume TF today per nutrition recs     Hyperlipidemia on Lipitor.     Hypertension on Coreg     Hypo natremia  Mild-stable.           Diet: Adult Formula Drip Feeding: Continuous Jevity 1.5; Gastrostomy; Goal Rate: 50 mL/hr; mL/hr; Jevity 1.5 via G tube at 10 mL/hr x 8 hrs, advance by 5 mL (May adv up to 10 mL pending patient tolerance) q 6 hrs to goal rate of 50ml/hr (1185 ml/day/ 5 ...    DVT Prophylaxis: Low Risk/Ambulatory with no VTE prophylaxis indicated  Vásquez Catheter: Not present  Lines: PRESENT      Port A Cath Single 04/26/24 Right Chest wall-Site Assessment: WDL      Cardiac Monitoring: None  Code Status: No CPR- Pre-arrest intubation OK      Clinically Significant Risk Factors         # Hyponatremia: Lowest Na = 129 mmol/L in last 2 days, will monitor as appropriate      # Hypoalbuminemia: Lowest albumin = 2 g/dL at  5/21/2024  4:13 AM, will monitor as appropriate   # Thrombocytopenia: Lowest platelets = 106 in last 2 days, will monitor for bleeding   # Hypertension: Noted on problem list        # Obesity: Estimated body mass index is 30.61 kg/m  as calculated from the following:    Height as of this encounter: 1.524 m (5').    Weight as of this encounter: 71.1 kg (156 lb 12 oz)., PRESENT ON ADMISSION       # Financial/Environmental Concerns: none         Disposition Plan     Medically Ready for Discharge: Anticipated in 2-4 Days             Chacorta Ramirez DO  Hospitalist Service  [facility]  Securely message with [employer] (more info)  Text page via [employer] Paging/Directory   ______________________________________________________________________    Interval History   NAEO. Patient wishing to speak with palliative care. Code status changed from full to DNR-with intubation.   Re-visited patient to discuss with family at his request.     Physical Exam   Vital Signs: Temp: 97.9  F (36.6  C) Temp src: Oral BP: (!) 163/76 Pulse: 73   Resp: 18 SpO2: 96 % O2 Device: None (Room air)    Weight: 156 lbs 11.95 oz    Physical Exam  Constitutional:       General: Pt is not in acute distress. He is chronically ill.   HENT:      Head: Normocephalic and atraumatic.      Nose: Nose normal.   Eyes:      Conjunctiva/sclera: Conjunctivae normal.   Pulmonary:      Effort: Pulmonary effort is normal.   Abdominal:      General: Abdomen is tender and distended.   Skin:     Findings: No rash.   Neurological:      General: No focal deficit present.      Mental Status: He is alert.   Psychiatric:         Mood and Affect: Mood normal.       Medical Decision Making       75 MINUTES SPENT BY ME on the date of service doing chart review, history, exam, documentation & further activities per the note.      Data     I have personally reviewed the following data over the past 24 hrs:    6.4  \   12.5 (L)   / 106 (L)     129 (L) 96 (L) 20.3 /   125 (H)   4.1 25 0.68 \     ALT: 16 AST: 27 AP: 254 (H) TBILI: 0.4   ALB: 2.0 (L) TOT PROTEIN: 5.1 (L) LIPASE: N/A       Imaging results reviewed over the past 24 hrs:   No results found for this or any previous visit (from the past 24 hour(s)).

## 2024-05-21 NOTE — PROGRESS NOTES
Nutrition Note Brief    RD spoke to patient RN this AM. Per RN patient is reporting more heartburn; patient reports he is not sure if he is experiencing true nausea with TF currently. Patient advanced to 15 mL/hr this AM. Per RN education provided on 10 mL equivalent fluid amounts for advancement. However, despite education patient requesting to only advance by 5 mL d/t previous poor tolerance. Given patient poor tolerance over weekend OK to advance more slowly for patient comfort. RD updated patient order set this AM.     Brenda Santos RDN, LD  Clinical Dietitian  Office: [phone]  Weekend pager: [phone]

## 2024-05-22 NOTE — PROGRESS NOTES
[facility]    Medicine Progress Note - Hospitalist Service    Date of Admission:  5/17/2024    Assessment & Plan   Patient is a an 83-year-old male admitted 5/18 for  nausea, vomiting and epigastric pain.   Continues to have epigastric tightness when swallowing. Has abdominal discomfort-not too different than baseline. Hospitalization complicated by spontaneous bacterial peritonitis. Patient is contemplating hospice, with family goals of care discussion to take place arouind 1200 05/22/2024. Dipso pending GOC.    Goals of Care Discussion 05/21-05/22:  5/21 Patient changed code status to DNR-okay with intubation. Wants to speak with palliative for possible hospice/comfort-focused cares.  5/22: still contemplating hospice, states he doesn't want to continue palliative chemotherapy. Goal is to go home and be with family. Discussion today at noon.     Nausea/ vomiting/ dysphagia  Recurrent lower esophageal stricture  Likely due to esophagitis/ stricture. H/o adenoCA of distal esophagous--undergoin chemo-immunotherapy. S/p radiation.  S/p multiple dilatations in past. Last was in 4/24. Was Scheduled for EGD with dilatation today Monday. Pt having sx for months but worse last few days-has persistent epigastric discomfort. CT abd showed thickened distal esophagous. On prilosec 40mg bid at home.  Pt vomited all TF started 5/18 evening hence TF were held 5/19. Continues t have nausea/ abd discomfort.     - 05/21: Added carafate with viscous lidocaine and scheduled reglan  - continue  conservative rx with zofran/ compazine prn. Continue iv protonix bid. Stop po clears and resume TF very slow as per new nutrition recs today.  Needs to re-schedule dilatation in future.      SBP  E. Coli Peritonitis  Ascitic fluid cx from 5/18 growing  lactose fermenting GNB. ANC was reported 75. Discussed with ID 5/19-recommended to rx 5-7 days.   S/p paracentesis today 5/20-2L of clear fluid removed. ANC is 3899!  Today. Fluid cx from 3/18 growing e coli. Prior hospitalist discussed with ID. Will continue rocephin. Continue to monitor cx.   Ascites Cx: + E. Coli pan-susceptible    Continue rocephin 7d     Neutropenia  2ndry to chemo. Last chemo 5/14. WBC/ ANC on 5/14 were 4.0/ 2.8. Presented  now with WBC/ANC of 0.8/0.6.  No fever. Got neupogen x 1 on 5/18. 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 Status post 2 cycle 5/14. S/p multiple paracentesis in past-last was 5/6-2.4L fluid removed.   CT 5/18 showed moderate ascitis and abdomen distended. S/p paracentesis today 5/20-2L of clear fluid removed.    Needs to f/up hemeonc OP  Palliative Care consulted     Cough with mucous production  CXR shows B atelectasis / infiltrates. No fever. Neutropenic. O2 sats stable on RA  Was wheezing today a little. O2 sats still stable on RA. High risk of asp-hence will stop po clears. Continue TF.   -if hypoxic, consider repeat CXR     Severe Protein Calorie Malnutrition, POA  Nutrition  Malnutrition 2/2 chronic illness  G tube placed 5/8. Supposed to take 5 cans of TF-uses only 2. Also drinks water   Resume TF today per nutrition recs     Hyperlipidemia on Lipitor.     Hypertension on Coreg     Hypo natremia  Mild-stable.           Diet: Adult Formula Drip Feeding: Continuous Jevity 1.5; Gastrostomy; Goal Rate: 50 mL/hr; mL/hr; Jevity 1.5 via G tube at 10 mL/hr x 8 hrs, advance by 5 mL (May adv up to 10 mL pending patient tolerance) q 6 hrs to goal rate of 50ml/hr (1185 ml/day/ 5 ...    DVT Prophylaxis: Low Risk/Ambulatory with no VTE prophylaxis indicated  Vásquez Catheter: Not present  Lines: PRESENT      Port A Cath Single 04/26/24 Right Chest wall-Site Assessment: WDL      Cardiac  Monitoring: None  Code Status: No CPR- Pre-arrest intubation OK      Clinically Significant Risk Factors         # Hyponatremia: Lowest Na = 129 mmol/L in last 2 days, will monitor as appropriate      # Hypoalbuminemia: Lowest albumin = 2 g/dL at 5/22/2024  5:04 AM, will monitor as appropriate   # Thrombocytopenia: Lowest platelets = 106 in last 2 days, will monitor for bleeding   # Hypertension: Noted on problem list        # Obesity: Estimated body mass index is 30.7 kg/m  as calculated from the following:    Height as of this encounter: 1.524 m (5').    Weight as of this encounter: 71.3 kg (157 lb 3 oz)., PRESENT ON ADMISSION       # Financial/Environmental Concerns: none         Disposition Plan     Medically Ready for Discharge: Anticipated Today if Hospice, otherwise 2-4             Chacorta Ramirez DO  Hospitalist Service  [facility]  Securely message with [employer] (more info)  Text page via [employer] Paging/Directory   ______________________________________________________________________    Interval History   NAEO. GO discussion today. Patient feels slightly improved with suctioning and minor improvement with medications added for nausea/heart burn    Physical Exam   Vital Signs: Temp: 99.2  F (37.3  C) Temp src: Oral BP: (!) 181/91 (nurse notified) Pulse: 102   Resp: 20 SpO2: 96 % O2 Device: None (Room air)    Weight: 157 lbs 3.01 oz    Physical Exam  Constitutional:       General: Pt is not in acute distress. He is chronically ill.   HENT:      Head: Normocephalic and atraumatic.      Nose: Nose normal.   Eyes:      Conjunctiva/sclera: Conjunctivae normal.   Pulmonary:      Effort: Pulmonary effort is normal.   Abdominal:      General: Abdomen is tender and distended.   Skin:     Findings: No rash.   Neurological:      General: No focal deficit present.      Mental Status: He is alert.   Psychiatric:         Mood and Affect: Mood normal.       Medical Decision Making       75 MINUTES  SPENT BY ME on the date of service doing chart review, history, exam, documentation & further activities per the note.      Data     I have personally reviewed the following data over the past 24 hrs:    3.5 (L)  \   13.1 (L)   / 113 (L)     131 (L) 96 (L) 17.3 /  108 (H)   3.6 24 0.61 (L) \     ALT: N/A AST: N/A AP: N/A TBILI: N/A   ALB: 2.0 (L) TOT PROTEIN: N/A LIPASE: N/A       Imaging results reviewed over the past 24 hrs:   No results found for this or any previous visit (from the past 24 hour(s)).

## 2024-05-22 NOTE — CONSULTS
Palliative Care Consultation      Patient ID/Chief Complaint: Liu Ragsdale 83 year old male being seen for palliative care consultation at the request of hospitalist secondary to goals of care/symptom management.   The patient's primary care provider is:  Piyush Rogers.       Impression & Recommendations:  83-year-old male admitted 5/18 for nausea, vomiting and epigastric pain.  He is being treated for SBP with history of multiple paracenteses.  He has neutropenia secondary to chemotherapy.  He has recurrent esophageal cancer with recurrent malignant ascites with peritoneal metastases.  He is having difficulty tolerating tube feeds due to recurrent lower esophageal stricture, status post multiple dilatations in the past.    Symptom burden includes significant GERD, abdominal pain, and cancer associated fatigue.  He is also having problems with nausea, vomiting, and dysphagia.    He lives at home with his wife.  He has multiple children including Yusra (will be present to help care for him but lives in [facility]), son Joel who lives in [place], and daughter Brenda who lives near patient and will help care for him at home.     Symptoms/recommendations/discussion:  Healthcare directive present in EMR.  His wife is his primary agent.  Daughter Brenda is alternate agent.  Situation discussed with his primary oncologist Dr. Atkins who feels focus on comfort care and hospice to be a reasonable decision given prognosis and difficulty tolerating chemotherapy.  Patient elects comfort focused care and home hospice.  Meeting completed with patient, wife, and all 3 children to discuss hospice philosophy and planning.  Hospice will have to review plan for tube feedings with patient and family. Patient not able to tolerate bolus tube feedings.  On continuous infusion, trickle infusion when agreeable to tube feeding.   Resuscitation status discussed, updated to NO CPR and DO NOT INTUBATE  Start Butrans 5 mcg/h weekly  patch.  Start liquid morphine instead of oxycodone for ease of use with PEG  Start famotidine in addition to current GI medications.  Continue PPI, Reglan, and Carafate. Consider increasing Reglan tomorrow if needed.  PRN ativan added  Has been receiving frequent paracentesis.  Discussed with hospitalist about possible Pleurx catheter for drainage of ascites--plan not clear yet as he is receiving treatment for SBP.   Case discussed with RN JARON and hospitalist.        Thank you for the opportunity to participate in the care of this patient and family. Our team: will continue to follow.   GIO Leal CNP  Securely message with [employer] (more info)  Text page via [employer] Paging/Directory     History:  History gathered today from: patient, family/loved ones, medical chart, medical team members, unit team members, outside records including Care Everywhere, health care directive/s    ROS:  10 point ROS is negative unless exceptions noted below  GERD  Abd pain  Fatigue  insomnia    PE: BP (!) 174/76 (BP Location: Left arm)   Pulse 99   Temp 97.5  F (36.4  C) (Oral)   Resp 18   Ht 1.524 m (5')   Wt 71.3 kg (157 lb 3 oz)   SpO2 99%   BMI 30.70 kg/m     Wt Readings from Last 3 Encounters:   05/22/24 71.3 kg (157 lb 3 oz)   05/16/24 71.2 kg (157 lb)   05/14/24 68.5 kg (151 lb)     Gen alert,  chronically ill-appearing  Head NCAT.  Eyes anicteric without injection  Face symmetric, eyes conjugate  Heart regular and rhythmic  Lungs unlabored, no cough, speaking full sentences  Skin no rashes or lesions evident on face/neck  Neuro Face symmetric, eyes conjugate; speech fluent.  Neuropsych exam normal including affect, sensorium, gross memory, thought processes, and fund of knowledge.         Data reviewed:  I reviewed electrolytes, BUN/creatinine, liver profile, hemoglobin and hematocrit, platelet count, and most recent imaging      Medical decision making: High  Management discussed worsening symptom burden, advanced  disease state, opioid management, hospice  Hospitalist and nursing notes reviewed  Laboratory/imaging results reviewed  Outcomes of above conversation include: CODE STATUS change, arrange home hospice, multiple medication adjustments      Advance Care Planning Discussion: IAc APRN met with patient and family today to discuss advance care planning.  Patient and family understand advance care planning discussion is voluntary and wished to proceed.  Discussion included: Medical management options, recommendation for hospice care, patient values and goals.  Total ACP time was 40, beginning at 1230 and ending at 1310.         Thank you for involving us in the patient's care.   GIO Leal, [facility] Palliative Care Service

## 2024-05-22 NOTE — PLAN OF CARE
Problem: Adult Inpatient Plan of Care  Goal: Optimal Comfort and Wellbeing  Outcome: Progressing     Problem: Pain Acute  Goal: Optimal Pain Control and Function  Outcome: Progressing   Goal Outcome Evaluation:  A&O x4, makes needs known. SBA  Pt continues to have a frequent productive cough. Pt declined the use of the feeding tube due to irritation and acid reflux. Scheduled protonix and PRN tums were provided. PRN Flexeril was also given.Yankaur was given in order to help manage the excess production of saliva. Pt was educated on proper use.   Plan for Palliative consult later today.

## 2024-05-22 NOTE — PROGRESS NOTES
Care Management Follow Up    Length of Stay (days): 4    Expected Discharge Date: 05/23/2024     Concerns to be Addressed: discharge planning     Patient plan of care discussed at interdisciplinary rounds: Yes    Anticipated Discharge Disposition: Hospice  Disposition Comments: Home with hospice  Anticipated Discharge Services: None  Anticipated Discharge DME: Bed, Raised Toilet Seat    Patient/family educated on Medicare website which has current facility and service quality ratings: yes  Education Provided on the Discharge Plan: Yes  Patient/Family in Agreement with the Plan: yes    Referrals Placed by CM/SW: Internal Clinic Care Coordination, Hospice  Private pay costs discussed: Not applicable    Additional Information:  Per IDT rounds palliative care conference scheduled for today to discuss hospice.    CM coordinated with Palliative care provider Ac Wasserman to discuss consult. Per Ac pt wanting to go home on hospice.    CM met bedside with patient, wife Kelsi and step daughter Yusra to discuss hospice. Per family wanting referral sent to [facility] (Phone: [phone] Fax: [phone]). Patient lives in [place] with wife, children to all be involved and assist at home until end of life. Family/patient aware hospice is not 24/7 care and understanding of this. CM discussed coordination with hospice needed for tube feedings, family understanding.    DME needs: hospital bed, yankauer suction, bedside table, commode, continuous pump with pole.    CM sent referral to hospice of choice, [facility] (Phone: [phone] Fax: [phone]). Hospice liaison Yvonne looking into tube feedings and to contact CM regarding coordination of this.    MD updated on discharge plan changes. Family to transport upon discharge once hospice coordinated for home. Pt will need 3 days worth of comfort meds sent home.    Per MD pt needing pleurx drain, unable to be placed while hospitalized due to infection. MD  placed outpatient order. CM working with hospice to understand coverage for this. Per MD patient has a few more days of IV antibiotic course to complete while hospitalized.    CM to continue to coordinate with hospice regarding safe discharge plan home.     Plan: Home with [facility] (Phone: [phone] Fax: [phone])    Transport: Family    EVELIN Jones  Care Transitions Registered Nurse  Tele: [phone]

## 2024-05-22 NOTE — PLAN OF CARE
Problem: Adult Inpatient Plan of Care  Goal: Plan of Care Review  Outcome: Progressing    Problem: Pain Acute  Goal: Optimal Pain Control and Function  Outcome: Progressing  Intervention: Develop Pain Management Plan  Flowsheets  Taken 5/22/2024 1627  Pain Management Interventions: declines  Taken 5/22/2024 0931  Pain Management Interventions: declines  Intervention: Prevent or Manage Pain  Flowsheets  Taken 5/22/2024 1627  Sensory Stimulation Regulation:   care clustered   quiet environment promoted  Medication Review/Management: medications reviewed  Taken 5/22/2024 0735  Medication Review/Management: medications reviewed     Problem: Malnutrition  Goal: Improved Nutritional Intake  Outcome: Not Progressing  Intervention: Optimize Nutrition Delivery  Flowsheets (Taken 5/22/2024 1627)  Nutrition Support Management: (Pt declined wanting tube feeding.) other (see comments)   Goal Outcome Evaluation:      Plan of Care Reviewed With: patient    Overall Patient Progress: no change    Outcome Evaluation: Pt is A&O x4 and is able to make his needs known appropriately. Pt's pain has been 4/10, which he denied wanting anything for the pain. Pt had palliative care consult this afternoon, awaiting placement to a home care facility with hospice. Pt did not have any episodes of N/V this shift. Pt is utilizing Yaunker as indicated for phlegm build-up. Output has been tan in color. Pt has a buprenorphine patch in place on the front side of his left shoulder. Pt declined wanting any tube feedings this shift for bowel rest. Pt ambulated in hallways with author, gait belt and walker. Pt received IV ABX this shift. No c/o SOA or dizziness while ambulating. Will continue to monitor per plan of care.

## 2024-05-23 NOTE — PROGRESS NOTES
Palliative Care Progress Note    Impression & Recommendations:  83-year-old male admitted 5/18 for nausea, vomiting and epigastric pain.  He is being treated for SBP with history of multiple paracenteses.  He has neutropenia secondary to chemotherapy.  He has recurrent esophageal cancer with recurrent malignant ascites with peritoneal metastases.  He is having difficulty tolerating tube feeds due to recurrent lower esophageal stricture, status post multiple dilatations in the past.    Symptom burden includes significant GERD, abdominal pain, and cancer associated fatigue.  He is also having problems with nausea, vomiting, and dysphagia.    He lives at home with his wife.  He has multiple children including Yusra (will be present to help care for him but lives in [facility]), son Joel who lives in [place], and daughter Brenda who lives near patient and will help care for him at home.     Symptoms/recommendations/discussion:  Healthcare directive present in EMR.  His wife is his primary agent.  Daughter Brenda is alternate agent.  Situation discussed with his primary oncologist Dr. Atkins who feels focus on comfort care and hospice to be a reasonable decision given prognosis and difficulty tolerating chemotherapy.  Patient elects comfort focused care and home hospice.  Meeting completed with patient, wife, and all 3 children to discuss hospice philosophy and planning.  Hospice will have to review plan for tube feedings with patient and family. Patient not able to tolerate bolus tube feedings.  On continuous infusion, trickle infusion when agreeable to tube feeding.   Resuscitation status discussed, updated to NO CPR and DO NOT INTUBATE  Started Butrans 5 mcg/h weekly patch 5/22.  Continue liquid morphine instead of oxycodone for ease of use with PEG  Continue famotidine, Protonix, Reglan, and Carafate.  Baclofen added for hiccups, may also help with GERD  PRN ativan added  Has been receiving frequent  paracentesis.  Discussed with hospitalist about possible Pleurx catheter for drainage of ascites--plan not clear yet as he is receiving treatment for SBP. Would hope that tube can be placed before home with hospice  OP follow up for possible esophageal dilation  Case discussed with RN JARON and hospitalist.        Thank you for the opportunity to participate in the care of this patient and family. Our team: will continue to follow.   GIO Leal CNP  Securely message with [employer] (more info)  Text page via [employer] Paging/Directory     History:  History gathered today from: patient, family/loved ones, medical chart, medical team members, unit team members, outside records including Care Everywhere, health care directive/s    ROS:  10 point ROS is negative unless exceptions noted below  GERD  Abd pain  Fatigue  insomnia    PE: BP (!) 174/76 (BP Location: Left arm)   Pulse 99   Temp 97.5  F (36.4  C) (Oral)   Resp 18   Ht 1.524 m (5')   Wt 71.3 kg (157 lb 3 oz)   SpO2 99%   BMI 30.70 kg/m     Wt Readings from Last 3 Encounters:   05/22/24 71.3 kg (157 lb 3 oz)   05/16/24 71.2 kg (157 lb)   05/14/24 68.5 kg (151 lb)     Gen alert,  chronically ill-appearing  Head NCAT.  Eyes anicteric without injection  Face symmetric, eyes conjugate  Heart regular and rhythmic  Lungs unlabored, no cough, speaking full sentences  Skin no rashes or lesions evident on face/neck  Neuro Face symmetric, eyes conjugate; speech fluent.  Neuropsych exam normal including affect, sensorium, gross memory, thought processes, and fund of knowledge.         Data reviewed:  I reviewed electrolytes, BUN/creatinine, liver profile, hemoglobin and hematocrit, platelet count, and most recent imaging      Medical decision making: High           Thank you for involving us in the patient's care.   GIO Leal, [facility] Palliative Care Service

## 2024-05-23 NOTE — PLAN OF CARE
Problem: Adult Inpatient Plan of Care  Goal: Absence of Hospital-Acquired Illness or Injury  Outcome: Progressing  Intervention: Identify and Manage Fall Risk  Recent Flowsheet Documentation  Taken 5/23/2024 0040 by Chantel Crum RN  Safety Promotion/Fall Prevention:   activity supervised   assistive device/personal items within reach   clutter free environment maintained   nonskid shoes/slippers when out of bed   patient and family education     Problem: Adult Inpatient Plan of Care  Goal: Optimal Comfort and Wellbeing  Outcome: Progressing  Intervention: Monitor Pain and Promote Comfort  Recent Flowsheet Documentation  Taken 5/23/2024 0416 by Chantel Crum, RN  Pain Management Interventions:   medication (see MAR)   care clustered   rest     Problem: Pain Acute  Goal: Optimal Pain Control and Function  Outcome: Progressing  Intervention: Develop Pain Management Plan  Flowsheets (Taken 5/23/2024 0416)  Pain Management Interventions:   medication (see MAR)   care clustered   rest  Intervention: Prevent or Manage Pain  Flowsheets  Taken 5/23/2024 0416  Sensory Stimulation Regulation:   care clustered   quiet environment promoted  Sleep/Rest Enhancement: awakenings minimized  Bowel Elimination Promotion: adequate fluid intake promoted  Medication Review/Management: medications reviewed  Taken 5/23/2024 0040  Medication Review/Management: medications reviewed  Intervention: Optimize Psychosocial Wellbeing  Flowsheets (Taken 5/23/2024 0416)  Supportive Measures:   active listening utilized   decision-making supported   positive reinforcement provided   self-care encouraged     Problem: Malnutrition  Goal: Improved Nutritional Intake  Outcome: Progressing  Intervention: Optimize Nutrition Delivery  Flowsheets (Taken 5/23/2024 0416)  Nutrition Support Management: (Patient declined tube feedings) other (see comments)       Goal Outcome Evaluation:      Plan of Care Reviewed With: patient    Overall Patient  Progress: no change    Outcome Evaluation: Patient continues to have abdominal discomfort/fullness and is declining tube feedings. Denies N/V. Coughing persists - uses Yanker to clear phlegm. Reports bilateral ear pain - order for liquid tylenol obtained from telemed provider. Resting on and off during night.

## 2024-05-23 NOTE — PLAN OF CARE
Problem: Adult Inpatient Plan of Care  Goal: Absence of Hospital-Acquired Illness or Injury  Intervention: Identify and Manage Fall Risk  Recent Flowsheet Documentation  Taken 5/23/2024 0946 by Scarlet Leone RN  Safety Promotion/Fall Prevention:   activity supervised   nonskid shoes/slippers when out of bed  Intervention: Prevent and Manage VTE (Venous Thromboembolism) Risk  Recent Flowsheet Documentation  Taken 5/23/2024 0946 by Scarlet Leone RN  VTE Prevention/Management: patient refused intervention  Intervention: Prevent Infection  Recent Flowsheet Documentation  Taken 5/23/2024 0946 by Scarlet Leone RN  Infection Prevention: rest/sleep promoted     Problem: Risk for Delirium  Goal: Optimal Coping  Intervention: Optimize Psychosocial Adjustment to Delirium  Recent Flowsheet Documentation  Taken 5/23/2024 0946 by Scarlet Leone RN  Supportive Measures:   active listening utilized   decision-making supported  Goal: Improved Behavioral Control  Intervention: Prevent and Manage Agitation  Recent Flowsheet Documentation  Taken 5/23/2024 0946 by Scarlet Leone RN  Environment Familiarity/Consistency: daily routine followed  Intervention: Minimize Safety Risk  Recent Flowsheet Documentation  Taken 5/23/2024 0946 by Scarlet Leone RN  Communication Enhancement Strategies: call light answered in person  Enhanced Safety Measures:   pain management   room near unit station  Goal: Improved Attention and Thought Clarity  Intervention: Maximize Cognitive Function  Recent Flowsheet Documentation  Taken 5/23/2024 0946 by Scarlet Leone RN  Sensory Stimulation Regulation:   care clustered   lighting decreased  Reorientation Measures: clock in view  Goal: Improved Sleep  Outcome: Progressing  Intervention: Promote Sleep  Recent Flowsheet Documentation  Taken 5/23/2024 0946 by Scarlet Leone RN  Sleep/Rest Enhancement: awakenings minimized   Goal Outcome Evaluation:     Pt A & O x 4,  pain in abdominal area, shoulder, back, medication patch in place L upper chest, D5LR running 75 ml/h with intermittent antibiotic use, ORA, small BM, DECLINED tube feedings, does not want any medications orally, provider added cough suppressant in liquid form, teslon pearls can not be crushed  CHG wipes and cares provider, sleeping between cares, family visiting off and on, speciality pillow and cushion to protect coccyx.

## 2024-05-23 NOTE — PLAN OF CARE
MD Notification    Notified Person: MD    Notified Person Name: Viviana Lopez MD    Notification Date/Time: 5/23 @ 0127    Notification Interaction: [employer] Messenger    Purpose of Notification: Patient reports bilateral ear pain. Wondering if we can get an order for tylenol - liquid to give in G-tube. Alkaline phos elevated at 254. Rest of LFTs normal.     Orders Received: Tylenol 650 mg - liquid

## 2024-05-23 NOTE — PLAN OF CARE
"  Problem: Adult Inpatient Plan of Care  Goal: Plan of Care Review  Description: The Plan of Care Review/Shift note should be completed every shift.  The Outcome Evaluation is a brief statement about your assessment that the patient is improving, declining, or no change.  This information will be displayed automatically on your shift  note.  Outcome: Progressing  Goal: Patient-Specific Goal (Individualized)  Description: You can add care plan individualizations to a care plan. Examples of Individualization might be:  \"Parent requests to be called daily at 9am for status\", \"I have a hard time hearing out of my right ear\", or \"Do not touch me to wake me up as it startles  me\".  Outcome: Progressing  Goal: Absence of Hospital-Acquired Illness or Injury  Outcome: Progressing  Intervention: Identify and Manage Fall Risk  Recent Flowsheet Documentation  Taken 5/23/2024 1800 by Burke Haley RN  Safety Promotion/Fall Prevention: activity supervised  Intervention: Prevent Skin Injury  Recent Flowsheet Documentation  Taken 5/23/2024 1800 by Burke Haley RN  Body Position: weight shifting  Intervention: Prevent Infection  Recent Flowsheet Documentation  Taken 5/23/2024 1800 by Burke Haley RN  Infection Prevention: rest/sleep promoted  Goal: Optimal Comfort and Wellbeing  Outcome: Progressing  Goal: Readiness for Transition of Care  Outcome: Progressing     Problem: Risk for Delirium  Goal: Optimal Coping  Outcome: Progressing  Intervention: Optimize Psychosocial Adjustment to Delirium  Recent Flowsheet Documentation  Taken 5/23/2024 1800 by Burke Haley RN  Supportive Measures: active listening utilized  Goal: Improved Behavioral Control  Outcome: Progressing  Intervention: Prevent and Manage Agitation  Recent Flowsheet Documentation  Taken 5/23/2024 1800 by Burke Haley RN  Environment Familiarity/Consistency: daily routine followed  Intervention: Minimize Safety Risk  Recent Flowsheet Documentation  Taken 5/23/2024 " 1800 by Burke Haley RN  Communication Enhancement Strategies: call light answered in person  Enhanced Safety Measures: pain management  Goal: Improved Attention and Thought Clarity  Outcome: Progressing  Intervention: Maximize Cognitive Function  Recent Flowsheet Documentation  Taken 5/23/2024 1800 by Burke Haley RN  Sensory Stimulation Regulation:   care clustered   lighting decreased  Reorientation Measures: clock in view  Goal: Improved Sleep  Outcome: Progressing     Problem: Pain Acute  Goal: Optimal Pain Control and Function  Outcome: Progressing  Intervention: Prevent or Manage Pain  Recent Flowsheet Documentation  Taken 5/23/2024 1800 by Burke Haley RN  Sensory Stimulation Regulation:   care clustered   lighting decreased  Medication Review/Management: medications reviewed  Intervention: Optimize Psychosocial Wellbeing  Recent Flowsheet Documentation  Taken 5/23/2024 1800 by Burke Haley RN  Supportive Measures: active listening utilized     Problem: Malnutrition  Goal: Improved Nutritional Intake  Outcome: Progressing   Goal Outcome Evaluation:         Pain controlled with acetaminophen and medication patch.  A&Ox4  Weight shifted once in chair with assistance from writer during this shift.    Burke BOSE [facility]

## 2024-05-23 NOTE — CONFIDENTIAL NOTE
[facility]: Cancer Care                                                                                        Left a message on patients home Voicemail that we will cancel his upcoming chemo appointments since he is enrolling in hospice care now.      Signature:  Nyla Morrell RN

## 2024-05-23 NOTE — PLAN OF CARE
"      Problem: Malnutrition  Goal: Improved Nutritional Intake  Outcome: Not Progressing     Problem: Adult Inpatient Plan of Care  Goal: Plan of Care Review  Description: The Plan of Care Review/Shift note should be completed every shift.  The Outcome Evaluation is a brief statement about your assessment that the patient is improving, declining, or no change.  This information will be displayed automatically on your shift  note.  Outcome: Progressing  Goal: Patient-Specific Goal (Individualized)  Description: You can add care plan individualizations to a care plan. Examples of Individualization might be:  \"Parent requests to be called daily at 9am for status\", \"I have a hard time hearing out of my right ear\", or \"Do not touch me to wake me up as it startles  me\".  Outcome: Progressing  Goal: Absence of Hospital-Acquired Illness or Injury  Outcome: Progressing  Intervention: Identify and Manage Fall Risk  Recent Flowsheet Documentation  Taken 5/22/2024 1740 by Kaylee Adkins, RN  Safety Promotion/Fall Prevention:   activity supervised   clutter free environment maintained   nonskid shoes/slippers when out of bed   lighting adjusted   room near nurse's station  Intervention: Prevent Skin Injury  Recent Flowsheet Documentation  Taken 5/22/2024 1740 by Kaylee Adkins, RN  Skin Protection: adhesive use limited  Device Skin Pressure Protection: adhesive use limited  Intervention: Prevent Infection  Recent Flowsheet Documentation  Taken 5/22/2024 1740 by Kaylee Adkins, RN  Infection Prevention: rest/sleep promoted  Goal: Optimal Comfort and Wellbeing  Outcome: Progressing  Intervention: Monitor Pain and Promote Comfort  Recent Flowsheet Documentation  Taken 5/22/2024 1740 by Kaylee Adkins, RN  Pain Management Interventions: repositioned  Goal: Readiness for Transition of Care  Outcome: Progressing     Problem: Risk for Delirium  Goal: Optimal Coping  Outcome: Progressing  Intervention: Optimize Psychosocial " Adjustment to Delirium  Recent Flowsheet Documentation  Taken 5/22/2024 1740 by Kaylee Adkins RN  Supportive Measures:   active listening utilized   self-care encouraged  Goal: Improved Behavioral Control  Outcome: Progressing  Intervention: Minimize Safety Risk  Recent Flowsheet Documentation  Taken 5/22/2024 1740 by Kaylee Adkins RN  Communication Enhancement Strategies: call light answered in person  Enhanced Safety Measures: review medications for side effects with activity  Goal: Improved Attention and Thought Clarity  Outcome: Progressing  Intervention: Maximize Cognitive Function  Recent Flowsheet Documentation  Taken 5/22/2024 1740 by Kaylee Adkins RN  Sensory Stimulation Regulation:   care clustered   quiet environment promoted  Reorientation Measures: clock in view  Goal: Improved Sleep  Outcome: Progressing     Problem: Pain Acute  Goal: Optimal Pain Control and Function  Outcome: Progressing  Intervention: Develop Pain Management Plan  Recent Flowsheet Documentation  Taken 5/22/2024 1740 by Kaylee Adkins RN  Pain Management Interventions: repositioned  Intervention: Prevent or Manage Pain  Recent Flowsheet Documentation  Taken 5/22/2024 1740 by Kaylee Adkins RN  Sensory Stimulation Regulation:   care clustered   quiet environment promoted  Medication Review/Management: medications reviewed  Intervention: Optimize Psychosocial Wellbeing  Recent Flowsheet Documentation  Taken 5/22/2024 1740 by Kaylee Adkins RN  Supportive Measures:   active listening utilized   self-care encouraged   A&O x4. SBA with walker and gait belt, denied SOB when ambulating. Pt declined tube feedings on this shift due to acid reflux and wanting to rest his bowels. Pt stated 4/10 pain across the abdomen as his baseline and declined any medication for pain. Denied N/V. Pt is using the Yaunker to aid in suctioning phlegm, is tolerating well. Pt requested melatonin to help him sleep, is effective.  Portacath was heparin locked after last IV med was given.

## 2024-05-23 NOTE — PROGRESS NOTES
Care Management Follow Up    Length of Stay (days): 5    Expected Discharge Date: 05/24/2024     Concerns to be Addressed: discharge planning     Patient plan of care discussed at interdisciplinary rounds: Yes    Anticipated Discharge Disposition: Hospice  Disposition Comments: Home with hospice  Anticipated Discharge Services: None  Anticipated Discharge DME: Bed, Raised Toilet Seat    Patient/family educated on Medicare website which has current facility and service quality ratings: yes  Education Provided on the Discharge Plan: Yes  Patient/Family in Agreement with the Plan: yes    Referrals Placed by CM/SW: Internal Clinic Care Coordination, Hospice  Private pay costs discussed: Not applicable    Additional Information:  Per MD at IDT rounds pt is not medically stable for discharge today, likely tmr once 5 day course of IV rochephin completed. Per IDT rounds pt has active infection in abdomen, pt would like pleurx drain for discharge.    JARON met bedside with patient, pts family and [facility] (Phone: [phone] Fax: [phone]) Liaison Yvonne to discuss discharge plan.    Per patient wanting to go home on hospice, Yvonne discussed what that would look like. Patient and family discussing dilation procedure, pt stating this does provide him with relief at times, hospice looking into covering this procedure or not. Patient also wanting pleurx drain upon discharge, JARON discussed potential issue with this due to infection. MD notified and consulting with surgery for this for pleurx to be placed tmr.    JARON spoke with both MD and palliative care APRN regarding case, IDT in agreement with plan. Per MD if pt wants to get dilation done inpatient this could be looked at with [facility] team.    Pending items:  -Dilation procedure- Patient does not want to transfer for procedure, would be done outpatient, pt aware. hospice most likely will NOT cover this.  -Pleurx drain-surgery able to place or not due to infection,  however this is a comfort measure for hospice. Surgery to decide.    Hospice team aware of DME needs: hospital bed, yankauer suction, bedside table, commode, continuous pump with pole for tube feeds.     CM team to connect with Yvonne hospice Liaison tmr morning once decided on pleurx drain- [phone]    Plan: Home with [facility] (Phone: [phone] Fax: [phone])     Transport: Family    EVELIN Jones  Care Transitions Registered Nurse  Tele: [phone]

## 2024-05-23 NOTE — PROGRESS NOTES
[facility]    Medicine Progress Note - Hospitalist Service    Date of Admission:  5/17/2024    Assessment & Plan   Patient is a an 83-year-old male admitted 5/18 for  nausea, vomiting and epigastric pain.   Continues to have epigastric tightness when swallowing. Has abdominal discomfort-not too different than baseline. Hospitalization complicated by spontaneous bacterial peritonitis. Patient has decided to move forward with hospice, currently working on enrollment and plan is to go home with hospice.    Nausea/ vomiting/ dysphagia  Recurrent lower esophageal stricture  Likely due to esophagitis/ stricture. H/o adenoCA of distal esophagous--undergoin chemo-immunotherapy. S/p radiation.  S/p multiple dilatations in past. Last was in 4/24. Was Scheduled for EGD with dilatation today Monday. Pt having sx for months but worse last few days-has persistent epigastric discomfort. CT abd showed thickened distal esophagous. On prilosec 40mg bid at home.  Pt vomited all TF started 5/18 evening hence TF were held 5/19. Continues t have nausea/ abd discomfort.     - 05/21: Added carafate with viscous lidocaine and scheduled reglan  - continue  conservative rx with zofran/ compazine prn. Continue iv protonix bid.   - could consider transfer for eso-dilation if unable to find alternative and aligns with patient's goals     SBP  E. Coli Peritonitis  Ascitic fluid cx from 5/18 growing  lactose fermenting GNB. ANC was reported 75. Discussed with ID 5/19-recommended to rx 5-7 days.   S/p paracentesis today 5/20-2L of clear fluid removed. ANC is 3899! Today. Fluid cx from 3/18 growing e coli. Prior hospitalist discussed with ID. Will continue rocephin. Continue to monitor cx.   Ascites Cx: + E. Coli pan-susceptible    - Continue rocephin 5d (last dose 05/24)  - After completion of last dose of IV abx for SBP with E. Coli, consider inpatient consult to radiology for intra-peritoneal pleurx catheter to drain  ascites (likely won't have hospice cover cost of OP referral for pleurx but could be done inpatient prior to enrolling in hospice)   - CM discussing with hospice agency to see if patient could have this done OP, otherwise would ideally occur inpatient      Neutropenia  2ndry to chemo. Last chemo 5/14. WBC/ ANC on 5/14 were 4.0/ 2.8. Presented  now with WBC/ANC of 0.8/0.6.  No fever. Got neupogen x 1 on 5/18. 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 Status post 2 cycle 5/14. S/p multiple paracentesis in past-last was 5/6-2.4L fluid removed.   CT 5/18 showed moderate ascitis and abdomen distended. S/p paracentesis today 5/20-2L of clear fluid removed.    Pt enrolling in hospice     Cough with mucous production  CXR shows B atelectasis / infiltrates. No fever. Neutropenic. O2 sats stable on RA  Was wheezing today a little. O2 sats still stable on RA.     High risk of aspiration -hence will stop po clears.     Severe Protein Calorie Malnutrition, POA  Nutrition  Malnutrition 2/2 chronic illness  G tube placed 5/8. Supposed to take 5 cans of TF-uses only 2. Also drinks water   Resume TF per nutrition recs     Hyperlipidemia on Lipitor.     Hypertension on Coreg     Hypo natremia  Mild-stable.           Diet: Adult Formula Drip Feeding: Continuous Jevity 1.5; Gastrostomy; Goal Rate: 50 mL/hr; mL/hr; Jevity 1.5 via G tube at 10 mL/hr x 8 hrs, advance by 5 mL (May adv up to 10 mL pending patient tolerance) q 6 hrs to goal rate of 50ml/hr (1185 ml/day/ 5 ...    DVT Prophylaxis: Low Risk/Ambulatory with no VTE prophylaxis indicated  Vásquez Catheter: Not present  Lines: PRESENT      Port A Cath Single 04/26/24 Right Chest wall-Site Assessment: WDL      Cardiac Monitoring:  None  Code Status: No CPR- Do NOT Intubate      Clinically Significant Risk Factors              # Hypoalbuminemia: Lowest albumin = 2 g/dL at 5/22/2024  5:04 AM, will monitor as appropriate   # Thrombocytopenia: Lowest platelets = 113 in last 2 days, will monitor for bleeding   # Hypertension: Noted on problem list        # Obesity: Estimated body mass index is 30.7 kg/m  as calculated from the following:    Height as of this encounter: 1.524 m (5').    Weight as of this encounter: 71.3 kg (157 lb 3 oz).        # Financial/Environmental Concerns: none         Disposition Plan     Medically Ready for Discharge: Anticipated Tomorrow             Chacorta Ramirez DO  Hospitalist Service  [facility]  Securely message with [employer] (more info)  Text page via [employer] Paging/Directory   ______________________________________________________________________    Interval History   NAEO. Pain/heart burn worse, complaining of worsening hiccups as well which hurt, not wanting tube feeding due to this    Physical Exam   Vital Signs: Temp: 98  F (36.7  C) Temp src: Oral BP: (!) 162/59 Pulse: 99   Resp: 18 SpO2: 96 % O2 Device: None (Room air)    Weight: 157 lbs 3.01 oz    Physical Exam  Constitutional:       General: Pt is not in acute distress.  HENT:      Head: Normocephalic and atraumatic.      Nose: Nose normal.   Eyes:      Conjunctiva/sclera: Conjunctivae normal.   Pulmonary:      Effort: Pulmonary effort is normal.   Abdominal:      General: Abdomen is flat.   Skin:     Findings: No rash.   Neurological:      General: No focal deficit present.      Mental Status: Pt is alert.   Psychiatric:         Mood and Affect: Mood normal.       Medical Decision Making       40 MINUTES SPENT BY ME on the date of service doing chart review, history, exam, documentation & further activities per the note.      Data     I have personally reviewed the following data over the past 24 hrs:    3.6 (L)  \   13.3   / 126 (L)      134 (L) 99 15.1 /  109 (H)   3.6 21 (L) 0.67 \     ALT: N/A AST: N/A AP: N/A TBILI: N/A   ALB: 2.1 (L) TOT PROTEIN: N/A LIPASE: N/A       Imaging results reviewed over the past 24 hrs:   No results found for this or any previous visit (from the past 24 hour(s)).

## 2024-05-24 NOTE — PLAN OF CARE
Goal Outcome Evaluation:         Problem: Malnutrition  Goal: Improved Nutritional Intake  Outcome: Adequate for Care Transition     Patient discharging on hospice. RD sign off.    Brenda Santos RDN, LD  Clinical Dietitian  Office: [phone]  [facility] pager: [phone]

## 2024-05-24 NOTE — PROGRESS NOTES
Palliative Care Progress Note    Impression & Recommendations:  83-year-old male admitted 5/18 for nausea, vomiting and epigastric pain.  He is being treated for SBP with history of multiple paracenteses.  He has neutropenia secondary to chemotherapy.  He has recurrent esophageal cancer with recurrent malignant ascites with peritoneal metastases.  He is having difficulty tolerating tube feeds due to recurrent lower esophageal stricture, status post multiple dilatations in the past.    Symptom burden includes significant GERD, abdominal pain, and cancer associated fatigue.  He is also having problems with nausea, vomiting, and dysphagia.    He lives at home with his wife.  He has multiple children including Yusra (will be present to help care for him but lives in [facility]), son Joel who lives in [place], and daughter Brenda who lives near patient and will help care for him at home.     Symptoms/recommendations/discussion:  Healthcare directive present in EMR.  His wife is his primary agent.  Daughter Brenda is alternate agent.  Situation discussed with his primary oncologist Dr. Atkins who feels focus on comfort care and hospice to be a reasonable decision given prognosis and difficulty tolerating chemotherapy.  Patient elects comfort focused care and home hospice.  Meeting completed with patient, wife, and all 3 children to discuss hospice philosophy and planning.  Hospice will have to review plan for tube feedings with patient and family. Patient not able to tolerate bolus tube feedings.  On continuous infusion, trickle infusion when agreeable to tube feeding.   Resuscitation status discussed, updated to NO CPR and DO NOT INTUBATE  Started Butrans 5 mcg/h weekly patch 5/22.  Continue liquid morphine instead of oxycodone for ease of use with PEG  Continue famotidine, Protonix, Reglan, and Carafate.  Baclofen added for hiccups, may also help with GERD  PRN ativan added  Has been receiving frequent  paracentesis.  Discussed with hospitalist about possible Pleurx catheter for drainage of ascites--plan not clear yet as he is receiving treatment for SBP. Would hope that tube can be placed before home with hospice  OP follow up for possible esophageal dilation  Case discussed with RN JARON and hospitalist.        Thank you for the opportunity to participate in the care of this patient and family. Our team: will continue to follow.   GIO Leal CNP  Securely message with [employer] (more info)  Text page via [employer] Paging/Directory     History:  History gathered today from: patient, family/loved ones, medical chart, medical team members, unit team members, outside records including Care Everywhere, health care directive/s    ROS:  10 point ROS is negative unless exceptions noted below  GERD  Abd pain  Fatigue  insomnia    PE: BP (!) 174/76 (BP Location: Left arm)   Pulse 99   Temp 97.5  F (36.4  C) (Oral)   Resp 18   Ht 1.524 m (5')   Wt 71.3 kg (157 lb 3 oz)   SpO2 99%   BMI 30.70 kg/m     Wt Readings from Last 3 Encounters:   05/22/24 71.3 kg (157 lb 3 oz)   05/16/24 71.2 kg (157 lb)   05/14/24 68.5 kg (151 lb)     Gen alert,  chronically ill-appearing  Head NCAT.  Eyes anicteric without injection  Face symmetric, eyes conjugate  Heart regular and rhythmic  Lungs unlabored, no cough, speaking full sentences  Skin no rashes or lesions evident on face/neck  Neuro Face symmetric, eyes conjugate; speech fluent.  Neuropsych exam normal including affect, sensorium, gross memory, thought processes, and fund of knowledge.         Data reviewed:  I reviewed electrolytes, BUN/creatinine, liver profile, hemoglobin and hematocrit, platelet count, and most recent imaging      Medical decision making: moderate           Thank you for involving us in the patient's care.   GIO Leal, [facility] Palliative Care Service

## 2024-05-24 NOTE — PROGRESS NOTES
Addendum at 3pm:  SIMONA confirmed with Juanita at [facility] that pt can discharge to home on hospice & still get a pleurex drain if/when needed & not lose his hospice benefits.  SIMONA met with pt at [facility], educated on above and pt shared he wishes to discharge to home & not wait for Tuesday to see if he will have enough fluid for a pleurx drain.    SIMONA called & spoke with pt's wife, daughter, & granddaughter via their cellphone speakerphone & educated on above.  Pt and family in agreement to discharge to home on 5/25 and enroll with [facility] services, knowing if/when he needs a pleurx drain, he will not lose his hospice benefits or DME at home.    Plan:    Pt to discharge to home on 5/25/24 via family transport around 12 NOON   Family is aware to  3 days of comfort meds at the [facility]  DME to be delivered tonight vs tomorrow via [facility]:  hospital bed, [facility] suction, bedside table, commode, continuous pump with pole for tube feeds.  [facility] to have RN at home between 1-2 PM to enroll into hospice.    MD paged and informed of above and need for 3 days of comfort meds via [employer] to be sent to [facility] to be sent home with patient.  MARTIN Rodríguez on 5/24/2024 at 3:06 PM          Addendum at 1:35 PM:  SIMONA notified by MD that US showed no fluid for paracentesis today.  MD reviewed EMR and pt accumulates fluids requiring paracentesis over a 2 week time period.  MD requested writer reach out to [facility] to see if they would consider accepting the pt tomorrow & allow for coverage of cost of a pleurx drain when pt needs it placed (about 10 days).  SIMONA sent secure message to Juanita, , inquiring re: pleurx on above.      Care Management Follow Up    Length of Stay (days): 6    Expected Discharge Date: 5/28/24     Concerns to be Addressed: discharge planning     Patient plan of care discussed at interdisciplinary rounds:  Yes    Anticipated Discharge Disposition: Hospice  Anticipated Discharge Services: None  Anticipated Discharge DME: Provided by hospice:  hospital bed, [employer] suction, bedside table, commode, continuous pump with pole for tube feeds.     Patient/family educated on Medicare website which has current facility and service quality ratings: yes  Education Provided on the Discharge Plan: Yes  Patient/Family in Agreement with the Plan: yes    Referrals Placed by CM/SW: Internal Clinic Care Coordination, Hospice  Private pay costs discussed: Not applicable    Additional Information:  Per IDT rounds today, MD team states that pt IS medically stable for discharge today only due to the need of a pleurx drain placement for enrollment into hospice cares.  Per MD, no provider available to perform surgery until 5/28/24 & MD recommends pt not discharge without pleurx in place or possible readmission highly likely.     Simona met at bedside with pt, wife-Kelsi, daughter-Yusra (lives in [place] but now staying with pt & wife until needed, and granddaughter, Aggie (Yusra's daughter) to introduce self, title and role.  SIMONA explained I will be working with them today to discuss plan for the day/plan for the stay.  Educated on pleurx drain & inability to place until 5/28/24.     SIMONA sent secure email to [facility] (Phone: [phone] Fax: [phone]) and informed of above.  Received return email confirming acceptance of care & delivery of DME either the day before or morning of discharge.       MARTIN Rodríguez

## 2024-05-24 NOTE — PLAN OF CARE
Problem: Adult Inpatient Plan of Care  Goal: Absence of Hospital-Acquired Illness or Injury  Intervention: Identify and Manage Fall Risk  Recent Flowsheet Documentation  Taken 5/24/2024 0000 by Laina Long RN  Safety Promotion/Fall Prevention: activity supervised  Taken 5/23/2024 2130 by Laina Long RN  Safety Promotion/Fall Prevention: activity supervised  Intervention: Prevent Skin Injury  Recent Flowsheet Documentation  Taken 5/24/2024 0000 by Laina Long RN  Body Position: right  Intervention: Prevent Infection  Recent Flowsheet Documentation  Taken 5/24/2024 0000 by Laina Long RN  Infection Prevention: rest/sleep promoted  Taken 5/23/2024 2130 by Laina Long RN  Infection Prevention: rest/sleep promoted   Goal Outcome Evaluation:    Patient ambulates in room with one assist.  Patient has large amounts of gastric reflux secretions.  He has crackly lung sounds throughout and is able to only speak a few words at a time.  Meds being given through Gastric tube.  Patient reporting increased pain tonight and has been receiving Morphine q 3-4 hours with improvement.

## 2024-05-24 NOTE — PROGRESS NOTES
[facility] Medicine Progress Note  Date of Service (when I saw the patient): 05/24/2024    REASON FOR ADMISSION / INTERVAL HISTORY:  Patient is a an 83-year-old male admitted 5/18 for  nausea, vomiting and epigastric pain.   Patient has decided to move forward with hospice, currently working on enrollment and plan is to go home with hospice. See details below      Assessment/ Plan     Nausea/ vomiting/ dysphagia  Recurrent lower esophageal stricture  Likely due to esophagitis/ stricture. H/o adenoCA of distal esophagous--undergoin chemo-immunotherapy. S/p radiation.  S/p multiple dilatations in past. Last was in 4/24. Was Scheduled for EGD with dilatation today Monday. Pt having sx for months but worse last few days-has persistent epigastric discomfort. CT abd showed thickened distal esophagous. On prilosec 40mg bid at home.  Pt vomited all TF started 5/18 evening hence TF were held 5/19. Continues t have nausea/ abd discomfort.   -continue  conservative rx with zofran/ compazine prn. Change to po protonix bid/ carafate . Continue TF     SBP  E. Coli Peritonitis   Ascitic fluid cx from 5/18 growing  lactose fermenting GNB. ANC was reported 75. Discussed with ID 5/19-recommended to rx 5-7 days.   S/p paracentesis 5/20-2L of clear fluid removed. ANC is 3899! Today. Fluid cx from 3/18 growing e coli. Discussed with ID again 5/ 20  Completed 5 days rocephin today. US abd showed minimal ascitis. Pt needs pleurx drain before discharge. Need to repeat US Tuesday and assess for drain placement    Neutropenia  2ndry to chemo. Last chemo 5/14. WBC/ ANC on 5/14 were 4.0/ 2.8. Presented  now with WBC/ANC of 0.8/0.6.  No fever. Got neupogen x 1 on 5/18. 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 Status post 2 cycle 5/14. S/p multiple paracentesis in past-last was 5/6-2.4L fluid removed.   CT 5/18 showed moderate ascitis and abdomen distended. S/p paracentesis today 5/20-2L of clear fluid removed.  Needs to f/up hemeonc OP    Cough with mucous production  CXR shows B atelectasis / infiltrates. No fever. Neutropenic. O2 sats stable on RA  Was wheezing today a little. O2 sats still stable on RA. High risk of asp-hence stopped  po clears. Continue TF.     Nutrition  G tube placed 5/8. S continue TF per nutrition.     Hyperlipidemia on Lipitor.     Hypertension on Coreg    Hypo natremia  Mild-stable.     Dispo  Will be in hospital until Tuesday atleast-need US abd and re-assess for pleurx cath prior to hospice discharge.       DVT Prophylaxis: Low Risk/Ambulatory with no VTE prophylaxis indicated  Vásquez Catheter: Not present  Code Status: No CPR- Do NOT Intubatefull      WHITNEY WINKLER MD   Pg [phone]        ROS:  As described in A/P and Exam.  Otherwise ALL are  negative.    PHYSICAL EXAM:  All vitals have been reviewed    Blood pressure (!) 164/73, pulse 98, temperature 98.3  F (36.8  C), temperature source Oral, resp. rate 18, height 1.524 m (5'), weight 74.6 kg (164 lb 7.4 oz), SpO2 92%.    I/O this shift:  In: 600 [I.V.:600]  Out: -     GENERAL APPEARANCE: healthy, alert and no distress  EYES: conjunctiva clear, eyes grossly normal  HENT: external ears and nose normal   RESP: lungs -CTA b  CV: regular rate and rhythm, normal S1 S2, no S3 or S4 and no murmur, click or rub   ABDOMEN: soft, mild tenderness at G tube site and diffusely, no HSM or masses and bowel sounds normal  MS: no clubbing, cyanosis; no edema  SKIN: clear without significant rashes or lesions  NEURO: -non-focal moves all 4 extr    ROUTINE  LABS (Last four results)  CMP  Recent Labs   Lab 05/24/24  1127 05/24/24  0748 05/24/24  0614 05/23/24  0628 05/22/24  0504 05/21/24  0413 05/19/24  0600   NA  --   --    --  134* 131* 129* 130*   POTASSIUM  --   --   --  3.6 3.6 4.1 4.7   CHLORIDE  --   --   --  99 96* 96* 98   CO2  --   --   --  21* 24 25 23   ANIONGAP  --   --   --  14 11 8 9   * 148*  --  109* 108* 125* 197*   BUN  --   --   --  15.1 17.3 20.3 17.4   CR  --   --   --  0.67 0.61* 0.68 0.74   GFRESTIMATED  --   --   --  >90 >90 >90 90   GWEN  --   --   --  8.2* 8.1* 8.0* 7.9*   PHOS  --   --  2.0* 3.0  3.0 1.5*  --   --    PROTTOTAL  --   --   --   --   --  5.1*  --    ALBUMIN  --   --   --  2.1* 2.0* 2.0*  --    BILITOTAL  --   --   --   --   --  0.4  --    ALKPHOS  --   --   --   --   --  254*  --    AST  --   --   --   --   --  27  --    ALT  --   --   --   --   --  16  --      CBC  Recent Labs   Lab 05/23/24  0628 05/22/24  0504 05/21/24  0413 05/19/24  0600   WBC 3.6* 3.5* 6.4 3.5*   RBC 4.48 4.44 4.26* 4.37*   HGB 13.3 13.1* 12.5* 13.1*   HCT 38.4* 37.9* 36.9* 37.7*   MCV 86 85 87 86   MCH 29.7 29.5 29.3 30.0   MCHC 34.6 34.6 33.9 34.7   RDW 13.4 13.4 13.6 13.6   * 113* 106* 156     INRNo lab results found in last 7 days.  Arterial Blood GasNo lab results found in last 7 days.    No results found for this or any previous visit (from the past 24 hour(s)).

## 2024-05-24 NOTE — PROGRESS NOTES
CLINICAL NUTRITION SERVICES - BRIEF NOTE    Chart reviewed due to positive nutrition risk screen/consult for nutrition follow up.  Noted patient is on comfort cares/going home on hospice. RD will sign-off, but please re-consult if plan of care changes.     Brenda Santos RDN, LD  Clinical Dietitian  Office: [phone]  Weekend pager: [phone]

## 2024-05-24 NOTE — PROGRESS NOTES
Pt has a pain patch on but taking morphine 5mg as needed.  Working well for patient.  Liu has a suction at bedside for secretions.  All meds through feeding tube.  Pt would only like tube feeding if requested.  Pt is able to ambulate with stand by and walker.  Family here today.  Able to call for needs

## 2024-05-25 NOTE — DISCHARGE SUMMARY
[facility] Discharge Summary    Liu Ragsdale MRN# [id number]   Age: 83 year old YOB: 1940     Date of Admission:  5/17/2024  Date of Discharge::  5/25/2024  Admitting Physician:  Maurilio Maxwell MD  Discharge Physician:  Maurilio Maxwell MD  Primary Physician: Piyush Rogers Facility: N/A     Home clinic: [facility]          Admission Diagnoses:   Epigastric pain [R10.13]  Nausea vomiting and diarrhea [R11.2, R19.7]          Discharge Diagnosis:     Principle diagnosis:   Lower esophageal adenocarcinoma, status post concurrent chemoradiation  Recurrent esophageal cancer involving 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  Secondary diagnoses:  Patient Active Problem List   Diagnosis    Carotid bruit    Hyperlipidemia LDL goal <70    Benign essential hypertension, BP goal <140/90    BMI 31.0-31.9,adult    FANI (obstructive sleep apnea)    SNHL (sensory-neural hearing loss), asymmetrical    Right shoulder pain, unspecified chronicity    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    SOB (shortness of breath) on exertion    Arthritis of right glenohumeral joint- moderate    Arthritis of right acromioclavicular joint- advanced    Positive cardiac stress test    Status post coronary angiogram    Coronary artery disease involving native coronary artery of native heart without angina pectoris    Degenerative joint disease of left hip    Near syncope    Right carotid artery occlusion    Cerebrovascular accident (H)    History of ischemic stroke    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)    Upper GI bleed    Anemia, unspecified type    Jejunostomy tube in situ (H)    Esophageal dysphagia    Malignant ascites (H28)    Dehydration    Epigastric pain    Nausea vomiting and diarrhea          Brief History of Presenting Illness:   As per admit hx  Liu Ragsdale is a 83 year old male significant past medical history  of adenocarcinoma of the distal esophagus with metastasis and ascites on chemotherapy, dysphagia status post G-tube placement, CAD, CVA, hypertension, presents to the ED for generalized weakness.  He usually does not use a walker or cane but has been feeling more unstable on his feet.  He is taking nutrition through the G-tube but sometimes feels nauseous.  He denies any fever, chills, chest pain, shortness of breath, or urinary symptoms.  He has noticed a dry cough.  Paracentesis was done in the ED to check ascites fluid did not show evidence of SBP. CT abdomen pelvis with contrast was done in the ED which showed bilateral pleural effusion, pleural effusion, moderate ascites.  Labs showed a WBC of 0.8, sodium of 129.             Hospital Course:   Nausea/ vomiting/ dysphagia  Recurrent lower esophageal stricture  Likely due to esophagitis/ stricture. H/o adenoCA of distal esophagous--undergoin chemo-immunotherapy. S/p radiation.  S/p multiple dilatations in past. Last was in 4/24. Was Scheduled for EGD with dilatation today Monday. Pt having sx for months but worse last few days-has persistent epigastric discomfort. CT abd showed thickened distal esophagous. On prilosec 40mg bid at home.  Pt vomited all TF started 5/18 evening hence TF were held 5/19.   Going home with hospice. Will continue TF as in hospital. Continue all antiemetics and comfort meds     SBP  E. Coli Peritonitis   Ascitic fluid cx from 5/18 growing  lactose fermenting GNB. ANC was reported 75. Discussed with ID 5/19-recommended to rx 5-7 days.   S/p paracentesis 5/20-2L of clear fluid removed. ANC is 3899! Today. Fluid cx from 3/18 growing e coli. Discussed with ID again 5/ 20  Completed 5 days rocephin 5/24. Repeat US 5/24 showed small ascitis with septations.      Neutropenia  2ndry to chemo. Last chemo 5/14. WBC/ ANC on 5/14 were 4.0/ 2.8. Presented  now with WBC/ANC of 0.8/0.6.  No fever. Got neupogen x 1 on 5/18. 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 Status post 2 cycle 5/14. S/p multiple paracentesis in past-last was 5/6-2.4L fluid removed.   CT 5/18 showed moderate ascitis and abdomen distended. S/p paracentesis today 5/20-2L of clear fluid removed.Repeat US 5/24 showed small ascitis with septations.        Cough with mucous production  CXR shows B atelectasis / infiltrates. No fever. Neutropenic. O2 sats stable on RA  Was wheezing today a little. O2 sats still stable on RA. High risk of asp-hence stopped  po clears. Continue TF.      Nutrition  G tube placed 5/8. S continue TF per nutrition.      Hyperlipidemia on Lipitor.     Hypertension on Coreg     Hypo natremia  Mild-stable.      Dispo  Will be discharged home with hospice care.             Procedures:   No procedures performed during this admission         Allergies:      Allergies   Allergen Reactions    Ampicillin Rash    Latex Rash    Metoprolol Other (See Comments)     Side effects : dry mouth             Medications Prior to Admission:     Medications Prior to Admission   Medication Sig Dispense Refill Last Dose    atorvastatin (LIPITOR) 10 MG tablet Take 1 tablet (10 mg) by mouth daily 90 tablet 1 5/17/2024 at AM    carvedilol (COREG) 6.25 MG tablet Take 1 tablet (6.25 mg) by mouth 2 times daily (with meals) 90 tablet 0 5/17/2024 at PM    cyclobenzaprine (FLEXERIL) 5 MG tablet Take 1 tablet (5 mg) by mouth 3 times daily as needed for muscle spasms 15 tablet 0 5/17/2024 at PM    diclofenac (VOLTAREN) 1 % topical gel Place 4 g onto the skin 3 times daily as needed for moderate pain (Shoulder) 100 g 1 5/17/2024 at 1200    naloxone (NARCAN) 4 MG/0.1ML nasal spray Spray 4 mg into one nostril alternating nostrils once as needed   Unknown at on hand     nitroGLYcerin (NITROSTAT) 0.3 MG sublingual tablet For chest pain place 1 tablet under the tongue every 5 minutes for 3 doses. If symptoms persist 5 minutes after 1st dose call 911. 30 tablet 3 More than a month at on hand    omeprazole (PRILOSEC) 40 MG DR capsule Take 40 mg by mouth 2 times daily   5/17/2024    ondansetron (ZOFRAN ODT) 4 MG ODT tab Take 2 tablets (8 mg) by mouth every 8 hours as needed for nausea 60 tablet 1 5/17/2024 at noon    prochlorperazine (COMPAZINE) 10 MG tablet Take 1 tablet (10 mg) by mouth every 6 hours as needed (Nausea/Vomiting) 30 tablet 2 5/17/2024    aspirin 81 MG EC tablet Take 81 mg by mouth daily                Discharge Medications:     Current Discharge Medication List        START taking these medications    Details   benzocaine-menthol (CHLORASEPTIC) 6-10 MG lozenge Place 1 lozenge inside cheek every hour as needed for sore throat  Qty: 30 lozenge, Refills: 1    Associated Diagnoses: 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)      benzonatate (TESSALON) 200 MG capsule Take 1 capsule (200 mg) by mouth 3 times daily  Qty: 90 capsule, Refills: 0    Associated Diagnoses: 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)      buprenorphine (BUTRANS) 5 MCG/HR WK patch Place 1 patch onto the skin once a week  Qty: 3 patch, Refills: 0    Associated Diagnoses: 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)      LORazepam (ATIVAN) 0.5 MG tablet Place 1 tablet (0.5 mg) under the tongue every 8 hours as needed for anxiety, nausea or sleep  Qty: 20 tablet, Refills: 0    Associated Diagnoses: 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)      metoclopramide (REGLAN) 5 MG tablet 1.5 tablets (7.5 mg) by Oral or Feeding Tube route 4 times daily (before meals and nightly)  Qty: 120 tablet, Refills: 0    Associated Diagnoses: 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)      morphine 10 MG/5ML solution 5 mLs (10 mg) by Oral or Feeding Tube route every hour as needed for  moderate pain or pain (anxiety/ air hunger/ sob)  Qty: 100 mL, Refills: 0    Associated Diagnoses: 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)           CONTINUE these medications which have NOT CHANGED    Details   atorvastatin (LIPITOR) 10 MG tablet Take 1 tablet (10 mg) by mouth daily  Qty: 90 tablet, Refills: 1    Associated Diagnoses: Right carotid artery occlusion      carvedilol (COREG) 6.25 MG tablet Take 1 tablet (6.25 mg) by mouth 2 times daily (with meals)  Qty: 90 tablet, Refills: 0    Associated Diagnoses: Right carotid artery occlusion      cyclobenzaprine (FLEXERIL) 5 MG tablet Take 1 tablet (5 mg) by mouth 3 times daily as needed for muscle spasms  Qty: 15 tablet, Refills: 0    Associated Diagnoses: Post procedure discomfort      diclofenac (VOLTAREN) 1 % topical gel Place 4 g onto the skin 3 times daily as needed for moderate pain (Shoulder)  Qty: 100 g, Refills: 1    Associated Diagnoses: Primary osteoarthritis of right shoulder      naloxone (NARCAN) 4 MG/0.1ML nasal spray Spray 4 mg into one nostril alternating nostrils once as needed      nitroGLYcerin (NITROSTAT) 0.3 MG sublingual tablet For chest pain place 1 tablet under the tongue every 5 minutes for 3 doses. If symptoms persist 5 minutes after 1st dose call 911.  Qty: 30 tablet, Refills: 3      omeprazole (PRILOSEC) 40 MG DR capsule Take 40 mg by mouth 2 times daily      ondansetron (ZOFRAN ODT) 4 MG ODT tab Take 2 tablets (8 mg) by mouth every 8 hours as needed for nausea  Qty: 60 tablet, Refills: 1    Associated Diagnoses: 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)      prochlorperazine (COMPAZINE) 10 MG tablet Take 1 tablet (10 mg) by mouth every 6 hours as needed (Nausea/Vomiting)  Qty: 30 tablet, Refills: 2    Associated Diagnoses: 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); Malignant ascites (H28)      aspirin 81 MG EC tablet Take 81 mg by mouth daily                   Consultations:   No consultations were  requested during this admission            Discharge Exam:   Blood pressure (!) 166/82, pulse 100, temperature 98  F (36.7  C), temperature source Oral, resp. rate 18, height 1.524 m (5'), weight 73.8 kg (162 lb 11.2 oz), SpO2 94%.  GENERAL APPEARANCE: healthy, alert and no distress  EYES: conjunctiva clear, eyes grossly normal  HENT: external ears and nose normal   MS: no clubbing, cyanosis; no edema  SKIN: clear without significant rashes or lesions  NEURO: Normal strength and tone, sensory exam grossly normal, mentation intact and speech normal    Unresulted Labs Ordered in the Past 30 Days of this Admission       No orders found from 4/17/2024 to 5/18/2024.            Recent Results (from the past 24 hour(s))   US Abdomen Limited    Narrative    US ABDOMEN LIMITED 5/24/2024 1:30 PM    HISTORY: Evaluate for ascites. Recent paracentesis.    TECHNIQUE: Limited abdominal ultrasound.    COMPARISON: 5/20/2024    FINDINGS: Small amount of ascites is seen in the abdomen. A few  septations are seen in the right upper quadrant.        Impression    IMPRESSION: Small amount of ascites with some septations.      RADHA OSEGUERA MD         SYSTEM ID:  [id number]            Pending Tests at Discharge:   None         Discharge Instructions and Follow-Up:     Discharge diet: Regular   Discharge activity: Activity as tolerated   Discharge follow-up: Hospice care           Discharge Disposition:     Discharged to home      Attestation:  I have reviewed today's vital signs, notes, medications, labs and imaging.    Time Spent on this Encounter   I, Maurilio Maxwell MD, personally saw the patient today and spent greater than 30 minutes discharging this patient.    Maurilio Maxwell MD

## 2024-05-25 NOTE — PROGRESS NOTES
Care Management Discharge Note    Discharge Date: 05/25/2024       Discharge Disposition: Hospice    Discharge Services: Transportation Services    Discharge DME:  (Per [facility])    Discharge Transportation: agency    Private pay costs discussed: transportation costs    Does the patient's insurance plan have a 3 day qualifying hospital stay waiver?  No    PAS Confirmation Code:  N/A   Patient/family educated on Medicare website which has current facility and service quality ratings: yes    Education Provided on the Discharge Plan: Yes  Persons Notified of Discharge Plans: Patient, Son Yusra Maldonado Step daughter [phone], Nyla BOSE, [facility] [phone], [facility] for enteral supplies.  Patient/Family in Agreement with the Plan: yes    Handoff Referral Completed: No    Additional Information:    Plan:  Home with [facility] and 24/7 family support/care.  Transportation provided by [facility] stretcher with suction.    Met with the Patient, wife and step daughterYusra to discuss the discharge plan, they are in agreement.    Confirmed prior to discharge:  - 3 day supply of Comfort medications ordered and picked up by family.  - DME arrived at home prior to discharge.    Hospice enrollment was scheduled for today at 1-2 PM.  Due to the need for stretcher transportation with suction and late arrival of DME enrollment will be later.  Equipment arrived a 1330.      Bri Zimmer RN

## 2024-05-25 NOTE — PROGRESS NOTES
[facility] DISCHARGE NOTE    Patient discharged to home at 2:53 PM via ems. Accompanied by spouse and daughter and staff. Discharge instructions reviewed with patient, spouse, and daughter, opportunity offered to ask questions. Prescriptions sent to patients preferred pharmacy. All belongings sent with patient.    Melani High RN

## 2024-05-25 NOTE — PROGRESS NOTES
Late entry-pt ambulates with assist of 1 and gait belt.  Mouth swabbed but NPO.  All meds through the g tube were flushed before and after giving.  Portacath was de accessed prior to discharge and was heparinized. Pt has a meplilex on sacrum that was assessed under pad and only slightly pink.  Cushions pt used here were sent home with family.  Pt has slight edema on lower extremities and elevated legs when in bed.  Morphine given for pain x1 which pt felt was helpful.  Ambulance here around 1445 with family present.  All belongings sent.  Attempted to call hospice for report and left message for them to call back here.  This writer did not hear from them.  Family has discharge paperwork and has picked up medications.  This writer did check with pharmacy to make sure everything ordered can be put into feeding tube.  Only thing not able to be crushed was tessalon and pt was not taking here due to not being able to crush.  Family did not fill rx.  Pt has been alert and oriented and called for needs.

## 2024-05-25 NOTE — PLAN OF CARE
Occupational Therapy Discharge Summary    Reason for therapy discharge:    Discharged to Home with hospice    Progress towards therapy goal(s). See goals on Care Plan in Epic electronic health record for goal details.  Goals not met.  Barriers to achieving goals:   Patient transitioned to hospice.    Therapy recommendation(s):    No further therapy is recommended.    Goal Outcome Evaluation:

## 2024-05-28 ENCOUNTER — PATIENT OUTREACH (OUTPATIENT)
Dept: CARE COORDINATION | Facility: CLINIC | Age: 84
End: 2024-05-28
Payer: COMMERCIAL

## 2024-05-28 ASSESSMENT — ACTIVITIES OF DAILY LIVING (ADL): DEPENDENT_IADLS:: INDEPENDENT

## 2024-05-28 NOTE — PROGRESS NOTES
Clinic Care Coordination Contact    Situation: Patient chart reviewed by care coordinator.    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   E-mail [email or web address]   [phone]

## 2024-05-29 RX ORDER — ONDANSETRON 2 MG/ML
4 INJECTION INTRAMUSCULAR; INTRAVENOUS
Status: CANCELLED | OUTPATIENT
Start: 2024-05-29

## 2024-05-29 RX ORDER — LIDOCAINE 40 MG/G
CREAM TOPICAL
Status: CANCELLED | OUTPATIENT
Start: 2024-05-29

## 2024-05-31 ENCOUNTER — MEDICAL CORRESPONDENCE (OUTPATIENT)
Dept: HEALTH INFORMATION MANAGEMENT | Facility: CLINIC | Age: 84
End: 2024-05-31
Payer: COMMERCIAL

## 2024-06-02 ENCOUNTER — MEDICAL CORRESPONDENCE (OUTPATIENT)
Dept: HEALTH INFORMATION MANAGEMENT | Facility: CLINIC | Age: 84
End: 2024-06-02
Payer: COMMERCIAL

## 2024-06-03 RX ORDER — LIDOCAINE 40 MG/G
CREAM TOPICAL
Status: CANCELLED | OUTPATIENT
Start: 2024-06-03

## 2024-06-29 ENCOUNTER — HEALTH MAINTENANCE LETTER (OUTPATIENT)
Age: 84
End: 2024-06-29

## 2024-07-11 ENCOUNTER — PATIENT OUTREACH (OUTPATIENT)
Dept: ONCOLOGY | Facility: CLINIC | Age: 84
End: 2024-07-11
Payer: COMMERCIAL

## 2024-09-07 NOTE — ANESTHESIA CARE TRANSFER NOTE
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    Anesthesia Type:   Spinal     Note:    Oropharynx: oropharynx clear of all foreign objects  Level of Consciousness: awake and drowsy  Oxygen Supplementation: nasal cannula    Independent Airway: airway patency satisfactory and stable  Dentition: dentition unchanged  Vital Signs Stable: post-procedure vital signs reviewed and stable  Report to RN Given: handoff report given  Patient transferred to: PACU    Handoff Report: Identifed the Patient, Identified the Reponsible Provider, Reviewed the pertinent medical history, Discussed the surgical course, Reviewed Intra-OP anesthesia mangement and issues during anesthesia, Set expectations for post-procedure period and Allowed opportunity for questions and acknowledgement of understanding      Vitals: (Last set prior to Anesthesia Care Transfer)  CRNA VITALS  2/3/2021 1555 - 2/3/2021 1626      2/3/2021             Pulse:  94    SpO2:  99 %    Resp Rate (observed):  (!) 6        Electronically Signed By: Claribel Benjamin CRNA, APRN SRIKANTH  February 3, 2021  4:26 PM   2 = assistive person

## 2025-07-29 NOTE — Clinical Note
Max pressure = 17 marii. Total duration = 25 seconds.     Max pressure = 17 marii. Total duration = 23 seconds.    Balloon reinflated a second time: Max pressure = 17 marii. Total duration = 23 seconds.   Calm/Appropriate

## (undated) DEVICE — GOWN IMPERVIOUS SPECIALTY XLG/XLONG 32474

## (undated) DEVICE — CLIP HORIZON SM RED WIDE SLOT 001201

## (undated) DEVICE — SPONGE KITTNER 30-101

## (undated) DEVICE — DECANTER BAG 2002S

## (undated) DEVICE — EYE SPONGE SPEAR WECK CEL 0008685

## (undated) DEVICE — SOL WATER IRRIG 500ML BOTTLE 2F7113

## (undated) DEVICE — DRAIN JACKSON PRATT 10MM FLAT 4/4 PERF SU130-1311

## (undated) DEVICE — SUCTION MANIFOLD NEPTUNE 2 SYS 1 PORT 702-025-000

## (undated) DEVICE — TUBING SUCTION MEDI-VAC SOFT 3/16"X20' N520A

## (undated) DEVICE — SUTURE BOOTS 051003PBX

## (undated) DEVICE — MANIFOLD KIT ANGIO AUTOMATED 014613

## (undated) DEVICE — GLOVE PROTEXIS BLUE W/NEU-THERA 8.5  2D73EB85

## (undated) DEVICE — DECANTER VIAL 2006S

## (undated) DEVICE — CATH DIAG 4FR JL 4.5 538417

## (undated) DEVICE — GLOVE PROTEXIS W/NEU-THERA 8.0  2D73TE80

## (undated) DEVICE — IMM PILLOW ABDUCT HIP MED 0814-8033

## (undated) DEVICE — SU PROLENE 7-0 BV-1DA 18" 8701H

## (undated) DEVICE — INFLATION DEVICE BIG 60 ENDO-AN6012

## (undated) DEVICE — RETR ELASTIC STAYS LONE STAR SHARP 5MM 8/PACK 3311-8G

## (undated) DEVICE — SU ETHILON 3-0 PS-1 18" 1663H

## (undated) DEVICE — SUCTION CATH AIRLIFE TRI-FLO W/CONTROL PORT 14FR  T60C

## (undated) DEVICE — HOOD T4 PROTECTIVE STERI FACE SHIELD 400-800

## (undated) DEVICE — CATH GUIDING  7F MACH1 CLS3.5

## (undated) DEVICE — SYR 50ML LL W/O NDL 309653

## (undated) DEVICE — ENDO PROBE COVER ULTRASOUND BALLOON LATEX  MAJ-249

## (undated) DEVICE — GLOVE EXAM NITRILE LG PF LATEX FREE 5064

## (undated) DEVICE — BLADE KNIFE SURG 11 371111

## (undated) DEVICE — GLOVE PROTEXIS BLUE W/NEU-THERA 7.5  2D73EB75

## (undated) DEVICE — DRSG TEGADERM 2 3/8X2 3/4" 1624W

## (undated) DEVICE — CATH BALLOON NC EMERGE 2.25X12MM H7493926712220

## (undated) DEVICE — SU VICRYL 0 UR-6 27" J603H

## (undated) DEVICE — CATH BALLOON MERIT ESOPH FIVE-STAGE 17X21MMX180CM EX18

## (undated) DEVICE — CATH BALLOON NC EMERGE 2.00X15MM H7493926715200

## (undated) DEVICE — DEVICE SUTURE PASSER 14GA WECK EFX EFXSP2

## (undated) DEVICE — SU VICRYL 2-0 CT-1 36" UND J945H

## (undated) DEVICE — SU SILK 0 SH 30" K834H

## (undated) DEVICE — APPLICATORS COTTON TIP 6"X2 STERILE LF C15053-006

## (undated) DEVICE — SYR 30ML SLIP TIP W/O NDL 302833

## (undated) DEVICE — CLIP HORIZON MED BLUE 002200

## (undated) DEVICE — SUCTION IRR SYSTEM W/O TIP INTERPULSE HANDPIECE 0210-100-000

## (undated) DEVICE — TUBING SUCTION MEDI-VAC 1/4"X20' N620A

## (undated) DEVICE — LINEN TOWEL PACK X5 5464

## (undated) DEVICE — DRSG STERI STRIP 1/2X4" R1547

## (undated) DEVICE — SU VICRYL 3-0 SH CR 8X18" J774

## (undated) DEVICE — SPONGE SURGIFOAM 100 1974

## (undated) DEVICE — NDL PERC ENTRY THINWALL 18GA 7.0" G00166

## (undated) DEVICE — TUBING SMOKE EVAC PNEUMOCLEAR HIGH FLOW 0620050250

## (undated) DEVICE — BLADE SAW SAGITTAL STRK 18X90X1.27MM HD SYS 6 6118-127-090

## (undated) DEVICE — SOL WATER IRRIG 1000ML BOTTLE 07139-09

## (undated) DEVICE — CATH BALLOON MERIT ESOPH FIVE-STAGE 11X16MMX180CM EX12

## (undated) DEVICE — KIT ENDO TURNOVER/PROCEDURE CARRY-ON 101822

## (undated) DEVICE — PREP CHLORAPREP CLEAR 3ML 260400

## (undated) DEVICE — DEFIB PRO-PADZ LVP LQD GEL ADULT 8900-2105-01

## (undated) DEVICE — DRAPE MAYO STAND 23X54 8337

## (undated) DEVICE — DRAPE IOBAN INCISE 23X17" 6650EZ

## (undated) DEVICE — ENDO TROCAR SLEEVE KII Z-THREADED 12X100MM CTS22

## (undated) DEVICE — SURGICEL HEMOSTAT 4X8" 1952

## (undated) DEVICE — ESU ELEC BLADE 2.75" COATED/INSULATED E1455

## (undated) DEVICE — BONE CLEANING TIP INTERPULSE  0210-010-000

## (undated) DEVICE — GOWN IMPERVIOUS 2XL BLUE

## (undated) DEVICE — SU SILK 2-0 SH 30" K833H

## (undated) DEVICE — RAD CLOSURE ANGIOSEAL 8FR  610131

## (undated) DEVICE — CATH BALLOON EMERGE 2.0X12MM H7493918912200

## (undated) DEVICE — PREP DURAPREP 26ML APL 8630

## (undated) DEVICE — ESU GROUND PAD ADULT W/CORD E7507

## (undated) DEVICE — SU DERMABOND ADVANCED .7ML DNX12

## (undated) DEVICE — PACK NEURO MINOR UMMC SNE32MNMU4

## (undated) DEVICE — SU MONOCRYL 4-0 PS-2 27" UND Y426H

## (undated) DEVICE — CATH TRAY FOLEY SURESTEP 16FR W/URNE MTR STLK LATEX A303316A

## (undated) DEVICE — SU PROLENE 6-0 BV-1 DA 24" 8805H

## (undated) DEVICE — DEVICE RETRIEVAL ROTH NET PLATINUM UNIV 2.5MMX230CM 00715050

## (undated) DEVICE — SUCTION TIP FLEXI CLEAR TIP DISP K62

## (undated) DEVICE — ADH SKIN CLOSURE PREMIERPRO EXOFIN 1.0ML 3470

## (undated) DEVICE — TOURNIQUET VASCULAR KIT ARGYLE 8888-585000

## (undated) DEVICE — PACK TOTAL HIP W/POUCH RIVERSIDE LATEX FREE

## (undated) DEVICE — DRAPE SHEET REV FOLD 3/4 9349

## (undated) DEVICE — SOL NACL 0.9% IRRIG 1000ML BOTTLE 07138-09

## (undated) DEVICE — KIT HAND CONTROL ANGIOTOUCH ACIST 65CM AT-P65

## (undated) DEVICE — SU ETHIBOND 1 CT-1 30" X425H

## (undated) DEVICE — ENDO TROCAR FIRST ENTRY KII FIOS Z-THRD 12X100MM CTF73

## (undated) DEVICE — DRAPE POUCH INSTRUMENT 1018

## (undated) DEVICE — INSERT FOGARTY 33MM TRACTION HYDRAJAW HYDRA33

## (undated) DEVICE — ENDO BITE BLOCK ADULT OMNI-BLOC

## (undated) DEVICE — STRAP UNIVERSAL POSITIONING 2-PIECE 4X47X76" 91-287

## (undated) DEVICE — PREP CHLORAPREP 26ML TINTED ORANGE  260815

## (undated) DEVICE — GLOVE PROTEXIS W/NEU-THERA 7.0  2D73TE70

## (undated) DEVICE — VESSEL LOOPS YELLOW MAXI 31145694

## (undated) DEVICE — Device

## (undated) DEVICE — KIT ENDO FIRST STEP DISINFECTANT 200ML W/POUCH EP-4

## (undated) DEVICE — DRAIN JACKSON PRATT RESERVOIR 100ML SU130-1305

## (undated) DEVICE — SU FIBERWIRE 2 38" T-8 NDL  AR-7206

## (undated) DEVICE — SYR 30ML LL W/O NDL 302832

## (undated) DEVICE — SU VICRYL 3-0 SH 8X18" UND J864D

## (undated) DEVICE — SU SILK 3-0 TIE 12X30" A304H

## (undated) DEVICE — GLOVE PROTEXIS MICRO 7.0  2D73PM70

## (undated) DEVICE — SPONGE COTTONOID 1/2X1 1/2" 20-06S

## (undated) DEVICE — INTR PEELAWAY 18FRX15.5CM G06444 PLVW-18.0-38

## (undated) DEVICE — SOL WATER IRRIG 1000ML BOTTLE 2F7114

## (undated) DEVICE — SU STRATAFIX PDS PLUS 1 CT-1 18" SXPP1A404

## (undated) DEVICE — BLADE KNIFE SURG 15 371115

## (undated) DEVICE — DEVICE RETRIEVER HEWSON 71111579

## (undated) DEVICE — DRAPE IOBAN LG .375X23.5" 6648EZ

## (undated) DEVICE — SU VICRYL 4-0 PS-2 18" UND J496H

## (undated) DEVICE — PAD CHUX UNDERPAD 23X24" 7136

## (undated) DEVICE — INFL DVC KIT W/10CC NITRO IN4530

## (undated) DEVICE — SPECIMEN CONTAINER 3OZ W/FORMALIN 59901

## (undated) DEVICE — RETR RING LONE STAR 25X25CM 3310G

## (undated) DEVICE — SUCTION MANIFOLD NEPTUNE 2 SYS 4 PORT 0702-020-000

## (undated) DEVICE — ESU ELEC BLADE 4" COATED

## (undated) DEVICE — TOTE ANGIO CORP PC15AT SAN32CC83O

## (undated) DEVICE — ESU PENCIL SMOKE EVAC W/ROCKER SWITCH 0703-047-000

## (undated) DEVICE — SYR 01ML 27GA 0.5" NDL TBC 309623

## (undated) DEVICE — ENDO FORCEP ENDOJAW BIOPSY 2.8MMX230CM FB-220U

## (undated) DEVICE — PITCHER STERILE 1000ML  SSK9004A

## (undated) DEVICE — CLOSURE SYS SKIN PREMIERPRO EXOFIN FUSION 4X22CM STRL 3472

## (undated) DEVICE — LINEN TOWEL PACK X6 WHITE 5487

## (undated) DEVICE — TUBE JEJUNOSTOMY ENFIT 16FR 8200-16

## (undated) DEVICE — WIPES FOLEY CARE SURESTEP PROVON DFC100

## (undated) DEVICE — CATH ANGIO INFINITI 3DRC 4FRX100CM 538476

## (undated) DEVICE — SPONGE COTTONOID 1/2X3" 20-07S

## (undated) DEVICE — SU PROLENE 6-0 BV-1DA 18" 8709H

## (undated) DEVICE — SOL NACL 0.9% IRRIG 1000ML BOTTLE 2F7124

## (undated) DEVICE — 185CM VERRATA PLUS PRESSURE GUIDEWIRE

## (undated) DEVICE — SOL NACL 0.9% IRRIG 3000ML BAG 07972-08

## (undated) DEVICE — NDL ANGIOCATH 14GA 1.25" 4048

## (undated) DEVICE — IOM SUPPLIES/CASE FEE

## (undated) DEVICE — BLADE CLIPPER 4406

## (undated) DEVICE — SU SILK 2-0 TIE 12X30" A305H

## (undated) DEVICE — INTRO SHEATH 7FRX10CM PINNACLE RSS702

## (undated) DEVICE — NDL 18GA 1.5" 305196

## (undated) DEVICE — SU STRATAFIX MONOCRYL 3-0 SPIRAL PS-2 30CM SXMP1B106

## (undated) DEVICE — ENDO FORCEP BX CAPTURA PRO SPIKE G50696

## (undated) DEVICE — INTRO SHEATH 4FRX10CM PINNACLE RSS402

## (undated) DEVICE — GUIDEWIRE VASC 0.014INX190CM J TIP CGRXT190HJ

## (undated) DEVICE — SPONGE SURGIFOAM 01GM POWDER 1978

## (undated) DEVICE — STENT RESOLUTE ONYX DE 2.7FR 2.00X15MM RONYX DE20015UX: Type: IMPLANTABLE DEVICE | Status: NON-FUNCTIONAL

## (undated) DEVICE — PROTECTOR ARM ONE-STEP TRENDELENBURG 40418

## (undated) DEVICE — SU VICRYL 3-0 SH 27" UND J416H

## (undated) DEVICE — GUIDEWIRE AMPLATZ SUPER STIFF 0.035"X180CM M001465250

## (undated) DEVICE — DRSG AQUACEL AG 3.5X9.75" HYDROFIBER 412011

## (undated) RX ORDER — PROPOFOL 10 MG/ML
INJECTION, EMULSION INTRAVENOUS
Status: DISPENSED
Start: 2021-02-03

## (undated) RX ORDER — FENTANYL CITRATE 50 UG/ML
INJECTION, SOLUTION INTRAMUSCULAR; INTRAVENOUS
Status: DISPENSED
Start: 2023-01-01

## (undated) RX ORDER — METHOCARBAMOL 500 MG/1
TABLET, FILM COATED ORAL
Status: DISPENSED
Start: 2023-01-01

## (undated) RX ORDER — LIDOCAINE HYDROCHLORIDE 10 MG/ML
INJECTION, SOLUTION EPIDURAL; INFILTRATION; INTRACAUDAL; PERINEURAL
Status: DISPENSED
Start: 2021-02-03

## (undated) RX ORDER — CLINDAMYCIN PHOSPHATE 900 MG/50ML
INJECTION, SOLUTION INTRAVENOUS
Status: DISPENSED
Start: 2021-09-29

## (undated) RX ORDER — HYDROMORPHONE HYDROCHLORIDE 1 MG/ML
INJECTION, SOLUTION INTRAMUSCULAR; INTRAVENOUS; SUBCUTANEOUS
Status: DISPENSED
Start: 2021-09-29

## (undated) RX ORDER — HYDROMORPHONE HCL IN WATER/PF 6 MG/30 ML
PATIENT CONTROLLED ANALGESIA SYRINGE INTRAVENOUS
Status: DISPENSED
Start: 2023-01-01

## (undated) RX ORDER — HYDROMORPHONE HYDROCHLORIDE 1 MG/ML
INJECTION, SOLUTION INTRAMUSCULAR; INTRAVENOUS; SUBCUTANEOUS
Status: DISPENSED
Start: 2021-02-03

## (undated) RX ORDER — ONDANSETRON 2 MG/ML
INJECTION INTRAMUSCULAR; INTRAVENOUS
Status: DISPENSED
Start: 2021-02-03

## (undated) RX ORDER — DEXAMETHASONE SODIUM PHOSPHATE 4 MG/ML
INJECTION, SOLUTION INTRA-ARTICULAR; INTRALESIONAL; INTRAMUSCULAR; INTRAVENOUS; SOFT TISSUE
Status: DISPENSED
Start: 2021-09-29

## (undated) RX ORDER — PROPOFOL 10 MG/ML
INJECTION, EMULSION INTRAVENOUS
Status: DISPENSED
Start: 2023-01-01

## (undated) RX ORDER — LIDOCAINE HYDROCHLORIDE 10 MG/ML
INJECTION, SOLUTION INFILTRATION; PERINEURAL
Status: DISPENSED
Start: 2024-01-01

## (undated) RX ORDER — FENTANYL CITRATE 50 UG/ML
INJECTION, SOLUTION INTRAMUSCULAR; INTRAVENOUS
Status: DISPENSED
Start: 2021-02-03

## (undated) RX ORDER — REGADENOSON 0.08 MG/ML
INJECTION, SOLUTION INTRAVENOUS
Status: DISPENSED
Start: 2019-11-20

## (undated) RX ORDER — OXYCODONE HYDROCHLORIDE 5 MG/1
TABLET ORAL
Status: DISPENSED
Start: 2023-01-01

## (undated) RX ORDER — HYDRALAZINE HYDROCHLORIDE 20 MG/ML
INJECTION INTRAMUSCULAR; INTRAVENOUS
Status: DISPENSED
Start: 2021-09-29

## (undated) RX ORDER — DEXAMETHASONE SODIUM PHOSPHATE 4 MG/ML
INJECTION, SOLUTION INTRA-ARTICULAR; INTRALESIONAL; INTRAMUSCULAR; INTRAVENOUS; SOFT TISSUE
Status: DISPENSED
Start: 2021-02-03

## (undated) RX ORDER — ONDANSETRON 2 MG/ML
INJECTION INTRAMUSCULAR; INTRAVENOUS
Status: DISPENSED
Start: 2023-01-01

## (undated) RX ORDER — POTASSIUM CHLORIDE 1500 MG/1
TABLET, EXTENDED RELEASE ORAL
Status: DISPENSED
Start: 2020-03-10

## (undated) RX ORDER — HEPARIN SODIUM 1000 [USP'U]/ML
INJECTION, SOLUTION INTRAVENOUS; SUBCUTANEOUS
Status: DISPENSED
Start: 2020-03-10

## (undated) RX ORDER — SODIUM CHLORIDE 9 MG/ML
INJECTION, SOLUTION INTRAVENOUS
Status: DISPENSED
Start: 2021-09-29

## (undated) RX ORDER — TRANEXAMIC ACID 650 MG/1
TABLET ORAL
Status: DISPENSED
Start: 2021-02-03

## (undated) RX ORDER — HEPARIN SODIUM 200 [USP'U]/100ML
INJECTION, SOLUTION INTRAVENOUS
Status: DISPENSED
Start: 2020-03-10

## (undated) RX ORDER — CLINDAMYCIN PHOSPHATE 900 MG/50ML
INJECTION, SOLUTION INTRAVENOUS
Status: DISPENSED
Start: 2021-02-03

## (undated) RX ORDER — LABETALOL HYDROCHLORIDE 5 MG/ML
INJECTION, SOLUTION INTRAVENOUS
Status: DISPENSED
Start: 2021-09-29

## (undated) RX ORDER — FENTANYL CITRATE-0.9 % NACL/PF 10 MCG/ML
PLASTIC BAG, INJECTION (ML) INTRAVENOUS
Status: DISPENSED
Start: 2023-01-01

## (undated) RX ORDER — FENTANYL CITRATE 50 UG/ML
INJECTION, SOLUTION INTRAMUSCULAR; INTRAVENOUS
Status: DISPENSED
Start: 2020-03-10

## (undated) RX ORDER — LIDOCAINE HYDROCHLORIDE 20 MG/ML
JELLY TOPICAL
Status: DISPENSED
Start: 2023-01-01

## (undated) RX ORDER — NITROGLYCERIN 5 MG/ML
VIAL (ML) INTRAVENOUS
Status: DISPENSED
Start: 2020-03-10

## (undated) RX ORDER — LIDOCAINE HYDROCHLORIDE 10 MG/ML
INJECTION, SOLUTION EPIDURAL; INFILTRATION; INTRACAUDAL; PERINEURAL
Status: DISPENSED
Start: 2020-03-10

## (undated) RX ORDER — ACETAMINOPHEN 325 MG/1
TABLET ORAL
Status: DISPENSED
Start: 2023-01-01

## (undated) RX ORDER — GLYCOPYRROLATE 0.2 MG/ML
INJECTION, SOLUTION INTRAMUSCULAR; INTRAVENOUS
Status: DISPENSED
Start: 2023-01-01

## (undated) RX ORDER — CLINDAMYCIN PHOSPHATE 900 MG/50ML
INJECTION, SOLUTION INTRAVENOUS
Status: DISPENSED
Start: 2023-01-01

## (undated) RX ORDER — SODIUM CHLORIDE, SODIUM LACTATE, POTASSIUM CHLORIDE, CALCIUM CHLORIDE 600; 310; 30; 20 MG/100ML; MG/100ML; MG/100ML; MG/100ML
INJECTION, SOLUTION INTRAVENOUS
Status: DISPENSED
Start: 2023-01-01

## (undated) RX ORDER — BUPIVACAINE HYDROCHLORIDE AND EPINEPHRINE 2.5; 5 MG/ML; UG/ML
INJECTION, SOLUTION EPIDURAL; INFILTRATION; INTRACAUDAL; PERINEURAL
Status: DISPENSED
Start: 2023-01-01

## (undated) RX ORDER — EPHEDRINE SULFATE 50 MG/ML
INJECTION, SOLUTION INTRAMUSCULAR; INTRAVENOUS; SUBCUTANEOUS
Status: DISPENSED
Start: 2021-02-03

## (undated) RX ORDER — HEPARIN SODIUM 1000 [USP'U]/ML
INJECTION, SOLUTION INTRAVENOUS; SUBCUTANEOUS
Status: DISPENSED
Start: 2021-09-29

## (undated) RX ORDER — HEPARIN SODIUM (PORCINE) LOCK FLUSH IV SOLN 100 UNIT/ML 100 UNIT/ML
SOLUTION INTRAVENOUS
Status: DISPENSED
Start: 2024-01-01

## (undated) RX ORDER — LIDOCAINE HYDROCHLORIDE 10 MG/ML
INJECTION, SOLUTION EPIDURAL; INFILTRATION; INTRACAUDAL; PERINEURAL
Status: DISPENSED
Start: 2023-01-01

## (undated) RX ORDER — HEPARIN SODIUM 5000 [USP'U]/.5ML
INJECTION, SOLUTION INTRAVENOUS; SUBCUTANEOUS
Status: DISPENSED
Start: 2023-01-01

## (undated) RX ORDER — CEFAZOLIN SODIUM/WATER 2 G/20 ML
SYRINGE (ML) INTRAVENOUS
Status: DISPENSED
Start: 2024-01-01

## (undated) RX ORDER — LIDOCAINE HYDROCHLORIDE 20 MG/ML
INJECTION, SOLUTION EPIDURAL; INFILTRATION; INTRACAUDAL; PERINEURAL
Status: DISPENSED
Start: 2021-09-29

## (undated) RX ORDER — FENTANYL CITRATE 50 UG/ML
INJECTION, SOLUTION INTRAMUSCULAR; INTRAVENOUS
Status: DISPENSED
Start: 2021-09-29

## (undated) RX ORDER — FENTANYL CITRATE 50 UG/ML
INJECTION, SOLUTION INTRAMUSCULAR; INTRAVENOUS
Status: DISPENSED
Start: 2024-01-01

## (undated) RX ORDER — ACETAMINOPHEN 325 MG/1
TABLET ORAL
Status: DISPENSED
Start: 2021-02-03